# Patient Record
Sex: FEMALE | Race: WHITE | NOT HISPANIC OR LATINO | Employment: OTHER | URBAN - METROPOLITAN AREA
[De-identification: names, ages, dates, MRNs, and addresses within clinical notes are randomized per-mention and may not be internally consistent; named-entity substitution may affect disease eponyms.]

---

## 2017-03-07 ENCOUNTER — HISTORICAL (OUTPATIENT)
Dept: ADMINISTRATIVE | Facility: HOSPITAL | Age: 62
End: 2017-03-07

## 2017-03-13 LAB
CRP SERPL-MCNC: 0.55 MG/DL (ref 0–1.4)
URATE SERPL-MCNC: 5.5 MG/DL (ref 2.6–7.8)

## 2017-03-21 LAB — HLA B27 INTERPRETATION: NEGATIVE

## 2017-04-18 ENCOUNTER — HISTORICAL (OUTPATIENT)
Dept: ADMINISTRATIVE | Facility: HOSPITAL | Age: 62
End: 2017-04-18

## 2017-08-07 ENCOUNTER — OFFICE VISIT (OUTPATIENT)
Dept: RHEUMATOLOGY | Facility: CLINIC | Age: 62
End: 2017-08-07
Payer: MEDICAID

## 2017-08-07 VITALS
HEIGHT: 68 IN | BODY MASS INDEX: 44.41 KG/M2 | DIASTOLIC BLOOD PRESSURE: 82 MMHG | WEIGHT: 293 LBS | SYSTOLIC BLOOD PRESSURE: 141 MMHG

## 2017-08-07 DIAGNOSIS — M15.9 OSTEOARTHRITIS, GENERALIZED: Primary | ICD-10-CM

## 2017-08-07 PROCEDURE — 99213 OFFICE O/P EST LOW 20 MIN: CPT | Mod: ,,, | Performed by: INTERNAL MEDICINE

## 2017-08-07 PROCEDURE — 3008F BODY MASS INDEX DOCD: CPT | Mod: ,,, | Performed by: INTERNAL MEDICINE

## 2017-08-07 RX ORDER — CYANOCOBALAMIN 1000 UG/ML
INJECTION, SOLUTION INTRAMUSCULAR; SUBCUTANEOUS
COMMUNITY
End: 2019-02-19

## 2017-08-07 RX ORDER — EVENING PRIMROSE OIL 500 MG
CAPSULE ORAL
COMMUNITY
End: 2019-04-08

## 2017-08-07 RX ORDER — TRAMADOL HYDROCHLORIDE 50 MG/1
TABLET ORAL
COMMUNITY
End: 2018-06-07

## 2017-08-07 RX ORDER — INSULIN ASPART 100 [IU]/ML
INJECTION, SUSPENSION SUBCUTANEOUS
COMMUNITY
End: 2018-11-07

## 2017-08-07 RX ORDER — ERGOCALCIFEROL 1.25 MG/1
CAPSULE ORAL
COMMUNITY
End: 2019-02-19

## 2017-08-07 RX ORDER — GLUCOSAM/CHONDRO/HERB 149/HYAL 750-100 MG
2 TABLET ORAL NIGHTLY
COMMUNITY
End: 2019-05-20

## 2017-08-07 RX ORDER — ASPIRIN 325 MG
325 TABLET, DELAYED RELEASE (ENTERIC COATED) ORAL DAILY
Status: ON HOLD | COMMUNITY
End: 2021-08-08 | Stop reason: HOSPADM

## 2017-08-07 RX ORDER — ASCORBIC ACID 500 MG
500 TABLET ORAL DAILY
COMMUNITY
End: 2019-05-20

## 2017-08-07 RX ORDER — AMLODIPINE AND BENAZEPRIL HYDROCHLORIDE 10; 20 MG/1; MG/1
CAPSULE ORAL
COMMUNITY
End: 2017-08-23

## 2017-08-07 RX ORDER — ACETAMINOPHEN 160 MG/5ML
SUSPENSION, ORAL (FINAL DOSE FORM) ORAL
COMMUNITY
End: 2018-06-07

## 2017-08-07 RX ORDER — METOLAZONE 5 MG/1
5 TABLET ORAL DAILY
COMMUNITY
End: 2019-05-20

## 2017-08-07 RX ORDER — METFORMIN HYDROCHLORIDE 1000 MG/1
1000 TABLET ORAL 2 TIMES DAILY WITH MEALS
COMMUNITY
End: 2019-08-14 | Stop reason: SDUPTHER

## 2017-08-07 RX ORDER — DIFLUNISAL 500 MG/1
TABLET, FILM COATED ORAL
COMMUNITY
End: 2018-02-07 | Stop reason: SDUPTHER

## 2017-08-07 RX ORDER — FUROSEMIDE 40 MG/1
TABLET ORAL
COMMUNITY
End: 2017-08-23

## 2017-08-07 RX ORDER — POTASSIUM CHLORIDE 1500 MG/1
TABLET, EXTENDED RELEASE ORAL
COMMUNITY
End: 2019-02-19

## 2017-08-07 RX ORDER — METOPROLOL TARTRATE 50 MG/1
TABLET ORAL
COMMUNITY
End: 2018-02-07

## 2017-08-07 RX ORDER — BENAZEPRIL HYDROCHLORIDE 40 MG/1
TABLET ORAL
COMMUNITY
End: 2017-08-07 | Stop reason: SDUPTHER

## 2017-08-07 RX ORDER — HYDROCHLOROTHIAZIDE 50 MG/1
50 TABLET ORAL DAILY
COMMUNITY
End: 2019-05-20

## 2017-08-07 RX ORDER — DULOXETIN HYDROCHLORIDE 60 MG/1
CAPSULE, DELAYED RELEASE ORAL
COMMUNITY
End: 2017-12-14 | Stop reason: SDUPTHER

## 2017-08-07 RX ORDER — PRAVASTATIN SODIUM 40 MG/1
TABLET ORAL
COMMUNITY
End: 2017-08-23

## 2017-08-07 RX ORDER — BENAZEPRIL/HYDROCHLOROTHIAZIDE 20 MG-25MG
TABLET ORAL
COMMUNITY
End: 2017-08-23

## 2017-08-07 RX ORDER — ZOLPIDEM TARTRATE 12.5 MG/1
12.5 TABLET, FILM COATED, EXTENDED RELEASE ORAL NIGHTLY PRN
COMMUNITY
End: 2017-08-23

## 2017-08-07 NOTE — PROGRESS NOTES
[facility] RHEUMATOLOGY            PROGRESS NOTE      Subjective:       Patient ID:   NAME: Chanel Cervantes : 1955     61 y.o. female    Referring Doc: No ref. provider found  Other Physicians:    Chief Complaint:  Osteoarthritis (follow up, increased pain in both hands, increased in right with chronic pain)      History of Present Illness:     Patient returns today for a regularly scheduled follow-up visit for OA      The patient is experiencing pain in neck,PIPs,1CMC   No GI complaints  Has had 4 recent UTIs,gross hematuria  Has seen urology            ROS:   GEN:  No  fever, night sweats . weight is stable   + fatigue  SKIN: no rashes, no bruising, no ulcerations, no Raynaud's  HEENT: no HA's, No visual changes, no mucosal ulcers, no sicca symptoms except dry mouth  CV:   no CP, SOB, PND, WILSON, no orthopnea, no palpitations  PULM: normal with no SOB, cough, hemoptysis, sputum or pleuritic pain  GI:  no abdominal pain, nausea, vomiting, constipation, diarrhea, melanotic stools, BRBPR, hematemesis, no dysphagia  :   no dysuria  NEURO: no paresthesias, headaches, visual disturbances, muscle weakness  MUSCULOSKELETAL:+joint swelling 1 CMC,+ prolonged AM stiffness in hands, + back pain, + muscle pain  Allergies:  Review of patient's allergies indicates:   Allergen Reactions    Penicillins Edema     and burning sensation    Iodinated contrast- oral and iv dye Rash       Medications:    Current Outpatient Prescriptions:     amlodipine-benazepril 10-20mg (LOTREL) 10-20 mg per capsule, Take by mouth., Disp: , Rfl:     ascorbic acid, vitamin C, (VITAMIN C) 500 MG tablet, Take by mouth., Disp: , Rfl:     aspirin (ECOTRIN) 325 MG EC tablet, Take by mouth., Disp: , Rfl:     benazepril-hydrochlorthiazide (LOTENSIN HCT) 20-25 mg Tab, Take by mouth., Disp: , Rfl:     coenzyme Q10 200 mg capsule, Take by mouth., Disp: , Rfl:     cyanocobalamin (VITAMIN B-12) 1,000 mcg/mL injection, vitamin b-12 injection once a  "month Active, Disp: , Rfl:     diflunisal (DOLOBID) 500 mg Tab, Take by mouth., Disp: , Rfl:     duloxetine (CYMBALTA) 60 MG capsule, Take by mouth., Disp: , Rfl:     ergocalciferol (VITAMIN D2) 50,000 unit Cap, Take by mouth., Disp: , Rfl:     furosemide (LASIX) 40 MG tablet, Take by mouth., Disp: , Rfl:     hydrochlorothiazide (HYDRODIURIL) 25 MG tablet, Take by mouth., Disp: , Rfl:     insulin asp prt-insulin aspart, NOVOLOG 70/30, (NOVOLOG MIX 70-30) 100 unit/mL (70-30) Soln, Inject into the skin., Disp: , Rfl:     insulin lispro protamine-insulin lispro (HUMALOG MIX 75-25) 100 unit/mL (75-25) Susp, Inject into the skin., Disp: , Rfl:     insulin NPH (HUMULIN N) 100 unit/mL injection, Inject into the skin., Disp: , Rfl:     metformin (GLUCOPHAGE) 1000 MG tablet, Take by mouth., Disp: , Rfl:     metOLazone (ZAROXOLYN) 5 MG tablet, Take by mouth., Disp: , Rfl:     metoprolol tartrate (LOPRESSOR) 50 MG tablet, Take by mouth., Disp: , Rfl:     omega 3-dha-epa-fish oil (FISH OIL) 1,000 mg (120 mg-180 mg) Cap, Take by mouth., Disp: , Rfl:     potassium chloride (K-TAB) 20 mEq, Take by mouth., Disp: , Rfl:     pravastatin (PRAVACHOL) 40 MG tablet, Take by mouth., Disp: , Rfl:     tramadol (ULTRAM) 50 mg tablet, Take by mouth., Disp: , Rfl:     VITAMIN E, DL,TOCOPHERYL ACET, (VITAMIN E, DL, ACETATE,) 100 unit Cap, Take by mouth., Disp: , Rfl:     zolpidem (AMBIEN CR) 12.5 MG CR tablet, Take 12.5 mg by mouth nightly as needed for Insomnia., Disp: , Rfl:     PMHx/PSHx Updates:        Objective:     Vitals:  Blood pressure (!) 141/82, height 5' 8" (1.727 m), weight (!) 152 kg (335 lb).    Physical Examination:   GEN: no apparent distress, comfortable; AAOx3  SKIN: no rashes,no ulceration, no Raynaud's, no petechiae, no SQ nodules,  HEAD: normal  EYES: no pallor, no icterus,   NECK: no masses, thyroid normal, trachea midline, no LAD/LN's, supple  CV: RRR with no murmur; l S1 and S2 reg. ,no gallop no rubs, "   CHEST: Normal respiratory effort; CTAB; normal breath sounds; no wheeze or crackles  ABDOM: nontender and nondistended; soft; no masses; no rebound/guarding  MUSC/Skeletal: ROM normal; no crepitus; joints without synovitis,  no deformities  No joint swelling or tenderness of PIP, MCP, wrist, elbow, shoulder, or knee joints. Tenderness in both first carpometacarpal joints.  EXTREM: no clubbing, cyanosis, no edema,normal  pulses   NEURO: grossly intact; motor WNL; AAOx3; ,  PSYCH: normal mood, affect and behavior  LYMPH: normal cervical, supraclavicular          Labs:   No results found for: WBC, HGB, HCT, MCV, PLT CMP  @LASTLAB(NA,K,CL,CO2,GLU,BUN,Creatinine,Calcium,PROT,Albumin,Bilitot,Alkphos,AST,ALT,CRP,ESR,RF,CCP,LORIN,SSA,CPK,uric acid) )@  I have reviewed all available lab results and radiology reports.    Radiology/Diagnostic Studies:        Assessment/Plan:   (1) 61 y.o. female with diagnosis of Osteoarthritis. She has not been compliant with her Dolobid. Advised her to try Dolobid 500 mg 3 times a day after meals as needed. She has an appointment with orthopedic surgeon in a few days. If symptoms are not better she will most likely receive intra-articular injection. She brought copies of recent blood work, okay overall                Discussion:     I have explained all of the above in detail and the patient understands all of the current recommendation(s). I have answered all questions to the best of my ability and to their complete satisfaction.       The patient is to continue with the current management plan         RTC in   4 months      Electronically signed by Navdeep Orozco MD

## 2017-09-08 ENCOUNTER — TELEPHONE (OUTPATIENT)
Dept: SURGERY | Facility: CLINIC | Age: 62
End: 2017-09-08

## 2017-09-08 NOTE — TELEPHONE ENCOUNTER
----- Message from Felix TERRELL MD sent at 9/8/2017 11:46 AM CDT -----  Call pt u/s and marvin ok

## 2017-12-14 RX ORDER — DULOXETIN HYDROCHLORIDE 60 MG/1
60 CAPSULE, DELAYED RELEASE ORAL DAILY
Qty: 30 CAPSULE | Refills: 1 | Status: SHIPPED | OUTPATIENT
Start: 2017-12-14 | End: 2017-12-21 | Stop reason: SDUPTHER

## 2017-12-21 RX ORDER — DULOXETIN HYDROCHLORIDE 60 MG/1
60 CAPSULE, DELAYED RELEASE ORAL DAILY
Qty: 30 CAPSULE | Refills: 1 | Status: SHIPPED | OUTPATIENT
Start: 2017-12-21 | End: 2018-02-07 | Stop reason: SDUPTHER

## 2018-02-07 ENCOUNTER — OFFICE VISIT (OUTPATIENT)
Dept: RHEUMATOLOGY | Facility: CLINIC | Age: 63
End: 2018-02-07
Payer: MEDICAID

## 2018-02-07 VITALS
DIASTOLIC BLOOD PRESSURE: 78 MMHG | HEIGHT: 68 IN | SYSTOLIC BLOOD PRESSURE: 190 MMHG | WEIGHT: 293 LBS | BODY MASS INDEX: 44.41 KG/M2

## 2018-02-07 DIAGNOSIS — M19.91 PRIMARY OSTEOARTHRITIS, UNSPECIFIED SITE: Primary | ICD-10-CM

## 2018-02-07 PROCEDURE — 99213 OFFICE O/P EST LOW 20 MIN: CPT | Mod: ,,, | Performed by: INTERNAL MEDICINE

## 2018-02-07 PROCEDURE — 3008F BODY MASS INDEX DOCD: CPT | Mod: ,,, | Performed by: INTERNAL MEDICINE

## 2018-02-07 RX ORDER — CYCLOBENZAPRINE HCL 10 MG
TABLET ORAL
Qty: 30 TABLET | Refills: 1 | Status: SHIPPED | OUTPATIENT
Start: 2018-02-07 | End: 2019-05-20

## 2018-02-07 RX ORDER — DULOXETIN HYDROCHLORIDE 60 MG/1
60 CAPSULE, DELAYED RELEASE ORAL DAILY
Qty: 30 CAPSULE | Refills: 3 | Status: SHIPPED | OUTPATIENT
Start: 2018-02-07 | End: 2018-08-06 | Stop reason: SDUPTHER

## 2018-02-07 RX ORDER — DIFLUNISAL 500 MG/1
TABLET, FILM COATED ORAL
Qty: 90 TABLET | Refills: 3 | Status: SHIPPED | OUTPATIENT
Start: 2018-02-07 | End: 2019-02-11 | Stop reason: SDUPTHER

## 2018-02-07 NOTE — PROGRESS NOTES
[facility] RHEUMATOLOGY            PROGRESS NOTE      Subjective:       Patient ID:   NAME: Chanel Cervantes : 1955     62 y.o. female    Referring Doc: No ref. provider found  Other Physicians:    Chief Complaint:  No chief complaint on file.      History of Present Illness:     Patient returns today for a regularly scheduled follow-up visit for  Osteoarthritis     The patient is complaining of neck and low back pain.No paresthesias  No GI complaints  Taking meds for sinus congestion,Finished a few days of prednisone last night            ROS:   GEN:  No  fever, night sweats . weight is stable   + fatigue  SKIN: no rashes, no bruising, no ulcerations, no Raynaud's  HEENT: no HA's, No visual changes, no mucosal ulcers, no sicca symptoms,  CV:   no CP, SOB, PND, WILSON, no orthopnea, no palpitations  PULM: normal with no SOB, cough, hemoptysis, sputum or pleuritic pain  GI:  no abdominal pain, nausea, vomiting, constipation, diarrhea, melanotic stools, BRBPR, hematemesis, no dysphagia  :   no dysuria  NEURO: no paresthesias, headaches, visual disturbances, muscle weakness  MUSCULOSKELETAL:no joint swelling, prolonged AM stiffness, + back pain, + muscle pain  Allergies:  Review of patient's allergies indicates:   Allergen Reactions    Penicillins Edema     and burning sensation    Iodinated contrast- oral and iv dye Rash       Medications:    Current Outpatient Prescriptions:     ascorbic acid, vitamin C, (VITAMIN C) 500 MG tablet, Take by mouth., Disp: , Rfl:     aspirin (ECOTRIN) 325 MG EC tablet, Take by mouth., Disp: , Rfl:     benazepril (LOTENSIN) 40 MG tablet, Take 40 mg by mouth once daily., Disp: , Rfl:     coenzyme Q10 200 mg capsule, Take by mouth., Disp: , Rfl:     cyanocobalamin (VITAMIN B-12) 1,000 mcg/mL injection, vitamin b-12 injection once a month Active, Disp: , Rfl:     diflunisal (DOLOBID) 500 mg Tab, Take by mouth., Disp: , Rfl:     DULoxetine (CYMBALTA) 60 MG capsule, Take 1  "capsule (60 mg total) by mouth once daily., Disp: 30 capsule, Rfl: 1    ergocalciferol (VITAMIN D2) 50,000 unit Cap, Take by mouth., Disp: , Rfl:     estradiol (ESTRACE) 1 MG tablet, Take 1 mg by mouth 3 (three) times a week., Disp: , Rfl:     guanfacine (TENEX) 1 MG Tab, Take 1 mg by mouth every evening., Disp: , Rfl:     hydrochlorothiazide (HYDRODIURIL) 25 MG tablet, Take by mouth., Disp: , Rfl:     insulin asp prt-insulin aspart, NOVOLOG 70/30, (NOVOLOG MIX 70-30) 100 unit/mL (70-30) Soln, Inject into the skin., Disp: , Rfl:     insulin NPH (HUMULIN N) 100 unit/mL injection, Inject into the skin., Disp: , Rfl:     metformin (GLUCOPHAGE) 1000 MG tablet, Take by mouth., Disp: , Rfl:     metOLazone (ZAROXOLYN) 5 MG tablet, Take by mouth., Disp: , Rfl:     metoprolol succinate (TOPROL-XL) 50 MG 24 hr tablet, Take 100 mg by mouth once daily. , Disp: , Rfl:     omega 3-dha-epa-fish oil (FISH OIL) 1,000 mg (120 mg-180 mg) Cap, Take by mouth., Disp: , Rfl:     potassium chloride (K-TAB) 20 mEq, Take by mouth., Disp: , Rfl:     rosuvastatin (CRESTOR) 40 MG Tab, Take 40 mg by mouth every evening., Disp: , Rfl:     tramadol (ULTRAM) 50 mg tablet, Take by mouth., Disp: , Rfl:     VITAMIN E, DL,TOCOPHERYL ACET, (VITAMIN E, DL, ACETATE,) 100 unit Cap, Take by mouth., Disp: , Rfl:     zolpidem (AMBIEN) 5 MG Tab, Take 5 mg by mouth nightly as needed., Disp: , Rfl:     PMHx/PSHx Updates:    Objective:     Vitals:  Blood pressure (!) 190/78, height 5' 8" (1.727 m), weight (!) 156 kg (344 lb).    Physical Examination:   GEN: no apparent distress, comfortable; AAOx3  SKIN: no rashes,no ulceration, no Raynaud's, no petechiae, no SQ nodules,  HEAD: normal  EYES: no pallor, no icterus,  NECK: no masses, thyroid normal, trachea midline, no LAD/LN's, supple  CV: RRR with no murmur; l S1 and S2 reg. ,no gallop no rubs,   CHEST: Normal respiratory effort; CTAB; normal breath sounds; no wheeze or crackles  ABDOM: nontender " and nondistended; soft; no masses; no rebound/guarding  MUSC/Skeletal: ROM normal; no crepitus; joints without synovitis,  no deformities  No joint swelling or tenderness of PIP, MCP, wrist, elbow, shoulder, or knee joints,Spastic trapezius terry  EXTREM: no clubbing, cyanosis, no edema,normal  pulses   NEURO: grossly intact; motor WNL; AAOx3; ,   PSYCH: normal mood, affect and behavior  LYMPH: normal cervical, supraclavicular          Labs:   No results found for: WBC, HGB, HCT, MCV, PLT CMP  @LASTLAB(NA,K,CL,CO2,GLU,BUN,Creatinine,Calcium,PROT,Albumin,Bilitot,Alkphos,AST,ALT,CRP,ESR,RF,CCP,LORIN,SSA,CPK,uric acid) )@  I have reviewed all available lab results and radiology reports.    Radiology/Diagnostic Studies:        Assessment/Plan:   (1) 62 y.o. female with diagnosis of Osteoarthritis..stable  2) Cervical muscle spasm..Moist heat,Flexeril 5-10 mg tid prn  3) Elevated BP.will see PCP now.Stop any decongestants      Had labs for renal function  Add LFTs        Discussion:     I have explained all of the above in detail and the patient understands all of the current recommendation(s). I have answered all questions to the best of my ability and to their complete satisfaction.       The patient is to continue with the current management plan         RTC in   4 months      Electronically signed by Navdeep Orozco MD

## 2018-05-18 LAB
ALBUMIN SERPL-MCNC: 4.1 G/DL (ref 3.6–4.8)
ALP SERPL-CCNC: 62 IU/L (ref 39–117)
ALT SERPL-CCNC: 20 IU/L (ref 0–32)
AMBIG ABBREV HFP7 DEFAULT: NORMAL
AST SERPL-CCNC: 23 IU/L (ref 0–40)
BILIRUB DIRECT SERPL-MCNC: 0.1 MG/DL (ref 0–0.4)
BILIRUB SERPL-MCNC: 0.2 MG/DL (ref 0–1.2)
GGT SERPL-CCNC: 24 IU/L (ref 0–60)
PROT SERPL-MCNC: 6.5 G/DL (ref 6–8.5)

## 2018-06-07 ENCOUNTER — OFFICE VISIT (OUTPATIENT)
Dept: RHEUMATOLOGY | Facility: CLINIC | Age: 63
End: 2018-06-07
Payer: MEDICAID

## 2018-06-07 VITALS — BODY MASS INDEX: 52.08 KG/M2 | WEIGHT: 293 LBS | SYSTOLIC BLOOD PRESSURE: 148 MMHG | DIASTOLIC BLOOD PRESSURE: 72 MMHG

## 2018-06-07 DIAGNOSIS — M19.91 PRIMARY OSTEOARTHRITIS, UNSPECIFIED SITE: Primary | ICD-10-CM

## 2018-06-07 PROCEDURE — 99213 OFFICE O/P EST LOW 20 MIN: CPT | Mod: ,,, | Performed by: INTERNAL MEDICINE

## 2018-06-07 RX ORDER — CLONIDINE HYDROCHLORIDE 0.2 MG/1
0.3 TABLET ORAL 2 TIMES DAILY
COMMUNITY
End: 2019-08-01

## 2018-06-07 RX ORDER — AMLODIPINE BESYLATE 10 MG/1
10 TABLET ORAL DAILY
COMMUNITY
End: 2018-12-19

## 2018-06-07 NOTE — PROGRESS NOTES
[facility] RHEUMATOLOGY            PROGRESS NOTE      Subjective:       Patient ID:   NAME: Chanel Cervantes : 1955     62 y.o. female    Referring Doc: No ref. provider found  Other Physicians:    Chief Complaint:  Osteoarthritis      History of Present Illness:     Patient returns today for a regularly scheduled follow-up visit for  Osteoarthritis     The patient is complaining of pain in the right shoulder and neck pain. No paresthesias. No chest pains, cough shortness of breath. No gastrointestinal complaints. She tolerates Diflunisal well.            ROS:   GEN:  No  fever, night sweats . weight is stable   + fatigue  SKIN: no rashes, no bruising, no ulcerations, no Raynaud's  HEENT: no HA's, No visual changes, no mucosal ulcers, no sicca symptoms,  CV:   no CP, SOB, PND, WILSON, no orthopnea, no palpitations  PULM: normal with no SOB, cough, hemoptysis, sputum or pleuritic pain  GI:  no abdominal pain, nausea, vomiting, constipation, diarrhea, melanotic stools, BRBPR, hematemesis, no dysphagia  :   no dysuria  NEURO: no paresthesias, headaches, visual disturbances, muscle weakness  MUSCULOSKELETAL:no joint swelling, prolonged AM stiffness, Cervical back pain  Allergies:  Review of patient's allergies indicates:   Allergen Reactions    Penicillins Edema     and burning sensation    Iodinated contrast- oral and iv dye Rash       Medications:    Current Outpatient Prescriptions:     amLODIPine (NORVASC) 10 MG tablet, Take 10 mg by mouth once daily., Disp: , Rfl:     ascorbic acid, vitamin C, (VITAMIN C) 500 MG tablet, Take by mouth., Disp: , Rfl:     aspirin (ECOTRIN) 325 MG EC tablet, Take by mouth., Disp: , Rfl:     benazepril (LOTENSIN) 40 MG tablet, Take 40 mg by mouth once daily., Disp: , Rfl:     cloNIDine (CATAPRES) 0.3 MG tablet, Take 0.3 mg by mouth 2 (two) times daily., Disp: , Rfl:     cyanocobalamin (VITAMIN B-12) 1,000 mcg/mL injection, vitamin b-12 injection once a month Active, Disp:  , Rfl:     cyclobenzaprine (FLEXERIL) 10 MG tablet, 1/2- 1 po tid prn, Disp: 30 tablet, Rfl: 1    diflunisal (DOLOBID) 500 mg Tab, One po tid pc prn, Disp: 90 tablet, Rfl: 3    DULoxetine (CYMBALTA) 60 MG capsule, Take 1 capsule (60 mg total) by mouth once daily., Disp: 30 capsule, Rfl: 3    ergocalciferol (VITAMIN D2) 50,000 unit Cap, Take by mouth., Disp: , Rfl:     guanfacine (TENEX) 1 MG Tab, Take 1 mg by mouth every evening., Disp: , Rfl:     hydrochlorothiazide (HYDRODIURIL) 25 MG tablet, Take by mouth., Disp: , Rfl:     insulin asp prt-insulin aspart, NOVOLOG 70/30, (NOVOLOG MIX 70-30) 100 unit/mL (70-30) Soln, Inject into the skin., Disp: , Rfl:     insulin NPH (HUMULIN N) 100 unit/mL injection, Inject into the skin., Disp: , Rfl:     metformin (GLUCOPHAGE) 1000 MG tablet, Take by mouth., Disp: , Rfl:     metOLazone (ZAROXOLYN) 5 MG tablet, Take by mouth., Disp: , Rfl:     omega 3-dha-epa-fish oil (FISH OIL) 1,000 mg (120 mg-180 mg) Cap, Take by mouth., Disp: , Rfl:     potassium chloride (K-TAB) 20 mEq, Take by mouth., Disp: , Rfl:     rosuvastatin (CRESTOR) 40 MG Tab, Take 40 mg by mouth every evening., Disp: , Rfl:     VITAMIN E, DL,TOCOPHERYL ACET, (VITAMIN E, DL, ACETATE,) 100 unit Cap, Take by mouth., Disp: , Rfl:     PMHx/PSHx Updates:          Objective:     Vitals:  Blood pressure (!) 148/72, weight (!) 155.4 kg (342 lb 8 oz).    Physical Examination:   GEN: no apparent distress, comfortable; AAOx3  SKIN: no rashes,no ulceration, no Raynaud's, no petechiae, no SQ nodules,  HEAD: normal  EYES: no pallor, no icterus,  NECK: no masses, thyroid normal, trachea midline, no LAD/LN's, supple  CV: RRR with no murmur; l S1 and S2 reg. ,no gallop no rubs,   CHEST: Normal respiratory effort; CTAB; normal breath sounds; no wheeze or crackles  MUSC/Skeletal: ROM Decrease in right shoulder; no crepitus; joints without synovitis,  no deformities  No joint swelling or tenderness of PIP, MCP, wrist,  elbow, shoulder, or knee joints  EXTREM: no clubbing, cyanosis, no edema,normal  pulses   NEURO: grossly intact; motor WNL; AAOx3;  PSYCH: normal mood, affect and behavior  LYMPH: normal cervical, supraclavicular          Labs:   No results found for: WBC, HGB, HCT, MCV, PLT CMP  @LASTLAB(NA,K,CL,CO2,GLU,BUN,Creatinine,Calcium,PROT,Albumin,Bilitot,Alkphos,AST,ALT,CRP,ESR,RF,CCP,LORIN,SSA,CPK,uric acid) )@  I have reviewed all available lab results and radiology reports.    Radiology/Diagnostic Studies:    Reviewed blood work from last month: creatinine 1.04 liver tests within normal limits    Assessment/Plan:   (1) 62 y.o. female with diagnosis of Osteoarthritis. She is stable  2) right frozen shoulder. She will start physical  therapy soon.              Discussion:     I have explained all of the above in detail and the patient understands all of the current recommendation(s). I have answered all questions to the best of my ability and to their complete satisfaction.       The patient is to continue with the current management plan         RTC in   4 months or before if needed      Electronically signed by Navdeep Orozco MD

## 2018-08-06 RX ORDER — DULOXETIN HYDROCHLORIDE 60 MG/1
60 CAPSULE, DELAYED RELEASE ORAL DAILY
Qty: 30 CAPSULE | Refills: 3 | Status: SHIPPED | OUTPATIENT
Start: 2018-08-06 | End: 2018-10-29 | Stop reason: SDUPTHER

## 2018-08-13 ENCOUNTER — TELEPHONE (OUTPATIENT)
Dept: SURGERY | Facility: CLINIC | Age: 63
End: 2018-08-13

## 2018-08-13 DIAGNOSIS — R92.0 MICROCALCIFICATION OF BOTH BREASTS ON MAMMOGRAPHY: Primary | ICD-10-CM

## 2018-08-13 DIAGNOSIS — Z87.898 HISTORY OF ABNORMAL MAMMOGRAM: ICD-10-CM

## 2018-09-11 ENCOUNTER — TELEPHONE (OUTPATIENT)
Dept: SURGERY | Facility: CLINIC | Age: 63
End: 2018-09-11

## 2018-10-29 ENCOUNTER — OFFICE VISIT (OUTPATIENT)
Dept: RHEUMATOLOGY | Facility: CLINIC | Age: 63
End: 2018-10-29
Payer: MEDICAID

## 2018-10-29 VITALS
BODY MASS INDEX: 49.57 KG/M2 | HEART RATE: 59 BPM | DIASTOLIC BLOOD PRESSURE: 61 MMHG | WEIGHT: 293 LBS | SYSTOLIC BLOOD PRESSURE: 98 MMHG

## 2018-10-29 DIAGNOSIS — M15.9 PRIMARY OSTEOARTHRITIS INVOLVING MULTIPLE JOINTS: Primary | ICD-10-CM

## 2018-10-29 LAB
ALBUMIN SERPL-MCNC: 3.9 G/DL (ref 3.1–4.7)
ALP SERPL-CCNC: 56 IU/L (ref 40–104)
ALT (SGPT): 21 IU/L (ref 3–33)
AST SERPL-CCNC: 25 IU/L (ref 10–40)
BASOPHILS NFR BLD: 0.1 K/UL (ref 0–0.2)
BASOPHILS NFR BLD: 1 %
BILIRUB SERPL-MCNC: 0.6 MG/DL (ref 0.3–1)
BUN SERPL-MCNC: 15 MG/DL (ref 8–20)
CALCIUM SERPL-MCNC: 9.9 MG/DL (ref 7.7–10.4)
CHLORIDE: 99 MMOL/L (ref 98–110)
CO2 SERPL-SCNC: 30 MMOL/L (ref 22.8–31.6)
CREATININE: 1.03 MG/DL (ref 0.6–1.4)
CRP SERPL-MCNC: 1.14 MG/DL (ref 0–1.4)
EOSINOPHIL NFR BLD: 0.1 K/UL (ref 0–0.7)
EOSINOPHIL NFR BLD: 1.9 %
ERYTHROCYTE [DISTWIDTH] IN BLOOD BY AUTOMATED COUNT: 14.8 % (ref 11.7–14.9)
GLUCOSE: 139 MG/DL (ref 70–99)
GRAN #: 4.8 K/UL (ref 1.4–6.5)
GRAN%: 66.5 %
HCT VFR BLD AUTO: 45.2 % (ref 36–48)
HGB BLD-MCNC: 13.9 G/DL (ref 12–15)
IMMATURE GRANS (ABS): 0 K/UL (ref 0–1)
IMMATURE GRANULOCYTES: 0.3 %
LYMPH #: 1.5 K/UL (ref 1.2–3.4)
LYMPH%: 20.5 %
MCH RBC QN AUTO: 26.2 PG (ref 25–35)
MCHC RBC AUTO-ENTMCNC: 30.8 G/DL (ref 31–36)
MCV RBC AUTO: 85.1 FL (ref 79–98)
MONO #: 0.7 K/UL (ref 0.1–0.6)
MONO%: 9.8 %
NUCLEATED RBCS: 0 %
PLATELET # BLD AUTO: 326 K/UL (ref 140–440)
PMV BLD AUTO: 11.6 FL (ref 8.8–12.7)
POTASSIUM SERPL-SCNC: 3.7 MMOL/L (ref 3.5–5)
PROT SERPL-MCNC: 7.1 G/DL (ref 6–8.2)
RBC # BLD AUTO: 5.31 M/UL (ref 3.5–5.5)
SODIUM: 142 MMOL/L (ref 134–144)
WBC # BLD AUTO: 7.2 K/UL (ref 5–10)

## 2018-10-29 PROCEDURE — 99213 OFFICE O/P EST LOW 20 MIN: CPT | Mod: ,,, | Performed by: INTERNAL MEDICINE

## 2018-10-29 RX ORDER — DULOXETIN HYDROCHLORIDE 60 MG/1
60 CAPSULE, DELAYED RELEASE ORAL DAILY
Qty: 30 CAPSULE | Refills: 3 | Status: SHIPPED | OUTPATIENT
Start: 2018-10-29 | End: 2019-02-26 | Stop reason: SDUPTHER

## 2018-10-29 RX ORDER — METOPROLOL SUCCINATE 25 MG/1
25 TABLET, EXTENDED RELEASE ORAL DAILY
COMMUNITY
End: 2020-09-22 | Stop reason: SDUPTHER

## 2018-10-29 NOTE — PROGRESS NOTES
[facility] RHEUMATOLOGY            PROGRESS NOTE      Subjective:       Patient ID:   NAME: Chanel Cervantes : 1955     63 y.o. female    Referring Doc: No ref. provider found  Other Physicians:    Chief Complaint:  Osteoarthritis      History of Present Illness:     Patient returns today for a regularly scheduled follow-up visit for  Osteoarthritis.     The patient is doing well overall.  Planning on bariatric surgery soon.Pain in r post neck,no paresthesias.No CP,cough or SOB  No GI complaints  Finished PT for R frozen shoulder,as per pt            ROS:   GEN:  No  fever, night sweats . weight is stable  + somefatigue  SKIN: no rashes, no bruising, no ulcerations, no Raynaud's  HEENT: no HA's, No visual changes, no mucosal ulcers, no sicca symptoms,  CV:   no CP, SOB, PND, WILSON, no orthopnea, no palpitations  PULM: normal with no SOB, cough, hemoptysis, sputum or pleuritic pain  GI:  no abdominal pain, nausea, vomiting, constipation, diarrhea, melanotic stools, BRBPR, hematemesis, no dysphagia  :   no dysuria  NEURO: no paresthesias, headaches, visual disturbances, muscle weakness  MUSCULOSKELETAL:no joint swelling, prolonged AM stiffness, no back pain, + muscle pain  Allergies:  Review of patient's allergies indicates:   Allergen Reactions    Penicillins Edema     and burning sensation    Iodinated contrast- oral and iv dye Rash       Medications:    Current Outpatient Medications:     amLODIPine (NORVASC) 10 MG tablet, Take 10 mg by mouth once daily., Disp: , Rfl:     ascorbic acid, vitamin C, (VITAMIN C) 500 MG tablet, Take by mouth., Disp: , Rfl:     aspirin (ECOTRIN) 325 MG EC tablet, Take by mouth., Disp: , Rfl:     benazepril (LOTENSIN) 40 MG tablet, Take 40 mg by mouth 2 (two) times daily. , Disp: , Rfl:     cloNIDine (CATAPRES) 0.2 MG tablet, Take 0.3 mg by mouth 2 (two) times daily., Disp: , Rfl:     cyanocobalamin (VITAMIN B-12) 1,000 mcg/mL injection, vitamin b-12 injection once a  month Active, Disp: , Rfl:     cyclobenzaprine (FLEXERIL) 10 MG tablet, 1/2- 1 po tid prn, Disp: 30 tablet, Rfl: 1    diflunisal (DOLOBID) 500 mg Tab, One po tid pc prn, Disp: 90 tablet, Rfl: 3    DULoxetine (CYMBALTA) 60 MG capsule, Take 1 capsule (60 mg total) by mouth once daily., Disp: 30 capsule, Rfl: 3    ergocalciferol (VITAMIN D2) 50,000 unit Cap, Take by mouth., Disp: , Rfl:     guanfacine (TENEX) 1 MG Tab, Take 1 mg by mouth every evening., Disp: , Rfl:     hydrochlorothiazide (HYDRODIURIL) 25 MG tablet, Take by mouth., Disp: , Rfl:     insulin asp prt-insulin aspart, NOVOLOG 70/30, (NOVOLOG MIX 70-30) 100 unit/mL (70-30) Soln, Inject into the skin., Disp: , Rfl:     insulin NPH (HUMULIN N) 100 unit/mL injection, Inject into the skin., Disp: , Rfl:     metformin (GLUCOPHAGE) 1000 MG tablet, Take by mouth., Disp: , Rfl:     metOLazone (ZAROXOLYN) 5 MG tablet, Take by mouth., Disp: , Rfl:     metoprolol succinate (TOPROL-XL) 50 MG 24 hr tablet, Take 50 mg by mouth once daily., Disp: , Rfl:     omega 3-dha-epa-fish oil (FISH OIL) 1,000 mg (120 mg-180 mg) Cap, Take by mouth., Disp: , Rfl:     potassium chloride (K-TAB) 20 mEq, Take by mouth., Disp: , Rfl:     rosuvastatin (CRESTOR) 40 MG Tab, Take 40 mg by mouth every evening., Disp: , Rfl:     VITAMIN E, DL,TOCOPHERYL ACET, (VITAMIN E, DL, ACETATE,) 100 unit Cap, Take by mouth., Disp: , Rfl:     PMHx/PSHx Updates:          Objective:     Vitals:  Blood pressure 98/61, pulse (!) 59, weight (!) 147.9 kg (326 lb).    Physical Examination:   GEN: no apparent distress, comfortable; AAOx3  SKIN: no rashes,no ulceration, no Raynaud's, no petechiae, no SQ nodules,  HEAD: normal  EYES: no pallor, no icterus,   NECK: no masses, thyroid normal, trachea midline, no LAD/LN's, supple  CV: RRR with no murmur; l S1 and S2 reg. ,no gallop no rubs,   CHEST: Normal respiratory,clear  MUSC/Skeletal: ROM normal; no crepitus; joints without synovitis,  no  deformities  No joint swelling or tenderness of PIP, MCP, wrist, elbow, shoulder, or knee joints.Tenderness r trapezius.Shoulder FROM,no swelling  EXTREM: no clubbing, cyanosis, no edema,normal  pulses   NEURO: grossly intact; motor WNL; AAOx3;   PSYCH: normal mood, affect and behavior  LYMPH: normal cervical, supraclavicular          Labs:   No results found for: WBC, HGB, HCT, MCV, PLT CMP  @LASTLAB(NA,K,CL,CO2,GLU,BUN,Creatinine,Calcium,PROT,Albumin,Bilitot,Alkphos,AST,ALT,CRP,ESR,RF,CCP,LORIN,SSA,CPK,uric acid) )@  I have reviewed all available lab results and radiology reports.    Radiology/Diagnostic Studies:        Assessment/Plan:   (1) 63 y.o. female with diagnosis of osteoarthritis. She is stable          CBC CMP      Discussion:     I have explained all of the above in detail and the patient understands all of the current recommendation(s). I have answered all questions to the best of my ability and to their complete satisfaction.       The patient is to continue with the current management plan         RTC in 4 months      Electronically signed by Navdeep Orozco MD

## 2018-11-07 ENCOUNTER — OFFICE VISIT (OUTPATIENT)
Dept: FAMILY MEDICINE | Facility: CLINIC | Age: 63
End: 2018-11-07
Payer: MEDICARE

## 2018-11-07 VITALS
HEIGHT: 68 IN | RESPIRATION RATE: 20 BRPM | BODY MASS INDEX: 44.41 KG/M2 | SYSTOLIC BLOOD PRESSURE: 136 MMHG | DIASTOLIC BLOOD PRESSURE: 72 MMHG | OXYGEN SATURATION: 98 % | HEART RATE: 60 BPM | WEIGHT: 293 LBS

## 2018-11-07 DIAGNOSIS — I10 BENIGN ESSENTIAL HYPERTENSION: ICD-10-CM

## 2018-11-07 DIAGNOSIS — E11.9 TYPE 2 DIABETES MELLITUS TREATED WITH INSULIN: ICD-10-CM

## 2018-11-07 DIAGNOSIS — I87.2 VENOUS INSUFFICIENCY: ICD-10-CM

## 2018-11-07 DIAGNOSIS — Z79.4 TYPE 2 DIABETES MELLITUS TREATED WITH INSULIN: ICD-10-CM

## 2018-11-07 DIAGNOSIS — B37.2 CANDIDAL INTERTRIGO: ICD-10-CM

## 2018-11-07 DIAGNOSIS — E55.9 VITAMIN D DEFICIENCY: ICD-10-CM

## 2018-11-07 DIAGNOSIS — Z95.1 HX OF CABG: ICD-10-CM

## 2018-11-07 DIAGNOSIS — I25.2 H/O ACUTE MYOCARDIAL INFARCTION: ICD-10-CM

## 2018-11-07 DIAGNOSIS — E78.2 MIXED HYPERLIPIDEMIA: ICD-10-CM

## 2018-11-07 PROCEDURE — 99204 OFFICE O/P NEW MOD 45 MIN: CPT | Mod: ,,, | Performed by: INTERNAL MEDICINE

## 2018-11-07 RX ORDER — NYSTATIN 100000 [USP'U]/G
POWDER TOPICAL 2 TIMES DAILY
Qty: 60 G | Refills: 2 | Status: SHIPPED | OUTPATIENT
Start: 2018-11-07 | End: 2019-02-19

## 2018-11-07 RX ORDER — KETOCONAZOLE 20 MG/G
CREAM TOPICAL 2 TIMES DAILY
Qty: 60 G | Refills: 2 | Status: SHIPPED | OUTPATIENT
Start: 2018-11-07 | End: 2019-02-19

## 2018-11-07 RX ORDER — LANOLIN ALCOHOL/MO/W.PET/CERES
400 CREAM (GRAM) TOPICAL DAILY
COMMUNITY
End: 2019-05-20

## 2018-11-07 RX ORDER — INSULIN GLARGINE 300 [IU]/ML
30 INJECTION, SOLUTION SUBCUTANEOUS DAILY
Qty: 3 ML | Refills: 2 | Status: SHIPPED | OUTPATIENT
Start: 2018-11-07 | End: 2018-11-19 | Stop reason: SDUPTHER

## 2018-11-07 RX ORDER — FLUCONAZOLE 100 MG/1
100 TABLET ORAL WEEKLY
Qty: 4 TABLET | Refills: 0 | Status: SHIPPED | OUTPATIENT
Start: 2018-11-07 | End: 2018-12-07

## 2018-11-07 NOTE — PATIENT INSTRUCTIONS
Start toujeo 30 units daily.  Titrate up by 2 units every 3 days until fasting blood sugar is consistently under 130.  Please contact me via the portal every week to let me know how it's going.

## 2018-11-07 NOTE — PROGRESS NOTES
"                                                NEW ADULT PATIENT NOTE    Subjective:     Chief Complaint:  Establish Care      History of Present Illness:   Chanel Cervantes is a 63 y.o. female here to establish care with me. She is a former pt of Dr. Hernandez.    Diabetes- Pt was previously on  units QHS and novolog 70/30 80 units BID.  She reports her last HbA1c was <6%. More recently she has been on a strict diet in preparation for bariatric surgery and has not required such high doses. She also ran out of NPH and was unable to refill it as her insurance no longer prescribes it. She brought detailed logs of both glucose and insulin administration. If her blood sugar is <100 she does not take any insulin at all, if it is higher she gives herself insulin on an unspecified sliding scale.  Doses range from 30->100 units/day of 70/30.  Few lows noted in the 50's.   Rash- Pt c/o rash under her stomach. It is itchy and painful.  There are ashley where the skin has "split open." She states she is "always sweating" and can't keep the area dry.  She has tried medicated powder and bacitracin ointment w/o improvement.     Past Medical History:   Diagnosis Date    CAD (coronary artery disease)     Diabetes mellitus, type 2     MI (myocardial infarction)        History reviewed. No pertinent family history.    Social History     Socioeconomic History    Marital status:      Spouse name: Not on file    Number of children: Not on file    Years of education: Not on file    Highest education level: Not on file   Social Needs    Financial resource strain: Not on file    Food insecurity - worry: Not on file    Food insecurity - inability: Not on file    Transportation needs - medical: Not on file    Transportation needs - non-medical: Not on file   Occupational History    Not on file   Tobacco Use    Smoking status: Never Smoker    Smokeless tobacco: Never Used   Substance and Sexual Activity    Alcohol " use: No    Drug use: No    Sexual activity: Not on file   Other Topics Concern    Not on file   Social History Narrative    Not on file       ROS:  Review of Systems   Constitutional: Negative for fatigue, fever and unexpected weight change.   Respiratory: Negative for cough, shortness of breath and wheezing.    Cardiovascular: Positive for leg swelling (chronic L foot/leg).   Gastrointestinal: Negative for abdominal pain, blood in stool, constipation, diarrhea, nausea and vomiting.   Endocrine: Negative for polydipsia, polyphagia and polyuria.   Musculoskeletal: Positive for back pain, joint swelling, myalgias and neck pain.   Skin: Positive for rash.   Neurological: Negative for seizures, syncope, weakness and numbness.   Psychiatric/Behavioral: Negative for confusion.          Current Outpatient Medications:     amLODIPine (NORVASC) 10 MG tablet, Take 10 mg by mouth once daily., Disp: , Rfl:     ascorbic acid, vitamin C, (VITAMIN C) 500 MG tablet, Take by mouth., Disp: , Rfl:     aspirin (ECOTRIN) 325 MG EC tablet, Take by mouth., Disp: , Rfl:     benazepril (LOTENSIN) 40 MG tablet, Take 40 mg by mouth 2 (two) times daily. , Disp: , Rfl:     cloNIDine (CATAPRES) 0.2 MG tablet, Take 0.3 mg by mouth 2 (two) times daily., Disp: , Rfl:     cyanocobalamin (VITAMIN B-12) 1,000 mcg/mL injection, vitamin b-12 injection once a month Active, Disp: , Rfl:     cyclobenzaprine (FLEXERIL) 10 MG tablet, 1/2- 1 po tid prn, Disp: 30 tablet, Rfl: 1    diflunisal (DOLOBID) 500 mg Tab, One po tid pc prn, Disp: 90 tablet, Rfl: 3    DULoxetine (CYMBALTA) 60 MG capsule, Take 1 capsule (60 mg total) by mouth once daily., Disp: 30 capsule, Rfl: 3    ergocalciferol (VITAMIN D2) 50,000 unit Cap, Take by mouth., Disp: , Rfl:     guanfacine (TENEX) 1 MG Tab, Take 1 mg by mouth every evening., Disp: , Rfl:     hydroCHLOROthiazide (HYDRODIURIL) 50 MG tablet, Take 50 mg by mouth once daily. , Disp: , Rfl:     magnesium oxide  "(MAG-OX) 400 mg (241.3 mg magnesium) tablet, Take 400 mg by mouth once daily., Disp: , Rfl:     metformin (GLUCOPHAGE) 1000 MG tablet, Take by mouth., Disp: , Rfl:     metOLazone (ZAROXOLYN) 5 MG tablet, Take by mouth., Disp: , Rfl:     metoprolol succinate (TOPROL-XL) 50 MG 24 hr tablet, Take 50 mg by mouth once daily., Disp: , Rfl:     omega 3-dha-epa-fish oil (FISH OIL) 1,000 mg (120 mg-180 mg) Cap, Take by mouth., Disp: , Rfl:     potassium chloride (K-TAB) 20 mEq, Take by mouth., Disp: , Rfl:     rosuvastatin (CRESTOR) 40 MG Tab, Take 40 mg by mouth every evening., Disp: , Rfl:     VITAMIN E, DL,TOCOPHERYL ACET, (VITAMIN E, DL, ACETATE,) 100 unit Cap, Take by mouth., Disp: , Rfl:     DOCOSAHEXANOIC ACID ORAL, Take by mouth., Disp: , Rfl:     fluconazole (DIFLUCAN) 100 MG tablet, Take 1 tablet (100 mg total) by mouth once a week., Disp: 4 tablet, Rfl: 0    insulin glargine, TOUJEO, (TOUJEO SOLOSTAR U-300 INSULIN) 300 unit/mL (1.5 mL) InPn pen, Inject 30 Units into the skin once daily., Disp: 3 mL, Rfl: 2    ketoconazole (NIZORAL) 2 % cream, Apply topically 2 (two) times daily., Disp: 60 g, Rfl: 2    nystatin (MYCOSTATIN) powder, Apply topically 2 (two) times daily., Disp: 60 g, Rfl: 2    pen needle, diabetic (BD ULTRA-FINE ORIG PEN NEEDLE) 29 gauge x 1/2" Ndle, 1 each by Misc.(Non-Drug; Combo Route) route once daily., Disp: 100 each, Rfl: 5        Objective:       Physical Examination:     /72   Pulse 60   Resp 20   Ht 5' 8" (1.727 m)   Wt (!) 147.2 kg (324 lb 7 oz)   SpO2 98%   BMI 49.33 kg/m²     Physical Exam   Constitutional:   Pleasant, morbidly obese woman in NAD   Eyes: Conjunctivae are normal. Pupils are equal, round, and reactive to light.   Cardiovascular: Normal rate, regular rhythm, normal heart sounds and intact distal pulses.   Pulmonary/Chest: Effort normal and breath sounds normal. No respiratory distress.   Abdominal: Soft. Bowel sounds are normal. She exhibits no mass. "   Musculoskeletal: She exhibits edema (B feet, L>R with venous stasis skin changes and varicose veins).   Skin: Rash (confluent erythema with maceration, mild scaling under pannus and in groin) noted. She is not diaphoretic.              Assessment/Plan:   Chanel is a 63 y.o. female here to establish care.     Problem List Items Addressed This Visit        Cardiac/Vascular    Benign essential hypertension    Current Assessment & Plan     Per pt has been difficult to control but is normal today on HCTZ, guanfacine, clonidine, benazepril, amlodipine, metoprolol.          Relevant Orders    Comprehensive metabolic panel    Mixed hyperlipidemia    Current Assessment & Plan     Continue crestor. Check lipids.          Relevant Orders    Lipid panel    H/O acute myocardial infarction    Hx of CABG    Current Assessment & Plan     On aspirin, statin, BP control. F/u with cardiology.          Venous insufficiency    Current Assessment & Plan     Encouraged use of compression stockings.             ID    Candidal intertrigo    Current Assessment & Plan     Rx ketoconzole cream, diflucan x 4 weeks.  Nystatin powder to keep area dry.          Relevant Medications    fluconazole (DIFLUCAN) 100 MG tablet    ketoconazole (NIZORAL) 2 % cream    nystatin (MYCOSTATIN) powder       Endocrine    Type 2 diabetes mellitus treated with insulin    Current Assessment & Plan     Stop 70/30, start toujeo at 30 units daily, titrate up until fasting glucose <130. Continue metformin. HbA1c ordered.          Relevant Medications    insulin glargine, TOUJEO, (TOUJEO SOLOSTAR U-300 INSULIN) 300 unit/mL (1.5 mL) InPn pen    Other Relevant Orders    Hemoglobin A1c    Vitamin D deficiency          Health Maintenance   Topic Date Due    Hepatitis C Screening  1955    Foot Exam  08/20/1965    Eye Exam  08/20/1965    Colonoscopy  01/18/2020 (Originally 8/20/2005)    Hemoglobin A1c  02/23/2019    Lipid Panel  08/23/2019    Mammogram   09/10/2020    Pneumococcal PPSV23 (Medium Risk) (2) 05/09/2022    TETANUS VACCINE  09/21/2027    Zoster Vaccine  Completed    Influenza Vaccine  Completed       Discussion:     Follow-up in about 1 month (around 12/7/2018) for f/u diabetes.    Goals     None          Electronically signed by Mindy Funk

## 2018-11-08 RX ORDER — PEN NEEDLE, DIABETIC 29 G X1/2"
1 NEEDLE, DISPOSABLE MISCELLANEOUS DAILY
Qty: 100 EACH | Refills: 5 | Status: SHIPPED | OUTPATIENT
Start: 2018-11-08 | End: 2019-02-19

## 2018-11-08 NOTE — ASSESSMENT & PLAN NOTE
Stop 70/30, start toujeo at 30 units daily, titrate up until fasting glucose <130. Continue metformin. HbA1c ordered.

## 2018-11-08 NOTE — ASSESSMENT & PLAN NOTE
Per pt has been difficult to control but is normal today on HCTZ, guanfacine, clonidine, benazepril, amlodipine, metoprolol.

## 2018-11-12 ENCOUNTER — PATIENT MESSAGE (OUTPATIENT)
Dept: FAMILY MEDICINE | Facility: CLINIC | Age: 63
End: 2018-11-12

## 2018-11-14 ENCOUNTER — PATIENT MESSAGE (OUTPATIENT)
Dept: FAMILY MEDICINE | Facility: CLINIC | Age: 63
End: 2018-11-14

## 2018-11-16 ENCOUNTER — PATIENT MESSAGE (OUTPATIENT)
Dept: FAMILY MEDICINE | Facility: CLINIC | Age: 63
End: 2018-11-16

## 2018-11-19 ENCOUNTER — CLINICAL SUPPORT (OUTPATIENT)
Dept: FAMILY MEDICINE | Facility: CLINIC | Age: 63
End: 2018-11-19
Payer: MEDICARE

## 2018-11-19 ENCOUNTER — PATIENT MESSAGE (OUTPATIENT)
Dept: FAMILY MEDICINE | Facility: CLINIC | Age: 63
End: 2018-11-19

## 2018-11-19 DIAGNOSIS — E11.9 TYPE 2 DIABETES MELLITUS TREATED WITH INSULIN: ICD-10-CM

## 2018-11-19 DIAGNOSIS — Z79.4 TYPE 2 DIABETES MELLITUS TREATED WITH INSULIN: ICD-10-CM

## 2018-11-19 DIAGNOSIS — R30.0 DYSURIA: Primary | ICD-10-CM

## 2018-11-19 LAB
BILIRUB UR QL STRIP: NEGATIVE
GLUCOSE UR QL STRIP: POSITIVE
KETONES UR QL STRIP: NEGATIVE
LEUKOCYTE ESTERASE UR QL STRIP: NEGATIVE
PH, POC UA: 7.5 (ref 5–8.5)
POC BLOOD, URINE: NEGATIVE
POC NITRATES, URINE: NEGATIVE
PROT UR QL STRIP: NEGATIVE
SP GR UR STRIP: 1.01 (ref 1–1.03)
UROBILINOGEN UR STRIP-ACNC: NORMAL (ref 0.2–8)

## 2018-11-19 PROCEDURE — 81003 URINALYSIS AUTO W/O SCOPE: CPT | Mod: QW,,, | Performed by: INTERNAL MEDICINE

## 2018-11-19 RX ORDER — INSULIN GLARGINE 300 [IU]/ML
55 INJECTION, SOLUTION SUBCUTANEOUS DAILY
Qty: 3 ML | Refills: 2
Start: 2018-11-19 | End: 2018-11-26

## 2018-11-19 NOTE — PROGRESS NOTES
"Pt came in for Urine collection R/T dysuria. Urine Clean Catch collected. Positive Glucose. MD notified with orders for C&S. Pt educated on the use of her Toujeo Pen and the increase in dose to 55 units. Pt performed admin and noted some education to proper medication admin was needed. Educated pt on cleaning surface of site letting dry and pt stated, "the site I use is the ABD and I do alternate sites". PT educated to clean pen before placing on needle and priming needle with 2 units before dialing up dose. Pt educated to stick pen level at site and to hold for at least 8 seconds after the click to ensure delivery. Pt performed steps on mock pen kit. Pt verbally stated the steps properly. Educated on s/s of Ketoacidosis and verbally stated back s/s. Pt educated on chair exercises and water aerobics to help with diet and medication regime. Pt educated on what were carbs and what could change into glucose and to try to eat those more during the day in moderation. Pt stated, "I will increase my Toujeo to 55 units tonight. If I do not see a gradual change I will send a message on my Portal Wednesday. I understand may take up to two weeks to level out. I will meet with my dietician on Friday". Pt educated that her urine will be sent for Cx today if she will need an Abt we will inform her when the results come back. Pt stated verbal understanding.  "

## 2018-11-21 ENCOUNTER — PATIENT MESSAGE (OUTPATIENT)
Dept: FAMILY MEDICINE | Facility: CLINIC | Age: 63
End: 2018-11-21

## 2018-11-21 DIAGNOSIS — N30.90 CYSTITIS WITHOUT HEMATURIA: Primary | ICD-10-CM

## 2018-11-21 RX ORDER — NITROFURANTOIN 25; 75 MG/1; MG/1
100 CAPSULE ORAL 2 TIMES DAILY
Qty: 14 CAPSULE | Refills: 0 | Status: SHIPPED | OUTPATIENT
Start: 2018-11-21 | End: 2018-11-28

## 2018-11-26 ENCOUNTER — PATIENT MESSAGE (OUTPATIENT)
Dept: FAMILY MEDICINE | Facility: CLINIC | Age: 63
End: 2018-11-26

## 2018-11-26 DIAGNOSIS — Z79.4 TYPE 2 DIABETES MELLITUS TREATED WITH INSULIN: ICD-10-CM

## 2018-11-26 DIAGNOSIS — E11.9 TYPE 2 DIABETES MELLITUS TREATED WITH INSULIN: ICD-10-CM

## 2018-11-26 RX ORDER — INSULIN GLARGINE 300 [IU]/ML
65 INJECTION, SOLUTION SUBCUTANEOUS DAILY
Qty: 3 SYRINGE | Refills: 2 | Status: SHIPPED | OUTPATIENT
Start: 2018-11-26 | End: 2018-12-14 | Stop reason: SDUPTHER

## 2018-11-27 ENCOUNTER — PATIENT MESSAGE (OUTPATIENT)
Dept: FAMILY MEDICINE | Facility: CLINIC | Age: 63
End: 2018-11-27

## 2018-11-28 DIAGNOSIS — Z79.4 TYPE 2 DIABETES MELLITUS TREATED WITH INSULIN: Primary | ICD-10-CM

## 2018-11-28 DIAGNOSIS — E11.9 TYPE 2 DIABETES MELLITUS TREATED WITH INSULIN: Primary | ICD-10-CM

## 2018-11-28 RX ORDER — INSULIN PUMP SYRINGE, 3 ML
EACH MISCELLANEOUS
Qty: 1 EACH | Refills: 0 | Status: SHIPPED | OUTPATIENT
Start: 2018-11-28 | End: 2023-02-16

## 2018-11-30 ENCOUNTER — PATIENT MESSAGE (OUTPATIENT)
Dept: FAMILY MEDICINE | Facility: CLINIC | Age: 63
End: 2018-11-30

## 2018-12-05 ENCOUNTER — PATIENT MESSAGE (OUTPATIENT)
Dept: FAMILY MEDICINE | Facility: CLINIC | Age: 63
End: 2018-12-05

## 2018-12-07 ENCOUNTER — PATIENT MESSAGE (OUTPATIENT)
Dept: FAMILY MEDICINE | Facility: CLINIC | Age: 63
End: 2018-12-07

## 2018-12-07 DIAGNOSIS — E11.9 TYPE 2 DIABETES MELLITUS TREATED WITH INSULIN: Primary | ICD-10-CM

## 2018-12-07 DIAGNOSIS — Z79.4 TYPE 2 DIABETES MELLITUS TREATED WITH INSULIN: Primary | ICD-10-CM

## 2018-12-11 ENCOUNTER — PATIENT MESSAGE (OUTPATIENT)
Dept: FAMILY MEDICINE | Facility: CLINIC | Age: 63
End: 2018-12-11

## 2018-12-14 ENCOUNTER — PATIENT MESSAGE (OUTPATIENT)
Dept: FAMILY MEDICINE | Facility: CLINIC | Age: 63
End: 2018-12-14

## 2018-12-14 DIAGNOSIS — E11.9 TYPE 2 DIABETES MELLITUS TREATED WITH INSULIN: ICD-10-CM

## 2018-12-14 DIAGNOSIS — Z79.4 TYPE 2 DIABETES MELLITUS TREATED WITH INSULIN: ICD-10-CM

## 2018-12-14 RX ORDER — INSULIN GLARGINE 300 [IU]/ML
80 INJECTION, SOLUTION SUBCUTANEOUS DAILY
Qty: 3 SYRINGE | Refills: 2 | Status: SHIPPED | OUTPATIENT
Start: 2018-12-14 | End: 2019-02-19

## 2018-12-19 ENCOUNTER — OFFICE VISIT (OUTPATIENT)
Dept: FAMILY MEDICINE | Facility: CLINIC | Age: 63
End: 2018-12-19
Payer: MEDICARE

## 2018-12-19 VITALS
OXYGEN SATURATION: 97 % | HEART RATE: 51 BPM | RESPIRATION RATE: 18 BRPM | SYSTOLIC BLOOD PRESSURE: 118 MMHG | BODY MASS INDEX: 44.41 KG/M2 | WEIGHT: 293 LBS | HEIGHT: 68 IN | DIASTOLIC BLOOD PRESSURE: 60 MMHG

## 2018-12-19 DIAGNOSIS — J06.9 VIRAL URI WITH COUGH: ICD-10-CM

## 2018-12-19 DIAGNOSIS — Z79.4 TYPE 2 DIABETES MELLITUS TREATED WITH INSULIN: Primary | ICD-10-CM

## 2018-12-19 DIAGNOSIS — E11.9 TYPE 2 DIABETES MELLITUS TREATED WITH INSULIN: Primary | ICD-10-CM

## 2018-12-19 PROCEDURE — 99213 OFFICE O/P EST LOW 20 MIN: CPT | Mod: ,,, | Performed by: INTERNAL MEDICINE

## 2018-12-19 RX ORDER — FLUTICASONE PROPIONATE 50 MCG
1 SPRAY, SUSPENSION (ML) NASAL DAILY
Qty: 16 G | Refills: 0 | Status: SHIPPED | OUTPATIENT
Start: 2018-12-19 | End: 2021-10-11

## 2018-12-19 RX ORDER — AMLODIPINE BESYLATE 5 MG/1
5 TABLET ORAL DAILY
COMMUNITY
Start: 2018-12-04 | End: 2019-11-22

## 2018-12-19 RX ORDER — CODEINE PHOSPHATE AND GUAIFENESIN 10; 100 MG/5ML; MG/5ML
5 SOLUTION ORAL 3 TIMES DAILY PRN
Qty: 236 ML | Refills: 0 | Status: SHIPPED | OUTPATIENT
Start: 2018-12-19 | End: 2018-12-29

## 2018-12-19 RX ORDER — GUANFACINE 2 MG/1
2 TABLET ORAL NIGHTLY
COMMUNITY
Start: 2018-12-12 | End: 2019-05-20 | Stop reason: SDUPTHER

## 2018-12-19 NOTE — ASSESSMENT & PLAN NOTE
Advised symptomatic care with nasal saline spray nose often, ibuprofen or acetaminophen for fever or headache, honey or prescription cough syrup for cough.  Flonase x 2 weeks to clear up post nasal drip.  Explained expected course with patient, and explained no need for antibiotics at this time. Return to clinic if persistent fever or new symptoms such as ear pain, sinus pressure, shortness of breath or wheezing.

## 2018-12-19 NOTE — PROGRESS NOTES
ADULT FOLLOW-UP VISIT    Subjective:     Chief Complaint:  Follow-up; Diabetes; and Nasal Congestion      62 yo woman here for follow-up. She has been following up with me closely via the portal regarding her blood sugars after we changed her insulin regimen.  She has titrated up to toujeo 80 units daily, and we added tradjenta about 1 week ago. Since that time, all her fasting and post prandial blood sugars have been in range.  She is happy with current simplified regimen. She is still eating a restricted diet in preparation for bariatric surgery.  She c/o URI symptoms including sore throat, cough and runny nose for the past 3 days. No fever. Feels a little better today. OTC cold meds helping, but pt having trouble sleeping due to tickle in her throat/cough at night.           Ms. Cervantes  has a past medical history of CAD (coronary artery disease), Diabetes mellitus, type 2, and MI (myocardial infarction).    Family history and social history are reviewed and there are no significant changes.     ROS:  Review of Systems   HENT: Positive for postnasal drip, rhinorrhea, sneezing and sore throat. Negative for ear discharge, ear pain, facial swelling, sinus pressure and sinus pain.    Respiratory: Positive for cough. Negative for chest tightness, shortness of breath, wheezing and stridor.    Cardiovascular: Negative for chest pain and palpitations.   Endocrine: Negative for polydipsia, polyphagia and polyuria.   Neurological: Negative for dizziness, syncope, weakness and numbness.          Current Outpatient Medications:     amLODIPine (NORVASC) 5 MG tablet, , Disp: , Rfl:     ascorbic acid, vitamin C, (VITAMIN C) 500 MG tablet, Take by mouth., Disp: , Rfl:     aspirin (ECOTRIN) 325 MG EC tablet, Take by mouth., Disp: , Rfl:     benazepril (LOTENSIN) 40 MG tablet, Take 40 mg by mouth 2 (two) times daily. , Disp: , Rfl:     blood sugar diagnostic Strp, Use 4 times per day as  "directed to check blood sugar. (one touch delica), Disp: 200 each, Rfl: 5    blood-glucose meter kit, Use as instructed, Disp: 1 each, Rfl: 0    cloNIDine (CATAPRES) 0.2 MG tablet, Take 0.3 mg by mouth 2 (two) times daily., Disp: , Rfl:     cyanocobalamin (VITAMIN B-12) 1,000 mcg/mL injection, vitamin b-12 injection once a month Active, Disp: , Rfl:     cyclobenzaprine (FLEXERIL) 10 MG tablet, 1/2- 1 po tid prn, Disp: 30 tablet, Rfl: 1    diflunisal (DOLOBID) 500 mg Tab, One po tid pc prn, Disp: 90 tablet, Rfl: 3    DOCOSAHEXANOIC ACID ORAL, Take by mouth., Disp: , Rfl:     DULoxetine (CYMBALTA) 60 MG capsule, Take 1 capsule (60 mg total) by mouth once daily., Disp: 30 capsule, Rfl: 3    ergocalciferol (VITAMIN D2) 50,000 unit Cap, Take by mouth., Disp: , Rfl:     guanFACINE (TENEX) 2 MG tablet, , Disp: , Rfl:     hydroCHLOROthiazide (HYDRODIURIL) 50 MG tablet, Take 50 mg by mouth once daily. , Disp: , Rfl:     insulin glargine U-300 conc (TOUJEO MAX U-300 SOLOSTAR) 300 unit/mL (3 mL) InPn, Inject 80 Units into the skin once daily., Disp: 3 Syringe, Rfl: 2    ketoconazole (NIZORAL) 2 % cream, Apply topically 2 (two) times daily., Disp: 60 g, Rfl: 2    linagliptin (TRADJENTA) 5 mg Tab tablet, Take 1 tablet (5 mg total) by mouth once daily., Disp: 30 tablet, Rfl: 3    magnesium oxide (MAG-OX) 400 mg (241.3 mg magnesium) tablet, Take 400 mg by mouth once daily., Disp: , Rfl:     metformin (GLUCOPHAGE) 1000 MG tablet, Take by mouth., Disp: , Rfl:     metOLazone (ZAROXOLYN) 5 MG tablet, Take by mouth., Disp: , Rfl:     metoprolol succinate (TOPROL-XL) 50 MG 24 hr tablet, Take 50 mg by mouth once daily., Disp: , Rfl:     nystatin (MYCOSTATIN) powder, Apply topically 2 (two) times daily., Disp: 60 g, Rfl: 2    omega 3-dha-epa-fish oil (FISH OIL) 1,000 mg (120 mg-180 mg) Cap, Take by mouth., Disp: , Rfl:     pen needle, diabetic (BD ULTRA-FINE ORIG PEN NEEDLE) 29 gauge x 1/2" Ndle, 1 each by " "Misc.(Non-Drug; Combo Route) route once daily., Disp: 100 each, Rfl: 5    potassium chloride (K-TAB) 20 mEq, Take by mouth., Disp: , Rfl:     rosuvastatin (CRESTOR) 40 MG Tab, Take 40 mg by mouth every evening., Disp: , Rfl:     VITAMIN E, DL,TOCOPHERYL ACET, (VITAMIN E, DL, ACETATE,) 100 unit Cap, Take by mouth., Disp: , Rfl:     fluticasone (FLONASE) 50 mcg/actuation nasal spray, 1 spray (50 mcg total) by Each Nare route once daily., Disp: 16 g, Rfl: 0    guaifenesin-codeine 100-10 mg/5 ml (TUSSI-ORGANIDIN NR)  mg/5 mL syrup, Take 5 mLs by mouth 3 (three) times daily as needed for Cough., Disp: 236 mL, Rfl: 0    ONETOUCH DELICA LANCETS 33 gauge Misc, , Disp: , Rfl:       Objective:     Physical Examination:     /60   Pulse (!) 51   Resp 18   Ht 5' 8" (1.727 m)   Wt (!) 138.1 kg (304 lb 8 oz)   SpO2 97%   BMI 46.30 kg/m²     Physical Exam   Constitutional: She appears well-developed and well-nourished. No distress.   HENT:   Head: Normocephalic and atraumatic.   Right Ear: Tympanic membrane normal.   Left Ear: Tympanic membrane normal.   Nose: Mucosal edema and rhinorrhea present.   Mouth/Throat: Oropharynx is clear and moist and mucous membranes are normal. No oropharyngeal exudate, posterior oropharyngeal edema or posterior oropharyngeal erythema.   Eyes: Conjunctivae are normal. Pupils are equal, round, and reactive to light.   Cardiovascular: Normal rate, regular rhythm, normal heart sounds and intact distal pulses.   No murmur heard.  Pulmonary/Chest: Effort normal and breath sounds normal. She has no wheezes. She has no rales.   Musculoskeletal: She exhibits no edema.   Lymphadenopathy:     She has cervical adenopathy.   Skin: She is not diaphoretic.       Assessment/Plan:   Chanel is a 63 y.o. female here for follow-up.    Problem List Items Addressed This Visit        ENT    Viral URI with cough    Current Assessment & Plan     Advised symptomatic care with nasal saline spray nose " often, ibuprofen or acetaminophen for fever or headache, honey or prescription cough syrup for cough.  Flonase x 2 weeks to clear up post nasal drip.  Explained expected course with patient, and explained no need for antibiotics at this time. Return to clinic if persistent fever or new symptoms such as ear pain, sinus pressure, shortness of breath or wheezing.             Relevant Medications    fluticasone (FLONASE) 50 mcg/actuation nasal spray    guaifenesin-codeine 100-10 mg/5 ml (TUSSI-ORGANIDIN NR)  mg/5 mL syrup       Endocrine    Type 2 diabetes mellitus treated with insulin - Primary    Current Assessment & Plan     Continue metformin, toujeo 80 units QHS and tradjenta.  Labs including HbA1c ordered.                Health Maintenance   Topic Date Due    Hepatitis C Screening  1955    Foot Exam  08/20/1965    Eye Exam  08/20/1965    Colonoscopy  01/18/2020 (Originally 8/20/2005)    Hemoglobin A1c  02/23/2019    Lipid Panel  08/23/2019    Mammogram  09/10/2020    TETANUS VACCINE  09/21/2027    Zoster Vaccine  Completed    Pneumococcal Vaccine (Medium Risk)  Completed    Influenza Vaccine  Completed           Discussion:     Follow-up in about 2 months (around 2/19/2019) for diabetes, HTN.    Goals     None          Electronically signed by Mindy Funk

## 2019-01-14 LAB
ALBUMIN SERPL-MCNC: 3.8 G/DL (ref 3.1–4.7)
ALP SERPL-CCNC: 51 IU/L (ref 40–104)
ALT (SGPT): 23 IU/L (ref 3–33)
AST SERPL-CCNC: 26 IU/L (ref 10–40)
BILIRUB SERPL-MCNC: 0.9 MG/DL (ref 0.3–1)
BUN SERPL-MCNC: 18 MG/DL (ref 8–20)
CALCIUM SERPL-MCNC: 10 MG/DL (ref 7.7–10.4)
CHLORIDE: 96 MMOL/L (ref 98–110)
CO2 SERPL-SCNC: 32.3 MMOL/L (ref 22.8–31.6)
CREATININE: 0.99 MG/DL (ref 0.6–1.4)
GLUCOSE: 177 MG/DL (ref 70–99)
HBA1C MFR BLD: 6.9 % (ref 3.1–6.5)
POTASSIUM SERPL-SCNC: 3.7 MMOL/L (ref 3.5–5)
PROT SERPL-MCNC: 7.2 G/DL (ref 6–8.2)
SODIUM: 141 MMOL/L (ref 134–144)

## 2019-01-15 ENCOUNTER — PATIENT MESSAGE (OUTPATIENT)
Dept: FAMILY MEDICINE | Facility: CLINIC | Age: 64
End: 2019-01-15

## 2019-01-22 ENCOUNTER — PATIENT MESSAGE (OUTPATIENT)
Dept: FAMILY MEDICINE | Facility: CLINIC | Age: 64
End: 2019-01-22

## 2019-01-29 ENCOUNTER — PATIENT MESSAGE (OUTPATIENT)
Dept: FAMILY MEDICINE | Facility: CLINIC | Age: 64
End: 2019-01-29

## 2019-02-11 RX ORDER — DIFLUNISAL 500 MG/1
TABLET, FILM COATED ORAL
Qty: 90 TABLET | Refills: 3 | Status: SHIPPED | OUTPATIENT
Start: 2019-02-11 | End: 2019-02-26 | Stop reason: SDUPTHER

## 2019-02-14 RX ORDER — CLARITHROMYCIN 500 MG/1
TABLET, FILM COATED ORAL
COMMUNITY
Start: 2019-02-07 | End: 2019-02-19

## 2019-02-14 RX ORDER — PREDNISONE 20 MG/1
TABLET ORAL
COMMUNITY
Start: 2019-01-24 | End: 2019-02-19

## 2019-02-14 RX ORDER — OMEPRAZOLE 40 MG/1
CAPSULE, DELAYED RELEASE ORAL
COMMUNITY
Start: 2019-02-07 | End: 2019-02-19

## 2019-02-14 RX ORDER — POTASSIUM CHLORIDE 1500 MG/1
TABLET, EXTENDED RELEASE ORAL
COMMUNITY
Start: 2019-01-14 | End: 2019-03-14 | Stop reason: SDUPTHER

## 2019-02-14 RX ORDER — HUMAN INSULIN 100 [IU]/ML
INJECTION, SOLUTION SUBCUTANEOUS
COMMUNITY
Start: 2019-02-07 | End: 2019-02-26

## 2019-02-19 ENCOUNTER — OFFICE VISIT (OUTPATIENT)
Dept: FAMILY MEDICINE | Facility: CLINIC | Age: 64
End: 2019-02-19
Payer: MEDICARE

## 2019-02-19 VITALS
WEIGHT: 293 LBS | BODY MASS INDEX: 44.41 KG/M2 | HEART RATE: 75 BPM | TEMPERATURE: 98 F | HEIGHT: 68 IN | RESPIRATION RATE: 17 BRPM | SYSTOLIC BLOOD PRESSURE: 124 MMHG | OXYGEN SATURATION: 96 % | DIASTOLIC BLOOD PRESSURE: 58 MMHG

## 2019-02-19 DIAGNOSIS — G47.09 OTHER INSOMNIA: ICD-10-CM

## 2019-02-19 DIAGNOSIS — Z79.4 TYPE 2 DIABETES MELLITUS TREATED WITH INSULIN: ICD-10-CM

## 2019-02-19 DIAGNOSIS — I10 BENIGN ESSENTIAL HYPERTENSION: ICD-10-CM

## 2019-02-19 DIAGNOSIS — E11.9 TYPE 2 DIABETES MELLITUS TREATED WITH INSULIN: ICD-10-CM

## 2019-02-19 DIAGNOSIS — I25.2 H/O ACUTE MYOCARDIAL INFARCTION: Primary | ICD-10-CM

## 2019-02-19 DIAGNOSIS — B37.31 VAGINA, CANDIDIASIS: ICD-10-CM

## 2019-02-19 DIAGNOSIS — Z95.1 HX OF CABG: ICD-10-CM

## 2019-02-19 PROCEDURE — 99214 PR OFFICE/OUTPT VISIT, EST, LEVL IV, 30-39 MIN: ICD-10-PCS | Mod: ,,, | Performed by: INTERNAL MEDICINE

## 2019-02-19 PROCEDURE — 99214 OFFICE O/P EST MOD 30 MIN: CPT | Mod: ,,, | Performed by: INTERNAL MEDICINE

## 2019-02-19 RX ORDER — FLUCONAZOLE 150 MG/1
150 TABLET ORAL DAILY
Qty: 2 TABLET | Refills: 1 | Status: SHIPPED | OUTPATIENT
Start: 2019-02-19 | End: 2019-04-08

## 2019-02-19 RX ORDER — INSULIN GLARGINE 100 [IU]/ML
80 INJECTION, SOLUTION SUBCUTANEOUS DAILY
Qty: 24 ML | Refills: 2 | Status: SHIPPED | OUTPATIENT
Start: 2019-02-19 | End: 2019-06-21 | Stop reason: SDUPTHER

## 2019-02-19 RX ORDER — METRONIDAZOLE 500 MG/1
500 TABLET ORAL 3 TIMES DAILY
COMMUNITY
End: 2019-02-26

## 2019-02-19 NOTE — ASSESSMENT & PLAN NOTE
Recently out of control 2/2 need for steroids. Blood sugars improving. Continue metformin, toujeo 80 units QHS and tradjenta. When out of toujeo will change to basaglar for insurance. HbAic 6.9% last check.  Pt to call with updated glucose log.

## 2019-02-19 NOTE — PROGRESS NOTES
I attest that I have reviewed the student note and that the components of the history of present illness, the physical exam, and the assessment and plan documented were performed by me or were performed in my presence by the student. I have verified (and addended if appropriate) the documentation and performed (or re-performed) the exam and medical decision making.    Problem List Items Addressed This Visit        Cardiac/Vascular    Benign essential hypertension    Current Assessment & Plan     Well controlled on HCTZ, guanfacine, clonidine, benazepril, amlodipine, metoprolol.          H/O acute myocardial infarction - Primary    Hx of CABG    Current Assessment & Plan     On aspirin, statin, BP control. F/u with cardiology. S/p recent angiogram with Dr. Carlin.             Endocrine    Type 2 diabetes mellitus treated with insulin    Current Assessment & Plan     Recently out of control 2/2 need for steroids. Blood sugars improving. Continue metformin, toujeo 80 units QHS and tradjenta. When out of toujeo will change to basaglar for insurance. HbAic 6.9% last check.  Pt to call with updated glucose log.          Relevant Medications    NOVOLIN R REGULAR U-100 INSULN 100 unit/mL injection    insulin (BASAGLAR KWIKPEN U-100 INSULIN) glargine 100 units/mL (3mL) SubQ pen       Other    Other insomnia    Current Assessment & Plan     Trial of OTC meds. Once established on CPAP can try rx meds if needed.            Other Visit Diagnoses     Vagina, candidiasis        Relevant Medications    fluconazole (DIFLUCAN) 150 MG Tab

## 2019-02-19 NOTE — MEDICAL/APP STUDENT
ADULT FOLLOW-UP VISIT    Subjective:     Chief Complaint:  Follow-up and Diabetes      Mrs. Cervantes is a 63 yoF here for f/u of diabetes. She has completed her work-up for bariatric surgery and is waiting on a CPAP machine. During her angiogram as part of the work-up, she had an adverse reaction to the contrast and was on prednisone and medrol patch for 1 week. Her blood sugars were elevated 2/2 steroid-use and her cardiologist added Novolin which she is using at dinner time (8-10units) on top of Toujeo 80units/day and Tradjenta. Her AM glucose levels have been <120.      She is currently on triple therapy for H. Pylori infection. She reports a funny taste and yellowing of her tongue after she started the medications. She also reports trouble falling asleep. It takes her ~3 hours to fall asleep.          Ms. Cervantes  has a past medical history of CAD (coronary artery disease), Diabetes mellitus, type 2, and MI (myocardial infarction).    Family history and social history are reviewed and there are no significant changes.     ROS:  Review of Systems   Constitutional: Negative for activity change, fatigue and fever.   HENT: Negative for congestion and postnasal drip.         Dysgeusia   Respiratory: Negative for cough and shortness of breath.    Cardiovascular: Negative for chest pain and palpitations.   Gastrointestinal: Negative for abdominal pain, constipation and diarrhea.   Genitourinary: Negative for difficulty urinating.   Skin:        Skin is still peeling after adverse reaction to contrast   Neurological: Negative for light-headedness and headaches.   Psychiatric/Behavioral: Positive for sleep disturbance.          Current Outpatient Medications:     amLODIPine (NORVASC) 5 MG tablet, , Disp: , Rfl:     ascorbic acid, vitamin C, (VITAMIN C) 500 MG tablet, Take by mouth., Disp: , Rfl:     aspirin (ECOTRIN) 325 MG EC tablet, Take by mouth., Disp: , Rfl:     benazepril  (LOTENSIN) 40 MG tablet, Take 40 mg by mouth 2 (two) times daily. , Disp: , Rfl:     blood sugar diagnostic Strp, Use 4 times per day as directed to check blood sugar. (one touch delica), Disp: 200 each, Rfl: 5    blood-glucose meter kit, Use as instructed, Disp: 1 each, Rfl: 0    cloNIDine (CATAPRES) 0.2 MG tablet, Take 0.3 mg by mouth 2 (two) times daily., Disp: , Rfl:     cyclobenzaprine (FLEXERIL) 10 MG tablet, 1/2- 1 po tid prn, Disp: 30 tablet, Rfl: 1    diflunisal (DOLOBID) 500 mg Tab, TAKE ONE TABLET BY MOUTH THREE TIMES DAILY AFTER A MEAL AS NEEDED, Disp: 90 tablet, Rfl: 3    DULoxetine (CYMBALTA) 60 MG capsule, Take 1 capsule (60 mg total) by mouth once daily., Disp: 30 capsule, Rfl: 3    fluticasone (FLONASE) 50 mcg/actuation nasal spray, 1 spray (50 mcg total) by Each Nare route once daily., Disp: 16 g, Rfl: 0    guanFACINE (TENEX) 2 MG tablet, , Disp: , Rfl:     hydroCHLOROthiazide (HYDRODIURIL) 50 MG tablet, Take 50 mg by mouth once daily. , Disp: , Rfl:     KLOR-CON M20 20 mEq tablet, , Disp: , Rfl:     linagliptin (TRADJENTA) 5 mg Tab tablet, Take 1 tablet (5 mg total) by mouth once daily., Disp: 30 tablet, Rfl: 3    magnesium oxide (MAG-OX) 400 mg (241.3 mg magnesium) tablet, Take 400 mg by mouth once daily., Disp: , Rfl:     metformin (GLUCOPHAGE) 1000 MG tablet, Take by mouth., Disp: , Rfl:     metOLazone (ZAROXOLYN) 5 MG tablet, Take by mouth., Disp: , Rfl:     metoprolol succinate (TOPROL-XL) 50 MG 24 hr tablet, Take 50 mg by mouth once daily., Disp: , Rfl:     metroNIDAZOLE (FLAGYL) 500 MG tablet, Take 500 mg by mouth 3 (three) times daily., Disp: , Rfl:     NOVOLIN R REGULAR U-100 INSULN 100 unit/mL injection, , Disp: , Rfl:     omega 3-dha-epa-fish oil (FISH OIL) 1,000 mg (120 mg-180 mg) Cap, Take by mouth., Disp: , Rfl:     rosuvastatin (CRESTOR) 40 MG Tab, Take 40 mg by mouth every evening., Disp: , Rfl:     VITAMIN E, DL,TOCOPHERYL ACET, (VITAMIN E, DL, ACETATE,) 100  "unit Cap, Take by mouth., Disp: , Rfl:     fluconazole (DIFLUCAN) 150 MG Tab, Take 1 tablet (150 mg total) by mouth once daily. Repeat in 24 hours, Disp: 2 tablet, Rfl: 1    insulin (BASAGLAR KWIKPEN U-100 INSULIN) glargine 100 units/mL (3mL) SubQ pen, Inject 80 Units into the skin once daily., Disp: 24 mL, Rfl: 2      Objective:     Physical Examination:     BP (!) 124/58   Pulse 75   Temp 98.4 °F (36.9 °C) (Oral)   Resp 17   Ht 5' 8" (1.727 m)   Wt 133.1 kg (293 lb 7 oz)   SpO2 96%   BMI 44.62 kg/m²     Physical Exam   Constitutional: She appears well-developed and well-nourished.   Morbidly obese   HENT:   Head: Normocephalic and atraumatic.   Nose: Nose normal.   Mouth/Throat: No oropharyngeal exudate.   Yellow discoloration to back of tongue  No thrush   Eyes: Pupils are equal, round, and reactive to light.   Cardiovascular: Normal rate and regular rhythm.   Pulmonary/Chest: Effort normal and breath sounds normal. No respiratory distress.   Abdominal: Soft. Bowel sounds are normal.       Assessment/Plan:   Chanel is a 63 y.o. female here for follow-up.    Problem List Items Addressed This Visit        Cardiac/Vascular    H/O acute myocardial infarction - Primary       Endocrine    Type 2 diabetes mellitus treated with insulin    Current Assessment & Plan     Continue metformin, toujeo 80 units QHS and tradjenta.  Labs including HbA1c ordered.   Once out of toujeo, switch to Basaglar and update PCP through portal        Relevant Medications    NOVOLIN R REGULAR U-100 INSULN 100 unit/mL injection    insulin (BASAGLAR KWIKPEN U-100 INSULIN) glargine 100 units/mL (3mL) SubQ pen      Other Visit Diagnoses     Vagina, candidiasis        Relevant Medications    fluconazole (DIFLUCAN) 150 MG Tab          Health Maintenance   Topic Date Due    Hepatitis C Screening  1955    Foot Exam  08/20/1965    Eye Exam  08/20/1965    Colonoscopy  01/18/2020 (Originally 8/20/2005)    Hemoglobin A1c  07/14/2019 "    Lipid Panel  01/14/2020    High Dose Statin  02/19/2020    Mammogram  09/10/2020    TETANUS VACCINE  09/21/2027    Zoster Vaccine  Completed    Pneumococcal Vaccine (Medium Risk)  Completed    Influenza Vaccine  Completed           Discussion:     Follow-up in 3 months for diabetes    Goals     Try OTC sleep medication for trouble falling asleep          Electronically signed by Dee Meyers

## 2019-02-26 ENCOUNTER — OFFICE VISIT (OUTPATIENT)
Dept: RHEUMATOLOGY | Facility: CLINIC | Age: 64
End: 2019-02-26
Payer: MEDICARE

## 2019-02-26 VITALS
WEIGHT: 290.81 LBS | SYSTOLIC BLOOD PRESSURE: 111 MMHG | BODY MASS INDEX: 44.22 KG/M2 | DIASTOLIC BLOOD PRESSURE: 64 MMHG

## 2019-02-26 DIAGNOSIS — M19.91 PRIMARY OSTEOARTHRITIS, UNSPECIFIED SITE: Primary | ICD-10-CM

## 2019-02-26 PROCEDURE — 99213 OFFICE O/P EST LOW 20 MIN: CPT | Mod: ,,, | Performed by: INTERNAL MEDICINE

## 2019-02-26 PROCEDURE — 99213 PR OFFICE/OUTPT VISIT, EST, LEVL III, 20-29 MIN: ICD-10-PCS | Mod: ,,, | Performed by: INTERNAL MEDICINE

## 2019-02-26 RX ORDER — DIFLUNISAL 500 MG/1
TABLET, FILM COATED ORAL
Qty: 90 TABLET | Refills: 3 | Status: SHIPPED | OUTPATIENT
Start: 2019-02-26 | End: 2019-05-20

## 2019-02-26 RX ORDER — DULOXETIN HYDROCHLORIDE 60 MG/1
60 CAPSULE, DELAYED RELEASE ORAL DAILY
Qty: 30 CAPSULE | Refills: 3 | Status: SHIPPED | OUTPATIENT
Start: 2019-02-26 | End: 2019-07-16 | Stop reason: SDUPTHER

## 2019-02-26 NOTE — PROGRESS NOTES
[facility] RHEUMATOLOGY            PROGRESS NOTE      Subjective:       Patient ID:   NAME: Chanel Cervantes : 1955     63 y.o. female    Referring Doc: No ref. provider found  Other Physicians:    Chief Complaint:  Osteoarthritis (Back Pain)      History of Present Illness:     Patient returns today for a regularly scheduled follow-up visit for  osteoarthritis     The patient offers no new complaints. She has arthralgias in her finger joints neck and low back pain. No change from her usual. No paresthesias. No chest pains, cough or shortness of breath. No GI complaints.            ROS:   GEN:  No  fever, night sweats . weight is stable   + some  fatigue  SKIN: no rashes, no bruising, no ulcerations, no Raynaud's  HEENT: no HA's, No visual changes, no mucosal ulcers, no sicca symptoms,  CV:   no CP, SOB, PND, WILSON, no orthopnea, no palpitations  PULM: normal with no SOB, cough, hemoptysis, sputum or pleuritic pain  GI:  no abdominal pain, nausea, vomiting, constipation, diarrhea, melanotic stools, BRBPR, hematemesis, no dysphagia  :   no dysuria  NEURO: no paresthesias, headaches, visual disturbances, muscle weakness  MUSCULOSKELETAL:no joint swelling, prolonged AM stiffness, + back pain, no muscle pain  Allergies:  Review of patient's allergies indicates:   Allergen Reactions    Dye Hives    Penicillins Edema     and burning sensation    Iodinated contrast- oral and iv dye Rash       Medications:    Current Outpatient Medications:     amLODIPine (NORVASC) 5 MG tablet, , Disp: , Rfl:     ascorbic acid, vitamin C, (VITAMIN C) 500 MG tablet, Take by mouth., Disp: , Rfl:     aspirin (ECOTRIN) 325 MG EC tablet, Take by mouth., Disp: , Rfl:     benazepril (LOTENSIN) 40 MG tablet, Take 40 mg by mouth 2 (two) times daily. , Disp: , Rfl:     blood sugar diagnostic Strp, Use 4 times per day as directed to check blood sugar. (one touch delica), Disp: 200 each, Rfl: 5    blood-glucose meter kit, Use as  instructed, Disp: 1 each, Rfl: 0    cloNIDine (CATAPRES) 0.2 MG tablet, Take 0.3 mg by mouth 2 (two) times daily., Disp: , Rfl:     cyclobenzaprine (FLEXERIL) 10 MG tablet, 1/2- 1 po tid prn, Disp: 30 tablet, Rfl: 1    diflunisal (DOLOBID) 500 mg Tab, TAKE ONE TABLET BY MOUTH THREE TIMES DAILY AFTER A MEAL AS NEEDED, Disp: 90 tablet, Rfl: 3    DULoxetine (CYMBALTA) 60 MG capsule, Take 1 capsule (60 mg total) by mouth once daily., Disp: 30 capsule, Rfl: 3    fluconazole (DIFLUCAN) 150 MG Tab, Take 1 tablet (150 mg total) by mouth once daily. Repeat in 24 hours, Disp: 2 tablet, Rfl: 1    fluticasone (FLONASE) 50 mcg/actuation nasal spray, 1 spray (50 mcg total) by Each Nare route once daily., Disp: 16 g, Rfl: 0    hydroCHLOROthiazide (HYDRODIURIL) 50 MG tablet, Take 50 mg by mouth once daily. , Disp: , Rfl:     KLOR-CON M20 20 mEq tablet, , Disp: , Rfl:     linagliptin (TRADJENTA) 5 mg Tab tablet, Take 1 tablet (5 mg total) by mouth once daily., Disp: 30 tablet, Rfl: 3    magnesium oxide (MAG-OX) 400 mg (241.3 mg magnesium) tablet, Take 400 mg by mouth once daily., Disp: , Rfl:     metformin (GLUCOPHAGE) 1000 MG tablet, Take by mouth., Disp: , Rfl:     metOLazone (ZAROXOLYN) 5 MG tablet, Take by mouth., Disp: , Rfl:     metoprolol succinate (TOPROL-XL) 50 MG 24 hr tablet, Take 50 mg by mouth once daily., Disp: , Rfl:     omega 3-dha-epa-fish oil (FISH OIL) 1,000 mg (120 mg-180 mg) Cap, Take by mouth., Disp: , Rfl:     rosuvastatin (CRESTOR) 40 MG Tab, Take 40 mg by mouth every evening., Disp: , Rfl:     VITAMIN E, DL,TOCOPHERYL ACET, (VITAMIN E, DL, ACETATE,) 100 unit Cap, Take by mouth., Disp: , Rfl:     guanFACINE (TENEX) 2 MG tablet, , Disp: , Rfl:     insulin (BASAGLAR KWIKPEN U-100 INSULIN) glargine 100 units/mL (3mL) SubQ pen, Inject 80 Units into the skin once daily., Disp: 24 mL, Rfl: 2    PMHx/PSHx Updates:        Objective:     Vitals:  Blood pressure 111/64, weight 131.9 kg (290 lb 12.8  oz).    Physical Examination:   GEN: no apparent distress, comfortable; AAOx3  SKIN: no rashes,no ulceration, no Raynaud's, no petechiae, no SQ nodules,  HEAD: normal  EYES: no pallor, no icterus  NECK: no masses, thyroid normal, trachea midline, no LAD/LN's, supple  CV: RRR with no murmur; l S1 and S2 reg. ,no gallop no rubs,   CHEST: Normal respiratory effort; CTAB; normal breath sounds; no wheeze or crackles  MUSC/Skeletal: ROM normal; no crepitus; joints without synovitis,  no deformities  No joint swelling or tenderness of PIP, MCP, wrist, elbow, shoulder, or knee joints  EXTREM: no clubbing, cyanosis, no edema,normal  pulses   NEURO: grossly intact; motor WNL; AAOx3; ,  PSYCH: normal mood, affect and behavior  LYMPH: normal cervical, supraclavicular          Labs:   Lab Results   Component Value Date    WBC 7.2 10/29/2018    HGB 13.9 10/29/2018    HCT 45.2 10/29/2018    MCV 85.1 10/29/2018     10/29/2018    CMP  @LASTLAB(NA,K,CL,CO2,GLU,BUN,Creatinine,Calcium,PROT,Albumin,Bilitot,Alkphos,AST,ALT,CRP,ESR,RF,CCP,LORIN,SSA,CPK,uric acid) )@  I have reviewed all available lab results and radiology reports.    Radiology/Diagnostic Studies:        Assessment/Plan:   (1) 63 y.o. female with diagnosis of OA stable  2)DM  3)Sleep Apnea                Discussion:     I have explained all of the above in detail and the patient understands all of the current recommendation(s). I have answered all questions to the best of my ability and to their complete satisfaction.       The patient is to continue with the current management plan         RTC in   4 months or before prn      Electronically signed by Navdeep Orozco MD

## 2019-03-14 RX ORDER — POTASSIUM CHLORIDE 1500 MG/1
20 TABLET, EXTENDED RELEASE ORAL 2 TIMES DAILY
Qty: 60 TABLET | Refills: 5 | Status: SHIPPED | OUTPATIENT
Start: 2019-03-14 | End: 2019-05-20

## 2019-04-05 DIAGNOSIS — Z79.4 TYPE 2 DIABETES MELLITUS TREATED WITH INSULIN: ICD-10-CM

## 2019-04-05 DIAGNOSIS — E11.9 TYPE 2 DIABETES MELLITUS TREATED WITH INSULIN: ICD-10-CM

## 2019-04-08 ENCOUNTER — OFFICE VISIT (OUTPATIENT)
Dept: FAMILY MEDICINE | Facility: CLINIC | Age: 64
End: 2019-04-08
Payer: MEDICARE

## 2019-04-08 VITALS
SYSTOLIC BLOOD PRESSURE: 128 MMHG | WEIGHT: 292.19 LBS | DIASTOLIC BLOOD PRESSURE: 60 MMHG | HEART RATE: 60 BPM | TEMPERATURE: 98 F | RESPIRATION RATE: 16 BRPM | OXYGEN SATURATION: 96 % | BODY MASS INDEX: 44.28 KG/M2 | HEIGHT: 68 IN

## 2019-04-08 DIAGNOSIS — I10 BENIGN ESSENTIAL HYPERTENSION: ICD-10-CM

## 2019-04-08 DIAGNOSIS — G47.33 OSA ON CPAP: ICD-10-CM

## 2019-04-08 DIAGNOSIS — Z79.4 TYPE 2 DIABETES MELLITUS TREATED WITH INSULIN: Primary | ICD-10-CM

## 2019-04-08 DIAGNOSIS — E11.9 TYPE 2 DIABETES MELLITUS TREATED WITH INSULIN: Primary | ICD-10-CM

## 2019-04-08 DIAGNOSIS — Z11.59 ENCOUNTER FOR HEPATITIS C SCREENING TEST FOR LOW RISK PATIENT: ICD-10-CM

## 2019-04-08 PROCEDURE — 99213 PR OFFICE/OUTPT VISIT, EST, LEVL III, 20-29 MIN: ICD-10-PCS | Mod: ,,, | Performed by: INTERNAL MEDICINE

## 2019-04-08 PROCEDURE — 99213 OFFICE O/P EST LOW 20 MIN: CPT | Mod: ,,, | Performed by: INTERNAL MEDICINE

## 2019-04-08 RX ORDER — CYANOCOBALAMIN 1000 UG/ML
1 INJECTION, SOLUTION INTRAMUSCULAR; SUBCUTANEOUS
COMMUNITY
End: 2019-05-20

## 2019-04-08 NOTE — ASSESSMENT & PLAN NOTE
Continue metformin, basaglar 80 units QHS and tradjenta. HbAic 6.9% 3/2019 at [facility] per pt (unchanged).  Pt advised to hold oral hypoglycemics day of surgery. D/w anesthesia whether to take basaglar QHS as usual.

## 2019-04-08 NOTE — ASSESSMENT & PLAN NOTE
Well controlled on HCTZ, guanfacine, clonidine, benazepril, amlodipine, metoprolol. Continue all meds perioperative except for diuretics (hold AM of surgery).

## 2019-04-08 NOTE — PROGRESS NOTES
ADULT FOLLOW-UP VISIT    Subjective:     Chief Complaint:  Medication Management (Discuss Rx, insulin for Surgery) and Pre-op Exam (Discuss preop medication schedule before Bariatric surgery)      62 yo woman here for follow-up.  Pt scheduled for bariatric surgery 4/24.  Reviewed recent labs done by surgery (CBC, BMP) which were WNL.  Reviewed pt's home blood sugar logs- mostly AM fasting glucose is in range, occ ranging 150-170.  This has usually only occurred when pt forgot insulin or when she took less due to concern for hypoglycemia.  Pt checks PM blood sugar and was concerned about taking 80 units of basaglar if blood sugar was around 150 or less.  No hypoglyemia noted.    Diabetes   There are no hypoglycemic associated symptoms. Pertinent negatives for diabetes include no blurred vision, no chest pain, no fatigue, no foot paresthesias, no foot ulcerations, no polydipsia, no polyphagia, no polyuria, no visual change, no weakness and no weight loss.         Ms. Cervantes  has a past medical history of CAD (coronary artery disease), Diabetes mellitus, type 2, MI (myocardial infarction), Sleep apnea, and Sleep apnea (03/01/2019).    Family history and social history are reviewed and there are no significant changes.     ROS:  Review of Systems   Constitutional: Negative for fatigue and weight loss.   Eyes: Negative for blurred vision.   Cardiovascular: Negative for chest pain.   Endocrine: Negative for polydipsia, polyphagia and polyuria.   Neurological: Negative for weakness.          Current Outpatient Medications:     amLODIPine (NORVASC) 5 MG tablet, Take 5 mg by mouth once daily. , Disp: , Rfl:     ascorbic acid, vitamin C, (VITAMIN C) 500 MG tablet, Take 500 mg by mouth once daily. , Disp: , Rfl:     aspirin (ECOTRIN) 325 MG EC tablet, Take by mouth., Disp: , Rfl:     benazepril (LOTENSIN) 40 MG tablet, Take 40 mg by mouth 2 (two) times daily. , Disp: , Rfl:     blood  sugar diagnostic Strp, Use 4 times per day as directed to check blood sugar. (one touch delica), Disp: 200 each, Rfl: 5    blood-glucose meter kit, Use as instructed, Disp: 1 each, Rfl: 0    cloNIDine (CATAPRES) 0.2 MG tablet, Take 0.3 mg by mouth 2 (two) times daily., Disp: , Rfl:     cyanocobalamin 1,000 mcg/mL injection, Inject 1 mL into the skin every 30 days., Disp: , Rfl:     cyclobenzaprine (FLEXERIL) 10 MG tablet, 1/2- 1 po tid prn, Disp: 30 tablet, Rfl: 1    diflunisal (DOLOBID) 500 mg Tab, TAKE ONE TABLET BY MOUTH THREE TIMES DAILY AFTER A MEAL AS NEEDED, Disp: 90 tablet, Rfl: 3    DULoxetine (CYMBALTA) 60 MG capsule, Take 1 capsule (60 mg total) by mouth once daily., Disp: 30 capsule, Rfl: 3    fluticasone (FLONASE) 50 mcg/actuation nasal spray, 1 spray (50 mcg total) by Each Nare route once daily., Disp: 16 g, Rfl: 0    guanFACINE (TENEX) 2 MG tablet, Take 2 mg by mouth nightly. , Disp: , Rfl:     hydroCHLOROthiazide (HYDRODIURIL) 50 MG tablet, Take 50 mg by mouth once daily. , Disp: , Rfl:     insulin (BASAGLAR KWIKPEN U-100 INSULIN) glargine 100 units/mL (3mL) SubQ pen, Inject 80 Units into the skin once daily., Disp: 24 mL, Rfl: 2    KLOR-CON M20 20 mEq tablet, Take 1 tablet (20 mEq total) by mouth 2 (two) times daily., Disp: 60 tablet, Rfl: 5    linagliptin (TRADJENTA) 5 mg Tab tablet, Take 1 tablet (5 mg total) by mouth once daily., Disp: 30 tablet, Rfl: 3    magnesium oxide (MAG-OX) 400 mg (241.3 mg magnesium) tablet, Take 400 mg by mouth once daily., Disp: , Rfl:     metformin (GLUCOPHAGE) 1000 MG tablet, Take 1,000 mg by mouth 2 (two) times daily with meals. , Disp: , Rfl:     metOLazone (ZAROXOLYN) 5 MG tablet, Take 5 mg by mouth once daily. , Disp: , Rfl:     metoprolol succinate (TOPROL-XL) 50 MG 24 hr tablet, Take 50 mg by mouth once daily., Disp: , Rfl:     omega 3-dha-epa-fish oil (FISH OIL) 1,000 mg (120 mg-180 mg) Cap, Take 2 capsules by mouth every evening. , Disp: ,  "Rfl:     rosuvastatin (CRESTOR) 40 MG Tab, Take 40 mg by mouth every evening., Disp: , Rfl:       Objective:     Physical Examination:     /60 (BP Location: Left arm, Patient Position: Sitting, BP Method: Large (Manual))   Pulse 60   Temp 97.8 °F (36.6 °C)   Resp 16   Ht 5' 8" (1.727 m)   Wt 132.5 kg (292 lb 3.2 oz)   SpO2 96%   BMI 44.43 kg/m²     Physical Exam   Constitutional: She is oriented to person, place, and time. She appears well-developed and well-nourished. No distress.   HENT:   Right Ear: External ear normal.   Left Ear: External ear normal.   Nose: Nose normal.   Mouth/Throat: Oropharynx is clear and moist and mucous membranes are normal.   Eyes: Pupils are equal, round, and reactive to light. Conjunctivae are normal. Right eye exhibits no discharge. Left eye exhibits no discharge.   Cardiovascular: Normal rate, regular rhythm, normal heart sounds and intact distal pulses.   No murmur heard.  Pulmonary/Chest: Effort normal and breath sounds normal. No respiratory distress. She has no wheezes. She has no rales.   Musculoskeletal: She exhibits no edema.   Neurological: She is alert and oriented to person, place, and time.   Skin: She is not diaphoretic.       Assessment/Plan:   Chanel is a 63 y.o. female here for follow-up.    Problem List Items Addressed This Visit        Cardiac/Vascular    Benign essential hypertension    Current Assessment & Plan     Well controlled on HCTZ, guanfacine, clonidine, benazepril, amlodipine, metoprolol. Continue all meds perioperative except for diuretics (hold AM of surgery).             Endocrine    Type 2 diabetes mellitus treated with insulin - Primary    Current Assessment & Plan     Continue metformin, basaglar 80 units QHS and tradjenta. HbAic 6.9% 3/2019 at [facility] per pt (unchanged).  Pt advised to hold oral hypoglycemics day of surgery. D/w anesthesia whether to take basaglar QHS as usual.              Other    NIKOLAS on CPAP    Current Assessment & " Plan     Recently diagnosed and using CPAP for the past month.            Other Visit Diagnoses     Encounter for hepatitis C screening test for low risk patient        Relevant Orders    Hepatitis C antibody          Health Maintenance   Topic Date Due    Hepatitis C Screening  1955    Foot Exam  08/20/1965    Eye Exam  08/20/1965    Colonoscopy  01/18/2020 (Originally 8/20/2005)    Hemoglobin A1c  07/14/2019    Lipid Panel  01/14/2020    High Dose Statin  02/19/2020    Mammogram  09/10/2020    TETANUS VACCINE  09/21/2027    Zoster Vaccine  Completed    Pneumococcal Vaccine (Medium Risk)  Completed    Influenza Vaccine  Completed           Discussion:     No follow-ups on file.    Goals     None          Electronically signed by Mindy Funk

## 2019-04-30 ENCOUNTER — TELEPHONE (OUTPATIENT)
Dept: PEDIATRICS | Facility: CLINIC | Age: 64
End: 2019-04-30

## 2019-04-30 DIAGNOSIS — N30.00 ACUTE CYSTITIS WITHOUT HEMATURIA: Primary | ICD-10-CM

## 2019-04-30 RX ORDER — NITROFURANTOIN 25; 75 MG/1; MG/1
100 CAPSULE ORAL 2 TIMES DAILY
Qty: 14 CAPSULE | Refills: 0 | Status: SHIPPED | OUTPATIENT
Start: 2019-04-30 | End: 2019-05-07

## 2019-04-30 NOTE — TELEPHONE ENCOUNTER
Empiric treatment with nitrofurantion. If symptoms continue after treatment will need to come in to office for urine sample. If high fever, altered mental status, weakness, flank pain, n/v, will need to go to ER for evaluation.

## 2019-04-30 NOTE — TELEPHONE ENCOUNTER
Pt is at home recovering from recent bariatric surgery. She called complaining of urinary pressure and burning when she urinates for two days. She is requesting treatment to be called in for UTI. Please advise.

## 2019-05-18 NOTE — TELEPHONE ENCOUNTER
Pt notified...  Informed pt she needs yearly breast exams... She can RTO to see  or have GYN/PCP order yearly mammograms.    Mentioned she is changing to a new PCP in November (Mindy Funk)..     10

## 2019-05-20 ENCOUNTER — OFFICE VISIT (OUTPATIENT)
Dept: FAMILY MEDICINE | Facility: CLINIC | Age: 64
End: 2019-05-20
Payer: MEDICARE

## 2019-05-20 VITALS
BODY MASS INDEX: 40.55 KG/M2 | SYSTOLIC BLOOD PRESSURE: 138 MMHG | WEIGHT: 267.56 LBS | OXYGEN SATURATION: 98 % | DIASTOLIC BLOOD PRESSURE: 74 MMHG | HEART RATE: 74 BPM | RESPIRATION RATE: 18 BRPM | HEIGHT: 68 IN

## 2019-05-20 DIAGNOSIS — E11.9 TYPE 2 DIABETES MELLITUS TREATED WITH INSULIN: Primary | ICD-10-CM

## 2019-05-20 DIAGNOSIS — I10 BENIGN ESSENTIAL HYPERTENSION: ICD-10-CM

## 2019-05-20 DIAGNOSIS — E78.2 MIXED HYPERLIPIDEMIA: ICD-10-CM

## 2019-05-20 DIAGNOSIS — Z79.4 TYPE 2 DIABETES MELLITUS TREATED WITH INSULIN: Primary | ICD-10-CM

## 2019-05-20 DIAGNOSIS — Z98.84 S/P BARIATRIC SURGERY: ICD-10-CM

## 2019-05-20 PROCEDURE — 99213 OFFICE O/P EST LOW 20 MIN: CPT | Mod: ,,, | Performed by: INTERNAL MEDICINE

## 2019-05-20 PROCEDURE — 99213 PR OFFICE/OUTPT VISIT, EST, LEVL III, 20-29 MIN: ICD-10-PCS | Mod: ,,, | Performed by: INTERNAL MEDICINE

## 2019-05-20 RX ORDER — GUANFACINE 2 MG/1
2 TABLET ORAL NIGHTLY
Qty: 90 TABLET | Refills: 3 | Status: SHIPPED | OUTPATIENT
Start: 2019-05-20 | End: 2020-08-04 | Stop reason: SDUPTHER

## 2019-05-20 RX ORDER — ROSUVASTATIN CALCIUM 40 MG/1
40 TABLET, COATED ORAL NIGHTLY
Qty: 90 TABLET | Refills: 3 | Status: SHIPPED | OUTPATIENT
Start: 2019-05-20 | End: 2020-03-16 | Stop reason: SDUPTHER

## 2019-05-20 NOTE — ASSESSMENT & PLAN NOTE
Gastric sleeve 4/2019.  Lost 25 lbs so far, tolerating soft diet. F/u next week with surgeon at [facility]. Continue weaning off meds as possible.

## 2019-05-20 NOTE — PROGRESS NOTES
"                                    ADULT FOLLOW-UP VISIT    Subjective:     Chief Complaint:  Follow-up (bariatric surgery)      64 yo woman here for f/u after gastric sleeve done 3 weeks ago at [facility].  Pt reports she is doing very well - "Easiest surgery recover I've ever had."  Currently up to soft diet, no issues with n/v, abd pain.  At discharge from hospital after surgery, pt was taken off HCTZ, metolazone for BP, tradjenta for DM.  Pt has titrated down insulin to 40 units QHS with no low or high AM fasting glucose noted in the past week. 25lbs lost so far.         Ms. Cervantes  has a past medical history of CAD (coronary artery disease), Diabetes mellitus, type 2, MI (myocardial infarction), Sleep apnea, and Sleep apnea (03/01/2019).    Family history and social history are reviewed and there are no significant changes.     ROS:  Review of Systems   Respiratory: Negative for cough, shortness of breath and wheezing.    Cardiovascular: Negative for chest pain, palpitations and leg swelling.   Gastrointestinal: Negative for abdominal distention, abdominal pain, constipation, diarrhea, nausea and vomiting.   Skin: Positive for wound. Negative for rash.          Current Outpatient Medications:     amLODIPine (NORVASC) 5 MG tablet, Take 5 mg by mouth once daily. , Disp: , Rfl:     aspirin (ECOTRIN) 325 MG EC tablet, Take by mouth., Disp: , Rfl:     benazepril (LOTENSIN) 40 MG tablet, Take 40 mg by mouth 2 (two) times daily. , Disp: , Rfl:     blood sugar diagnostic Strp, Use 4 times per day as directed to check blood sugar. (one touch delica), Disp: 200 each, Rfl: 5    blood-glucose meter kit, Use as instructed, Disp: 1 each, Rfl: 0    cloNIDine (CATAPRES) 0.2 MG tablet, Take 0.3 mg by mouth 2 (two) times daily., Disp: , Rfl:     DULoxetine (CYMBALTA) 60 MG capsule, Take 1 capsule (60 mg total) by mouth once daily., Disp: 30 capsule, Rfl: 3    fluticasone (FLONASE) 50 mcg/actuation nasal spray, 1 spray (50 mcg " "total) by Each Nare route once daily., Disp: 16 g, Rfl: 0    guanFACINE (TENEX) 2 MG tablet, Take 1 tablet (2 mg total) by mouth nightly., Disp: 90 tablet, Rfl: 3    insulin (BASAGLAR KWIKPEN U-100 INSULIN) glargine 100 units/mL (3mL) SubQ pen, Inject 80 Units into the skin once daily., Disp: 24 mL, Rfl: 2    metformin (GLUCOPHAGE) 1000 MG tablet, Take 1,000 mg by mouth 2 (two) times daily with meals. , Disp: , Rfl:     metoprolol succinate (TOPROL-XL) 50 MG 24 hr tablet, Take 50 mg by mouth once daily., Disp: , Rfl:     rosuvastatin (CRESTOR) 40 MG Tab, Take 1 tablet (40 mg total) by mouth every evening., Disp: 90 tablet, Rfl: 3      Objective:     Physical Examination:     /74   Pulse 74   Resp 18   Ht 5' 8" (1.727 m)   Wt 121.4 kg (267 lb 9 oz)   SpO2 98%   BMI 40.68 kg/m²   Last 3 sets of Vitals    Vitals - 1 value per visit 2/26/2019 4/8/2019 5/20/2019   SYSTOLIC 111 128 138   DIASTOLIC 64 60 74   PULSE - 60 74   TEMPERATURE - 97.8 -   RESPIRATIONS - 16 18   SPO2 - 96 98   Weight (lb) 290.8 292.2 267.56   Weight (kg) 131.906 132.541 121.366   HEIGHT - 5' 8" 5' 8"   BODY MASS INDEX 44.22 44.43 40.68   VISIT REPORT - - -   Pain Score  6 - -     Physical Exam   Constitutional: She is oriented to person, place, and time. She appears well-developed and well-nourished. No distress.   HENT:   Right Ear: External ear normal.   Left Ear: External ear normal.   Nose: Nose normal.   Mouth/Throat: Oropharynx is clear and moist and mucous membranes are normal.   Eyes: Pupils are equal, round, and reactive to light. Conjunctivae are normal. Right eye exhibits no discharge. Left eye exhibits no discharge.   Cardiovascular: Normal rate, regular rhythm, normal heart sounds and intact distal pulses.   No murmur heard.  Pulmonary/Chest: Effort normal and breath sounds normal. No respiratory distress. She has no wheezes. She has no rales.   Abdominal: Soft. There is generalized tenderness. A hernia is present. " Hernia confirmed positive in the ventral area.   Musculoskeletal: She exhibits no edema.   Neurological: She is alert and oriented to person, place, and time.   Skin: She is not diaphoretic.   Multiple abdominal incisions, healing        Assessment/Plan:   Chanel is a 63 y.o. female here for follow-up.    Problem List Items Addressed This Visit        Cardiac/Vascular    Benign essential hypertension    Current Assessment & Plan     Well controlled on guanfacine, clonidine, benazepril, amlodipine, metoprolol. D/w pt that benazepril and metoprolol are for HTN as well as her h/o CAD, so will leave those on as long as BP not low.          Relevant Medications    guanFACINE (TENEX) 2 MG tablet    Mixed hyperlipidemia    Relevant Medications    rosuvastatin (CRESTOR) 40 MG Tab       Endocrine    Type 2 diabetes mellitus treated with insulin - Primary    Current Assessment & Plan     Continue metformin, basaglar 40 units QHS. HbA1c 6.9% 3/2019 at [facility] per pt (unchanged).  Recheck next month here or at [facility].          S/P bariatric surgery    Current Assessment & Plan     Gastric sleeve 4/2019.  Lost 25 lbs so far, tolerating soft diet. F/u next week with surgeon at [facility]. Continue weaning off meds as possible.                Health Maintenance   Topic Date Due    Hepatitis C Screening  1955    Foot Exam  08/20/1965    Eye Exam  08/20/1965    Colonoscopy  01/18/2020 (Originally 8/20/2005)    Hemoglobin A1c  07/14/2019    Influenza Vaccine  08/01/2019    Lipid Panel  01/14/2020    High Dose Statin  05/20/2020    Mammogram  09/10/2020    TETANUS VACCINE  09/21/2027    Pneumococcal Vaccine (Medium Risk)  Completed           Discussion:     Follow up in about 1 month (around 6/20/2019) for DM follow-up.    Goals     None          Electronically signed by Mindy Funk

## 2019-05-20 NOTE — ASSESSMENT & PLAN NOTE
Well controlled on guanfacine, clonidine, benazepril, amlodipine, metoprolol. D/w pt that benazepril and metoprolol are for HTN as well as her h/o CAD, so will leave those on as long as BP not low.

## 2019-05-20 NOTE — ASSESSMENT & PLAN NOTE
Continue metformin, basaglar 40 units QHS. HbA1c 6.9% 3/2019 at [facility] per pt (unchanged).  Recheck next month here or at [facility].

## 2019-06-04 ENCOUNTER — PATIENT MESSAGE (OUTPATIENT)
Dept: FAMILY MEDICINE | Facility: CLINIC | Age: 64
End: 2019-06-04

## 2019-06-06 DIAGNOSIS — I10 BENIGN ESSENTIAL HYPERTENSION: Primary | ICD-10-CM

## 2019-06-06 RX ORDER — METOLAZONE 5 MG/1
5 TABLET ORAL DAILY
Qty: 90 TABLET | Refills: 3 | Status: SHIPPED | OUTPATIENT
Start: 2019-06-06 | End: 2020-03-16 | Stop reason: SDUPTHER

## 2019-06-15 ENCOUNTER — PATIENT MESSAGE (OUTPATIENT)
Dept: FAMILY MEDICINE | Facility: CLINIC | Age: 64
End: 2019-06-15

## 2019-06-20 DIAGNOSIS — E11.9 TYPE 2 DIABETES MELLITUS TREATED WITH INSULIN: ICD-10-CM

## 2019-06-20 DIAGNOSIS — Z79.4 TYPE 2 DIABETES MELLITUS TREATED WITH INSULIN: ICD-10-CM

## 2019-06-21 DIAGNOSIS — Z79.4 TYPE 2 DIABETES MELLITUS TREATED WITH INSULIN: ICD-10-CM

## 2019-06-21 DIAGNOSIS — E11.9 TYPE 2 DIABETES MELLITUS TREATED WITH INSULIN: ICD-10-CM

## 2019-06-21 RX ORDER — INSULIN GLARGINE 100 [IU]/ML
80 INJECTION, SOLUTION SUBCUTANEOUS DAILY
Qty: 24 ML | Refills: 2 | Status: SHIPPED | OUTPATIENT
Start: 2019-06-21 | End: 2019-08-01

## 2019-06-21 RX ORDER — INSULIN GLARGINE 100 [IU]/ML
40 INJECTION, SOLUTION SUBCUTANEOUS DAILY
Qty: 24 ML | Refills: 2 | OUTPATIENT
Start: 2019-06-21 | End: 2020-06-20

## 2019-07-16 RX ORDER — DULOXETIN HYDROCHLORIDE 60 MG/1
60 CAPSULE, DELAYED RELEASE ORAL DAILY
Qty: 30 CAPSULE | Refills: 3 | Status: SHIPPED | OUTPATIENT
Start: 2019-07-16 | End: 2019-11-20 | Stop reason: SDUPTHER

## 2019-07-29 ENCOUNTER — TELEPHONE (OUTPATIENT)
Dept: FAMILY MEDICINE | Facility: CLINIC | Age: 64
End: 2019-07-29

## 2019-07-29 ENCOUNTER — PATIENT MESSAGE (OUTPATIENT)
Dept: FAMILY MEDICINE | Facility: CLINIC | Age: 64
End: 2019-07-29

## 2019-07-29 DIAGNOSIS — M54.30 ACUTE SCIATICA: Primary | ICD-10-CM

## 2019-07-29 NOTE — TELEPHONE ENCOUNTER
Pt called complaining of sciatic nerve pain muscle relaxers and ibuprofen not helping. She has an appointment scheduled on Thursday but is wondering if their is something you can suggest/ prescribe to help with pain before then.

## 2019-07-30 ENCOUNTER — PATIENT MESSAGE (OUTPATIENT)
Dept: FAMILY MEDICINE | Facility: CLINIC | Age: 64
End: 2019-07-30

## 2019-07-30 RX ORDER — GABAPENTIN 300 MG/1
300 CAPSULE ORAL 3 TIMES DAILY
Qty: 90 CAPSULE | Refills: 1 | Status: SHIPPED | OUTPATIENT
Start: 2019-07-30 | End: 2020-09-22

## 2019-08-01 ENCOUNTER — OFFICE VISIT (OUTPATIENT)
Dept: FAMILY MEDICINE | Facility: CLINIC | Age: 64
End: 2019-08-01
Payer: MEDICARE

## 2019-08-01 VITALS
SYSTOLIC BLOOD PRESSURE: 136 MMHG | HEIGHT: 68 IN | HEART RATE: 73 BPM | RESPIRATION RATE: 18 BRPM | WEIGHT: 235 LBS | DIASTOLIC BLOOD PRESSURE: 64 MMHG | BODY MASS INDEX: 35.61 KG/M2 | OXYGEN SATURATION: 98 %

## 2019-08-01 DIAGNOSIS — E11.9 TYPE 2 DIABETES MELLITUS TREATED WITH INSULIN: ICD-10-CM

## 2019-08-01 DIAGNOSIS — M54.41 ACUTE RIGHT-SIDED BACK PAIN WITH SCIATICA: Primary | ICD-10-CM

## 2019-08-01 DIAGNOSIS — I10 BENIGN ESSENTIAL HYPERTENSION: ICD-10-CM

## 2019-08-01 DIAGNOSIS — Z79.4 TYPE 2 DIABETES MELLITUS TREATED WITH INSULIN: ICD-10-CM

## 2019-08-01 LAB — HBA1C MFR BLD: 5.9 %

## 2019-08-01 PROCEDURE — 99999 PR PBB SHADOW E&M-EST. PATIENT-LVL V: CPT | Mod: PBBFAC,,, | Performed by: INTERNAL MEDICINE

## 2019-08-01 PROCEDURE — 83036 HEMOGLOBIN GLYCOSYLATED A1C: CPT | Mod: QW,S$GLB,, | Performed by: INTERNAL MEDICINE

## 2019-08-01 PROCEDURE — 96372 PR INJECTION,THERAP/PROPH/DIAG2ST, IM OR SUBCUT: ICD-10-PCS | Mod: S$GLB,,, | Performed by: INTERNAL MEDICINE

## 2019-08-01 PROCEDURE — 96372 THER/PROPH/DIAG INJ SC/IM: CPT | Mod: S$GLB,,, | Performed by: INTERNAL MEDICINE

## 2019-08-01 PROCEDURE — 99999 PR PBB SHADOW E&M-EST. PATIENT-LVL V: ICD-10-PCS | Mod: PBBFAC,,, | Performed by: INTERNAL MEDICINE

## 2019-08-01 PROCEDURE — 99213 OFFICE O/P EST LOW 20 MIN: CPT | Mod: 25,S$GLB,, | Performed by: INTERNAL MEDICINE

## 2019-08-01 PROCEDURE — 99213 PR OFFICE/OUTPT VISIT, EST, LEVL III, 20-29 MIN: ICD-10-PCS | Mod: 25,S$GLB,, | Performed by: INTERNAL MEDICINE

## 2019-08-01 PROCEDURE — 83036 POCT HEMOGLOBIN A1C: ICD-10-PCS | Mod: QW,S$GLB,, | Performed by: INTERNAL MEDICINE

## 2019-08-01 RX ORDER — HYDROCODONE BITARTRATE AND ACETAMINOPHEN 10; 325 MG/1; MG/1
TABLET ORAL
Qty: 24 TABLET | Refills: 0 | Status: SHIPPED | OUTPATIENT
Start: 2019-08-01 | End: 2019-10-28

## 2019-08-01 RX ORDER — INSULIN GLARGINE 100 [IU]/ML
40 INJECTION, SOLUTION SUBCUTANEOUS
COMMUNITY
Start: 2019-04-25 | End: 2019-10-21

## 2019-08-01 RX ORDER — KETOROLAC TROMETHAMINE 30 MG/ML
60 INJECTION, SOLUTION INTRAMUSCULAR; INTRAVENOUS
Status: COMPLETED | OUTPATIENT
Start: 2019-08-01 | End: 2019-08-01

## 2019-08-01 RX ADMIN — KETOROLAC TROMETHAMINE 60 MG: 30 INJECTION, SOLUTION INTRAMUSCULAR; INTRAVENOUS at 12:08

## 2019-08-01 NOTE — ASSESSMENT & PLAN NOTE
Weaning meds as needed as pt loses weight.  Stopped clonidine recently. Continue amlodipine, benazepril, metoprolol.

## 2019-08-01 NOTE — PROGRESS NOTES
Adult Urgent Visit    Chief Complaint   Patient presents with    Follow-up    Diabetes    Sciatica       Back Pain   This is a recurrent problem. The current episode started in the past 7 days. The problem occurs constantly. The problem has been waxing and waning since onset. The pain is present in the gluteal. The quality of the pain is described as burning and shooting. The pain radiates to the right thigh. The pain is severe. The pain is the same all the time. The symptoms are aggravated by bending, standing and sitting. Pertinent negatives include no abdominal pain, bladder incontinence, bowel incontinence, chest pain, dysuria, fever, headaches, leg pain, numbness, paresis, paresthesias, pelvic pain, perianal numbness, tingling, weakness or weight loss. Risk factors include obesity. She has tried analgesics, muscle relaxant, home exercises and heat (started gabapentin) for the symptoms. The treatment provided mild relief.       Review of Systems   Constitutional: Negative for fever and weight loss.   Cardiovascular: Negative for chest pain.   Gastrointestinal: Negative for abdominal pain and bowel incontinence.   Genitourinary: Negative for bladder incontinence, dysuria and pelvic pain.   Musculoskeletal: Positive for back pain.   Neurological: Negative for tingling, weakness, numbness, headaches and paresthesias.       Past medical, social and family history reviewed and there are no pertinent changes.       Current Outpatient Medications:     amLODIPine (NORVASC) 5 MG tablet, Take 5 mg by mouth once daily. , Disp: , Rfl:     aspirin (ECOTRIN) 325 MG EC tablet, Take by mouth., Disp: , Rfl:     benazepril (LOTENSIN) 40 MG tablet, Take 40 mg by mouth 2 (two) times daily. , Disp: , Rfl:     DULoxetine (CYMBALTA) 60 MG capsule, Take 1 capsule (60 mg total) by mouth once daily., Disp: 30 capsule, Rfl: 3    fluticasone (FLONASE) 50 mcg/actuation nasal spray, 1 spray (50 mcg total) by Each  "Nare route once daily., Disp: 16 g, Rfl: 0    gabapentin (NEURONTIN) 300 MG capsule, Take 1 capsule (300 mg total) by mouth 3 (three) times daily., Disp: 90 capsule, Rfl: 1    guanFACINE (TENEX) 2 MG tablet, Take 1 tablet (2 mg total) by mouth nightly., Disp: 90 tablet, Rfl: 3    insulin (LANTUS SOLOSTAR U-100 INSULIN) glargine 100 units/mL (3mL) SubQ pen, Inject 40 Units into the skin., Disp: , Rfl:     metformin (GLUCOPHAGE) 1000 MG tablet, Take 1,000 mg by mouth 2 (two) times daily with meals. , Disp: , Rfl:     metOLazone (ZAROXOLYN) 5 MG tablet, Take 1 tablet (5 mg total) by mouth once daily., Disp: 90 tablet, Rfl: 3    metoprolol succinate (TOPROL-XL) 25 MG 24 hr tablet, Take 50 mg by mouth once daily. , Disp: , Rfl:     rosuvastatin (CRESTOR) 40 MG Tab, Take 1 tablet (40 mg total) by mouth every evening., Disp: 90 tablet, Rfl: 3    blood sugar diagnostic Strp, Use 4 times per day as directed to check blood sugar. (one touch delica), Disp: 200 each, Rfl: 5    blood-glucose meter kit, Use as instructed, Disp: 1 each, Rfl: 0    HYDROcodone-acetaminophen (NORCO)  mg per tablet, 1/2 to 1 tab PO Q8 hours PRN pain, Disp: 24 tablet, Rfl: 0  No current facility-administered medications for this visit.     Vitals:    08/01/19 1139   BP: 136/64   Pulse: 73   Resp: 18   SpO2: 98%   Weight: 106.6 kg (235 lb)   Height: 5' 8" (1.727 m)       Physical Exam   Constitutional: She is oriented to person, place, and time. She appears well-developed and well-nourished. Distressed: mild distress 2/2 pain.   HENT:   Right Ear: External ear normal.   Left Ear: External ear normal.   Nose: Nose normal.   Mouth/Throat: Oropharynx is clear and moist and mucous membranes are normal.   Eyes: Pupils are equal, round, and reactive to light. Conjunctivae are normal. Right eye exhibits no discharge. Left eye exhibits no discharge.   Cardiovascular: Normal rate, regular rhythm, normal heart sounds and intact distal pulses.   No " murmur heard.  Pulmonary/Chest: Effort normal and breath sounds normal. No respiratory distress. She has no wheezes. She has no rales.   Musculoskeletal: She exhibits no edema.        Lumbar back: She exhibits decreased range of motion. She exhibits no deformity and no spasm.        Back:         Right upper leg: She exhibits tenderness. She exhibits no bony tenderness and no swelling.   Neurological: She is alert and oriented to person, place, and time.   Skin: She is not diaphoretic.       Asessment/Plan:  Chanel is a 63 y.o. female here with complaint of Follow-up; Diabetes; and Sciatica  .      Problem List Items Addressed This Visit        Cardiac/Vascular    Benign essential hypertension    Current Assessment & Plan     Weaning meds as needed as pt loses weight.  Stopped clonidine recently. Continue amlodipine, benazepril, metoprolol.             Endocrine    Type 2 diabetes mellitus treated with insulin    Current Assessment & Plan     Continue metformin, lantus 40 units QHS. HbA1c 5.9% today.          Relevant Medications    insulin (LANTUS SOLOSTAR U-100 INSULIN) glargine 100 units/mL (3mL) SubQ pen    Other Relevant Orders    Hemoglobin A1C, POCT       Orthopedic    Acute right-sided back pain with sciatica - Primary    Current Assessment & Plan     Toradol injection given in clinic. Increase gabapentin as tolerated. Hydrocodone PRN severe pain.          Relevant Medications    ketorolac injection 60 mg (Completed)    HYDROcodone-acetaminophen (NORCO)  mg per tablet

## 2019-08-01 NOTE — PATIENT INSTRUCTIONS
You may increase gabapentin to 300mg AM and lunch, 600mg before bed. Caution when combining sedating meds

## 2019-08-01 NOTE — ASSESSMENT & PLAN NOTE
Toradol injection given in clinic. Increase gabapentin as tolerated. Hydrocodone PRN severe pain.

## 2019-08-05 ENCOUNTER — PATIENT MESSAGE (OUTPATIENT)
Dept: FAMILY MEDICINE | Facility: CLINIC | Age: 64
End: 2019-08-05

## 2019-08-05 DIAGNOSIS — M54.41 ACUTE RIGHT-SIDED BACK PAIN WITH SCIATICA: Primary | ICD-10-CM

## 2019-08-07 ENCOUNTER — OFFICE VISIT (OUTPATIENT)
Dept: RHEUMATOLOGY | Facility: CLINIC | Age: 64
End: 2019-08-07
Payer: MEDICARE

## 2019-08-07 VITALS
BODY MASS INDEX: 35.69 KG/M2 | DIASTOLIC BLOOD PRESSURE: 73 MMHG | WEIGHT: 234.69 LBS | SYSTOLIC BLOOD PRESSURE: 124 MMHG

## 2019-08-07 DIAGNOSIS — M19.91 PRIMARY OSTEOARTHRITIS, UNSPECIFIED SITE: Primary | ICD-10-CM

## 2019-08-07 PROCEDURE — 99213 PR OFFICE/OUTPT VISIT, EST, LEVL III, 20-29 MIN: ICD-10-PCS | Mod: S$GLB,,, | Performed by: INTERNAL MEDICINE

## 2019-08-07 PROCEDURE — 99213 OFFICE O/P EST LOW 20 MIN: CPT | Mod: S$GLB,,, | Performed by: INTERNAL MEDICINE

## 2019-08-07 RX ORDER — DICLOFENAC SODIUM 10 MG/G
GEL TOPICAL
Qty: 1 TUBE | Refills: 2 | Status: SHIPPED | OUTPATIENT
Start: 2019-08-07 | End: 2019-10-28

## 2019-08-07 NOTE — PROGRESS NOTES
[facility]            PROGRESS NOTE      Subjective:       Patient ID:   NAME: Chanel Cervantes : 1955     63 y.o. female    Referring Doc: No ref. provider found  Other Physicians:    Chief Complaint:  Osteoarthritis      History of Present Illness:     Patient returns today for a regularly scheduled follow-up visit for  Osteoarthritis     The patient is experiencing r lower back pain radiating to ant thigh for last 3 wks.  No paresthesias.No hx trauma.No GI complaints            ROS:   GEN:  No  fever, night sweats . weight is stable   + fatigue  SKIN: no rashes, no bruising, no ulcerations, no Raynaud's  HEENT: no HA's, No visual changes, no mucosal ulcers, no sicca symptoms,  CV:   no CP, SOB, PND, WILSON, no orthopnea, no palpitations  PULM: normal with no SOB, cough, hemoptysis, sputum or pleuritic pain  GI:  no abdominal pain, nausea, vomiting, constipation, diarrhea, melanotic stools, BRBPR, hematemesis, no dysphagia  :   no dysuria  NEURO: no paresthesias, headaches, visual disturbances, muscle weakness  MUSCULOSKELETAL:no joint swelling, prolonged AM stiffness, + back pain, no muscle pain  Allergies:  Review of patient's allergies indicates:   Allergen Reactions    Adhesive tape-silicones Rash    Dye Hives    Penicillins Edema     and burning sensation    Iodinated contrast- oral and iv dye Rash       Medications:    Current Outpatient Medications:     amLODIPine (NORVASC) 5 MG tablet, Take 5 mg by mouth once daily. , Disp: , Rfl:     aspirin (ECOTRIN) 325 MG EC tablet, Take by mouth., Disp: , Rfl:     benazepril (LOTENSIN) 40 MG tablet, Take 40 mg by mouth 2 (two) times daily. , Disp: , Rfl:     blood sugar diagnostic Strp, Use 4 times per day as directed to check blood sugar. (one touch delica), Disp: 200 each, Rfl: 5    blood-glucose meter kit, Use as instructed, Disp: 1 each, Rfl: 0    DULoxetine (CYMBALTA) 60 MG capsule, Take 1 capsule (60 mg total) by mouth once daily.,  Disp: 30 capsule, Rfl: 3    fluticasone (FLONASE) 50 mcg/actuation nasal spray, 1 spray (50 mcg total) by Each Nare route once daily., Disp: 16 g, Rfl: 0    gabapentin (NEURONTIN) 300 MG capsule, Take 1 capsule (300 mg total) by mouth 3 (three) times daily., Disp: 90 capsule, Rfl: 1    guanFACINE (TENEX) 2 MG tablet, Take 1 tablet (2 mg total) by mouth nightly., Disp: 90 tablet, Rfl: 3    HYDROcodone-acetaminophen (NORCO)  mg per tablet, 1/2 to 1 tab PO Q8 hours PRN pain, Disp: 24 tablet, Rfl: 0    insulin (LANTUS SOLOSTAR U-100 INSULIN) glargine 100 units/mL (3mL) SubQ pen, Inject 40 Units into the skin., Disp: , Rfl:     metformin (GLUCOPHAGE) 1000 MG tablet, Take 1,000 mg by mouth 2 (two) times daily with meals. , Disp: , Rfl:     metOLazone (ZAROXOLYN) 5 MG tablet, Take 1 tablet (5 mg total) by mouth once daily., Disp: 90 tablet, Rfl: 3    metoprolol succinate (TOPROL-XL) 25 MG 24 hr tablet, Take 25 mg by mouth once daily. , Disp: , Rfl:     rosuvastatin (CRESTOR) 40 MG Tab, Take 1 tablet (40 mg total) by mouth every evening., Disp: 90 tablet, Rfl: 3    PMHx/PSHx Updates:        Objective:     Vitals:  Blood pressure 124/73, weight 106.5 kg (234 lb 11.2 oz).    Physical Examination:   GEN: no apparent distress, comfortable; AAOx3  SKIN: no rashes,no ulceration, no Raynaud's, no petechiae, no SQ nodules,  HEAD: normal  EYES: no pallor, no icterus,  NECK: no masses, thyroid normal, trachea midline, no LAD/LN's, supple  CV: RRR with no murmur; l S1 and S2 reg. ,no gallop no rubs,   CHEST: Normal respiratory effort; CTAB; normal breath sounds; no wheeze or crackles  MUSC/Skeletal: ROM normal; no crepitus; joints without synovitis,  No joint swelling or tenderness of PIP, MCP, wrist, elbow, shoulder, or knee joints.Sl tenderness R T Bursa and lat thigh  EXTREM: no clubbing, cyanosis, no edema,normal  pulses   NEURO: grossly intact; motor WNL; AAOx3; ,  PSYCH: normal mood, affect and behavior  LYMPH:  normal cervical, supraclavicular          Labs:   Lab Results   Component Value Date    WBC 7.2 10/29/2018    HGB 13.9 10/29/2018    HCT 45.2 10/29/2018    MCV 85.1 10/29/2018     10/29/2018    CMP  @[facility](NA,K,CL,CO2,GLU,BUN,Creatinine,Calcium,PROT,Albumin,Bilitot,Alkphos,AST,ALT,CRP,ESR,RF,CCP,LORIN,SSA,CPK,uric acid) )@  I have reviewed all available lab results and radiology reports.    Radiology/Diagnostic Studies:        Assessment/Plan:   (1) 63 y.o. female with diagnosis of Osteoarthritis.  Back Pain.Will see Dr Escalante tomorrow..defer to him [place] and tx  2) s/p bariatric surgery in April 2018.Lost 124 #                Discussion:     I have explained all of the above in detail and the patient understands all of the current recommendation(s). I have answered all questions to the best of my ability and to their complete satisfaction.       The patient is to continue with the current management plan         RTC in   4 months      Electronically signed by Navdeep Orozco MD

## 2019-08-08 ENCOUNTER — TELEPHONE (OUTPATIENT)
Dept: PAIN MEDICINE | Facility: CLINIC | Age: 64
End: 2019-08-08

## 2019-08-08 ENCOUNTER — INITIAL CONSULT (OUTPATIENT)
Dept: SPINE | Facility: CLINIC | Age: 64
End: 2019-08-08
Payer: MEDICARE

## 2019-08-08 VITALS
HEIGHT: 68 IN | SYSTOLIC BLOOD PRESSURE: 136 MMHG | BODY MASS INDEX: 35.59 KG/M2 | DIASTOLIC BLOOD PRESSURE: 73 MMHG | HEART RATE: 58 BPM | WEIGHT: 234.81 LBS

## 2019-08-08 DIAGNOSIS — M54.16 LUMBAR RADICULITIS: Primary | ICD-10-CM

## 2019-08-08 PROCEDURE — 99204 PR OFFICE/OUTPT VISIT, NEW, LEVL IV, 45-59 MIN: ICD-10-PCS | Mod: S$GLB,,, | Performed by: PHYSICAL MEDICINE & REHABILITATION

## 2019-08-08 PROCEDURE — 99204 OFFICE O/P NEW MOD 45 MIN: CPT | Mod: S$GLB,,, | Performed by: PHYSICAL MEDICINE & REHABILITATION

## 2019-08-08 RX ORDER — METHYLPREDNISOLONE 4 MG/1
TABLET ORAL
Qty: 1 PACKAGE | Refills: 0 | Status: SHIPPED | OUTPATIENT
Start: 2019-08-08 | End: 2019-08-29

## 2019-08-08 RX ORDER — CALCIUM CARBONATE 600 MG
600 TABLET ORAL ONCE
COMMUNITY
End: 2023-07-18

## 2019-08-08 NOTE — TELEPHONE ENCOUNTER
----- Message from Himanshu Escalante MD sent at 8/8/2019 10:01 AM CDT -----  Please schedule interlaminar epidural steroid injection at L2-3

## 2019-08-08 NOTE — PROGRESS NOTES
SUBJECTIVE:    Patient ID: Chanel Cervantes is a 63 y.o. female.    Chief Complaint: Back Pain    This is a 63-year-old woman who sees Dr. Funk for her primary care.  Has history of diabetes which is well-controlled.  History of hyperlipidemia hypertension.  Had bariatric surgery in April of this year and since then blood sugars much better.  Lost about 120 lb.  Long history of intermittent back pain radiating to the right lateral hip.  Usually resolves after a few days.  She presented to Dr. Funk on 08/01/2019 with a week long history of centralized low back pain at the lumbosacral junction radiating into the right lateral leg and right groin/anterior thigh.  Pain does not extend below the knee.  Mostly confined to the upper thigh region.  No associated weakness bowel or bladder dysfunction fever chills sweats or unexpected weight loss.  She received a shot of Toradol in the office which lasted for approximately 1 day.  She was also prescribed gabapentin which helped some and hydrocodone of which she is taking very little.  She has been trying to get by with Tylenol a 1000 mg per day which also helps some.  I reviewed an MRI from March of 2017 which is summarized below:    IMPRESSION:  1. Degenerative changes of the lumbar spine most pronounced at L2-L3 and L3-L4  as detailed above. Moderate neural foramen narrowing bilaterally at L3-L4. No  spinal canal compromise.  2. Minimal anterior wedging of T12 and L1 appears chronic.    X-rays of the hips from 2016 showed only mild degenerative changes.  Current pain level is 9/10        Past Medical History:   Diagnosis Date    CAD (coronary artery disease)     Diabetes mellitus, type 2     MI (myocardial infarction)     Sleep apnea     Sleep apnea 03/01/2019    Sleep Right      Social History     Socioeconomic History    Marital status:      Spouse name: Not on file    Number of children: Not on file    Years of education: Not on file    Highest  "education level: Not on file   Occupational History    Not on file   Social Needs    Financial resource strain: Not on file    Food insecurity:     Worry: Not on file     Inability: Not on file    Transportation needs:     Medical: Not on file     Non-medical: Not on file   Tobacco Use    Smoking status: Never Smoker    Smokeless tobacco: Never Used   Substance and Sexual Activity    Alcohol use: No    Drug use: No    Sexual activity: Not on file   Lifestyle    Physical activity:     Days per week: Not on file     Minutes per session: Not on file    Stress: Not on file   Relationships    Social connections:     Talks on phone: Not on file     Gets together: Not on file     Attends [employer] service: Not on file     Active member of club or organization: Not on file     Attends meetings of clubs or organizations: Not on file     Relationship status: Not on file   Other Topics Concern    Not on file   Social History Narrative    Not on file     Past Surgical History:   Procedure Laterality Date    ANGIOGRAM, CORONARY, WITH LEFT HEART CATHETERIZATION      APPENDECTOMY      BARIATRIC SURGERY  2019    Dr Nunez    CARPAL TUNNEL RELEASE Bilateral 2002     SECTION      CHOLECYSTECTOMY      COLONOSCOPY      CORONARY ANGIOPLASTY WITH STENT PLACEMENT      CORONARY ARTERY BYPASS GRAFT      ESOPHAGOGASTRODUODENOSCOPY      HERNIA REPAIR      HYSTERECTOMY       Family History   Problem Relation Age of Onset    Diabetes Mother     Vision loss Mother     Heart disease Father     Vision loss Father     Stroke Father      Vitals:    19 0919   BP: 136/73   Pulse: (!) 58   Weight: 106.5 kg (234 lb 12.6 oz)   Height: 5' 8" (1.727 m)       Review of Systems   Constitutional: Negative for chills, diaphoresis, fatigue, fever and unexpected weight change.   HENT: Negative for trouble swallowing.    Eyes: Negative for visual disturbance.   Respiratory: Negative for shortness of breath.  "   Cardiovascular: Negative for chest pain.   Gastrointestinal: Negative for abdominal pain, constipation, nausea and vomiting.   Genitourinary: Negative for difficulty urinating.   Musculoskeletal: Negative for arthralgias, back pain, gait problem, joint swelling, myalgias, neck pain and neck stiffness.   Neurological: Negative for dizziness, speech difficulty, weakness, light-headedness, numbness and headaches.          Objective:      Physical Exam   Constitutional: She is oriented to person, place, and time. She appears well-developed and well-nourished.   Neurological: She is alert and oriented to person, place, and time.   She is awake and in no acute distress but clearly uncomfortable  No point tenderness or palpable masses about the lumbar spine  Deep tendon reflexes +1 at both knees and trace at both ankles  Strength is normal in both lower extremities  Straight leg raising on the right reproduces right lateral leg discomfort.  Straight leg raising on the left is negative  HERB testing on the right causes right lateral leg discomfort low back pain and right groin pain.  Internal and external rotation of the right hip causes lateral thigh discomfort but no significant groin pain           Assessment:       1. Lumbar radiculitis           Plan:     she has a nonfocal examination from a neurological standpoint and no historical red flags.  I suspect that her pain is from lumbar radiculitis.  Probably upper lumbar region.  I doubt intrinsic hip pathology as the primary cause.  I am going to get her scheduled for an interlaminar epidural steroid injection at L2-3 but in the interim I am going to start on a Medrol pack.  Unfortunately she is going to have to struggle along with the pain medication and gabapentin until we can get those things done.  I will see her back after the procedure and if she is not significantly better she needs an updated MRI      Lumbar radiculitis    Other orders  -      methylPREDNISolone (MEDROL DOSEPACK) 4 mg tablet; use as directed  Dispense: 1 Package; Refill: 0

## 2019-08-12 ENCOUNTER — TELEPHONE (OUTPATIENT)
Dept: FAMILY MEDICINE | Facility: CLINIC | Age: 64
End: 2019-08-12

## 2019-08-12 DIAGNOSIS — M54.16 LUMBAR RADICULOPATHY: Primary | ICD-10-CM

## 2019-08-12 DIAGNOSIS — N30.91 CYSTITIS WITH HEMATURIA: Primary | ICD-10-CM

## 2019-08-12 RX ORDER — NITROFURANTOIN 25; 75 MG/1; MG/1
100 CAPSULE ORAL 2 TIMES DAILY
Qty: 14 CAPSULE | Refills: 0 | Status: SHIPPED | OUTPATIENT
Start: 2019-08-12 | End: 2019-08-19

## 2019-08-12 NOTE — TELEPHONE ENCOUNTER
Spoke with patient let her know to complete antibiotics if she is still symptomatic she will need to come in to the office to do a urine sample.

## 2019-08-12 NOTE — TELEPHONE ENCOUNTER
Pt called and left a msg complaining of burning urination and visible blood in urine. She can not make it in today due to transportation. Please advise.

## 2019-08-14 ENCOUNTER — PATIENT MESSAGE (OUTPATIENT)
Dept: FAMILY MEDICINE | Facility: CLINIC | Age: 64
End: 2019-08-14

## 2019-08-14 DIAGNOSIS — E11.9 TYPE 2 DIABETES MELLITUS TREATED WITH INSULIN: Primary | ICD-10-CM

## 2019-08-14 DIAGNOSIS — Z79.4 TYPE 2 DIABETES MELLITUS TREATED WITH INSULIN: Primary | ICD-10-CM

## 2019-08-14 RX ORDER — METFORMIN HYDROCHLORIDE 1000 MG/1
1000 TABLET ORAL 2 TIMES DAILY WITH MEALS
Qty: 60 TABLET | Refills: 5 | Status: SHIPPED | OUTPATIENT
Start: 2019-08-14 | End: 2020-02-26 | Stop reason: SDUPTHER

## 2019-08-18 ENCOUNTER — NURSE TRIAGE (OUTPATIENT)
Dept: ADMINISTRATIVE | Facility: CLINIC | Age: 64
End: 2019-08-18

## 2019-08-18 ENCOUNTER — HOSPITAL ENCOUNTER (EMERGENCY)
Facility: HOSPITAL | Age: 64
Discharge: HOME | End: 2019-08-18
Attending: EMERGENCY MEDICINE
Payer: MEDICARE

## 2019-08-18 VITALS
WEIGHT: 234 LBS | RESPIRATION RATE: 17 BRPM | TEMPERATURE: 98 F | DIASTOLIC BLOOD PRESSURE: 84 MMHG | OXYGEN SATURATION: 96 % | HEART RATE: 61 BPM | HEIGHT: 68 IN | BODY MASS INDEX: 35.46 KG/M2 | SYSTOLIC BLOOD PRESSURE: 130 MMHG

## 2019-08-18 DIAGNOSIS — M54.30 SCIATICA, UNSPECIFIED LATERALITY: Primary | ICD-10-CM

## 2019-08-18 PROCEDURE — 63600175 PHARM REV CODE 636 W HCPCS: Performed by: NURSE PRACTITIONER

## 2019-08-18 PROCEDURE — 99284 EMERGENCY DEPT VISIT MOD MDM: CPT | Mod: 25

## 2019-08-18 PROCEDURE — 96372 THER/PROPH/DIAG INJ SC/IM: CPT | Mod: 59

## 2019-08-18 RX ORDER — ONDANSETRON 2 MG/ML
4 INJECTION INTRAMUSCULAR; INTRAVENOUS ONCE
Status: COMPLETED | OUTPATIENT
Start: 2019-08-18 | End: 2019-08-18

## 2019-08-18 RX ORDER — HYDROMORPHONE HYDROCHLORIDE 1 MG/ML
1 INJECTION, SOLUTION INTRAMUSCULAR; INTRAVENOUS; SUBCUTANEOUS
Status: COMPLETED | OUTPATIENT
Start: 2019-08-18 | End: 2019-08-18

## 2019-08-18 RX ORDER — METHOCARBAMOL 500 MG/1
500 TABLET, FILM COATED ORAL 3 TIMES DAILY
Qty: 30 TABLET | Refills: 0 | Status: SHIPPED | OUTPATIENT
Start: 2019-08-18 | End: 2019-08-23

## 2019-08-18 RX ADMIN — ONDANSETRON 4 MG: 2 INJECTION INTRAMUSCULAR; INTRAVENOUS at 02:08

## 2019-08-18 RX ADMIN — HYDROMORPHONE HYDROCHLORIDE 1 MG: 1 INJECTION, SOLUTION INTRAMUSCULAR; INTRAVENOUS; SUBCUTANEOUS at 02:08

## 2019-08-18 NOTE — TELEPHONE ENCOUNTER
Saw Dr. Funk on 08/01 for sciatic nerve pain from the lower back to the knee and states that the pain was started to cross across her thigh. Was referred to Dr. Escalante. Patient was given a prednisone pack, hydrocodone, and a toradol shot for pain. Patient has an appointment for an epidural on 8/26/19. Patient states that she is in severe pain. Patient states that she has the pain in the right side of her back that wraps around and shoots down in the leg. Patient states that she did have her numbness in her leg last night. Patient advised to go to the urgent care or ER because she cannot see her PCP within four hours.      Reason for Disposition   Back pain radiating (shooting) into leg(s)   [1] SEVERE back pain (e.g., excruciating, unable to do any normal activities) AND [2] not improved 2 hours after pain medicine    Additional Information   Negative: Passed out (i.e., lost consciousness, collapsed and was not responding)   Negative: Shock suspected (e.g., cold/pale/clammy skin, too weak to stand, low BP, rapid pulse)   Negative: Sounds like a life-threatening emergency to the triager   Negative: [1] SEVERE back pain (e.g., excruciating) AND [2] sudden onset AND [3] age > 60   Negative: [1] Unable to urinate (or only a few drops) > 4 hours AND     [2] bladder feels very full (e.g., palpable bladder or strong urge to urinate)   Negative: [1] Urinary or bowel incontinence (i.e., loss of bladder or bowel control) AND [2] new onset   Negative: Numbness in groin or rectal area (i.e., loss of sensation)   Negative: [1] SEVERE abdominal pain AND [2] present > 1 hour   Negative: [1] Abdominal pain AND [2] age > 60   Negative: Weakness of a leg or foot (e.g., unable to bear weight, dragging foot)   Negative: Unable to walk   Negative: Patient sounds very sick or weak to the triager    Protocols used: LEG PAIN-A-AH, BACK PAIN-A-AH

## 2019-08-18 NOTE — ED PROVIDER NOTES
Encounter Date: 2019       History     Chief Complaint   Patient presents with    Back Pain     down right leg since  getting worse seen by pcp got toradol shot      Presents with complaint of low back pain with radiation down right leg.  Pt has been seen by her spinal specialist and dx with sciatica  She is to start epidural inj. Next week  Has been taking norco and just finished a medrol dose asad without relief.  Denies injury        Review of patient's allergies indicates:   Allergen Reactions    Adhesive tape-silicones Rash    Dye Hives    Penicillins Edema     and burning sensation    Iodinated contrast- oral and iv dye Rash     Past Medical History:   Diagnosis Date    CAD (coronary artery disease)     Diabetes mellitus, type 2     MI (myocardial infarction)     Sleep apnea     Sleep apnea 2019    Sleep Right      Past Surgical History:   Procedure Laterality Date    ANGIOGRAM, CORONARY, WITH LEFT HEART CATHETERIZATION      APPENDECTOMY      BARIATRIC SURGERY  2019    Dr Nunez    CARPAL TUNNEL RELEASE Bilateral 2002     SECTION      CHOLECYSTECTOMY      COLONOSCOPY      CORONARY ANGIOPLASTY WITH STENT PLACEMENT      CORONARY ARTERY BYPASS GRAFT      ESOPHAGOGASTRODUODENOSCOPY      HERNIA REPAIR      HYSTERECTOMY       Family History   Problem Relation Age of Onset    Diabetes Mother     Vision loss Mother     Heart disease Father     Vision loss Father     Stroke Father      Social History     Tobacco Use    Smoking status: Never Smoker    Smokeless tobacco: Never Used   Substance Use Topics    Alcohol use: No    Drug use: No     Review of Systems   Constitutional: Negative for fever.   Respiratory: Negative for cough, shortness of breath and wheezing.    Cardiovascular: Negative for chest pain, palpitations and leg swelling.   Gastrointestinal: Negative for abdominal pain, diarrhea, nausea and vomiting.   Genitourinary: Negative for dysuria.    Musculoskeletal: Positive for back pain.   Skin: Negative for rash.   Neurological: Negative for weakness.       Physical Exam     Initial Vitals [08/18/19 1234]   BP Pulse Resp Temp SpO2   (!) 161/80 70 20 97.8 °F (36.6 °C) 99 %      MAP       --         Physical Exam    Constitutional: She appears well-developed and well-nourished.   HENT:   Head: Normocephalic and atraumatic.   Eyes: Conjunctivae are normal.   Neck: Normal range of motion.   Cardiovascular: Normal rate and regular rhythm.   Pulmonary/Chest: Breath sounds normal. No respiratory distress.   Abdominal: Soft. Bowel sounds are normal.   Musculoskeletal: Normal range of motion.   Moderate pain with movement of right leg  Neg straight leg testing bilateral.  Moderate tenderness with palpation to right buttocks.  Negative bony tenderness   Neurological: She is alert and oriented to person, place, and time.   Skin: Skin is warm and dry.   Psychiatric: She has a normal mood and affect. Thought content normal.         ED Course   Procedures  Labs Reviewed - No data to display       Imaging Results    None          Medical Decision Making:   Initial Assessment:   Presents with chronic sciatic nerve pain  Back pain radiating down right leg  Denies injury  Pt reports decrease in pain after Dilaudid injection  Have discussed this pt with Dr. Jackson                      Clinical Impression:       ICD-10-CM ICD-9-CM   1. Sciatica, unspecified laterality M54.30 724.3                                Felicita Morton NP  08/18/19 3156

## 2019-08-19 ENCOUNTER — TELEPHONE (OUTPATIENT)
Dept: SPINE | Facility: CLINIC | Age: 64
End: 2019-08-19

## 2019-08-19 ENCOUNTER — PATIENT MESSAGE (OUTPATIENT)
Dept: FAMILY MEDICINE | Facility: CLINIC | Age: 64
End: 2019-08-19

## 2019-08-19 DIAGNOSIS — M54.16 LUMBAR RADICULITIS: Primary | ICD-10-CM

## 2019-08-19 DIAGNOSIS — M54.41 ACUTE RIGHT-SIDED BACK PAIN WITH SCIATICA: Primary | ICD-10-CM

## 2019-08-19 RX ORDER — OXYCODONE AND ACETAMINOPHEN 7.5; 325 MG/1; MG/1
1 TABLET ORAL EVERY 6 HOURS PRN
Qty: 24 TABLET | Refills: 0 | Status: SHIPPED | OUTPATIENT
Start: 2019-08-19 | End: 2019-08-23 | Stop reason: SDUPTHER

## 2019-08-19 NOTE — TELEPHONE ENCOUNTER
----- Message from Emily Sam sent at 8/19/2019  8:08 AM CDT -----  Contact: Patient  Type: Needs Medical Advice    Who Called:  Patient  Best Call Back Number: [phone]  Additional Information: Patient is stating that she went to the E.R. Yesterday at [facility] and she needs to speak with a nurse about getting in sooner for her epidural.Please call back and advise.

## 2019-08-20 ENCOUNTER — TELEPHONE (OUTPATIENT)
Dept: SPINE | Facility: CLINIC | Age: 64
End: 2019-08-20

## 2019-08-20 DIAGNOSIS — M54.16 LUMBAR RADICULITIS: Primary | ICD-10-CM

## 2019-08-20 RX ORDER — DIAZEPAM 5 MG/1
TABLET ORAL
Qty: 2 TABLET | Refills: 0 | Status: SHIPPED | OUTPATIENT
Start: 2019-08-20 | End: 2019-08-23 | Stop reason: SDUPTHER

## 2019-08-20 NOTE — TELEPHONE ENCOUNTER
----- Message from Bunny Best sent at 8/20/2019  2:15 PM CDT -----  Contact: Patient  Type: Needs Medical Advice    Who Called:  Patient  Best Call Back Number: [phone]  Additional Information: Patient is scheduled for MRI on 8/21/19 and will not be able to lie still because she is in severe pain. Please advise for sedation during MRI

## 2019-08-21 ENCOUNTER — HOSPITAL ENCOUNTER (OUTPATIENT)
Dept: RADIOLOGY | Facility: HOSPITAL | Age: 64
Discharge: HOME | End: 2019-08-21
Attending: PHYSICAL MEDICINE & REHABILITATION
Payer: MEDICARE

## 2019-08-21 ENCOUNTER — TELEPHONE (OUTPATIENT)
Dept: SPINE | Facility: CLINIC | Age: 64
End: 2019-08-21

## 2019-08-21 DIAGNOSIS — M54.16 LUMBAR RADICULITIS: ICD-10-CM

## 2019-08-21 PROCEDURE — 72148 MRI LUMBAR SPINE W/O DYE: CPT | Mod: TC

## 2019-08-21 NOTE — TELEPHONE ENCOUNTER
----- Message from Radha Fredy sent at 8/21/2019 11:41 AM CDT -----  Contact: Patient  Type: Needs Medical Advice    Who Called:  Patient  Best Call Back Number:   Additional Information: Calling to let the Nurse know that she wants a call back as soon as the MRI results are back in. Her insurance doesn't cover the Epidural that is scheduled Monday with Dr Hager.

## 2019-08-22 ENCOUNTER — TELEPHONE (OUTPATIENT)
Dept: PAIN MEDICINE | Facility: CLINIC | Age: 64
End: 2019-08-22

## 2019-08-22 NOTE — TELEPHONE ENCOUNTER
----- Message from Cari Koehler sent at 8/22/2019  8:52 AM CDT -----  Contact: self  Pt is calling in regards to cancelling her procedure on 08/26.    Pt can be reached at [phone].    Thank you

## 2019-08-23 ENCOUNTER — TELEPHONE (OUTPATIENT)
Dept: SPINE | Facility: CLINIC | Age: 64
End: 2019-08-23

## 2019-08-23 ENCOUNTER — OFFICE VISIT (OUTPATIENT)
Dept: SPINE | Facility: CLINIC | Age: 64
End: 2019-08-23
Payer: MEDICARE

## 2019-08-23 VITALS
DIASTOLIC BLOOD PRESSURE: 71 MMHG | WEIGHT: 233.94 LBS | HEART RATE: 55 BPM | HEIGHT: 68 IN | BODY MASS INDEX: 35.45 KG/M2 | SYSTOLIC BLOOD PRESSURE: 131 MMHG

## 2019-08-23 DIAGNOSIS — M54.41 ACUTE RIGHT-SIDED BACK PAIN WITH SCIATICA: ICD-10-CM

## 2019-08-23 DIAGNOSIS — R93.7 ABNORMAL MRI, LUMBAR SPINE: ICD-10-CM

## 2019-08-23 DIAGNOSIS — M54.16 LUMBAR RADICULITIS: Primary | ICD-10-CM

## 2019-08-23 PROCEDURE — 99213 PR OFFICE/OUTPT VISIT, EST, LEVL III, 20-29 MIN: ICD-10-PCS | Mod: S$GLB,,, | Performed by: PHYSICAL MEDICINE & REHABILITATION

## 2019-08-23 PROCEDURE — 99213 OFFICE O/P EST LOW 20 MIN: CPT | Mod: S$GLB,,, | Performed by: PHYSICAL MEDICINE & REHABILITATION

## 2019-08-23 RX ORDER — DIAZEPAM 5 MG/1
TABLET ORAL
Qty: 2 TABLET | Refills: 0 | Status: SHIPPED | OUTPATIENT
Start: 2019-08-23 | End: 2019-10-28

## 2019-08-23 RX ORDER — OXYCODONE AND ACETAMINOPHEN 7.5; 325 MG/1; MG/1
1 TABLET ORAL EVERY 6 HOURS PRN
Qty: 28 TABLET | Refills: 0 | Status: SHIPPED | OUTPATIENT
Start: 2019-08-23 | End: 2019-08-30

## 2019-08-23 NOTE — TELEPHONE ENCOUNTER
----- Message from Rivera Hill sent at 8/23/2019  2:08 PM CDT -----  Contact: same  Patient was to know if she has to wait for her MRI to get her epidural or not?    Patient call back number is [phone]

## 2019-08-23 NOTE — PROGRESS NOTES
SUBJECTIVE:    Patient ID: Chanel Cervantes is a 64 y.o. female.    Chief Complaint: Follow-up (MRi results)    She is here to review her lumbar MRI which was done to evaluate her complaint of right-sided low back pain associated with right anterior thigh pain.  The MRI was done on 08/21/2019 and is summarized below:    Impression      1.  Multilevel degenerative disc disease and facet arthropathy in the lumbar spine as outlined in detail above most pronounced at L2-L3, L3-L4 and L5-S1.    2.  Mild adjacent endplate edema at L3-L4, possibly degenerative in nature, consider correlation with symptoms, history and either short-term follow-up MRI or contrast enhanced exam as infection is not entirely excluded.    3.  Indeterminate exophytic interpolar left renal lesion, seen to represent a hyperdense/hemorrhagic cyst on the CT from 07/05/2017 ([facility]).    I note that she does not have any symptoms of infection and no risk factors for infection in the spine.  Also she continues to struggle with pain.  Her insurance company would not cover injections with Dr. Hager.  She intends to seek treatment with .        Past Medical History:   Diagnosis Date    CAD (coronary artery disease)     Diabetes mellitus, type 2     MI (myocardial infarction)     Sleep apnea     Sleep apnea 03/01/2019    Sleep Right      Social History     Socioeconomic History    Marital status:      Spouse name: Not on file    Number of children: Not on file    Years of education: Not on file    Highest education level: Not on file   Occupational History    Not on file   Social Needs    Financial resource strain: Not on file    Food insecurity:     Worry: Not on file     Inability: Not on file    Transportation needs:     Medical: Not on file     Non-medical: Not on file   Tobacco Use    Smoking status: Never Smoker    Smokeless tobacco: Never Used   Substance and Sexual Activity    Alcohol use: No    Drug use: No    Sexual  "activity: Not on file   Lifestyle    Physical activity:     Days per week: Not on file     Minutes per session: Not on file    Stress: Not on file   Relationships    Social connections:     Talks on phone: Not on file     Gets together: Not on file     Attends Sabianist service: Not on file     Active member of club or organization: Not on file     Attends meetings of clubs or organizations: Not on file     Relationship status: Not on file   Other Topics Concern    Not on file   Social History Narrative    Not on file     Past Surgical History:   Procedure Laterality Date    ANGIOGRAM, CORONARY, WITH LEFT HEART CATHETERIZATION      APPENDECTOMY      BARIATRIC SURGERY  2019    Dr Nunez    CARPAL TUNNEL RELEASE Bilateral 2002     SECTION      CHOLECYSTECTOMY      COLONOSCOPY      CORONARY ANGIOPLASTY WITH STENT PLACEMENT      CORONARY ARTERY BYPASS GRAFT      ESOPHAGOGASTRODUODENOSCOPY      HERNIA REPAIR      HYSTERECTOMY       Family History   Problem Relation Age of Onset    Diabetes Mother     Vision loss Mother     Heart disease Father     Vision loss Father     Stroke Father      Vitals:    19 1100   BP: 131/71   Pulse: (!) 55   Weight: 106.1 kg (233 lb 14.5 oz)   Height: 5' 8" (1.727 m)       Review of Systems   Constitutional: Negative for chills, diaphoresis, fatigue, fever and unexpected weight change.   HENT: Negative for trouble swallowing.    Eyes: Negative for visual disturbance.   Respiratory: Negative for shortness of breath.    Cardiovascular: Negative for chest pain.   Gastrointestinal: Negative for abdominal pain, constipation, nausea and vomiting.   Genitourinary: Negative for difficulty urinating.   Musculoskeletal: Negative for arthralgias, back pain, gait problem, joint swelling, myalgias, neck pain and neck stiffness.   Neurological: Negative for dizziness, speech difficulty, weakness, light-headedness, numbness and headaches.          Objective:    "   Physical Exam   Constitutional: She is oriented to person, place, and time. She appears well-developed and well-nourished.   Neurological: She is alert and oriented to person, place, and time.           Assessment:       1. Lumbar radiculitis    2. Abnormal MRI, lumbar spine    3. Acute right-sided back pain with sciatica           Plan:     per recommendations of Radiology will get a contrast enhanced MRI.  I refilled her Percocet.  Refilled Valium for the procedure.  Referred to .  Follow-up with me after the procedure      Lumbar radiculitis  -     Ambulatory referral to Pain Clinic    Abnormal MRI, lumbar spine  -     MRI Lumbar Spine W WO Cont; Future; Expected date: 08/23/2019  -     Creatinine, serum; Future; Expected date: 08/23/2019    Acute right-sided back pain with sciatica  -     oxyCODONE-acetaminophen (PERCOCET) 7.5-325 mg per tablet; Take 1 tablet by mouth every 6 (six) hours as needed for Pain.  Dispense: 28 tablet; Refill: 0    Other orders  -     diazePAM (VALIUM) 5 MG tablet; 1-2 tablets p.o. 30 -15 min prior to MRI  Dispense: 2 tablet; Refill: 0

## 2019-08-27 ENCOUNTER — HOSPITAL ENCOUNTER (OUTPATIENT)
Dept: RADIOLOGY | Facility: HOSPITAL | Age: 64
Discharge: HOME | End: 2019-08-27
Attending: PHYSICAL MEDICINE & REHABILITATION
Payer: MEDICARE

## 2019-08-27 DIAGNOSIS — R93.7 ABNORMAL MRI, LUMBAR SPINE: ICD-10-CM

## 2019-08-27 LAB
CREAT SERPL-MCNC: 0.7 MG/DL (ref 0.5–1.4)
SAMPLE: NORMAL

## 2019-08-27 PROCEDURE — A9585 GADOBUTROL INJECTION: HCPCS | Mod: PO | Performed by: PHYSICAL MEDICINE & REHABILITATION

## 2019-08-27 PROCEDURE — 25500020 PHARM REV CODE 255: Mod: PO | Performed by: PHYSICAL MEDICINE & REHABILITATION

## 2019-08-27 PROCEDURE — 72149 MRI LUMBAR SPINE W/DYE: CPT | Mod: TC,PO

## 2019-08-27 RX ORDER — GADOBUTROL 604.72 MG/ML
10 INJECTION INTRAVENOUS
Status: COMPLETED | OUTPATIENT
Start: 2019-08-27 | End: 2019-08-27

## 2019-08-27 RX ADMIN — GADOBUTROL 10 ML: 604.72 INJECTION INTRAVENOUS at 02:08

## 2019-08-28 ENCOUNTER — TELEPHONE (OUTPATIENT)
Dept: SPINE | Facility: CLINIC | Age: 64
End: 2019-08-28

## 2019-08-28 DIAGNOSIS — N28.1 RENAL CYST, LEFT: Primary | ICD-10-CM

## 2019-08-28 NOTE — TELEPHONE ENCOUNTER
----- Message from Himanshu Escalante MD sent at 8/28/2019  1:04 PM CDT -----  The did see a cyst on her left kidney.  I am going to ask her to have an ultrasound on the left kidney to make sure that it is indeed a cyst.  Cyst are generally benign

## 2019-08-28 NOTE — TELEPHONE ENCOUNTER
Informed patient of MRI results and recommendations per Dr. Escalante. Patient voiced understanding. Follow up appointment patient will call back to scheduled after procedure if needed.

## 2019-08-30 ENCOUNTER — TELEPHONE (OUTPATIENT)
Dept: SPINE | Facility: CLINIC | Age: 64
End: 2019-08-30

## 2019-08-30 RX ORDER — METHOCARBAMOL 500 MG/1
500 TABLET, FILM COATED ORAL 3 TIMES DAILY PRN
Qty: 60 TABLET | Refills: 1 | Status: SHIPPED | OUTPATIENT
Start: 2019-08-30 | End: 2019-09-09

## 2019-08-30 RX ORDER — METHOCARBAMOL 500 MG/1
500 TABLET, FILM COATED ORAL 3 TIMES DAILY
Refills: 0 | Status: CANCELLED | OUTPATIENT
Start: 2019-08-30

## 2019-08-30 NOTE — TELEPHONE ENCOUNTER
----- Message from Naomi Clark sent at 8/30/2019  9:06 AM CDT -----  Type: Needs Medication ordered    Who Called:  Patient  Best Call Back Number: [phone]  Additional Information: Requesting muscle relaxer medication: Robaxin be ordered/experiencing leg pain/please call back to advise.

## 2019-08-30 NOTE — TELEPHONE ENCOUNTER
Patient is requesting a refill on Robaxin 500mg 1 tablet 3 times daily previously prescribed by the NP in the ER.

## 2019-09-02 ENCOUNTER — HOSPITAL ENCOUNTER (OUTPATIENT)
Dept: RADIOLOGY | Facility: HOSPITAL | Age: 64
Discharge: HOME | End: 2019-09-02
Attending: PHYSICAL MEDICINE & REHABILITATION
Payer: MEDICARE

## 2019-09-02 DIAGNOSIS — N28.1 RENAL CYST, LEFT: ICD-10-CM

## 2019-09-02 PROCEDURE — 76770 US EXAM ABDO BACK WALL COMP: CPT | Mod: TC

## 2019-09-03 ENCOUNTER — TELEPHONE (OUTPATIENT)
Dept: SPINE | Facility: CLINIC | Age: 64
End: 2019-09-03

## 2019-09-03 NOTE — TELEPHONE ENCOUNTER
Informed patient of ultrasound results per Dr. Escalante's instructions. Informed patient that I will send a message to her PCP. Instructed patient to follow up with her PCP.

## 2019-09-05 ENCOUNTER — PATIENT MESSAGE (OUTPATIENT)
Dept: PEDIATRICS | Facility: CLINIC | Age: 64
End: 2019-09-05

## 2019-10-09 ENCOUNTER — PATIENT MESSAGE (OUTPATIENT)
Dept: FAMILY MEDICINE | Facility: CLINIC | Age: 64
End: 2019-10-09

## 2019-10-14 DIAGNOSIS — Z12.31 SCREENING MAMMOGRAM FOR HIGH-RISK PATIENT: Primary | ICD-10-CM

## 2019-10-17 ENCOUNTER — PATIENT MESSAGE (OUTPATIENT)
Dept: FAMILY MEDICINE | Facility: CLINIC | Age: 64
End: 2019-10-17

## 2019-10-17 DIAGNOSIS — Z79.4 TYPE 2 DIABETES MELLITUS TREATED WITH INSULIN: Primary | ICD-10-CM

## 2019-10-17 DIAGNOSIS — E11.9 TYPE 2 DIABETES MELLITUS TREATED WITH INSULIN: Primary | ICD-10-CM

## 2019-10-21 ENCOUNTER — TELEPHONE (OUTPATIENT)
Dept: PEDIATRICS | Facility: CLINIC | Age: 64
End: 2019-10-21

## 2019-10-21 DIAGNOSIS — E11.9 TYPE 2 DIABETES MELLITUS TREATED WITH INSULIN: Primary | ICD-10-CM

## 2019-10-21 DIAGNOSIS — I87.2 VENOUS INSUFFICIENCY: ICD-10-CM

## 2019-10-21 DIAGNOSIS — Z79.4 TYPE 2 DIABETES MELLITUS TREATED WITH INSULIN: Primary | ICD-10-CM

## 2019-10-21 RX ORDER — INSULIN GLARGINE 100 [IU]/ML
80 INJECTION, SOLUTION SUBCUTANEOUS DAILY
Qty: 24 ML | Refills: 3 | OUTPATIENT
Start: 2019-10-21

## 2019-10-21 RX ORDER — INSULIN GLARGINE 100 [IU]/ML
80 INJECTION, SOLUTION SUBCUTANEOUS DAILY
Qty: 9 SYRINGE | Refills: 2 | Status: SHIPPED | OUTPATIENT
Start: 2019-10-21 | End: 2019-11-22

## 2019-10-22 ENCOUNTER — PATIENT MESSAGE (OUTPATIENT)
Dept: FAMILY MEDICINE | Facility: CLINIC | Age: 64
End: 2019-10-22

## 2019-10-28 ENCOUNTER — OFFICE VISIT (OUTPATIENT)
Dept: FAMILY MEDICINE | Facility: CLINIC | Age: 64
End: 2019-10-28
Payer: MEDICARE

## 2019-10-28 VITALS
WEIGHT: 219.19 LBS | OXYGEN SATURATION: 97 % | TEMPERATURE: 99 F | DIASTOLIC BLOOD PRESSURE: 66 MMHG | BODY MASS INDEX: 33.22 KG/M2 | HEIGHT: 68 IN | SYSTOLIC BLOOD PRESSURE: 128 MMHG | HEART RATE: 57 BPM

## 2019-10-28 DIAGNOSIS — N39.0 URINARY TRACT INFECTION WITH HEMATURIA, SITE UNSPECIFIED: Primary | ICD-10-CM

## 2019-10-28 DIAGNOSIS — R31.9 URINARY TRACT INFECTION WITH HEMATURIA, SITE UNSPECIFIED: Primary | ICD-10-CM

## 2019-10-28 LAB
BILIRUB UR QL STRIP: NEGATIVE
GLUCOSE UR QL STRIP: NEGATIVE
KETONES UR QL STRIP: NEGATIVE
LEUKOCYTE ESTERASE UR QL STRIP: POSITIVE
PH, POC UA: 6.5
POC BLOOD, URINE: POSITIVE
POC NITRATES, URINE: NEGATIVE
PROT UR QL STRIP: POSITIVE
SP GR UR STRIP: 1.02 (ref 1–1.03)
UROBILINOGEN UR STRIP-ACNC: NORMAL (ref 0.1–1.1)

## 2019-10-28 PROCEDURE — 81003 POCT URINALYSIS, DIPSTICK, AUTOMATED, W/O SCOPE: ICD-10-PCS | Mod: QW,S$GLB,, | Performed by: NURSE PRACTITIONER

## 2019-10-28 PROCEDURE — 99213 OFFICE O/P EST LOW 20 MIN: CPT | Mod: 25,S$GLB,, | Performed by: NURSE PRACTITIONER

## 2019-10-28 PROCEDURE — 87086 URINE CULTURE/COLONY COUNT: CPT

## 2019-10-28 PROCEDURE — 99213 PR OFFICE/OUTPT VISIT, EST, LEVL III, 20-29 MIN: ICD-10-PCS | Mod: 25,S$GLB,, | Performed by: NURSE PRACTITIONER

## 2019-10-28 PROCEDURE — 81003 URINALYSIS AUTO W/O SCOPE: CPT | Mod: QW,S$GLB,, | Performed by: NURSE PRACTITIONER

## 2019-10-28 RX ORDER — METHOCARBAMOL 500 MG/1
500 TABLET, FILM COATED ORAL 3 TIMES DAILY
COMMUNITY
Start: 2019-10-04 | End: 2020-09-22

## 2019-10-28 RX ORDER — NITROFURANTOIN 25; 75 MG/1; MG/1
100 CAPSULE ORAL 2 TIMES DAILY
Qty: 10 CAPSULE | Refills: 0 | Status: SHIPPED | OUTPATIENT
Start: 2019-10-28 | End: 2019-11-02

## 2019-10-28 RX ORDER — PHENAZOPYRIDINE HYDROCHLORIDE 100 MG/1
100 TABLET, FILM COATED ORAL 3 TIMES DAILY PRN
Qty: 30 TABLET | Refills: 0 | Status: SHIPPED | OUTPATIENT
Start: 2019-10-28 | End: 2019-11-07

## 2019-10-28 RX ORDER — PEN NEEDLE, DIABETIC 30 GX3/16"
NEEDLE, DISPOSABLE MISCELLANEOUS
COMMUNITY
Start: 2019-08-29 | End: 2019-12-30

## 2019-10-28 RX ORDER — PREDNISOLONE ACETATE 10 MG/ML
1 SUSPENSION/ DROPS OPHTHALMIC
COMMUNITY
Start: 2019-09-06

## 2019-10-28 NOTE — PATIENT INSTRUCTIONS

## 2019-10-28 NOTE — PROGRESS NOTES
SUBJECTIVE:      Patient ID: Chanel Cervantes is a 64 y.o. female.    Chief Complaint: Urinary Tract Infection (Blood in urine started this morning)    Pt of Dr. Funk's presents for hematuria and urinary symptoms which started 7 days ago then resolved and started again today. She reports in the past having recurrent UTIs, even needing to be hospitalized for IV antibiotics for one incident. Reports she has been followed by urology for hematuria in the past. HPI as below. She does report flank pain but also reports not being able to tell between flank pain and her chronic back pain. She is scheduled to have an epidural injection on 11/4/2019. Denies fevers, CP, SOB, diarrhea, constipation, nausea, vomiting, dizziness, weakness, numbness, or any other concerns at this time.    Urinary Tract Infection    This is a new problem. The current episode started in the past 7 days. The problem has been gradually worsening. The quality of the pain is described as burning and aching. There has been no fever. She is not sexually active. There is a history of pyelonephritis (history of UTIs in the past, once requiring IV abx). Associated symptoms include flank pain, frequency, hematuria, sweats, urgency and weight loss (intentional weight loss). Pertinent negatives include no behavior changes, chills, discharge, hesitancy, nausea, possible pregnancy, vomiting, bubble bath use, constipation, rash or withholding. She has tried nothing for the symptoms. Her past medical history is significant for catheterization, diabetes mellitus, hypertension, recurrent UTIs and a urological procedure. There is no history of diabetes insipidus, genitourinary reflux, kidney stones, a single kidney, STD or urinary stasis.   Dysuria    This is a recurrent problem. The current episode started today. The problem occurs intermittently. The problem has been rapidly worsening. The quality of the pain is described as burning. The pain is at a severity  Patient : Karla Pelletier Age: 37 year old Sex: female   MRN: [id number] Encounter Date: 8/18/2017      History     Chief Complaint   Patient presents with   • Leg Pain     HPI  8/18/2017  8:49 PM Karla Pelletier is a 37 year old female who presents to the ED for evaluation of cast to LLE that began today.She states that she had a cased places by Dr. Adamson or a heel spur yesterday.  She reports that all of her toes are numb and it feels as if there is pressure on the back of her foot. Pt denies any pain or pressure above the level of her foot. She has no history of blood clotting disorders, has not had any recent surgeries, is not on birth control and denies any recent long distance travel. She denies weakness or any other s/s.There are no further complaints or modifying factors at this time.     PCP: Bulmaro Black MD      Allergies   Allergen Reactions   • Nitrofurantoin      Macrobid       Discharge Medication List as of 8/18/2017  9:47 PM      CONTINUE these medications which have NOT CHANGED    Details   terbinafine (LAMISIL) 250 MG tablet Take 1 tablet by mouth daily.Eprescribe, Disp-42 tablet, R-0      !! ibuprofen (MOTRIN) 600 MG tablet Take 1 tablet by mouth 3 times daily as needed for Pain.Normal, Disp-21 tablet, R-0      methylPREDNISolone (MEDROL DOSEPAK) 4 MG tablet follow package directionsEprescribe, Disp-21 tablet, R-0      nortriptyline (PAMELOR) 25 MG capsule Take 1 capsule by mouth nightly.Eprescribe, Disp-30 capsule, R-5      ondansetron (ZOFRAN ODT) 4 MG disintegrating tablet Take 1 tablet by mouth every 6 hours.Normal, Disp-10 tablet, R-0      traMADOL (ULTRAM) 50 MG tablet Take 1 tablet by mouth every 6 hours as needed for Pain.Normal, Disp-12 tablet, R-0      fluconazole (DIFLUCAN) 100 MG tablet Take 1 tablet by mouth daily.Normal, Disp-1 tablet, R-0      rifAMPIN (RIFADIN) 300 MG capsule Take 2 capsules by mouth every 12 hours.Eprescribe, Disp-14 capsule, R-0      mupirocin (BACTROBAN) 2 %  of 8/10. The pain is severe. There has been no fever. She is not sexually active. There is a history of pyelonephritis. Associated symptoms include flank pain, frequency, hematuria, sweats, urgency and weight loss (intentional weight loss). Pertinent negatives include no behavior changes, chills, discharge, hesitancy, nausea, possible pregnancy, vomiting, bubble bath use, constipation, rash or withholding. She has tried nothing for the symptoms. The treatment provided moderate relief. Her past medical history is significant for catheterization, diabetes mellitus, hypertension, recurrent UTIs and a urological procedure. There is no history of diabetes insipidus, genitourinary reflux, kidney stones, a single kidney, STD or urinary stasis.       Past Surgical History:   Procedure Laterality Date    ANGIOGRAM, CORONARY, WITH LEFT HEART CATHETERIZATION      APPENDECTOMY      BARIATRIC SURGERY  2019    Dr Nunez    CARPAL TUNNEL RELEASE Bilateral 2002     SECTION      CHOLECYSTECTOMY      COLONOSCOPY      CORONARY ANGIOPLASTY WITH STENT PLACEMENT      CORONARY ARTERY BYPASS GRAFT      ESOPHAGOGASTRODUODENOSCOPY      HERNIA REPAIR      HYSTERECTOMY       Family History   Problem Relation Age of Onset    Diabetes Mother     Vision loss Mother     Heart disease Father     Vision loss Father     Stroke Father       Social History     Socioeconomic History    Marital status:      Spouse name: Not on file    Number of children: Not on file    Years of education: Not on file    Highest education level: Not on file   Occupational History    Not on file   Social Needs    Financial resource strain: Somewhat hard    Food insecurity:     Worry: Sometimes true     Inability: Never true    Transportation needs:     Medical: No     Non-medical: No   Tobacco Use    Smoking status: Never Smoker    Smokeless tobacco: Never Used   Substance and Sexual Activity    Alcohol use: No      ointment Apply topically daily.Disp-22 g, R-0, Eprescribe      HYDROcodone-acetaminophen (NORCO) 5-325 MG per tablet Take 1 tablet by mouth every 6 hours as needed for Pain.Normal, Disp-15 tablet, R-0      guaiFENesin-codeine (CHERATUSSIN AC) 100-10 MG/5ML liquid Take 5 mLs by mouth 3 times daily as needed for Cough.Normal, Disp-120 mL, R-0      !! ibuprofen (MOTRIN) 800 MG tablet Take 1 tablet by mouth 3 times daily as needed for Pain.Eprescribe, Disp-30 tablet, R-1      acetaminophen (TYLENOL) 500 MG tablet Take 2 tablets by mouth 3 times daily as needed for Pain. Do not take more than 3000mg in 24 hoursEprescribe, Disp-45 tablet, R-5      fluticasone (FLONASE) 50 MCG/ACT nasal spray Spray 1 spray in each nostril daily.Eprescribe, Disp-16 g, R-2      loratadine (CLARITIN) 10 MG tablet Take 1 tablet by mouth daily.Eprescribe, Disp-30 tablet, R-2      !! sertraline (ZOLOFT) 50 MG tablet Take 1 tablet by mouth daily.Eprescribe, Disp-90 tablet, R-1New dose is 150mg      !! sertraline (ZOLOFT) 100 MG tablet Take 1 tablet by mouth daily.Eprescribe, Disp-90 tablet, R-3      polyethylene glycol (MIRALAX) packet Take 17 g by mouth daily.Eprescribe, Disp-90 packet, R-4      albuterol 108 (90 BASE) MCG/ACT inhaler Inhale 2 puffs into the lungs every 4 hours as needed for Shortness of Breath or Wheezing.Eprescribe, Disp-1 Inhaler, R-0       !! - Potential duplicate medications found. Please discuss with provider.            Past Medical History:   Diagnosis Date   • Anxiety    • Negative History of CA, HTN, DM, CAD, CVA, DVT, liver disease, migraine    • Obesity    • Vitamin D deficiency        Past Surgical History:   Procedure Laterality Date   • TUBAL LIGATION      Postpartum       Family History   Problem Relation Age of Onset   • Heart disease Mother 42     Heart disease   • Thyroid Mother    • OTHER Mother    • Hypertension Brother      x2 Brothers   • OTHER Daughter      appendectomy    • Diabetes Maternal Aunt    •  Frequency: Never     Binge frequency: Never    Drug use: No    Sexual activity: Not on file   Lifestyle    Physical activity:     Days per week: 0 days     Minutes per session: 0 min    Stress: Only a little   Relationships    Social connections:     Talks on phone: More than three times a week     Gets together: Three times a week     Attends Mosque service: Not on file     Active member of club or organization: No     Attends meetings of clubs or organizations: Never     Relationship status:    Other Topics Concern    Not on file   Social History Narrative    Not on file     Current Outpatient Medications   Medication Sig Dispense Refill    amLODIPine (NORVASC) 5 MG tablet Take 5 mg by mouth once daily.       aspirin (ECOTRIN) 325 MG EC tablet Take by mouth.      benazepril (LOTENSIN) 40 MG tablet Take 40 mg by mouth 2 (two) times daily.       blood sugar diagnostic Strp Use 4 times per day as directed to check blood sugar. (one touch delica) 200 each 5    blood-glucose meter kit Use as instructed 1 each 0    calcium carbonate (OS-RYAN) 600 mg calcium (1,500 mg) Tab Take 600 mg by mouth once.      DULoxetine (CYMBALTA) 60 MG capsule Take 1 capsule (60 mg total) by mouth once daily. 30 capsule 3    fluticasone (FLONASE) 50 mcg/actuation nasal spray 1 spray (50 mcg total) by Each Nare route once daily. 16 g 0    gabapentin (NEURONTIN) 300 MG capsule Take 1 capsule (300 mg total) by mouth 3 (three) times daily. 90 capsule 1    guanFACINE (TENEX) 2 MG tablet Take 1 tablet (2 mg total) by mouth nightly. 90 tablet 3    insulin (BASAGLAR KWIKPEN U-100 INSULIN) glargine 100 units/mL (3mL) SubQ pen Inject 80 Units into the skin once daily. 9 Syringe 2    metFORMIN (GLUCOPHAGE) 1000 MG tablet Take 1 tablet (1,000 mg total) by mouth 2 (two) times daily with meals. 60 tablet 5    methocarbamol (ROBAXIN) 500 MG Tab Take 500 mg by mouth.      metOLazone (ZAROXOLYN) 5 MG tablet Take 1 tablet (5 mg  Hypertension Maternal Aunt    • * Neg Hx      DVT/PE, CVA, Early MI, Cancer       Social History   Substance Use Topics   • Smoking status: Never Smoker   • Smokeless tobacco: Never Used   • Alcohol use No      Comment: socially       Review of Systems   Constitutional: Negative for chills and fever.   HENT: Negative for congestion, ear pain, rhinorrhea and sore throat.    Eyes: Negative for visual disturbance.   Respiratory: Negative for cough, choking, chest tightness, shortness of breath and wheezing.    Cardiovascular: Negative for chest pain and palpitations.   Gastrointestinal: Negative for abdominal pain, nausea and vomiting.   Genitourinary: Negative for difficulty urinating and dysuria.   Musculoskeletal: Negative for arthralgias, back pain and myalgias.        Positive for issue with cast to LLE     Skin: Negative for rash.   Neurological: Positive for numbness (left toes). Negative for dizziness, weakness, light-headedness and headaches.       Physical Exam     ED Triage Vitals [08/18/17 1955]   ED Triage Vitals Group      Temp 97.9 °F (36.6 °C)      Pulse 88      Resp 18      /85      SpO2 99 %      EtCO2 mmHg       Height 5' 2\" (1.575 m)      Weight 230 lb (104.3 kg)      Weight Scale Used ED Stated       Physical Exam   Constitutional: She appears well-developed.   HENT:   Head: Normocephalic.   Eyes: Pupils are equal, round, and reactive to light.   Neck: Normal range of motion. Neck supple.   Cardiovascular: Normal rate, regular rhythm and normal heart sounds.    Pulmonary/Chest: Effort normal and breath sounds normal. No respiratory distress.   Abdominal: Soft. There is no tenderness.   Musculoskeletal: Normal range of motion.   Short leg case LLE , normal cap refilll,  Toes are warm to touch with no cyanosis present ,cast seems loose    Neurological: She is alert. GCS eye subscore is 4. GCS verbal subscore is 5. GCS motor subscore is 6.   Skin: Skin is warm. No rash noted. No erythema. No  "total) by mouth once daily. 90 tablet 3    metoprolol succinate (TOPROL-XL) 25 MG 24 hr tablet Take 25 mg by mouth once daily.       multivitamin capsule Take 1 capsule by mouth once daily.      pen needle, diabetic (BD ULTRA-FINE SHORT PEN NEEDLE) 31 gauge x 5/16" Ndle USE 1 ONCE DAILY      prednisoLONE acetate (PRED FORTE) 1 % DrpS INSTILL 1 DROP INTO LEFT EYE TWICE DAILY FOR 7 DAYS      rosuvastatin (CRESTOR) 40 MG Tab Take 1 tablet (40 mg total) by mouth every evening. 90 tablet 3    nitrofurantoin, macrocrystal-monohydrate, (MACROBID) 100 MG capsule Take 1 capsule (100 mg total) by mouth 2 (two) times daily. for 5 days 10 capsule 0    phenazopyridine (PYRIDIUM) 100 MG tablet Take 1 tablet (100 mg total) by mouth 3 (three) times daily as needed for Pain. 30 tablet 0     No current facility-administered medications for this visit.      Review of patient's allergies indicates:   Allergen Reactions    Adhesive tape-silicones Rash    Dye Hives    Penicillins Edema     and burning sensation    Iodinated contrast media Rash      Past Medical History:   Diagnosis Date    CAD (coronary artery disease)     Diabetes mellitus, type 2     MI (myocardial infarction)     Sleep apnea     Sleep apnea 2019    Sleep Right      Past Surgical History:   Procedure Laterality Date    ANGIOGRAM, CORONARY, WITH LEFT HEART CATHETERIZATION      APPENDECTOMY      BARIATRIC SURGERY  2019    Dr Nunez    CARPAL TUNNEL RELEASE Bilateral 2002     SECTION      CHOLECYSTECTOMY      COLONOSCOPY      CORONARY ANGIOPLASTY WITH STENT PLACEMENT      CORONARY ARTERY BYPASS GRAFT      ESOPHAGOGASTRODUODENOSCOPY      HERNIA REPAIR      HYSTERECTOMY         Review of Systems   Constitutional: Positive for weight loss (intentional weight loss). Negative for activity change, appetite change, chills, fatigue, fever and unexpected weight change.   HENT: Negative for congestion, ear pain, postnasal drip, " pallor.   Psychiatric: She has a normal mood and affect.   Nursing note and vitals reviewed.      ED Course     Procedures  MDM  Number of Diagnoses or Management Options  Paresthesias/numbness- due to short leg cast/ distal CMS intact/ cast bivalved:     Vitals   Vitals:    08/18/17 1955   BP: 147/85   Pulse: 88   Resp: 18   Temp: 97.9 °F (36.6 °C)   TempSrc: Oral   SpO2: 99%   Weight: 104.3 kg   Height: 5' 2\" (1.575 m)       ED Course  8:55 PM Initial Impression: After initial examination, the pt presents with issue with case to LLE.We discussed the plan for cutting case.The pt understands and agrees to the plan. All questions have been addressed.    9:36 PM I bivalved the cast and applied ace wrap to LLE.   She is to follow up with Podiatry and return to ED for new or worsening sxs. She agrees with plan. All questions have been answered.       [facility]  Critical Care time spent on this patient outside of billable procedures:  None    Clinical Impression  ED Diagnosis        Final diagnosis    Paresthesias/numbness- due to short leg cast/ distal CMS intact/ cast bivalved           The patient was provided with a recommendation to follow up with a primary care provider and obtain reassessment of his/her blood pressure within three months.    Follow Up:  Marya Adamson DPM  [address] [facility] [address]  [facility] [address]  [facility] [address]  [phone]    In 2 days  As needed    Bulmaro Black MD  [address] [facility] [address]  [facility] [address]  [phone]      As needed    [facility]  [address] [facility] [address]  [phone]    If symptoms worsen       Discharge Medication List as of 8/18/2017  9:47 PM          Pt is discharged to home/self care in stable condition.       I have reviewed the information recorded by the scribe for accuracy and agree with its contents.    ____________________________________________________________________    Sparkle Vargas acting as  "rhinorrhea, sinus pain and sore throat.    Eyes: Negative for pain, discharge and visual disturbance.   Respiratory: Negative for cough, chest tightness, shortness of breath and wheezing.    Cardiovascular: Negative for chest pain, palpitations and leg swelling.   Gastrointestinal: Negative for abdominal distention, abdominal pain, blood in stool, constipation, diarrhea, nausea and vomiting.   Genitourinary: Positive for dysuria, flank pain, frequency, hematuria and urgency. Negative for difficulty urinating, hesitancy, pelvic pain and vaginal discharge.   Musculoskeletal: Positive for back pain. Negative for joint swelling and myalgias.   Skin: Negative for color change, rash and wound.   Neurological: Negative for dizziness, seizures, syncope, weakness, light-headedness and headaches.   Hematological: Negative for adenopathy.   Psychiatric/Behavioral: Negative for agitation, confusion, hallucinations and suicidal ideas. The patient is not nervous/anxious.       OBJECTIVE:      Vitals:    10/28/19 1611   BP: 128/66   BP Location: Right arm   Patient Position: Sitting   BP Method: Large (Manual)   Pulse: (!) 57   Temp: 98.5 °F (36.9 °C)   TempSrc: Oral   SpO2: 97%   Weight: 99.4 kg (219 lb 3.2 oz)   Height: 5' 8" (1.727 m)     Physical Exam   Constitutional: She is oriented to person, place, and time. Vital signs are normal. She appears well-developed and well-nourished. No distress.   HENT:   Head: Normocephalic and atraumatic.   Right Ear: Hearing and external ear normal.   Left Ear: Hearing and external ear normal.   Nose: Nose normal.   Mouth/Throat: Uvula is midline, oropharynx is clear and moist and mucous membranes are normal. No oropharyngeal exudate.   Eyes: Pupils are equal, round, and reactive to light. Conjunctivae, EOM and lids are normal. Right eye exhibits no discharge. Left eye exhibits no discharge. No scleral icterus.   Neck: Trachea normal, normal range of motion, full passive range of motion " a scribe for Henrry Dotson PA-C.  Henrry Dotson PA-C  Dictation # [id number]  Scribe: Sparkle Vargas    Attending Physician: Dr. Sonny Yip  Dictation # [id number]           Henrry Dotson PA-C  08/18/17 0868     without pain and phonation normal. Neck supple. Carotid bruit is not present. No tracheal deviation present. No thyromegaly present.   Cardiovascular: Normal rate, regular rhythm, normal heart sounds, intact distal pulses and normal pulses. Exam reveals no gallop and no friction rub.   No murmur heard.  Pulmonary/Chest: Effort normal and breath sounds normal. No stridor. No respiratory distress. She has no decreased breath sounds. She has no wheezes. She has no rhonchi. She has no rales.   Abdominal: Soft. Normal appearance and bowel sounds are normal. There is tenderness in the right lower quadrant and suprapubic area. There is no rigidity and no guarding. A hernia (RLQ) is present.   Musculoskeletal: Normal range of motion. She exhibits no edema.   Lymphadenopathy:     She has no cervical adenopathy.        Right: No supraclavicular adenopathy present.        Left: No supraclavicular adenopathy present.   Neurological: She is alert and oriented to person, place, and time.   Skin: Skin is warm, dry and intact. Capillary refill takes less than 2 seconds. No rash noted. She is not diaphoretic.   Psychiatric: She has a normal mood and affect. Her speech is normal and behavior is normal. Judgment and thought content normal. Cognition and memory are normal. She expresses no suicidal plans.   Vitals reviewed.     Assessment:       1. Urinary tract infection with hematuria, site unspecified        Plan:       Urinary tract infection with hematuria, site unspecified  Starting on pyridium and Macrobid with positive findings of leuks, blood, and protein in her POCT UA. Sending urine for culture for sensitivity. Will call with results. F/U if symptoms worsen or persist.   -     phenazopyridine (PYRIDIUM) 100 MG tablet; Take 1 tablet (100 mg total) by mouth 3 (three) times daily as needed for Pain.  Dispense: 30 tablet; Refill: 0  -     nitrofurantoin, macrocrystal-monohydrate, (MACROBID) 100 MG capsule; Take 1 capsule (100 mg  total) by mouth 2 (two) times daily. for 5 days  Dispense: 10 capsule; Refill: 0  -     Urine culture  -     POCT Urinalysis, Dipstick, Automated, W/O Scope        Follow up if symptoms worsen or fail to improve.      10/28/2019 MESERET Santos, GISELP

## 2019-10-29 ENCOUNTER — PATIENT MESSAGE (OUTPATIENT)
Dept: FAMILY MEDICINE | Facility: CLINIC | Age: 64
End: 2019-10-29

## 2019-10-30 LAB
BACTERIA UR CULT: NORMAL
BACTERIA UR CULT: NORMAL

## 2019-11-13 ENCOUNTER — HOSPITAL ENCOUNTER (OUTPATIENT)
Dept: RADIOLOGY | Facility: HOSPITAL | Age: 64
Discharge: HOME | End: 2019-11-13
Attending: INTERNAL MEDICINE
Payer: MEDICARE

## 2019-11-13 DIAGNOSIS — Z12.31 SCREENING MAMMOGRAM FOR HIGH-RISK PATIENT: ICD-10-CM

## 2019-11-13 PROCEDURE — 77063 BREAST TOMOSYNTHESIS BI: CPT | Mod: TC,PO

## 2019-11-14 ENCOUNTER — PATIENT MESSAGE (OUTPATIENT)
Dept: FAMILY MEDICINE | Facility: CLINIC | Age: 64
End: 2019-11-14

## 2019-11-14 DIAGNOSIS — R92.8 ABNORMAL MAMMOGRAM: Primary | ICD-10-CM

## 2019-11-15 ENCOUNTER — HOSPITAL ENCOUNTER (OUTPATIENT)
Dept: RADIOLOGY | Facility: HOSPITAL | Age: 64
Discharge: HOME | End: 2019-11-15
Attending: INTERNAL MEDICINE
Payer: MEDICARE

## 2019-11-15 DIAGNOSIS — R92.8 ABNORMAL MAMMOGRAM: ICD-10-CM

## 2019-11-15 PROCEDURE — 77065 DX MAMMO INCL CAD UNI: CPT | Mod: TC,PO

## 2019-11-15 PROCEDURE — 77061 BREAST TOMOSYNTHESIS UNI: CPT | Mod: TC,PO

## 2019-11-20 RX ORDER — DULOXETIN HYDROCHLORIDE 60 MG/1
60 CAPSULE, DELAYED RELEASE ORAL DAILY
Qty: 30 CAPSULE | Refills: 3 | Status: SHIPPED | OUTPATIENT
Start: 2019-11-20 | End: 2020-04-06 | Stop reason: SDUPTHER

## 2019-11-22 ENCOUNTER — OFFICE VISIT (OUTPATIENT)
Dept: FAMILY MEDICINE | Facility: CLINIC | Age: 64
End: 2019-11-22
Payer: MEDICARE

## 2019-11-22 VITALS
HEART RATE: 60 BPM | OXYGEN SATURATION: 97 % | SYSTOLIC BLOOD PRESSURE: 124 MMHG | BODY MASS INDEX: 33.29 KG/M2 | HEIGHT: 68 IN | WEIGHT: 219.63 LBS | DIASTOLIC BLOOD PRESSURE: 62 MMHG

## 2019-11-22 DIAGNOSIS — Z79.4 TYPE 2 DIABETES MELLITUS TREATED WITH INSULIN: ICD-10-CM

## 2019-11-22 DIAGNOSIS — K43.9 VENTRAL HERNIA WITHOUT OBSTRUCTION OR GANGRENE: ICD-10-CM

## 2019-11-22 DIAGNOSIS — E11.9 TYPE 2 DIABETES MELLITUS TREATED WITH INSULIN: ICD-10-CM

## 2019-11-22 DIAGNOSIS — I10 BENIGN ESSENTIAL HYPERTENSION: ICD-10-CM

## 2019-11-22 PROBLEM — G89.29 CHRONIC BILATERAL LOW BACK PAIN WITH BILATERAL SCIATICA: Status: ACTIVE | Noted: 2019-08-01

## 2019-11-22 PROBLEM — M54.42 CHRONIC BILATERAL LOW BACK PAIN WITH BILATERAL SCIATICA: Status: ACTIVE | Noted: 2019-08-01

## 2019-11-22 PROCEDURE — 99214 OFFICE O/P EST MOD 30 MIN: CPT | Mod: S$GLB,,, | Performed by: INTERNAL MEDICINE

## 2019-11-22 PROCEDURE — 99214 PR OFFICE/OUTPT VISIT, EST, LEVL IV, 30-39 MIN: ICD-10-PCS | Mod: S$GLB,,, | Performed by: INTERNAL MEDICINE

## 2019-11-22 RX ORDER — INSULIN GLARGINE 100 [IU]/ML
40 INJECTION, SOLUTION SUBCUTANEOUS DAILY
Qty: 9 SYRINGE | Refills: 2
Start: 2019-11-22 | End: 2020-03-16 | Stop reason: SDUPTHER

## 2019-11-22 NOTE — PROGRESS NOTES
ADULT FOLLOW-UP VISIT    Subjective:     Chief Complaint:  Diabetes (f/u)      64-year-old woman with a history of type 2 diabetes, CAD, hypertension, status post gastric bypass surgery here for follow-up of diabetes.  Hemoglobin A1c was last checked 10/20/2019 by her bariatric surgeon and was 6.1%.  Patient continues on basaglar 40 units daily and metformin.  She checks her sugar every morning and it is usually in range.  She has unfortunately been suffering from ongoing low back pain.  She has had 2 epidural injections and reports that the injections make her blood sugar increase.  Sometimes she will take extra insulin to account for this.  In terms of her bariatric surgery, since her last visit she has lost 16 more lb.  Her cardiologist took her off another 1 of her blood pressure medications.  She has an upcoming appointment with her surgeon and is planned for repair of her ventral hernia.    Diabetes   Pertinent negatives for diabetes include no blurred vision, no chest pain, no fatigue, no foot paresthesias, no foot ulcerations, no polydipsia, no polyphagia, no polyuria, no visual change, no weakness and no weight loss.         Ms. Ceravntes  has a past medical history of CAD (coronary artery disease), Diabetes mellitus, type 2, MI (myocardial infarction), Sleep apnea, and Sleep apnea (03/01/2019).    Family history and social history are reviewed and there are no significant changes.     ROS:  Review of Systems   Constitutional: Negative for fatigue and weight loss.   Eyes: Negative for blurred vision.   Cardiovascular: Negative for chest pain.   Endocrine: Negative for polydipsia, polyphagia and polyuria.   Neurological: Negative for weakness.          Current Outpatient Medications:     aspirin (ECOTRIN) 325 MG EC tablet, Take by mouth., Disp: , Rfl:     benazepril (LOTENSIN) 40 MG tablet, Take 40 mg by mouth 2 (two) times daily. , Disp: , Rfl:     blood sugar diagnostic  "Strp, Use 4 times per day as directed to check blood sugar. (one touch delica), Disp: 200 each, Rfl: 5    blood-glucose meter kit, Use as instructed, Disp: 1 each, Rfl: 0    calcium carbonate (OS-RYAN) 600 mg calcium (1,500 mg) Tab, Take 600 mg by mouth once., Disp: , Rfl:     DULoxetine (CYMBALTA) 60 MG capsule, Take 1 capsule (60 mg total) by mouth once daily., Disp: 30 capsule, Rfl: 3    fluticasone (FLONASE) 50 mcg/actuation nasal spray, 1 spray (50 mcg total) by Each Nare route once daily. (Patient taking differently: 1 spray by Each Nostril route daily as needed. ), Disp: 16 g, Rfl: 0    gabapentin (NEURONTIN) 300 MG capsule, Take 1 capsule (300 mg total) by mouth 3 (three) times daily., Disp: 90 capsule, Rfl: 1    guanFACINE (TENEX) 2 MG tablet, Take 1 tablet (2 mg total) by mouth nightly., Disp: 90 tablet, Rfl: 3    insulin (BASAGLAR KWIKPEN U-100 INSULIN) glargine 100 units/mL (3mL) SubQ pen, Inject 40 Units into the skin once daily., Disp: 9 Syringe, Rfl: 2    metFORMIN (GLUCOPHAGE) 1000 MG tablet, Take 1 tablet (1,000 mg total) by mouth 2 (two) times daily with meals., Disp: 60 tablet, Rfl: 5    methocarbamol (ROBAXIN) 500 MG Tab, Take 500 mg by mouth 3 (three) times daily. , Disp: , Rfl:     metOLazone (ZAROXOLYN) 5 MG tablet, Take 1 tablet (5 mg total) by mouth once daily., Disp: 90 tablet, Rfl: 3    metoprolol succinate (TOPROL-XL) 25 MG 24 hr tablet, Take 25 mg by mouth once daily. , Disp: , Rfl:     multivitamin capsule, Take 1 capsule by mouth once daily., Disp: , Rfl:     pen needle, diabetic (BD ULTRA-FINE SHORT PEN NEEDLE) 31 gauge x 5/16" Ndle, USE 1 ONCE DAILY, Disp: , Rfl:     prednisoLONE acetate (PRED FORTE) 1 % DrpS, Place 1 drop into both eyes as needed. , Disp: , Rfl:     rosuvastatin (CRESTOR) 40 MG Tab, Take 1 tablet (40 mg total) by mouth every evening., Disp: 90 tablet, Rfl: 3      Objective:     Physical Examination:     /62 (BP Location: Left arm, Patient " "Position: Sitting, BP Method: Large (Manual))   Pulse 60   Ht 5' 8" (1.727 m)   Wt 99.6 kg (219 lb 9.6 oz)   SpO2 97%   BMI 33.39 kg/m²     Physical Exam   Constitutional: She is oriented to person, place, and time. She appears well-developed and well-nourished. No distress.   HENT:   Right Ear: External ear normal.   Left Ear: External ear normal.   Nose: Nose normal.   Mouth/Throat: Oropharynx is clear and moist and mucous membranes are normal.   Eyes: Pupils are equal, round, and reactive to light. Conjunctivae are normal. Right eye exhibits no discharge. Left eye exhibits no discharge.   Cardiovascular: Normal rate, regular rhythm, normal heart sounds and intact distal pulses.   No murmur heard.  Pulses:       Dorsalis pedis pulses are 2+ on the right side, and 2+ on the left side.        Posterior tibial pulses are 2+ on the right side, and 2+ on the left side.   Pulmonary/Chest: Effort normal and breath sounds normal. No respiratory distress. She has no wheezes. She has no rales.   Abdominal: Soft. There is no tenderness. A hernia is present. Hernia confirmed positive in the ventral area.   Musculoskeletal: She exhibits no edema.        Right foot: There is normal range of motion and no deformity.        Left foot: There is normal range of motion and no deformity.   Feet:   Right Foot:   Protective Sensation: 10 sites tested. 10 sites sensed.   Skin Integrity: Positive for dry skin. Negative for ulcer, blister, skin breakdown or warmth.   Left Foot:   Protective Sensation: 10 sites tested. 10 sites sensed.   Skin Integrity: Positive for dry skin. Negative for ulcer, blister, skin breakdown or warmth.   Neurological: She is alert and oriented to person, place, and time.   Skin: She is not diaphoretic.       Assessment/Plan:   Chanel is a 64 y.o. female here for follow-up.    Problem List Items Addressed This Visit        Cardiac/Vascular    Benign essential hypertension    Current Assessment & Plan     " Weaning meds as needed as pt loses weight.  Continue tenex, benazepril, metoprolol.            Endocrine    Type 2 diabetes mellitus treated with insulin    Current Assessment & Plan     Continue metformin, lantus 40 units QHS. HbA1c 6.1% 10/2019. Eye exam 8/2019, results requested. Normal foot exam today.          Relevant Medications    insulin (BASAGLAR KWIKPEN U-100 INSULIN) glargine 100 units/mL (3mL) SubQ pen       GI    Ventral hernia without obstruction or gangrene    Current Assessment & Plan     Upcoming surgical repair.                Health Maintenance   Topic Date Due    Hepatitis C Screening  1955    Eye Exam  08/20/1965    Colonoscopy  01/18/2020 (Originally 8/20/2005)    Hemoglobin A1c  04/10/2020    Lipid Panel  10/10/2020    High Dose Statin  11/22/2020    Aspirin/Antiplatelet Therapy  11/22/2020    Foot Exam  11/22/2020    Mammogram  11/15/2021    TETANUS VACCINE  09/21/2027    Pneumococcal Vaccine (Medium Risk)  Completed           Discussion:     Follow up in about 3 months (around 2/22/2020) for DM, HTN.    Goals    None         Electronically signed by Mindy Funk

## 2019-11-22 NOTE — ASSESSMENT & PLAN NOTE
Continue metformin, lantus 40 units QHS. HbA1c 6.1% 10/2019. Eye exam 8/2019, results requested. Normal foot exam today.

## 2019-12-30 DIAGNOSIS — E11.9 TYPE 2 DIABETES MELLITUS TREATED WITH INSULIN: ICD-10-CM

## 2019-12-30 DIAGNOSIS — Z79.4 TYPE 2 DIABETES MELLITUS TREATED WITH INSULIN: ICD-10-CM

## 2019-12-30 RX ORDER — PEN NEEDLE, DIABETIC 31 GX5/16"
NEEDLE, DISPOSABLE MISCELLANEOUS
Qty: 100 EACH | Refills: 3 | Status: SHIPPED | OUTPATIENT
Start: 2019-12-30 | End: 2020-03-16 | Stop reason: SDUPTHER

## 2019-12-30 RX ORDER — LANCETS 33 GAUGE
EACH MISCELLANEOUS
Qty: 100 EACH | Refills: 3 | Status: SHIPPED | OUTPATIENT
Start: 2019-12-30

## 2020-02-26 DIAGNOSIS — Z79.4 TYPE 2 DIABETES MELLITUS TREATED WITH INSULIN: ICD-10-CM

## 2020-02-26 DIAGNOSIS — E11.9 TYPE 2 DIABETES MELLITUS TREATED WITH INSULIN: ICD-10-CM

## 2020-02-26 RX ORDER — METFORMIN HYDROCHLORIDE 1000 MG/1
1000 TABLET ORAL 2 TIMES DAILY WITH MEALS
Qty: 60 TABLET | Refills: 5 | Status: SHIPPED | OUTPATIENT
Start: 2020-02-26 | End: 2020-08-04 | Stop reason: SDUPTHER

## 2020-03-15 ENCOUNTER — PATIENT MESSAGE (OUTPATIENT)
Dept: FAMILY MEDICINE | Facility: CLINIC | Age: 65
End: 2020-03-15

## 2020-03-16 DIAGNOSIS — E78.2 MIXED HYPERLIPIDEMIA: ICD-10-CM

## 2020-03-16 DIAGNOSIS — E11.9 TYPE 2 DIABETES MELLITUS TREATED WITH INSULIN: ICD-10-CM

## 2020-03-16 DIAGNOSIS — Z79.4 TYPE 2 DIABETES MELLITUS TREATED WITH INSULIN: ICD-10-CM

## 2020-03-16 RX ORDER — METOLAZONE 5 MG/1
5 TABLET ORAL DAILY
Qty: 90 TABLET | Refills: 3 | Status: SHIPPED | OUTPATIENT
Start: 2020-03-16 | End: 2021-07-13

## 2020-03-16 RX ORDER — ROSUVASTATIN CALCIUM 40 MG/1
40 TABLET, COATED ORAL NIGHTLY
Qty: 90 TABLET | Refills: 3 | Status: SHIPPED | OUTPATIENT
Start: 2020-03-16 | End: 2020-08-04 | Stop reason: SDUPTHER

## 2020-03-16 RX ORDER — PEN NEEDLE, DIABETIC 30 GX3/16"
NEEDLE, DISPOSABLE MISCELLANEOUS
Qty: 100 EACH | Refills: 3 | Status: SHIPPED | OUTPATIENT
Start: 2020-03-16 | End: 2022-04-06

## 2020-03-16 RX ORDER — INSULIN GLARGINE 100 [IU]/ML
40 INJECTION, SOLUTION SUBCUTANEOUS DAILY
Qty: 9 SYRINGE | Refills: 2
Start: 2020-03-16 | End: 2020-06-02 | Stop reason: SDUPTHER

## 2020-03-23 ENCOUNTER — PATIENT MESSAGE (OUTPATIENT)
Dept: FAMILY MEDICINE | Facility: CLINIC | Age: 65
End: 2020-03-23

## 2020-03-23 DIAGNOSIS — N30.90 CYSTITIS: Primary | ICD-10-CM

## 2020-03-23 RX ORDER — NITROFURANTOIN 25; 75 MG/1; MG/1
100 CAPSULE ORAL 2 TIMES DAILY
Qty: 14 CAPSULE | Refills: 0 | Status: SHIPPED | OUTPATIENT
Start: 2020-03-23 | End: 2020-03-30

## 2020-04-06 RX ORDER — DULOXETIN HYDROCHLORIDE 60 MG/1
60 CAPSULE, DELAYED RELEASE ORAL DAILY
Qty: 30 CAPSULE | Refills: 3 | Status: SHIPPED | OUTPATIENT
Start: 2020-04-06 | End: 2020-10-05 | Stop reason: SDUPTHER

## 2020-06-02 DIAGNOSIS — E11.9 TYPE 2 DIABETES MELLITUS TREATED WITH INSULIN: ICD-10-CM

## 2020-06-02 DIAGNOSIS — Z79.4 TYPE 2 DIABETES MELLITUS TREATED WITH INSULIN: ICD-10-CM

## 2020-06-02 RX ORDER — INSULIN GLARGINE 100 [IU]/ML
40 INJECTION, SOLUTION SUBCUTANEOUS DAILY
Qty: 9 SYRINGE | Refills: 2 | Status: SHIPPED | OUTPATIENT
Start: 2020-06-02 | End: 2020-08-04 | Stop reason: SDUPTHER

## 2020-07-13 ENCOUNTER — OFFICE VISIT (OUTPATIENT)
Dept: FAMILY MEDICINE | Facility: CLINIC | Age: 65
End: 2020-07-13
Payer: MEDICARE

## 2020-07-13 DIAGNOSIS — E11.9 TYPE 2 DIABETES MELLITUS TREATED WITH INSULIN: Primary | ICD-10-CM

## 2020-07-13 DIAGNOSIS — K65.1 INTRA-ABDOMINAL ABSCESS: ICD-10-CM

## 2020-07-13 DIAGNOSIS — Z98.84 S/P BARIATRIC SURGERY: ICD-10-CM

## 2020-07-13 DIAGNOSIS — Z87.19 H/O VENTRAL HERNIA REPAIR: ICD-10-CM

## 2020-07-13 DIAGNOSIS — Z79.4 TYPE 2 DIABETES MELLITUS TREATED WITH INSULIN: Primary | ICD-10-CM

## 2020-07-13 DIAGNOSIS — I10 BENIGN ESSENTIAL HYPERTENSION: ICD-10-CM

## 2020-07-13 DIAGNOSIS — Z98.890 H/O VENTRAL HERNIA REPAIR: ICD-10-CM

## 2020-07-13 PROCEDURE — 99442 PR PHYSICIAN TELEPHONE EVALUATION 11-20 MIN: CPT | Mod: 95,,, | Performed by: INTERNAL MEDICINE

## 2020-07-13 PROCEDURE — 99442 PR PHYSICIAN TELEPHONE EVALUATION 11-20 MIN: ICD-10-PCS | Mod: 95,,, | Performed by: INTERNAL MEDICINE

## 2020-07-13 NOTE — PROGRESS NOTES
Established Patient - Audio Only Telehealth Visit     The patient location is: home  The chief complaint leading to consultation is: hospital follow-up  Visit type: Virtual visit with audio only (telephone)  Total time spent with patient: 20 minutes       The reason for the audio only service rather than synchronous audio and video virtual visit was related to technical difficulties or patient preference/necessity.     Each patient to whom I provide medical services by telemedicine is:  (1) informed of the relationship between the physician and patient and the respective role of any other health care provider with respect to management of the patient; and (2) notified that they may decline to receive medical services by telemedicine and may withdraw from such care at any time. Patient verbally consented to receive this service via voice-only telephone call.       HPI: abd wall hernia 12/4/19 surgery, complicated by abdominal wall abscesses and poor wound healing. Most recently readmitted 6/19 with recurrent abdominal wall abscess. Grew MSSA and proteus mirabilis. Treated with IV cefepime during hospitalization and levaquin PO at home x 7 days.  Has 2 new pigtail drains, doing daily betadine rinses at home.  Pt reports draining minimal amount currently, had recent f/u with surgery. A1c 5.9% during recent hospitalization.  Currently on metformin, basaglar 40 units daily for diabetes. BP well controlled on metoprolol and benazepril.  Has upcoming f/u with IR and ID at [facility].       Assessment and plan:  65 yo woman with a h/o type 2 DM, HTN, s/p bariatric surgery last year and abd wall hernia s/p repair last year c/b multiple abdominal abscesses. Currently with 2 drains in place, f/u scheduled with IR and ID in the upcoming weeks. No changes to pt's chronic meds for DM and HTN.  F/u in 3 months in office for routine follow-up.                         This service was not originating from a related E/M service provided  within the previous 7 days nor will  to an E/M service or procedure within the next 24 hours or my soonest available appointment.  Prevailing standard of care was able to be met in this audio-only visit.

## 2020-08-04 DIAGNOSIS — Z79.4 TYPE 2 DIABETES MELLITUS TREATED WITH INSULIN: ICD-10-CM

## 2020-08-04 DIAGNOSIS — E11.9 TYPE 2 DIABETES MELLITUS TREATED WITH INSULIN: ICD-10-CM

## 2020-08-04 DIAGNOSIS — E78.2 MIXED HYPERLIPIDEMIA: ICD-10-CM

## 2020-08-04 DIAGNOSIS — I10 BENIGN ESSENTIAL HYPERTENSION: ICD-10-CM

## 2020-08-04 RX ORDER — INSULIN GLARGINE 100 [IU]/ML
40 INJECTION, SOLUTION SUBCUTANEOUS DAILY
Qty: 9 SYRINGE | Refills: 2 | Status: SHIPPED | OUTPATIENT
Start: 2020-08-04 | End: 2020-09-22 | Stop reason: SDUPTHER

## 2020-08-04 RX ORDER — GUANFACINE 2 MG/1
2 TABLET ORAL NIGHTLY
Qty: 90 TABLET | Refills: 3 | Status: SHIPPED | OUTPATIENT
Start: 2020-08-04 | End: 2021-07-13 | Stop reason: SDUPTHER

## 2020-08-04 RX ORDER — METFORMIN HYDROCHLORIDE 1000 MG/1
1000 TABLET ORAL 2 TIMES DAILY WITH MEALS
Qty: 180 TABLET | Refills: 3 | Status: SHIPPED | OUTPATIENT
Start: 2020-08-04 | End: 2021-08-12 | Stop reason: SDUPTHER

## 2020-08-04 RX ORDER — ROSUVASTATIN CALCIUM 40 MG/1
40 TABLET, COATED ORAL NIGHTLY
Qty: 90 TABLET | Refills: 3 | Status: SHIPPED | OUTPATIENT
Start: 2020-08-04 | End: 2021-08-12 | Stop reason: SDUPTHER

## 2020-09-22 ENCOUNTER — OFFICE VISIT (OUTPATIENT)
Dept: FAMILY MEDICINE | Facility: CLINIC | Age: 65
End: 2020-09-22
Payer: MEDICARE

## 2020-09-22 VITALS
TEMPERATURE: 97 F | BODY MASS INDEX: 32.36 KG/M2 | RESPIRATION RATE: 18 BRPM | OXYGEN SATURATION: 99 % | DIASTOLIC BLOOD PRESSURE: 62 MMHG | WEIGHT: 213.5 LBS | SYSTOLIC BLOOD PRESSURE: 138 MMHG | HEIGHT: 68 IN | HEART RATE: 70 BPM

## 2020-09-22 DIAGNOSIS — K43.9 VENTRAL HERNIA WITHOUT OBSTRUCTION OR GANGRENE: ICD-10-CM

## 2020-09-22 DIAGNOSIS — Z12.31 SCREENING MAMMOGRAM, ENCOUNTER FOR: ICD-10-CM

## 2020-09-22 DIAGNOSIS — Z87.19 H/O VENTRAL HERNIA REPAIR: ICD-10-CM

## 2020-09-22 DIAGNOSIS — Z23 NEEDS FLU SHOT: ICD-10-CM

## 2020-09-22 DIAGNOSIS — E55.9 VITAMIN D DEFICIENCY: ICD-10-CM

## 2020-09-22 DIAGNOSIS — Z23 NEED FOR PROPHYLACTIC VACCINATION AGAINST STREPTOCOCCUS PNEUMONIAE (PNEUMOCOCCUS): ICD-10-CM

## 2020-09-22 DIAGNOSIS — Z98.890 H/O VENTRAL HERNIA REPAIR: ICD-10-CM

## 2020-09-22 DIAGNOSIS — E78.2 MIXED HYPERLIPIDEMIA: ICD-10-CM

## 2020-09-22 DIAGNOSIS — G47.33 OSA ON CPAP: ICD-10-CM

## 2020-09-22 DIAGNOSIS — Z79.4 TYPE 2 DIABETES MELLITUS TREATED WITH INSULIN: Primary | ICD-10-CM

## 2020-09-22 DIAGNOSIS — E11.9 TYPE 2 DIABETES MELLITUS TREATED WITH INSULIN: Primary | ICD-10-CM

## 2020-09-22 DIAGNOSIS — I10 BENIGN ESSENTIAL HYPERTENSION: ICD-10-CM

## 2020-09-22 DIAGNOSIS — R92.8 ABNORMALITY OF RIGHT BREAST ON SCREENING MAMMOGRAM: ICD-10-CM

## 2020-09-22 DIAGNOSIS — I25.2 H/O ACUTE MYOCARDIAL INFARCTION: ICD-10-CM

## 2020-09-22 PROBLEM — K65.1 INTRA-ABDOMINAL ABSCESS: Status: RESOLVED | Noted: 2020-07-13 | Resolved: 2020-09-22

## 2020-09-22 PROCEDURE — G0009 PNEUMOCOCCAL CONJUGATE VACCINE 13-VALENT LESS THAN 5YO & GREATER THAN: ICD-10-PCS | Mod: S$GLB,,, | Performed by: INTERNAL MEDICINE

## 2020-09-22 PROCEDURE — 90670 PNEUMOCOCCAL CONJUGATE VACCINE 13-VALENT LESS THAN 5YO & GREATER THAN: ICD-10-PCS | Mod: S$GLB,,, | Performed by: INTERNAL MEDICINE

## 2020-09-22 PROCEDURE — G0009 ADMIN PNEUMOCOCCAL VACCINE: HCPCS | Mod: S$GLB,,, | Performed by: INTERNAL MEDICINE

## 2020-09-22 PROCEDURE — 90662 IIV NO PRSV INCREASED AG IM: CPT | Mod: S$GLB,,, | Performed by: INTERNAL MEDICINE

## 2020-09-22 PROCEDURE — G0008 FLU VACCINE - QUADRIVALENT - HIGH DOSE (65+) PRESERVATIVE FREE IM: ICD-10-PCS | Mod: S$GLB,,, | Performed by: INTERNAL MEDICINE

## 2020-09-22 PROCEDURE — G0008 ADMIN INFLUENZA VIRUS VAC: HCPCS | Mod: S$GLB,,, | Performed by: INTERNAL MEDICINE

## 2020-09-22 PROCEDURE — 90662 FLU VACCINE - QUADRIVALENT - HIGH DOSE (65+) PRESERVATIVE FREE IM: ICD-10-PCS | Mod: S$GLB,,, | Performed by: INTERNAL MEDICINE

## 2020-09-22 PROCEDURE — 99214 OFFICE O/P EST MOD 30 MIN: CPT | Mod: 25,S$GLB,, | Performed by: INTERNAL MEDICINE

## 2020-09-22 PROCEDURE — 90670 PCV13 VACCINE IM: CPT | Mod: S$GLB,,, | Performed by: INTERNAL MEDICINE

## 2020-09-22 PROCEDURE — 99214 PR OFFICE/OUTPT VISIT, EST, LEVL IV, 30-39 MIN: ICD-10-PCS | Mod: 25,S$GLB,, | Performed by: INTERNAL MEDICINE

## 2020-09-22 RX ORDER — ERGOCALCIFEROL 1.25 MG/1
50000 CAPSULE ORAL
Qty: 12 CAPSULE | Refills: 3 | Status: CANCELLED | OUTPATIENT
Start: 2020-09-22

## 2020-09-22 RX ORDER — INSULIN GLARGINE 100 [IU]/ML
40 INJECTION, SOLUTION SUBCUTANEOUS DAILY
Qty: 9 SYRINGE | Refills: 2 | Status: SHIPPED | OUTPATIENT
Start: 2020-09-22 | End: 2020-12-22 | Stop reason: SDUPTHER

## 2020-09-22 RX ORDER — METOPROLOL SUCCINATE 25 MG/1
25 TABLET, EXTENDED RELEASE ORAL DAILY
Qty: 90 TABLET | Refills: 3 | Status: SHIPPED | OUTPATIENT
Start: 2020-09-22 | End: 2021-07-13 | Stop reason: SDUPTHER

## 2020-09-22 NOTE — ASSESSMENT & PLAN NOTE
Continue metformin,  basaglar 40 units QHS. HbA1c 5.9% 5/2020. Eye exam 8/2020 results requested.

## 2020-09-23 ENCOUNTER — PATIENT MESSAGE (OUTPATIENT)
Dept: FAMILY MEDICINE | Facility: CLINIC | Age: 65
End: 2020-09-23

## 2020-09-25 ENCOUNTER — TELEPHONE (OUTPATIENT)
Dept: PEDIATRICS | Facility: CLINIC | Age: 65
End: 2020-09-25

## 2020-09-25 DIAGNOSIS — Z12.31 SCREENING MAMMOGRAM FOR HIGH-RISK PATIENT: Primary | ICD-10-CM

## 2020-09-25 NOTE — TELEPHONE ENCOUNTER
----- Message from Ailyn Nur sent at 9/25/2020 12:45 PM CDT -----  Regarding: RE: Order clarification and order request  I just noticed the screening order states unilateral can you please resend for bilateral with code Z12.31 Chuyita Ariza  ----- Message -----  From: Mindy Funk MD  Sent: 9/25/2020  12:30 PM CDT  To: Ailyn Nur  Subject: RE: Order clarification and order request        She had an abnormal screening mammo on the R last year which is why I ordered dx on the R and screening on the L. But her dx mammo and u/s on R last year were read as BIRADS-2, so actually she should be okay for just screening mammo bilaterally.   Thanks!  ----- Message -----  From: Ailyn Nur  Sent: 9/24/2020   3:29 PM CDT  To: [facility]  Subject: Order clarification and order request            Good afternoon,     I would like to clarify which mammo order is needed for this patient, if a diag mammo is what we are looking to schedule then can you please send an order for a ultrasound of the breast limited left or right with the same dx code as the diag mammo order. Chuyita Ariza

## 2020-10-05 ENCOUNTER — CLINICAL SUPPORT (OUTPATIENT)
Dept: PEDIATRICS | Facility: CLINIC | Age: 65
End: 2020-10-05
Payer: MEDICARE

## 2020-10-05 ENCOUNTER — LAB VISIT (OUTPATIENT)
Dept: LAB | Facility: HOSPITAL | Age: 65
End: 2020-10-05
Attending: INTERNAL MEDICINE
Payer: MEDICARE

## 2020-10-05 ENCOUNTER — OFFICE VISIT (OUTPATIENT)
Dept: RHEUMATOLOGY | Facility: CLINIC | Age: 65
End: 2020-10-05
Payer: MEDICARE

## 2020-10-05 ENCOUNTER — PATIENT MESSAGE (OUTPATIENT)
Dept: FAMILY MEDICINE | Facility: CLINIC | Age: 65
End: 2020-10-05

## 2020-10-05 VITALS
DIASTOLIC BLOOD PRESSURE: 72 MMHG | SYSTOLIC BLOOD PRESSURE: 165 MMHG | WEIGHT: 216.63 LBS | TEMPERATURE: 98 F | BODY MASS INDEX: 32.93 KG/M2

## 2020-10-05 DIAGNOSIS — M85.89 OTHER SPECIFIED DISORDERS OF BONE DENSITY AND STRUCTURE, MULTIPLE SITES: ICD-10-CM

## 2020-10-05 DIAGNOSIS — M19.91 PRIMARY OSTEOARTHRITIS, UNSPECIFIED SITE: Primary | ICD-10-CM

## 2020-10-05 DIAGNOSIS — N30.90 CYSTITIS WITHOUT HEMATURIA: ICD-10-CM

## 2020-10-05 DIAGNOSIS — R30.0 DYSURIA: Primary | ICD-10-CM

## 2020-10-05 DIAGNOSIS — M19.91 PRIMARY OSTEOARTHRITIS, UNSPECIFIED SITE: ICD-10-CM

## 2020-10-05 LAB
ALBUMIN SERPL BCP-MCNC: 3.7 G/DL (ref 3.5–5.2)
ALP SERPL-CCNC: 63 U/L (ref 55–135)
ALT SERPL W/O P-5'-P-CCNC: 24 U/L (ref 10–44)
ANION GAP SERPL CALC-SCNC: 9 MMOL/L (ref 8–16)
AST SERPL-CCNC: 27 U/L (ref 10–40)
BASOPHILS # BLD AUTO: 0.05 K/UL (ref 0–0.2)
BASOPHILS NFR BLD: 0.7 % (ref 0–1.9)
BILIRUB SERPL-MCNC: 0.6 MG/DL (ref 0.1–1)
BILIRUB UR QL STRIP: NEGATIVE
BUN SERPL-MCNC: 25 MG/DL (ref 8–23)
CALCIUM SERPL-MCNC: 9.7 MG/DL (ref 8.7–10.5)
CHLORIDE SERPL-SCNC: 102 MMOL/L (ref 95–110)
CO2 SERPL-SCNC: 33 MMOL/L (ref 23–29)
CREAT SERPL-MCNC: 0.9 MG/DL (ref 0.5–1.4)
CRP SERPL-MCNC: 0.26 MG/DL
DIFFERENTIAL METHOD: ABNORMAL
EOSINOPHIL # BLD AUTO: 0.1 K/UL (ref 0–0.5)
EOSINOPHIL NFR BLD: 1.2 % (ref 0–8)
ERYTHROCYTE [DISTWIDTH] IN BLOOD BY AUTOMATED COUNT: 15.6 % (ref 11.5–14.5)
EST. GFR  (AFRICAN AMERICAN): >60 ML/MIN/1.73 M^2
EST. GFR  (NON AFRICAN AMERICAN): >60 ML/MIN/1.73 M^2
GLUCOSE SERPL-MCNC: 84 MG/DL (ref 70–110)
GLUCOSE UR QL STRIP: NEGATIVE
HCT VFR BLD AUTO: 33.3 % (ref 37–48.5)
HGB BLD-MCNC: 9.8 G/DL (ref 12–16)
IMM GRANULOCYTES # BLD AUTO: 0.02 K/UL (ref 0–0.04)
IMM GRANULOCYTES NFR BLD AUTO: 0.3 % (ref 0–0.5)
KETONES UR QL STRIP: NEGATIVE
LEUKOCYTE ESTERASE UR QL STRIP: POSITIVE
LYMPHOCYTES # BLD AUTO: 2.1 K/UL (ref 1–4.8)
LYMPHOCYTES NFR BLD: 28.2 % (ref 18–48)
MCH RBC QN AUTO: 22.6 PG (ref 27–31)
MCHC RBC AUTO-ENTMCNC: 29.4 G/DL (ref 32–36)
MCV RBC AUTO: 77 FL (ref 82–98)
MONOCYTES # BLD AUTO: 0.5 K/UL (ref 0.3–1)
MONOCYTES NFR BLD: 7.3 % (ref 4–15)
NEUTROPHILS # BLD AUTO: 4.6 K/UL (ref 1.8–7.7)
NEUTROPHILS NFR BLD: 62.3 % (ref 38–73)
NRBC BLD-RTO: 0 /100 WBC
PH, POC UA: 6.5
PLATELET # BLD AUTO: 385 K/UL (ref 150–350)
PMV BLD AUTO: 9.9 FL (ref 9.2–12.9)
POC BLOOD, URINE: NEGATIVE
POC NITRATES, URINE: POSITIVE
POTASSIUM SERPL-SCNC: 3.8 MMOL/L (ref 3.5–5.1)
PROT SERPL-MCNC: 7.2 G/DL (ref 6–8.4)
PROT UR QL STRIP: NEGATIVE
RBC # BLD AUTO: 4.34 M/UL (ref 4–5.4)
SODIUM SERPL-SCNC: 144 MMOL/L (ref 136–145)
SP GR UR STRIP: 1.02 (ref 1–1.03)
URATE SERPL-MCNC: 4.1 MG/DL (ref 2.4–5.7)
UROBILINOGEN UR STRIP-ACNC: 1 (ref 0.1–1.1)
WBC # BLD AUTO: 7.31 K/UL (ref 3.9–12.7)

## 2020-10-05 PROCEDURE — 81003 POCT URINALYSIS, DIPSTICK, AUTOMATED, W/O SCOPE: ICD-10-PCS | Mod: QW,S$GLB,, | Performed by: INTERNAL MEDICINE

## 2020-10-05 PROCEDURE — 86140 C-REACTIVE PROTEIN: CPT

## 2020-10-05 PROCEDURE — 36415 COLL VENOUS BLD VENIPUNCTURE: CPT

## 2020-10-05 PROCEDURE — 99213 OFFICE O/P EST LOW 20 MIN: CPT | Mod: S$GLB,,, | Performed by: INTERNAL MEDICINE

## 2020-10-05 PROCEDURE — 81003 URINALYSIS AUTO W/O SCOPE: CPT | Mod: QW,S$GLB,, | Performed by: INTERNAL MEDICINE

## 2020-10-05 PROCEDURE — 80053 COMPREHEN METABOLIC PANEL: CPT

## 2020-10-05 PROCEDURE — 85025 COMPLETE CBC W/AUTO DIFF WBC: CPT

## 2020-10-05 PROCEDURE — 87186 SC STD MICRODIL/AGAR DIL: CPT

## 2020-10-05 PROCEDURE — 87077 CULTURE AEROBIC IDENTIFY: CPT

## 2020-10-05 PROCEDURE — 99213 PR OFFICE/OUTPT VISIT, EST, LEVL III, 20-29 MIN: ICD-10-PCS | Mod: S$GLB,,, | Performed by: INTERNAL MEDICINE

## 2020-10-05 PROCEDURE — 84550 ASSAY OF BLOOD/URIC ACID: CPT

## 2020-10-05 PROCEDURE — 87086 URINE CULTURE/COLONY COUNT: CPT

## 2020-10-05 RX ORDER — NITROFURANTOIN 25; 75 MG/1; MG/1
100 CAPSULE ORAL 2 TIMES DAILY
Qty: 10 CAPSULE | Refills: 0 | Status: SHIPPED | OUTPATIENT
Start: 2020-10-05 | End: 2020-10-07 | Stop reason: ALTCHOICE

## 2020-10-05 RX ORDER — DULOXETIN HYDROCHLORIDE 60 MG/1
60 CAPSULE, DELAYED RELEASE ORAL DAILY
Qty: 30 CAPSULE | Refills: 3 | Status: SHIPPED | OUTPATIENT
Start: 2020-10-05 | End: 2020-12-23 | Stop reason: SDUPTHER

## 2020-10-05 NOTE — PROGRESS NOTES
[facility] RHEUMATOLOGY            PROGRESS NOTE      Subjective:       Patient ID:   NAME: Chanel Cervantes : 1955     65 y.o. female    Referring Doc: No ref. provider found  Other Physicians:    Chief Complaint:  Osteoarthritis (Needs refill on Cymbalta)      History of Present Illness:     Patient returns today for a regularly scheduled follow-up visit for OA      The patient was last seen 1 yr ago  Had bariatric surgery 2019, has had recurrent abscesses.  Arthralgias no joint swelling        ROS:   GEN:  No  fever, night sweats . weight is stable   + fatigue  SKIN: no rashes, no bruising, no ulcerations, no Raynaud's  HEENT: no HA's, No visual changes, no mucosal ulcers, no sicca symptoms,  CV:   no CP, SOB, PND, WILSON, no orthopnea, no palpitations  PULM: normal with no SOB, cough, hemoptysis, sputum or pleuritic pain  GI:  no abdominal pain, nausea, vomiting, constipation, diarrhea, melanotic stools, BRBPR, hematemesis, no dysphagia  :   no dysuria  NEURO: no paresthesias, headaches, visual disturbances, muscle weakness  MUSCULOSKELETAL:no joint swelling, prolonged AM stiffness, no back pain, no muscle pain  Allergies:  Review of patient's allergies indicates:   Allergen Reactions    Adhesive tape-silicones Rash    Dye Hives    Iodine     Penicillins Edema     and burning sensation    Pneumococcal vaccine     Iodinated contrast media Rash       Medications:    Current Outpatient Medications:     aspirin (ECOTRIN) 325 MG EC tablet, Take by mouth., Disp: , Rfl:     benazepril (LOTENSIN) 40 MG tablet, Take 40 mg by mouth 2 (two) times daily. , Disp: , Rfl:     blood sugar diagnostic Strp, One Touch Ultra Blue Test strips, Use l strip to check glucose 4 times daily, Disp: 100 each, Rfl: 11    blood-glucose meter kit, Use as instructed, Disp: 1 each, Rfl: 0    calcium carbonate (OS-RYAN) 600 mg calcium (1,500 mg) Tab, Take 600 mg by mouth once., Disp: , Rfl:     DULoxetine (CYMBALTA) 60 MG  "capsule, Take 1 capsule (60 mg total) by mouth once daily., Disp: 30 capsule, Rfl: 3    fluticasone (FLONASE) 50 mcg/actuation nasal spray, 1 spray (50 mcg total) by Each Nare route once daily. (Patient taking differently: 1 spray by Each Nostril route daily as needed. ), Disp: 16 g, Rfl: 0    guanFACINE (TENEX) 2 MG tablet, Take 1 tablet (2 mg total) by mouth nightly., Disp: 90 tablet, Rfl: 3    insulin (BASAGLAR KWIKPEN U-100 INSULIN) glargine 100 units/mL (3mL) SubQ pen, Inject 40 Units into the skin once daily., Disp: 9 Syringe, Rfl: 2    lancets (ONETOUCH DELICA LANCETS) 33 gauge Misc, USE 1  TO CHECK GLUCOSE 4 TIMES DAILY, Disp: 100 each, Rfl: 3    metFORMIN (GLUCOPHAGE) 1000 MG tablet, Take 1 tablet (1,000 mg total) by mouth 2 (two) times daily with meals., Disp: 180 tablet, Rfl: 3    metOLazone (ZAROXOLYN) 5 MG tablet, Take 1 tablet (5 mg total) by mouth once daily., Disp: 90 tablet, Rfl: 3    metoprolol succinate (TOPROL-XL) 25 MG 24 hr tablet, Take 1 tablet (25 mg total) by mouth once daily., Disp: 90 tablet, Rfl: 3    multivitamin capsule, Take 1 capsule by mouth once daily., Disp: , Rfl:     pen needle, diabetic (BD ULTRA-FINE SHORT PEN NEEDLE) 31 gauge x 5/16" Ndle, Use to check glucose 4x daily., Disp: 100 each, Rfl: 3    prednisoLONE acetate (PRED FORTE) 1 % DrpS, Place 1 drop into both eyes as needed. , Disp: , Rfl:     rosuvastatin (CRESTOR) 40 MG Tab, Take 1 tablet (40 mg total) by mouth every evening., Disp: 90 tablet, Rfl: 3    PMHx/PSHx Updates:        Objective:     Vitals:  Blood pressure (!) 165/72, temperature 98 °F (36.7 °C), weight 98.2 kg (216 lb 9.6 oz).    Physical Examination:   GEN: no apparent distress, comfortable; AAOx3  SKIN: no rashes,no ulceration, no Raynaud's, no petechiae, no SQ nodules,  HEAD: normal  EYES: no pallor, no icterus,  NECK: no masses, thyroid normal, trachea midline, no LAD/LN's, supple  CV: RRR with no murmur; l S1 and S2 reg. ,no gallop no rubs, "   CHEST: Normal respiratory effort; CTAB; normal breath sounds; no wheeze or crackles  MUSC/Skeletal: ROM normal; no crepitus; joints without synovitis,  no deformities  No joint swelling or tenderness of PIP, MCP, wrist, elbow, shoulder, or knee joints  EXTREM: no clubbing, cyanosis, no edema,normal  pulses   NEURO: grossly intact; motor WNL; AAOx3;   PSYCH: normal mood, affect and behavior  LYMPH: normal cervical, supraclavicular          Labs:   Lab Results   Component Value Date    WBC 7.2 10/29/2018    HGB 13.9 10/29/2018    HCT 45.2 10/29/2018    MCV 85.1 10/29/2018     10/29/2018    CMP  @LASTLAB(NA,K,CL,CO2,GLU,BUN,Creatinine,Calcium,PROT,Albumin,Bilitot,Alkphos,AST,ALT,CRP,ESR,RF,CCP,LORIN,SSA,CPK,uric acid) )@  I have reviewed all available lab results and radiology reports.    Radiology/Diagnostic Studies:        Assessment/Plan:   (1) 65 y.o. female with diagnosis of osteoarthritis .  Stable  S/p bariatric surgery    Labs    Continue Cymbalta      Discussion:     I have explained all of the above in detail and the patient understands all of the current recommendation(s). I have answered all questions to the best of my ability and to their complete satisfaction.       The patient is to continue with the current management plan         RTC in   A few months      Electronically signed by Navdeep Orozco MD

## 2020-10-06 ENCOUNTER — PATIENT MESSAGE (OUTPATIENT)
Dept: FAMILY MEDICINE | Facility: CLINIC | Age: 65
End: 2020-10-06

## 2020-10-07 ENCOUNTER — TELEPHONE (OUTPATIENT)
Dept: FAMILY MEDICINE | Facility: CLINIC | Age: 65
End: 2020-10-07

## 2020-10-07 ENCOUNTER — PATIENT MESSAGE (OUTPATIENT)
Dept: FAMILY MEDICINE | Facility: CLINIC | Age: 65
End: 2020-10-07

## 2020-10-07 DIAGNOSIS — N30.00 ACUTE CYSTITIS WITHOUT HEMATURIA: Primary | ICD-10-CM

## 2020-10-07 LAB — BACTERIA UR CULT: ABNORMAL

## 2020-10-07 RX ORDER — CIPROFLOXACIN 500 MG/1
500 TABLET ORAL DAILY
Qty: 3 TABLET | Refills: 0 | Status: SHIPPED | OUTPATIENT
Start: 2020-10-07 | End: 2020-10-10

## 2020-10-07 NOTE — PROGRESS NOTES
Please call patient. Urine culture shows bacteria not sensitive to the Macrobid called in by Dr. Funk. Will send in Cipro once daily x 3 days and she can stop the Macrobid.

## 2020-10-07 NOTE — TELEPHONE ENCOUNTER
----- Message from ARIC Jimenez sent at 10/7/2020  9:27 AM CDT -----  Please call patient. Urine culture shows bacteria not sensitive to the Macrobid called in by Dr. Funk. Will send in Cipro once daily x 3 days and she can stop the Macrobid.

## 2020-11-02 ENCOUNTER — TELEPHONE (OUTPATIENT)
Dept: FAMILY MEDICINE | Facility: CLINIC | Age: 65
End: 2020-11-02

## 2020-11-02 NOTE — TELEPHONE ENCOUNTER
.The following medication needs a prior authorization:     Medication Name: Guanfacine HCL ( Tenex)    Dosage: 2 mg     Frequency: Nightly    Directions for use: Take 1 tablet (2 mg total) by mouth nightly    Diagnosis: Benign essential hypertension [I10]    Is the request for a reauthorization? Yes    Is the patient currently stable on therapy? Yes    Please list all therapeutic alternatives previously used with start/end dates and outcome: Benazepril 40 mg ( not effective alone), Toprol-Xl 25 mg ( not effective alone) and  Amlodipine 10 mg d/c'd 11/22/2019.      The benefit of this therapy with this prescribed medication outweighs the potential risk for this patient.

## 2020-11-09 ENCOUNTER — PATIENT MESSAGE (OUTPATIENT)
Dept: FAMILY MEDICINE | Facility: CLINIC | Age: 65
End: 2020-11-09

## 2020-11-12 ENCOUNTER — PATIENT MESSAGE (OUTPATIENT)
Dept: FAMILY MEDICINE | Facility: CLINIC | Age: 65
End: 2020-11-12

## 2020-11-16 ENCOUNTER — HOSPITAL ENCOUNTER (OUTPATIENT)
Dept: RADIOLOGY | Facility: HOSPITAL | Age: 65
Discharge: HOME | End: 2020-11-16
Attending: INTERNAL MEDICINE
Payer: MEDICARE

## 2020-11-16 ENCOUNTER — PATIENT MESSAGE (OUTPATIENT)
Dept: FAMILY MEDICINE | Facility: CLINIC | Age: 65
End: 2020-11-16

## 2020-11-16 DIAGNOSIS — Z12.31 SCREENING MAMMOGRAM FOR HIGH-RISK PATIENT: ICD-10-CM

## 2020-11-16 DIAGNOSIS — M85.89 OTHER SPECIFIED DISORDERS OF BONE DENSITY AND STRUCTURE, MULTIPLE SITES: ICD-10-CM

## 2020-11-16 PROCEDURE — 77080 DXA BONE DENSITY AXIAL: CPT | Mod: TC,PO

## 2020-11-16 PROCEDURE — 77067 SCR MAMMO BI INCL CAD: CPT | Mod: TC,PO

## 2020-12-15 ENCOUNTER — PATIENT MESSAGE (OUTPATIENT)
Dept: FAMILY MEDICINE | Facility: CLINIC | Age: 65
End: 2020-12-15

## 2020-12-22 ENCOUNTER — OFFICE VISIT (OUTPATIENT)
Dept: FAMILY MEDICINE | Facility: CLINIC | Age: 65
End: 2020-12-22
Payer: MEDICARE

## 2020-12-22 ENCOUNTER — LAB VISIT (OUTPATIENT)
Dept: LAB | Facility: HOSPITAL | Age: 65
End: 2020-12-22
Attending: INTERNAL MEDICINE
Payer: MEDICARE

## 2020-12-22 VITALS
OXYGEN SATURATION: 99 % | HEIGHT: 68 IN | WEIGHT: 222 LBS | SYSTOLIC BLOOD PRESSURE: 126 MMHG | TEMPERATURE: 98 F | RESPIRATION RATE: 18 BRPM | BODY MASS INDEX: 33.65 KG/M2 | HEART RATE: 60 BPM | DIASTOLIC BLOOD PRESSURE: 62 MMHG

## 2020-12-22 DIAGNOSIS — Z98.84 S/P BARIATRIC SURGERY: ICD-10-CM

## 2020-12-22 DIAGNOSIS — M18.0 ARTHRITIS OF CARPOMETACARPAL (CMC) JOINT OF BOTH THUMBS: ICD-10-CM

## 2020-12-22 DIAGNOSIS — D50.9 MICROCYTIC ANEMIA: ICD-10-CM

## 2020-12-22 DIAGNOSIS — D53.9 NUTRITIONAL ANEMIA, UNSPECIFIED: ICD-10-CM

## 2020-12-22 DIAGNOSIS — E11.9 TYPE 2 DIABETES MELLITUS TREATED WITH INSULIN: Primary | ICD-10-CM

## 2020-12-22 DIAGNOSIS — E55.9 VITAMIN D DEFICIENCY: ICD-10-CM

## 2020-12-22 DIAGNOSIS — E78.2 MIXED HYPERLIPIDEMIA: ICD-10-CM

## 2020-12-22 DIAGNOSIS — E11.9 TYPE 2 DIABETES MELLITUS TREATED WITH INSULIN: ICD-10-CM

## 2020-12-22 DIAGNOSIS — Z79.4 TYPE 2 DIABETES MELLITUS TREATED WITH INSULIN: Primary | ICD-10-CM

## 2020-12-22 DIAGNOSIS — I10 BENIGN ESSENTIAL HYPERTENSION: ICD-10-CM

## 2020-12-22 DIAGNOSIS — Z79.4 TYPE 2 DIABETES MELLITUS TREATED WITH INSULIN: ICD-10-CM

## 2020-12-22 LAB
25(OH)D3+25(OH)D2 SERPL-MCNC: 38 NG/ML (ref 30–96)
BASOPHILS # BLD AUTO: 0.07 K/UL (ref 0–0.2)
BASOPHILS NFR BLD: 1 % (ref 0–1.9)
DIFFERENTIAL METHOD: ABNORMAL
EOSINOPHIL # BLD AUTO: 0.1 K/UL (ref 0–0.5)
EOSINOPHIL NFR BLD: 1.5 % (ref 0–8)
ERYTHROCYTE [DISTWIDTH] IN BLOOD BY AUTOMATED COUNT: 16.5 % (ref 11.5–14.5)
ESTIMATED AVG GLUCOSE: 148 MG/DL (ref 68–131)
FERRITIN SERPL-MCNC: 3 NG/ML (ref 20–300)
FOLATE SERPL-MCNC: >24.8 NG/ML (ref 4–24)
HBA1C MFR BLD HPLC: 6.8 % (ref 4.5–6.2)
HBA1C MFR BLD: 6.2 %
HCT VFR BLD AUTO: 34 % (ref 37–48.5)
HGB BLD-MCNC: 9.6 G/DL (ref 12–16)
IMM GRANULOCYTES # BLD AUTO: 0.02 K/UL (ref 0–0.04)
IMM GRANULOCYTES NFR BLD AUTO: 0.3 % (ref 0–0.5)
IRON SERPL-MCNC: 21 UG/DL (ref 30–160)
LYMPHOCYTES # BLD AUTO: 2 K/UL (ref 1–4.8)
LYMPHOCYTES NFR BLD: 29 % (ref 18–48)
MCH RBC QN AUTO: 21 PG (ref 27–31)
MCHC RBC AUTO-ENTMCNC: 28.2 G/DL (ref 32–36)
MCV RBC AUTO: 74 FL (ref 82–98)
MONOCYTES # BLD AUTO: 0.5 K/UL (ref 0.3–1)
MONOCYTES NFR BLD: 6.8 % (ref 4–15)
NEUTROPHILS # BLD AUTO: 4.2 K/UL (ref 1.8–7.7)
NEUTROPHILS NFR BLD: 61.4 % (ref 38–73)
NRBC BLD-RTO: 0 /100 WBC
PLATELET # BLD AUTO: 376 K/UL (ref 150–350)
PMV BLD AUTO: 10.5 FL (ref 9.2–12.9)
RBC # BLD AUTO: 4.58 M/UL (ref 4–5.4)
SATURATED IRON: 5 % (ref 20–50)
TOTAL IRON BINDING CAPACITY: 433 UG/DL (ref 250–450)
TRANSFERRIN SERPL-MCNC: 309 MG/DL (ref 200–375)
VIT B12 SERPL-MCNC: 222 PG/ML (ref 210–950)
WBC # BLD AUTO: 6.8 K/UL (ref 3.9–12.7)

## 2020-12-22 PROCEDURE — 82728 ASSAY OF FERRITIN: CPT

## 2020-12-22 PROCEDURE — 99214 PR OFFICE/OUTPT VISIT, EST, LEVL IV, 30-39 MIN: ICD-10-PCS | Mod: S$GLB,,, | Performed by: INTERNAL MEDICINE

## 2020-12-22 PROCEDURE — 82746 ASSAY OF FOLIC ACID SERUM: CPT

## 2020-12-22 PROCEDURE — 84630 ASSAY OF ZINC: CPT

## 2020-12-22 PROCEDURE — 83036 HEMOGLOBIN GLYCOSYLATED A1C: CPT

## 2020-12-22 PROCEDURE — 83036 HEMOGLOBIN GLYCOSYLATED A1C: CPT | Mod: QW,S$GLB,, | Performed by: INTERNAL MEDICINE

## 2020-12-22 PROCEDURE — 82525 ASSAY OF COPPER: CPT

## 2020-12-22 PROCEDURE — 99214 OFFICE O/P EST MOD 30 MIN: CPT | Mod: S$GLB,,, | Performed by: INTERNAL MEDICINE

## 2020-12-22 PROCEDURE — 83036 POCT HEMOGLOBIN A1C: ICD-10-PCS | Mod: QW,S$GLB,, | Performed by: INTERNAL MEDICINE

## 2020-12-22 PROCEDURE — 82306 VITAMIN D 25 HYDROXY: CPT

## 2020-12-22 PROCEDURE — 83540 ASSAY OF IRON: CPT

## 2020-12-22 PROCEDURE — 82607 VITAMIN B-12: CPT

## 2020-12-22 PROCEDURE — 36415 COLL VENOUS BLD VENIPUNCTURE: CPT

## 2020-12-22 PROCEDURE — 85025 COMPLETE CBC W/AUTO DIFF WBC: CPT

## 2020-12-22 RX ORDER — INSULIN GLARGINE 100 [IU]/ML
40 INJECTION, SOLUTION SUBCUTANEOUS DAILY
Qty: 9 SYRINGE | Refills: 2 | Status: SHIPPED | OUTPATIENT
Start: 2020-12-22 | End: 2021-03-02 | Stop reason: SDUPTHER

## 2020-12-22 RX ORDER — BENAZEPRIL HYDROCHLORIDE 40 MG/1
40 TABLET ORAL 2 TIMES DAILY
Qty: 180 TABLET | Refills: 3 | Status: SHIPPED | OUTPATIENT
Start: 2020-12-22 | End: 2021-12-31

## 2020-12-22 NOTE — PROGRESS NOTES
ADULT FOLLOW-UP VISIT    Subjective:     Chief Complaint:  Diabetes      64 yo woman with h/o DM, HTN, depression, obesity s/p gastric sleeve in April 2019 c/b incisional ventral hernia s/p repair, panniculectomy and full thickness skin graft on 12/4/19.  Patient reports minimal improvement in her chronic abdominal swelling and pain.  She has had a recurrence of her low back pain and is due to go back and see her pain doctor for possible injection.  Her diabetes continues to be well controlled on metformin and Basaglar.  Hemoglobin A1c today 6.2%.  Reviewed recent labs done by patient's rheumatologist.  CMP within normal limits, CBC shows mild, microcytic anemia.  Patient reports a previous history of iron deficiency.  She takes a multivitamin but does not think she has had her iron checked since her gastric sleeve last year.  Patient also complains of pain at the base of both of her thumbs which is worse with cooking and other activities where she has to         Diabetes  Associated symptoms include fatigue. Pertinent negatives for diabetes include no blurred vision, no chest pain, no foot paresthesias, no foot ulcerations, no polydipsia, no polyphagia, no polyuria, no visual change, no weakness and no weight loss.         Ms. Cervantes  has a past medical history of CAD (coronary artery disease), Diabetes mellitus, type 2, MI (myocardial infarction), Sleep apnea, and Sleep apnea (03/01/2019).    Family history and social history are reviewed and there are no significant changes.     ROS:  Review of Systems   Constitutional: Positive for fatigue. Negative for weight loss.   Eyes: Negative for blurred vision.   Cardiovascular: Negative for chest pain.   Endocrine: Negative for polydipsia, polyphagia and polyuria.   Musculoskeletal: Positive for arthralgias and back pain.   Neurological: Negative for weakness.          Current Outpatient  Medications:     aspirin (ECOTRIN) 325 MG EC tablet, Take by mouth., Disp: , Rfl:     benazepriL (LOTENSIN) 40 MG tablet, Take 1 tablet (40 mg total) by mouth 2 (two) times daily., Disp: 180 tablet, Rfl: 3    calcium carbonate (OS-RYAN) 600 mg calcium (1,500 mg) Tab, Take 600 mg by mouth once., Disp: , Rfl:     DULoxetine (CYMBALTA) 60 MG capsule, Take 1 capsule (60 mg total) by mouth once daily., Disp: 30 capsule, Rfl: 3    fluticasone (FLONASE) 50 mcg/actuation nasal spray, 1 spray (50 mcg total) by Each Nare route once daily. (Patient taking differently: 1 spray by Each Nostril route daily as needed. ), Disp: 16 g, Rfl: 0    guanFACINE (TENEX) 2 MG tablet, Take 1 tablet (2 mg total) by mouth nightly., Disp: 90 tablet, Rfl: 3    insulin (BASAGLAR KWIKPEN U-100 INSULIN) glargine 100 units/mL (3mL) SubQ pen, Inject 40 Units into the skin once daily., Disp: 9 Syringe, Rfl: 2    metFORMIN (GLUCOPHAGE) 1000 MG tablet, Take 1 tablet (1,000 mg total) by mouth 2 (two) times daily with meals., Disp: 180 tablet, Rfl: 3    metOLazone (ZAROXOLYN) 5 MG tablet, Take 1 tablet (5 mg total) by mouth once daily., Disp: 90 tablet, Rfl: 3    metoprolol succinate (TOPROL-XL) 25 MG 24 hr tablet, Take 1 tablet (25 mg total) by mouth once daily., Disp: 90 tablet, Rfl: 3    multivitamin capsule, Take 1 capsule by mouth once daily., Disp: , Rfl:     prednisoLONE acetate (PRED FORTE) 1 % DrpS, Place 1 drop into both eyes as needed. , Disp: , Rfl:     rosuvastatin (CRESTOR) 40 MG Tab, Take 1 tablet (40 mg total) by mouth every evening., Disp: 90 tablet, Rfl: 3    blood sugar diagnostic Strp, One Touch Ultra Blue Test strips, Use l strip to check glucose 4 times daily, Disp: 100 each, Rfl: 11    blood-glucose meter kit, Use as instructed, Disp: 1 each, Rfl: 0    lancets (ONETOUCH DELICA LANCETS) 33 gauge Misc, USE 1  TO CHECK GLUCOSE 4 TIMES DAILY, Disp: 100 each, Rfl: 3    pen needle, diabetic (BD ULTRA-FINE SHORT PEN NEEDLE)  "31 gauge x 5/16" Ndle, Use to check glucose 4x daily., Disp: 100 each, Rfl: 3      Objective:     Physical Examination:     /62   Pulse 60   Temp 98.4 °F (36.9 °C) (Temporal)   Resp 18   Ht 5' 8" (1.727 m)   Wt 100.7 kg (222 lb)   SpO2 99%   BMI 33.75 kg/m²     Physical Exam  Constitutional:       General: She is not in acute distress.     Appearance: She is well-developed. She is obese. She is not diaphoretic.   HENT:      Right Ear: External ear normal.      Left Ear: External ear normal.      Nose: Nose normal.   Eyes:      General:         Right eye: No discharge.         Left eye: No discharge.      Conjunctiva/sclera: Conjunctivae normal.      Pupils: Pupils are equal, round, and reactive to light.   Cardiovascular:      Rate and Rhythm: Normal rate and regular rhythm.      Heart sounds: Normal heart sounds. No murmur.   Pulmonary:      Effort: Pulmonary effort is normal. No respiratory distress.      Breath sounds: Normal breath sounds. No wheezing or rales.   Musculoskeletal:      Left lower leg: Edema (to ankle) present.      Comments: TTP at base of thumb B    Neurological:      Mental Status: She is alert and oriented to person, place, and time.         Assessment/Plan:   Chanel is a 65 y.o. female here for follow-up.    Problem List Items Addressed This Visit        Cardiac/Vascular    Benign essential hypertension    Relevant Medications    benazepriL (LOTENSIN) 40 MG tablet    Mixed hyperlipidemia       Oncology    Microcytic anemia    Current Assessment & Plan     Recheck CBC with ferritin, iron panel.  Most likely related to bariatric surgery.  Patient is no longer being followed by her bariatric surgeon, saw I will plan to check for vitamin and mineral deficiencies yearly starting now.  Patient denies vaginal bleeding, hematuria.  She is up-to-date on colonoscopy.         Relevant Orders    Iron and TIBC    Ferritin    CBC Auto Differential       Endocrine    Type 2 diabetes mellitus " treated with insulin - Primary    Current Assessment & Plan     Continue metformin,  basaglar 40 units QHS. HbA1c 6.2% today. Eye exam 8/2020 results requested.          Relevant Medications    insulin (BASAGLAR KWIKPEN U-100 INSULIN) glargine 100 units/mL (3mL) SubQ pen    Other Relevant Orders    Hemoglobin A1C, POCT (Completed)    Vitamin D deficiency    Relevant Orders    Vitamin D    S/P bariatric surgery    Relevant Orders    Copper, Serum    Zinc    Iron and TIBC    Ferritin    Vitamin B12    Folate    CBC Auto Differential       Other    Arthritis of carpometacarpal (CMC) joint of both thumbs    Current Assessment & Plan     Advised topical NSAID.            Other Visit Diagnoses     Nutritional anemia, unspecified         Relevant Orders    Vitamin B12    Folate          Health Maintenance   Topic Date Due    Hepatitis C Screening  1955    Eye Exam  09/05/2020    Hemoglobin A1c  10/09/2020    Foot Exam  11/22/2020    Lipid Panel  04/09/2021    High Dose Statin  09/22/2021    Aspirin/Antiplatelet Therapy  09/22/2021    Pneumococcal Vaccine (65+ Low/Medium Risk) (2 of 2 - PPSV23) 05/09/2022    Mammogram  11/16/2022    DEXA SCAN  11/16/2023    TETANUS VACCINE  09/21/2027           Discussion:     Follow up in about 3 months (around 3/22/2021).    Goals    None         Electronically signed by Mindy Funk

## 2020-12-22 NOTE — ASSESSMENT & PLAN NOTE
Recheck CBC with ferritin, iron panel.  Most likely related to bariatric surgery.  Patient is no longer being followed by her bariatric surgeon, saw I will plan to check for vitamin and mineral deficiencies yearly starting now.  Patient denies vaginal bleeding, hematuria.  She is up-to-date on colonoscopy.

## 2020-12-23 ENCOUNTER — OFFICE VISIT (OUTPATIENT)
Dept: RHEUMATOLOGY | Facility: CLINIC | Age: 65
End: 2020-12-23
Payer: MEDICARE

## 2020-12-23 VITALS
SYSTOLIC BLOOD PRESSURE: 155 MMHG | BODY MASS INDEX: 33.91 KG/M2 | WEIGHT: 223 LBS | TEMPERATURE: 97 F | DIASTOLIC BLOOD PRESSURE: 70 MMHG

## 2020-12-23 DIAGNOSIS — M19.90 ACUTE ARTHRITIS: ICD-10-CM

## 2020-12-23 DIAGNOSIS — M19.91 PRIMARY OSTEOARTHRITIS, UNSPECIFIED SITE: Primary | ICD-10-CM

## 2020-12-23 PROCEDURE — 20600 DRAIN/INJ JOINT/BURSA W/O US: CPT | Mod: LT,S$GLB,, | Performed by: INTERNAL MEDICINE

## 2020-12-23 PROCEDURE — 99213 OFFICE O/P EST LOW 20 MIN: CPT | Mod: 25,S$GLB,, | Performed by: INTERNAL MEDICINE

## 2020-12-23 PROCEDURE — 20600 SMALL JOINT ASPIRATION/INJECTION: ICD-10-PCS | Mod: LT,S$GLB,, | Performed by: INTERNAL MEDICINE

## 2020-12-23 PROCEDURE — 99213 PR OFFICE/OUTPT VISIT, EST, LEVL III, 20-29 MIN: ICD-10-PCS | Mod: 25,S$GLB,, | Performed by: INTERNAL MEDICINE

## 2020-12-23 RX ORDER — DULOXETIN HYDROCHLORIDE 60 MG/1
60 CAPSULE, DELAYED RELEASE ORAL DAILY
Qty: 30 CAPSULE | Refills: 3 | Status: SHIPPED | OUTPATIENT
Start: 2020-12-23 | End: 2020-12-23 | Stop reason: SDUPTHER

## 2020-12-23 RX ORDER — DEXAMETHASONE SODIUM PHOSPHATE 4 MG/ML
4 INJECTION, SOLUTION INTRA-ARTICULAR; INTRALESIONAL; INTRAMUSCULAR; INTRAVENOUS; SOFT TISSUE
Status: DISCONTINUED | OUTPATIENT
Start: 2020-12-23 | End: 2020-12-23 | Stop reason: HOSPADM

## 2020-12-23 RX ORDER — DULOXETIN HYDROCHLORIDE 60 MG/1
60 CAPSULE, DELAYED RELEASE ORAL DAILY
Qty: 30 CAPSULE | Refills: 3 | Status: SHIPPED | OUTPATIENT
Start: 2020-12-23 | End: 2021-05-04 | Stop reason: SDUPTHER

## 2020-12-23 RX ORDER — TRIAMCINOLONE ACETONIDE 40 MG/ML
40 INJECTION, SUSPENSION INTRA-ARTICULAR; INTRAMUSCULAR
Status: DISCONTINUED | OUTPATIENT
Start: 2020-12-23 | End: 2020-12-23 | Stop reason: HOSPADM

## 2020-12-23 RX ADMIN — TRIAMCINOLONE ACETONIDE 40 MG: 40 INJECTION, SUSPENSION INTRA-ARTICULAR; INTRAMUSCULAR at 03:12

## 2020-12-23 RX ADMIN — DEXAMETHASONE SODIUM PHOSPHATE 4 MG: 4 INJECTION, SOLUTION INTRA-ARTICULAR; INTRALESIONAL; INTRAMUSCULAR; INTRAVENOUS; SOFT TISSUE at 03:12

## 2020-12-23 NOTE — PROGRESS NOTES
[facility] RHEUMATOLOGY            PROGRESS NOTE      Subjective:       Patient ID:   NAME: Chanel Cervantes : 1955     65 y.o. female    Referring Doc: No ref. provider found  Other Physicians:    Chief Complaint:  Osteoarthritis      History of Present Illness:     Patient returns today for a regularly scheduled follow-up visit for  OA     The patient has been experiencing increased pain in both 1st carpometacarpal joints.  She has tried Voltaren gel without relief.  No chest pains cough or shortness of breath.  Following CDC guidelines to avoid COVID-19 infection.  No known exposure to COVID        ROS:   GEN:  No  fever, night sweats . weight is stable   + fatigue  SKIN: no rashes, no bruising, no ulcerations, no Raynaud's  HEENT: no HA's, No visual changes, no mucosal ulcers, no sicca symptoms,  CV:   no CP, SOB, PND, WILSON, no orthopnea, no palpitations  PULM: normal with no SOB, cough, hemoptysis, sputum or pleuritic pain  GI:  no abdominal pain, nausea, vomiting, constipation, diarrhea, melanotic stools, BRBPR, hematemesis, no dysphagia  :   no dysuria  NEURO: no paresthesias, headaches, visual disturbances, muscle weakness  MUSCULOSKELETAL:no joint swelling, prolonged AM stiffness, no back pain, no muscle pain  Allergies:  Review of patient's allergies indicates:   Allergen Reactions    Adhesive tape-silicones Rash    Dye Hives    Iodine     Penicillins Edema     and burning sensation    Pneumococcal vaccine     Iodinated contrast media Rash       Medications:    Current Outpatient Medications:     aspirin (ECOTRIN) 325 MG EC tablet, Take by mouth., Disp: , Rfl:     benazepriL (LOTENSIN) 40 MG tablet, Take 1 tablet (40 mg total) by mouth 2 (two) times daily., Disp: 180 tablet, Rfl: 3    blood sugar diagnostic Strp, One Touch Ultra Blue Test strips, Use l strip to check glucose 4 times daily, Disp: 100 each, Rfl: 11    blood-glucose meter kit, Use as instructed, Disp: 1 each, Rfl: 0     "calcium carbonate (OS-RYAN) 600 mg calcium (1,500 mg) Tab, Take 600 mg by mouth once., Disp: , Rfl:     DULoxetine (CYMBALTA) 60 MG capsule, Take 1 capsule (60 mg total) by mouth once daily., Disp: 30 capsule, Rfl: 3    fluticasone (FLONASE) 50 mcg/actuation nasal spray, 1 spray (50 mcg total) by Each Nare route once daily. (Patient taking differently: 1 spray by Each Nostril route daily as needed. ), Disp: 16 g, Rfl: 0    guanFACINE (TENEX) 2 MG tablet, Take 1 tablet (2 mg total) by mouth nightly., Disp: 90 tablet, Rfl: 3    insulin (BASAGLAR KWIKPEN U-100 INSULIN) glargine 100 units/mL (3mL) SubQ pen, Inject 40 Units into the skin once daily., Disp: 9 Syringe, Rfl: 2    lancets (ONETOUCH DELICA LANCETS) 33 gauge Misc, USE 1  TO CHECK GLUCOSE 4 TIMES DAILY, Disp: 100 each, Rfl: 3    metFORMIN (GLUCOPHAGE) 1000 MG tablet, Take 1 tablet (1,000 mg total) by mouth 2 (two) times daily with meals., Disp: 180 tablet, Rfl: 3    metOLazone (ZAROXOLYN) 5 MG tablet, Take 1 tablet (5 mg total) by mouth once daily., Disp: 90 tablet, Rfl: 3    metoprolol succinate (TOPROL-XL) 25 MG 24 hr tablet, Take 1 tablet (25 mg total) by mouth once daily., Disp: 90 tablet, Rfl: 3    multivitamin capsule, Take 1 capsule by mouth once daily., Disp: , Rfl:     pen needle, diabetic (BD ULTRA-FINE SHORT PEN NEEDLE) 31 gauge x 5/16" Ndle, Use to check glucose 4x daily., Disp: 100 each, Rfl: 3    prednisoLONE acetate (PRED FORTE) 1 % DrpS, Place 1 drop into both eyes as needed. , Disp: , Rfl:     rosuvastatin (CRESTOR) 40 MG Tab, Take 1 tablet (40 mg total) by mouth every evening., Disp: 90 tablet, Rfl: 3    PMHx/PSHx Updates:          Objective:     Vitals:  Blood pressure (!) 155/70, temperature 97.2 °F (36.2 °C), weight 101.2 kg (223 lb).    Physical Examination:   GEN: no apparent distress, comfortable; AAOx3  SKIN: no rashes,no ulceration, no Raynaud's, no petechiae, no SQ nodules,  HEAD: normal  EYES: no pallor, no icterus  NECK: " no masses, thyroid normal, trachea midline, no LAD/LN's, supple  CV: RRR with no murmur; l S1 and S2 reg. ,no gallop no rubs,   CHEST: Normal respiratory effort; CTAB; normal breath sounds; no wheeze or crackles  MUSC/Skeletal: ROM normal; no crepitus; joints without synovitis,  no deformities  No joint swelling or tenderness of PIP, MCP, wrist, elbow, shoulder, or knee joints.  Tenderness in both 1st carpometacarpal joints  EXTREM: no clubbing, cyanosis, no edema,normal  pulses   NEURO: grossly intact; motor WNL; AAOx3; ,   PSYCH: normal mood, affect and behavior  LYMPH: normal cervical, supraclavicular          Labs:   Lab Results   Component Value Date    WBC 6.80 12/22/2020    HGB 9.6 (L) 12/22/2020    HCT 34.0 (L) 12/22/2020    MCV 74 (L) 12/22/2020     (H) 12/22/2020    CMP  @LASTLAB(NA,K,CL,CO2,GLU,BUN,Creatinine,Calcium,PROT,Albumin,Bilitot,Alkphos,AST,ALT,CRP,ESR,RF,CCP,LORIN,SSA,CPK,uric acid) )@  I have reviewed all available lab results and radiology reports.    Radiology/Diagnostic Studies:        Assessment/Plan:   (1) 65 y.o. female with diagnosis of osteoarthritis.  Acute in  left 1st carpometacarpal joint.  This was injected as per procedure note        Follow-up in a few weeks or few months if doing well        Discussion:     I have explained all of the above in detail and the patient understands all of the current recommendation(s). I have answered all questions to the best of my ability and to their complete satisfaction.       The patient is to continue with the current management plan         RTC in         Electronically signed by Navdeep Orozco MD

## 2020-12-23 NOTE — PROCEDURES
Small Joint Aspiration/Injection    Date/Time: 12/23/2020 3:30 PM  Performed by: Navdeep Orozco MD  Authorized by: Navdeep Orozco MD     Consent Done?:  Not Needed  Indications:  Pain  Local anesthetic:  Lidocaine 2% without epinephrine  Medications:  4 mg dexamethasone 4 mg/mL; 40 mg triamcinolone acetonide 40 mg/mL  Patient tolerance:  Patient tolerated the procedure well with no immediate complications     Injected 0.125 cc Kenalog 0.125 cc dexamethasone left 1st carpometacarpal joint

## 2020-12-23 NOTE — PROGRESS NOTES
Zinc and copper results pending. Mild B12 deficiency, iron deficiency noted. Likely related to bariatric surgery. Will likely need B12 injections and iron infusions.

## 2020-12-28 ENCOUNTER — TELEPHONE (OUTPATIENT)
Dept: PEDIATRICS | Facility: CLINIC | Age: 65
End: 2020-12-28

## 2020-12-28 ENCOUNTER — PATIENT MESSAGE (OUTPATIENT)
Dept: PEDIATRICS | Facility: CLINIC | Age: 65
End: 2020-12-28

## 2020-12-28 DIAGNOSIS — E53.8 B12 DEFICIENCY: Primary | ICD-10-CM

## 2020-12-28 DIAGNOSIS — D50.8 OTHER IRON DEFICIENCY ANEMIA: ICD-10-CM

## 2020-12-28 LAB
COPPER SERPL-MCNC: 120 UG/DL (ref 72–166)
ZINC SERPL-MCNC: 88 UG/DL (ref 56–134)

## 2020-12-28 NOTE — TELEPHONE ENCOUNTER
Are you sending in b-12 injections and setting her up for infusions? Please advise so we can update her on the plan of care. Zinc and Copper still pending.

## 2020-12-30 ENCOUNTER — PATIENT MESSAGE (OUTPATIENT)
Dept: PEDIATRICS | Facility: CLINIC | Age: 65
End: 2020-12-30

## 2020-12-30 ENCOUNTER — TELEPHONE (OUTPATIENT)
Dept: FAMILY MEDICINE | Facility: CLINIC | Age: 65
End: 2020-12-30

## 2020-12-30 RX ORDER — CYANOCOBALAMIN 1000 UG/ML
1000 INJECTION, SOLUTION INTRAMUSCULAR; SUBCUTANEOUS DAILY
Qty: 10 ML | Refills: 0 | Status: SHIPPED | OUTPATIENT
Start: 2020-12-30 | End: 2021-01-06

## 2020-12-30 NOTE — TELEPHONE ENCOUNTER
B12 injections ordered and sent to patient's pharmacy.  Patient will need to start with daily injections for 1 week, then weekly for 1 month, then monthly for maintenance. Referring to hematology for iron infusions.

## 2020-12-30 NOTE — TELEPHONE ENCOUNTER
Spoke to patient and she was educated on when and where to get tested.  She verbally stated that she understood.

## 2020-12-30 NOTE — TELEPHONE ENCOUNTER
----- Message from Carmen Carr sent at 12/30/2020  3:06 PM CST -----  Regarding: Covid concern  Pt called stating she was exposed to daughter who tested positive for Covid today.  Concerned and needs advise on what to do.  Please call pt.

## 2021-01-14 ENCOUNTER — PATIENT MESSAGE (OUTPATIENT)
Dept: FAMILY MEDICINE | Facility: CLINIC | Age: 66
End: 2021-01-14

## 2021-01-24 PROBLEM — K95.89 IRON DEFICIENCY ANEMIA FOLLOWING BARIATRIC SURGERY: Status: ACTIVE | Noted: 2021-01-24

## 2021-01-24 PROBLEM — D64.89 ANEMIA DUE TO MULTIPLE MECHANISMS: Status: ACTIVE | Noted: 2021-01-24

## 2021-01-24 PROBLEM — D50.8 IRON DEFICIENCY ANEMIA FOLLOWING BARIATRIC SURGERY: Status: ACTIVE | Noted: 2021-01-24

## 2021-01-24 PROBLEM — D75.838 SECONDARY THROMBOCYTOSIS: Status: ACTIVE | Noted: 2021-01-24

## 2021-01-24 PROBLEM — D63.8 ANEMIA, CHRONIC DISEASE: Status: ACTIVE | Noted: 2021-01-24

## 2021-01-25 ENCOUNTER — OFFICE VISIT (OUTPATIENT)
Dept: HEMATOLOGY/ONCOLOGY | Facility: CLINIC | Age: 66
End: 2021-01-25
Payer: MEDICARE

## 2021-01-25 VITALS
HEART RATE: 60 BPM | HEIGHT: 67 IN | RESPIRATION RATE: 18 BRPM | BODY MASS INDEX: 35.16 KG/M2 | DIASTOLIC BLOOD PRESSURE: 80 MMHG | WEIGHT: 224 LBS | SYSTOLIC BLOOD PRESSURE: 162 MMHG

## 2021-01-25 DIAGNOSIS — D50.8 IRON DEFICIENCY ANEMIA FOLLOWING BARIATRIC SURGERY: ICD-10-CM

## 2021-01-25 DIAGNOSIS — Z98.84 S/P BARIATRIC SURGERY: ICD-10-CM

## 2021-01-25 DIAGNOSIS — D53.9 NUTRITIONAL ANEMIA, UNSPECIFIED: ICD-10-CM

## 2021-01-25 DIAGNOSIS — D63.8 ANEMIA, CHRONIC DISEASE: ICD-10-CM

## 2021-01-25 DIAGNOSIS — D50.9 MICROCYTIC ANEMIA: Primary | ICD-10-CM

## 2021-01-25 DIAGNOSIS — D50.8 OTHER IRON DEFICIENCY ANEMIA: ICD-10-CM

## 2021-01-25 DIAGNOSIS — D64.89 ANEMIA DUE TO MULTIPLE MECHANISMS: ICD-10-CM

## 2021-01-25 DIAGNOSIS — K95.89 IRON DEFICIENCY ANEMIA FOLLOWING BARIATRIC SURGERY: ICD-10-CM

## 2021-01-25 DIAGNOSIS — D75.838 SECONDARY THROMBOCYTOSIS: ICD-10-CM

## 2021-01-25 DIAGNOSIS — E55.9 VITAMIN D DEFICIENCY: ICD-10-CM

## 2021-01-25 PROCEDURE — 99203 OFFICE O/P NEW LOW 30 MIN: CPT | Mod: S$GLB,,, | Performed by: INTERNAL MEDICINE

## 2021-01-25 PROCEDURE — 99203 PR OFFICE/OUTPT VISIT, NEW, LEVL III, 30-44 MIN: ICD-10-PCS | Mod: S$GLB,,, | Performed by: INTERNAL MEDICINE

## 2021-01-25 RX ORDER — SODIUM CHLORIDE 9 MG/ML
INJECTION, SOLUTION INTRAVENOUS CONTINUOUS
Status: CANCELLED | OUTPATIENT
Start: 2021-01-26

## 2021-01-25 RX ORDER — METHYLPREDNISOLONE SOD SUCC 125 MG
125 VIAL (EA) INJECTION ONCE AS NEEDED
Status: CANCELLED | OUTPATIENT
Start: 2021-01-26

## 2021-01-25 RX ORDER — DIPHENHYDRAMINE HYDROCHLORIDE 50 MG/ML
25 INJECTION INTRAMUSCULAR; INTRAVENOUS
Status: CANCELLED
Start: 2021-01-26

## 2021-01-25 RX ORDER — SODIUM CHLORIDE 0.9 % (FLUSH) 0.9 %
10 SYRINGE (ML) INJECTION
Status: CANCELLED | OUTPATIENT
Start: 2021-01-26

## 2021-01-25 RX ORDER — EPINEPHRINE 0.3 MG/.3ML
0.3 INJECTION SUBCUTANEOUS ONCE AS NEEDED
Status: CANCELLED | OUTPATIENT
Start: 2021-01-26

## 2021-01-25 RX ORDER — HEPARIN 100 UNIT/ML
5 SYRINGE INTRAVENOUS
Status: CANCELLED | OUTPATIENT
Start: 2021-01-26

## 2021-01-25 RX ORDER — DIPHENHYDRAMINE HYDROCHLORIDE 50 MG/ML
50 INJECTION INTRAMUSCULAR; INTRAVENOUS ONCE AS NEEDED
Status: CANCELLED | OUTPATIENT
Start: 2021-01-26

## 2021-01-26 ENCOUNTER — LAB VISIT (OUTPATIENT)
Dept: LAB | Facility: HOSPITAL | Age: 66
End: 2021-01-26
Attending: INTERNAL MEDICINE
Payer: MEDICARE

## 2021-01-26 DIAGNOSIS — D53.9 NUTRITIONAL ANEMIA, UNSPECIFIED: ICD-10-CM

## 2021-01-26 DIAGNOSIS — D53.9 SIMPLE CHRONIC ANEMIA: Primary | ICD-10-CM

## 2021-01-26 DIAGNOSIS — D63.8 ANEMIA, CHRONIC DISEASE: ICD-10-CM

## 2021-01-26 DIAGNOSIS — D64.89 ANEMIA DUE TO MULTIPLE MECHANISMS: ICD-10-CM

## 2021-01-26 DIAGNOSIS — D50.9 IRON DEFICIENCY ANEMIA, UNSPECIFIED: ICD-10-CM

## 2021-01-26 DIAGNOSIS — D50.9 MICROCYTIC ANEMIA: ICD-10-CM

## 2021-01-26 LAB
ALBUMIN SERPL BCP-MCNC: 3.5 G/DL (ref 3.5–5.2)
ALP SERPL-CCNC: 59 U/L (ref 55–135)
ALT SERPL W/O P-5'-P-CCNC: 16 U/L (ref 10–44)
ANION GAP SERPL CALC-SCNC: 11 MMOL/L (ref 8–16)
AST SERPL-CCNC: 17 U/L (ref 10–40)
BASOPHILS # BLD AUTO: 0.06 K/UL (ref 0–0.2)
BASOPHILS NFR BLD: 1 % (ref 0–1.9)
BILIRUB SERPL-MCNC: 0.6 MG/DL (ref 0.1–1)
BUN SERPL-MCNC: 18 MG/DL (ref 8–23)
CALCIUM SERPL-MCNC: 9.5 MG/DL (ref 8.7–10.5)
CHLORIDE SERPL-SCNC: 98 MMOL/L (ref 95–110)
CO2 SERPL-SCNC: 30 MMOL/L (ref 23–29)
CREAT SERPL-MCNC: 0.8 MG/DL (ref 0.5–1.4)
DIFFERENTIAL METHOD: ABNORMAL
EOSINOPHIL # BLD AUTO: 0.1 K/UL (ref 0–0.5)
EOSINOPHIL NFR BLD: 1.4 % (ref 0–8)
ERYTHROCYTE [DISTWIDTH] IN BLOOD BY AUTOMATED COUNT: 16.5 % (ref 11.5–14.5)
EST. GFR  (AFRICAN AMERICAN): >60 ML/MIN/1.73 M^2
EST. GFR  (NON AFRICAN AMERICAN): >60 ML/MIN/1.73 M^2
FERRITIN SERPL-MCNC: 3 NG/ML (ref 20–300)
FOLATE SERPL-MCNC: >24.8 NG/ML (ref 4–24)
GLUCOSE SERPL-MCNC: 134 MG/DL (ref 70–110)
HCT VFR BLD AUTO: 33.1 % (ref 37–48.5)
HGB BLD-MCNC: 9.8 G/DL (ref 12–16)
IMM GRANULOCYTES # BLD AUTO: 0.02 K/UL (ref 0–0.04)
IMM GRANULOCYTES NFR BLD AUTO: 0.3 % (ref 0–0.5)
IRON SERPL-MCNC: 20 UG/DL (ref 30–160)
LYMPHOCYTES # BLD AUTO: 1.5 K/UL (ref 1–4.8)
LYMPHOCYTES NFR BLD: 25 % (ref 18–48)
MCH RBC QN AUTO: 21.5 PG (ref 27–31)
MCHC RBC AUTO-ENTMCNC: 29.6 G/DL (ref 32–36)
MCV RBC AUTO: 73 FL (ref 82–98)
MONOCYTES # BLD AUTO: 0.5 K/UL (ref 0.3–1)
MONOCYTES NFR BLD: 8.7 % (ref 4–15)
NEUTROPHILS # BLD AUTO: 3.8 K/UL (ref 1.8–7.7)
NEUTROPHILS NFR BLD: 63.6 % (ref 38–73)
NRBC BLD-RTO: 0 /100 WBC
PLATELET # BLD AUTO: 383 K/UL (ref 150–350)
PMV BLD AUTO: 10 FL (ref 9.2–12.9)
POTASSIUM SERPL-SCNC: 3.4 MMOL/L (ref 3.5–5.1)
PROT SERPL-MCNC: 7.1 G/DL (ref 6–8.4)
RBC # BLD AUTO: 4.56 M/UL (ref 4–5.4)
RETICS/RBC NFR AUTO: 1.8 % (ref 0.5–2.5)
SATURATED IRON: 4 % (ref 20–50)
SODIUM SERPL-SCNC: 139 MMOL/L (ref 136–145)
TOTAL IRON BINDING CAPACITY: 445 UG/DL (ref 250–450)
TRANSFERRIN SERPL-MCNC: 318 MG/DL (ref 200–375)
VIT B12 SERPL-MCNC: 179 PG/ML (ref 210–950)
WBC # BLD AUTO: 5.89 K/UL (ref 3.9–12.7)

## 2021-01-26 PROCEDURE — 36415 COLL VENOUS BLD VENIPUNCTURE: CPT

## 2021-01-26 PROCEDURE — 82728 ASSAY OF FERRITIN: CPT

## 2021-01-26 PROCEDURE — 80053 COMPREHEN METABOLIC PANEL: CPT

## 2021-01-26 PROCEDURE — 82607 VITAMIN B-12: CPT

## 2021-01-26 PROCEDURE — 83540 ASSAY OF IRON: CPT

## 2021-01-26 PROCEDURE — 85045 AUTOMATED RETICULOCYTE COUNT: CPT

## 2021-01-26 PROCEDURE — 85025 COMPLETE CBC W/AUTO DIFF WBC: CPT

## 2021-01-26 PROCEDURE — 82746 ASSAY OF FOLIC ACID SERUM: CPT

## 2021-01-26 PROCEDURE — 84165 PROTEIN E-PHORESIS SERUM: CPT

## 2021-01-26 PROCEDURE — 83021 HEMOGLOBIN CHROMOTOGRAPHY: CPT

## 2021-01-27 LAB
HEMOGLOBIN VARIANT 1: ABNORMAL
HGB A MFR BLD: 98.4 % (ref 96.4–98.8)
HGB A2 MFR BLD COLUMN CHROM: 1.6 % (ref 1.8–3.2)
HGB C MFR BLD: 0 %
HGB F MFR BLD: 0 % (ref 0–2)
HGB FRACT BLD-IMP: ABNORMAL
HGB S BLD QL SOLY: NEGATIVE
HGB S MFR BLD: 0 %

## 2021-01-28 LAB
ALBUMIN SERPL ELPH-MCNC: 3.3 G/DL (ref 2.9–4.4)
ALBUMIN/GLOB SERPL: 1.1 {RATIO} (ref 0.7–1.7)
ALPHA1 GLOB SERPL ELPH-MCNC: 0.2 G/DL (ref 0–0.4)
ALPHA2 GLOB SERPL ELPH-MCNC: 0.8 G/DL (ref 0.4–1)
B-GLOBULIN SERPL ELPH-MCNC: 1.3 G/DL (ref 0.7–1.3)
GAMMA GLOB SERPL ELPH-MCNC: 0.8 G/DL (ref 0.4–1.8)
GLOBULIN SER CALC-MCNC: 3.1 G/DL (ref 2.2–3.9)
LABORATORY COMMENT REPORT: NORMAL
M PROTEIN SERPL ELPH-MCNC: NORMAL G/DL
PROT SERPL-MCNC: 6.4 G/DL (ref 6–8.5)

## 2021-01-29 ENCOUNTER — INFUSION (OUTPATIENT)
Dept: INFUSION THERAPY | Facility: HOSPITAL | Age: 66
End: 2021-01-29
Attending: INTERNAL MEDICINE
Payer: MEDICARE

## 2021-01-29 VITALS
HEART RATE: 67 BPM | DIASTOLIC BLOOD PRESSURE: 68 MMHG | RESPIRATION RATE: 18 BRPM | WEIGHT: 225 LBS | HEIGHT: 68 IN | BODY MASS INDEX: 34.1 KG/M2 | TEMPERATURE: 98 F | OXYGEN SATURATION: 98 % | SYSTOLIC BLOOD PRESSURE: 138 MMHG

## 2021-01-29 DIAGNOSIS — K95.89 IRON DEFICIENCY ANEMIA FOLLOWING BARIATRIC SURGERY: Primary | ICD-10-CM

## 2021-01-29 DIAGNOSIS — D64.89 ANEMIA DUE TO MULTIPLE MECHANISMS: ICD-10-CM

## 2021-01-29 DIAGNOSIS — Z98.84 S/P BARIATRIC SURGERY: ICD-10-CM

## 2021-01-29 DIAGNOSIS — D50.8 IRON DEFICIENCY ANEMIA FOLLOWING BARIATRIC SURGERY: Primary | ICD-10-CM

## 2021-01-29 PROCEDURE — 96367 TX/PROPH/DG ADDL SEQ IV INF: CPT

## 2021-01-29 PROCEDURE — 25000003 PHARM REV CODE 250: Performed by: INTERNAL MEDICINE

## 2021-01-29 PROCEDURE — 63600175 PHARM REV CODE 636 W HCPCS: Mod: JG | Performed by: INTERNAL MEDICINE

## 2021-01-29 PROCEDURE — 96365 THER/PROPH/DIAG IV INF INIT: CPT

## 2021-01-29 RX ORDER — SODIUM CHLORIDE 0.9 % (FLUSH) 0.9 %
10 SYRINGE (ML) INJECTION
Status: CANCELLED | OUTPATIENT
Start: 2021-02-05

## 2021-01-29 RX ORDER — HEPARIN 100 UNIT/ML
5 SYRINGE INTRAVENOUS
Status: CANCELLED | OUTPATIENT
Start: 2021-02-05

## 2021-01-29 RX ORDER — DIPHENHYDRAMINE HYDROCHLORIDE 50 MG/ML
50 INJECTION INTRAMUSCULAR; INTRAVENOUS ONCE AS NEEDED
Status: CANCELLED | OUTPATIENT
Start: 2021-02-05

## 2021-01-29 RX ORDER — METHYLPREDNISOLONE SOD SUCC 125 MG
125 VIAL (EA) INJECTION ONCE AS NEEDED
Status: CANCELLED | OUTPATIENT
Start: 2021-02-05

## 2021-01-29 RX ORDER — SODIUM CHLORIDE 0.9 % (FLUSH) 0.9 %
10 SYRINGE (ML) INJECTION
Status: DISCONTINUED | OUTPATIENT
Start: 2021-01-29 | End: 2021-01-29 | Stop reason: HOSPADM

## 2021-01-29 RX ORDER — EPINEPHRINE 0.3 MG/.3ML
0.3 INJECTION SUBCUTANEOUS ONCE AS NEEDED
Status: CANCELLED | OUTPATIENT
Start: 2021-02-05

## 2021-01-29 RX ORDER — DIPHENHYDRAMINE HYDROCHLORIDE 50 MG/ML
25 INJECTION INTRAMUSCULAR; INTRAVENOUS
Status: CANCELLED
Start: 2021-02-05

## 2021-01-29 RX ORDER — SODIUM CHLORIDE 9 MG/ML
INJECTION, SOLUTION INTRAVENOUS CONTINUOUS
Status: CANCELLED | OUTPATIENT
Start: 2021-02-05

## 2021-01-29 RX ORDER — SODIUM CHLORIDE 9 MG/ML
INJECTION, SOLUTION INTRAVENOUS CONTINUOUS
Status: DISCONTINUED | OUTPATIENT
Start: 2021-01-29 | End: 2021-01-29 | Stop reason: HOSPADM

## 2021-01-29 RX ADMIN — FERRIC CARBOXYMALTOSE INJECTION 750 MG: 50 INJECTION, SOLUTION INTRAVENOUS at 10:01

## 2021-01-29 RX ADMIN — SODIUM CHLORIDE: 0.9 INJECTION, SOLUTION INTRAVENOUS at 10:01

## 2021-01-29 RX ADMIN — DIPHENHYDRAMINE HYDROCHLORIDE 25 MG: 50 INJECTION INTRAMUSCULAR; INTRAVENOUS at 10:01

## 2021-02-05 ENCOUNTER — INFUSION (OUTPATIENT)
Dept: INFUSION THERAPY | Facility: HOSPITAL | Age: 66
End: 2021-02-05
Attending: INTERNAL MEDICINE
Payer: MEDICARE

## 2021-02-05 VITALS
DIASTOLIC BLOOD PRESSURE: 73 MMHG | OXYGEN SATURATION: 96 % | SYSTOLIC BLOOD PRESSURE: 151 MMHG | BODY MASS INDEX: 35.56 KG/M2 | TEMPERATURE: 97 F | HEART RATE: 64 BPM | RESPIRATION RATE: 18 BRPM | WEIGHT: 233.88 LBS

## 2021-02-05 DIAGNOSIS — K95.89 IRON DEFICIENCY ANEMIA FOLLOWING BARIATRIC SURGERY: Primary | ICD-10-CM

## 2021-02-05 DIAGNOSIS — Z98.84 S/P BARIATRIC SURGERY: ICD-10-CM

## 2021-02-05 DIAGNOSIS — D64.89 ANEMIA DUE TO MULTIPLE MECHANISMS: ICD-10-CM

## 2021-02-05 DIAGNOSIS — D50.8 IRON DEFICIENCY ANEMIA FOLLOWING BARIATRIC SURGERY: Primary | ICD-10-CM

## 2021-02-05 PROCEDURE — 96365 THER/PROPH/DIAG IV INF INIT: CPT

## 2021-02-05 PROCEDURE — 25000003 PHARM REV CODE 250: Performed by: INTERNAL MEDICINE

## 2021-02-05 PROCEDURE — 96367 TX/PROPH/DG ADDL SEQ IV INF: CPT

## 2021-02-05 PROCEDURE — 63600175 PHARM REV CODE 636 W HCPCS: Performed by: INTERNAL MEDICINE

## 2021-02-05 RX ORDER — DIPHENHYDRAMINE HYDROCHLORIDE 50 MG/ML
25 INJECTION INTRAMUSCULAR; INTRAVENOUS
Status: CANCELLED
Start: 2021-02-12

## 2021-02-05 RX ORDER — DIPHENHYDRAMINE HYDROCHLORIDE 50 MG/ML
50 INJECTION INTRAMUSCULAR; INTRAVENOUS ONCE AS NEEDED
Status: CANCELLED | OUTPATIENT
Start: 2021-02-12

## 2021-02-05 RX ORDER — EPINEPHRINE 0.3 MG/.3ML
0.3 INJECTION SUBCUTANEOUS ONCE AS NEEDED
Status: CANCELLED | OUTPATIENT
Start: 2021-02-12

## 2021-02-05 RX ORDER — METHYLPREDNISOLONE SOD SUCC 125 MG
125 VIAL (EA) INJECTION ONCE AS NEEDED
Status: CANCELLED | OUTPATIENT
Start: 2021-02-12

## 2021-02-05 RX ORDER — SODIUM CHLORIDE 0.9 % (FLUSH) 0.9 %
10 SYRINGE (ML) INJECTION
Status: CANCELLED | OUTPATIENT
Start: 2021-02-12

## 2021-02-05 RX ORDER — HEPARIN 100 UNIT/ML
5 SYRINGE INTRAVENOUS
Status: CANCELLED | OUTPATIENT
Start: 2021-02-12

## 2021-02-05 RX ORDER — DIPHENHYDRAMINE HYDROCHLORIDE 50 MG/ML
25 INJECTION INTRAMUSCULAR; INTRAVENOUS
Status: DISCONTINUED | OUTPATIENT
Start: 2021-02-05 | End: 2021-02-05

## 2021-02-05 RX ORDER — SODIUM CHLORIDE 9 MG/ML
INJECTION, SOLUTION INTRAVENOUS CONTINUOUS
Status: DISCONTINUED | OUTPATIENT
Start: 2021-02-05 | End: 2021-02-05 | Stop reason: HOSPADM

## 2021-02-05 RX ORDER — SODIUM CHLORIDE 9 MG/ML
INJECTION, SOLUTION INTRAVENOUS CONTINUOUS
Status: CANCELLED | OUTPATIENT
Start: 2021-02-12

## 2021-02-05 RX ADMIN — DIPHENHYDRAMINE HYDROCHLORIDE 25 MG: 50 INJECTION INTRAMUSCULAR; INTRAVENOUS at 10:02

## 2021-02-05 RX ADMIN — FERRIC CARBOXYMALTOSE INJECTION 750 MG: 50 INJECTION, SOLUTION INTRAVENOUS at 10:02

## 2021-02-05 RX ADMIN — SODIUM CHLORIDE: 0.9 INJECTION, SOLUTION INTRAVENOUS at 10:02

## 2021-02-11 ENCOUNTER — PATIENT MESSAGE (OUTPATIENT)
Dept: FAMILY MEDICINE | Facility: CLINIC | Age: 66
End: 2021-02-11

## 2021-02-18 ENCOUNTER — HOSPITAL ENCOUNTER (EMERGENCY)
Facility: HOSPITAL | Age: 66
Discharge: HOME | End: 2021-02-18
Attending: EMERGENCY MEDICINE
Payer: MEDICARE

## 2021-02-18 VITALS
SYSTOLIC BLOOD PRESSURE: 188 MMHG | OXYGEN SATURATION: 98 % | WEIGHT: 222 LBS | DIASTOLIC BLOOD PRESSURE: 78 MMHG | BODY MASS INDEX: 33.65 KG/M2 | TEMPERATURE: 98 F | HEART RATE: 65 BPM | RESPIRATION RATE: 20 BRPM | HEIGHT: 68 IN
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
## 2021-02-18 DIAGNOSIS — T14.90XA TRAUMA: ICD-10-CM

## 2021-02-18 DIAGNOSIS — S42.211A CLOSED FRACTURE OF NECK OF RIGHT HUMERUS, INITIAL ENCOUNTER: Primary | ICD-10-CM
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
## 2021-02-18 DIAGNOSIS — S00.83XA CONTUSION OF FOREHEAD, INITIAL ENCOUNTER: ICD-10-CM

## 2021-02-18 DIAGNOSIS — S00.81XA ABRASION OF FOREHEAD, INITIAL ENCOUNTER: ICD-10-CM

## 2021-02-18 DIAGNOSIS — S09.90XA INJURY OF HEAD, INITIAL ENCOUNTER: ICD-10-CM

## 2021-02-18 DIAGNOSIS — E04.1 THYROID NODULE: ICD-10-CM

## 2021-02-18 LAB
ALBUMIN SERPL BCP-MCNC: 3.9 G/DL (ref 3.5–5.2)
ALP SERPL-CCNC: 66 U/L (ref 55–135)
ALT SERPL W/O P-5'-P-CCNC: 21 U/L (ref 10–44)
ANION GAP SERPL CALC-SCNC: 14 MMOL/L (ref 8–16)
AST SERPL-CCNC: 28 U/L (ref 10–40)
BASOPHILS # BLD AUTO: 0.05 K/UL (ref 0–0.2)
BASOPHILS NFR BLD: 0.5 % (ref 0–1.9)
BILIRUB SERPL-MCNC: 0.6 MG/DL (ref 0.1–1)
BUN SERPL-MCNC: 17 MG/DL (ref 8–23)
CALCIUM SERPL-MCNC: 9.6 MG/DL (ref 8.7–10.5)
CHLORIDE SERPL-SCNC: 97 MMOL/L (ref 95–110)
CO2 SERPL-SCNC: 29 MMOL/L (ref 23–29)
CREAT SERPL-MCNC: 0.9 MG/DL (ref 0.5–1.4)
DIFFERENTIAL METHOD: ABNORMAL
EOSINOPHIL # BLD AUTO: 0 K/UL (ref 0–0.5)
EOSINOPHIL NFR BLD: 0.2 % (ref 0–8)
ERYTHROCYTE [DISTWIDTH] IN BLOOD BY AUTOMATED COUNT: 21.9 % (ref 11.5–14.5)
EST. GFR  (AFRICAN AMERICAN): >60 ML/MIN/1.73 M^2
EST. GFR  (NON AFRICAN AMERICAN): >60 ML/MIN/1.73 M^2
GLUCOSE SERPL-MCNC: 207 MG/DL (ref 70–110)
HCT VFR BLD AUTO: 38.3 % (ref 37–48.5)
HGB BLD-MCNC: 11.6 G/DL (ref 12–16)
IMM GRANULOCYTES # BLD AUTO: 0.07 K/UL (ref 0–0.04)
IMM GRANULOCYTES NFR BLD AUTO: 0.8 % (ref 0–0.5)
LYMPHOCYTES # BLD AUTO: 0.8 K/UL (ref 1–4.8)
LYMPHOCYTES NFR BLD: 9.1 % (ref 18–48)
MCH RBC QN AUTO: 23.2 PG (ref 27–31)
MCHC RBC AUTO-ENTMCNC: 30.3 G/DL (ref 32–36)
MCV RBC AUTO: 77 FL (ref 82–98)
MONOCYTES # BLD AUTO: 0.3 K/UL (ref 0.3–1)
MONOCYTES NFR BLD: 3.6 % (ref 4–15)
NEUTROPHILS # BLD AUTO: 7.9 K/UL (ref 1.8–7.7)
NEUTROPHILS NFR BLD: 85.8 % (ref 38–73)
NRBC BLD-RTO: 0 /100 WBC
PLATELET # BLD AUTO: 318 K/UL (ref 150–350)
PMV BLD AUTO: 10.5 FL (ref 9.2–12.9)
POTASSIUM SERPL-SCNC: 3 MMOL/L (ref 3.5–5.1)
PROT SERPL-MCNC: 7.6 G/DL (ref 6–8.4)
RBC # BLD AUTO: 4.99 M/UL (ref 4–5.4)
SODIUM SERPL-SCNC: 140 MMOL/L (ref 136–145)
WBC # BLD AUTO: 9.15 K/UL (ref 3.9–12.7)

## 2021-02-18 PROCEDURE — 96375 TX/PRO/DX INJ NEW DRUG ADDON: CPT | Mod: 59

## 2021-02-18 PROCEDURE — 80053 COMPREHEN METABOLIC PANEL: CPT

## 2021-02-18 PROCEDURE — 25000003 PHARM REV CODE 250: Performed by: EMERGENCY MEDICINE

## 2021-02-18 PROCEDURE — 85025 COMPLETE CBC W/AUTO DIFF WBC: CPT

## 2021-02-18 PROCEDURE — 29105 APPLICATION LONG ARM SPLINT: CPT | Mod: RT

## 2021-02-18 PROCEDURE — 99284 EMERGENCY DEPT VISIT MOD MDM: CPT | Mod: 25

## 2021-02-18 PROCEDURE — 96374 THER/PROPH/DIAG INJ IV PUSH: CPT | Mod: 59

## 2021-02-18 PROCEDURE — 96376 TX/PRO/DX INJ SAME DRUG ADON: CPT

## 2021-02-18 PROCEDURE — 63600175 PHARM REV CODE 636 W HCPCS: Performed by: EMERGENCY MEDICINE

## 2021-02-18 PROCEDURE — 90715 TDAP VACCINE 7 YRS/> IM: CPT | Performed by: EMERGENCY MEDICINE

## 2021-02-18 PROCEDURE — 90471 IMMUNIZATION ADMIN: CPT | Performed by: EMERGENCY MEDICINE

## 2021-02-18 RX ORDER — MORPHINE SULFATE 2 MG/ML
2 INJECTION, SOLUTION INTRAMUSCULAR; INTRAVENOUS
Status: COMPLETED | OUTPATIENT
Start: 2021-02-18 | End: 2021-02-18

## 2021-02-18 RX ORDER — MORPHINE SULFATE 4 MG/ML
4 INJECTION, SOLUTION INTRAMUSCULAR; INTRAVENOUS
Status: COMPLETED | OUTPATIENT
Start: 2021-02-18 | End: 2021-02-18

## 2021-02-18 RX ORDER — METHOCARBAMOL 500 MG/1
1000 TABLET, FILM COATED ORAL 3 TIMES DAILY
Qty: 30 TABLET | Refills: 0 | Status: SHIPPED | OUTPATIENT
Start: 2021-02-18 | End: 2021-02-23

## 2021-02-18 RX ORDER — ONDANSETRON 2 MG/ML
4 INJECTION INTRAMUSCULAR; INTRAVENOUS
Status: COMPLETED | OUTPATIENT
Start: 2021-02-18 | End: 2021-02-18

## 2021-02-18 RX ORDER — HYDROCODONE BITARTRATE AND ACETAMINOPHEN 5; 325 MG/1; MG/1
1 TABLET ORAL EVERY 6 HOURS PRN
Qty: 12 TABLET | Refills: 0 | Status: SHIPPED | OUTPATIENT
Start: 2021-02-18 | End: 2021-03-02

## 2021-02-18 RX ADMIN — ONDANSETRON 4 MG: 2 INJECTION INTRAMUSCULAR; INTRAVENOUS at 03:02

## 2021-02-18 RX ADMIN — POTASSIUM BICARBONATE 50 MEQ: 977.5 TABLET, EFFERVESCENT ORAL at 05:02

## 2021-02-18 RX ADMIN — CLOSTRIDIUM TETANI TOXOID ANTIGEN (FORMALDEHYDE INACTIVATED), CORYNEBACTERIUM DIPHTHERIAE TOXOID ANTIGEN (FORMALDEHYDE INACTIVATED), BORDETELLA PERTUSSIS TOXOID ANTIGEN (GLUTARALDEHYDE INACTIVATED), BORDETELLA PERTUSSIS FILAMENTOUS HEMAGGLUTININ ANTIGEN (FORMALDEHYDE INACTIVATED), BORDETELLA PERTUSSIS PERTACTIN ANTIGEN, AND BORDETELLA PERTUSSIS FIMBRIAE 2/3 ANTIGEN 0.5 ML: 5; 2; 2.5; 5; 3; 5 INJECTION, SUSPENSION INTRAMUSCULAR at 03:02

## 2021-02-18 RX ADMIN — MORPHINE SULFATE 2 MG: 2 INJECTION, SOLUTION INTRAMUSCULAR; INTRAVENOUS at 03:02

## 2021-02-18 RX ADMIN — MORPHINE SULFATE 4 MG: 4 INJECTION INTRAVENOUS at 04:02

## 2021-02-18 RX ADMIN — MORPHINE SULFATE 4 MG: 4 INJECTION INTRAVENOUS at 05:02

## 2021-02-22 ENCOUNTER — TELEPHONE (OUTPATIENT)
Dept: FAMILY MEDICINE | Facility: CLINIC | Age: 66
End: 2021-02-22

## 2021-02-22 DIAGNOSIS — N39.0 COMPLICATED UTI (URINARY TRACT INFECTION): ICD-10-CM

## 2021-02-22 DIAGNOSIS — N39.0 COMPLICATED UTI (URINARY TRACT INFECTION): Primary | ICD-10-CM

## 2021-02-22 RX ORDER — CIPROFLOXACIN 500 MG/1
500 TABLET ORAL 2 TIMES DAILY
Qty: 10 TABLET | Refills: 0 | Status: SHIPPED | OUTPATIENT
Start: 2021-02-22 | End: 2021-02-22 | Stop reason: SDUPTHER

## 2021-02-23 RX ORDER — CIPROFLOXACIN 500 MG/1
500 TABLET ORAL 2 TIMES DAILY
Qty: 10 TABLET | Refills: 0 | Status: SHIPPED | OUTPATIENT
Start: 2021-02-23 | End: 2021-02-28

## 2021-02-24 DIAGNOSIS — M25.511 PAIN IN RIGHT SHOULDER: Primary | ICD-10-CM

## 2021-02-25 ENCOUNTER — PATIENT MESSAGE (OUTPATIENT)
Dept: FAMILY MEDICINE | Facility: CLINIC | Age: 66
End: 2021-02-25

## 2021-02-25 ENCOUNTER — TELEPHONE (OUTPATIENT)
Dept: FAMILY MEDICINE | Facility: CLINIC | Age: 66
End: 2021-02-25

## 2021-03-02 ENCOUNTER — HOSPITAL ENCOUNTER (OUTPATIENT)
Dept: RADIOLOGY | Facility: HOSPITAL | Age: 66
Discharge: HOME | End: 2021-03-02
Attending: ORTHOPAEDIC SURGERY
Payer: MEDICARE

## 2021-03-02 ENCOUNTER — OFFICE VISIT (OUTPATIENT)
Dept: FAMILY MEDICINE | Facility: CLINIC | Age: 66
End: 2021-03-02
Payer: MEDICARE

## 2021-03-02 VITALS
SYSTOLIC BLOOD PRESSURE: 146 MMHG | BODY MASS INDEX: 33.61 KG/M2 | OXYGEN SATURATION: 96 % | TEMPERATURE: 98 F | DIASTOLIC BLOOD PRESSURE: 60 MMHG | RESPIRATION RATE: 20 BRPM | HEART RATE: 72 BPM | WEIGHT: 221.06 LBS

## 2021-03-02 DIAGNOSIS — E78.2 MIXED HYPERLIPIDEMIA: ICD-10-CM

## 2021-03-02 DIAGNOSIS — Z79.4 TYPE 2 DIABETES MELLITUS TREATED WITH INSULIN: ICD-10-CM

## 2021-03-02 DIAGNOSIS — I10 BENIGN ESSENTIAL HYPERTENSION: Primary | ICD-10-CM

## 2021-03-02 DIAGNOSIS — K95.89 IRON DEFICIENCY ANEMIA FOLLOWING BARIATRIC SURGERY: ICD-10-CM

## 2021-03-02 DIAGNOSIS — S42.211G: ICD-10-CM

## 2021-03-02 DIAGNOSIS — M25.511 PAIN IN RIGHT SHOULDER: ICD-10-CM

## 2021-03-02 DIAGNOSIS — D50.8 IRON DEFICIENCY ANEMIA FOLLOWING BARIATRIC SURGERY: ICD-10-CM

## 2021-03-02 DIAGNOSIS — D50.9 MICROCYTIC ANEMIA: ICD-10-CM

## 2021-03-02 DIAGNOSIS — E11.9 TYPE 2 DIABETES MELLITUS TREATED WITH INSULIN: ICD-10-CM

## 2021-03-02 DIAGNOSIS — E04.1 RIGHT THYROID NODULE: ICD-10-CM

## 2021-03-02 PROCEDURE — 99214 OFFICE O/P EST MOD 30 MIN: CPT | Mod: S$GLB,,, | Performed by: INTERNAL MEDICINE

## 2021-03-02 PROCEDURE — 73200 CT UPPER EXTREMITY W/O DYE: CPT | Mod: TC,PO,RT

## 2021-03-02 PROCEDURE — 99214 PR OFFICE/OUTPT VISIT, EST, LEVL IV, 30-39 MIN: ICD-10-PCS | Mod: S$GLB,,, | Performed by: INTERNAL MEDICINE

## 2021-03-02 RX ORDER — OXYCODONE AND ACETAMINOPHEN 5; 325 MG/1; MG/1
1 TABLET ORAL EVERY 8 HOURS PRN
COMMUNITY
Start: 2021-02-23 | End: 2021-07-16

## 2021-03-02 RX ORDER — INSULIN GLARGINE 100 [IU]/ML
50 INJECTION, SOLUTION SUBCUTANEOUS DAILY
Qty: 9 SYRINGE | Refills: 2 | Status: SHIPPED | OUTPATIENT
Start: 2021-03-02 | End: 2021-08-12 | Stop reason: SDUPTHER

## 2021-03-08 ENCOUNTER — TELEPHONE (OUTPATIENT)
Dept: HEMATOLOGY/ONCOLOGY | Facility: CLINIC | Age: 66
End: 2021-03-08

## 2021-03-09 ENCOUNTER — TELEPHONE (OUTPATIENT)
Dept: HEMATOLOGY/ONCOLOGY | Facility: CLINIC | Age: 66
End: 2021-03-09

## 2021-03-30 ENCOUNTER — LAB VISIT (OUTPATIENT)
Dept: LAB | Facility: HOSPITAL | Age: 66
End: 2021-03-30
Attending: INTERNAL MEDICINE
Payer: MEDICARE

## 2021-03-30 DIAGNOSIS — D50.9 MICROCYTIC ANEMIA: ICD-10-CM

## 2021-03-30 DIAGNOSIS — I10 BENIGN ESSENTIAL HYPERTENSION: ICD-10-CM

## 2021-03-30 DIAGNOSIS — D64.89 ANEMIA DUE TO MULTIPLE MECHANISMS: ICD-10-CM

## 2021-03-30 DIAGNOSIS — E04.1 RIGHT THYROID NODULE: ICD-10-CM

## 2021-03-30 DIAGNOSIS — D63.8 ANEMIA, CHRONIC DISEASE: ICD-10-CM

## 2021-03-30 DIAGNOSIS — K95.89 IRON DEFICIENCY ANEMIA FOLLOWING BARIATRIC SURGERY: ICD-10-CM

## 2021-03-30 DIAGNOSIS — D50.8 IRON DEFICIENCY ANEMIA FOLLOWING BARIATRIC SURGERY: ICD-10-CM

## 2021-03-30 DIAGNOSIS — D53.9 NUTRITIONAL ANEMIA, UNSPECIFIED: ICD-10-CM

## 2021-03-30 LAB
ALBUMIN SERPL BCP-MCNC: 3.4 G/DL (ref 3.5–5.2)
ALP SERPL-CCNC: 112 U/L (ref 55–135)
ALT SERPL W/O P-5'-P-CCNC: 17 U/L (ref 10–44)
ANION GAP SERPL CALC-SCNC: 12 MMOL/L (ref 8–16)
ANION GAP SERPL CALC-SCNC: 12 MMOL/L (ref 8–16)
AST SERPL-CCNC: 19 U/L (ref 10–40)
BASOPHILS # BLD AUTO: 0.1 K/UL (ref 0–0.2)
BASOPHILS # BLD AUTO: 0.1 K/UL (ref 0–0.2)
BASOPHILS NFR BLD: 1.6 % (ref 0–1.9)
BASOPHILS NFR BLD: 1.6 % (ref 0–1.9)
BILIRUB SERPL-MCNC: 0.5 MG/DL (ref 0.1–1)
BUN SERPL-MCNC: 25 MG/DL (ref 8–23)
BUN SERPL-MCNC: 25 MG/DL (ref 8–23)
CALCIUM SERPL-MCNC: 10.1 MG/DL (ref 8.7–10.5)
CALCIUM SERPL-MCNC: 10.1 MG/DL (ref 8.7–10.5)
CHLORIDE SERPL-SCNC: 99 MMOL/L (ref 95–110)
CHLORIDE SERPL-SCNC: 99 MMOL/L (ref 95–110)
CO2 SERPL-SCNC: 33 MMOL/L (ref 23–29)
CO2 SERPL-SCNC: 33 MMOL/L (ref 23–29)
CREAT SERPL-MCNC: 0.8 MG/DL (ref 0.5–1.4)
CREAT SERPL-MCNC: 0.8 MG/DL (ref 0.5–1.4)
DIFFERENTIAL METHOD: ABNORMAL
DIFFERENTIAL METHOD: ABNORMAL
EOSINOPHIL # BLD AUTO: 0.2 K/UL (ref 0–0.5)
EOSINOPHIL # BLD AUTO: 0.2 K/UL (ref 0–0.5)
EOSINOPHIL NFR BLD: 3 % (ref 0–8)
EOSINOPHIL NFR BLD: 3 % (ref 0–8)
ERYTHROCYTE [DISTWIDTH] IN BLOOD BY AUTOMATED COUNT: 20.1 % (ref 11.5–14.5)
ERYTHROCYTE [DISTWIDTH] IN BLOOD BY AUTOMATED COUNT: 20.1 % (ref 11.5–14.5)
EST. GFR  (AFRICAN AMERICAN): >60 ML/MIN/1.73 M^2
EST. GFR  (AFRICAN AMERICAN): >60 ML/MIN/1.73 M^2
EST. GFR  (NON AFRICAN AMERICAN): >60 ML/MIN/1.73 M^2
EST. GFR  (NON AFRICAN AMERICAN): >60 ML/MIN/1.73 M^2
FERRITIN SERPL-MCNC: 97 NG/ML (ref 20–300)
FOLATE SERPL-MCNC: 17.5 NG/ML (ref 4–24)
GLUCOSE SERPL-MCNC: 167 MG/DL (ref 70–110)
GLUCOSE SERPL-MCNC: 167 MG/DL (ref 70–110)
HCT VFR BLD AUTO: 34.4 % (ref 37–48.5)
HCT VFR BLD AUTO: 34.4 % (ref 37–48.5)
HGB BLD-MCNC: 10.5 G/DL (ref 12–16)
HGB BLD-MCNC: 10.5 G/DL (ref 12–16)
IMM GRANULOCYTES # BLD AUTO: 0.05 K/UL (ref 0–0.04)
IMM GRANULOCYTES # BLD AUTO: 0.05 K/UL (ref 0–0.04)
IMM GRANULOCYTES NFR BLD AUTO: 0.8 % (ref 0–0.5)
IMM GRANULOCYTES NFR BLD AUTO: 0.8 % (ref 0–0.5)
IRON SERPL-MCNC: 41 UG/DL (ref 30–160)
LYMPHOCYTES # BLD AUTO: 1.4 K/UL (ref 1–4.8)
LYMPHOCYTES # BLD AUTO: 1.4 K/UL (ref 1–4.8)
LYMPHOCYTES NFR BLD: 21.6 % (ref 18–48)
LYMPHOCYTES NFR BLD: 21.6 % (ref 18–48)
MCH RBC QN AUTO: 25.5 PG (ref 27–31)
MCH RBC QN AUTO: 25.5 PG (ref 27–31)
MCHC RBC AUTO-ENTMCNC: 30.5 G/DL (ref 32–36)
MCHC RBC AUTO-ENTMCNC: 30.5 G/DL (ref 32–36)
MCV RBC AUTO: 84 FL (ref 82–98)
MCV RBC AUTO: 84 FL (ref 82–98)
MONOCYTES # BLD AUTO: 0.4 K/UL (ref 0.3–1)
MONOCYTES # BLD AUTO: 0.4 K/UL (ref 0.3–1)
MONOCYTES NFR BLD: 6.3 % (ref 4–15)
MONOCYTES NFR BLD: 6.3 % (ref 4–15)
NEUTROPHILS # BLD AUTO: 4.2 K/UL (ref 1.8–7.7)
NEUTROPHILS # BLD AUTO: 4.2 K/UL (ref 1.8–7.7)
NEUTROPHILS NFR BLD: 66.7 % (ref 38–73)
NEUTROPHILS NFR BLD: 66.7 % (ref 38–73)
NRBC BLD-RTO: 0 /100 WBC
NRBC BLD-RTO: 0 /100 WBC
PLATELET # BLD AUTO: 445 K/UL (ref 150–450)
PLATELET # BLD AUTO: 445 K/UL (ref 150–450)
PMV BLD AUTO: 10.6 FL (ref 9.2–12.9)
PMV BLD AUTO: 10.6 FL (ref 9.2–12.9)
POTASSIUM SERPL-SCNC: 3.9 MMOL/L (ref 3.5–5.1)
POTASSIUM SERPL-SCNC: 3.9 MMOL/L (ref 3.5–5.1)
PROT SERPL-MCNC: 6.9 G/DL (ref 6–8.4)
RBC # BLD AUTO: 4.11 M/UL (ref 4–5.4)
RBC # BLD AUTO: 4.11 M/UL (ref 4–5.4)
SATURATED IRON: 13 % (ref 20–50)
SODIUM SERPL-SCNC: 144 MMOL/L (ref 136–145)
SODIUM SERPL-SCNC: 144 MMOL/L (ref 136–145)
TOTAL IRON BINDING CAPACITY: 328 UG/DL (ref 250–450)
TRANSFERRIN SERPL-MCNC: 234 MG/DL (ref 200–375)
TSH SERPL DL<=0.005 MIU/L-ACNC: 1.18 UIU/ML (ref 0.34–5.6)
VIT B12 SERPL-MCNC: 450 PG/ML (ref 210–950)
WBC # BLD AUTO: 6.31 K/UL (ref 3.9–12.7)
WBC # BLD AUTO: 6.31 K/UL (ref 3.9–12.7)

## 2021-03-30 PROCEDURE — 84443 ASSAY THYROID STIM HORMONE: CPT | Performed by: INTERNAL MEDICINE

## 2021-03-30 PROCEDURE — 82607 VITAMIN B-12: CPT | Performed by: INTERNAL MEDICINE

## 2021-03-30 PROCEDURE — 36415 COLL VENOUS BLD VENIPUNCTURE: CPT | Performed by: INTERNAL MEDICINE

## 2021-03-30 PROCEDURE — 80053 COMPREHEN METABOLIC PANEL: CPT | Performed by: INTERNAL MEDICINE

## 2021-03-30 PROCEDURE — 85025 COMPLETE CBC W/AUTO DIFF WBC: CPT | Performed by: INTERNAL MEDICINE

## 2021-03-30 PROCEDURE — 82746 ASSAY OF FOLIC ACID SERUM: CPT | Performed by: INTERNAL MEDICINE

## 2021-03-30 PROCEDURE — 82728 ASSAY OF FERRITIN: CPT | Performed by: INTERNAL MEDICINE

## 2021-03-30 PROCEDURE — 83540 ASSAY OF IRON: CPT | Performed by: INTERNAL MEDICINE

## 2021-04-05 ENCOUNTER — OFFICE VISIT (OUTPATIENT)
Dept: HEMATOLOGY/ONCOLOGY | Facility: CLINIC | Age: 66
End: 2021-04-05
Payer: MEDICARE

## 2021-04-05 VITALS
HEART RATE: 76 BPM | DIASTOLIC BLOOD PRESSURE: 80 MMHG | RESPIRATION RATE: 18 BRPM | BODY MASS INDEX: 34.35 KG/M2 | SYSTOLIC BLOOD PRESSURE: 133 MMHG | WEIGHT: 225.88 LBS | TEMPERATURE: 98 F

## 2021-04-05 DIAGNOSIS — D50.9 MICROCYTIC ANEMIA: ICD-10-CM

## 2021-04-05 DIAGNOSIS — D75.838 SECONDARY THROMBOCYTOSIS: ICD-10-CM

## 2021-04-05 DIAGNOSIS — Z98.84 S/P BARIATRIC SURGERY: ICD-10-CM

## 2021-04-05 DIAGNOSIS — D50.8 IRON DEFICIENCY ANEMIA FOLLOWING BARIATRIC SURGERY: ICD-10-CM

## 2021-04-05 DIAGNOSIS — D63.8 ANEMIA, CHRONIC DISEASE: ICD-10-CM

## 2021-04-05 DIAGNOSIS — D64.89 ANEMIA DUE TO MULTIPLE MECHANISMS: Primary | ICD-10-CM

## 2021-04-05 DIAGNOSIS — K95.89 IRON DEFICIENCY ANEMIA FOLLOWING BARIATRIC SURGERY: ICD-10-CM

## 2021-04-05 PROCEDURE — 99215 OFFICE O/P EST HI 40 MIN: CPT | Mod: S$GLB,,, | Performed by: INTERNAL MEDICINE

## 2021-04-05 PROCEDURE — 99215 PR OFFICE/OUTPT VISIT, EST, LEVL V, 40-54 MIN: ICD-10-PCS | Mod: S$GLB,,, | Performed by: INTERNAL MEDICINE

## 2021-04-05 RX ORDER — IRON,CARBONYL/ASCORBIC ACID 100-250 MG
1 TABLET ORAL DAILY
Qty: 30 EACH | Refills: 6 | Status: SHIPPED | OUTPATIENT
Start: 2021-04-05 | End: 2021-11-17

## 2021-04-29 ENCOUNTER — PATIENT MESSAGE (OUTPATIENT)
Dept: RESEARCH | Facility: HOSPITAL | Age: 66
End: 2021-04-29

## 2021-05-04 ENCOUNTER — HOSPITAL ENCOUNTER (OUTPATIENT)
Dept: RADIOLOGY | Facility: HOSPITAL | Age: 66
Discharge: HOME | End: 2021-05-04
Attending: INTERNAL MEDICINE
Payer: MEDICARE

## 2021-05-04 ENCOUNTER — TELEPHONE (OUTPATIENT)
Dept: PEDIATRICS | Facility: CLINIC | Age: 66
End: 2021-05-04

## 2021-05-04 ENCOUNTER — OFFICE VISIT (OUTPATIENT)
Dept: RHEUMATOLOGY | Facility: CLINIC | Age: 66
End: 2021-05-04
Payer: MEDICARE

## 2021-05-04 ENCOUNTER — PATIENT MESSAGE (OUTPATIENT)
Dept: PEDIATRICS | Facility: CLINIC | Age: 66
End: 2021-05-04

## 2021-05-04 ENCOUNTER — TELEPHONE (OUTPATIENT)
Dept: FAMILY MEDICINE | Facility: CLINIC | Age: 66
End: 2021-05-04

## 2021-05-04 VITALS
SYSTOLIC BLOOD PRESSURE: 135 MMHG | DIASTOLIC BLOOD PRESSURE: 78 MMHG | BODY MASS INDEX: 33.74 KG/M2 | WEIGHT: 221.88 LBS

## 2021-05-04 DIAGNOSIS — E04.2 MULTINODULAR NON-TOXIC GOITER: Primary | ICD-10-CM

## 2021-05-04 DIAGNOSIS — M19.91 PRIMARY OSTEOARTHRITIS, UNSPECIFIED SITE: Primary | ICD-10-CM

## 2021-05-04 DIAGNOSIS — E04.1 RIGHT THYROID NODULE: ICD-10-CM

## 2021-05-04 PROCEDURE — 76536 US EXAM OF HEAD AND NECK: CPT | Mod: TC,PO

## 2021-05-04 PROCEDURE — 99213 OFFICE O/P EST LOW 20 MIN: CPT | Mod: S$GLB,,, | Performed by: INTERNAL MEDICINE

## 2021-05-04 PROCEDURE — 99213 PR OFFICE/OUTPT VISIT, EST, LEVL III, 20-29 MIN: ICD-10-PCS | Mod: S$GLB,,, | Performed by: INTERNAL MEDICINE

## 2021-05-04 RX ORDER — POTASSIUM CHLORIDE 750 MG/1
10 TABLET, EXTENDED RELEASE ORAL 2 TIMES DAILY
Qty: 10 TABLET | Refills: 0 | Status: SHIPPED | OUTPATIENT
Start: 2021-05-04 | End: 2021-05-27 | Stop reason: SDUPTHER

## 2021-05-04 RX ORDER — DULOXETIN HYDROCHLORIDE 60 MG/1
60 CAPSULE, DELAYED RELEASE ORAL DAILY
Qty: 30 CAPSULE | Refills: 3 | Status: SHIPPED | OUTPATIENT
Start: 2021-05-04 | End: 2021-09-06 | Stop reason: SDUPTHER

## 2021-05-04 RX ORDER — NYSTATIN 100000 [USP'U]/G
POWDER TOPICAL
COMMUNITY
Start: 2021-04-29 | End: 2023-09-06

## 2021-05-04 RX ORDER — HYDROCODONE BITARTRATE AND ACETAMINOPHEN 5; 325 MG/1; MG/1
TABLET ORAL
COMMUNITY
Start: 2021-04-27 | End: 2021-07-16

## 2021-05-06 ENCOUNTER — PATIENT MESSAGE (OUTPATIENT)
Dept: PEDIATRICS | Facility: CLINIC | Age: 66
End: 2021-05-06

## 2021-05-06 ENCOUNTER — TELEPHONE (OUTPATIENT)
Dept: PEDIATRICS | Facility: CLINIC | Age: 66
End: 2021-05-06

## 2021-05-06 DIAGNOSIS — E87.6 HYPOKALEMIA: Primary | ICD-10-CM

## 2021-05-10 ENCOUNTER — PATIENT MESSAGE (OUTPATIENT)
Dept: FAMILY MEDICINE | Facility: CLINIC | Age: 66
End: 2021-05-10

## 2021-05-10 DIAGNOSIS — E04.2 MULTINODULAR NON-TOXIC GOITER: Primary | ICD-10-CM

## 2021-05-18 DIAGNOSIS — Z96.611 PRESENCE OF RIGHT ARTIFICIAL SHOULDER JOINT: Primary | ICD-10-CM

## 2021-05-24 ENCOUNTER — LAB VISIT (OUTPATIENT)
Dept: LAB | Facility: HOSPITAL | Age: 66
End: 2021-05-24
Attending: INTERNAL MEDICINE
Payer: MEDICARE

## 2021-05-24 DIAGNOSIS — D53.9 NUTRITIONAL ANEMIA, UNSPECIFIED: ICD-10-CM

## 2021-05-24 DIAGNOSIS — D64.89 ANEMIA DUE TO MULTIPLE MECHANISMS: ICD-10-CM

## 2021-05-24 DIAGNOSIS — D50.9 MICROCYTIC ANEMIA: ICD-10-CM

## 2021-05-24 DIAGNOSIS — E87.6 HYPOKALEMIA: ICD-10-CM

## 2021-05-24 DIAGNOSIS — D63.8 ANEMIA, CHRONIC DISEASE: ICD-10-CM

## 2021-05-24 LAB
ALBUMIN SERPL BCP-MCNC: 3.8 G/DL (ref 3.5–5.2)
ALP SERPL-CCNC: 78 U/L (ref 55–135)
ALT SERPL W/O P-5'-P-CCNC: 24 U/L (ref 10–44)
ANION GAP SERPL CALC-SCNC: 9 MMOL/L (ref 8–16)
ANION GAP SERPL CALC-SCNC: 9 MMOL/L (ref 8–16)
AST SERPL-CCNC: 20 U/L (ref 10–40)
BASOPHILS # BLD AUTO: 0.05 K/UL (ref 0–0.2)
BASOPHILS NFR BLD: 0.8 % (ref 0–1.9)
BILIRUB SERPL-MCNC: 0.6 MG/DL (ref 0.1–1)
BUN SERPL-MCNC: 22 MG/DL (ref 8–23)
BUN SERPL-MCNC: 22 MG/DL (ref 8–23)
CALCIUM SERPL-MCNC: 10 MG/DL (ref 8.7–10.5)
CALCIUM SERPL-MCNC: 10 MG/DL (ref 8.7–10.5)
CHLORIDE SERPL-SCNC: 98 MMOL/L (ref 95–110)
CHLORIDE SERPL-SCNC: 98 MMOL/L (ref 95–110)
CO2 SERPL-SCNC: 32 MMOL/L (ref 23–29)
CO2 SERPL-SCNC: 32 MMOL/L (ref 23–29)
CREAT SERPL-MCNC: 0.7 MG/DL (ref 0.5–1.4)
CREAT SERPL-MCNC: 0.7 MG/DL (ref 0.5–1.4)
DIFFERENTIAL METHOD: ABNORMAL
EOSINOPHIL # BLD AUTO: 0.1 K/UL (ref 0–0.5)
EOSINOPHIL NFR BLD: 1.8 % (ref 0–8)
ERYTHROCYTE [DISTWIDTH] IN BLOOD BY AUTOMATED COUNT: 14.2 % (ref 11.5–14.5)
EST. GFR  (AFRICAN AMERICAN): >60 ML/MIN/1.73 M^2
EST. GFR  (AFRICAN AMERICAN): >60 ML/MIN/1.73 M^2
EST. GFR  (NON AFRICAN AMERICAN): >60 ML/MIN/1.73 M^2
EST. GFR  (NON AFRICAN AMERICAN): >60 ML/MIN/1.73 M^2
FERRITIN SERPL-MCNC: 58 NG/ML (ref 20–300)
FOLATE SERPL-MCNC: 19.1 NG/ML (ref 4–24)
GLUCOSE SERPL-MCNC: 147 MG/DL (ref 70–110)
GLUCOSE SERPL-MCNC: 147 MG/DL (ref 70–110)
HCT VFR BLD AUTO: 37.7 % (ref 37–48.5)
HGB BLD-MCNC: 11.9 G/DL (ref 12–16)
IMM GRANULOCYTES # BLD AUTO: 0.01 K/UL (ref 0–0.04)
IMM GRANULOCYTES NFR BLD AUTO: 0.2 % (ref 0–0.5)
IRON SERPL-MCNC: 57 UG/DL (ref 30–160)
LYMPHOCYTES # BLD AUTO: 1.8 K/UL (ref 1–4.8)
LYMPHOCYTES NFR BLD: 29.9 % (ref 18–48)
MCH RBC QN AUTO: 26.4 PG (ref 27–31)
MCHC RBC AUTO-ENTMCNC: 31.6 G/DL (ref 32–36)
MCV RBC AUTO: 84 FL (ref 82–98)
MONOCYTES # BLD AUTO: 0.5 K/UL (ref 0.3–1)
MONOCYTES NFR BLD: 7.6 % (ref 4–15)
NEUTROPHILS # BLD AUTO: 3.6 K/UL (ref 1.8–7.7)
NEUTROPHILS NFR BLD: 59.7 % (ref 38–73)
NRBC BLD-RTO: 0 /100 WBC
PLATELET # BLD AUTO: 253 K/UL (ref 150–450)
PMV BLD AUTO: 11 FL (ref 9.2–12.9)
POTASSIUM SERPL-SCNC: 3.2 MMOL/L (ref 3.5–5.1)
POTASSIUM SERPL-SCNC: 3.2 MMOL/L (ref 3.5–5.1)
PROT SERPL-MCNC: 6.9 G/DL (ref 6–8.4)
RBC # BLD AUTO: 4.51 M/UL (ref 4–5.4)
SATURATED IRON: 17 % (ref 20–50)
SODIUM SERPL-SCNC: 139 MMOL/L (ref 136–145)
SODIUM SERPL-SCNC: 139 MMOL/L (ref 136–145)
TOTAL IRON BINDING CAPACITY: 329 UG/DL (ref 250–450)
TRANSFERRIN SERPL-MCNC: 235 MG/DL (ref 200–375)
VIT B12 SERPL-MCNC: 241 PG/ML (ref 210–950)
WBC # BLD AUTO: 6.03 K/UL (ref 3.9–12.7)

## 2021-05-24 PROCEDURE — 82746 ASSAY OF FOLIC ACID SERUM: CPT | Performed by: INTERNAL MEDICINE

## 2021-05-24 PROCEDURE — 82607 VITAMIN B-12: CPT | Performed by: INTERNAL MEDICINE

## 2021-05-24 PROCEDURE — 82728 ASSAY OF FERRITIN: CPT | Performed by: INTERNAL MEDICINE

## 2021-05-24 PROCEDURE — 36415 COLL VENOUS BLD VENIPUNCTURE: CPT | Performed by: INTERNAL MEDICINE

## 2021-05-24 PROCEDURE — 80053 COMPREHEN METABOLIC PANEL: CPT | Performed by: INTERNAL MEDICINE

## 2021-05-24 PROCEDURE — 83540 ASSAY OF IRON: CPT | Performed by: INTERNAL MEDICINE

## 2021-05-24 PROCEDURE — 85025 COMPLETE CBC W/AUTO DIFF WBC: CPT | Performed by: INTERNAL MEDICINE

## 2021-05-27 ENCOUNTER — PATIENT MESSAGE (OUTPATIENT)
Dept: FAMILY MEDICINE | Facility: CLINIC | Age: 66
End: 2021-05-27

## 2021-05-27 DIAGNOSIS — E87.6 HYPOKALEMIA: Primary | ICD-10-CM

## 2021-05-27 RX ORDER — POTASSIUM CHLORIDE 20 MEQ/1
20 TABLET, EXTENDED RELEASE ORAL DAILY
Qty: 30 TABLET | Refills: 1 | Status: SHIPPED | OUTPATIENT
Start: 2021-05-27 | End: 2021-06-26

## 2021-05-28 ENCOUNTER — PATIENT MESSAGE (OUTPATIENT)
Dept: FAMILY MEDICINE | Facility: CLINIC | Age: 66
End: 2021-05-28

## 2021-06-01 ENCOUNTER — OFFICE VISIT (OUTPATIENT)
Dept: HEMATOLOGY/ONCOLOGY | Facility: CLINIC | Age: 66
End: 2021-06-01
Payer: MEDICARE

## 2021-06-01 VITALS
HEART RATE: 58 BPM | TEMPERATURE: 98 F | SYSTOLIC BLOOD PRESSURE: 146 MMHG | RESPIRATION RATE: 18 BRPM | BODY MASS INDEX: 33.1 KG/M2 | WEIGHT: 217.69 LBS | DIASTOLIC BLOOD PRESSURE: 70 MMHG

## 2021-06-01 DIAGNOSIS — E53.8 B12 DEFICIENCY: ICD-10-CM

## 2021-06-01 DIAGNOSIS — D63.8 ANEMIA, CHRONIC DISEASE: ICD-10-CM

## 2021-06-01 DIAGNOSIS — K95.89 IRON DEFICIENCY ANEMIA FOLLOWING BARIATRIC SURGERY: ICD-10-CM

## 2021-06-01 DIAGNOSIS — D75.838 SECONDARY THROMBOCYTOSIS: Primary | ICD-10-CM

## 2021-06-01 DIAGNOSIS — D50.9 MICROCYTIC ANEMIA: ICD-10-CM

## 2021-06-01 DIAGNOSIS — D50.8 IRON DEFICIENCY ANEMIA FOLLOWING BARIATRIC SURGERY: ICD-10-CM

## 2021-06-01 DIAGNOSIS — D64.89 ANEMIA DUE TO MULTIPLE MECHANISMS: ICD-10-CM

## 2021-06-01 DIAGNOSIS — Z98.84 S/P BARIATRIC SURGERY: ICD-10-CM

## 2021-06-01 PROCEDURE — 99213 OFFICE O/P EST LOW 20 MIN: CPT | Mod: 25,S$GLB,, | Performed by: INTERNAL MEDICINE

## 2021-06-01 PROCEDURE — 96372 PR INJECTION,THERAP/PROPH/DIAG2ST, IM OR SUBCUT: ICD-10-PCS | Mod: S$GLB,,, | Performed by: INTERNAL MEDICINE

## 2021-06-01 PROCEDURE — 96372 THER/PROPH/DIAG INJ SC/IM: CPT | Mod: S$GLB,,, | Performed by: INTERNAL MEDICINE

## 2021-06-01 PROCEDURE — 99213 PR OFFICE/OUTPT VISIT, EST, LEVL III, 20-29 MIN: ICD-10-PCS | Mod: 25,S$GLB,, | Performed by: INTERNAL MEDICINE

## 2021-06-01 RX ORDER — CYANOCOBALAMIN 1000 UG/ML
1000 INJECTION, SOLUTION INTRAMUSCULAR; SUBCUTANEOUS
Status: SHIPPED | OUTPATIENT
Start: 2021-06-01 | End: 2022-05-27

## 2021-06-01 RX ADMIN — CYANOCOBALAMIN 1000 MCG: 1000 INJECTION, SOLUTION INTRAMUSCULAR; SUBCUTANEOUS at 11:06

## 2021-06-08 DIAGNOSIS — M54.2 CERVICALGIA: Primary | ICD-10-CM

## 2021-06-14 ENCOUNTER — HOSPITAL ENCOUNTER (OUTPATIENT)
Dept: RADIOLOGY | Facility: HOSPITAL | Age: 66
Discharge: HOME | End: 2021-06-14
Attending: ORTHOPAEDIC SURGERY
Payer: MEDICARE

## 2021-06-14 DIAGNOSIS — M54.2 CERVICALGIA: ICD-10-CM

## 2021-06-14 PROCEDURE — 72141 MRI NECK SPINE W/O DYE: CPT | Mod: TC,PO

## 2021-07-06 ENCOUNTER — CLINICAL SUPPORT (OUTPATIENT)
Dept: HEMATOLOGY/ONCOLOGY | Facility: CLINIC | Age: 66
End: 2021-07-06
Payer: MEDICARE

## 2021-07-06 DIAGNOSIS — D63.8 ANEMIA, CHRONIC DISEASE: ICD-10-CM

## 2021-07-06 PROCEDURE — 96372 THER/PROPH/DIAG INJ SC/IM: CPT | Mod: S$GLB,,, | Performed by: INTERNAL MEDICINE

## 2021-07-06 PROCEDURE — 96372 PR INJECTION,THERAP/PROPH/DIAG2ST, IM OR SUBCUT: ICD-10-PCS | Mod: S$GLB,,, | Performed by: INTERNAL MEDICINE

## 2021-07-06 RX ADMIN — CYANOCOBALAMIN 1000 MCG: 1000 INJECTION, SOLUTION INTRAMUSCULAR; SUBCUTANEOUS at 11:07

## 2021-07-07 ENCOUNTER — TELEPHONE (OUTPATIENT)
Dept: FAMILY MEDICINE | Facility: CLINIC | Age: 66
End: 2021-07-07

## 2021-07-07 ENCOUNTER — LAB VISIT (OUTPATIENT)
Dept: LAB | Facility: HOSPITAL | Age: 66
End: 2021-07-07
Attending: INTERNAL MEDICINE
Payer: MEDICARE

## 2021-07-07 DIAGNOSIS — E11.9 TYPE 2 DIABETES MELLITUS TREATED WITH INSULIN: ICD-10-CM

## 2021-07-07 DIAGNOSIS — E87.6 HYPOKALEMIA: ICD-10-CM

## 2021-07-07 DIAGNOSIS — Z79.4 TYPE 2 DIABETES MELLITUS TREATED WITH INSULIN: Primary | ICD-10-CM

## 2021-07-07 DIAGNOSIS — Z79.4 TYPE 2 DIABETES MELLITUS TREATED WITH INSULIN: ICD-10-CM

## 2021-07-07 DIAGNOSIS — E11.9 TYPE 2 DIABETES MELLITUS TREATED WITH INSULIN: Primary | ICD-10-CM

## 2021-07-07 LAB
ANION GAP SERPL CALC-SCNC: 12 MMOL/L (ref 8–16)
BUN SERPL-MCNC: 21 MG/DL (ref 8–23)
CALCIUM SERPL-MCNC: 10.2 MG/DL (ref 8.7–10.5)
CHLORIDE SERPL-SCNC: 94 MMOL/L (ref 95–110)
CO2 SERPL-SCNC: 32 MMOL/L (ref 23–29)
CREAT SERPL-MCNC: 0.8 MG/DL (ref 0.5–1.4)
EST. GFR  (AFRICAN AMERICAN): >60 ML/MIN/1.73 M^2
EST. GFR  (NON AFRICAN AMERICAN): >60 ML/MIN/1.73 M^2
GLUCOSE SERPL-MCNC: 138 MG/DL (ref 70–110)
POTASSIUM SERPL-SCNC: 4 MMOL/L (ref 3.5–5.1)
SODIUM SERPL-SCNC: 138 MMOL/L (ref 136–145)

## 2021-07-07 PROCEDURE — 36415 COLL VENOUS BLD VENIPUNCTURE: CPT | Performed by: INTERNAL MEDICINE

## 2021-07-07 PROCEDURE — 80048 BASIC METABOLIC PNL TOTAL CA: CPT | Performed by: INTERNAL MEDICINE

## 2021-07-07 PROCEDURE — 83036 HEMOGLOBIN GLYCOSYLATED A1C: CPT | Performed by: INTERNAL MEDICINE

## 2021-07-08 LAB
ESTIMATED AVG GLUCOSE: 166 MG/DL (ref 68–131)
HBA1C MFR BLD: 7.4 % (ref 4.5–6.2)

## 2021-07-09 ENCOUNTER — OFFICE VISIT (OUTPATIENT)
Dept: FAMILY MEDICINE | Facility: CLINIC | Age: 66
End: 2021-07-09
Payer: MEDICARE

## 2021-07-09 VITALS
DIASTOLIC BLOOD PRESSURE: 68 MMHG | OXYGEN SATURATION: 97 % | SYSTOLIC BLOOD PRESSURE: 138 MMHG | BODY MASS INDEX: 33.45 KG/M2 | HEIGHT: 68 IN | TEMPERATURE: 99 F | HEART RATE: 58 BPM | RESPIRATION RATE: 16 BRPM | WEIGHT: 220.69 LBS

## 2021-07-09 DIAGNOSIS — D50.9 MICROCYTIC ANEMIA: ICD-10-CM

## 2021-07-09 DIAGNOSIS — Z79.4 TYPE 2 DIABETES MELLITUS TREATED WITH INSULIN: ICD-10-CM

## 2021-07-09 DIAGNOSIS — H60.331 ACUTE SWIMMER'S EAR OF RIGHT SIDE: ICD-10-CM

## 2021-07-09 DIAGNOSIS — I10 BENIGN ESSENTIAL HYPERTENSION: Primary | ICD-10-CM

## 2021-07-09 DIAGNOSIS — E04.1 THYROID NODULE: ICD-10-CM

## 2021-07-09 DIAGNOSIS — E11.9 TYPE 2 DIABETES MELLITUS TREATED WITH INSULIN: ICD-10-CM

## 2021-07-09 DIAGNOSIS — E87.6 HYPOKALEMIA: ICD-10-CM

## 2021-07-09 PROCEDURE — 99214 PR OFFICE/OUTPT VISIT, EST, LEVL IV, 30-39 MIN: ICD-10-PCS | Mod: S$GLB,,, | Performed by: INTERNAL MEDICINE

## 2021-07-09 PROCEDURE — 99214 OFFICE O/P EST MOD 30 MIN: CPT | Mod: S$GLB,,, | Performed by: INTERNAL MEDICINE

## 2021-07-09 RX ORDER — POTASSIUM CHLORIDE 750 MG/1
10 TABLET, EXTENDED RELEASE ORAL DAILY
Qty: 90 TABLET | Refills: 3 | Status: SHIPPED | OUTPATIENT
Start: 2021-07-09 | End: 2022-03-07 | Stop reason: SDUPTHER

## 2021-07-09 RX ORDER — POTASSIUM CHLORIDE 750 MG/1
10 TABLET, EXTENDED RELEASE ORAL DAILY
COMMUNITY
Start: 2021-05-04 | End: 2021-07-09 | Stop reason: SDUPTHER

## 2021-07-09 RX ORDER — NEOMYCIN SULFATE, POLYMYXIN B SULFATE AND HYDROCORTISONE 10; 3.5; 1 MG/ML; MG/ML; [USP'U]/ML
3 SUSPENSION/ DROPS AURICULAR (OTIC) 4 TIMES DAILY
Qty: 10 ML | Refills: 0 | Status: SHIPPED | OUTPATIENT
Start: 2021-07-09 | End: 2021-08-12

## 2021-07-16 ENCOUNTER — LAB VISIT (OUTPATIENT)
Dept: LAB | Facility: HOSPITAL | Age: 66
End: 2021-07-16
Attending: SURGERY
Payer: MEDICARE

## 2021-07-16 ENCOUNTER — HOSPITAL ENCOUNTER (OUTPATIENT)
Dept: PREADMISSION TESTING | Facility: HOSPITAL | Age: 66
Discharge: HOME | End: 2021-07-16
Attending: SURGERY
Payer: MEDICARE

## 2021-07-16 ENCOUNTER — HOSPITAL ENCOUNTER (OUTPATIENT)
Dept: RADIOLOGY | Facility: HOSPITAL | Age: 66
Discharge: HOME | End: 2021-07-16
Attending: SURGERY
Payer: MEDICARE

## 2021-07-16 VITALS
DIASTOLIC BLOOD PRESSURE: 82 MMHG | RESPIRATION RATE: 16 BRPM | BODY MASS INDEX: 34.29 KG/M2 | HEIGHT: 67 IN | SYSTOLIC BLOOD PRESSURE: 158 MMHG | WEIGHT: 218.5 LBS | OXYGEN SATURATION: 96 % | TEMPERATURE: 98 F

## 2021-07-16 DIAGNOSIS — E04.2 NONTOXIC MULTINODULAR GOITER: ICD-10-CM

## 2021-07-16 DIAGNOSIS — Z01.818 PRE-OP TESTING: ICD-10-CM

## 2021-07-16 DIAGNOSIS — E04.2 NONTOXIC MULTINODULAR GOITER: Primary | ICD-10-CM

## 2021-07-16 DIAGNOSIS — Z01.811 PRE-OP CHEST EXAM: Primary | ICD-10-CM

## 2021-07-16 LAB
ANION GAP SERPL CALC-SCNC: 10 MMOL/L (ref 8–16)
BILIRUB UR QL STRIP: NEGATIVE
BUN SERPL-MCNC: 24 MG/DL (ref 8–23)
CALCIUM SERPL-MCNC: 9.8 MG/DL (ref 8.7–10.5)
CHLORIDE SERPL-SCNC: 95 MMOL/L (ref 95–110)
CLARITY UR: CLEAR
CO2 SERPL-SCNC: 33 MMOL/L (ref 23–29)
COLOR UR: YELLOW
CREAT SERPL-MCNC: 0.7 MG/DL (ref 0.5–1.4)
ERYTHROCYTE [DISTWIDTH] IN BLOOD BY AUTOMATED COUNT: 14.1 % (ref 11.5–14.5)
EST. GFR  (AFRICAN AMERICAN): >60 ML/MIN/1.73 M^2
EST. GFR  (NON AFRICAN AMERICAN): >60 ML/MIN/1.73 M^2
GLUCOSE SERPL-MCNC: 182 MG/DL (ref 70–110)
GLUCOSE UR QL STRIP: NEGATIVE
HCT VFR BLD AUTO: 38.5 % (ref 37–48.5)
HGB BLD-MCNC: 13.1 G/DL (ref 12–16)
HGB UR QL STRIP: NEGATIVE
KETONES UR QL STRIP: NEGATIVE
LEUKOCYTE ESTERASE UR QL STRIP: NEGATIVE
MCH RBC QN AUTO: 28.2 PG (ref 27–31)
MCHC RBC AUTO-ENTMCNC: 34 G/DL (ref 32–36)
MCV RBC AUTO: 83 FL (ref 82–98)
NITRITE UR QL STRIP: NEGATIVE
PH UR STRIP: 7 [PH] (ref 5–8)
PLATELET # BLD AUTO: 304 K/UL (ref 150–450)
PMV BLD AUTO: 10.8 FL (ref 9.2–12.9)
POTASSIUM SERPL-SCNC: 4 MMOL/L (ref 3.5–5.1)
PROT UR QL STRIP: NEGATIVE
RBC # BLD AUTO: 4.64 M/UL (ref 4–5.4)
SODIUM SERPL-SCNC: 138 MMOL/L (ref 136–145)
SP GR UR STRIP: 1.02 (ref 1–1.03)
URN SPEC COLLECT METH UR: NORMAL
UROBILINOGEN UR STRIP-ACNC: NEGATIVE EU/DL
WBC # BLD AUTO: 6.6 K/UL (ref 3.9–12.7)

## 2021-07-16 PROCEDURE — 36415 COLL VENOUS BLD VENIPUNCTURE: CPT | Performed by: SURGERY

## 2021-07-16 PROCEDURE — 85027 COMPLETE CBC AUTOMATED: CPT | Performed by: SURGERY

## 2021-07-16 PROCEDURE — 71046 X-RAY EXAM CHEST 2 VIEWS: CPT | Mod: TC

## 2021-07-16 PROCEDURE — 80048 BASIC METABOLIC PNL TOTAL CA: CPT | Performed by: SURGERY

## 2021-07-16 PROCEDURE — 93010 ELECTROCARDIOGRAM REPORT: CPT | Mod: ,,, | Performed by: SPECIALIST

## 2021-07-16 PROCEDURE — 93005 ELECTROCARDIOGRAM TRACING: CPT | Performed by: SPECIALIST

## 2021-07-16 PROCEDURE — 93010 EKG 12-LEAD: ICD-10-PCS | Mod: ,,, | Performed by: SPECIALIST

## 2021-07-16 PROCEDURE — 81003 URINALYSIS AUTO W/O SCOPE: CPT | Performed by: SURGERY

## 2021-07-16 RX ORDER — LEVOFLOXACIN 5 MG/ML
500 INJECTION, SOLUTION INTRAVENOUS ONCE
Status: CANCELLED | OUTPATIENT
Start: 2021-07-20

## 2021-07-16 NOTE — DISCHARGE INSTRUCTIONS
A nurse will call the afternoon before the surgery between 4:00 PM and 6:00 PM with the final arrival time.       Please use [address] Park[address] and take the elevator to the first floor or come in through the [facility]. entrance.  (This will bring you directly to Registration)  Check in at Registration.     After checking in at Registration, proceed down the [address] past the gift shop and through glass door into the Outpatient Pav[facility].  Check in at the nurses station to the left.     Do not eat or drink anything after midnight the night before the procedure - THIS INCLUDES  WATER, GUM, MINTS AND CANDY.  Brush your teeth but DO NOT swallow any water.    You may take your Metoprolol and Cymbalta with just enough water to swallow them.    If you are diabetic, check your sugar the morning of the procedure.     DO NOT take any diabetic medicines or insulin the morning of the procedure.     PLEASE NOTE:    The surgery schedule has many variables which may affect the time of surgeries or endoscopy cases.  Family members should be available if your time changes and plan to spend 4-6 hours at the hospital.    DO NOT TAKE THESE MEDICATIONS 5-7 DAYS PRIOR to your procedure or per your surgeon's request:   ASPIRIN, ALEVE, ADVIL, IBUPROFEN,  MAYELIN SELTZER, BC , FISH OIL , VITAMIN E, HERBALS    You may take Tylenol    If you are prescribed any type of blood thinners:    Aspirin, Coumadin, Plavix, Pradaxa, Xarelto, Aggrenox, Effient, Eliquis, Savasya, Brilinta, or any other, ask your surgeon whether you should stop taking them and how long before surgery you should stop.  You may also need to verify with the prescribing physician if it is ok to stop your medication.                                                      IMPORTANT INSTRUCTIONS    · Do not smoke, vape or drink alcoholic beverages 24 hours prior to your procedure.  · Shower the night before AND the morning of your procedure with a Chlorhexidine wash such as Hibiclens or Dial  antibacterial soap from the neck down.   · No lotions, powder or oils on your skin after you shower.  · Do not get it on your face or in your eyes.  You may use your own shampoo and face wash. This helps your skin to be as bacteria free as possible.   · DO NOT remove hair from the surgery site.  Do not shave the incision site unless you are given specific instructions to do so.    · Sleep in a bed with clean sheets.    Do not sleep with a pet in the bed.   · If you wear contact lenses, dentures, hearing aids or glasses, bring a container to put them in during surgery and give to a family member for safe keeping.    · Please leave all jewelry, piercing's and valuables at home.   · Wear rubber soled shoes (no flip flops).  · ONLY if you have been diagnosed with sleep apnea please bring your C-PAP machine.  · ONLY if you wear home oxygen please bring your portable oxygen tank the day of your procedure.   · ONLY for patients requiring bowel prep, written instructions will be given by your doctor's office.  · ONLY if you have a neuro stimulator, please bring the controller with you the morning of surgery  · ONLY if a type and screen test is needed before surgery, please return:  · If your doctor has scheduled you for an overnight stay, bring a small overnight bag with any personal items you need.    · Make arrangements in advance for transportation home by a responsible adult (someone over the age of 18 who knows you).    · You must make arrangements for transportation, TAXI'S, UBER'S OR LYFTS ARE NOT ALLOWED.      If you have any questions about these instructions, call (Monday - Friday) Pre-Op Admit  Nursing  at [phone] or the Pre-Op Day Surgery Unit at [phone].

## 2021-07-20 ENCOUNTER — ANESTHESIA (OUTPATIENT)
Dept: SURGERY | Facility: HOSPITAL | Age: 66
End: 2021-07-20
Payer: MEDICARE

## 2021-07-20 ENCOUNTER — HOSPITAL ENCOUNTER (OUTPATIENT)
Facility: HOSPITAL | Age: 66
Discharge: HOME | End: 2021-07-20
Attending: SURGERY | Admitting: SURGERY
Payer: MEDICARE

## 2021-07-20 ENCOUNTER — ANESTHESIA EVENT (OUTPATIENT)
Dept: SURGERY | Facility: HOSPITAL | Age: 66
End: 2021-07-20
Payer: MEDICARE

## 2021-07-20 VITALS
BODY MASS INDEX: 34.29 KG/M2 | TEMPERATURE: 98 F | DIASTOLIC BLOOD PRESSURE: 83 MMHG | SYSTOLIC BLOOD PRESSURE: 144 MMHG | HEIGHT: 67 IN | HEART RATE: 83 BPM | RESPIRATION RATE: 16 BRPM | OXYGEN SATURATION: 97 % | WEIGHT: 218.5 LBS

## 2021-07-20 DIAGNOSIS — E04.2 MULTINODULAR GOITER: Primary | ICD-10-CM

## 2021-07-20 DIAGNOSIS — Z01.818 PRE-OP TESTING: ICD-10-CM

## 2021-07-20 LAB — GLUCOSE SERPL-MCNC: 70 MG/DL (ref 70–110)

## 2021-07-20 PROCEDURE — 63600175 PHARM REV CODE 636 W HCPCS: Performed by: STUDENT IN AN ORGANIZED HEALTH CARE EDUCATION/TRAINING PROGRAM

## 2021-07-20 PROCEDURE — 37000009 HC ANESTHESIA EA ADD 15 MINS: Performed by: SURGERY

## 2021-07-20 PROCEDURE — 88331 PATH CONSLTJ SURG 1 BLK 1SPC: CPT | Mod: TC

## 2021-07-20 PROCEDURE — 99900035 HC TECH TIME PER 15 MIN (STAT)

## 2021-07-20 PROCEDURE — C9290 INJ, BUPIVACAINE LIPOSOME: HCPCS | Performed by: SURGERY

## 2021-07-20 PROCEDURE — 27000653 HC CATH, IV CATHLN: Performed by: STUDENT IN AN ORGANIZED HEALTH CARE EDUCATION/TRAINING PROGRAM

## 2021-07-20 PROCEDURE — 71000039 HC RECOVERY, EACH ADD'L HOUR: Performed by: SURGERY

## 2021-07-20 PROCEDURE — 94799 UNLISTED PULMONARY SVC/PX: CPT

## 2021-07-20 PROCEDURE — 25000003 PHARM REV CODE 250

## 2021-07-20 PROCEDURE — 71000033 HC RECOVERY, INTIAL HOUR: Performed by: SURGERY

## 2021-07-20 PROCEDURE — 36000707: Performed by: SURGERY

## 2021-07-20 PROCEDURE — 71000015 HC POSTOP RECOV 1ST HR: Performed by: SURGERY

## 2021-07-20 PROCEDURE — 25000003 PHARM REV CODE 250: Performed by: STUDENT IN AN ORGANIZED HEALTH CARE EDUCATION/TRAINING PROGRAM

## 2021-07-20 PROCEDURE — 36000706: Performed by: SURGERY

## 2021-07-20 PROCEDURE — 27202105 HC BIS BILATERAL SENSOR: Performed by: STUDENT IN AN ORGANIZED HEALTH CARE EDUCATION/TRAINING PROGRAM

## 2021-07-20 PROCEDURE — 37000008 HC ANESTHESIA 1ST 15 MINUTES: Performed by: SURGERY

## 2021-07-20 PROCEDURE — 25000003 PHARM REV CODE 250: Performed by: NURSE ANESTHETIST, CERTIFIED REGISTERED

## 2021-07-20 PROCEDURE — 27201423 OPTIME MED/SURG SUP & DEVICES STERILE SUPPLY: Performed by: SURGERY

## 2021-07-20 PROCEDURE — 25000003 PHARM REV CODE 250: Performed by: SURGERY

## 2021-07-20 PROCEDURE — 63600175 PHARM REV CODE 636 W HCPCS: Performed by: NURSE ANESTHETIST, CERTIFIED REGISTERED

## 2021-07-20 PROCEDURE — 27000671 HC TUBING MICROBORE EXT: Performed by: STUDENT IN AN ORGANIZED HEALTH CARE EDUCATION/TRAINING PROGRAM

## 2021-07-20 PROCEDURE — 71000016 HC POSTOP RECOV ADDL HR: Performed by: SURGERY

## 2021-07-20 PROCEDURE — 27201107 HC STYLET, STANDARD: Performed by: STUDENT IN AN ORGANIZED HEALTH CARE EDUCATION/TRAINING PROGRAM

## 2021-07-20 PROCEDURE — 63600175 PHARM REV CODE 636 W HCPCS: Performed by: SURGERY

## 2021-07-20 PROCEDURE — 27000673 HC TUBING BLOOD Y: Performed by: STUDENT IN AN ORGANIZED HEALTH CARE EDUCATION/TRAINING PROGRAM

## 2021-07-20 RX ORDER — BUPIVACAINE HYDROCHLORIDE AND EPINEPHRINE 5; 5 MG/ML; UG/ML
INJECTION, SOLUTION EPIDURAL; INTRACAUDAL; PERINEURAL
Status: DISCONTINUED | OUTPATIENT
Start: 2021-07-20 | End: 2021-07-20 | Stop reason: HOSPADM

## 2021-07-20 RX ORDER — ACETAMINOPHEN 325 MG/1
650 TABLET ORAL EVERY 4 HOURS PRN
Status: CANCELLED | OUTPATIENT
Start: 2021-07-20

## 2021-07-20 RX ORDER — LIDOCAINE HYDROCHLORIDE 10 MG/ML
INJECTION, SOLUTION EPIDURAL; INFILTRATION; INTRACAUDAL; PERINEURAL
Status: DISCONTINUED | OUTPATIENT
Start: 2021-07-20 | End: 2021-07-20

## 2021-07-20 RX ORDER — DIPHENHYDRAMINE HYDROCHLORIDE 50 MG/ML
12.5 INJECTION INTRAMUSCULAR; INTRAVENOUS
Status: DISCONTINUED | OUTPATIENT
Start: 2021-07-20 | End: 2021-07-20 | Stop reason: HOSPADM

## 2021-07-20 RX ORDER — LIDOCAINE HYDROCHLORIDE 40 MG/ML
SOLUTION TOPICAL
Status: DISCONTINUED | OUTPATIENT
Start: 2021-07-20 | End: 2021-07-20

## 2021-07-20 RX ORDER — ACETAMINOPHEN 10 MG/ML
INJECTION, SOLUTION INTRAVENOUS
Status: DISCONTINUED | OUTPATIENT
Start: 2021-07-20 | End: 2021-07-20

## 2021-07-20 RX ORDER — LEVOFLOXACIN 5 MG/ML
500 INJECTION, SOLUTION INTRAVENOUS ONCE
Status: DISCONTINUED | OUTPATIENT
Start: 2021-07-20 | End: 2021-07-20 | Stop reason: HOSPADM

## 2021-07-20 RX ORDER — SODIUM CHLORIDE 0.9 % (FLUSH) 0.9 %
10 SYRINGE (ML) INJECTION
Status: DISCONTINUED | OUTPATIENT
Start: 2021-07-20 | End: 2021-07-20 | Stop reason: HOSPADM

## 2021-07-20 RX ORDER — SODIUM CHLORIDE 0.9 G/100ML
IRRIGANT IRRIGATION
Status: DISCONTINUED | OUTPATIENT
Start: 2021-07-20 | End: 2021-07-20 | Stop reason: HOSPADM

## 2021-07-20 RX ORDER — MIDAZOLAM HYDROCHLORIDE 1 MG/ML
INJECTION INTRAMUSCULAR; INTRAVENOUS
Status: DISCONTINUED | OUTPATIENT
Start: 2021-07-20 | End: 2021-07-20

## 2021-07-20 RX ORDER — ONDANSETRON 2 MG/ML
4 INJECTION INTRAMUSCULAR; INTRAVENOUS EVERY 12 HOURS PRN
Status: CANCELLED | OUTPATIENT
Start: 2021-07-20

## 2021-07-20 RX ORDER — OXYCODONE HYDROCHLORIDE 5 MG/1
5 TABLET ORAL
Status: DISCONTINUED | OUTPATIENT
Start: 2021-07-20 | End: 2021-07-20 | Stop reason: HOSPADM

## 2021-07-20 RX ORDER — SUCCINYLCHOLINE CHLORIDE 20 MG/ML
INJECTION INTRAMUSCULAR; INTRAVENOUS
Status: DISCONTINUED | OUTPATIENT
Start: 2021-07-20 | End: 2021-07-20

## 2021-07-20 RX ORDER — LABETALOL HYDROCHLORIDE 5 MG/ML
5 INJECTION, SOLUTION INTRAVENOUS ONCE
Status: COMPLETED | OUTPATIENT
Start: 2021-07-20 | End: 2021-07-20

## 2021-07-20 RX ORDER — ONDANSETRON 2 MG/ML
4 INJECTION INTRAMUSCULAR; INTRAVENOUS DAILY PRN
Status: DISCONTINUED | OUTPATIENT
Start: 2021-07-20 | End: 2021-07-20 | Stop reason: HOSPADM

## 2021-07-20 RX ORDER — HYDROCODONE BITARTRATE AND ACETAMINOPHEN 5; 325 MG/1; MG/1
1 TABLET ORAL EVERY 6 HOURS PRN
Status: CANCELLED | OUTPATIENT
Start: 2021-07-20

## 2021-07-20 RX ORDER — FENTANYL CITRATE 50 UG/ML
INJECTION, SOLUTION INTRAMUSCULAR; INTRAVENOUS
Status: DISCONTINUED | OUTPATIENT
Start: 2021-07-20 | End: 2021-07-20

## 2021-07-20 RX ORDER — SODIUM CHLORIDE, SODIUM LACTATE, POTASSIUM CHLORIDE, CALCIUM CHLORIDE 600; 310; 30; 20 MG/100ML; MG/100ML; MG/100ML; MG/100ML
INJECTION, SOLUTION INTRAVENOUS CONTINUOUS PRN
Status: DISCONTINUED | OUTPATIENT
Start: 2021-07-20 | End: 2021-07-20

## 2021-07-20 RX ORDER — LABETALOL HYDROCHLORIDE 5 MG/ML
INJECTION, SOLUTION INTRAVENOUS
Status: COMPLETED
Start: 2021-07-20 | End: 2021-07-20

## 2021-07-20 RX ORDER — ACETAMINOPHEN 10 MG/ML
1000 INJECTION, SOLUTION INTRAVENOUS ONCE
Status: CANCELLED | OUTPATIENT
Start: 2021-07-20 | End: 2021-07-20

## 2021-07-20 RX ORDER — FAMOTIDINE 10 MG/ML
INJECTION INTRAVENOUS
Status: DISCONTINUED | OUTPATIENT
Start: 2021-07-20 | End: 2021-07-20

## 2021-07-20 RX ORDER — METOPROLOL TARTRATE 1 MG/ML
INJECTION, SOLUTION INTRAVENOUS
Status: DISCONTINUED | OUTPATIENT
Start: 2021-07-20 | End: 2021-07-20

## 2021-07-20 RX ORDER — ROCURONIUM BROMIDE 10 MG/ML
INJECTION, SOLUTION INTRAVENOUS
Status: DISCONTINUED | OUTPATIENT
Start: 2021-07-20 | End: 2021-07-20

## 2021-07-20 RX ORDER — PROPOFOL 10 MG/ML
VIAL (ML) INTRAVENOUS
Status: DISCONTINUED | OUTPATIENT
Start: 2021-07-20 | End: 2021-07-20

## 2021-07-20 RX ORDER — HYDROMORPHONE HYDROCHLORIDE 1 MG/ML
0.2 INJECTION, SOLUTION INTRAMUSCULAR; INTRAVENOUS; SUBCUTANEOUS
Status: DISCONTINUED | OUTPATIENT
Start: 2021-07-20 | End: 2021-07-20 | Stop reason: HOSPADM

## 2021-07-20 RX ORDER — ONDANSETRON 2 MG/ML
INJECTION INTRAMUSCULAR; INTRAVENOUS
Status: DISCONTINUED | OUTPATIENT
Start: 2021-07-20 | End: 2021-07-20

## 2021-07-20 RX ORDER — MEPERIDINE HYDROCHLORIDE 50 MG/ML
12.5 INJECTION INTRAMUSCULAR; INTRAVENOUS; SUBCUTANEOUS EVERY 10 MIN PRN
Status: DISCONTINUED | OUTPATIENT
Start: 2021-07-20 | End: 2021-07-20 | Stop reason: HOSPADM

## 2021-07-20 RX ORDER — HYDROCODONE BITARTRATE AND ACETAMINOPHEN 5; 325 MG/1; MG/1
1 TABLET ORAL EVERY 8 HOURS PRN
Qty: 15 TABLET | Refills: 0 | Status: SHIPPED | OUTPATIENT
Start: 2021-07-20 | End: 2021-08-12

## 2021-07-20 RX ADMIN — HYDROMORPHONE HYDROCHLORIDE 0.2 MG: 1 INJECTION, SOLUTION INTRAMUSCULAR; INTRAVENOUS; SUBCUTANEOUS at 11:07

## 2021-07-20 RX ADMIN — FENTANYL CITRATE 50 MCG: 50 INJECTION INTRAMUSCULAR; INTRAVENOUS at 09:07

## 2021-07-20 RX ADMIN — ROCURONIUM BROMIDE 5 MG: 10 INJECTION, SOLUTION INTRAVENOUS at 08:07

## 2021-07-20 RX ADMIN — METOPROLOL TARTRATE 2.5 MG: 1 INJECTION, SOLUTION INTRAVENOUS at 09:07

## 2021-07-20 RX ADMIN — PROPOFOL 20 MG: 10 INJECTION, EMULSION INTRAVENOUS at 09:07

## 2021-07-20 RX ADMIN — LIDOCAINE HYDROCHLORIDE 4 MG: 40 SOLUTION TOPICAL at 08:07

## 2021-07-20 RX ADMIN — PROPOFOL 20 MG: 10 INJECTION, EMULSION INTRAVENOUS at 10:07

## 2021-07-20 RX ADMIN — LABETALOL HYDROCHLORIDE 5 MG: 5 INJECTION, SOLUTION INTRAVENOUS at 11:07

## 2021-07-20 RX ADMIN — FENTANYL CITRATE 50 MCG: 50 INJECTION INTRAMUSCULAR; INTRAVENOUS at 08:07

## 2021-07-20 RX ADMIN — ROCURONIUM BROMIDE 15 MG: 10 INJECTION, SOLUTION INTRAVENOUS at 08:07

## 2021-07-20 RX ADMIN — SUCCINYLCHOLINE CHLORIDE 140 MG: 20 INJECTION, SOLUTION INTRAMUSCULAR; INTRAVENOUS at 08:07

## 2021-07-20 RX ADMIN — SUGAMMADEX 200 MG: 100 INJECTION, SOLUTION INTRAVENOUS at 11:07

## 2021-07-20 RX ADMIN — FAMOTIDINE 20 MG: 10 INJECTION, SOLUTION INTRAVENOUS at 08:07

## 2021-07-20 RX ADMIN — SODIUM CHLORIDE, SODIUM LACTATE, POTASSIUM CHLORIDE, AND CALCIUM CHLORIDE: .6; .31; .03; .02 INJECTION, SOLUTION INTRAVENOUS at 09:07

## 2021-07-20 RX ADMIN — ONDANSETRON 4 MG: 2 INJECTION INTRAMUSCULAR; INTRAVENOUS at 08:07

## 2021-07-20 RX ADMIN — OXYCODONE HYDROCHLORIDE 5 MG: 5 TABLET ORAL at 11:07

## 2021-07-20 RX ADMIN — MIDAZOLAM HYDROCHLORIDE 2 MG: 1 INJECTION, SOLUTION INTRAMUSCULAR; INTRAVENOUS at 08:07

## 2021-07-20 RX ADMIN — PROPOFOL 100 MG: 10 INJECTION, EMULSION INTRAVENOUS at 08:07

## 2021-07-20 RX ADMIN — LIDOCAINE HYDROCHLORIDE 50 MG: 10 INJECTION, SOLUTION EPIDURAL; INFILTRATION; INTRACAUDAL; PERINEURAL at 08:07

## 2021-07-20 RX ADMIN — SODIUM CHLORIDE, SODIUM LACTATE, POTASSIUM CHLORIDE, AND CALCIUM CHLORIDE: .6; .31; .03; .02 INJECTION, SOLUTION INTRAVENOUS at 08:07

## 2021-07-20 RX ADMIN — ACETAMINOPHEN 1000 MG: 10 INJECTION, SOLUTION INTRAVENOUS at 08:07

## 2021-07-20 RX ADMIN — HYDROMORPHONE HYDROCHLORIDE 0.2 MG: 1 INJECTION, SOLUTION INTRAMUSCULAR; INTRAVENOUS; SUBCUTANEOUS at 12:07

## 2021-07-27 ENCOUNTER — TELEPHONE (OUTPATIENT)
Dept: FAMILY MEDICINE | Facility: CLINIC | Age: 66
End: 2021-07-27

## 2021-08-05 ENCOUNTER — CLINICAL SUPPORT (OUTPATIENT)
Dept: HEMATOLOGY/ONCOLOGY | Facility: CLINIC | Age: 66
End: 2021-08-05
Payer: MEDICARE

## 2021-08-05 ENCOUNTER — LAB VISIT (OUTPATIENT)
Dept: LAB | Facility: HOSPITAL | Age: 66
End: 2021-08-05
Attending: SURGERY
Payer: MEDICARE

## 2021-08-05 DIAGNOSIS — E04.2 NONTOXIC MULTINODULAR GOITER: Primary | ICD-10-CM

## 2021-08-05 DIAGNOSIS — E53.8 B12 DEFICIENCY: ICD-10-CM

## 2021-08-05 LAB
T4 FREE SERPL-MCNC: 0.56 NG/DL (ref 0.71–1.51)
TSH SERPL DL<=0.005 MIU/L-ACNC: 4.86 UIU/ML (ref 0.34–5.6)

## 2021-08-05 PROCEDURE — 84480 ASSAY TRIIODOTHYRONINE (T3): CPT | Performed by: SURGERY

## 2021-08-05 PROCEDURE — 84443 ASSAY THYROID STIM HORMONE: CPT | Performed by: SURGERY

## 2021-08-05 PROCEDURE — 36415 COLL VENOUS BLD VENIPUNCTURE: CPT | Performed by: SURGERY

## 2021-08-05 PROCEDURE — 84439 ASSAY OF FREE THYROXINE: CPT | Performed by: SURGERY

## 2021-08-05 PROCEDURE — 96372 PR INJECTION,THERAP/PROPH/DIAG2ST, IM OR SUBCUT: ICD-10-PCS | Mod: S$GLB,,, | Performed by: INTERNAL MEDICINE

## 2021-08-05 PROCEDURE — 96372 THER/PROPH/DIAG INJ SC/IM: CPT | Mod: S$GLB,,, | Performed by: INTERNAL MEDICINE

## 2021-08-05 RX ADMIN — CYANOCOBALAMIN 1000 MCG: 1000 INJECTION, SOLUTION INTRAMUSCULAR; SUBCUTANEOUS at 09:08

## 2021-08-06 LAB — T3 SERPL-MCNC: 122 NG/DL (ref 71–180)

## 2021-08-07 ENCOUNTER — HOSPITAL ENCOUNTER (OUTPATIENT)
Facility: HOSPITAL | Age: 66
Discharge: HOME | End: 2021-08-08
Attending: EMERGENCY MEDICINE | Admitting: INTERNAL MEDICINE
Payer: MEDICARE

## 2021-08-07 DIAGNOSIS — R00.2 PALPITATIONS: ICD-10-CM

## 2021-08-07 DIAGNOSIS — R07.9 CHEST PAIN, UNSPECIFIED TYPE: Primary | ICD-10-CM

## 2021-08-07 PROCEDURE — 99285 EMERGENCY DEPT VISIT HI MDM: CPT

## 2021-08-07 PROCEDURE — 93010 ELECTROCARDIOGRAM REPORT: CPT | Mod: ,,, | Performed by: INTERNAL MEDICINE

## 2021-08-07 PROCEDURE — 80053 COMPREHEN METABOLIC PANEL: CPT | Performed by: EMERGENCY MEDICINE

## 2021-08-07 PROCEDURE — 85025 COMPLETE CBC W/AUTO DIFF WBC: CPT | Performed by: EMERGENCY MEDICINE

## 2021-08-07 PROCEDURE — 83880 ASSAY OF NATRIURETIC PEPTIDE: CPT | Performed by: EMERGENCY MEDICINE

## 2021-08-07 PROCEDURE — 85610 PROTHROMBIN TIME: CPT | Performed by: EMERGENCY MEDICINE

## 2021-08-07 PROCEDURE — 84484 ASSAY OF TROPONIN QUANT: CPT | Performed by: EMERGENCY MEDICINE

## 2021-08-07 PROCEDURE — U0002 COVID-19 LAB TEST NON-CDC: HCPCS | Performed by: EMERGENCY MEDICINE

## 2021-08-07 PROCEDURE — 84443 ASSAY THYROID STIM HORMONE: CPT | Performed by: EMERGENCY MEDICINE

## 2021-08-07 PROCEDURE — 93005 ELECTROCARDIOGRAM TRACING: CPT | Performed by: INTERNAL MEDICINE

## 2021-08-07 PROCEDURE — 93010 EKG 12-LEAD: ICD-10-PCS | Mod: ,,, | Performed by: INTERNAL MEDICINE

## 2021-08-08 ENCOUNTER — HOSPITAL ENCOUNTER (OUTPATIENT)
Dept: RADIOLOGY | Facility: HOSPITAL | Age: 66
Discharge: HOME | End: 2021-08-08
Payer: MEDICARE

## 2021-08-08 ENCOUNTER — CLINICAL SUPPORT (OUTPATIENT)
Dept: CARDIOLOGY | Facility: HOSPITAL | Age: 66
End: 2021-08-08
Payer: MEDICARE

## 2021-08-08 VITALS
HEART RATE: 50 BPM | BODY MASS INDEX: 34.29 KG/M2 | SYSTOLIC BLOOD PRESSURE: 151 MMHG | OXYGEN SATURATION: 95 % | RESPIRATION RATE: 18 BRPM | TEMPERATURE: 98 F | HEIGHT: 67 IN | DIASTOLIC BLOOD PRESSURE: 65 MMHG | WEIGHT: 218.5 LBS

## 2021-08-08 PROBLEM — R07.9 CHEST PAIN: Status: ACTIVE | Noted: 2021-08-08

## 2021-08-08 LAB
ALBUMIN SERPL BCP-MCNC: 3.3 G/DL (ref 3.5–5.2)
ALBUMIN SERPL BCP-MCNC: 3.5 G/DL (ref 3.5–5.2)
ALP SERPL-CCNC: 64 U/L (ref 55–135)
ALP SERPL-CCNC: 76 U/L (ref 55–135)
ALT SERPL W/O P-5'-P-CCNC: 23 U/L (ref 10–44)
ALT SERPL W/O P-5'-P-CCNC: 27 U/L (ref 10–44)
ANION GAP SERPL CALC-SCNC: 10 MMOL/L (ref 8–16)
ANION GAP SERPL CALC-SCNC: 15 MMOL/L (ref 8–16)
AST SERPL-CCNC: 18 U/L (ref 10–40)
AST SERPL-CCNC: 24 U/L (ref 10–40)
BASOPHILS # BLD AUTO: 0.05 K/UL (ref 0–0.2)
BASOPHILS NFR BLD: 1.1 % (ref 0–1.9)
BILIRUB SERPL-MCNC: 0.5 MG/DL (ref 0.1–1)
BILIRUB SERPL-MCNC: 0.6 MG/DL (ref 0.1–1)
BILIRUB UR QL STRIP: NEGATIVE
BNP SERPL-MCNC: 85 PG/ML (ref 0–99)
BUN SERPL-MCNC: 16 MG/DL (ref 8–23)
BUN SERPL-MCNC: 17 MG/DL (ref 8–23)
CALCIUM SERPL-MCNC: 9.5 MG/DL (ref 8.7–10.5)
CALCIUM SERPL-MCNC: 9.6 MG/DL (ref 8.7–10.5)
CHLORIDE SERPL-SCNC: 100 MMOL/L (ref 95–110)
CHLORIDE SERPL-SCNC: 103 MMOL/L (ref 95–110)
CLARITY UR: CLEAR
CO2 SERPL-SCNC: 26 MMOL/L (ref 23–29)
CO2 SERPL-SCNC: 31 MMOL/L (ref 23–29)
COLOR UR: YELLOW
CREAT SERPL-MCNC: 0.9 MG/DL (ref 0.5–1.4)
CREAT SERPL-MCNC: 0.9 MG/DL (ref 0.5–1.4)
CV PHARM DOSE: 0.4 MG
CV STRESS BASE HR: 53 BPM
DIASTOLIC BLOOD PRESSURE: 67 MMHG
DIFFERENTIAL METHOD: ABNORMAL
EOSINOPHIL # BLD AUTO: 0.1 K/UL (ref 0–0.5)
EOSINOPHIL NFR BLD: 3 % (ref 0–8)
ERYTHROCYTE [DISTWIDTH] IN BLOOD BY AUTOMATED COUNT: 14.1 % (ref 11.5–14.5)
EST. GFR  (AFRICAN AMERICAN): >60 ML/MIN/1.73 M^2
EST. GFR  (AFRICAN AMERICAN): >60 ML/MIN/1.73 M^2
EST. GFR  (NON AFRICAN AMERICAN): >60 ML/MIN/1.73 M^2
EST. GFR  (NON AFRICAN AMERICAN): >60 ML/MIN/1.73 M^2
GLUCOSE SERPL-MCNC: 114 MG/DL (ref 70–110)
GLUCOSE SERPL-MCNC: 280 MG/DL (ref 70–110)
GLUCOSE SERPL-MCNC: 91 MG/DL (ref 70–110)
GLUCOSE UR QL STRIP: ABNORMAL
HCT VFR BLD AUTO: 39.1 % (ref 37–48.5)
HGB BLD-MCNC: 12.7 G/DL (ref 12–16)
HGB UR QL STRIP: NEGATIVE
IMM GRANULOCYTES # BLD AUTO: 0.02 K/UL (ref 0–0.04)
IMM GRANULOCYTES NFR BLD AUTO: 0.4 % (ref 0–0.5)
INR PPP: 1
KETONES UR QL STRIP: NEGATIVE
LEUKOCYTE ESTERASE UR QL STRIP: NEGATIVE
LYMPHOCYTES # BLD AUTO: 1.5 K/UL (ref 1–4.8)
LYMPHOCYTES NFR BLD: 31.6 % (ref 18–48)
MAGNESIUM SERPL-MCNC: 1.4 MG/DL (ref 1.6–2.6)
MCH RBC QN AUTO: 26.7 PG (ref 27–31)
MCHC RBC AUTO-ENTMCNC: 32.5 G/DL (ref 32–36)
MCV RBC AUTO: 82 FL (ref 82–98)
MONOCYTES # BLD AUTO: 0.4 K/UL (ref 0.3–1)
MONOCYTES NFR BLD: 7.6 % (ref 4–15)
NEUTROPHILS # BLD AUTO: 2.7 K/UL (ref 1.8–7.7)
NEUTROPHILS NFR BLD: 56.3 % (ref 38–73)
NITRITE UR QL STRIP: NEGATIVE
NRBC BLD-RTO: 0 /100 WBC
OHS CV CPX 85 PERCENT MAX PREDICTED HEART RATE MALE: 126
OHS CV CPX MAX PREDICTED HEART RATE: 149
OHS CV CPX PATIENT IS FEMALE: 1
OHS CV CPX PATIENT IS MALE: 0
OHS CV CPX PEAK DIASTOLIC BLOOD PRESSURE: 50 MMHG
OHS CV CPX PEAK HEAR RATE: 73 BPM
OHS CV CPX PEAK RATE PRESSURE PRODUCT: 9198
OHS CV CPX PEAK SYSTOLIC BLOOD PRESSURE: 126 MMHG
OHS CV CPX PERCENT MAX PREDICTED HEART RATE ACHIEVED: 49
OHS CV CPX RATE PRESSURE PRODUCT PRESENTING: 6519
PH UR STRIP: 6 [PH] (ref 5–8)
PLATELET # BLD AUTO: 283 K/UL (ref 150–450)
PMV BLD AUTO: 10.7 FL (ref 9.2–12.9)
POTASSIUM SERPL-SCNC: 3.2 MMOL/L (ref 3.5–5.1)
POTASSIUM SERPL-SCNC: 3.7 MMOL/L (ref 3.5–5.1)
PROT SERPL-MCNC: 6.3 G/DL (ref 6–8.4)
PROT SERPL-MCNC: 7 G/DL (ref 6–8.4)
PROT UR QL STRIP: ABNORMAL
PROTHROMBIN TIME: 12.4 SEC (ref 11.8–14.3)
RBC # BLD AUTO: 4.76 M/UL (ref 4–5.4)
SARS-COV-2 RDRP RESP QL NAA+PROBE: NEGATIVE
SODIUM SERPL-SCNC: 141 MMOL/L (ref 136–145)
SODIUM SERPL-SCNC: 144 MMOL/L (ref 136–145)
SP GR UR STRIP: 1.01 (ref 1–1.03)
SYSTOLIC BLOOD PRESSURE: 123 MMHG
TROPONIN I SERPL DL<=0.01 NG/ML-MCNC: 0.03 NG/ML
TROPONIN I SERPL DL<=0.01 NG/ML-MCNC: <0.03 NG/ML
TSH SERPL DL<=0.005 MIU/L-ACNC: 2.7 UIU/ML (ref 0.34–5.6)
URN SPEC COLLECT METH UR: ABNORMAL
UROBILINOGEN UR STRIP-ACNC: NEGATIVE EU/DL
WBC # BLD AUTO: 4.74 K/UL (ref 3.9–12.7)

## 2021-08-08 PROCEDURE — 25000003 PHARM REV CODE 250: Performed by: NURSE PRACTITIONER

## 2021-08-08 PROCEDURE — 83735 ASSAY OF MAGNESIUM: CPT | Performed by: NURSE PRACTITIONER

## 2021-08-08 PROCEDURE — 25000003 PHARM REV CODE 250: Performed by: HOSPITALIST

## 2021-08-08 PROCEDURE — 96374 THER/PROPH/DIAG INJ IV PUSH: CPT | Mod: 59

## 2021-08-08 PROCEDURE — 81003 URINALYSIS AUTO W/O SCOPE: CPT | Performed by: EMERGENCY MEDICINE

## 2021-08-08 PROCEDURE — 78452 HT MUSCLE IMAGE SPECT MULT: CPT | Mod: TC

## 2021-08-08 PROCEDURE — 99900035 HC TECH TIME PER 15 MIN (STAT)

## 2021-08-08 PROCEDURE — 93018 NUCLEAR STRESS TEST (CUPID ONLY): ICD-10-PCS | Mod: ,,, | Performed by: INTERNAL MEDICINE

## 2021-08-08 PROCEDURE — 93018 CV STRESS TEST I&R ONLY: CPT | Mod: ,,, | Performed by: INTERNAL MEDICINE

## 2021-08-08 PROCEDURE — 93017 CV STRESS TEST TRACING ONLY: CPT

## 2021-08-08 PROCEDURE — 80053 COMPREHEN METABOLIC PANEL: CPT | Performed by: NURSE PRACTITIONER

## 2021-08-08 PROCEDURE — 93016 CV STRESS TEST SUPVJ ONLY: CPT | Mod: ,,, | Performed by: INTERNAL MEDICINE

## 2021-08-08 PROCEDURE — G0378 HOSPITAL OBSERVATION PER HR: HCPCS

## 2021-08-08 PROCEDURE — 99219 PR INITIAL OBSERVATION CARE,LEVL II: CPT | Mod: 25,,, | Performed by: INTERNAL MEDICINE

## 2021-08-08 PROCEDURE — 25000003 PHARM REV CODE 250: Performed by: INTERNAL MEDICINE

## 2021-08-08 PROCEDURE — 63600175 PHARM REV CODE 636 W HCPCS: Performed by: NURSE PRACTITIONER

## 2021-08-08 PROCEDURE — 93016 NUCLEAR STRESS TEST (CUPID ONLY): ICD-10-PCS | Mod: ,,, | Performed by: INTERNAL MEDICINE

## 2021-08-08 PROCEDURE — 63600175 PHARM REV CODE 636 W HCPCS: Performed by: HOSPITALIST

## 2021-08-08 PROCEDURE — 84484 ASSAY OF TROPONIN QUANT: CPT | Mod: 91 | Performed by: NURSE PRACTITIONER

## 2021-08-08 PROCEDURE — 99219 PR INITIAL OBSERVATION CARE,LEVL II: ICD-10-PCS | Mod: 25,,, | Performed by: INTERNAL MEDICINE

## 2021-08-08 PROCEDURE — 36415 COLL VENOUS BLD VENIPUNCTURE: CPT | Performed by: NURSE PRACTITIONER

## 2021-08-08 PROCEDURE — 94760 N-INVAS EAR/PLS OXIMETRY 1: CPT

## 2021-08-08 PROCEDURE — 99900031 HC PATIENT EDUCATION (STAT)

## 2021-08-08 PROCEDURE — A9502 TC99M TETROFOSMIN: HCPCS

## 2021-08-08 PROCEDURE — 82962 GLUCOSE BLOOD TEST: CPT

## 2021-08-08 RX ORDER — NAPROXEN SODIUM 220 MG/1
324 TABLET, FILM COATED ORAL ONCE
Status: COMPLETED | OUTPATIENT
Start: 2021-08-08 | End: 2021-08-08

## 2021-08-08 RX ORDER — LANOLIN ALCOHOL/MO/W.PET/CERES
400 CREAM (GRAM) TOPICAL DAILY
Qty: 30 TABLET | Refills: 0 | Status: SHIPPED | OUTPATIENT
Start: 2021-08-08 | End: 2021-08-12 | Stop reason: SDUPTHER

## 2021-08-08 RX ORDER — ASPIRIN 81 MG/1
81 TABLET ORAL DAILY
Status: DISCONTINUED | OUTPATIENT
Start: 2021-08-09 | End: 2021-08-08 | Stop reason: HOSPADM

## 2021-08-08 RX ORDER — MAGNESIUM SULFATE HEPTAHYDRATE 40 MG/ML
2 INJECTION, SOLUTION INTRAVENOUS
Status: DISCONTINUED | OUTPATIENT
Start: 2021-08-08 | End: 2021-08-08 | Stop reason: HOSPADM

## 2021-08-08 RX ORDER — REGADENOSON 0.08 MG/ML
0.4 INJECTION, SOLUTION INTRAVENOUS ONCE
Status: COMPLETED | OUTPATIENT
Start: 2021-08-08 | End: 2021-08-08

## 2021-08-08 RX ORDER — MAGNESIUM SULFATE HEPTAHYDRATE 40 MG/ML
2 INJECTION, SOLUTION INTRAVENOUS ONCE
Status: COMPLETED | OUTPATIENT
Start: 2021-08-08 | End: 2021-08-08

## 2021-08-08 RX ORDER — POTASSIUM CHLORIDE 20 MEQ/1
20 TABLET, EXTENDED RELEASE ORAL
Status: DISCONTINUED | OUTPATIENT
Start: 2021-08-08 | End: 2021-08-08 | Stop reason: HOSPADM

## 2021-08-08 RX ORDER — NITROGLYCERIN 0.4 MG/1
0.4 TABLET SUBLINGUAL EVERY 5 MIN PRN
Status: DISCONTINUED | OUTPATIENT
Start: 2021-08-08 | End: 2021-08-08 | Stop reason: HOSPADM

## 2021-08-08 RX ORDER — ASPIRIN 325 MG
325 TABLET, DELAYED RELEASE (ENTERIC COATED) ORAL DAILY
Status: DISCONTINUED | OUTPATIENT
Start: 2021-08-08 | End: 2021-08-08

## 2021-08-08 RX ORDER — POTASSIUM CHLORIDE 750 MG/1
10 CAPSULE, EXTENDED RELEASE ORAL DAILY
Status: DISCONTINUED | OUTPATIENT
Start: 2021-08-08 | End: 2021-08-08 | Stop reason: HOSPADM

## 2021-08-08 RX ORDER — POTASSIUM CHLORIDE 7.45 MG/ML
20 INJECTION INTRAVENOUS
Status: DISCONTINUED | OUTPATIENT
Start: 2021-08-08 | End: 2021-08-08 | Stop reason: HOSPADM

## 2021-08-08 RX ORDER — POTASSIUM CHLORIDE 7.45 MG/ML
40 INJECTION INTRAVENOUS
Status: DISCONTINUED | OUTPATIENT
Start: 2021-08-08 | End: 2021-08-08 | Stop reason: HOSPADM

## 2021-08-08 RX ORDER — POTASSIUM CHLORIDE 20 MEQ/1
40 TABLET, EXTENDED RELEASE ORAL ONCE
Status: COMPLETED | OUTPATIENT
Start: 2021-08-08 | End: 2021-08-08

## 2021-08-08 RX ORDER — METOPROLOL SUCCINATE 25 MG/1
25 TABLET, EXTENDED RELEASE ORAL DAILY
Status: DISCONTINUED | OUTPATIENT
Start: 2021-08-08 | End: 2021-08-08 | Stop reason: HOSPADM

## 2021-08-08 RX ORDER — ASPIRIN 81 MG/1
81 TABLET ORAL DAILY
Qty: 30 TABLET | Refills: 0 | Status: SHIPPED | OUTPATIENT
Start: 2021-08-08 | End: 2024-01-10

## 2021-08-08 RX ORDER — GLUCAGON 1 MG
1 KIT INJECTION
Status: DISCONTINUED | OUTPATIENT
Start: 2021-08-08 | End: 2021-08-08 | Stop reason: HOSPADM

## 2021-08-08 RX ORDER — MAGNESIUM SULFATE 1 G/100ML
1 INJECTION INTRAVENOUS
Status: DISCONTINUED | OUTPATIENT
Start: 2021-08-08 | End: 2021-08-08 | Stop reason: HOSPADM

## 2021-08-08 RX ORDER — POTASSIUM CHLORIDE 20 MEQ/1
40 TABLET, EXTENDED RELEASE ORAL
Status: DISCONTINUED | OUTPATIENT
Start: 2021-08-08 | End: 2021-08-08 | Stop reason: HOSPADM

## 2021-08-08 RX ORDER — MAGNESIUM SULFATE HEPTAHYDRATE 40 MG/ML
4 INJECTION, SOLUTION INTRAVENOUS
Status: DISCONTINUED | OUTPATIENT
Start: 2021-08-08 | End: 2021-08-08 | Stop reason: HOSPADM

## 2021-08-08 RX ORDER — LANOLIN ALCOHOL/MO/W.PET/CERES
400 CREAM (GRAM) TOPICAL ONCE
Status: COMPLETED | OUTPATIENT
Start: 2021-08-08 | End: 2021-08-08

## 2021-08-08 RX ORDER — ATORVASTATIN CALCIUM 20 MG/1
40 TABLET, FILM COATED ORAL DAILY
Status: DISCONTINUED | OUTPATIENT
Start: 2021-08-08 | End: 2021-08-08

## 2021-08-08 RX ORDER — GUANFACINE 1 MG/1
2 TABLET ORAL NIGHTLY
Status: DISCONTINUED | OUTPATIENT
Start: 2021-08-08 | End: 2021-08-08 | Stop reason: HOSPADM

## 2021-08-08 RX ORDER — MORPHINE SULFATE 2 MG/ML
1 INJECTION, SOLUTION INTRAMUSCULAR; INTRAVENOUS EVERY 6 HOURS PRN
Status: DISCONTINUED | OUTPATIENT
Start: 2021-08-08 | End: 2021-08-08 | Stop reason: HOSPADM

## 2021-08-08 RX ORDER — INSULIN ASPART 100 [IU]/ML
0-5 INJECTION, SOLUTION INTRAVENOUS; SUBCUTANEOUS EVERY 6 HOURS PRN
Status: DISCONTINUED | OUTPATIENT
Start: 2021-08-08 | End: 2021-08-08 | Stop reason: HOSPADM

## 2021-08-08 RX ORDER — DULOXETIN HYDROCHLORIDE 30 MG/1
60 CAPSULE, DELAYED RELEASE ORAL DAILY
Status: DISCONTINUED | OUTPATIENT
Start: 2021-08-08 | End: 2021-08-08 | Stop reason: HOSPADM

## 2021-08-08 RX ORDER — CALCIUM CARBONATE 200(500)MG
500 TABLET,CHEWABLE ORAL DAILY
Status: DISCONTINUED | OUTPATIENT
Start: 2021-08-08 | End: 2021-08-08 | Stop reason: HOSPADM

## 2021-08-08 RX ORDER — ATORVASTATIN CALCIUM 40 MG/1
80 TABLET, FILM COATED ORAL DAILY
Status: DISCONTINUED | OUTPATIENT
Start: 2021-08-08 | End: 2021-08-08 | Stop reason: HOSPADM

## 2021-08-08 RX ORDER — LISINOPRIL 10 MG/1
10 TABLET ORAL DAILY
Status: DISCONTINUED | OUTPATIENT
Start: 2021-08-08 | End: 2021-08-08 | Stop reason: HOSPADM

## 2021-08-08 RX ORDER — ACETAMINOPHEN 325 MG/1
650 TABLET ORAL EVERY 6 HOURS PRN
Status: DISCONTINUED | OUTPATIENT
Start: 2021-08-08 | End: 2021-08-08 | Stop reason: HOSPADM

## 2021-08-08 RX ORDER — LANOLIN ALCOHOL/MO/W.PET/CERES
800 CREAM (GRAM) TOPICAL
Status: DISCONTINUED | OUTPATIENT
Start: 2021-08-08 | End: 2021-08-08 | Stop reason: HOSPADM

## 2021-08-08 RX ADMIN — CALCIUM CARBONATE (ANTACID) CHEW TAB 500 MG 500 MG: 500 CHEW TAB at 04:08

## 2021-08-08 RX ADMIN — MAGNESIUM OXIDE 400 MG: 400 TABLET ORAL at 04:08

## 2021-08-08 RX ADMIN — METOPROLOL SUCCINATE 25 MG: 25 TABLET, EXTENDED RELEASE ORAL at 04:08

## 2021-08-08 RX ADMIN — REGADENOSON 0.4 MG: 0.08 INJECTION, SOLUTION INTRAVENOUS at 11:08

## 2021-08-08 RX ADMIN — LISINOPRIL 10 MG: 10 TABLET ORAL at 04:08

## 2021-08-08 RX ADMIN — MAGNESIUM SULFATE 2 G: 2 INJECTION INTRAVENOUS at 10:08

## 2021-08-08 RX ADMIN — NITROGLYCERIN 1 INCH: 20 OINTMENT TOPICAL at 03:08

## 2021-08-08 RX ADMIN — POTASSIUM CHLORIDE 10 MEQ: 750 CAPSULE, EXTENDED RELEASE ORAL at 04:08

## 2021-08-08 RX ADMIN — POTASSIUM CHLORIDE 40 MEQ: 1500 TABLET, EXTENDED RELEASE ORAL at 10:08

## 2021-08-08 RX ADMIN — ATORVASTATIN CALCIUM 80 MG: 40 TABLET, FILM COATED ORAL at 04:08

## 2021-08-08 RX ADMIN — ASPIRIN 324 MG: 81 TABLET, CHEWABLE ORAL at 03:08

## 2021-08-08 RX ADMIN — DULOXETINE HYDROCHLORIDE 60 MG: 30 CAPSULE, DELAYED RELEASE ORAL at 04:08

## 2021-08-09 ENCOUNTER — TELEPHONE (OUTPATIENT)
Dept: CARDIOLOGY | Facility: CLINIC | Age: 66
End: 2021-08-09

## 2021-08-12 ENCOUNTER — OFFICE VISIT (OUTPATIENT)
Dept: FAMILY MEDICINE | Facility: CLINIC | Age: 66
End: 2021-08-12
Payer: MEDICARE

## 2021-08-12 ENCOUNTER — PATIENT MESSAGE (OUTPATIENT)
Dept: FAMILY MEDICINE | Facility: CLINIC | Age: 66
End: 2021-08-12

## 2021-08-12 VITALS
RESPIRATION RATE: 18 BRPM | OXYGEN SATURATION: 98 % | WEIGHT: 219.38 LBS | SYSTOLIC BLOOD PRESSURE: 148 MMHG | BODY MASS INDEX: 34.43 KG/M2 | HEART RATE: 96 BPM | HEIGHT: 67 IN | DIASTOLIC BLOOD PRESSURE: 62 MMHG

## 2021-08-12 DIAGNOSIS — E11.9 TYPE 2 DIABETES MELLITUS TREATED WITH INSULIN: ICD-10-CM

## 2021-08-12 DIAGNOSIS — Z90.09 HISTORY OF LOBECTOMY OF THYROID: ICD-10-CM

## 2021-08-12 DIAGNOSIS — R07.89 ATYPICAL CHEST PAIN: ICD-10-CM

## 2021-08-12 DIAGNOSIS — E87.6 HYPOKALEMIA: ICD-10-CM

## 2021-08-12 DIAGNOSIS — Z79.4 TYPE 2 DIABETES MELLITUS TREATED WITH INSULIN: ICD-10-CM

## 2021-08-12 DIAGNOSIS — E04.2 MULTINODULAR GOITER: Primary | ICD-10-CM

## 2021-08-12 DIAGNOSIS — E78.2 MIXED HYPERLIPIDEMIA: ICD-10-CM

## 2021-08-12 DIAGNOSIS — E83.42 HYPOMAGNESEMIA: ICD-10-CM

## 2021-08-12 DIAGNOSIS — I10 BENIGN ESSENTIAL HYPERTENSION: ICD-10-CM

## 2021-08-12 PROCEDURE — 99214 OFFICE O/P EST MOD 30 MIN: CPT | Mod: S$GLB,,, | Performed by: INTERNAL MEDICINE

## 2021-08-12 PROCEDURE — 99214 PR OFFICE/OUTPT VISIT, EST, LEVL IV, 30-39 MIN: ICD-10-PCS | Mod: S$GLB,,, | Performed by: INTERNAL MEDICINE

## 2021-08-12 RX ORDER — GUANFACINE 2 MG/1
2 TABLET ORAL NIGHTLY
Qty: 90 TABLET | Refills: 3 | Status: SHIPPED | OUTPATIENT
Start: 2021-08-12 | End: 2022-08-05 | Stop reason: SDUPTHER

## 2021-08-12 RX ORDER — INSULIN GLARGINE 100 [IU]/ML
60 INJECTION, SOLUTION SUBCUTANEOUS DAILY
Qty: 54 ML | Refills: 3 | Status: SHIPPED | OUTPATIENT
Start: 2021-08-12 | End: 2022-04-06 | Stop reason: ALTCHOICE

## 2021-08-12 RX ORDER — METFORMIN HYDROCHLORIDE 1000 MG/1
1000 TABLET ORAL 2 TIMES DAILY WITH MEALS
Qty: 180 TABLET | Refills: 3 | Status: SHIPPED | OUTPATIENT
Start: 2021-08-12 | End: 2022-08-05 | Stop reason: SDUPTHER

## 2021-08-12 RX ORDER — LEVOTHYROXINE SODIUM 25 UG/1
TABLET ORAL
COMMUNITY
Start: 2021-08-10 | End: 2021-10-12

## 2021-08-12 RX ORDER — LANOLIN ALCOHOL/MO/W.PET/CERES
400 CREAM (GRAM) TOPICAL DAILY
Qty: 30 TABLET | Refills: 5 | Status: SHIPPED | OUTPATIENT
Start: 2021-08-12 | End: 2022-03-07 | Stop reason: SDUPTHER

## 2021-08-12 RX ORDER — ROSUVASTATIN CALCIUM 40 MG/1
40 TABLET, COATED ORAL NIGHTLY
Qty: 90 TABLET | Refills: 3 | Status: SHIPPED | OUTPATIENT
Start: 2021-08-12 | End: 2021-08-16

## 2021-09-03 ENCOUNTER — PATIENT MESSAGE (OUTPATIENT)
Dept: RHEUMATOLOGY | Facility: CLINIC | Age: 66
End: 2021-09-03

## 2021-09-06 RX ORDER — DULOXETIN HYDROCHLORIDE 60 MG/1
60 CAPSULE, DELAYED RELEASE ORAL DAILY
Qty: 30 CAPSULE | Refills: 3 | Status: SHIPPED | OUTPATIENT
Start: 2021-09-06 | End: 2022-01-05 | Stop reason: SDUPTHER

## 2021-09-23 ENCOUNTER — LAB VISIT (OUTPATIENT)
Dept: LAB | Facility: HOSPITAL | Age: 66
End: 2021-09-23
Attending: SURGERY
Payer: MEDICARE

## 2021-09-23 DIAGNOSIS — E04.2 NONTOXIC MULTINODULAR GOITER: ICD-10-CM

## 2021-09-23 DIAGNOSIS — E04.2 NONTOXIC MULTINODULAR GOITER: Primary | ICD-10-CM

## 2021-09-23 LAB
T4 FREE SERPL-MCNC: 0.79 NG/DL (ref 0.71–1.51)
TSH SERPL DL<=0.005 MIU/L-ACNC: 2.61 UIU/ML (ref 0.34–5.6)

## 2021-09-23 PROCEDURE — 84443 ASSAY THYROID STIM HORMONE: CPT | Performed by: SURGERY

## 2021-09-23 PROCEDURE — 84480 ASSAY TRIIODOTHYRONINE (T3): CPT | Performed by: SURGERY

## 2021-09-23 PROCEDURE — 84439 ASSAY OF FREE THYROXINE: CPT | Performed by: SURGERY

## 2021-09-24 LAB — T3 SERPL-MCNC: 123 NG/DL (ref 71–180)

## 2021-10-02 ENCOUNTER — PATIENT MESSAGE (OUTPATIENT)
Dept: FAMILY MEDICINE | Facility: CLINIC | Age: 66
End: 2021-10-02

## 2021-10-02 ENCOUNTER — PATIENT MESSAGE (OUTPATIENT)
Dept: RHEUMATOLOGY | Facility: CLINIC | Age: 66
End: 2021-10-02

## 2021-10-02 DIAGNOSIS — I10 BENIGN ESSENTIAL HYPERTENSION: ICD-10-CM

## 2021-10-04 RX ORDER — METOPROLOL SUCCINATE 25 MG/1
25 TABLET, EXTENDED RELEASE ORAL DAILY
Qty: 90 TABLET | Refills: 3 | Status: SHIPPED | OUTPATIENT
Start: 2021-10-04 | End: 2022-09-30 | Stop reason: SDUPTHER

## 2021-10-07 ENCOUNTER — LAB VISIT (OUTPATIENT)
Dept: LAB | Facility: HOSPITAL | Age: 66
End: 2021-10-07
Attending: INTERNAL MEDICINE
Payer: MEDICARE

## 2021-10-07 DIAGNOSIS — E87.6 HYPOKALEMIA: ICD-10-CM

## 2021-10-07 DIAGNOSIS — D50.9 MICROCYTIC ANEMIA: ICD-10-CM

## 2021-10-07 DIAGNOSIS — D63.8 ANEMIA, CHRONIC DISEASE: ICD-10-CM

## 2021-10-07 DIAGNOSIS — E83.42 HYPOMAGNESEMIA: ICD-10-CM

## 2021-10-07 DIAGNOSIS — D64.89 ANEMIA DUE TO MULTIPLE MECHANISMS: ICD-10-CM

## 2021-10-07 DIAGNOSIS — E89.0 POSTSURGICAL HYPOTHYROIDISM: Primary | ICD-10-CM

## 2021-10-07 DIAGNOSIS — E89.0 POSTSURGICAL HYPOTHYROIDISM: ICD-10-CM

## 2021-10-07 DIAGNOSIS — D53.9 NUTRITIONAL ANEMIA, UNSPECIFIED: ICD-10-CM

## 2021-10-07 LAB
ALBUMIN SERPL BCP-MCNC: 3.8 G/DL (ref 3.5–5.2)
ALP SERPL-CCNC: 73 U/L (ref 55–135)
ALT SERPL W/O P-5'-P-CCNC: 24 U/L (ref 10–44)
ANION GAP SERPL CALC-SCNC: 8 MMOL/L (ref 8–16)
ANION GAP SERPL CALC-SCNC: 8 MMOL/L (ref 8–16)
AST SERPL-CCNC: 22 U/L (ref 10–40)
BASOPHILS # BLD AUTO: 0.04 K/UL (ref 0–0.2)
BASOPHILS NFR BLD: 0.7 % (ref 0–1.9)
BILIRUB SERPL-MCNC: 0.3 MG/DL (ref 0.1–1)
BUN SERPL-MCNC: 18 MG/DL (ref 8–23)
BUN SERPL-MCNC: 18 MG/DL (ref 8–23)
CALCIUM SERPL-MCNC: 9.7 MG/DL (ref 8.7–10.5)
CALCIUM SERPL-MCNC: 9.7 MG/DL (ref 8.7–10.5)
CHLORIDE SERPL-SCNC: 100 MMOL/L (ref 95–110)
CHLORIDE SERPL-SCNC: 100 MMOL/L (ref 95–110)
CO2 SERPL-SCNC: 35 MMOL/L (ref 23–29)
CO2 SERPL-SCNC: 35 MMOL/L (ref 23–29)
CREAT SERPL-MCNC: 0.8 MG/DL (ref 0.5–1.4)
CREAT SERPL-MCNC: 0.8 MG/DL (ref 0.5–1.4)
DIFFERENTIAL METHOD: ABNORMAL
EOSINOPHIL # BLD AUTO: 0.1 K/UL (ref 0–0.5)
EOSINOPHIL NFR BLD: 1.6 % (ref 0–8)
ERYTHROCYTE [DISTWIDTH] IN BLOOD BY AUTOMATED COUNT: 13.8 % (ref 11.5–14.5)
EST. GFR  (AFRICAN AMERICAN): >60 ML/MIN/1.73 M^2
EST. GFR  (AFRICAN AMERICAN): >60 ML/MIN/1.73 M^2
EST. GFR  (NON AFRICAN AMERICAN): >60 ML/MIN/1.73 M^2
EST. GFR  (NON AFRICAN AMERICAN): >60 ML/MIN/1.73 M^2
FERRITIN SERPL-MCNC: 68 NG/ML (ref 20–300)
GLUCOSE SERPL-MCNC: 115 MG/DL (ref 70–110)
GLUCOSE SERPL-MCNC: 115 MG/DL (ref 70–110)
HCT VFR BLD AUTO: 39.8 % (ref 37–48.5)
HGB BLD-MCNC: 12.5 G/DL (ref 12–16)
IMM GRANULOCYTES # BLD AUTO: 0.01 K/UL (ref 0–0.04)
IMM GRANULOCYTES NFR BLD AUTO: 0.2 % (ref 0–0.5)
IRON SERPL-MCNC: 67 UG/DL (ref 30–160)
LYMPHOCYTES # BLD AUTO: 1.9 K/UL (ref 1–4.8)
LYMPHOCYTES NFR BLD: 33.9 % (ref 18–48)
MAGNESIUM SERPL-MCNC: 1.6 MG/DL (ref 1.6–2.6)
MCH RBC QN AUTO: 27.1 PG (ref 27–31)
MCHC RBC AUTO-ENTMCNC: 31.4 G/DL (ref 32–36)
MCV RBC AUTO: 86 FL (ref 82–98)
MONOCYTES # BLD AUTO: 0.4 K/UL (ref 0.3–1)
MONOCYTES NFR BLD: 7.8 % (ref 4–15)
NEUTROPHILS # BLD AUTO: 3.1 K/UL (ref 1.8–7.7)
NEUTROPHILS NFR BLD: 55.8 % (ref 38–73)
NRBC BLD-RTO: 0 /100 WBC
PLATELET # BLD AUTO: 297 K/UL (ref 150–450)
PMV BLD AUTO: 10.8 FL (ref 9.2–12.9)
POTASSIUM SERPL-SCNC: 3.6 MMOL/L (ref 3.5–5.1)
POTASSIUM SERPL-SCNC: 3.6 MMOL/L (ref 3.5–5.1)
PROT SERPL-MCNC: 7 G/DL (ref 6–8.4)
RBC # BLD AUTO: 4.62 M/UL (ref 4–5.4)
SATURATED IRON: 19 % (ref 20–50)
SODIUM SERPL-SCNC: 143 MMOL/L (ref 136–145)
SODIUM SERPL-SCNC: 143 MMOL/L (ref 136–145)
T4 FREE SERPL-MCNC: 0.7 NG/DL (ref 0.71–1.51)
TOTAL IRON BINDING CAPACITY: 361 UG/DL (ref 250–450)
TRANSFERRIN SERPL-MCNC: 258 MG/DL (ref 200–375)
TSH SERPL DL<=0.005 MIU/L-ACNC: 2.72 UIU/ML (ref 0.34–5.6)
WBC # BLD AUTO: 5.51 K/UL (ref 3.9–12.7)

## 2021-10-07 PROCEDURE — 83735 ASSAY OF MAGNESIUM: CPT | Performed by: INTERNAL MEDICINE

## 2021-10-07 PROCEDURE — 84466 ASSAY OF TRANSFERRIN: CPT | Performed by: INTERNAL MEDICINE

## 2021-10-07 PROCEDURE — 80053 COMPREHEN METABOLIC PANEL: CPT | Performed by: INTERNAL MEDICINE

## 2021-10-07 PROCEDURE — 36415 COLL VENOUS BLD VENIPUNCTURE: CPT | Performed by: INTERNAL MEDICINE

## 2021-10-07 PROCEDURE — 84443 ASSAY THYROID STIM HORMONE: CPT | Performed by: INTERNAL MEDICINE

## 2021-10-07 PROCEDURE — 82728 ASSAY OF FERRITIN: CPT | Performed by: INTERNAL MEDICINE

## 2021-10-07 PROCEDURE — 84439 ASSAY OF FREE THYROXINE: CPT | Performed by: INTERNAL MEDICINE

## 2021-10-07 PROCEDURE — 85025 COMPLETE CBC W/AUTO DIFF WBC: CPT | Performed by: INTERNAL MEDICINE

## 2021-10-11 ENCOUNTER — OFFICE VISIT (OUTPATIENT)
Dept: HEMATOLOGY/ONCOLOGY | Facility: CLINIC | Age: 66
End: 2021-10-11
Payer: MEDICARE

## 2021-10-11 VITALS
DIASTOLIC BLOOD PRESSURE: 84 MMHG | BODY MASS INDEX: 35.47 KG/M2 | WEIGHT: 226 LBS | RESPIRATION RATE: 18 BRPM | HEIGHT: 67 IN | HEART RATE: 60 BPM | SYSTOLIC BLOOD PRESSURE: 141 MMHG

## 2021-10-11 DIAGNOSIS — D50.9 MICROCYTIC ANEMIA: ICD-10-CM

## 2021-10-11 DIAGNOSIS — D75.838 SECONDARY THROMBOCYTOSIS: ICD-10-CM

## 2021-10-11 DIAGNOSIS — D64.89 ANEMIA DUE TO MULTIPLE MECHANISMS: Primary | ICD-10-CM

## 2021-10-11 DIAGNOSIS — D63.8 ANEMIA, CHRONIC DISEASE: ICD-10-CM

## 2021-10-11 DIAGNOSIS — D50.8 IRON DEFICIENCY ANEMIA FOLLOWING BARIATRIC SURGERY: ICD-10-CM

## 2021-10-11 DIAGNOSIS — K95.89 IRON DEFICIENCY ANEMIA FOLLOWING BARIATRIC SURGERY: ICD-10-CM

## 2021-10-11 DIAGNOSIS — E53.8 B12 DEFICIENCY: ICD-10-CM

## 2021-10-11 DIAGNOSIS — Z98.84 S/P BARIATRIC SURGERY: ICD-10-CM

## 2021-10-11 PROCEDURE — 96372 THER/PROPH/DIAG INJ SC/IM: CPT | Mod: S$GLB,,, | Performed by: INTERNAL MEDICINE

## 2021-10-11 PROCEDURE — 99213 OFFICE O/P EST LOW 20 MIN: CPT | Mod: 25,S$GLB,, | Performed by: INTERNAL MEDICINE

## 2021-10-11 PROCEDURE — 96372 PR INJECTION,THERAP/PROPH/DIAG2ST, IM OR SUBCUT: ICD-10-PCS | Mod: S$GLB,,, | Performed by: INTERNAL MEDICINE

## 2021-10-11 PROCEDURE — 99213 PR OFFICE/OUTPT VISIT, EST, LEVL III, 20-29 MIN: ICD-10-PCS | Mod: 25,S$GLB,, | Performed by: INTERNAL MEDICINE

## 2021-10-11 RX ORDER — LEVOTHYROXINE SODIUM 50 UG/1
50 TABLET ORAL DAILY
COMMUNITY
Start: 2021-09-15 | End: 2022-01-05

## 2021-10-11 RX ADMIN — CYANOCOBALAMIN 1000 MCG: 1000 INJECTION, SOLUTION INTRAMUSCULAR; SUBCUTANEOUS at 11:10

## 2021-10-12 ENCOUNTER — OFFICE VISIT (OUTPATIENT)
Dept: FAMILY MEDICINE | Facility: CLINIC | Age: 66
End: 2021-10-12
Payer: MEDICARE

## 2021-10-12 VITALS
DIASTOLIC BLOOD PRESSURE: 70 MMHG | HEIGHT: 67 IN | RESPIRATION RATE: 18 BRPM | OXYGEN SATURATION: 99 % | WEIGHT: 225 LBS | SYSTOLIC BLOOD PRESSURE: 136 MMHG | BODY MASS INDEX: 35.31 KG/M2 | HEART RATE: 78 BPM

## 2021-10-12 DIAGNOSIS — Z23 NEED FOR INFLUENZA VACCINATION: ICD-10-CM

## 2021-10-12 DIAGNOSIS — Z90.09 HISTORY OF LOBECTOMY OF THYROID: ICD-10-CM

## 2021-10-12 DIAGNOSIS — E11.9 TYPE 2 DIABETES MELLITUS TREATED WITH INSULIN: Primary | ICD-10-CM

## 2021-10-12 DIAGNOSIS — E78.2 MIXED HYPERLIPIDEMIA: ICD-10-CM

## 2021-10-12 DIAGNOSIS — I10 BENIGN ESSENTIAL HYPERTENSION: ICD-10-CM

## 2021-10-12 DIAGNOSIS — Z79.4 TYPE 2 DIABETES MELLITUS TREATED WITH INSULIN: Primary | ICD-10-CM

## 2021-10-12 DIAGNOSIS — D64.89 ANEMIA DUE TO MULTIPLE MECHANISMS: ICD-10-CM

## 2021-10-12 LAB — HBA1C MFR BLD: 6.9 %

## 2021-10-12 PROCEDURE — G0008 FLU VACCINE - QUADRIVALENT - HIGH DOSE (65+) PRESERVATIVE FREE IM: ICD-10-PCS | Mod: S$GLB,,, | Performed by: INTERNAL MEDICINE

## 2021-10-12 PROCEDURE — 83036 HEMOGLOBIN GLYCOSYLATED A1C: CPT | Mod: QW,S$GLB,, | Performed by: INTERNAL MEDICINE

## 2021-10-12 PROCEDURE — 99214 PR OFFICE/OUTPT VISIT, EST, LEVL IV, 30-39 MIN: ICD-10-PCS | Mod: 25,S$GLB,, | Performed by: INTERNAL MEDICINE

## 2021-10-12 PROCEDURE — 90662 IIV NO PRSV INCREASED AG IM: CPT | Mod: S$GLB,,, | Performed by: INTERNAL MEDICINE

## 2021-10-12 PROCEDURE — 83036 POCT HEMOGLOBIN A1C: ICD-10-PCS | Mod: QW,S$GLB,, | Performed by: INTERNAL MEDICINE

## 2021-10-12 PROCEDURE — G0008 ADMIN INFLUENZA VIRUS VAC: HCPCS | Mod: S$GLB,,, | Performed by: INTERNAL MEDICINE

## 2021-10-12 PROCEDURE — 99214 OFFICE O/P EST MOD 30 MIN: CPT | Mod: 25,S$GLB,, | Performed by: INTERNAL MEDICINE

## 2021-10-12 PROCEDURE — 90662 FLU VACCINE - QUADRIVALENT - HIGH DOSE (65+) PRESERVATIVE FREE IM: ICD-10-PCS | Mod: S$GLB,,, | Performed by: INTERNAL MEDICINE

## 2021-10-14 DIAGNOSIS — E04.2 NONTOXIC MULTINODULAR GOITER: Primary | ICD-10-CM

## 2021-11-08 ENCOUNTER — CLINICAL SUPPORT (OUTPATIENT)
Dept: HEMATOLOGY/ONCOLOGY | Facility: CLINIC | Age: 66
End: 2021-11-08
Payer: MEDICARE

## 2021-11-08 DIAGNOSIS — E53.8 B12 DEFICIENCY: ICD-10-CM

## 2021-11-08 PROCEDURE — 96372 THER/PROPH/DIAG INJ SC/IM: CPT | Mod: S$GLB,,, | Performed by: INTERNAL MEDICINE

## 2021-11-08 PROCEDURE — 96372 PR INJECTION,THERAP/PROPH/DIAG2ST, IM OR SUBCUT: ICD-10-PCS | Mod: S$GLB,,, | Performed by: INTERNAL MEDICINE

## 2021-11-08 RX ADMIN — CYANOCOBALAMIN 1000 MCG: 1000 INJECTION, SOLUTION INTRAMUSCULAR; SUBCUTANEOUS at 10:11

## 2021-11-10 DIAGNOSIS — Z12.31 ENCOUNTER FOR SCREENING MAMMOGRAM FOR MALIGNANT NEOPLASM OF BREAST: Primary | ICD-10-CM

## 2021-12-09 ENCOUNTER — HOSPITAL ENCOUNTER (OUTPATIENT)
Dept: RADIOLOGY | Facility: HOSPITAL | Age: 66
Discharge: HOME | End: 2021-12-09
Attending: INTERNAL MEDICINE
Payer: MEDICARE

## 2021-12-09 DIAGNOSIS — Z12.31 ENCOUNTER FOR SCREENING MAMMOGRAM FOR MALIGNANT NEOPLASM OF BREAST: ICD-10-CM

## 2021-12-09 PROCEDURE — 77067 SCR MAMMO BI INCL CAD: CPT | Mod: TC,PO

## 2021-12-13 ENCOUNTER — CLINICAL SUPPORT (OUTPATIENT)
Dept: HEMATOLOGY/ONCOLOGY | Facility: CLINIC | Age: 66
End: 2021-12-13
Payer: MEDICARE

## 2021-12-13 DIAGNOSIS — E53.8 B12 DEFICIENCY: ICD-10-CM

## 2021-12-13 PROCEDURE — 96372 THER/PROPH/DIAG INJ SC/IM: CPT | Mod: S$GLB,,, | Performed by: INTERNAL MEDICINE

## 2021-12-13 PROCEDURE — 96372 PR INJECTION,THERAP/PROPH/DIAG2ST, IM OR SUBCUT: ICD-10-PCS | Mod: S$GLB,,, | Performed by: INTERNAL MEDICINE

## 2021-12-13 RX ADMIN — CYANOCOBALAMIN 1000 MCG: 1000 INJECTION, SOLUTION INTRAMUSCULAR; SUBCUTANEOUS at 11:12

## 2021-12-29 ENCOUNTER — HOSPITAL ENCOUNTER (OUTPATIENT)
Dept: RADIOLOGY | Facility: HOSPITAL | Age: 66
Discharge: HOME | End: 2021-12-29
Attending: INTERNAL MEDICINE
Payer: MEDICARE

## 2021-12-29 DIAGNOSIS — E04.2 NONTOXIC MULTINODULAR GOITER: ICD-10-CM

## 2021-12-29 PROCEDURE — 76536 US EXAM OF HEAD AND NECK: CPT | Mod: TC

## 2022-01-05 ENCOUNTER — OFFICE VISIT (OUTPATIENT)
Dept: FAMILY MEDICINE | Facility: CLINIC | Age: 67
End: 2022-01-05
Payer: MEDICARE

## 2022-01-05 VITALS
OXYGEN SATURATION: 96 % | BODY MASS INDEX: 36.05 KG/M2 | HEIGHT: 67 IN | RESPIRATION RATE: 18 BRPM | HEART RATE: 69 BPM | WEIGHT: 229.69 LBS | TEMPERATURE: 99 F | SYSTOLIC BLOOD PRESSURE: 142 MMHG | DIASTOLIC BLOOD PRESSURE: 88 MMHG

## 2022-01-05 DIAGNOSIS — M25.572 CHRONIC PAIN OF BOTH ANKLES: ICD-10-CM

## 2022-01-05 DIAGNOSIS — G89.29 CHRONIC PAIN OF BOTH ANKLES: ICD-10-CM

## 2022-01-05 DIAGNOSIS — M79.7 FIBROMYALGIA: ICD-10-CM

## 2022-01-05 DIAGNOSIS — Z11.59 ENCOUNTER FOR HEPATITIS C SCREENING TEST FOR LOW RISK PATIENT: ICD-10-CM

## 2022-01-05 DIAGNOSIS — H65.93 MIDDLE EAR EFFUSION, BILATERAL: ICD-10-CM

## 2022-01-05 DIAGNOSIS — M25.571 CHRONIC PAIN OF BOTH ANKLES: ICD-10-CM

## 2022-01-05 DIAGNOSIS — E11.9 TYPE 2 DIABETES MELLITUS TREATED WITH INSULIN: Primary | ICD-10-CM

## 2022-01-05 DIAGNOSIS — Z79.4 TYPE 2 DIABETES MELLITUS TREATED WITH INSULIN: Primary | ICD-10-CM

## 2022-01-05 DIAGNOSIS — E78.2 MIXED HYPERLIPIDEMIA: ICD-10-CM

## 2022-01-05 DIAGNOSIS — I10 BENIGN ESSENTIAL HYPERTENSION: ICD-10-CM

## 2022-01-05 PROBLEM — I21.9 MYOCARDIAL INFARCTION: Status: ACTIVE | Noted: 2022-01-05

## 2022-01-05 PROBLEM — Z87.898 PERSONAL HISTORY OF OTHER SPECIFIED CONDITIONS: Status: ACTIVE | Noted: 2022-01-05

## 2022-01-05 PROBLEM — M19.90 OSTEOARTHRITIS: Status: ACTIVE | Noted: 2022-01-05

## 2022-01-05 PROBLEM — Z87.898 PERSONAL HISTORY OF OTHER SPECIFIED CONDITIONS: Status: RESOLVED | Noted: 2022-01-05 | Resolved: 2022-01-05

## 2022-01-05 PROBLEM — I21.9 MYOCARDIAL INFARCTION: Status: RESOLVED | Noted: 2022-01-05 | Resolved: 2022-01-05

## 2022-01-05 PROBLEM — R07.89 ATYPICAL CHEST PAIN: Status: RESOLVED | Noted: 2021-08-08 | Resolved: 2022-01-05

## 2022-01-05 PROBLEM — I25.10 CAD (CORONARY ARTERY DISEASE): Status: ACTIVE | Noted: 2018-11-23

## 2022-01-05 LAB — HBA1C MFR BLD: 7.3 %

## 2022-01-05 PROCEDURE — 99214 PR OFFICE/OUTPT VISIT, EST, LEVL IV, 30-39 MIN: ICD-10-PCS | Mod: S$GLB,,, | Performed by: NURSE PRACTITIONER

## 2022-01-05 PROCEDURE — 83036 POCT HEMOGLOBIN A1C: ICD-10-PCS | Mod: QW,S$GLB,, | Performed by: NURSE PRACTITIONER

## 2022-01-05 PROCEDURE — 99214 OFFICE O/P EST MOD 30 MIN: CPT | Mod: S$GLB,,, | Performed by: NURSE PRACTITIONER

## 2022-01-05 PROCEDURE — 83036 HEMOGLOBIN GLYCOSYLATED A1C: CPT | Mod: QW,S$GLB,, | Performed by: NURSE PRACTITIONER

## 2022-01-05 RX ORDER — LEVOTHYROXINE SODIUM 75 UG/1
75 TABLET ORAL
COMMUNITY
End: 2023-07-18 | Stop reason: CLARIF

## 2022-01-05 RX ORDER — FLUTICASONE PROPIONATE 50 MCG
2 SPRAY, SUSPENSION (ML) NASAL DAILY
Qty: 18.2 ML | Refills: 6 | Status: SHIPPED | OUTPATIENT
Start: 2022-01-05 | End: 2022-04-06

## 2022-01-05 RX ORDER — DULOXETIN HYDROCHLORIDE 60 MG/1
60 CAPSULE, DELAYED RELEASE ORAL DAILY
Qty: 30 CAPSULE | Refills: 3 | Status: SHIPPED | OUTPATIENT
Start: 2022-01-05 | End: 2022-05-03 | Stop reason: SDUPTHER

## 2022-01-05 RX ORDER — AMLODIPINE BESYLATE 2.5 MG/1
2.5 TABLET ORAL DAILY
Qty: 30 TABLET | Refills: 3 | Status: SHIPPED | OUTPATIENT
Start: 2022-01-05 | End: 2022-05-03 | Stop reason: SDUPTHER

## 2022-01-05 NOTE — PROGRESS NOTES
SUBJECTIVE:      Patient ID: Chanel Cervantes is a 66 y.o. female.    Chief Complaint: Diabetes    Dr. Henley - endocrine  Dr. Jackson - hem/onc  Dr. Orozco - rheumatology  Dr. Nunez - bariatrics/gen surg  Dr. Chance - surg (thyroid)  Dr. Guan - eye doctor    Former pt of Dr. Funk presents for F/U HTN, DMII. This is a 67 yo F with h/o DM, HTN, anemia, CAD, hx of MI, depression, obesity s/p gastric sleeve in April 2019 c/b incisional ventral hernia s/p repair, panniculectomy and full thickness skin graft on 12/4/19, anemia, multinodular thyroid s/p R thyroid lobectomy 7/2021, and NIKOLAS. Hemoglobin A1c has worsened today to 7.3 from previous 6.9%. She continues on Basaglar and metformin. She reports her diet has not been good and she cannot exercise reportedly due to her fibromyalgia and chronic pain to her B ankles. Her BP is slightly uncontrolled today. Previously, she has been on Benazepril with control of her BP. Denies CP, SOB, wheezing, fevers, nausea, vomiting, diarrhea, constipation, numbness, weakness, dizziness, palpitations, or any other concerns at this time.     Diabetes  She presents for her follow-up diabetic visit. She has type 2 diabetes mellitus. Her disease course has been worsening. There are no hypoglycemic associated symptoms. Pertinent negatives for hypoglycemia include no confusion, dizziness, headaches, nervousness/anxiousness, seizures or sweats. There are no diabetic associated symptoms. Pertinent negatives for diabetes include no blurred vision, no chest pain, no fatigue, no polydipsia, no polyuria and no weakness. There are no hypoglycemic complications. Symptoms are stable. Diabetic complications include heart disease. Risk factors for coronary artery disease include diabetes mellitus, dyslipidemia, obesity, hypertension, sedentary lifestyle and post-menopausal. Current diabetic treatment includes insulin injections and oral agent (monotherapy). She is compliant with treatment most  of the time. Her weight is increasing steadily. She is following a generally healthy diet. When asked about meal planning, she reported none. She never participates in exercise. (<130) An ACE inhibitor/angiotensin II receptor blocker is being taken. She does not see a podiatrist.Eye exam is current.   Hypertension  This is a chronic problem. The current episode started more than 1 year ago. The problem has been gradually worsening since onset. The problem is uncontrolled. Pertinent negatives include no anxiety, blurred vision, chest pain, headaches, malaise/fatigue, neck pain, orthopnea, palpitations, peripheral edema, PND, shortness of breath or sweats. Agents associated with hypertension include thyroid hormones. Risk factors for coronary artery disease include sedentary lifestyle, obesity, post-menopausal state, diabetes mellitus and dyslipidemia. Past treatments include ACE inhibitors. The current treatment provides no improvement. Compliance problems include diet and exercise.  Hypertensive end-organ damage includes CAD/MI. Identifiable causes of hypertension include sleep apnea and a thyroid problem.       Past Surgical History:   Procedure Laterality Date    ANGIOGRAM, CORONARY, WITH LEFT HEART CATHETERIZATION      APPENDECTOMY      BARIATRIC SURGERY  2019    Dr Nunez    CARPAL TUNNEL RELEASE Bilateral 2002     SECTION      CHOLECYSTECTOMY      COLONOSCOPY      CORONARY ANGIOPLASTY WITH STENT PLACEMENT      CORONARY ARTERY BYPASS GRAFT      EPIDURAL STEROID INJECTION INTO LUMBAR SPINE      x2    ESOPHAGOGASTRODUODENOSCOPY      HERNIA REPAIR  2019    HYSTERECTOMY      THYROID LOBECTOMY Right 2021    Procedure: LOBECTOMY, THYROID;  Surgeon: Mari Chance MD;  Location: [facility];  Service: General;  Laterality: Right;     Family History   Problem Relation Age of Onset    Diabetes Mother     Vision loss Mother     Heart disease Father     Vision loss Father     Stroke  "Father       Social History     Socioeconomic History    Marital status:    Tobacco Use    Smoking status: Former Smoker     Packs/day: 1.00     Years: 15.00     Pack years: 15.00     Quit date:      Years since quittin.0    Smokeless tobacco: Never Used   Substance and Sexual Activity    Alcohol use: No     Comment: "maybe once a year"    Drug use: No    Sexual activity: Not Currently     Social Determinants of Health     Financial Resource Strain: Unknown    Difficulty of Paying Living Expenses: Patient refused   Food Insecurity: Unknown    Worried About Running Out of Food in the Last Year: Patient refused    Ran Out of Food in the Last Year: Patient refused   Transportation Needs: Unknown    Lack of Transportation (Medical): Patient refused    Lack of Transportation (Non-Medical): Patient refused   Physical Activity: Inactive    Days of Exercise per Week: 0 days    Minutes of Exercise per Session: 0 min   Stress: Stress Concern Present    Feeling of Stress : Rather much   Social Connections: Unknown    Frequency of Communication with Friends and Family: More than three times a week    Frequency of Social Gatherings with Friends and Family: More than three times a week    Active Member of Clubs or Organizations: No    Attends Club or Organization Meetings: Never    Marital Status:    Housing Stability: Low Risk     Unable to Pay for Housing in the Last Year: No    Number of Places Lived in the Last Year: 1    Unstable Housing in the Last Year: No     Current Outpatient Medications   Medication Sig Dispense Refill    aspirin (ECOTRIN) 81 MG EC tablet Take 1 tablet (81 mg total) by mouth once daily. 30 tablet 0    benazepriL (LOTENSIN) 40 MG tablet Take 1 tablet by mouth twice daily 180 tablet 0    blood sugar diagnostic Strp One Touch Ultra Blue Test strips, Use l strip to check glucose 4 times daily 100 each 11    blood-glucose meter kit Use as instructed 1 each " "0    calcium carbonate (OS-RYAN) 600 mg calcium (1,500 mg) Tab Take 600 mg by mouth once.      guanFACINE (TENEX) 2 MG tablet Take 1 tablet (2 mg total) by mouth nightly. 90 tablet 3    insulin (BASAGLAR KWIKPEN U-100 INSULIN) glargine 100 units/mL (3mL) SubQ pen Inject 60 Units into the skin once daily. 54 mL 3    iron-vitamin C 100-250 mg, ICAR-C, 100-250 mg Tab Take 1 tablet by mouth once daily 30 tablet 0    lancets (ONETOUCH DELICA LANCETS) 33 gauge Misc USE 1  TO CHECK GLUCOSE 4 TIMES DAILY 100 each 3    levothyroxine (SYNTHROID) 75 MCG tablet       magnesium oxide (MAG-OX) 400 mg (241.3 mg magnesium) tablet Take 1 tablet (400 mg total) by mouth once daily. 30 tablet 5    metFORMIN (GLUCOPHAGE) 1000 MG tablet Take 1 tablet (1,000 mg total) by mouth 2 (two) times daily with meals. 180 tablet 3    metOLazone (ZAROXOLYN) 5 MG tablet Take 1 tablet (5 mg total) by mouth once daily. 90 tablet 3    metoprolol succinate (TOPROL-XL) 25 MG 24 hr tablet Take 1 tablet (25 mg total) by mouth once daily. 90 tablet 3    multivitamin capsule Take 1 capsule by mouth once daily.      NYSTOP powder APPLY TO AFFECTED AREA AS NEEDED      pen needle, diabetic (BD ULTRA-FINE SHORT PEN NEEDLE) 31 gauge x 5/16" Ndle Use to check glucose 4x daily. 100 each 3    potassium chloride (KLOR-CON) 10 MEQ TbSR Take 1 tablet (10 mEq total) by mouth once daily. 90 tablet 3    prednisoLONE acetate (PRED FORTE) 1 % DrpS Place 1 drop into both eyes as needed.       rosuvastatin (CRESTOR) 40 MG Tab TAKE 1 TABLET BY MOUTH ONCE DAILY IN THE EVENING 90 tablet 3    syringe-needle,safety,disp unt 1.5 mL 22 gauge x 1 1/2" Syrg Use as directed 100 Syringe 0    amLODIPine (NORVASC) 2.5 MG tablet Take 1 tablet (2.5 mg total) by mouth once daily. 30 tablet 3    DULoxetine (CYMBALTA) 60 MG capsule Take 1 capsule (60 mg total) by mouth once daily. 30 capsule 3    fluticasone propionate (FLONASE) 50 mcg/actuation nasal spray 2 sprays (100 mcg " total) by Each Nostril route once daily. 18.2 mL 6     Current Facility-Administered Medications   Medication Dose Route Frequency Provider Last Rate Last Admin    cyanocobalamin injection 1,000 mcg  1,000 mcg Intramuscular Q30 Days Mauricio Jackson MD   1,000 mcg at 21 1127     Review of patient's allergies indicates:   Allergen Reactions    Adhesive tape-silicones Rash    Dye Hives    Cephalexin     Iodine     Penicillins Edema    Pneumococcal vaccine     Iodinated contrast media Rash      Past Medical History:   Diagnosis Date    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 2021    Anemia, chronic disease 2021    CAD (coronary artery disease)     Diabetes mellitus, type 2     Hypertension     Iron deficiency anemia following bariatric surgery 2021    MI (myocardial infarction)     Secondary thrombocytosis 2021    Sleep apnea     Sleep apnea 2019    Sleep Right      Past Surgical History:   Procedure Laterality Date    ANGIOGRAM, CORONARY, WITH LEFT HEART CATHETERIZATION      APPENDECTOMY      BARIATRIC SURGERY  2019    Dr Nunez    CARPAL TUNNEL RELEASE Bilateral 2002     SECTION      CHOLECYSTECTOMY      COLONOSCOPY      CORONARY ANGIOPLASTY WITH STENT PLACEMENT      CORONARY ARTERY BYPASS GRAFT      EPIDURAL STEROID INJECTION INTO LUMBAR SPINE      x2    ESOPHAGOGASTRODUODENOSCOPY      HERNIA REPAIR  2019    HYSTERECTOMY      THYROID LOBECTOMY Right 2021    Procedure: LOBECTOMY, THYROID;  Surgeon: Mari Chance MD;  Location: [facility];  Service: General;  Laterality: Right;       Review of Systems   Constitutional: Negative for activity change, appetite change, chills, fatigue, fever, malaise/fatigue and unexpected weight change.   HENT: Negative for congestion, ear pain, hearing loss, postnasal drip, rhinorrhea, sinus pressure, sinus pain, sneezing, sore throat and trouble swallowing.    Eyes: Negative for blurred vision, pain,  "discharge and visual disturbance.   Respiratory: Negative for cough, chest tightness, shortness of breath and wheezing.    Cardiovascular: Negative for chest pain, palpitations, orthopnea, leg swelling and PND.   Gastrointestinal: Negative for abdominal distention, abdominal pain, blood in stool, constipation, diarrhea, nausea and vomiting.   Endocrine: Negative for polydipsia and polyuria.   Genitourinary: Negative for difficulty urinating, dysuria, flank pain, frequency, hematuria, menstrual problem, pelvic pain, urgency and vaginal discharge.   Musculoskeletal: Positive for arthralgias and joint swelling. Negative for back pain, myalgias and neck pain.   Skin: Negative for color change, rash and wound.   Neurological: Negative for dizziness, seizures, syncope, weakness, light-headedness, numbness and headaches.   Hematological: Negative for adenopathy.   Psychiatric/Behavioral: Negative for agitation, confusion, dysphoric mood, hallucinations, sleep disturbance and suicidal ideas. The patient is not nervous/anxious.       OBJECTIVE:      Vitals:    01/05/22 1403 01/05/22 1443   BP: (!) 178/88 (!) 142/88   BP Location: Left arm Left arm   Patient Position: Sitting Sitting   BP Method: Large (Manual)    Pulse: 69    Resp: 18    Temp: 98.5 °F (36.9 °C)    TempSrc: Oral    SpO2: 96%    Weight: 104.2 kg (229 lb 11.2 oz)    Height: 5' 7" (1.702 m)      Physical Exam  Vitals reviewed.   Constitutional:       General: She is not in acute distress.     Appearance: Normal appearance. She is well-developed. She is obese. She is not diaphoretic.   HENT:      Head: Normocephalic and atraumatic.      Right Ear: Hearing, ear canal and external ear normal. A middle ear effusion (clear fluid) is present. Tympanic membrane is not perforated, erythematous, retracted or bulging.      Left Ear: Hearing, ear canal and external ear normal. A middle ear effusion (clear fluid) is present. Tympanic membrane is not perforated, " erythematous, retracted or bulging.      Nose: Nose normal. No mucosal edema, congestion or rhinorrhea.      Mouth/Throat:      Lips: Pink.      Mouth: Mucous membranes are moist.      Pharynx: Oropharynx is clear. Uvula midline. No pharyngeal swelling, oropharyngeal exudate or posterior oropharyngeal erythema.   Eyes:      General: Lids are normal. No scleral icterus.        Right eye: No discharge.         Left eye: No discharge.      Extraocular Movements: Extraocular movements intact.      Conjunctiva/sclera: Conjunctivae normal.      Pupils: Pupils are equal, round, and reactive to light.   Neck:      Thyroid: No thyroid mass or thyromegaly.      Vascular: No carotid bruit.      Trachea: Trachea and phonation normal. No tracheal deviation.   Cardiovascular:      Rate and Rhythm: Normal rate and regular rhythm.      Pulses: Normal pulses.      Heart sounds: Normal heart sounds. No murmur heard.  No friction rub. No gallop.    Pulmonary:      Effort: Pulmonary effort is normal. No respiratory distress.      Breath sounds: Normal breath sounds. No stridor. No decreased breath sounds, wheezing, rhonchi or rales.   Chest:   Breasts:      Right: No supraclavicular adenopathy.      Left: No supraclavicular adenopathy.       Abdominal:      General: Bowel sounds are normal.      Palpations: Abdomen is soft.      Tenderness: There is no abdominal tenderness.   Musculoskeletal:         General: Normal range of motion.      Cervical back: Full passive range of motion without pain, normal range of motion and neck supple.      Right lower leg: No edema.      Left lower leg: No edema.   Lymphadenopathy:      Cervical: No cervical adenopathy.      Upper Body:      Right upper body: No supraclavicular adenopathy.      Left upper body: No supraclavicular adenopathy.   Skin:     General: Skin is warm and dry.      Capillary Refill: Capillary refill takes less than 2 seconds.      Findings: No rash.   Neurological:      General:  No focal deficit present.      Mental Status: She is alert and oriented to person, place, and time.      Coordination: Coordination is intact.      Gait: Gait is intact.   Psychiatric:         Attention and Perception: Attention and perception normal.         Mood and Affect: Mood and affect normal.         Speech: Speech normal.         Behavior: Behavior normal. Behavior is cooperative.         Thought Content: Thought content normal. Thought content does not include suicidal plan.         Cognition and Memory: Cognition and memory normal.         Judgment: Judgment normal.        Assessment:       1. Type 2 diabetes mellitus treated with insulin    2. Benign essential hypertension    3. Mixed hyperlipidemia    4. Chronic pain of both ankles    5. Fibromyalgia    6. Middle ear effusion, bilateral    7. Encounter for hepatitis C screening test for low risk patient        Plan:       Type 2 diabetes mellitus treated with insulin  A1c worsened slightly. Encouraged dietary and lifestyle changes. Podiatry for foot exam and for bilateral chronic ankle pain. F/U in 3 months.  -     Hemoglobin A1C, POCT  -     Microalbumin/Creatinine Ratio, Urine; Future; Expected date: 01/05/2022  -     Ambulatory referral/consult to Podiatry; Future; Expected date: 01/12/2022    Benign essential hypertension  Adding Norvasc to Benazepril for more optimal control. Encouraged dietary and lifestyle measures. F/U in 3 months.  -     amLODIPine (NORVASC) 2.5 MG tablet; Take 1 tablet (2.5 mg total) by mouth once daily.  Dispense: 30 tablet; Refill: 3    Mixed hyperlipidemia  -     Lipid Panel; Future; Expected date: 01/05/2022    Chronic pain of both ankles  -     Ambulatory referral/consult to Podiatry; Future; Expected date: 01/12/2022    Fibromyalgia  -     DULoxetine (CYMBALTA) 60 MG capsule; Take 1 capsule (60 mg total) by mouth once daily.  Dispense: 30 capsule; Refill: 3    Middle ear effusion, bilateral  -     fluticasone propionate  (FLONASE) 50 mcg/actuation nasal spray; 2 sprays (100 mcg total) by Each Nostril route once daily.  Dispense: 18.2 mL; Refill: 6    Encounter for hepatitis C screening test for low risk patient  -     Hepatitis C Antibody; Future; Expected date: 01/05/2022        Follow up in about 3 months (around 4/5/2022) for F/U HTN, DM.      1/8/2022 MESERET Santos, FNP

## 2022-01-05 NOTE — PATIENT INSTRUCTIONS
"Patient Education       Diabetes and Diet   The Basics   Written by the doctors and editors at [facility]   Why is diet important in diabetes? -- Diet is important because it is part of diabetes treatment. Many people need to change what they eat and how much they eat to help treat their diabetes. It is important for people to treat their diabetes so that they:  · Keep their blood sugar at or near a normal level  · Prevent long-term problems, such as heart or kidney problems, that can happen in people with diabetes  Changing your diet can also help treat obesity, high blood pressure, and high cholesterol. These conditions can affect people with diabetes and can lead to future problems, such as heart attacks or strokes.  Who will work with me to change my diet? -- Your doctor or nurse will work with you to make a food plan to change your diet. They might also recommend that you work with a "dietitian." A dietitian is an expert on food and eating.  Do I need to eat at the same times every day? -- When and how often you should eat depends, in part, on the diabetes medicines you take. For example:  · People who take about the same amount of insulin at the same time each day (called a "fixed regimen") should eat meals at the same times. This is also true for people who take pills that increase insulin levels, such as sulfonylureas. Eating meals at the same time every day helps prevent low blood sugar.  · People who adjust the dose and timing of their insulin each day (called a "flexible regimen") do not always have to eat meals at the same time. That's because they can time their insulin dose for before they plan to eat, and also adjust the dose for how much they plan to eat.  · People who take medicines that don't usually cause low blood sugar, such as metformin, don't have to eat meals at the same time every day.  What do I need to think about when planning what to eat? -- Our bodies break down the food we eat into small " "pieces called carbohydrates, proteins, and fats.  When planning what to eat, people with diabetes need to think about:  · Carbohydrates (or "carbs") - Carbohydrates, which are sugars that our bodies use for energy, can raise a person's blood sugar level. Your doctor, nurse, or dietitian will tell you how many carbohydrates you should eat at each meal or snack. Foods that have carbohydrates include:  ? Bread, pasta, and rice  ? Vegetables and fruits  ? Dairy foods  ? Foods and drinks with added sugar  It is best to get your carbohydrates from fruits, vegetables, whole grains, and low-fat milk. It is best to avoid drinks with added sugar, like soda, juices, and sports drinks.   · Protein - Your doctor, nurse, or dietitian will tell you how much protein you should eat each day. It is best to eat lean meats, fish, eggs, beans, peas, soy products, nuts, and seeds.  · Fats - The type of fat you eat is more important than the amount of fat. "Saturated" and "trans" fats can increase your risk for heart problems, like a heart attack.  ? Foods that have saturated fats include meat, butter, cheese, and ice cream.  ? Foods that have trans fats include processed food with "partially hydrogenated oils" on the ingredient list. This may include fried foods, store bought cookies, muffins, pies, and cakes.  "Monounsaturated" and "polyunsaturated" fats are better for you. Foods with these types of fat include fish, avocado, olive oil, and nuts.  · Calories - People need to eat a certain amount of calories each day to keep their weight the same. People who are overweight and want to lose weight need to eat fewer calories each day.  · Fiber - Eating foods with a lot of fiber can help control a person's blood sugar level. Foods that have a lot of fiber include apples, green beans, peas, beans, lentils, nuts, oatmeal, and whole grains.  · Salt - People who have high blood pressure should not eat foods that contain a lot of salt (also " called sodium). People with high blood pressure should also eat healthy foods, such as fruits, vegetables, and low-fat dairy foods.  · Alcohol - Having more than 1 drink (for women) or 2 drinks (for men) a day can raise blood sugar levels. Also, drinks that have fruit juice or soda in them can raise blood sugar levels.  What can I do if I need to lose weight? -- If you need to lose weight, you can:  · Exercise - Try to get at least 30 minutes of physical activity a day, most days of the week. Even gentle exercise, like walking, is good for your health. Some people with diabetes need to change their medicine dose before they exercise. They might also need to check their blood sugar levels before and after exercising.  · Eat fewer calories - Your doctor, nurse, or dietitian can tell you how many calories you should eat each day in order to lose weight.  If you are worried about your weight, size, or shape, talk with your doctor, nurse, or dietitian. They can help you make changes to improve your health.  Can I eat the same foods as my family? -- Yes. You do not need to eat special foods if you have diabetes. You and your family can eat the same foods. Changing your diet is mostly about eating healthy foods and not eating too much.  What are the other parts of diabetes treatment? -- Besides changing your diet, the other parts of diabetes treatment are:  · Exercise  · Medicines  Some people with diabetes need to learn how to match their diet and exercise with their medicine dose. For example, people who use insulin might need to choose the dose of insulin they give themselves. To choose their dose, they need to think about:  · What they plan to eat at the next meal  · How much exercise they plan to do  · What their blood sugar level is  If the diet and exercise do not match the medicine dose, a person's blood sugar level can get too low or too high. Blood sugar levels that are too low or too high can cause  problems.  All topics are updated as new evidence becomes available and our peer review process is complete.  This topic retrieved from [employer] on: Sep 21, 2021.  Topic 96744 Version 7.0  Release: 29.4.2 - C29.263  © 2021 UpToDate, Inc. and/or its affiliates. All rights reserved.  Consumer Information Use and Disclaimer   This information is not specific medical advice and does not replace information you receive from your health care provider. This is only a brief summary of general information. It does NOT include all information about conditions, illnesses, injuries, tests, procedures, treatments, therapies, discharge instructions or life-style choices that may apply to you. You must talk with your health care provider for complete information about your health and treatment options. This information should not be used to decide whether or not to accept your health care provider's advice, instructions or recommendations. Only your health care provider has the knowledge and training to provide advice that is right for you. The use of this information is governed by the [employer] End User License Agreement, available at [email or web address]/caridad.The use of [employer] content is governed by the [employer] Terms of Use. ©2021 UpToDate, Inc. All rights reserved.  Copyright   © 2021 UpToDate, Inc. and/or its affiliates. All rights reserved.

## 2022-01-10 ENCOUNTER — CLINICAL SUPPORT (OUTPATIENT)
Dept: HEMATOLOGY/ONCOLOGY | Facility: CLINIC | Age: 67
End: 2022-01-10
Payer: MEDICARE

## 2022-01-10 DIAGNOSIS — E53.8 B12 DEFICIENCY: ICD-10-CM

## 2022-01-10 PROCEDURE — 96372 THER/PROPH/DIAG INJ SC/IM: CPT | Mod: S$GLB,,, | Performed by: INTERNAL MEDICINE

## 2022-01-10 PROCEDURE — 96372 PR INJECTION,THERAP/PROPH/DIAG2ST, IM OR SUBCUT: ICD-10-PCS | Mod: S$GLB,,, | Performed by: INTERNAL MEDICINE

## 2022-01-10 RX ADMIN — CYANOCOBALAMIN 1000 MCG: 1000 INJECTION, SOLUTION INTRAMUSCULAR; SUBCUTANEOUS at 11:01

## 2022-02-07 ENCOUNTER — TELEPHONE (OUTPATIENT)
Dept: HEMATOLOGY/ONCOLOGY | Facility: CLINIC | Age: 67
End: 2022-02-07
Payer: MEDICARE

## 2022-02-07 ENCOUNTER — TELEPHONE (OUTPATIENT)
Dept: FAMILY MEDICINE | Facility: CLINIC | Age: 67
End: 2022-02-07
Payer: MEDICARE

## 2022-02-07 NOTE — TELEPHONE ENCOUNTER
The following medication needs a prior authorization:     Medication Name: guanFACINE (TENEX)    Dosage: 2 mg    Frequency: nightly     Directions for use: take 1 tablet by mouth nightly     Diagnosis: Benign essential hypertension [I10]    Is the request for a reauthorization? yes    Is the patient currently stable on therapy? Yes     Please list all therapeutic alternatives previously used with start/end dates and outcome: n/a

## 2022-02-14 ENCOUNTER — PATIENT MESSAGE (OUTPATIENT)
Dept: FAMILY MEDICINE | Facility: CLINIC | Age: 67
End: 2022-02-14
Payer: MEDICARE

## 2022-02-23 ENCOUNTER — PATIENT MESSAGE (OUTPATIENT)
Dept: FAMILY MEDICINE | Facility: CLINIC | Age: 67
End: 2022-02-23
Payer: MEDICARE

## 2022-02-24 DIAGNOSIS — D50.8 IRON DEFICIENCY ANEMIA FOLLOWING BARIATRIC SURGERY: ICD-10-CM

## 2022-02-24 DIAGNOSIS — K95.89 IRON DEFICIENCY ANEMIA FOLLOWING BARIATRIC SURGERY: ICD-10-CM

## 2022-02-24 RX ORDER — IRON,CARBONYL/ASCORBIC ACID 100-250 MG
1 TABLET ORAL DAILY
Qty: 15 TABLET | Refills: 0 | Status: SHIPPED | OUTPATIENT
Start: 2022-02-24 | End: 2022-03-10 | Stop reason: SDUPTHER

## 2022-03-04 ENCOUNTER — PATIENT MESSAGE (OUTPATIENT)
Dept: FAMILY MEDICINE | Facility: CLINIC | Age: 67
End: 2022-03-04
Payer: MEDICARE

## 2022-03-04 DIAGNOSIS — E87.6 HYPOKALEMIA: ICD-10-CM

## 2022-03-04 DIAGNOSIS — E83.42 HYPOMAGNESEMIA: ICD-10-CM

## 2022-03-07 RX ORDER — LANOLIN ALCOHOL/MO/W.PET/CERES
400 CREAM (GRAM) TOPICAL DAILY
Qty: 90 TABLET | Refills: 1 | Status: SHIPPED | OUTPATIENT
Start: 2022-03-07 | End: 2022-08-05 | Stop reason: SDUPTHER

## 2022-03-07 RX ORDER — POTASSIUM CHLORIDE 750 MG/1
10 TABLET, EXTENDED RELEASE ORAL DAILY
Qty: 90 TABLET | Refills: 1 | Status: SHIPPED | OUTPATIENT
Start: 2022-03-07 | End: 2022-10-13

## 2022-03-09 NOTE — PROGRESS NOTES
[facility] Hematology/Oncology  PROGRESS NOTE -  Follow-up Visit      Subjective:       Patient ID:   NAME: Chanel Cervantes : 1955     66 y.o. female    Referring Doc: Mindy Funk MD  Other Physicians: LUIS Orozco; Kj Lucas Braxton; Himanshu Nunez (Bariatrics)           Chief Complaint: iron defic anem f/u      History of Present Illness:     Patient returns today for a regularly scheduled follow-up visit.  The patient is here today to go over the results of the recently ordered labs, tests and studies.     She had right shoulder surgery on 3/15/2021 with Dr Nathan Piedra and is on PT at home     She saw Dr Henley with endocrine again the other day and had good report; she sees her again in 3 months;    She is doing ok with the oral iron and needs refills; she has not been getting the B12 monthly     She is otherwise doing ok. No CP, SOB, HA's or N/V.    She last saw PCP - Freddie on 2022    Discussed covid19 precautions - she has had her vaccinations            ROS:   GEN: energy levels adequate; no fevers, weight loss  HEENT: normal with no HA's, sore throat, stiff neck, changes in vision  CV: normal with no CP, SOB, PND, WILSON or orthopnea  PULM: normal with no SOB, cough, hemoptysis, sputum or pleuritic pain  GI: normal with no abdominal pain, nausea, vomiting, constipation, diarrhea, melanotic stools, BRBPR, or hematemesis  : normal with no hematuria, dysuria  BREAST: normal with no mass, discharge, pain  SKIN: normal with no rash, erythema, bruising, or swelling    Pain Scale: 0     Allergies:  Review of patient's allergies indicates:   Allergen Reactions    Adhesive tape-silicones Rash    Dye Hives    Cephalexin     Iodine     Penicillins Edema    Pneumococcal vaccine     Iodinated contrast media Rash       Medications:    Current Outpatient Medications:     amLODIPine (NORVASC) 2.5 MG tablet, Take 1 tablet (2.5 mg total) by mouth once daily., Disp: 30 tablet, Rfl: 3    aspirin  (ECOTRIN) 81 MG EC tablet, Take 1 tablet (81 mg total) by mouth once daily., Disp: 30 tablet, Rfl: 0    benazepriL (LOTENSIN) 40 MG tablet, Take 1 tablet by mouth twice daily, Disp: 180 tablet, Rfl: 0    blood sugar diagnostic Strp, One Touch Ultra Blue Test strips, Use l strip to check glucose 4 times daily, Disp: 100 each, Rfl: 11    calcium carbonate (OS-RYAN) 600 mg calcium (1,500 mg) Tab, Take 600 mg by mouth once., Disp: , Rfl:     DULoxetine (CYMBALTA) 60 MG capsule, Take 1 capsule (60 mg total) by mouth once daily., Disp: 30 capsule, Rfl: 3    fluticasone propionate (FLONASE) 50 mcg/actuation nasal spray, 2 sprays (100 mcg total) by Each Nostril route once daily., Disp: 18.2 mL, Rfl: 6    guanFACINE (TENEX) 2 MG tablet, Take 1 tablet (2 mg total) by mouth nightly., Disp: 90 tablet, Rfl: 3    insulin (BASAGLAR KWIKPEN U-100 INSULIN) glargine 100 units/mL (3mL) SubQ pen, Inject 60 Units into the skin once daily., Disp: 54 mL, Rfl: 3    iron-vitamin C 100-250 mg, ICAR-C, 100-250 mg Tab, Take 1 tablet by mouth once daily. for 15 days, Disp: 15 tablet, Rfl: 0    lancets (ONETOUCH DELICA LANCETS) 33 gauge Misc, USE 1  TO CHECK GLUCOSE 4 TIMES DAILY, Disp: 100 each, Rfl: 3    levothyroxine (SYNTHROID) 75 MCG tablet, , Disp: , Rfl:     magnesium oxide (MAG-OX) 400 mg (241.3 mg magnesium) tablet, Take 1 tablet (400 mg total) by mouth once daily., Disp: 90 tablet, Rfl: 1    metFORMIN (GLUCOPHAGE) 1000 MG tablet, Take 1 tablet (1,000 mg total) by mouth 2 (two) times daily with meals., Disp: 180 tablet, Rfl: 3    metOLazone (ZAROXOLYN) 5 MG tablet, Take 1 tablet (5 mg total) by mouth once daily., Disp: 90 tablet, Rfl: 3    metoprolol succinate (TOPROL-XL) 25 MG 24 hr tablet, Take 1 tablet (25 mg total) by mouth once daily., Disp: 90 tablet, Rfl: 3    multivitamin capsule, Take 1 capsule by mouth once daily., Disp: , Rfl:     NYSTOP powder, APPLY TO AFFECTED AREA AS NEEDED, Disp: , Rfl:     pen needle,  "diabetic (BD ULTRA-FINE SHORT PEN NEEDLE) 31 gauge x 5/16" Ndle, Use to check glucose 4x daily., Disp: 100 each, Rfl: 3    potassium chloride (KLOR-CON) 10 MEQ TbSR, Take 1 tablet (10 mEq total) by mouth once daily., Disp: 90 tablet, Rfl: 1    prednisoLONE acetate (PRED FORTE) 1 % DrpS, Place 1 drop into both eyes as needed. , Disp: , Rfl:     rosuvastatin (CRESTOR) 40 MG Tab, TAKE 1 TABLET BY MOUTH ONCE DAILY IN THE EVENING, Disp: 90 tablet, Rfl: 3    syringe-needle,safety,disp unt 1.5 mL 22 gauge x 1 1/2" Syrg, Use as directed, Disp: 100 Syringe, Rfl: 0    blood-glucose meter kit, Use as instructed, Disp: 1 each, Rfl: 0    Current Facility-Administered Medications:     cyanocobalamin injection 1,000 mcg, 1,000 mcg, Intramuscular, Q30 Days, Mauricio Jackson MD, 1,000 mcg at 01/10/22 1104    PMHx/PSHx Updates:  See patient's last visit with me on 10/1/2021.  See H&P on 1/25/2021        Pathology:  Cancer Staging  No matching staging information was found for the patient.          Objective:     Vitals:  Blood pressure (!) 163/78, pulse 62, temperature 97.6 °F (36.4 °C), resp. rate 18, height 5' 7" (1.702 m), weight 108.4 kg (239 lb).    Physical Examination:   GEN: no apparent distress, comfortable; AAOx3; overweight  HEAD: atraumatic and normocephalic  EYES: no pallor, no icterus, PERRLA  ENT: OMM, no pharyngeal erythema, external ears WNL; no nasal discharge; no thrush  NECK: no masses, thyroid normal, trachea midline, no LAD/LN's, supple; s/p thyroidectomy    CV: RRR with no murmur; normal pulse; normal S1 and S2; no pedal edema  CHEST: Normal respiratory effort; CTAB; normal breath sounds; no wheeze or crackles  ABDOM: nontender and nondistended; soft; normal bowel sounds; no rebound/guarding  MUSC/Skeletal: ROM normal; no crepitus; joints normal; no deformities; s/p right shoulder replacement with better ROM   EXTREM: no clubbing, cyanosis, inflammation or swelling  SKIN: no rashes, lesions, ulcers, " petechiae or subcutaneous nodules  : no schaefer  NEURO: grossly intact; motor/sensory WNL; AAOx3; no tremors  PSYCH: normal mood, affect and behavior  LYMPH: normal cervical, supraclavicular, axillary and groin LN's            Labs:     Lab Results   Component Value Date    WBC 5.51 10/07/2021    HGB 12.5 10/07/2021    HCT 39.8 10/07/2021    MCV 86 10/07/2021     10/07/2021       Lab Results   Component Value Date    IRON 67 10/07/2021    TIBC 361 10/07/2021    FERRITIN 68 10/07/2021     Lab Results   Component Value Date    EWDNSYJP41 241 05/24/2021     Lab Results   Component Value Date    FOLATE 19.1 05/24/2021            CMP  Sodium   Date Value Ref Range Status   10/07/2021 143 136 - 145 mmol/L Final   10/07/2021 143 136 - 145 mmol/L Final   01/14/2019 141 134 - 144 mmol/L      Potassium   Date Value Ref Range Status   10/07/2021 3.6 3.5 - 5.1 mmol/L Final   10/07/2021 3.6 3.5 - 5.1 mmol/L Final     Chloride   Date Value Ref Range Status   10/07/2021 100 95 - 110 mmol/L Final   10/07/2021 100 95 - 110 mmol/L Final   01/14/2019 96 (L) 98 - 110 mmol/L      CO2   Date Value Ref Range Status   10/07/2021 35 (H) 23 - 29 mmol/L Final   10/07/2021 35 (H) 23 - 29 mmol/L Final     Glucose   Date Value Ref Range Status   10/07/2021 115 (H) 70 - 110 mg/dL Final   10/07/2021 115 (H) 70 - 110 mg/dL Final   01/14/2019 177 (H) 70 - 99 mg/dL      BUN   Date Value Ref Range Status   10/07/2021 18 8 - 23 mg/dL Final   10/07/2021 18 8 - 23 mg/dL Final     Creatinine   Date Value Ref Range Status   10/07/2021 0.8 0.5 - 1.4 mg/dL Final   10/07/2021 0.8 0.5 - 1.4 mg/dL Final   01/14/2019 0.99 0.60 - 1.40 mg/dL      Calcium   Date Value Ref Range Status   10/07/2021 9.7 8.7 - 10.5 mg/dL Final   10/07/2021 9.7 8.7 - 10.5 mg/dL Final     Total Protein   Date Value Ref Range Status   10/07/2021 7.0 6.0 - 8.4 g/dL Final     Albumin   Date Value Ref Range Status   10/07/2021 3.8 3.5 - 5.2 g/dL Final   01/14/2019 3.8 3.1 - 4.7 g/dL       Total Bilirubin   Date Value Ref Range Status   10/07/2021 0.3 0.1 - 1.0 mg/dL Final     Comment:     For infants and newborns, interpretation of results should be based  on gestational age, weight and in agreement with clinical  observations.    Premature Infant recommended reference ranges:  Up to 24 hours.............<8.0 mg/dL  Up to 48 hours............<12.0 mg/dL  3-5 days..................<15.0 mg/dL  6-29 days.................<15.0 mg/dL       Alkaline Phosphatase   Date Value Ref Range Status   10/07/2021 73 55 - 135 U/L Final     AST   Date Value Ref Range Status   10/07/2021 22 10 - 40 U/L Final     ALT   Date Value Ref Range Status   10/07/2021 24 10 - 44 U/L Final     Anion Gap   Date Value Ref Range Status   10/07/2021 8 8 - 16 mmol/L Final   10/07/2021 8 8 - 16 mmol/L Final     eGFR if    Date Value Ref Range Status   10/07/2021 >60.0 >60 mL/min/1.73 m^2 Final   10/07/2021 >60.0 >60 mL/min/1.73 m^2 Final     eGFR if non    Date Value Ref Range Status   10/07/2021 >60.0 >60 mL/min/1.73 m^2 Final     Comment:     Calculation used to obtain the estimated glomerular filtration  rate (eGFR) is the CKD-EPI equation.      10/07/2021 >60.0 >60 mL/min/1.73 m^2 Final     Comment:     Calculation used to obtain the estimated glomerular filtration  rate (eGFR) is the CKD-EPI equation.            Radiology/Diagnostic Studies:    CT Shoulder Without Contrast Right    Result Date: 3/2/2021  CMS MANDATED QUALITY DATA - CT RADIATION  436 All CT scans at this facility utilize dose modulation, iterative reconstruction, and/or weight based dosing when appropriate to reduce radiation dose to as low as reasonably achievable. 3-D reconstructed images were generated on a separate workstation from the axial data set and stored in the patient's permanent medical record. CT SHOULDER WITHOUT CONTRAST RIGHT CLINICAL HISTORY: 65 years Female M25.511 Pain in right Shoulder COMPARISON: Right  shoulder radiography February 18, 2020 FINDINGS: Acute comminuted proximal right humeral fracture demonstrates similar alignment to reference right shoulder radiographs. Medial displacement of largest distal humeral fracture fragment in relation to the largest proximal fragment by about one shaft width. Fracture extends superiorly to involve both the surgical and anatomic necks of the humerus, and extends through the greater tuberosity. No intra-articular extension through the humeral head articular surface is evident. Scapula is intact. AC joint is normally aligned. Mild AC joint degenerative change. No right rib fracture is evident within the field-of-view. Right lung is clear. IMPRESSION: Acute comminuted and displaced fracture of proximal right humerus, involving both the surgical and anatomic necks of the right humerus, as well as the greater tuberosity. Electronically Signed by Jah TORRES on 3/3/2021 7:44 AM      I have reviewed all available lab results and radiology reports.    Assessment/Plan:   (1) 66 y.o. female with diagnosis of iron deficiency anemia who has been referred by Mindy Funk MD for evaluation by medical hematology/oncology. She has history of bariatric surgery and most likely has a multifactorial anemia process with underlying anemia of chronic disorders and iron deficiency/nutrient deficiency due to bariatric induced malabsorption.    - microcytic indices  - total bili is WNL, so I do not suspect any hemolysis at this time  - iron low at 21 and ferritin low at 3     1/25/2021:  - check B12/folate level   - order stool OBS x3  - check SPEP and Hgb electrophoresis  - discussed and will set up IV iron    4/5/2021:  - she had shoulder surgery  - repeat hgb currently at 10.5 and stable; she had two IV irons since last visit and before surgery and had been as high as 11.6 afterwards with the hgb  - iron panel is adequate  - B12 and folate are adequate  - retic 1.8 and SPEP with no  M-protein    6/1/2021:  - latest labs with fairly adequate CBC  - mild anemia  - iron panel was good since on oral iron  - B12 was low - will start shots monthly today    10/11/2021:  - latest CBC and iron panel WNL  - getting B12 shot today    3/10/2022:  - last available labs are from oct 2021  - no anemia and iron panel was adequate  - B12 still was low - resume B12 monthly     (2) CAD s/p MI and CABG; venous insufficiency     (3) HTN and hypercholesterolemia     (4) IDDM Type II     (5) Vit D Deficiency     (6) NIKOLAS - on CPAP     (7) Chronic LBP     (8) Mild secondary thrombocytosis (resolved)        VISIT DIAGNOSES:      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 continue current management  2. continue ICAR-C - call in refill  3. F/u with PCP, bariatrics, card, Endocrine, etc  4. Check basic labs incl iron panel every 3 months  5. continue B12 monthly (encouraged compliance)      RTC in 6 months    Fax note to Mindy Funk MD; Robson Gurrola       Discussion:     COVID-19 Discussion:     I had long discussion with patient and any applicable family about the COVID-19 coronavirus epidemic and the recommended precautions with regard to cancer and/or hematology patients. I have re-iterated the CDC recommendations for adequate hand washing, use of hand -like products, and coughing into elbow, etc. In addition, especially for our patients who are on chemotherapy and/or our otherwise immunocompromised patients, I have recommended avoidance of crowds, including movie theaters, restaurants, churches, etc. I have recommended avoidance of any sick or symptomatic family members and/or friends. I have also recommended avoidance of any raw and unwashed food products, and general avoidance of food items that have not been prepared by  themselves. The patient has been asked to call us immediately with any symptom developments, issues, questions or other general concerns.         Iron Infusion Therapy Discussion:      I provided literature/learning materials on the particular IV iron regimen and discussed the potential side-effect profiles of the drug(s). I discussed the importance of compliance with obtaining and monitoring requested lab work, and went over the potential risk for the development of anaphylactic shock, bronchospasm, dysrhythmia, liver and/or kidney damage, and respiratory/cardiovascular arrest and/or failure. I discussed the potential risks for development of alopecia, fevers, itching, chills and/or rigors, cold sensory issues, ringing in ears, vertigo and neuropathy, all of which are usually acute but sometimes could end up being chronic and life-long. I discussed the risks of hand-foot syndrome and rashes, and development of other autoimmune mediated processes such as pneumonitis and colitis which could be life threatening.      The patient's consent has been obtained to proceed with the IV iron therapy.The patient will be referred to [facility] for training and education on IV iron therapy, use of antiemetics and/or anti-diarrheals, use of NSAID's, potential IV iron therapy side-effects, and any specific recommendations and precautions with the particular IV iron agents.       I answered all of the patient's (and family's, if applicable) questions to the best of my ability and to their complete satisfaction. The patient acknowledged full understanding of the risks, recommendations and plan(s).            I spent over 25 mins of time with the patient. Reviewed results of the recently ordered labs, tests and studies; made directives with regards to the results. Over half of this time was spent couseling and coordinating care.    I have explained all of the above in detail and the patient understands  all of the current recommendation(s). I have answered all of their questions to the best of my ability and to their complete satisfaction.   The patient is to continue with the current management plan.            Electronically signed by Mauricio Jackson MD                    Answers for HPI/ROS submitted by the patient on 3/4/2022  appetite change : Yes  unexpected weight change: No  mouth sores: No  visual disturbance: No  cough: Yes  shortness of breath: No  chest pain: No  abdominal pain: No  diarrhea: No  frequency: No  back pain: Yes  rash: No  headaches: No  adenopathy: No  nervous/ anxious: No

## 2022-03-10 ENCOUNTER — OFFICE VISIT (OUTPATIENT)
Dept: HEMATOLOGY/ONCOLOGY | Facility: CLINIC | Age: 67
End: 2022-03-10
Payer: MEDICARE

## 2022-03-10 VITALS
DIASTOLIC BLOOD PRESSURE: 78 MMHG | HEART RATE: 62 BPM | WEIGHT: 239 LBS | TEMPERATURE: 98 F | HEIGHT: 67 IN | SYSTOLIC BLOOD PRESSURE: 163 MMHG | BODY MASS INDEX: 37.51 KG/M2 | RESPIRATION RATE: 18 BRPM

## 2022-03-10 DIAGNOSIS — D50.9 MICROCYTIC ANEMIA: ICD-10-CM

## 2022-03-10 DIAGNOSIS — D51.8 OTHER VITAMIN B12 DEFICIENCY ANEMIA: ICD-10-CM

## 2022-03-10 DIAGNOSIS — D75.838 SECONDARY THROMBOCYTOSIS: ICD-10-CM

## 2022-03-10 DIAGNOSIS — D51.8 OTHER VITAMIN B12 DEFICIENCY ANEMIA: Primary | ICD-10-CM

## 2022-03-10 DIAGNOSIS — D64.89 ANEMIA DUE TO MULTIPLE MECHANISMS: Primary | ICD-10-CM

## 2022-03-10 DIAGNOSIS — D63.8 ANEMIA, CHRONIC DISEASE: ICD-10-CM

## 2022-03-10 DIAGNOSIS — Z98.84 S/P BARIATRIC SURGERY: ICD-10-CM

## 2022-03-10 DIAGNOSIS — D50.8 IRON DEFICIENCY ANEMIA FOLLOWING BARIATRIC SURGERY: ICD-10-CM

## 2022-03-10 DIAGNOSIS — K95.89 IRON DEFICIENCY ANEMIA FOLLOWING BARIATRIC SURGERY: ICD-10-CM

## 2022-03-10 PROCEDURE — 3288F FALL RISK ASSESSMENT DOCD: CPT | Mod: S$GLB,,, | Performed by: INTERNAL MEDICINE

## 2022-03-10 PROCEDURE — 3077F PR MOST RECENT SYSTOLIC BLOOD PRESSURE >= 140 MM HG: ICD-10-PCS | Mod: S$GLB,,, | Performed by: INTERNAL MEDICINE

## 2022-03-10 PROCEDURE — 96372 THER/PROPH/DIAG INJ SC/IM: CPT | Mod: S$GLB,,, | Performed by: INTERNAL MEDICINE

## 2022-03-10 PROCEDURE — 1101F PT FALLS ASSESS-DOCD LE1/YR: CPT | Mod: S$GLB,,, | Performed by: INTERNAL MEDICINE

## 2022-03-10 PROCEDURE — 1160F RVW MEDS BY RX/DR IN RCRD: CPT | Mod: S$GLB,,, | Performed by: INTERNAL MEDICINE

## 2022-03-10 PROCEDURE — 1125F PR PAIN SEVERITY QUANTIFIED, PAIN PRESENT: ICD-10-PCS | Mod: S$GLB,,, | Performed by: INTERNAL MEDICINE

## 2022-03-10 PROCEDURE — 3078F DIAST BP <80 MM HG: CPT | Mod: S$GLB,,, | Performed by: INTERNAL MEDICINE

## 2022-03-10 PROCEDURE — 1125F AMNT PAIN NOTED PAIN PRSNT: CPT | Mod: S$GLB,,, | Performed by: INTERNAL MEDICINE

## 2022-03-10 PROCEDURE — 1160F PR REVIEW ALL MEDS BY PRESCRIBER/CLIN PHARMACIST DOCUMENTED: ICD-10-PCS | Mod: S$GLB,,, | Performed by: INTERNAL MEDICINE

## 2022-03-10 PROCEDURE — 3051F PR MOST RECENT HEMOGLOBIN A1C LEVEL 7.0 - < 8.0%: ICD-10-PCS | Mod: S$GLB,,, | Performed by: INTERNAL MEDICINE

## 2022-03-10 PROCEDURE — 3051F HG A1C>EQUAL 7.0%<8.0%: CPT | Mod: S$GLB,,, | Performed by: INTERNAL MEDICINE

## 2022-03-10 PROCEDURE — 99213 OFFICE O/P EST LOW 20 MIN: CPT | Mod: 25,S$GLB,, | Performed by: INTERNAL MEDICINE

## 2022-03-10 PROCEDURE — 1159F PR MEDICATION LIST DOCUMENTED IN MEDICAL RECORD: ICD-10-PCS | Mod: S$GLB,,, | Performed by: INTERNAL MEDICINE

## 2022-03-10 PROCEDURE — 3078F PR MOST RECENT DIASTOLIC BLOOD PRESSURE < 80 MM HG: ICD-10-PCS | Mod: S$GLB,,, | Performed by: INTERNAL MEDICINE

## 2022-03-10 PROCEDURE — 99213 PR OFFICE/OUTPT VISIT, EST, LEVL III, 20-29 MIN: ICD-10-PCS | Mod: 25,S$GLB,, | Performed by: INTERNAL MEDICINE

## 2022-03-10 PROCEDURE — 96372 PR INJECTION,THERAP/PROPH/DIAG2ST, IM OR SUBCUT: ICD-10-PCS | Mod: S$GLB,,, | Performed by: INTERNAL MEDICINE

## 2022-03-10 PROCEDURE — 3077F SYST BP >= 140 MM HG: CPT | Mod: S$GLB,,, | Performed by: INTERNAL MEDICINE

## 2022-03-10 PROCEDURE — 3008F BODY MASS INDEX DOCD: CPT | Mod: S$GLB,,, | Performed by: INTERNAL MEDICINE

## 2022-03-10 PROCEDURE — 1159F MED LIST DOCD IN RCRD: CPT | Mod: S$GLB,,, | Performed by: INTERNAL MEDICINE

## 2022-03-10 PROCEDURE — 3288F PR FALLS RISK ASSESSMENT DOCUMENTED: ICD-10-PCS | Mod: S$GLB,,, | Performed by: INTERNAL MEDICINE

## 2022-03-10 PROCEDURE — 1101F PR PT FALLS ASSESS DOC 0-1 FALLS W/OUT INJ PAST YR: ICD-10-PCS | Mod: S$GLB,,, | Performed by: INTERNAL MEDICINE

## 2022-03-10 PROCEDURE — 3008F PR BODY MASS INDEX (BMI) DOCUMENTED: ICD-10-PCS | Mod: S$GLB,,, | Performed by: INTERNAL MEDICINE

## 2022-03-10 RX ORDER — CYANOCOBALAMIN 1000 UG/ML
1000 INJECTION, SOLUTION INTRAMUSCULAR; SUBCUTANEOUS
Qty: 3 ML | Refills: 4 | Status: CANCELLED | OUTPATIENT
Start: 2022-03-10

## 2022-03-10 RX ORDER — CYANOCOBALAMIN 1000 UG/ML
1000 INJECTION, SOLUTION INTRAMUSCULAR; SUBCUTANEOUS
Qty: 3 ML | Refills: 4 | Status: SHIPPED | OUTPATIENT
Start: 2022-03-10 | End: 2023-04-13 | Stop reason: SDUPTHER

## 2022-03-10 RX ORDER — IRON,CARBONYL/ASCORBIC ACID 100-250 MG
1 TABLET ORAL DAILY
Qty: 90 TABLET | Refills: 1 | Status: SHIPPED | OUTPATIENT
Start: 2022-03-10 | End: 2022-03-25

## 2022-03-10 RX ADMIN — CYANOCOBALAMIN 1000 MCG: 1000 INJECTION, SOLUTION INTRAMUSCULAR; SUBCUTANEOUS at 11:03

## 2022-03-10 NOTE — PROGRESS NOTES
B12 given, tolerated well. Orders placed for B12 to be given at home per Dr. Jackson's Ok and patient's request.

## 2022-03-27 ENCOUNTER — PATIENT MESSAGE (OUTPATIENT)
Dept: FAMILY MEDICINE | Facility: CLINIC | Age: 67
End: 2022-03-27

## 2022-03-27 DIAGNOSIS — I10 BENIGN ESSENTIAL HYPERTENSION: ICD-10-CM

## 2022-03-28 ENCOUNTER — TELEPHONE (OUTPATIENT)
Dept: FAMILY MEDICINE | Facility: CLINIC | Age: 67
End: 2022-03-28
Payer: MEDICARE

## 2022-03-28 RX ORDER — BENAZEPRIL HYDROCHLORIDE 40 MG/1
40 TABLET ORAL 2 TIMES DAILY
Qty: 180 TABLET | Refills: 0 | Status: SHIPPED | OUTPATIENT
Start: 2022-03-28 | End: 2022-04-06

## 2022-04-01 ENCOUNTER — LAB VISIT (OUTPATIENT)
Dept: LAB | Facility: HOSPITAL | Age: 67
End: 2022-04-01
Attending: NURSE PRACTITIONER
Payer: MEDICARE

## 2022-04-01 DIAGNOSIS — E11.9 TYPE 2 DIABETES MELLITUS TREATED WITH INSULIN: ICD-10-CM

## 2022-04-01 DIAGNOSIS — Z11.59 ENCOUNTER FOR HEPATITIS C SCREENING TEST FOR LOW RISK PATIENT: ICD-10-CM

## 2022-04-01 DIAGNOSIS — E78.2 MIXED HYPERLIPIDEMIA: ICD-10-CM

## 2022-04-01 DIAGNOSIS — Z79.4 TYPE 2 DIABETES MELLITUS TREATED WITH INSULIN: ICD-10-CM

## 2022-04-01 LAB
ALBUMIN/CREAT UR: 10.4 UG/MG (ref 0–30)
CHOLEST SERPL-MCNC: 93 MG/DL (ref 120–199)
CHOLEST/HDLC SERPL: 2.8 {RATIO} (ref 2–5)
CREAT UR-MCNC: 75 MG/DL (ref 15–325)
HDLC SERPL-MCNC: 33 MG/DL (ref 40–75)
HDLC SERPL: 35.5 % (ref 20–50)
LDLC SERPL CALC-MCNC: 31.6 MG/DL (ref 63–159)
MICROALBUMIN UR DL<=1MG/L-MCNC: 7.8 UG/ML
NONHDLC SERPL-MCNC: 60 MG/DL
TRIGL SERPL-MCNC: 142 MG/DL (ref 30–150)

## 2022-04-01 PROCEDURE — 86803 HEPATITIS C AB TEST: CPT | Performed by: NURSE PRACTITIONER

## 2022-04-01 PROCEDURE — 36415 COLL VENOUS BLD VENIPUNCTURE: CPT | Performed by: NURSE PRACTITIONER

## 2022-04-01 PROCEDURE — 80061 LIPID PANEL: CPT | Performed by: NURSE PRACTITIONER

## 2022-04-01 PROCEDURE — 82570 ASSAY OF URINE CREATININE: CPT | Performed by: NURSE PRACTITIONER

## 2022-04-01 PROCEDURE — 82043 UR ALBUMIN QUANTITATIVE: CPT | Performed by: NURSE PRACTITIONER

## 2022-04-03 LAB — HCV AB S/CO SERPL IA: <0.1 S/CO RATIO (ref 0–0.9)

## 2022-04-05 ENCOUNTER — PATIENT MESSAGE (OUTPATIENT)
Dept: FAMILY MEDICINE | Facility: CLINIC | Age: 67
End: 2022-04-05

## 2022-04-06 ENCOUNTER — OFFICE VISIT (OUTPATIENT)
Dept: FAMILY MEDICINE | Facility: CLINIC | Age: 67
End: 2022-04-06
Payer: MEDICARE

## 2022-04-06 ENCOUNTER — TELEPHONE (OUTPATIENT)
Dept: ORTHOPEDICS | Facility: CLINIC | Age: 67
End: 2022-04-06
Payer: MEDICARE

## 2022-04-06 VITALS
RESPIRATION RATE: 18 BRPM | OXYGEN SATURATION: 97 % | HEIGHT: 67 IN | TEMPERATURE: 98 F | HEART RATE: 63 BPM | SYSTOLIC BLOOD PRESSURE: 142 MMHG | WEIGHT: 242.19 LBS | DIASTOLIC BLOOD PRESSURE: 70 MMHG | BODY MASS INDEX: 38.01 KG/M2

## 2022-04-06 DIAGNOSIS — M79.642 LEFT HAND PAIN: ICD-10-CM

## 2022-04-06 DIAGNOSIS — K43.9 HERNIA OF ABDOMINAL WALL: ICD-10-CM

## 2022-04-06 DIAGNOSIS — R10.31 RIGHT LOWER QUADRANT ABDOMINAL PAIN: ICD-10-CM

## 2022-04-06 DIAGNOSIS — E11.9 TYPE 2 DIABETES MELLITUS TREATED WITH INSULIN: ICD-10-CM

## 2022-04-06 DIAGNOSIS — I10 BENIGN ESSENTIAL HYPERTENSION: Primary | ICD-10-CM

## 2022-04-06 DIAGNOSIS — Z79.4 TYPE 2 DIABETES MELLITUS TREATED WITH INSULIN: ICD-10-CM

## 2022-04-06 LAB — HBA1C MFR BLD: 7.2 %

## 2022-04-06 PROCEDURE — 3078F DIAST BP <80 MM HG: CPT | Mod: S$GLB,,, | Performed by: NURSE PRACTITIONER

## 2022-04-06 PROCEDURE — 1159F MED LIST DOCD IN RCRD: CPT | Mod: S$GLB,,, | Performed by: NURSE PRACTITIONER

## 2022-04-06 PROCEDURE — 3008F PR BODY MASS INDEX (BMI) DOCUMENTED: ICD-10-PCS | Mod: S$GLB,,, | Performed by: NURSE PRACTITIONER

## 2022-04-06 PROCEDURE — 1101F PT FALLS ASSESS-DOCD LE1/YR: CPT | Mod: S$GLB,,, | Performed by: NURSE PRACTITIONER

## 2022-04-06 PROCEDURE — 3061F PR NEG MICROALBUMINURIA RESULT DOCUMENTED/REVIEW: ICD-10-PCS | Mod: S$GLB,,, | Performed by: NURSE PRACTITIONER

## 2022-04-06 PROCEDURE — 3288F PR FALLS RISK ASSESSMENT DOCUMENTED: ICD-10-PCS | Mod: S$GLB,,, | Performed by: NURSE PRACTITIONER

## 2022-04-06 PROCEDURE — 3066F PR DOCUMENTATION OF TREATMENT FOR NEPHROPATHY: ICD-10-PCS | Mod: S$GLB,,, | Performed by: NURSE PRACTITIONER

## 2022-04-06 PROCEDURE — 1159F PR MEDICATION LIST DOCUMENTED IN MEDICAL RECORD: ICD-10-PCS | Mod: S$GLB,,, | Performed by: NURSE PRACTITIONER

## 2022-04-06 PROCEDURE — 4010F PR ACE/ARB THEARPY RXD/TAKEN: ICD-10-PCS | Mod: S$GLB,,, | Performed by: NURSE PRACTITIONER

## 2022-04-06 PROCEDURE — 99214 OFFICE O/P EST MOD 30 MIN: CPT | Mod: S$GLB,,, | Performed by: NURSE PRACTITIONER

## 2022-04-06 PROCEDURE — 83036 HEMOGLOBIN GLYCOSYLATED A1C: CPT | Mod: QW,S$GLB,, | Performed by: NURSE PRACTITIONER

## 2022-04-06 PROCEDURE — 3066F NEPHROPATHY DOC TX: CPT | Mod: S$GLB,,, | Performed by: NURSE PRACTITIONER

## 2022-04-06 PROCEDURE — 4010F ACE/ARB THERAPY RXD/TAKEN: CPT | Mod: S$GLB,,, | Performed by: NURSE PRACTITIONER

## 2022-04-06 PROCEDURE — 3051F HG A1C>EQUAL 7.0%<8.0%: CPT | Mod: S$GLB,,, | Performed by: NURSE PRACTITIONER

## 2022-04-06 PROCEDURE — 99214 PR OFFICE/OUTPT VISIT, EST, LEVL IV, 30-39 MIN: ICD-10-PCS | Mod: S$GLB,,, | Performed by: NURSE PRACTITIONER

## 2022-04-06 PROCEDURE — 3051F PR MOST RECENT HEMOGLOBIN A1C LEVEL 7.0 - < 8.0%: ICD-10-PCS | Mod: S$GLB,,, | Performed by: NURSE PRACTITIONER

## 2022-04-06 PROCEDURE — 3288F FALL RISK ASSESSMENT DOCD: CPT | Mod: S$GLB,,, | Performed by: NURSE PRACTITIONER

## 2022-04-06 PROCEDURE — 3008F BODY MASS INDEX DOCD: CPT | Mod: S$GLB,,, | Performed by: NURSE PRACTITIONER

## 2022-04-06 PROCEDURE — 3077F SYST BP >= 140 MM HG: CPT | Mod: S$GLB,,, | Performed by: NURSE PRACTITIONER

## 2022-04-06 PROCEDURE — 1160F RVW MEDS BY RX/DR IN RCRD: CPT | Mod: S$GLB,,, | Performed by: NURSE PRACTITIONER

## 2022-04-06 PROCEDURE — 3078F PR MOST RECENT DIASTOLIC BLOOD PRESSURE < 80 MM HG: ICD-10-PCS | Mod: S$GLB,,, | Performed by: NURSE PRACTITIONER

## 2022-04-06 PROCEDURE — 3077F PR MOST RECENT SYSTOLIC BLOOD PRESSURE >= 140 MM HG: ICD-10-PCS | Mod: S$GLB,,, | Performed by: NURSE PRACTITIONER

## 2022-04-06 PROCEDURE — 83036 POCT HEMOGLOBIN A1C: ICD-10-PCS | Mod: QW,S$GLB,, | Performed by: NURSE PRACTITIONER

## 2022-04-06 PROCEDURE — 1160F PR REVIEW ALL MEDS BY PRESCRIBER/CLIN PHARMACIST DOCUMENTED: ICD-10-PCS | Mod: S$GLB,,, | Performed by: NURSE PRACTITIONER

## 2022-04-06 PROCEDURE — 3061F NEG MICROALBUMINURIA REV: CPT | Mod: S$GLB,,, | Performed by: NURSE PRACTITIONER

## 2022-04-06 PROCEDURE — 1101F PR PT FALLS ASSESS DOC 0-1 FALLS W/OUT INJ PAST YR: ICD-10-PCS | Mod: S$GLB,,, | Performed by: NURSE PRACTITIONER

## 2022-04-06 RX ORDER — OLMESARTAN MEDOXOMIL 20 MG/1
20 TABLET ORAL DAILY
Qty: 90 TABLET | Refills: 1 | Status: SHIPPED | OUTPATIENT
Start: 2022-04-06 | End: 2022-05-20 | Stop reason: SDUPTHER

## 2022-04-06 RX ORDER — DAPAGLIFLOZIN 5 MG/1
5 TABLET, FILM COATED ORAL DAILY
Qty: 90 TABLET | Refills: 1 | Status: SHIPPED | OUTPATIENT
Start: 2022-04-06 | End: 2022-09-30 | Stop reason: SDUPTHER

## 2022-04-06 RX ORDER — INSULIN DEGLUDEC 200 U/ML
65 INJECTION, SOLUTION SUBCUTANEOUS DAILY
Qty: 10 PEN | Refills: 1 | Status: SHIPPED | OUTPATIENT
Start: 2022-04-06 | End: 2022-04-25 | Stop reason: SDUPTHER

## 2022-04-06 RX ORDER — METOLAZONE 5 MG/1
5 TABLET ORAL DAILY
Qty: 90 TABLET | Refills: 3 | Status: SHIPPED | OUTPATIENT
Start: 2022-04-06 | End: 2023-07-03 | Stop reason: SDUPTHER

## 2022-04-06 NOTE — PROGRESS NOTES
SUBJECTIVE:      Patient ID: Chanel Cervantes is a 66 y.o. female.    Chief Complaint: Diabetes and Hypertension    Dr. Henley - endocrine  Dr. Jackson - hem/onc  Dr. Orozco - rheumatology  Dr. Nunez - bariatrics/gen surg  Dr. Chance - surg (thyroid)  Dr. Guan - eye doctor     Pt presents for F/U HTN, DMII, and HLD. This is a 65 yo F with h/o DM, HTN, HLD, anemia, CAD, hx of MI, depression, obesity s/p gastric sleeve in April 2019 c/b incisional ventral hernia s/p repair, panniculectomy and full thickness skin graft on 12/4/19, anemia, multinodular thyroid s/p R thyroid lobectomy 7/2021, and NIKOLAS. Hemoglobin A1c has improved today to 7.2 from previous 7.3%. She continues on Basaglar and metformin. She reports her diet has improved but she still cannot exercise reportedly due to her fibromyalgia and chronic pain to her B ankles. She was referred to podiatry for bilateral foot pain which she deferred stating Dr. Henley completed her foot exam at her previous visit - will request records. Her BP is uncontrolled today. Last visit, she was started on amlodipine in addition to her Benazepril, Tenex, and metolazone. Her BP remains uncontrolled today. She is also concerned for what she believes she was told was a hernia reporting it is getting bigger. She has an area to her RLQ that is larger than the other side and seems to be distended today. Bowel sounds can be heard audibly without stethoscope during the visit today. She reports the area is tender when she wears pants which may be a little too tight or is seated. She also reports L hand pain to the base of her thumb which has been more intense recently. She has attempted NSAIDs, topical medications, and Tylenol without relief. Denies locking or popping sensation to the joint. She denies noticing any overuse to that hand and is R handed. She reports she previously received an injection by Dr. Orozco which helped her more than anything else. Denies CP, SOB,  wheezing, fevers, nausea, vomiting, diarrhea, constipation, numbness, weakness, dizziness, palpitations, or any other concerns at this time.    Diabetes  She presents for her follow-up diabetic visit. She has type 2 diabetes mellitus. Her disease course has been stable. Hypoglycemia symptoms include headaches. Pertinent negatives for hypoglycemia include no confusion, dizziness, hunger, mood changes, nervousness/anxiousness, pallor, seizures, sleepiness, speech difficulty, sweats or tremors. There are no diabetic associated symptoms. Pertinent negatives for diabetes include no blurred vision, no chest pain, no fatigue and no weakness. There are no hypoglycemic complications. Symptoms are stable. Diabetic complications include heart disease. Pertinent negatives for diabetic complications include no autonomic neuropathy, CVA, impotence, nephropathy, peripheral neuropathy, PVD or retinopathy. Risk factors for coronary artery disease include diabetes mellitus, hypertension, obesity, sedentary lifestyle, post-menopausal and family history. Current diabetic treatment includes oral agent (monotherapy) and insulin injections. She is compliant with treatment most of the time. Her weight is increasing steadily. She is following a generally healthy diet. Meal planning includes avoidance of concentrated sweets. She never participates in exercise. Her breakfast blood glucose range is generally  mg/dl. Her dinner blood glucose range is generally >200 mg/dl. An ACE inhibitor/angiotensin II receptor blocker is being taken. She does not see a podiatrist.Eye exam is current.   Hypertension  This is a chronic problem. The current episode started more than 1 year ago. The problem is unchanged. The problem is uncontrolled. Associated symptoms include headaches. Pertinent negatives include no anxiety, blurred vision, chest pain, malaise/fatigue, neck pain, orthopnea, palpitations, peripheral edema, PND, shortness of breath or  "sweats. Agents associated with hypertension include NSAIDs and thyroid hormones. Risk factors for coronary artery disease include diabetes mellitus, family history, obesity, sedentary lifestyle and stress. Past treatments include diuretics, central alpha agonists, calcium channel blockers, ACE inhibitors and beta blockers. The current treatment provides no improvement. Compliance problems include exercise.  Hypertensive end-organ damage includes CAD/MI. There is no history of angina, kidney disease, CVA, heart failure, left ventricular hypertrophy, PVD or retinopathy.       Past Surgical History:   Procedure Laterality Date    ANGIOGRAM, CORONARY, WITH LEFT HEART CATHETERIZATION      APPENDECTOMY      BARIATRIC SURGERY  2019    Dr Nunez    CARPAL TUNNEL RELEASE Bilateral 2002     SECTION      CHOLECYSTECTOMY      COLONOSCOPY      CORONARY ANGIOPLASTY WITH STENT PLACEMENT      CORONARY ARTERY BYPASS GRAFT      EPIDURAL STEROID INJECTION INTO LUMBAR SPINE      x2    ESOPHAGOGASTRODUODENOSCOPY      HERNIA REPAIR  2019    HYSTERECTOMY      THYROID LOBECTOMY Right 2021    Procedure: LOBECTOMY, THYROID;  Surgeon: Mari Chance MD;  Location: [facility];  Service: General;  Laterality: Right;     Family History   Problem Relation Age of Onset    Diabetes Mother     Vision loss Mother     Heart disease Father     Vision loss Father     Stroke Father       Social History     Socioeconomic History    Marital status:    Tobacco Use    Smoking status: Former Smoker     Packs/day: 1.00     Years: 15.00     Pack years: 15.00     Quit date:      Years since quittin.2    Smokeless tobacco: Never Used   Substance and Sexual Activity    Alcohol use: No     Comment: "maybe once a year"    Drug use: No    Sexual activity: Not Currently     Social Determinants of Health     Financial Resource Strain: Unknown    Difficulty of Paying Living Expenses: Patient refused   Food " Insecurity: Unknown    Worried About Running Out of Food in the Last Year: Patient refused    Ran Out of Food in the Last Year: Patient refused   Transportation Needs: Unknown    Lack of Transportation (Medical): Patient refused    Lack of Transportation (Non-Medical): Patient refused   Physical Activity: Inactive    Days of Exercise per Week: 0 days    Minutes of Exercise per Session: 0 min   Stress: Stress Concern Present    Feeling of Stress : To some extent   Social Connections: Unknown    Frequency of Communication with Friends and Family: More than three times a week    Frequency of Social Gatherings with Friends and Family: More than three times a week    Active Member of Clubs or Organizations: No    Attends Club or Organization Meetings: Never    Marital Status:    Housing Stability: Low Risk     Unable to Pay for Housing in the Last Year: No    Number of Places Lived in the Last Year: 1    Unstable Housing in the Last Year: No     Current Outpatient Medications   Medication Sig Dispense Refill    amLODIPine (NORVASC) 2.5 MG tablet Take 1 tablet (2.5 mg total) by mouth once daily. 30 tablet 3    aspirin (ECOTRIN) 81 MG EC tablet Take 1 tablet (81 mg total) by mouth once daily. 30 tablet 0    blood-glucose meter kit Use as instructed 1 each 0    calcium carbonate (OS-RYAN) 600 mg calcium (1,500 mg) Tab Take 600 mg by mouth once.      cyanocobalamin 1,000 mcg/mL injection Inject 1 mL (1,000 mcg total) into the skin every 30 days. 3 mL 4    DULoxetine (CYMBALTA) 60 MG capsule Take 1 capsule (60 mg total) by mouth once daily. 30 capsule 3    guanFACINE (TENEX) 2 MG tablet Take 1 tablet (2 mg total) by mouth nightly. 90 tablet 3    lancets (ONETOUCH DELICA LANCETS) 33 gauge Misc USE 1  TO CHECK GLUCOSE 4 TIMES DAILY 100 each 3    levothyroxine (SYNTHROID) 75 MCG tablet       magnesium oxide (MAG-OX) 400 mg (241.3 mg magnesium) tablet Take 1 tablet (400 mg total) by mouth once  daily. 90 tablet 1    metFORMIN (GLUCOPHAGE) 1000 MG tablet Take 1 tablet (1,000 mg total) by mouth 2 (two) times daily with meals. 180 tablet 3    metoprolol succinate (TOPROL-XL) 25 MG 24 hr tablet Take 1 tablet (25 mg total) by mouth once daily. 90 tablet 3    multivitamin capsule Take 1 capsule by mouth once daily.      NYSTOP powder APPLY TO AFFECTED AREA AS NEEDED      potassium chloride (KLOR-CON) 10 MEQ TbSR Take 1 tablet (10 mEq total) by mouth once daily. 90 tablet 1    rosuvastatin (CRESTOR) 40 MG Tab TAKE 1 TABLET BY MOUTH ONCE DAILY IN THE EVENING 90 tablet 3    blood sugar diagnostic Strp One Touch Ultra Blue Test strips, Use l strip to check glucose 4 times daily 100 each 11    dapagliflozin (FARXIGA) 5 mg Tab tablet Take 1 tablet (5 mg total) by mouth once daily. 90 tablet 1    insulin degludec (TRESIBA FLEXTOUCH U-200) 200 unit/mL (3 mL) insulin pen Inject 66 Units into the skin once daily. 10 pen 1    metOLazone (ZAROXOLYN) 5 MG tablet Take 1 tablet (5 mg total) by mouth once daily. 90 tablet 3    olmesartan (BENICAR) 20 MG tablet Take 1 tablet (20 mg total) by mouth once daily. 90 tablet 1    prednisoLONE acetate (PRED FORTE) 1 % DrpS Place 1 drop into both eyes as needed.        Current Facility-Administered Medications   Medication Dose Route Frequency Provider Last Rate Last Admin    cyanocobalamin injection 1,000 mcg  1,000 mcg Intramuscular Q30 Days Mauricio Jackson MD   1,000 mcg at 03/10/22 1144     Review of patient's allergies indicates:   Allergen Reactions    Adhesive tape-silicones Rash    Dye Hives    Cephalexin     Iodine     Penicillins Edema    Pneumococcal vaccine     Iodinated contrast media Rash      Past Medical History:   Diagnosis Date    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 1/24/2021    Anemia, chronic disease 1/24/2021    CAD (coronary artery disease)     Diabetes mellitus, type 2     Hypertension     Iron deficiency anemia following bariatric  surgery 2021    MI (myocardial infarction)     Secondary thrombocytosis 2021    Sleep apnea     Sleep apnea 2019    Sleep Right      Past Surgical History:   Procedure Laterality Date    ANGIOGRAM, CORONARY, WITH LEFT HEART CATHETERIZATION      APPENDECTOMY      BARIATRIC SURGERY  2019    Dr Nunez    CARPAL TUNNEL RELEASE Bilateral 2002     SECTION      CHOLECYSTECTOMY      COLONOSCOPY      CORONARY ANGIOPLASTY WITH STENT PLACEMENT      CORONARY ARTERY BYPASS GRAFT      EPIDURAL STEROID INJECTION INTO LUMBAR SPINE      x2    ESOPHAGOGASTRODUODENOSCOPY      HERNIA REPAIR  2019    HYSTERECTOMY      THYROID LOBECTOMY Right 2021    Procedure: LOBECTOMY, THYROID;  Surgeon: Mari Chance MD;  Location: [facility];  Service: General;  Laterality: Right;       Review of Systems   Constitutional: Negative for activity change, appetite change, chills, fatigue, fever, malaise/fatigue and unexpected weight change.   HENT: Negative for congestion, ear pain, postnasal drip, rhinorrhea, sinus pressure, sinus pain, sneezing and sore throat.    Eyes: Negative for blurred vision, pain, discharge and visual disturbance.   Respiratory: Negative for cough, chest tightness, shortness of breath and wheezing.    Cardiovascular: Negative for chest pain, palpitations, orthopnea, leg swelling and PND.   Gastrointestinal: Positive for abdominal distention and abdominal pain. Negative for blood in stool, constipation, diarrhea, nausea and vomiting.   Genitourinary: Negative for difficulty urinating, flank pain, frequency, hematuria, impotence, pelvic pain, urgency and vaginal discharge.   Musculoskeletal: Positive for arthralgias. Negative for back pain, joint swelling, myalgias and neck pain.   Skin: Negative for color change, pallor, rash and wound.   Neurological: Positive for headaches. Negative for dizziness, tremors, seizures, syncope, speech difficulty, weakness, light-headedness  "and numbness.   Hematological: Negative for adenopathy.   Psychiatric/Behavioral: Negative for agitation, confusion, dysphoric mood, hallucinations, sleep disturbance and suicidal ideas. The patient is not nervous/anxious.       OBJECTIVE:      Vitals:    04/06/22 1301 04/06/22 1353   BP: (!) 156/92 (!) 142/70   BP Location: Right arm Left arm   Patient Position: Sitting Sitting   BP Method: Large (Manual)    Pulse: 63    Resp: 18    Temp: 98.4 °F (36.9 °C)    TempSrc: Oral    SpO2: 97%    Weight: 109.9 kg (242 lb 3.2 oz)    Height: 5' 7" (1.702 m)      Physical Exam  Vitals reviewed.   Constitutional:       General: She is not in acute distress.     Appearance: Normal appearance. She is well-developed. She is obese. She is not diaphoretic.   HENT:      Head: Normocephalic and atraumatic.      Right Ear: Hearing and external ear normal.      Left Ear: Hearing and external ear normal.      Nose: Nose normal.      Mouth/Throat:      Lips: Pink.      Mouth: Mucous membranes are moist.   Eyes:      General: Lids are normal. No scleral icterus.        Right eye: No discharge.         Left eye: No discharge.      Extraocular Movements: Extraocular movements intact.      Conjunctiva/sclera: Conjunctivae normal.      Pupils: Pupils are equal, round, and reactive to light.   Neck:      Thyroid: No thyroid mass or thyromegaly.      Vascular: No carotid bruit.      Trachea: Trachea and phonation normal. No tracheal deviation.   Cardiovascular:      Rate and Rhythm: Normal rate and regular rhythm.      Pulses: Normal pulses.      Heart sounds: Normal heart sounds. No murmur heard.    No friction rub. No gallop.   Pulmonary:      Effort: Pulmonary effort is normal. No respiratory distress.      Breath sounds: Normal breath sounds. No stridor. No decreased breath sounds, wheezing, rhonchi or rales.   Chest:   Breasts:      Right: No supraclavicular adenopathy.      Left: No supraclavicular adenopathy.       Abdominal:      " General: Bowel sounds are increased. There is distension. There is no abdominal bruit.      Palpations: Abdomen is soft. There is no hepatomegaly, splenomegaly or mass.      Tenderness: There is abdominal tenderness in the right lower quadrant. There is no guarding or rebound. Negative signs include Mei's sign.      Hernia: No hernia is present.       Musculoskeletal:      Left hand: Tenderness and bony tenderness present. No swelling, deformity or lacerations. Decreased range of motion. Normal strength. Normal sensation. Normal capillary refill. Normal pulse.      Cervical back: Full passive range of motion without pain, normal range of motion and neck supple.      Right lower leg: No edema.      Left lower leg: No edema.   Lymphadenopathy:      Cervical: No cervical adenopathy.      Upper Body:      Right upper body: No supraclavicular adenopathy.      Left upper body: No supraclavicular adenopathy.   Skin:     General: Skin is warm and dry.      Capillary Refill: Capillary refill takes less than 2 seconds.      Findings: No rash.   Neurological:      General: No focal deficit present.      Mental Status: She is alert and oriented to person, place, and time.      Coordination: Coordination is intact.      Gait: Gait is intact.   Psychiatric:         Attention and Perception: Attention and perception normal.         Mood and Affect: Mood and affect normal.         Speech: Speech normal.         Behavior: Behavior normal. Behavior is cooperative.         Thought Content: Thought content normal. Thought content does not include suicidal plan.         Cognition and Memory: Cognition and memory normal.         Judgment: Judgment normal.        Assessment:       1. Benign essential hypertension    2. Type 2 diabetes mellitus treated with insulin    3. Right lower quadrant abdominal pain    4. Hernia of abdominal wall    5. Left hand pain        Plan:       Benign essential hypertension  Switching benazepril to  olmesartan for more optimal control of BP. Continue metolazone, metoprolol, Tenex, and amlodipine as prescribed. Daily BP checks and notify clinic after a week about readings. F/U in 3 months.  -     metOLazone (ZAROXOLYN) 5 MG tablet; Take 1 tablet (5 mg total) by mouth once daily.  Dispense: 90 tablet; Refill: 3  -     olmesartan (BENICAR) 20 MG tablet; Take 1 tablet (20 mg total) by mouth once daily.  Dispense: 90 tablet; Refill: 1    Type 2 diabetes mellitus treated with insulin  A1c stable. Switching to Tresiba for more optimal control of blood sugar. Post prandials are high. Adding Farxiga in addition to Metformin for additional CVD and renal benefits as well as more optimal A1c control. F/U in 3 months.  -     blood sugar diagnostic Strp; One Touch Ultra Blue Test strips, Use l strip to check glucose 4 times daily  Dispense: 100 each; Refill: 11  -     Hemoglobin A1C, POCT  -     insulin degludec (TRESIBA FLEXTOUCH U-200) 200 unit/mL (3 mL) insulin pen; Inject 66 Units into the skin once daily.  Dispense: 10 pen; Refill: 1  -     dapagliflozin (FARXIGA) 5 mg Tab tablet; Take 1 tablet (5 mg total) by mouth once daily.  Dispense: 90 tablet; Refill: 1    Right lower quadrant abdominal pain  -     CT Abdomen Pelvis W Wo Contrast; Future; Expected date: 04/06/2022  -     Creatinine, serum; Future; Expected date: 04/06/2022    Hernia of abdominal wall  -     CT Abdomen Pelvis W Wo Contrast; Future; Expected date: 04/06/2022    Left hand pain  -     X-Ray Hand 3 View Left; Future; Expected date: 04/06/2022  -     Ambulatory referral/consult to Orthopedics; Future; Expected date: 04/13/2022        Follow up in about 3 months (around 7/6/2022) for F/U HTN, DMII.      4/6/2022 Wen Frazier, MESERET, FNP

## 2022-04-11 ENCOUNTER — PATIENT MESSAGE (OUTPATIENT)
Dept: FAMILY MEDICINE | Facility: CLINIC | Age: 67
End: 2022-04-11

## 2022-04-13 ENCOUNTER — PATIENT MESSAGE (OUTPATIENT)
Dept: FAMILY MEDICINE | Facility: CLINIC | Age: 67
End: 2022-04-13

## 2022-04-13 ENCOUNTER — TELEPHONE (OUTPATIENT)
Dept: FAMILY MEDICINE | Facility: CLINIC | Age: 67
End: 2022-04-13

## 2022-04-24 ENCOUNTER — PATIENT MESSAGE (OUTPATIENT)
Dept: FAMILY MEDICINE | Facility: CLINIC | Age: 67
End: 2022-04-24

## 2022-04-24 DIAGNOSIS — E11.9 TYPE 2 DIABETES MELLITUS TREATED WITH INSULIN: ICD-10-CM

## 2022-04-24 DIAGNOSIS — Z79.4 TYPE 2 DIABETES MELLITUS TREATED WITH INSULIN: ICD-10-CM

## 2022-04-25 ENCOUNTER — PATIENT MESSAGE (OUTPATIENT)
Dept: FAMILY MEDICINE | Facility: CLINIC | Age: 67
End: 2022-04-25

## 2022-04-25 RX ORDER — INSULIN DEGLUDEC 200 U/ML
75 INJECTION, SOLUTION SUBCUTANEOUS DAILY
Qty: 12 PEN | Refills: 3 | Status: SHIPPED | OUTPATIENT
Start: 2022-04-25 | End: 2023-02-16 | Stop reason: SDUPTHER

## 2022-05-03 ENCOUNTER — PATIENT MESSAGE (OUTPATIENT)
Dept: FAMILY MEDICINE | Facility: CLINIC | Age: 67
End: 2022-05-03

## 2022-05-03 DIAGNOSIS — M79.7 FIBROMYALGIA: ICD-10-CM

## 2022-05-03 DIAGNOSIS — I10 BENIGN ESSENTIAL HYPERTENSION: ICD-10-CM

## 2022-05-04 RX ORDER — AMLODIPINE BESYLATE 2.5 MG/1
2.5 TABLET ORAL DAILY
Qty: 90 TABLET | Refills: 1 | Status: SHIPPED | OUTPATIENT
Start: 2022-05-04 | End: 2022-10-26 | Stop reason: SDUPTHER

## 2022-05-04 RX ORDER — DULOXETIN HYDROCHLORIDE 60 MG/1
60 CAPSULE, DELAYED RELEASE ORAL DAILY
Qty: 90 CAPSULE | Refills: 1 | Status: SHIPPED | OUTPATIENT
Start: 2022-05-04 | End: 2022-10-26 | Stop reason: SDUPTHER

## 2022-05-19 ENCOUNTER — PATIENT MESSAGE (OUTPATIENT)
Dept: FAMILY MEDICINE | Facility: CLINIC | Age: 67
End: 2022-05-19

## 2022-05-19 DIAGNOSIS — I10 BENIGN ESSENTIAL HYPERTENSION: ICD-10-CM

## 2022-05-20 RX ORDER — OLMESARTAN MEDOXOMIL 40 MG/1
40 TABLET ORAL DAILY
Qty: 90 TABLET | Refills: 1 | Status: SHIPPED | OUTPATIENT
Start: 2022-05-20 | End: 2022-11-18 | Stop reason: SDUPTHER

## 2022-06-07 ENCOUNTER — PATIENT MESSAGE (OUTPATIENT)
Dept: FAMILY MEDICINE | Facility: CLINIC | Age: 67
End: 2022-06-07

## 2022-06-07 DIAGNOSIS — M54.42 CHRONIC BILATERAL LOW BACK PAIN WITH BILATERAL SCIATICA: Primary | ICD-10-CM

## 2022-06-07 DIAGNOSIS — M54.41 CHRONIC BILATERAL LOW BACK PAIN WITH BILATERAL SCIATICA: Primary | ICD-10-CM

## 2022-06-07 DIAGNOSIS — G89.29 CHRONIC BILATERAL LOW BACK PAIN WITH BILATERAL SCIATICA: Primary | ICD-10-CM

## 2022-06-07 RX ORDER — METHOCARBAMOL 500 MG/1
500 TABLET, FILM COATED ORAL 3 TIMES DAILY PRN
Qty: 40 TABLET | Refills: 0 | Status: SHIPPED | OUTPATIENT
Start: 2022-06-07 | End: 2022-06-17

## 2022-07-11 ENCOUNTER — HOSPITAL ENCOUNTER (OUTPATIENT)
Dept: RADIOLOGY | Facility: HOSPITAL | Age: 67
Discharge: HOME | End: 2022-07-11
Attending: NURSE PRACTITIONER
Payer: MEDICARE

## 2022-07-11 DIAGNOSIS — M79.642 LEFT HAND PAIN: ICD-10-CM

## 2022-07-11 PROCEDURE — 73130 X-RAY EXAM OF HAND: CPT | Mod: TC,PO,LT

## 2022-07-20 ENCOUNTER — LAB VISIT (OUTPATIENT)
Dept: LAB | Facility: HOSPITAL | Age: 67
End: 2022-07-20
Attending: NURSE PRACTITIONER
Payer: MEDICARE

## 2022-07-20 DIAGNOSIS — R10.31 RIGHT LOWER QUADRANT ABDOMINAL PAIN: ICD-10-CM

## 2022-07-20 LAB
CREAT SERPL-MCNC: 0.9 MG/DL (ref 0.5–1.4)
EST. GFR  (AFRICAN AMERICAN): >60 ML/MIN/1.73 M^2
EST. GFR  (NON AFRICAN AMERICAN): >60 ML/MIN/1.73 M^2

## 2022-07-20 PROCEDURE — 82565 ASSAY OF CREATININE: CPT | Performed by: NURSE PRACTITIONER

## 2022-08-05 ENCOUNTER — OFFICE VISIT (OUTPATIENT)
Dept: FAMILY MEDICINE | Facility: CLINIC | Age: 67
End: 2022-08-05
Payer: MEDICARE

## 2022-08-05 ENCOUNTER — TELEPHONE (OUTPATIENT)
Dept: FAMILY MEDICINE | Facility: CLINIC | Age: 67
End: 2022-08-05
Payer: MEDICARE

## 2022-08-05 VITALS
BODY MASS INDEX: 37.98 KG/M2 | HEART RATE: 86 BPM | DIASTOLIC BLOOD PRESSURE: 74 MMHG | SYSTOLIC BLOOD PRESSURE: 136 MMHG | TEMPERATURE: 98 F | WEIGHT: 242 LBS | HEIGHT: 67 IN | OXYGEN SATURATION: 98 % | RESPIRATION RATE: 18 BRPM

## 2022-08-05 DIAGNOSIS — I10 BENIGN ESSENTIAL HYPERTENSION: ICD-10-CM

## 2022-08-05 DIAGNOSIS — M19.042 PRIMARY OSTEOARTHRITIS OF BOTH HANDS: ICD-10-CM

## 2022-08-05 DIAGNOSIS — E78.2 MIXED HYPERLIPIDEMIA: ICD-10-CM

## 2022-08-05 DIAGNOSIS — G89.29 CHRONIC BILATERAL LOW BACK PAIN WITH BILATERAL SCIATICA: ICD-10-CM

## 2022-08-05 DIAGNOSIS — M54.42 CHRONIC BILATERAL LOW BACK PAIN WITH BILATERAL SCIATICA: ICD-10-CM

## 2022-08-05 DIAGNOSIS — E83.42 HYPOMAGNESEMIA: ICD-10-CM

## 2022-08-05 DIAGNOSIS — M54.41 CHRONIC BILATERAL LOW BACK PAIN WITH BILATERAL SCIATICA: ICD-10-CM

## 2022-08-05 DIAGNOSIS — Z79.4 TYPE 2 DIABETES MELLITUS TREATED WITH INSULIN: Primary | ICD-10-CM

## 2022-08-05 DIAGNOSIS — M19.041 PRIMARY OSTEOARTHRITIS OF BOTH HANDS: ICD-10-CM

## 2022-08-05 DIAGNOSIS — E11.9 TYPE 2 DIABETES MELLITUS TREATED WITH INSULIN: Primary | ICD-10-CM

## 2022-08-05 LAB — HBA1C MFR BLD: 7.4 %

## 2022-08-05 PROCEDURE — 1101F PT FALLS ASSESS-DOCD LE1/YR: CPT | Mod: CPTII,S$GLB,, | Performed by: NURSE PRACTITIONER

## 2022-08-05 PROCEDURE — 3075F PR MOST RECENT SYSTOLIC BLOOD PRESS GE 130-139MM HG: ICD-10-PCS | Mod: CPTII,S$GLB,, | Performed by: NURSE PRACTITIONER

## 2022-08-05 PROCEDURE — 4010F PR ACE/ARB THEARPY RXD/TAKEN: ICD-10-PCS | Mod: CPTII,S$GLB,, | Performed by: NURSE PRACTITIONER

## 2022-08-05 PROCEDURE — 4010F ACE/ARB THERAPY RXD/TAKEN: CPT | Mod: CPTII,S$GLB,, | Performed by: NURSE PRACTITIONER

## 2022-08-05 PROCEDURE — 99213 PR OFFICE/OUTPT VISIT, EST, LEVL III, 20-29 MIN: ICD-10-PCS | Mod: S$GLB,,, | Performed by: NURSE PRACTITIONER

## 2022-08-05 PROCEDURE — 3066F PR DOCUMENTATION OF TREATMENT FOR NEPHROPATHY: ICD-10-PCS | Mod: CPTII,S$GLB,, | Performed by: NURSE PRACTITIONER

## 2022-08-05 PROCEDURE — 1160F PR REVIEW ALL MEDS BY PRESCRIBER/CLIN PHARMACIST DOCUMENTED: ICD-10-PCS | Mod: CPTII,S$GLB,, | Performed by: NURSE PRACTITIONER

## 2022-08-05 PROCEDURE — 3288F FALL RISK ASSESSMENT DOCD: CPT | Mod: CPTII,S$GLB,, | Performed by: NURSE PRACTITIONER

## 2022-08-05 PROCEDURE — 1159F MED LIST DOCD IN RCRD: CPT | Mod: CPTII,S$GLB,, | Performed by: NURSE PRACTITIONER

## 2022-08-05 PROCEDURE — 1160F RVW MEDS BY RX/DR IN RCRD: CPT | Mod: CPTII,S$GLB,, | Performed by: NURSE PRACTITIONER

## 2022-08-05 PROCEDURE — 3051F HG A1C>EQUAL 7.0%<8.0%: CPT | Mod: CPTII,S$GLB,, | Performed by: NURSE PRACTITIONER

## 2022-08-05 PROCEDURE — 3061F PR NEG MICROALBUMINURIA RESULT DOCUMENTED/REVIEW: ICD-10-PCS | Mod: CPTII,S$GLB,, | Performed by: NURSE PRACTITIONER

## 2022-08-05 PROCEDURE — 3066F NEPHROPATHY DOC TX: CPT | Mod: CPTII,S$GLB,, | Performed by: NURSE PRACTITIONER

## 2022-08-05 PROCEDURE — 3061F NEG MICROALBUMINURIA REV: CPT | Mod: CPTII,S$GLB,, | Performed by: NURSE PRACTITIONER

## 2022-08-05 PROCEDURE — 99213 OFFICE O/P EST LOW 20 MIN: CPT | Mod: S$GLB,,, | Performed by: NURSE PRACTITIONER

## 2022-08-05 PROCEDURE — 3051F PR MOST RECENT HEMOGLOBIN A1C LEVEL 7.0 - < 8.0%: ICD-10-PCS | Mod: CPTII,S$GLB,, | Performed by: NURSE PRACTITIONER

## 2022-08-05 PROCEDURE — 3078F PR MOST RECENT DIASTOLIC BLOOD PRESSURE < 80 MM HG: ICD-10-PCS | Mod: CPTII,S$GLB,, | Performed by: NURSE PRACTITIONER

## 2022-08-05 PROCEDURE — 1159F PR MEDICATION LIST DOCUMENTED IN MEDICAL RECORD: ICD-10-PCS | Mod: CPTII,S$GLB,, | Performed by: NURSE PRACTITIONER

## 2022-08-05 PROCEDURE — 3008F BODY MASS INDEX DOCD: CPT | Mod: CPTII,S$GLB,, | Performed by: NURSE PRACTITIONER

## 2022-08-05 PROCEDURE — 3078F DIAST BP <80 MM HG: CPT | Mod: CPTII,S$GLB,, | Performed by: NURSE PRACTITIONER

## 2022-08-05 PROCEDURE — 1101F PR PT FALLS ASSESS DOC 0-1 FALLS W/OUT INJ PAST YR: ICD-10-PCS | Mod: CPTII,S$GLB,, | Performed by: NURSE PRACTITIONER

## 2022-08-05 PROCEDURE — 83036 POCT HEMOGLOBIN A1C: ICD-10-PCS | Mod: QW,S$GLB,, | Performed by: NURSE PRACTITIONER

## 2022-08-05 PROCEDURE — 3288F PR FALLS RISK ASSESSMENT DOCUMENTED: ICD-10-PCS | Mod: CPTII,S$GLB,, | Performed by: NURSE PRACTITIONER

## 2022-08-05 PROCEDURE — 3075F SYST BP GE 130 - 139MM HG: CPT | Mod: CPTII,S$GLB,, | Performed by: NURSE PRACTITIONER

## 2022-08-05 PROCEDURE — 3008F PR BODY MASS INDEX (BMI) DOCUMENTED: ICD-10-PCS | Mod: CPTII,S$GLB,, | Performed by: NURSE PRACTITIONER

## 2022-08-05 PROCEDURE — 83036 HEMOGLOBIN GLYCOSYLATED A1C: CPT | Mod: QW,S$GLB,, | Performed by: NURSE PRACTITIONER

## 2022-08-05 RX ORDER — DICLOFENAC SODIUM 10 MG/G
2 GEL TOPICAL 4 TIMES DAILY
Qty: 450 G | Refills: 1 | Status: SHIPPED | OUTPATIENT
Start: 2022-08-05 | End: 2023-04-17

## 2022-08-05 RX ORDER — ROSUVASTATIN CALCIUM 40 MG/1
40 TABLET, COATED ORAL NIGHTLY
Qty: 90 TABLET | Refills: 3 | Status: SHIPPED | OUTPATIENT
Start: 2022-08-05 | End: 2023-02-16 | Stop reason: SDUPTHER

## 2022-08-05 RX ORDER — LANOLIN ALCOHOL/MO/W.PET/CERES
400 CREAM (GRAM) TOPICAL DAILY
Qty: 90 TABLET | Refills: 1 | Status: SHIPPED | OUTPATIENT
Start: 2022-08-05 | End: 2023-02-16 | Stop reason: SDUPTHER

## 2022-08-05 RX ORDER — GUANFACINE 2 MG/1
2 TABLET ORAL NIGHTLY
Qty: 90 TABLET | Refills: 3 | Status: SHIPPED | OUTPATIENT
Start: 2022-08-05 | End: 2023-09-06 | Stop reason: SDUPTHER

## 2022-08-05 RX ORDER — IRON,CARBONYL/ASCORBIC ACID 100-250 MG
1 TABLET ORAL DAILY
COMMUNITY
Start: 2022-05-29 | End: 2022-09-12 | Stop reason: SDUPTHER

## 2022-08-05 RX ORDER — CYCLOBENZAPRINE HCL 5 MG
5 TABLET ORAL NIGHTLY PRN
Qty: 60 TABLET | Refills: 1 | Status: SHIPPED | OUTPATIENT
Start: 2022-08-05 | End: 2022-08-15

## 2022-08-05 RX ORDER — METFORMIN HYDROCHLORIDE 1000 MG/1
1000 TABLET ORAL 2 TIMES DAILY WITH MEALS
Qty: 180 TABLET | Refills: 3 | Status: SHIPPED | OUTPATIENT
Start: 2022-08-05 | End: 2023-02-16

## 2022-08-05 RX ORDER — METHOCARBAMOL 500 MG/1
500 TABLET, FILM COATED ORAL 2 TIMES DAILY PRN
Qty: 60 TABLET | Refills: 0 | Status: SHIPPED | OUTPATIENT
Start: 2022-08-05 | End: 2022-08-15

## 2022-08-05 NOTE — PROGRESS NOTES
SUBJECTIVE:      Patient ID: Chanel Cervantes is a 66 y.o. female.    Chief Complaint: Diabetes and Hypertension    Dr. Henley - endocrine  Dr. Jackson - hem/onc  Dr. Orzoco - rheumatology  Dr. Nunez - bariatrics/gen surg  Dr. Chance - surg (thyroid)  Dr. Guan - eye doctor     Pt presents for F/U HTN and DMII. This is a 65 yo F with h/o DM, HTN, HLD, anemia, CAD, hx of MI, depression, obesity s/p gastric sleeve in April 2019 c/b incisional ventral hernia s/p repair, panniculectomy and full thickness skin graft on 12/4/19, anemia, multinodular thyroid s/p R thyroid lobectomy 7/2021, and NIKOLAS. Hemoglobin A1c has worsened today to 7.4 from previous 7.2%. She continues on Tresiba, Farxiga, and metformin. Her Tresiba was decreased slightly from previous through the portal due to lower blood sugars in the morning. She reports her diet has not been good and she hasn't been able to exercise reportedly due to her fibromyalgia and chronic pain to her B ankles. Her eye exam is due and her foot exam Her BP is controlled today. She continues on amlodipine, olmesartan, Tenex, metoprolol, and metolazone. She continues with bilateral hand pain due to OA. Denies locking or popping sensations. She continues with intermittent back pain and has been using OTC NSAIDs and Robaxin as prescribed. She is agreeable to seeing pain management to discuss different options for pain relief. Denies CP, SOB, wheezing, fevers, nausea, vomiting, diarrhea, constipation, numbness, weakness, dizziness, palpitations, or any other concerns at this time.    Hypertension  This is a recurrent problem. The current episode started more than 1 year ago. The problem has been gradually improving since onset. The problem is resistant. Associated symptoms include malaise/fatigue and peripheral edema. Pertinent negatives include no chest pain, headaches, palpitations, PND, shortness of breath or sweats. Agents associated with hypertension include NSAIDs and  "thyroid hormones. Risk factors for coronary artery disease include diabetes mellitus, obesity, sedentary lifestyle and stress. Past treatments include beta blockers, diuretics and lifestyle changes. The current treatment provides moderate improvement. There are no compliance problems.        Past Surgical History:   Procedure Laterality Date    ANGIOGRAM, CORONARY, WITH LEFT HEART CATHETERIZATION      APPENDECTOMY      BARIATRIC SURGERY  2019    Dr Nunez    CARPAL TUNNEL RELEASE Bilateral 2002     SECTION      CHOLECYSTECTOMY      COLONOSCOPY      CORONARY ANGIOPLASTY WITH STENT PLACEMENT      CORONARY ARTERY BYPASS GRAFT      EPIDURAL STEROID INJECTION INTO LUMBAR SPINE      x2    ESOPHAGOGASTRODUODENOSCOPY      HERNIA REPAIR  2019    HYSTERECTOMY      THYROID LOBECTOMY Right 2021    Procedure: LOBECTOMY, THYROID;  Surgeon: Mari Chance MD;  Location: [facility];  Service: General;  Laterality: Right;     Family History   Problem Relation Age of Onset    Diabetes Mother     Vision loss Mother     Heart disease Father     Vision loss Father     Stroke Father       Social History     Socioeconomic History    Marital status:    Tobacco Use    Smoking status: Former Smoker     Packs/day: 1.00     Years: 15.00     Pack years: 15.00     Quit date:      Years since quittin.6    Smokeless tobacco: Never Used   Substance and Sexual Activity    Alcohol use: No     Comment: "maybe once a year"    Drug use: No    Sexual activity: Not Currently     Social Determinants of Health     Financial Resource Strain: Unknown    Difficulty of Paying Living Expenses: Patient refused   Food Insecurity: Unknown    Worried About Running Out of Food in the Last Year: Patient refused    Ran Out of Food in the Last Year: Patient refused   Transportation Needs: Unknown    Lack of Transportation (Medical): Patient refused    Lack of Transportation (Non-Medical): Patient " refused   Physical Activity: Inactive    Days of Exercise per Week: 0 days    Minutes of Exercise per Session: 0 min   Stress: Stress Concern Present    Feeling of Stress : To some extent   Social Connections: Unknown    Frequency of Communication with Friends and Family: More than three times a week    Frequency of Social Gatherings with Friends and Family: More than three times a week    Active Member of Clubs or Organizations: No    Attends Club or Organization Meetings: Never    Marital Status:    Housing Stability: Low Risk     Unable to Pay for Housing in the Last Year: No    Number of Places Lived in the Last Year: 1    Unstable Housing in the Last Year: No     Current Outpatient Medications   Medication Sig Dispense Refill    amLODIPine (NORVASC) 2.5 MG tablet Take 1 tablet (2.5 mg total) by mouth once daily. 90 tablet 1    aspirin (ECOTRIN) 81 MG EC tablet Take 1 tablet (81 mg total) by mouth once daily. 30 tablet 0    blood sugar diagnostic Strp One Touch Ultra Blue Test strips, Use l strip to check glucose 4 times daily 100 each 11    blood-glucose meter kit Use as instructed 1 each 0    calcium carbonate (OS-RYNA) 600 mg calcium (1,500 mg) Tab Take 600 mg by mouth once.      cyanocobalamin 1,000 mcg/mL injection Inject 1 mL (1,000 mcg total) into the skin every 30 days. 3 mL 4    dapagliflozin (FARXIGA) 5 mg Tab tablet Take 1 tablet (5 mg total) by mouth once daily. 90 tablet 1    DULoxetine (CYMBALTA) 60 MG capsule Take 1 capsule (60 mg total) by mouth once daily. 90 capsule 1    insulin degludec (TRESIBA FLEXTOUCH U-200) 200 unit/mL (3 mL) insulin pen Inject 76 Units into the skin once daily. 12 pen 3    iron-vitamin C 100-250 mg, ICAR-C, 100-250 mg Tab Take 1 tablet by mouth once daily.      lancets (ONETOUCH DELICA LANCETS) 33 gauge Misc USE 1  TO CHECK GLUCOSE 4 TIMES DAILY 100 each 3    levothyroxine (SYNTHROID) 75 MCG tablet       metOLazone (ZAROXOLYN) 5 MG tablet  Take 1 tablet (5 mg total) by mouth once daily. 90 tablet 3    metoprolol succinate (TOPROL-XL) 25 MG 24 hr tablet Take 1 tablet (25 mg total) by mouth once daily. 90 tablet 3    multivitamin capsule Take 1 capsule by mouth once daily.      NYSTOP powder APPLY TO AFFECTED AREA AS NEEDED      olmesartan (BENICAR) 40 MG tablet Take 1 tablet (40 mg total) by mouth once daily. 90 tablet 1    potassium chloride (KLOR-CON) 10 MEQ TbSR Take 1 tablet (10 mEq total) by mouth once daily. 90 tablet 1    prednisoLONE acetate (PRED FORTE) 1 % DrpS Place 1 drop into both eyes as needed.       cyclobenzaprine (FLEXERIL) 5 MG tablet Take 1 tablet (5 mg total) by mouth nightly as needed for Muscle spasms. 60 tablet 1    diclofenac sodium (VOLTAREN) 1 % Gel Apply 2 g topically 4 (four) times daily. 450 g 1    guanFACINE (TENEX) 2 MG tablet Take 1 tablet (2 mg total) by mouth nightly. 90 tablet 3    magnesium oxide (MAG-OX) 400 mg (241.3 mg magnesium) tablet Take 1 tablet (400 mg total) by mouth once daily. 90 tablet 1    metFORMIN (GLUCOPHAGE) 1000 MG tablet Take 1 tablet (1,000 mg total) by mouth 2 (two) times daily with meals. 180 tablet 3    methocarbamoL (ROBAXIN) 500 MG Tab Take 1 tablet (500 mg total) by mouth 2 (two) times daily as needed (back pain). 60 tablet 0    rosuvastatin (CRESTOR) 40 MG Tab Take 1 tablet (40 mg total) by mouth every evening. 90 tablet 3     No current facility-administered medications for this visit.     Review of patient's allergies indicates:   Allergen Reactions    Adhesive tape-silicones Rash    Dye Hives    Cephalexin     Iodine     Penicillins Edema    Pneumococcal vaccine     Iodinated contrast media Rash      Past Medical History:   Diagnosis Date    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 1/24/2021    Anemia, chronic disease 1/24/2021    CAD (coronary artery disease)     Diabetes mellitus, type 2     Hypertension     Iron deficiency anemia following bariatric surgery  2021    MI (myocardial infarction)     Secondary thrombocytosis 2021    Sleep apnea     Sleep apnea 2019    Sleep Right      Past Surgical History:   Procedure Laterality Date    ANGIOGRAM, CORONARY, WITH LEFT HEART CATHETERIZATION      APPENDECTOMY      BARIATRIC SURGERY  2019    Dr Nunez    CARPAL TUNNEL RELEASE Bilateral 2002     SECTION      CHOLECYSTECTOMY      COLONOSCOPY      CORONARY ANGIOPLASTY WITH STENT PLACEMENT      CORONARY ARTERY BYPASS GRAFT      EPIDURAL STEROID INJECTION INTO LUMBAR SPINE      x2    ESOPHAGOGASTRODUODENOSCOPY      HERNIA REPAIR  2019    HYSTERECTOMY      THYROID LOBECTOMY Right 2021    Procedure: LOBECTOMY, THYROID;  Surgeon: Mari Chance MD;  Location: [facility];  Service: General;  Laterality: Right;       Review of Systems   Constitutional: Positive for malaise/fatigue. Negative for activity change, appetite change, chills, fatigue, fever and unexpected weight change.   HENT: Negative for congestion, ear pain, postnasal drip, rhinorrhea, sinus pressure, sinus pain, sneezing and sore throat.    Eyes: Negative for pain, discharge and visual disturbance.   Respiratory: Negative for cough, chest tightness, shortness of breath and wheezing.    Cardiovascular: Negative for chest pain, palpitations, leg swelling and PND.   Gastrointestinal: Negative for abdominal distention, abdominal pain, blood in stool, constipation, diarrhea, nausea and vomiting.   Genitourinary: Negative for difficulty urinating, flank pain, frequency, hematuria, pelvic pain, urgency and vaginal discharge.   Musculoskeletal: Positive for arthralgias and back pain. Negative for joint swelling and myalgias.   Skin: Negative for color change, rash and wound.   Neurological: Negative for dizziness, seizures, syncope, weakness, light-headedness, numbness and headaches.   Hematological: Negative for adenopathy.   Psychiatric/Behavioral: Negative for agitation,  "confusion, dysphoric mood, hallucinations, sleep disturbance and suicidal ideas. The patient is not nervous/anxious.       OBJECTIVE:      Vitals:    08/05/22 0852   BP: 136/74   BP Location: Left arm   Patient Position: Standing   BP Method: Large (Manual)   Pulse: 86   Resp: 18   Temp: 98.2 °F (36.8 °C)   TempSrc: Oral   SpO2: 98%   Weight: 109.8 kg (242 lb)   Height: 5' 7" (1.702 m)     Physical Exam  Vitals reviewed.   Constitutional:       General: She is not in acute distress.     Appearance: Normal appearance. She is well-developed. She is obese. She is not diaphoretic.   HENT:      Head: Normocephalic and atraumatic.      Right Ear: Hearing, tympanic membrane, ear canal and external ear normal.      Left Ear: Hearing, tympanic membrane, ear canal and external ear normal.      Nose: Nose normal. No mucosal edema, congestion or rhinorrhea.      Mouth/Throat:      Lips: Pink.      Mouth: Mucous membranes are moist.      Pharynx: Oropharynx is clear. Uvula midline. No pharyngeal swelling, oropharyngeal exudate or posterior oropharyngeal erythema.   Eyes:      General: Lids are normal. No scleral icterus.        Right eye: No discharge.         Left eye: No discharge.      Extraocular Movements: Extraocular movements intact.      Conjunctiva/sclera: Conjunctivae normal.      Pupils: Pupils are equal, round, and reactive to light.   Neck:      Thyroid: No thyroid mass or thyromegaly.      Vascular: No carotid bruit.      Trachea: Trachea and phonation normal. No tracheal deviation.   Cardiovascular:      Rate and Rhythm: Normal rate and regular rhythm.      Pulses: Normal pulses.      Heart sounds: Normal heart sounds. No murmur heard.    No friction rub. No gallop.   Pulmonary:      Effort: Pulmonary effort is normal. No respiratory distress.      Breath sounds: Normal breath sounds. No stridor. No decreased breath sounds, wheezing, rhonchi or rales.   Chest:   Breasts:      Right: No supraclavicular adenopathy. "      Left: No supraclavicular adenopathy.       Abdominal:      General: Bowel sounds are normal.      Palpations: Abdomen is soft.      Tenderness: There is no abdominal tenderness.   Musculoskeletal:         General: Normal range of motion.      Cervical back: Full passive range of motion without pain, normal range of motion and neck supple.      Right lower leg: No edema.      Left lower leg: No edema.   Lymphadenopathy:      Cervical: No cervical adenopathy.      Upper Body:      Right upper body: No supraclavicular adenopathy.      Left upper body: No supraclavicular adenopathy.   Skin:     General: Skin is warm and dry.      Capillary Refill: Capillary refill takes less than 2 seconds.      Findings: No rash.   Neurological:      General: No focal deficit present.      Mental Status: She is alert and oriented to person, place, and time.      Coordination: Coordination is intact.      Gait: Gait is intact.   Psychiatric:         Attention and Perception: Attention and perception normal.         Mood and Affect: Mood and affect normal.         Speech: Speech normal.         Behavior: Behavior normal. Behavior is cooperative.         Thought Content: Thought content normal. Thought content does not include suicidal plan.         Cognition and Memory: Cognition and memory normal.         Judgment: Judgment normal.        Assessment:       1. Type 2 diabetes mellitus treated with insulin    2. Benign essential hypertension    3. Mixed hyperlipidemia    4. Chronic bilateral low back pain with bilateral sciatica    5. Primary osteoarthritis of both hands    6. Hypomagnesemia        Plan:       Type 2 diabetes mellitus treated with insulin  -     Hemoglobin A1C, POCT  -     Ambulatory referral/consult to Ophthalmology; Future; Expected date: 08/12/2022  -     metFORMIN (GLUCOPHAGE) 1000 MG tablet; Take 1 tablet (1,000 mg total) by mouth 2 (two) times daily with meals.  Dispense: 180 tablet; Refill: 3    Benign  essential hypertension  -     guanFACINE (TENEX) 2 MG tablet; Take 1 tablet (2 mg total) by mouth nightly.  Dispense: 90 tablet; Refill: 3    Mixed hyperlipidemia  -     rosuvastatin (CRESTOR) 40 MG Tab; Take 1 tablet (40 mg total) by mouth every evening.  Dispense: 90 tablet; Refill: 3    Chronic bilateral low back pain with bilateral sciatica  -     Ambulatory referral/consult to Pain Clinic; Future; Expected date: 08/12/2022  -     cyclobenzaprine (FLEXERIL) 5 MG tablet; Take 1 tablet (5 mg total) by mouth nightly as needed for Muscle spasms.  Dispense: 60 tablet; Refill: 1  -     methocarbamoL (ROBAXIN) 500 MG Tab; Take 1 tablet (500 mg total) by mouth 2 (two) times daily as needed (back pain).  Dispense: 60 tablet; Refill: 0    Primary osteoarthritis of both hands  -     diclofenac sodium (VOLTAREN) 1 % Gel; Apply 2 g topically 4 (four) times daily.  Dispense: 450 g; Refill: 1    Hypomagnesemia  -     magnesium oxide (MAG-OX) 400 mg (241.3 mg magnesium) tablet; Take 1 tablet (400 mg total) by mouth once daily.  Dispense: 90 tablet; Refill: 1        Follow up in about 3 months (around 11/5/2022) for F/U DM, HTN.      8/8/2022 MESERET Santos, FNP

## 2022-08-31 ENCOUNTER — LAB VISIT (OUTPATIENT)
Dept: LAB | Facility: HOSPITAL | Age: 67
End: 2022-08-31
Attending: NURSE PRACTITIONER
Payer: MEDICARE

## 2022-08-31 DIAGNOSIS — K95.89 IRON DEFICIENCY ANEMIA FOLLOWING BARIATRIC SURGERY: ICD-10-CM

## 2022-08-31 DIAGNOSIS — D50.9 MICROCYTIC ANEMIA: ICD-10-CM

## 2022-08-31 DIAGNOSIS — D75.838 SECONDARY THROMBOCYTOSIS: ICD-10-CM

## 2022-08-31 DIAGNOSIS — D63.8 ANEMIA, CHRONIC DISEASE: ICD-10-CM

## 2022-08-31 DIAGNOSIS — D51.8 OTHER VITAMIN B12 DEFICIENCY ANEMIA: ICD-10-CM

## 2022-08-31 DIAGNOSIS — D50.8 IRON DEFICIENCY ANEMIA FOLLOWING BARIATRIC SURGERY: ICD-10-CM

## 2022-08-31 DIAGNOSIS — D64.89 ANEMIA DUE TO MULTIPLE MECHANISMS: ICD-10-CM

## 2022-08-31 LAB
ALBUMIN SERPL BCP-MCNC: 3.9 G/DL (ref 3.5–5.2)
ALP SERPL-CCNC: 77 U/L (ref 55–135)
ALT SERPL W/O P-5'-P-CCNC: 25 U/L (ref 10–44)
ANION GAP SERPL CALC-SCNC: 11 MMOL/L (ref 8–16)
AST SERPL-CCNC: 24 U/L (ref 10–40)
BASOPHILS # BLD AUTO: 0.05 K/UL (ref 0–0.2)
BASOPHILS NFR BLD: 0.8 % (ref 0–1.9)
BILIRUB SERPL-MCNC: 0.5 MG/DL (ref 0.1–1)
BUN SERPL-MCNC: 18 MG/DL (ref 8–23)
CALCIUM SERPL-MCNC: 9.5 MG/DL (ref 8.7–10.5)
CHLORIDE SERPL-SCNC: 98 MMOL/L (ref 95–110)
CO2 SERPL-SCNC: 32 MMOL/L (ref 23–29)
CREAT SERPL-MCNC: 0.9 MG/DL (ref 0.5–1.4)
DIFFERENTIAL METHOD: ABNORMAL
EOSINOPHIL # BLD AUTO: 0.1 K/UL (ref 0–0.5)
EOSINOPHIL NFR BLD: 1.8 % (ref 0–8)
ERYTHROCYTE [DISTWIDTH] IN BLOOD BY AUTOMATED COUNT: 14.6 % (ref 11.5–14.5)
EST. GFR  (NO RACE VARIABLE): >60 ML/MIN/1.73 M^2
FERRITIN SERPL-MCNC: 30 NG/ML (ref 20–300)
FOLATE SERPL-MCNC: >24.8 NG/ML (ref 4–24)
GLUCOSE SERPL-MCNC: 198 MG/DL (ref 70–110)
HCT VFR BLD AUTO: 44.8 % (ref 37–48.5)
HGB BLD-MCNC: 14 G/DL (ref 12–16)
IMM GRANULOCYTES # BLD AUTO: 0.02 K/UL (ref 0–0.04)
IMM GRANULOCYTES NFR BLD AUTO: 0.3 % (ref 0–0.5)
IRON SERPL-MCNC: 55 UG/DL (ref 30–160)
LYMPHOCYTES # BLD AUTO: 1.7 K/UL (ref 1–4.8)
LYMPHOCYTES NFR BLD: 28 % (ref 18–48)
MCH RBC QN AUTO: 26.2 PG (ref 27–31)
MCHC RBC AUTO-ENTMCNC: 31.3 G/DL (ref 32–36)
MCV RBC AUTO: 84 FL (ref 82–98)
MONOCYTES # BLD AUTO: 0.5 K/UL (ref 0.3–1)
MONOCYTES NFR BLD: 8.6 % (ref 4–15)
NEUTROPHILS # BLD AUTO: 3.7 K/UL (ref 1.8–7.7)
NEUTROPHILS NFR BLD: 60.5 % (ref 38–73)
NRBC BLD-RTO: 0 /100 WBC
PLATELET # BLD AUTO: 334 K/UL (ref 150–450)
PMV BLD AUTO: 10.3 FL (ref 9.2–12.9)
POTASSIUM SERPL-SCNC: 3.5 MMOL/L (ref 3.5–5.1)
PROT SERPL-MCNC: 7.2 G/DL (ref 6–8.4)
RBC # BLD AUTO: 5.34 M/UL (ref 4–5.4)
SATURATED IRON: 14 % (ref 20–50)
SODIUM SERPL-SCNC: 141 MMOL/L (ref 136–145)
TOTAL IRON BINDING CAPACITY: 391 UG/DL (ref 250–450)
TRANSFERRIN SERPL-MCNC: 279 MG/DL (ref 200–375)
VIT B12 SERPL-MCNC: 519 PG/ML (ref 210–950)
WBC # BLD AUTO: 6.17 K/UL (ref 3.9–12.7)

## 2022-08-31 PROCEDURE — 80053 COMPREHEN METABOLIC PANEL: CPT | Performed by: INTERNAL MEDICINE

## 2022-08-31 PROCEDURE — 82746 ASSAY OF FOLIC ACID SERUM: CPT | Performed by: INTERNAL MEDICINE

## 2022-08-31 PROCEDURE — 36415 COLL VENOUS BLD VENIPUNCTURE: CPT | Performed by: INTERNAL MEDICINE

## 2022-08-31 PROCEDURE — 82728 ASSAY OF FERRITIN: CPT | Performed by: INTERNAL MEDICINE

## 2022-08-31 PROCEDURE — 84466 ASSAY OF TRANSFERRIN: CPT | Performed by: INTERNAL MEDICINE

## 2022-08-31 PROCEDURE — 82607 VITAMIN B-12: CPT | Performed by: INTERNAL MEDICINE

## 2022-08-31 PROCEDURE — 85025 COMPLETE CBC W/AUTO DIFF WBC: CPT | Performed by: INTERNAL MEDICINE

## 2022-09-09 NOTE — PROGRESS NOTES
[facility] Hematology/Oncology  PROGRESS NOTE -  Follow-up Visit      Subjective:       Patient ID:   NAME: Chanel Cervantes : 1955     67 y.o. female    Referring Doc: Freddie  Other Physicians: LUIS Orozco; Kj Lucas Braxton; Himanshu Nunez (Bariatrics)           Chief Complaint: iron defic anem f/u      History of Present Illness:     Patient returns today for a regularly scheduled follow-up visit.  The patient is here today to go over the results of the recently ordered labs, tests and studies.     She has been under a lot of stress at home with her daughter who recently had surgery. She has been having chronic back pain and sciatica issues and is seeing pain management next month.     Low energy levels, general aches and pains; some urinary symptoms     She sees Dr Henley with endocrine again in Oct 2022    She is doing ok with the oral iron and needs refills; she has been doing the B12 monthly     She is otherwise doing ok. No CP, SOB, HA's or N/V.     She saw her PCP Freddie on 2022 and is on new BP meds    Discussed covid19 precautions - she has had her vaccinations            ROS:   GEN: energy levels low again; a lot of stress at home; no fevers, weight loss  HEENT: normal with no HA's, sore throat, stiff neck, changes in vision  CV: normal with no CP, SOB, PND, WILSON or orthopnea  PULM: normal with no SOB, cough, hemoptysis, sputum or pleuritic pain  GI: normal with no abdominal pain, nausea, vomiting, constipation, diarrhea, melanotic stools, BRBPR, or hematemesis  : some dysuria  BREAST: normal with no mass, discharge, pain  SKIN: normal with no rash, erythema, bruising, or swelling    Pain Scale: 0     Allergies:  Review of patient's allergies indicates:   Allergen Reactions    Adhesive tape-silicones Rash    Dye Hives    Cephalexin     Iodine     Penicillins Edema    Pneumococcal vaccine     Iodinated contrast media Rash       Medications:    Current Outpatient Medications:     amLODIPine  (NORVASC) 2.5 MG tablet, Take 1 tablet (2.5 mg total) by mouth once daily., Disp: 90 tablet, Rfl: 1    blood sugar diagnostic Strp, One Touch Ultra Blue Test strips, Use l strip to check glucose 4 times daily, Disp: 100 each, Rfl: 11    calcium carbonate (OS-RYAN) 600 mg calcium (1,500 mg) Tab, Take 600 mg by mouth once., Disp: , Rfl:     cyanocobalamin 1,000 mcg/mL injection, Inject 1 mL (1,000 mcg total) into the skin every 30 days., Disp: 3 mL, Rfl: 4    dapagliflozin (FARXIGA) 5 mg Tab tablet, Take 1 tablet (5 mg total) by mouth once daily., Disp: 90 tablet, Rfl: 1    diclofenac sodium (VOLTAREN) 1 % Gel, Apply 2 g topically 4 (four) times daily., Disp: 450 g, Rfl: 1    DULoxetine (CYMBALTA) 60 MG capsule, Take 1 capsule (60 mg total) by mouth once daily., Disp: 90 capsule, Rfl: 1    guanFACINE (TENEX) 2 MG tablet, Take 1 tablet (2 mg total) by mouth nightly., Disp: 90 tablet, Rfl: 3    insulin degludec (TRESIBA FLEXTOUCH U-200) 200 unit/mL (3 mL) insulin pen, Inject 76 Units into the skin once daily., Disp: 12 pen, Rfl: 3    iron-vitamin C 100-250 mg, ICAR-C, 100-250 mg Tab, Take 1 tablet by mouth once daily., Disp: , Rfl:     lancets (ONETOUCH DELICA LANCETS) 33 gauge Misc, USE 1  TO CHECK GLUCOSE 4 TIMES DAILY, Disp: 100 each, Rfl: 3    levothyroxine (SYNTHROID) 75 MCG tablet, , Disp: , Rfl:     magnesium oxide (MAG-OX) 400 mg (241.3 mg magnesium) tablet, Take 1 tablet (400 mg total) by mouth once daily., Disp: 90 tablet, Rfl: 1    metFORMIN (GLUCOPHAGE) 1000 MG tablet, Take 1 tablet (1,000 mg total) by mouth 2 (two) times daily with meals., Disp: 180 tablet, Rfl: 3    metOLazone (ZAROXOLYN) 5 MG tablet, Take 1 tablet (5 mg total) by mouth once daily., Disp: 90 tablet, Rfl: 3    metoprolol succinate (TOPROL-XL) 25 MG 24 hr tablet, Take 1 tablet (25 mg total) by mouth once daily., Disp: 90 tablet, Rfl: 3    multivitamin capsule, Take 1 capsule by mouth once daily., Disp: , Rfl:     NYSTOP powder, APPLY TO  "AFFECTED AREA AS NEEDED, Disp: , Rfl:     olmesartan (BENICAR) 40 MG tablet, Take 1 tablet (40 mg total) by mouth once daily., Disp: 90 tablet, Rfl: 1    potassium chloride (KLOR-CON) 10 MEQ TbSR, Take 1 tablet (10 mEq total) by mouth once daily., Disp: 90 tablet, Rfl: 1    prednisoLONE acetate (PRED FORTE) 1 % DrpS, Place 1 drop into both eyes as needed. , Disp: , Rfl:     rosuvastatin (CRESTOR) 40 MG Tab, Take 1 tablet (40 mg total) by mouth every evening., Disp: 90 tablet, Rfl: 3    aspirin (ECOTRIN) 81 MG EC tablet, Take 1 tablet (81 mg total) by mouth once daily., Disp: 30 tablet, Rfl: 0    blood-glucose meter kit, Use as instructed, Disp: 1 each, Rfl: 0    PMHx/PSHx Updates:  See patient's last visit with me on 3/10/2022  See H&P on 1/25/2021        Pathology:   Cancer Staging   No matching staging information was found for the patient.          Objective:     Vitals:  Blood pressure 138/79, pulse (!) 57, temperature 96.4 °F (35.8 °C), resp. rate 18, height 5' 7" (1.702 m), weight 111.8 kg (246 lb 6.4 oz).    Physical Examination:   GEN: no apparent distress, comfortable; AAOx3; overweight  HEAD: atraumatic and normocephalic  EYES: no pallor, no icterus, PERRLA  ENT: OMM, no pharyngeal erythema, external ears WNL; no nasal discharge; no thrush  NECK: no masses, thyroid normal, trachea midline, no LAD/LN's, supple; s/p thyroidectomy    CV: RRR with no murmur; normal pulse; normal S1 and S2; no pedal edema  CHEST: Normal respiratory effort; CTAB; normal breath sounds; no wheeze or crackles  ABDOM: nontender and nondistended; soft; normal bowel sounds; no rebound/guarding  MUSC/Skeletal: ROM normal; no crepitus; joints normal; no deformities; s/p right shoulder replacement with better ROM   EXTREM: no clubbing, cyanosis, inflammation or swelling  SKIN: no rashes, lesions, ulcers, petechiae or subcutaneous nodules  : no schaefer  NEURO: grossly intact; motor/sensory WNL; AAOx3; no tremors  PSYCH: normal mood, " affect and behavior  LYMPH: normal cervical, supraclavicular, axillary and groin LN's            Labs:     Lab Results   Component Value Date    WBC 6.17 08/31/2022    HGB 14.0 08/31/2022    HCT 44.8 08/31/2022    MCV 84 08/31/2022     08/31/2022       Lab Results   Component Value Date    IRON 55 08/31/2022    TIBC 391 08/31/2022    FERRITIN 30 08/31/2022     Lab Results   Component Value Date    QQLSAFOI87 519 08/31/2022     Lab Results   Component Value Date    FOLATE >24.8 (H) 08/31/2022           CMP  Sodium   Date Value Ref Range Status   08/31/2022 141 136 - 145 mmol/L Final   01/14/2019 141 134 - 144 mmol/L      Potassium   Date Value Ref Range Status   08/31/2022 3.5 3.5 - 5.1 mmol/L Final     Chloride   Date Value Ref Range Status   08/31/2022 98 95 - 110 mmol/L Final   01/14/2019 96 (L) 98 - 110 mmol/L      CO2   Date Value Ref Range Status   08/31/2022 32 (H) 23 - 29 mmol/L Final     Glucose   Date Value Ref Range Status   08/31/2022 198 (H) 70 - 110 mg/dL Final   01/14/2019 177 (H) 70 - 99 mg/dL      BUN   Date Value Ref Range Status   08/31/2022 18 8 - 23 mg/dL Final     Creatinine   Date Value Ref Range Status   08/31/2022 0.9 0.5 - 1.4 mg/dL Final   01/14/2019 0.99 0.60 - 1.40 mg/dL      Calcium   Date Value Ref Range Status   08/31/2022 9.5 8.7 - 10.5 mg/dL Final     Total Protein   Date Value Ref Range Status   08/31/2022 7.2 6.0 - 8.4 g/dL Final     Albumin   Date Value Ref Range Status   08/31/2022 3.9 3.5 - 5.2 g/dL Final   01/14/2019 3.8 3.1 - 4.7 g/dL      Total Bilirubin   Date Value Ref Range Status   08/31/2022 0.5 0.1 - 1.0 mg/dL Final     Comment:     For infants and newborns, interpretation of results should be based  on gestational age, weight and in agreement with clinical  observations.    Premature Infant recommended reference ranges:  Up to 24 hours.............<8.0 mg/dL  Up to 48 hours............<12.0 mg/dL  3-5 days..................<15.0 mg/dL  6-29  days.................<15.0 mg/dL       Alkaline Phosphatase   Date Value Ref Range Status   08/31/2022 77 55 - 135 U/L Final     AST   Date Value Ref Range Status   08/31/2022 24 10 - 40 U/L Final     ALT   Date Value Ref Range Status   08/31/2022 25 10 - 44 U/L Final     Anion Gap   Date Value Ref Range Status   08/31/2022 11 8 - 16 mmol/L Final     eGFR if    Date Value Ref Range Status   07/20/2022 >60.0 >60 mL/min/1.73 m^2 Final     eGFR if non    Date Value Ref Range Status   07/20/2022 >60.0 >60 mL/min/1.73 m^2 Final     Comment:     Calculation used to obtain the estimated glomerular filtration  rate (eGFR) is the CKD-EPI equation.            Radiology/Diagnostic Studies:    CT Shoulder Without Contrast Right    Result Date: 3/2/2021  CMS MANDATED QUALITY DATA - CT RADIATION  436 All CT scans at this facility utilize dose modulation, iterative reconstruction, and/or weight based dosing when appropriate to reduce radiation dose to as low as reasonably achievable. 3-D reconstructed images were generated on a separate workstation from the axial data set and stored in the patient's permanent medical record. CT SHOULDER WITHOUT CONTRAST RIGHT CLINICAL HISTORY: 65 years Female M25.511 Pain in right Shoulder COMPARISON: Right shoulder radiography February 18, 2020 FINDINGS: Acute comminuted proximal right humeral fracture demonstrates similar alignment to reference right shoulder radiographs. Medial displacement of largest distal humeral fracture fragment in relation to the largest proximal fragment by about one shaft width. Fracture extends superiorly to involve both the surgical and anatomic necks of the humerus, and extends through the greater tuberosity. No intra-articular extension through the humeral head articular surface is evident. Scapula is intact. AC joint is normally aligned. Mild AC joint degenerative change. No right rib fracture is evident within the field-of-view.  Right lung is clear. IMPRESSION: Acute comminuted and displaced fracture of proximal right humerus, involving both the surgical and anatomic necks of the right humerus, as well as the greater tuberosity. Electronically Signed by Jah TORRES on 3/3/2021 7:44 AM      I have reviewed all available lab results and radiology reports.    Assessment/Plan:   (1) 67 y.o. female with diagnosis of iron deficiency anemia who has been referred by Mindy Fukn MD for evaluation by medical hematology/oncology. She has history of bariatric surgery and most likely has a multifactorial anemia process with underlying anemia of chronic disorders and iron deficiency/nutrient deficiency due to bariatric induced malabsorption.    - microcytic indices  - total bili is WNL, so I do not suspect any hemolysis at this time  - iron low at 21 and ferritin low at 3     1/25/2021:  - check B12/folate level   - order stool OBS x3  - check SPEP and Hgb electrophoresis  - discussed and will set up IV iron    4/5/2021:  - she had shoulder surgery  - repeat hgb currently at 10.5 and stable; she had two IV irons since last visit and before surgery and had been as high as 11.6 afterwards with the hgb  - iron panel is adequate  - B12 and folate are adequate  - retic 1.8 and SPEP with no M-protein    6/1/2021:  - latest labs with fairly adequate CBC  - mild anemia  - iron panel was good since on oral iron  - B12 was low - will start shots monthly today    10/11/2021:  - latest CBC and iron panel WNL  - getting B12 shot today    3/10/2022:  - last available labs are from oct 2021  - no anemia and iron panel was adequate  - B12 still was low - resume B12 monthly    9/12/2022:  - latest Hgb was WNL; no current anemia  - iron panel, B12/folate are all adequate  - continued on oral iron and B12 injections at home     (2) CAD s/p MI and CABG; venous insufficiency     (3) HTN and hypercholesterolemia     (4) IDDM Type II     (5) Vit D Deficiency      (6) NIKOLAS - on CPAP     (7) Chronic LBP              VISIT DIAGNOSES:      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 continue current management  2. continue ICAR-C - call in refill  3. F/u with PCP, bariatrics, card, Endocrine, etc  4. Check basic labs incl iron panel every 3 months  5. continue B12 monthly (encouraged compliance)      RTC in 6 months    Fax note to Robson Skinner       Discussion:     COVID-19 Discussion:     I had long discussion with patient and any applicable family about the COVID-19 coronavirus epidemic and the recommended precautions with regard to cancer and/or hematology patients. I have re-iterated the CDC recommendations for adequate hand washing, use of hand -like products, and coughing into elbow, etc. In addition, especially for our patients who are on chemotherapy and/or our otherwise immunocompromised patients, I have recommended avoidance of crowds, including movie theaters, restaurants, churches, etc. I have recommended avoidance of any sick or symptomatic family members and/or friends. I have also recommended avoidance of any raw and unwashed food products, and general avoidance of food items that have not been prepared by themselves. The patient has been asked to call us immediately with any symptom developments, issues, questions or other general concerns.         Iron Infusion Therapy Discussion:      I provided literature/learning materials on the particular IV iron regimen and discussed the potential side-effect profiles of the drug(s). I discussed the importance of compliance with obtaining and monitoring requested lab work, and went over the potential risk for the development of anaphylactic shock, bronchospasm, dysrhythmia, liver and/or kidney damage, and respiratory/cardiovascular  arrest and/or failure. I discussed the potential risks for development of alopecia, fevers, itching, chills and/or rigors, cold sensory issues, ringing in ears, vertigo and neuropathy, all of which are usually acute but sometimes could end up being chronic and life-long. I discussed the risks of hand-foot syndrome and rashes, and development of other autoimmune mediated processes such as pneumonitis and colitis which could be life threatening.      The patient's consent has been obtained to proceed with the IV iron therapy.The patient will be referred to [facility] for training and education on IV iron therapy, use of antiemetics and/or anti-diarrheals, use of NSAID's, potential IV iron therapy side-effects, and any specific recommendations and precautions with the particular IV iron agents.       I answered all of the patient's (and family's, if applicable) questions to the best of my ability and to their complete satisfaction. The patient acknowledged full understanding of the risks, recommendations and plan(s).            I spent over 25 mins of time with the patient. Reviewed results of the recently ordered labs, tests and studies; made directives with regards to the results. Over half of this time was spent couseling and coordinating care.    I have explained all of the above in detail and the patient understands all of the current recommendation(s). I have answered all of their questions to the best of my ability and to their complete satisfaction.   The patient is to continue with the current management plan.            Electronically signed by Mauricio Jackson MD

## 2022-09-12 ENCOUNTER — OFFICE VISIT (OUTPATIENT)
Dept: HEMATOLOGY/ONCOLOGY | Facility: CLINIC | Age: 67
End: 2022-09-12
Payer: MEDICARE

## 2022-09-12 ENCOUNTER — LAB VISIT (OUTPATIENT)
Dept: LAB | Facility: HOSPITAL | Age: 67
End: 2022-09-12
Attending: INTERNAL MEDICINE
Payer: MEDICARE

## 2022-09-12 VITALS
BODY MASS INDEX: 38.67 KG/M2 | WEIGHT: 246.38 LBS | RESPIRATION RATE: 18 BRPM | DIASTOLIC BLOOD PRESSURE: 79 MMHG | HEART RATE: 57 BPM | SYSTOLIC BLOOD PRESSURE: 138 MMHG | TEMPERATURE: 96 F | HEIGHT: 67 IN

## 2022-09-12 DIAGNOSIS — N39.0 URINARY TRACT INFECTION WITHOUT HEMATURIA, SITE UNSPECIFIED: Primary | ICD-10-CM

## 2022-09-12 DIAGNOSIS — D75.838 SECONDARY THROMBOCYTOSIS: ICD-10-CM

## 2022-09-12 DIAGNOSIS — D50.9 MICROCYTIC ANEMIA: ICD-10-CM

## 2022-09-12 DIAGNOSIS — D64.89 ANEMIA DUE TO MULTIPLE MECHANISMS: Primary | ICD-10-CM

## 2022-09-12 DIAGNOSIS — D63.8 ANEMIA, CHRONIC DISEASE: ICD-10-CM

## 2022-09-12 DIAGNOSIS — Z98.84 S/P BARIATRIC SURGERY: ICD-10-CM

## 2022-09-12 DIAGNOSIS — K95.89 IRON DEFICIENCY ANEMIA FOLLOWING BARIATRIC SURGERY: ICD-10-CM

## 2022-09-12 DIAGNOSIS — D50.8 IRON DEFICIENCY ANEMIA FOLLOWING BARIATRIC SURGERY: ICD-10-CM

## 2022-09-12 DIAGNOSIS — D53.9 NUTRITIONAL ANEMIA, UNSPECIFIED: ICD-10-CM

## 2022-09-12 DIAGNOSIS — N39.0 URINARY TRACT INFECTION WITHOUT HEMATURIA, SITE UNSPECIFIED: ICD-10-CM

## 2022-09-12 LAB
BACTERIA #/AREA URNS HPF: NEGATIVE /HPF
BILIRUB UR QL STRIP: NEGATIVE
CLARITY UR: CLEAR
COLOR UR: YELLOW
GLUCOSE UR QL STRIP: ABNORMAL
HGB UR QL STRIP: NEGATIVE
HYALINE CASTS #/AREA URNS LPF: 5 /LPF
KETONES UR QL STRIP: NEGATIVE
LEUKOCYTE ESTERASE UR QL STRIP: NEGATIVE
MICROSCOPIC COMMENT: ABNORMAL
NITRITE UR QL STRIP: NEGATIVE
PH UR STRIP: 7 [PH] (ref 5–8)
PROT UR QL STRIP: NEGATIVE
RBC #/AREA URNS HPF: 2 /HPF (ref 0–4)
SP GR UR STRIP: 1.02 (ref 1–1.03)
SQUAMOUS #/AREA URNS HPF: 1 /HPF
URN SPEC COLLECT METH UR: ABNORMAL
UROBILINOGEN UR STRIP-ACNC: NEGATIVE EU/DL
WBC #/AREA URNS HPF: 2 /HPF (ref 0–5)
YEAST URNS QL MICRO: ABNORMAL

## 2022-09-12 PROCEDURE — 3051F PR MOST RECENT HEMOGLOBIN A1C LEVEL 7.0 - < 8.0%: ICD-10-PCS | Mod: CPTII,S$GLB,, | Performed by: INTERNAL MEDICINE

## 2022-09-12 PROCEDURE — 3066F PR DOCUMENTATION OF TREATMENT FOR NEPHROPATHY: ICD-10-PCS | Mod: CPTII,S$GLB,, | Performed by: INTERNAL MEDICINE

## 2022-09-12 PROCEDURE — 4010F ACE/ARB THERAPY RXD/TAKEN: CPT | Mod: CPTII,S$GLB,, | Performed by: INTERNAL MEDICINE

## 2022-09-12 PROCEDURE — 1160F RVW MEDS BY RX/DR IN RCRD: CPT | Mod: CPTII,S$GLB,, | Performed by: INTERNAL MEDICINE

## 2022-09-12 PROCEDURE — 3061F PR NEG MICROALBUMINURIA RESULT DOCUMENTED/REVIEW: ICD-10-PCS | Mod: CPTII,S$GLB,, | Performed by: INTERNAL MEDICINE

## 2022-09-12 PROCEDURE — 3288F FALL RISK ASSESSMENT DOCD: CPT | Mod: CPTII,S$GLB,, | Performed by: INTERNAL MEDICINE

## 2022-09-12 PROCEDURE — 99213 OFFICE O/P EST LOW 20 MIN: CPT | Mod: S$GLB,,, | Performed by: INTERNAL MEDICINE

## 2022-09-12 PROCEDURE — 3075F SYST BP GE 130 - 139MM HG: CPT | Mod: CPTII,S$GLB,, | Performed by: INTERNAL MEDICINE

## 2022-09-12 PROCEDURE — 3078F PR MOST RECENT DIASTOLIC BLOOD PRESSURE < 80 MM HG: ICD-10-PCS | Mod: CPTII,S$GLB,, | Performed by: INTERNAL MEDICINE

## 2022-09-12 PROCEDURE — 3061F NEG MICROALBUMINURIA REV: CPT | Mod: CPTII,S$GLB,, | Performed by: INTERNAL MEDICINE

## 2022-09-12 PROCEDURE — 3075F PR MOST RECENT SYSTOLIC BLOOD PRESS GE 130-139MM HG: ICD-10-PCS | Mod: CPTII,S$GLB,, | Performed by: INTERNAL MEDICINE

## 2022-09-12 PROCEDURE — 1159F PR MEDICATION LIST DOCUMENTED IN MEDICAL RECORD: ICD-10-PCS | Mod: CPTII,S$GLB,, | Performed by: INTERNAL MEDICINE

## 2022-09-12 PROCEDURE — 3008F PR BODY MASS INDEX (BMI) DOCUMENTED: ICD-10-PCS | Mod: CPTII,S$GLB,, | Performed by: INTERNAL MEDICINE

## 2022-09-12 PROCEDURE — 1125F PR PAIN SEVERITY QUANTIFIED, PAIN PRESENT: ICD-10-PCS | Mod: CPTII,S$GLB,, | Performed by: INTERNAL MEDICINE

## 2022-09-12 PROCEDURE — 4010F PR ACE/ARB THEARPY RXD/TAKEN: ICD-10-PCS | Mod: CPTII,S$GLB,, | Performed by: INTERNAL MEDICINE

## 2022-09-12 PROCEDURE — 3078F DIAST BP <80 MM HG: CPT | Mod: CPTII,S$GLB,, | Performed by: INTERNAL MEDICINE

## 2022-09-12 PROCEDURE — 1101F PT FALLS ASSESS-DOCD LE1/YR: CPT | Mod: CPTII,S$GLB,, | Performed by: INTERNAL MEDICINE

## 2022-09-12 PROCEDURE — 3288F PR FALLS RISK ASSESSMENT DOCUMENTED: ICD-10-PCS | Mod: CPTII,S$GLB,, | Performed by: INTERNAL MEDICINE

## 2022-09-12 PROCEDURE — 1160F PR REVIEW ALL MEDS BY PRESCRIBER/CLIN PHARMACIST DOCUMENTED: ICD-10-PCS | Mod: CPTII,S$GLB,, | Performed by: INTERNAL MEDICINE

## 2022-09-12 PROCEDURE — 3051F HG A1C>EQUAL 7.0%<8.0%: CPT | Mod: CPTII,S$GLB,, | Performed by: INTERNAL MEDICINE

## 2022-09-12 PROCEDURE — 99213 PR OFFICE/OUTPT VISIT, EST, LEVL III, 20-29 MIN: ICD-10-PCS | Mod: S$GLB,,, | Performed by: INTERNAL MEDICINE

## 2022-09-12 PROCEDURE — 1159F MED LIST DOCD IN RCRD: CPT | Mod: CPTII,S$GLB,, | Performed by: INTERNAL MEDICINE

## 2022-09-12 PROCEDURE — 3066F NEPHROPATHY DOC TX: CPT | Mod: CPTII,S$GLB,, | Performed by: INTERNAL MEDICINE

## 2022-09-12 PROCEDURE — 3008F BODY MASS INDEX DOCD: CPT | Mod: CPTII,S$GLB,, | Performed by: INTERNAL MEDICINE

## 2022-09-12 PROCEDURE — 81001 URINALYSIS AUTO W/SCOPE: CPT | Performed by: INTERNAL MEDICINE

## 2022-09-12 PROCEDURE — 1101F PR PT FALLS ASSESS DOC 0-1 FALLS W/OUT INJ PAST YR: ICD-10-PCS | Mod: CPTII,S$GLB,, | Performed by: INTERNAL MEDICINE

## 2022-09-12 PROCEDURE — 1125F AMNT PAIN NOTED PAIN PRSNT: CPT | Mod: CPTII,S$GLB,, | Performed by: INTERNAL MEDICINE

## 2022-09-12 RX ORDER — IRON,CARBONYL/ASCORBIC ACID 100-250 MG
1 TABLET ORAL DAILY
Qty: 30 EACH | Refills: 8 | Status: SHIPPED | OUTPATIENT
Start: 2022-09-12 | End: 2022-12-26 | Stop reason: SDUPTHER

## 2022-09-12 RX ORDER — NITROFURANTOIN 25; 75 MG/1; MG/1
100 CAPSULE ORAL 2 TIMES DAILY
Qty: 14 CAPSULE | Refills: 0 | Status: SHIPPED | OUTPATIENT
Start: 2022-09-12 | End: 2022-09-19

## 2022-09-12 NOTE — TELEPHONE ENCOUNTER
Order placed for macrobid 100 mg 1 po BID x 7 days per Dr. Jackson. Pt has UTI. Cultures ordered at this time. Pt notified.

## 2022-09-28 DIAGNOSIS — E04.2 NONTOXIC MULTINODULAR GOITER: ICD-10-CM

## 2022-09-28 DIAGNOSIS — E89.0 POSTSURGICAL HYPOTHYROIDISM: Primary | ICD-10-CM

## 2022-09-29 ENCOUNTER — PATIENT MESSAGE (OUTPATIENT)
Dept: FAMILY MEDICINE | Facility: CLINIC | Age: 67
End: 2022-09-29

## 2022-09-29 DIAGNOSIS — E11.9 TYPE 2 DIABETES MELLITUS TREATED WITH INSULIN: ICD-10-CM

## 2022-09-29 DIAGNOSIS — Z79.4 TYPE 2 DIABETES MELLITUS TREATED WITH INSULIN: ICD-10-CM

## 2022-09-29 DIAGNOSIS — I10 BENIGN ESSENTIAL HYPERTENSION: ICD-10-CM

## 2022-10-03 ENCOUNTER — HOSPITAL ENCOUNTER (OUTPATIENT)
Dept: RADIOLOGY | Facility: HOSPITAL | Age: 67
Discharge: HOME | End: 2022-10-03
Attending: INTERNAL MEDICINE
Payer: MEDICARE

## 2022-10-03 DIAGNOSIS — E04.2 NONTOXIC MULTINODULAR GOITER: ICD-10-CM

## 2022-10-03 DIAGNOSIS — E89.0 POSTSURGICAL HYPOTHYROIDISM: ICD-10-CM

## 2022-10-03 PROCEDURE — 76536 US EXAM OF HEAD AND NECK: CPT | Mod: TC,PO

## 2022-10-03 RX ORDER — METOPROLOL SUCCINATE 25 MG/1
25 TABLET, EXTENDED RELEASE ORAL DAILY
Qty: 90 TABLET | Refills: 3 | Status: SHIPPED | OUTPATIENT
Start: 2022-10-03 | End: 2023-06-02 | Stop reason: SDUPTHER

## 2022-10-03 RX ORDER — DAPAGLIFLOZIN 5 MG/1
5 TABLET, FILM COATED ORAL DAILY
Qty: 90 TABLET | Refills: 1 | Status: SHIPPED | OUTPATIENT
Start: 2022-10-03 | End: 2022-12-26 | Stop reason: SDUPTHER

## 2022-11-03 DIAGNOSIS — M54.12 CERVICAL RADICULITIS: Primary | ICD-10-CM

## 2022-11-03 DIAGNOSIS — M54.16 LUMBAR RADICULOPATHY: ICD-10-CM

## 2022-11-17 ENCOUNTER — PATIENT MESSAGE (OUTPATIENT)
Dept: FAMILY MEDICINE | Facility: CLINIC | Age: 67
End: 2022-11-17

## 2022-11-17 DIAGNOSIS — I10 BENIGN ESSENTIAL HYPERTENSION: ICD-10-CM

## 2022-11-18 RX ORDER — OLMESARTAN MEDOXOMIL 40 MG/1
40 TABLET ORAL DAILY
Qty: 90 TABLET | Refills: 1 | Status: SHIPPED | OUTPATIENT
Start: 2022-11-18 | End: 2022-11-30 | Stop reason: SDUPTHER

## 2022-11-21 ENCOUNTER — HOSPITAL ENCOUNTER (OUTPATIENT)
Dept: RADIOLOGY | Facility: HOSPITAL | Age: 67
Discharge: HOME | End: 2022-11-21
Attending: ORTHOPAEDIC SURGERY
Payer: MEDICARE

## 2022-11-21 DIAGNOSIS — M54.12 CERVICAL RADICULITIS: ICD-10-CM

## 2022-11-21 DIAGNOSIS — M54.16 LUMBAR RADICULOPATHY: ICD-10-CM

## 2022-11-21 PROCEDURE — 72141 MRI NECK SPINE W/O DYE: CPT | Mod: TC,PO

## 2022-11-21 PROCEDURE — 72050 X-RAY EXAM NECK SPINE 4/5VWS: CPT | Mod: TC,PO

## 2022-11-21 PROCEDURE — 72148 MRI LUMBAR SPINE W/O DYE: CPT | Mod: TC,PO

## 2022-11-21 PROCEDURE — 72110 X-RAY EXAM L-2 SPINE 4/>VWS: CPT | Mod: TC,PO

## 2022-11-30 ENCOUNTER — OFFICE VISIT (OUTPATIENT)
Dept: FAMILY MEDICINE | Facility: CLINIC | Age: 67
End: 2022-11-30
Payer: MEDICARE

## 2022-11-30 VITALS
RESPIRATION RATE: 18 BRPM | WEIGHT: 240 LBS | OXYGEN SATURATION: 97 % | SYSTOLIC BLOOD PRESSURE: 140 MMHG | TEMPERATURE: 99 F | BODY MASS INDEX: 37.67 KG/M2 | DIASTOLIC BLOOD PRESSURE: 72 MMHG | HEIGHT: 67 IN | HEART RATE: 70 BPM

## 2022-11-30 DIAGNOSIS — Z12.31 BREAST CANCER SCREENING BY MAMMOGRAM: ICD-10-CM

## 2022-11-30 DIAGNOSIS — E11.9 TYPE 2 DIABETES MELLITUS TREATED WITH INSULIN: Primary | ICD-10-CM

## 2022-11-30 DIAGNOSIS — R41.3 MEMORY CHANGES: ICD-10-CM

## 2022-11-30 DIAGNOSIS — Z79.4 TYPE 2 DIABETES MELLITUS TREATED WITH INSULIN: Primary | ICD-10-CM

## 2022-11-30 DIAGNOSIS — Z82.0 FH: ALZHEIMER'S DISEASE: ICD-10-CM

## 2022-11-30 DIAGNOSIS — I10 BENIGN ESSENTIAL HYPERTENSION: ICD-10-CM

## 2022-11-30 DIAGNOSIS — Z12.11 COLON CANCER SCREENING: ICD-10-CM

## 2022-11-30 LAB — HBA1C MFR BLD: 7.2 %

## 2022-11-30 PROCEDURE — 3061F NEG MICROALBUMINURIA REV: CPT | Mod: CPTII,S$GLB,, | Performed by: NURSE PRACTITIONER

## 2022-11-30 PROCEDURE — 3008F BODY MASS INDEX DOCD: CPT | Mod: CPTII,S$GLB,, | Performed by: NURSE PRACTITIONER

## 2022-11-30 PROCEDURE — 3077F PR MOST RECENT SYSTOLIC BLOOD PRESSURE >= 140 MM HG: ICD-10-PCS | Mod: CPTII,S$GLB,, | Performed by: NURSE PRACTITIONER

## 2022-11-30 PROCEDURE — 83036 POCT HEMOGLOBIN A1C: ICD-10-PCS | Mod: QW,S$GLB,, | Performed by: NURSE PRACTITIONER

## 2022-11-30 PROCEDURE — 3078F PR MOST RECENT DIASTOLIC BLOOD PRESSURE < 80 MM HG: ICD-10-PCS | Mod: CPTII,S$GLB,, | Performed by: NURSE PRACTITIONER

## 2022-11-30 PROCEDURE — 1101F PR PT FALLS ASSESS DOC 0-1 FALLS W/OUT INJ PAST YR: ICD-10-PCS | Mod: CPTII,S$GLB,, | Performed by: NURSE PRACTITIONER

## 2022-11-30 PROCEDURE — 3078F DIAST BP <80 MM HG: CPT | Mod: CPTII,S$GLB,, | Performed by: NURSE PRACTITIONER

## 2022-11-30 PROCEDURE — 4010F PR ACE/ARB THEARPY RXD/TAKEN: ICD-10-PCS | Mod: CPTII,S$GLB,, | Performed by: NURSE PRACTITIONER

## 2022-11-30 PROCEDURE — 4010F ACE/ARB THERAPY RXD/TAKEN: CPT | Mod: CPTII,S$GLB,, | Performed by: NURSE PRACTITIONER

## 2022-11-30 PROCEDURE — 3066F PR DOCUMENTATION OF TREATMENT FOR NEPHROPATHY: ICD-10-PCS | Mod: CPTII,S$GLB,, | Performed by: NURSE PRACTITIONER

## 2022-11-30 PROCEDURE — 99214 OFFICE O/P EST MOD 30 MIN: CPT | Mod: S$GLB,,, | Performed by: NURSE PRACTITIONER

## 2022-11-30 PROCEDURE — 3061F PR NEG MICROALBUMINURIA RESULT DOCUMENTED/REVIEW: ICD-10-PCS | Mod: CPTII,S$GLB,, | Performed by: NURSE PRACTITIONER

## 2022-11-30 PROCEDURE — 1159F PR MEDICATION LIST DOCUMENTED IN MEDICAL RECORD: ICD-10-PCS | Mod: CPTII,S$GLB,, | Performed by: NURSE PRACTITIONER

## 2022-11-30 PROCEDURE — 3288F FALL RISK ASSESSMENT DOCD: CPT | Mod: CPTII,S$GLB,, | Performed by: NURSE PRACTITIONER

## 2022-11-30 PROCEDURE — 83036 HEMOGLOBIN GLYCOSYLATED A1C: CPT | Mod: QW,S$GLB,, | Performed by: NURSE PRACTITIONER

## 2022-11-30 PROCEDURE — 3051F HG A1C>EQUAL 7.0%<8.0%: CPT | Mod: CPTII,S$GLB,, | Performed by: NURSE PRACTITIONER

## 2022-11-30 PROCEDURE — 3288F PR FALLS RISK ASSESSMENT DOCUMENTED: ICD-10-PCS | Mod: CPTII,S$GLB,, | Performed by: NURSE PRACTITIONER

## 2022-11-30 PROCEDURE — 3008F PR BODY MASS INDEX (BMI) DOCUMENTED: ICD-10-PCS | Mod: CPTII,S$GLB,, | Performed by: NURSE PRACTITIONER

## 2022-11-30 PROCEDURE — 99214 PR OFFICE/OUTPT VISIT, EST, LEVL IV, 30-39 MIN: ICD-10-PCS | Mod: S$GLB,,, | Performed by: NURSE PRACTITIONER

## 2022-11-30 PROCEDURE — 3066F NEPHROPATHY DOC TX: CPT | Mod: CPTII,S$GLB,, | Performed by: NURSE PRACTITIONER

## 2022-11-30 PROCEDURE — 1159F MED LIST DOCD IN RCRD: CPT | Mod: CPTII,S$GLB,, | Performed by: NURSE PRACTITIONER

## 2022-11-30 PROCEDURE — 1160F PR REVIEW ALL MEDS BY PRESCRIBER/CLIN PHARMACIST DOCUMENTED: ICD-10-PCS | Mod: CPTII,S$GLB,, | Performed by: NURSE PRACTITIONER

## 2022-11-30 PROCEDURE — 1101F PT FALLS ASSESS-DOCD LE1/YR: CPT | Mod: CPTII,S$GLB,, | Performed by: NURSE PRACTITIONER

## 2022-11-30 PROCEDURE — 1160F RVW MEDS BY RX/DR IN RCRD: CPT | Mod: CPTII,S$GLB,, | Performed by: NURSE PRACTITIONER

## 2022-11-30 PROCEDURE — 3077F SYST BP >= 140 MM HG: CPT | Mod: CPTII,S$GLB,, | Performed by: NURSE PRACTITIONER

## 2022-11-30 PROCEDURE — 3051F PR MOST RECENT HEMOGLOBIN A1C LEVEL 7.0 - < 8.0%: ICD-10-PCS | Mod: CPTII,S$GLB,, | Performed by: NURSE PRACTITIONER

## 2022-11-30 RX ORDER — OLMESARTAN MEDOXOMIL 40 MG/1
40 TABLET ORAL DAILY
Qty: 90 TABLET | Refills: 0 | Status: SHIPPED | OUTPATIENT
Start: 2022-11-30 | End: 2023-02-16 | Stop reason: SDUPTHER

## 2022-11-30 RX ORDER — FERROUS SULFATE TAB 325 MG (65 MG ELEMENTAL FE) 325 (65 FE) MG
TAB ORAL
COMMUNITY
Start: 2022-11-06 | End: 2023-07-18

## 2022-11-30 NOTE — PROGRESS NOTES
SUBJECTIVE:      Patient ID: Chanel Cervantes is a 67 y.o. female.    Chief Complaint: Diabetes and Hypertension    Dr. Henley - endocrine  Dr. Jackson - hem/onc  Dr. Orozco - rheumatology  Dr. Nunez - bariatrics/gen surg  Dr. Chance - surg (thyroid)  Dr. Guan - eye doctor     Pt presents for F/U HTN and DMII. This is a 65 yo F with h/o DM, HTN, HLD, anemia, CAD, hx of MI, depression, obesity s/p gastric sleeve in April 2019 c/b incisional ventral hernia s/p repair, panniculectomy and full thickness skin graft on 12/4/19, anemia, multinodular thyroid s/p R thyroid lobectomy 7/2021, and NIKOLAS. Hemoglobin A1c has improved today to 7.2%. She continues on Tresiba, Farxiga, and metformin. Her low blood sugars have improved after decreasing the Tresiba. She reports her diet has not been good because of the holidays and she hasn't been able to exercise reportedly due to her fibromyalgia and chronic pain. Her eye exam is reportedly UTD (will request records) and her foot exam is overdue. Her BP is uncontrolled today. She continues on amlodipine, olmesartan, Tenex, metoprolol, and metolazone. Unfortunately, she threw a whole 90 days of olmesartan in the trash accidentally at home. She reports she went to the pharmacy and they told her it would be hundreds of dollars to refill without insurance. She has been without it for almost 2 weeks. She continues to see pain management for her chronic pain and they are determining different possibilities for longer term treatment. She is concerned for some memory changes she has been having because she has a family hx of Alzheimers in her grandmother. Denies CP, SOB, wheezing, fevers, nausea, vomiting, diarrhea, constipation, numbness, weakness, dizziness, palpitations, or any other concerns at this time.    Diabetes  She presents for her follow-up diabetic visit. She has type 2 diabetes mellitus. No MedicAlert identification noted. The initial diagnosis of diabetes was made 35  Years ago. Her disease course has been improving. Hypoglycemia symptoms include confusion. Pertinent negatives for hypoglycemia include no dizziness, headaches, hunger, mood changes, nervousness/anxiousness, pallor, seizures, sleepiness, speech difficulty, sweats or tremors. Associated symptoms include fatigue and polyuria. Pertinent negatives for diabetes include no blurred vision, no chest pain, no foot paresthesias, no foot ulcerations, no polydipsia, no polyphagia, no visual change, no weakness and no weight loss. There are no hypoglycemic complications. Pertinent negatives for hypoglycemia complications include no blackouts, no hospitalization, no nocturnal hypoglycemia, no required assistance and no required glucagon injection. Symptoms are stable. Diabetic complications include heart disease. Pertinent negatives for diabetic complications include no autonomic neuropathy, CVA, nephropathy, peripheral neuropathy, PVD or retinopathy. Risk factors for coronary artery disease include family history, hypertension, obesity, stress, diabetes mellitus, sedentary lifestyle, post-menopausal and dyslipidemia. Current diabetic treatment includes insulin injections and oral agent (dual therapy). She is compliant with treatment most of the time. She is currently taking insulin at bedtime. Insulin injections are given by patient. Rotation sites for injection include the abdominal wall. Her weight is stable. She is following a generally healthy diet. Meal planning includes avoidance of concentrated sweets. She has not had a previous visit with a dietitian. She never participates in exercise. She monitors blood glucose at home 1-2 x per day. Blood glucose monitoring compliance is good. There is no change in her home blood glucose trend. Her breakfast blood glucose range is generally 110-130 mg/dl. An ACE inhibitor/angiotensin II receptor blocker is being taken. She does not see a podiatrist.Eye exam is current.     Past  "Surgical History:   Procedure Laterality Date    ANGIOGRAM, CORONARY, WITH LEFT HEART CATHETERIZATION      APPENDECTOMY      BARIATRIC SURGERY  2019    Dr Nunez    CARPAL TUNNEL RELEASE Bilateral 2002     SECTION      CHOLECYSTECTOMY      COLONOSCOPY      CORONARY ANGIOPLASTY WITH STENT PLACEMENT      CORONARY ARTERY BYPASS GRAFT      EPIDURAL STEROID INJECTION INTO LUMBAR SPINE      x2    ESOPHAGOGASTRODUODENOSCOPY      HERNIA REPAIR  2019    HYSTERECTOMY      THYROID LOBECTOMY Right 2021    Procedure: LOBECTOMY, THYROID;  Surgeon: Mari Chance MD;  Location: [facility];  Service: General;  Laterality: Right;     Family History   Problem Relation Age of Onset    Diabetes Mother     Vision loss Mother     Heart disease Father     Vision loss Father     Stroke Father       Social History     Socioeconomic History    Marital status:    Tobacco Use    Smoking status: Former     Packs/day: 1.00     Years: 15.00     Pack years: 15.00     Types: Cigarettes     Quit date:      Years since quittin.9    Smokeless tobacco: Never   Substance and Sexual Activity    Alcohol use: No     Comment: "maybe once a year"    Drug use: No    Sexual activity: Not Currently     Social Determinants of Health     Financial Resource Strain: Unknown    Difficulty of Paying Living Expenses: Patient refused   Food Insecurity: Unknown    Worried About Running Out of Food in the Last Year: Patient refused    Ran Out of Food in the Last Year: Patient refused   Transportation Needs: Unknown    Lack of Transportation (Medical): Patient refused    Lack of Transportation (Non-Medical): Patient refused   Physical Activity: Inactive    Days of Exercise per Week: 0 days    Minutes of Exercise per Session: 0 min   Stress: Stress Concern Present    Feeling of Stress : To some extent   Social Connections: Unknown    Frequency of Communication with Friends and Family: More than three times a week    Frequency of " Social Gatherings with Friends and Family: More than three times a week    Active Member of Clubs or Organizations: No    Attends Club or Organization Meetings: Never    Marital Status:    Housing Stability: Low Risk     Unable to Pay for Housing in the Last Year: No    Number of Places Lived in the Last Year: 1    Unstable Housing in the Last Year: No     Current Outpatient Medications   Medication Sig Dispense Refill    amLODIPine (NORVASC) 2.5 MG tablet Take 1 tablet (2.5 mg total) by mouth once daily. 90 tablet 1    aspirin (ECOTRIN) 81 MG EC tablet Take 1 tablet (81 mg total) by mouth once daily. 30 tablet 0    blood sugar diagnostic Strp One Touch Ultra Blue Test strips, Use l strip to check glucose 4 times daily 100 each 11    blood-glucose meter kit Use as instructed 1 each 0    calcium carbonate (OS-RYAN) 600 mg calcium (1,500 mg) Tab Take 600 mg by mouth once.      cyanocobalamin 1,000 mcg/mL injection Inject 1 mL (1,000 mcg total) into the skin every 30 days. 3 mL 4    dapagliflozin (FARXIGA) 5 mg Tab tablet Take 1 tablet (5 mg total) by mouth once daily. 90 tablet 1    diclofenac sodium (VOLTAREN) 1 % Gel Apply 2 g topically 4 (four) times daily. 450 g 1    DULoxetine (CYMBALTA) 60 MG capsule Take 1 capsule (60 mg total) by mouth once daily. 90 capsule 1    FEROSUL 325 mg (65 mg iron) Tab tablet Take by mouth.      guanFACINE (TENEX) 2 MG tablet Take 1 tablet (2 mg total) by mouth nightly. 90 tablet 3    insulin degludec (TRESIBA FLEXTOUCH U-200) 200 unit/mL (3 mL) insulin pen Inject 76 Units into the skin once daily. 12 pen 3    iron-vitamin C 100-250 mg, ICAR-C, 100-250 mg Tab Take 1 tablet by mouth once daily. 30 each 8    lancets (ONETOUCH DELICA LANCETS) 33 gauge Misc USE 1  TO CHECK GLUCOSE 4 TIMES DAILY 100 each 3    levothyroxine (SYNTHROID) 75 MCG tablet       magnesium oxide (MAG-OX) 400 mg (241.3 mg magnesium) tablet Take 1 tablet (400 mg total) by mouth once daily. 90 tablet 1     metFORMIN (GLUCOPHAGE) 1000 MG tablet Take 1 tablet (1,000 mg total) by mouth 2 (two) times daily with meals. 180 tablet 3    metOLazone (ZAROXOLYN) 5 MG tablet Take 1 tablet (5 mg total) by mouth once daily. 90 tablet 3    metoprolol succinate (TOPROL-XL) 25 MG 24 hr tablet Take 1 tablet (25 mg total) by mouth once daily. 90 tablet 3    multivitamin capsule Take 1 capsule by mouth once daily.      NYSTOP powder APPLY TO AFFECTED AREA AS NEEDED      potassium chloride (KLOR-CON) 10 MEQ TbSR Take 1 tablet by mouth once daily 90 tablet 0    prednisoLONE acetate (PRED FORTE) 1 % DrpS Place 1 drop into both eyes as needed.       rosuvastatin (CRESTOR) 40 MG Tab Take 1 tablet (40 mg total) by mouth every evening. 90 tablet 3    olmesartan (BENICAR) 40 MG tablet Take 1 tablet (40 mg total) by mouth once daily. 90 tablet 0     No current facility-administered medications for this visit.     Review of patient's allergies indicates:   Allergen Reactions    Adhesive tape-silicones Rash    Dye Hives    Cephalexin     Iodine     Penicillins Edema    Pneumococcal vaccine     Iodinated contrast media Rash      Past Medical History:   Diagnosis Date    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 2021    Anemia, chronic disease 2021    CAD (coronary artery disease)     Diabetes mellitus, type 2     Hypertension     Iron deficiency anemia following bariatric surgery 2021    MI (myocardial infarction)     Secondary thrombocytosis 2021    Sleep apnea     Sleep apnea 2019    Sleep Right      Past Surgical History:   Procedure Laterality Date    ANGIOGRAM, CORONARY, WITH LEFT HEART CATHETERIZATION      APPENDECTOMY      BARIATRIC SURGERY  2019    Dr Nunez    CARPAL TUNNEL RELEASE Bilateral 2002     SECTION      CHOLECYSTECTOMY      COLONOSCOPY      CORONARY ANGIOPLASTY WITH STENT PLACEMENT      CORONARY ARTERY BYPASS GRAFT      EPIDURAL STEROID INJECTION INTO LUMBAR SPINE      x2     "ESOPHAGOGASTRODUODENOSCOPY      HERNIA REPAIR  12/04/2019    HYSTERECTOMY      THYROID LOBECTOMY Right 7/20/2021    Procedure: LOBECTOMY, THYROID;  Surgeon: Mari Chance MD;  Location: [facility];  Service: General;  Laterality: Right;       Review of Systems   Constitutional:  Positive for fatigue. Negative for activity change, appetite change, chills, fever, unexpected weight change and weight loss.   HENT:  Negative for congestion, ear pain, postnasal drip, rhinorrhea, sinus pressure, sinus pain, sneezing and sore throat.    Eyes:  Negative for blurred vision, pain, discharge and visual disturbance.   Respiratory:  Negative for cough, chest tightness, shortness of breath and wheezing.    Cardiovascular:  Negative for chest pain, palpitations and leg swelling.   Gastrointestinal:  Negative for abdominal distention, abdominal pain, blood in stool, constipation, diarrhea, nausea and vomiting.   Endocrine: Positive for polyuria. Negative for polydipsia and polyphagia.   Genitourinary:  Negative for difficulty urinating, flank pain, frequency, hematuria, pelvic pain, urgency and vaginal discharge.   Musculoskeletal:  Negative for back pain, joint swelling and myalgias.   Skin:  Negative for color change, pallor, rash and wound.   Neurological:  Negative for dizziness, tremors, seizures, syncope, speech difficulty, weakness, light-headedness, numbness and headaches.   Hematological:  Negative for adenopathy.   Psychiatric/Behavioral:  Positive for confusion. Negative for agitation, dysphoric mood, hallucinations, sleep disturbance and suicidal ideas. The patient is not nervous/anxious.     OBJECTIVE:      Vitals:    11/30/22 0955 11/30/22 1034   BP: (!) 144/80 (!) 140/72   BP Location: Right arm Left arm   Patient Position: Sitting Sitting   BP Method: Large (Manual) Large (Manual)   Pulse: 70    Resp: 18    Temp: 98.7 °F (37.1 °C)    TempSrc: Oral    SpO2: 97%    Weight: 108.9 kg (240 lb)    Height: 5' 7" (1.702 m)  "     Physical Exam  Vitals reviewed.   Constitutional:       General: She is not in acute distress.     Appearance: Normal appearance. She is well-developed. She is obese. She is not diaphoretic.   HENT:      Head: Normocephalic and atraumatic.      Right Ear: Hearing and external ear normal.      Left Ear: Hearing and external ear normal.      Nose: Nose normal.      Mouth/Throat:      Lips: Pink.      Mouth: Mucous membranes are moist.   Eyes:      General: Lids are normal. No scleral icterus.        Right eye: No discharge.         Left eye: No discharge.      Extraocular Movements: Extraocular movements intact.      Conjunctiva/sclera: Conjunctivae normal.      Pupils: Pupils are equal, round, and reactive to light.   Neck:      Thyroid: No thyroid mass or thyromegaly.      Vascular: No carotid bruit.      Trachea: Trachea and phonation normal. No tracheal deviation.   Cardiovascular:      Rate and Rhythm: Normal rate and regular rhythm.      Pulses: Normal pulses.      Heart sounds: Normal heart sounds. No murmur heard.    No friction rub. No gallop.   Pulmonary:      Effort: Pulmonary effort is normal. No respiratory distress.      Breath sounds: Normal breath sounds. No stridor. No decreased breath sounds, wheezing, rhonchi or rales.   Abdominal:      General: Bowel sounds are normal.      Palpations: Abdomen is soft.      Tenderness: There is no abdominal tenderness.   Musculoskeletal:         General: Normal range of motion.      Cervical back: Full passive range of motion without pain, normal range of motion and neck supple.      Right lower leg: No edema.      Left lower leg: No edema.   Lymphadenopathy:      Cervical: No cervical adenopathy.      Upper Body:      Right upper body: No supraclavicular adenopathy.      Left upper body: No supraclavicular adenopathy.   Skin:     General: Skin is warm and dry.      Capillary Refill: Capillary refill takes less than 2 seconds.      Findings: No rash.    Neurological:      General: No focal deficit present.      Mental Status: She is alert and oriented to person, place, and time.      GCS: GCS eye subscore is 4. GCS verbal subscore is 5. GCS motor subscore is 6.      Coordination: Coordination is intact.      Gait: Gait is intact.   Psychiatric:         Attention and Perception: Attention and perception normal.         Mood and Affect: Mood and affect normal.         Speech: Speech normal.         Behavior: Behavior normal. Behavior is cooperative.         Thought Content: Thought content normal. Thought content does not include suicidal plan.         Cognition and Memory: Cognition and memory normal.         Judgment: Judgment normal.      Assessment:       1. Type 2 diabetes mellitus treated with insulin    2. Benign essential hypertension    3. Memory changes    4. FH: Alzheimer's disease    5. Breast cancer screening by mammogram    6. Colon cancer screening        Plan:       Type 2 diabetes mellitus treated with insulin  -     Hemoglobin A1C, POCT    Benign essential hypertension  -     olmesartan (BENICAR) 40 MG tablet; Take 1 tablet (40 mg total) by mouth once daily.  Dispense: 90 tablet; Refill: 0    Memory changes  -     Ambulatory referral/consult to Neurology; Future; Expected date: 12/07/2022    FH: Alzheimer's disease  -     Ambulatory referral/consult to Neurology; Future; Expected date: 12/07/2022    Breast cancer screening by mammogram  -     Mammo Digital Screening Bilat w/ Ollie; Future; Expected date: 12/12/2022    Colon cancer screening  -     Cologuard Screening (Multitarget Stool DNA); Future; Expected date: 11/30/2022      Follow up in about 3 months (around 2/28/2023) for F/U DM, HTN.      11/30/2022 MESERET Santos, GISELP

## 2022-12-27 ENCOUNTER — HOSPITAL ENCOUNTER (OUTPATIENT)
Dept: RADIOLOGY | Facility: HOSPITAL | Age: 67
Discharge: HOME | End: 2022-12-27
Attending: NURSE PRACTITIONER
Payer: MEDICARE

## 2022-12-27 VITALS — HEIGHT: 67 IN | BODY MASS INDEX: 37.68 KG/M2 | WEIGHT: 240.06 LBS

## 2022-12-27 DIAGNOSIS — Z12.31 BREAST CANCER SCREENING BY MAMMOGRAM: ICD-10-CM

## 2022-12-27 PROCEDURE — 77067 SCR MAMMO BI INCL CAD: CPT | Mod: TC,PO

## 2022-12-27 PROCEDURE — 77063 BREAST TOMOSYNTHESIS BI: CPT | Mod: TC,PO

## 2023-01-10 ENCOUNTER — PATIENT MESSAGE (OUTPATIENT)
Dept: FAMILY MEDICINE | Facility: CLINIC | Age: 68
End: 2023-01-10

## 2023-01-10 DIAGNOSIS — E87.6 HYPOKALEMIA: ICD-10-CM

## 2023-01-11 RX ORDER — POTASSIUM CHLORIDE 750 MG/1
10 TABLET, EXTENDED RELEASE ORAL DAILY
Qty: 90 TABLET | Refills: 0 | Status: SHIPPED | OUTPATIENT
Start: 2023-01-11 | End: 2023-04-17 | Stop reason: SDUPTHER

## 2023-01-26 ENCOUNTER — PATIENT MESSAGE (OUTPATIENT)
Dept: FAMILY MEDICINE | Facility: CLINIC | Age: 68
End: 2023-01-26

## 2023-01-27 ENCOUNTER — PATIENT MESSAGE (OUTPATIENT)
Dept: FAMILY MEDICINE | Facility: CLINIC | Age: 68
End: 2023-01-27

## 2023-01-27 NOTE — TELEPHONE ENCOUNTER
Pt requesting refill LOV 11/30/22.       Spoke to pharmacy and they state pt picked up most recent refill on 01/22/23 and should have a full rx for 90 days.

## 2023-02-16 ENCOUNTER — OFFICE VISIT (OUTPATIENT)
Dept: FAMILY MEDICINE | Facility: CLINIC | Age: 68
End: 2023-02-16
Payer: MEDICARE

## 2023-02-16 VITALS
OXYGEN SATURATION: 98 % | HEART RATE: 83 BPM | WEIGHT: 239.31 LBS | SYSTOLIC BLOOD PRESSURE: 134 MMHG | DIASTOLIC BLOOD PRESSURE: 66 MMHG | HEIGHT: 67 IN | BODY MASS INDEX: 37.56 KG/M2

## 2023-02-16 DIAGNOSIS — G89.29 CHRONIC BILATERAL LOW BACK PAIN WITH BILATERAL SCIATICA: ICD-10-CM

## 2023-02-16 DIAGNOSIS — I10 BENIGN ESSENTIAL HYPERTENSION: ICD-10-CM

## 2023-02-16 DIAGNOSIS — Z79.4 TYPE 2 DIABETES MELLITUS TREATED WITH INSULIN: Primary | ICD-10-CM

## 2023-02-16 DIAGNOSIS — M54.41 CHRONIC BILATERAL LOW BACK PAIN WITH BILATERAL SCIATICA: ICD-10-CM

## 2023-02-16 DIAGNOSIS — M54.42 CHRONIC BILATERAL LOW BACK PAIN WITH BILATERAL SCIATICA: ICD-10-CM

## 2023-02-16 DIAGNOSIS — E83.42 HYPOMAGNESEMIA: ICD-10-CM

## 2023-02-16 DIAGNOSIS — E78.2 MIXED HYPERLIPIDEMIA: ICD-10-CM

## 2023-02-16 DIAGNOSIS — E11.9 TYPE 2 DIABETES MELLITUS TREATED WITH INSULIN: Primary | ICD-10-CM

## 2023-02-16 LAB — HBA1C MFR BLD: 8.2 %

## 2023-02-16 PROCEDURE — 1101F PR PT FALLS ASSESS DOC 0-1 FALLS W/OUT INJ PAST YR: ICD-10-PCS | Mod: CPTII,S$GLB,, | Performed by: NURSE PRACTITIONER

## 2023-02-16 PROCEDURE — 1125F AMNT PAIN NOTED PAIN PRSNT: CPT | Mod: CPTII,S$GLB,, | Performed by: NURSE PRACTITIONER

## 2023-02-16 PROCEDURE — 1125F PR PAIN SEVERITY QUANTIFIED, PAIN PRESENT: ICD-10-PCS | Mod: CPTII,S$GLB,, | Performed by: NURSE PRACTITIONER

## 2023-02-16 PROCEDURE — 3008F PR BODY MASS INDEX (BMI) DOCUMENTED: ICD-10-PCS | Mod: CPTII,S$GLB,, | Performed by: NURSE PRACTITIONER

## 2023-02-16 PROCEDURE — 1160F PR REVIEW ALL MEDS BY PRESCRIBER/CLIN PHARMACIST DOCUMENTED: ICD-10-PCS | Mod: CPTII,S$GLB,, | Performed by: NURSE PRACTITIONER

## 2023-02-16 PROCEDURE — 3075F PR MOST RECENT SYSTOLIC BLOOD PRESS GE 130-139MM HG: ICD-10-PCS | Mod: CPTII,S$GLB,, | Performed by: NURSE PRACTITIONER

## 2023-02-16 PROCEDURE — 3075F SYST BP GE 130 - 139MM HG: CPT | Mod: CPTII,S$GLB,, | Performed by: NURSE PRACTITIONER

## 2023-02-16 PROCEDURE — 1160F RVW MEDS BY RX/DR IN RCRD: CPT | Mod: CPTII,S$GLB,, | Performed by: NURSE PRACTITIONER

## 2023-02-16 PROCEDURE — 3078F DIAST BP <80 MM HG: CPT | Mod: CPTII,S$GLB,, | Performed by: NURSE PRACTITIONER

## 2023-02-16 PROCEDURE — 83036 HEMOGLOBIN GLYCOSYLATED A1C: CPT | Mod: QW,S$GLB,, | Performed by: NURSE PRACTITIONER

## 2023-02-16 PROCEDURE — 1159F MED LIST DOCD IN RCRD: CPT | Mod: CPTII,S$GLB,, | Performed by: NURSE PRACTITIONER

## 2023-02-16 PROCEDURE — 1159F PR MEDICATION LIST DOCUMENTED IN MEDICAL RECORD: ICD-10-PCS | Mod: CPTII,S$GLB,, | Performed by: NURSE PRACTITIONER

## 2023-02-16 PROCEDURE — 99214 PR OFFICE/OUTPT VISIT, EST, LEVL IV, 30-39 MIN: ICD-10-PCS | Mod: S$GLB,,, | Performed by: NURSE PRACTITIONER

## 2023-02-16 PROCEDURE — 3288F FALL RISK ASSESSMENT DOCD: CPT | Mod: CPTII,S$GLB,, | Performed by: NURSE PRACTITIONER

## 2023-02-16 PROCEDURE — 83036 POCT HEMOGLOBIN A1C: ICD-10-PCS | Mod: QW,S$GLB,, | Performed by: NURSE PRACTITIONER

## 2023-02-16 PROCEDURE — 3052F HG A1C>EQUAL 8.0%<EQUAL 9.0%: CPT | Mod: CPTII,S$GLB,, | Performed by: NURSE PRACTITIONER

## 2023-02-16 PROCEDURE — 1101F PT FALLS ASSESS-DOCD LE1/YR: CPT | Mod: CPTII,S$GLB,, | Performed by: NURSE PRACTITIONER

## 2023-02-16 PROCEDURE — 3008F BODY MASS INDEX DOCD: CPT | Mod: CPTII,S$GLB,, | Performed by: NURSE PRACTITIONER

## 2023-02-16 PROCEDURE — 3052F PR MOST RECENT HEMOGLOBIN A1C LEVEL 8.0 - < 9.0%: ICD-10-PCS | Mod: CPTII,S$GLB,, | Performed by: NURSE PRACTITIONER

## 2023-02-16 PROCEDURE — 4010F PR ACE/ARB THEARPY RXD/TAKEN: ICD-10-PCS | Mod: CPTII,S$GLB,, | Performed by: NURSE PRACTITIONER

## 2023-02-16 PROCEDURE — 3288F PR FALLS RISK ASSESSMENT DOCUMENTED: ICD-10-PCS | Mod: CPTII,S$GLB,, | Performed by: NURSE PRACTITIONER

## 2023-02-16 PROCEDURE — 4010F ACE/ARB THERAPY RXD/TAKEN: CPT | Mod: CPTII,S$GLB,, | Performed by: NURSE PRACTITIONER

## 2023-02-16 PROCEDURE — 3078F PR MOST RECENT DIASTOLIC BLOOD PRESSURE < 80 MM HG: ICD-10-PCS | Mod: CPTII,S$GLB,, | Performed by: NURSE PRACTITIONER

## 2023-02-16 PROCEDURE — 99214 OFFICE O/P EST MOD 30 MIN: CPT | Mod: S$GLB,,, | Performed by: NURSE PRACTITIONER

## 2023-02-16 RX ORDER — LANOLIN ALCOHOL/MO/W.PET/CERES
400 CREAM (GRAM) TOPICAL DAILY
Qty: 90 TABLET | Refills: 1 | Status: SHIPPED | OUTPATIENT
Start: 2023-02-16 | End: 2023-09-07 | Stop reason: SDUPTHER

## 2023-02-16 RX ORDER — CALCIUM CARBONATE/VITAMIN D3 600MG-62.5
CAPSULE ORAL
COMMUNITY

## 2023-02-16 RX ORDER — INSULIN GLARGINE 100 [IU]/ML
INJECTION, SOLUTION SUBCUTANEOUS
COMMUNITY
End: 2023-02-16

## 2023-02-16 RX ORDER — OLMESARTAN MEDOXOMIL 40 MG/1
40 TABLET ORAL DAILY
Qty: 90 TABLET | Refills: 1 | Status: ON HOLD | OUTPATIENT
Start: 2023-02-16 | End: 2023-08-03 | Stop reason: HOSPADM

## 2023-02-16 RX ORDER — INSULIN DEGLUDEC 200 U/ML
75 INJECTION, SOLUTION SUBCUTANEOUS DAILY
Qty: 12 PEN | Refills: 3 | Status: SHIPPED | OUTPATIENT
Start: 2023-02-16 | End: 2024-02-16

## 2023-02-16 RX ORDER — IRON,CARB/VIT C/VIT B12/FOLIC 100-250-1
TABLET ORAL
COMMUNITY
End: 2023-02-16

## 2023-02-16 RX ORDER — CALCIUM CARB/VITAMIN D3/VIT K1 500-500-40
TABLET,CHEWABLE ORAL
COMMUNITY
End: 2023-02-16

## 2023-02-16 RX ORDER — CYCLOBENZAPRINE HCL 10 MG
10 TABLET ORAL 3 TIMES DAILY PRN
Qty: 60 TABLET | Refills: 0 | Status: SHIPPED | OUTPATIENT
Start: 2023-02-16 | End: 2023-02-26

## 2023-02-16 RX ORDER — EMPAGLIFLOZIN, LINAGLIPTIN, METFORMIN HYDROCHLORIDE 25; 5; 1000 MG/1; MG/1; MG/1
1 TABLET, EXTENDED RELEASE ORAL DAILY
Qty: 90 TABLET | Refills: 1 | Status: SHIPPED | OUTPATIENT
Start: 2023-02-16 | End: 2023-08-04 | Stop reason: SDUPTHER

## 2023-02-16 RX ORDER — METFORMIN HYDROCHLORIDE 1000 MG/1
1000 TABLET ORAL
Qty: 90 TABLET | Refills: 1 | Status: SHIPPED | OUTPATIENT
Start: 2023-02-16 | End: 2023-07-18 | Stop reason: DRUGHIGH

## 2023-02-16 RX ORDER — ROSUVASTATIN CALCIUM 40 MG/1
40 TABLET, COATED ORAL NIGHTLY
Qty: 90 TABLET | Refills: 3 | Status: SHIPPED | OUTPATIENT
Start: 2023-02-16 | End: 2024-04-02 | Stop reason: SDUPTHER

## 2023-02-16 NOTE — PROGRESS NOTES
SUBJECTIVE:      Patient ID: Chanel Cervantes is a 67 y.o. female.    Chief Complaint: Diabetes and Hypertension    Dr. Henley - endocrine  Dr. Jackson - hem/onc  Dr. Orozco - rheumatology  Dr. Nunez - bariatrics/gen surg  Dr. Chance - surg (thyroid)  Dr. Guan - eye doctor     Pt presents for F/U HTN and DMII. This is a 65 yo F with h/o DM, HTN, HLD, anemia, CAD, hx of MI, depression, obesity s/p gastric sleeve in April 2019 c/b incisional ventral hernia s/p repair, panniculectomy and full thickness skin graft on 12/4/19, anemia, multinodular thyroid s/p R thyroid lobectomy 7/2021, and NIKOLAS. Hemoglobin A1c has worsened today to 8.2%. She continues on Tresiba, Farxiga, and metformin. Last visit, she reported low blood sugars so her Tresiba was decreased. She reports her diet has not been good because of Ady Gras and she hasn't been able to exercise reportedly due to her fibromyalgia and chronic pain. She has been eating desmond cake, as well. Her eye exam is reportedly UTD (will request records) and her foot exam is overdue but she wishes to go to podiatry because she is having foot pain and a nail issue. Her BP is controlled today. She continues on amlodipine, olmesartan, Tenex, metoprolol, and metolazone. She continues to see pain management for her chronic pain and they are determining different possibilities for longer term treatment. She was referred to neurology for concern over some memory changes she was having because she has a family hx of Alzheimers in her grandmother. She has not followed up there as of yet but has plans to. Denies CP, SOB, wheezing, fevers, nausea, vomiting, diarrhea, constipation, numbness, weakness, dizziness, palpitations, or any other concerns at this time.    Diabetes  She presents for her follow-up diabetic visit. She has type 2 diabetes mellitus. Her disease course has been worsening. There are no hypoglycemic associated symptoms. Pertinent negatives for hypoglycemia include  no confusion, dizziness, headaches, nervousness/anxiousness or seizures. There are no diabetic associated symptoms. Pertinent negatives for diabetes include no chest pain, no fatigue, no polydipsia, no polyuria and no weakness. There are no hypoglycemic complications. Symptoms are stable. Diabetic complications include heart disease. Pertinent negatives for diabetic complications include no autonomic neuropathy, CVA, nephropathy, peripheral neuropathy, PVD or retinopathy. Risk factors for coronary artery disease include post-menopausal, sedentary lifestyle, obesity, hypertension, diabetes mellitus and dyslipidemia. Current diabetic treatment includes insulin injections and oral agent (dual therapy). She is compliant with treatment most of the time. She is following a generally healthy diet. When asked about meal planning, she reported none. She rarely participates in exercise. An ACE inhibitor/angiotensin II receptor blocker is being taken. She does not see a podiatrist.Eye exam is current.   Past Surgical History:   Procedure Laterality Date    ANGIOGRAM, CORONARY, WITH LEFT HEART CATHETERIZATION      APPENDECTOMY      BARIATRIC SURGERY  2019    Dr Nunez    CARPAL TUNNEL RELEASE Bilateral 2002     SECTION      CHOLECYSTECTOMY      COLONOSCOPY      CORONARY ANGIOPLASTY WITH STENT PLACEMENT      CORONARY ARTERY BYPASS GRAFT      EPIDURAL STEROID INJECTION INTO LUMBAR SPINE      x2    ESOPHAGOGASTRODUODENOSCOPY      HERNIA REPAIR  2019    HYSTERECTOMY      THYROID LOBECTOMY Right 2021    Procedure: LOBECTOMY, THYROID;  Surgeon: Mari Chance MD;  Location: [facility];  Service: General;  Laterality: Right;     Family History   Problem Relation Age of Onset    Diabetes Mother     Vision loss Mother     Heart disease Father     Vision loss Father     Stroke Father       Social History     Socioeconomic History    Marital status:    Tobacco Use    Smoking status: Former     Packs/day: 1.00  "    Years: 15.00     Pack years: 15.00     Types: Cigarettes     Quit date:      Years since quittin.1    Smokeless tobacco: Never   Substance and Sexual Activity    Alcohol use: No     Comment: "maybe once a year"    Drug use: No    Sexual activity: Not Currently     Social Determinants of Health     Financial Resource Strain: Unknown    Difficulty of Paying Living Expenses: Patient refused   Food Insecurity: Unknown    Worried About Running Out of Food in the Last Year: Patient refused    Ran Out of Food in the Last Year: Patient refused   Transportation Needs: Unknown    Lack of Transportation (Medical): Patient refused    Lack of Transportation (Non-Medical): Patient refused   Physical Activity: Inactive    Days of Exercise per Week: 0 days    Minutes of Exercise per Session: 0 min   Stress: Stress Concern Present    Feeling of Stress : To some extent   Social Connections: Unknown    Frequency of Communication with Friends and Family: More than three times a week    Frequency of Social Gatherings with Friends and Family: More than three times a week    Active Member of Clubs or Organizations: Patient refused    Attends Club or Organization Meetings: Patient refused    Marital Status:    Housing Stability: Low Risk     Unable to Pay for Housing in the Last Year: No    Number of Places Lived in the Last Year: 1    Unstable Housing in the Last Year: No     Current Outpatient Medications   Medication Sig Dispense Refill    amLODIPine (NORVASC) 2.5 MG tablet Take 1 tablet by mouth once daily 90 tablet 0    aspirin (ECOTRIN) 81 MG EC tablet Take 1 tablet (81 mg total) by mouth once daily. 30 tablet 0    blood sugar diagnostic Strp One Touch Ultra Blue Test strips, Use l strip to check glucose 4 times daily 100 each 11    blood-glucose meter kit Use as instructed 1 each 0    calcium carbonate (OS-RYAN) 600 mg calcium (1,500 mg) Tab Take 600 mg by mouth once.      calcium carbonate-vitamin D3 600 mg-62.5 " mcg (2,500 unit) Cap calcium carbonate 600 mg-vitamin D3 62.5 mcg (2,500 unit) capsule   Take by oral route.      cyanocobalamin 1,000 mcg/mL injection Inject 1 mL (1,000 mcg total) into the skin every 30 days. 3 mL 4    diclofenac sodium (VOLTAREN) 1 % Gel Apply 2 g topically 4 (four) times daily. 450 g 1    DULoxetine (CYMBALTA) 60 MG capsule Take 1 capsule (60 mg total) by mouth once daily. 90 capsule 1    FEROSUL 325 mg (65 mg iron) Tab tablet Take by mouth.      guanFACINE (TENEX) 2 MG tablet Take 1 tablet (2 mg total) by mouth nightly. 90 tablet 3    iron-vitamin C 100-250 mg, ICAR-C, 100-250 mg Tab Take 1 tablet by mouth once daily. 30 each 8    lancets (ONETOUCH DELICA LANCETS) 33 gauge Misc USE 1  TO CHECK GLUCOSE 4 TIMES DAILY 100 each 3    levothyroxine (SYNTHROID) 75 MCG tablet       metOLazone (ZAROXOLYN) 5 MG tablet Take 1 tablet (5 mg total) by mouth once daily. 90 tablet 3    metoprolol succinate (TOPROL-XL) 25 MG 24 hr tablet Take 1 tablet (25 mg total) by mouth once daily. 90 tablet 3    multivitamin capsule Take 1 capsule by mouth once daily.      NYSTOP powder APPLY TO AFFECTED AREA AS NEEDED      potassium chloride (KLOR-CON) 10 MEQ TbSR Take 1 tablet (10 mEq total) by mouth once daily. 90 tablet 0    prednisoLONE acetate (PRED FORTE) 1 % DrpS Place 1 drop into both eyes as needed.       cyclobenzaprine (FLEXERIL) 10 MG tablet Take 1 tablet (10 mg total) by mouth 3 (three) times daily as needed for Muscle spasms. 60 tablet 0    empaglifloz-linaglip-metformin (TRIJARDY XR) 25-5-1,000 mg TBph Take 1 tablet by mouth once daily. 90 tablet 1    insulin degludec (TRESIBA FLEXTOUCH U-200) 200 unit/mL (3 mL) insulin pen Inject 76 Units into the skin once daily. 12 pen 3    magnesium oxide (MAG-OX) 400 mg (241.3 mg magnesium) tablet Take 1 tablet (400 mg total) by mouth once daily. 90 tablet 1    metFORMIN (GLUCOPHAGE) 1000 MG tablet Take 1 tablet (1,000 mg total) by mouth daily with dinner or evening  meal. 90 tablet 1    olmesartan (BENICAR) 40 MG tablet Take 1 tablet (40 mg total) by mouth once daily. 90 tablet 1    rosuvastatin (CRESTOR) 40 MG Tab Take 1 tablet (40 mg total) by mouth every evening. 90 tablet 3     No current facility-administered medications for this visit.     Review of patient's allergies indicates:   Allergen Reactions    Adhesive tape-silicones Rash    Dye Hives    Cephalexin     Iodine     Penicillins Edema    Pneumococcal vaccine     Iodinated contrast media Rash      Past Medical History:   Diagnosis Date    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 2021    Anemia, chronic disease 2021    CAD (coronary artery disease)     Diabetes mellitus, type 2     Hypertension     Iron deficiency anemia following bariatric surgery 2021    MI (myocardial infarction)     Secondary thrombocytosis 2021    Sleep apnea     Sleep apnea 2019    Sleep Right      Past Surgical History:   Procedure Laterality Date    ANGIOGRAM, CORONARY, WITH LEFT HEART CATHETERIZATION      APPENDECTOMY      BARIATRIC SURGERY  2019    Dr Nunez    CARPAL TUNNEL RELEASE Bilateral 2002     SECTION      CHOLECYSTECTOMY      COLONOSCOPY      CORONARY ANGIOPLASTY WITH STENT PLACEMENT      CORONARY ARTERY BYPASS GRAFT      EPIDURAL STEROID INJECTION INTO LUMBAR SPINE      x2    ESOPHAGOGASTRODUODENOSCOPY      HERNIA REPAIR  2019    HYSTERECTOMY      THYROID LOBECTOMY Right 2021    Procedure: LOBECTOMY, THYROID;  Surgeon: Mari Chance MD;  Location: [facility];  Service: General;  Laterality: Right;       Review of Systems   Constitutional:  Negative for activity change, appetite change, chills, fatigue, fever and unexpected weight change.   HENT:  Negative for congestion, ear pain, hearing loss, postnasal drip, rhinorrhea, sinus pressure, sinus pain, sneezing, sore throat and trouble swallowing.    Eyes:  Negative for pain, discharge and visual disturbance.   Respiratory:  Negative for  "cough, chest tightness, shortness of breath and wheezing.    Cardiovascular:  Negative for chest pain, palpitations and leg swelling.   Gastrointestinal:  Negative for abdominal distention, abdominal pain, blood in stool, constipation, diarrhea, nausea and vomiting.   Endocrine: Negative for polydipsia and polyuria.   Genitourinary:  Negative for difficulty urinating, dysuria, flank pain, frequency, hematuria, menstrual problem, pelvic pain, urgency and vaginal discharge.   Musculoskeletal:  Positive for arthralgias, back pain and joint swelling. Negative for myalgias and neck pain.   Skin:  Negative for color change, rash and wound.   Neurological:  Negative for dizziness, seizures, syncope, weakness, light-headedness, numbness and headaches.   Hematological:  Negative for adenopathy.   Psychiatric/Behavioral:  Negative for agitation, confusion, dysphoric mood, hallucinations, sleep disturbance and suicidal ideas. The patient is not nervous/anxious.     OBJECTIVE:      Vitals:    02/16/23 0921   BP: 134/66   BP Location: Left arm   Patient Position: Sitting   BP Method: Large (Automatic)   Pulse: 83   SpO2: 98%   Weight: 108.5 kg (239 lb 4.8 oz)   Height: 5' 7" (1.702 m)     Physical Exam  Vitals reviewed.   Constitutional:       General: She is not in acute distress.     Appearance: Normal appearance. She is well-developed. She is obese. She is not diaphoretic.   HENT:      Head: Normocephalic and atraumatic.      Right Ear: Hearing and external ear normal.      Left Ear: Hearing and external ear normal.      Nose: Nose normal.      Mouth/Throat:      Lips: Pink.      Mouth: Mucous membranes are moist.   Eyes:      General: Lids are normal. No scleral icterus.        Right eye: No discharge.         Left eye: No discharge.      Extraocular Movements: Extraocular movements intact.      Conjunctiva/sclera: Conjunctivae normal.      Pupils: Pupils are equal, round, and reactive to light.   Neck:      Thyroid: No " thyroid mass or thyromegaly.      Vascular: No carotid bruit.      Trachea: Trachea and phonation normal. No tracheal deviation.   Cardiovascular:      Rate and Rhythm: Normal rate and regular rhythm.      Pulses: Normal pulses.      Heart sounds: Normal heart sounds. No murmur heard.    No friction rub. No gallop.   Pulmonary:      Effort: Pulmonary effort is normal. No respiratory distress.      Breath sounds: Normal breath sounds. No stridor. No decreased breath sounds, wheezing, rhonchi or rales.   Abdominal:      General: Bowel sounds are normal.      Palpations: Abdomen is soft.      Tenderness: There is no abdominal tenderness.   Musculoskeletal:         General: Normal range of motion.      Cervical back: Full passive range of motion without pain, normal range of motion and neck supple.      Right lower leg: No edema.      Left lower leg: No edema.   Lymphadenopathy:      Cervical: No cervical adenopathy.      Upper Body:      Right upper body: No supraclavicular adenopathy.      Left upper body: No supraclavicular adenopathy.   Skin:     General: Skin is warm and dry.      Capillary Refill: Capillary refill takes less than 2 seconds.      Findings: No rash.   Neurological:      General: No focal deficit present.      Mental Status: She is alert and oriented to person, place, and time.      GCS: GCS eye subscore is 4. GCS verbal subscore is 5. GCS motor subscore is 6.      Coordination: Coordination is intact.      Gait: Gait is intact.   Psychiatric:         Attention and Perception: Attention and perception normal.         Mood and Affect: Mood and affect normal.         Speech: Speech normal.         Behavior: Behavior normal. Behavior is cooperative.         Thought Content: Thought content normal. Thought content does not include suicidal plan.         Cognition and Memory: Cognition and memory normal.         Judgment: Judgment normal.      Assessment:       1. Type 2 diabetes mellitus treated with  insulin    2. Benign essential hypertension    3. Mixed hyperlipidemia    4. Hypomagnesemia    5. Chronic bilateral low back pain with bilateral sciatica        Plan:       Type 2 diabetes mellitus treated with insulin  -     Hemoglobin A1C, POCT  -     Cancel: Foot Exam Performed  -     CBC Auto Differential; Future; Expected date: 02/16/2023  -     Comprehensive Metabolic Panel; Future; Expected date: 02/16/2023  -     Urinalysis; Future; Expected date: 02/16/2023  -     Microalbumin/Creatinine Ratio, Urine; Future; Expected date: 02/16/2023  -     empaglifloz-linaglip-metformin (TRIJARDY XR) 25-5-1,000 mg TBph; Take 1 tablet by mouth once daily.  Dispense: 90 tablet; Refill: 1  -     metFORMIN (GLUCOPHAGE) 1000 MG tablet; Take 1 tablet (1,000 mg total) by mouth daily with dinner or evening meal.  Dispense: 90 tablet; Refill: 1  -     insulin degludec (TRESIBA FLEXTOUCH U-200) 200 unit/mL (3 mL) insulin pen; Inject 76 Units into the skin once daily.  Dispense: 12 pen; Refill: 3  -     Ambulatory referral/consult to Podiatry; Future; Expected date: 02/23/2023    Benign essential hypertension  -     TSH; Future; Expected date: 02/16/2023  -     olmesartan (BENICAR) 40 MG tablet; Take 1 tablet (40 mg total) by mouth once daily.  Dispense: 90 tablet; Refill: 1    Mixed hyperlipidemia  -     Lipid Panel; Future; Expected date: 02/16/2023  -     rosuvastatin (CRESTOR) 40 MG Tab; Take 1 tablet (40 mg total) by mouth every evening.  Dispense: 90 tablet; Refill: 3    Hypomagnesemia  -     magnesium oxide (MAG-OX) 400 mg (241.3 mg magnesium) tablet; Take 1 tablet (400 mg total) by mouth once daily.  Dispense: 90 tablet; Refill: 1    Chronic bilateral low back pain with bilateral sciatica  -     cyclobenzaprine (FLEXERIL) 10 MG tablet; Take 1 tablet (10 mg total) by mouth 3 (three) times daily as needed for Muscle spasms.  Dispense: 60 tablet; Refill: 0        Follow up in about 3 months (around 5/16/2023) for F/U DM, HTN.       2/16/2023 Wen Frazier, MESERET, FNP

## 2023-02-17 ENCOUNTER — TELEPHONE (OUTPATIENT)
Dept: FAMILY MEDICINE | Facility: CLINIC | Age: 68
End: 2023-02-17
Payer: MEDICARE

## 2023-02-20 ENCOUNTER — TELEPHONE (OUTPATIENT)
Dept: FAMILY MEDICINE | Facility: CLINIC | Age: 68
End: 2023-02-20
Payer: MEDICARE

## 2023-02-21 ENCOUNTER — PATIENT MESSAGE (OUTPATIENT)
Dept: FAMILY MEDICINE | Facility: CLINIC | Age: 68
End: 2023-02-21

## 2023-02-22 ENCOUNTER — TELEPHONE (OUTPATIENT)
Dept: FAMILY MEDICINE | Facility: CLINIC | Age: 68
End: 2023-02-22

## 2023-02-22 NOTE — TELEPHONE ENCOUNTER
The following medication needs a prior authorization:     Medication Name: empaglifloz-linaglip-metformin (TRIJARDY XR)    Dosage: 25-5-1,000 mg TBph    Frequency: once daily     Directions for use: Take 1 tablet by mouth once daily. - Oral    Diagnosis: Type 2 diabetes mellitus treated with insulin [E11.9, Z79.4]    Is the request for a reauthorization? no    Is the patient currently stable on therapy? N/a    Please list all therapeutic alternatives previously used with start/end dates and outcome:           metFORMIN (GLUCOPHAGE) 1000 MG tablet - current use       insulin degludec (TRESIBA FLEXTOUCH U-200) 200 unit/mL (3 mL) insulin pen - current use

## 2023-04-03 ENCOUNTER — TELEPHONE (OUTPATIENT)
Dept: HEMATOLOGY/ONCOLOGY | Facility: CLINIC | Age: 68
End: 2023-04-03

## 2023-04-10 ENCOUNTER — LAB VISIT (OUTPATIENT)
Dept: LAB | Facility: HOSPITAL | Age: 68
End: 2023-04-10
Attending: INTERNAL MEDICINE
Payer: MEDICARE

## 2023-04-10 DIAGNOSIS — K95.89 IRON DEFICIENCY ANEMIA FOLLOWING BARIATRIC SURGERY: ICD-10-CM

## 2023-04-10 DIAGNOSIS — D50.9 MICROCYTIC ANEMIA: ICD-10-CM

## 2023-04-10 DIAGNOSIS — D53.9 NUTRITIONAL ANEMIA, UNSPECIFIED: ICD-10-CM

## 2023-04-10 DIAGNOSIS — D63.8 ANEMIA, CHRONIC DISEASE: ICD-10-CM

## 2023-04-10 DIAGNOSIS — D50.8 IRON DEFICIENCY ANEMIA FOLLOWING BARIATRIC SURGERY: ICD-10-CM

## 2023-04-10 DIAGNOSIS — D64.89 ANEMIA DUE TO MULTIPLE MECHANISMS: ICD-10-CM

## 2023-04-10 DIAGNOSIS — D75.838 SECONDARY THROMBOCYTOSIS: ICD-10-CM

## 2023-04-10 LAB
BASOPHILS # BLD AUTO: 0.06 K/UL (ref 0–0.2)
BASOPHILS NFR BLD: 0.9 % (ref 0–1.9)
DIFFERENTIAL METHOD: ABNORMAL
EOSINOPHIL # BLD AUTO: 0.1 K/UL (ref 0–0.5)
EOSINOPHIL NFR BLD: 1.5 % (ref 0–8)
ERYTHROCYTE [DISTWIDTH] IN BLOOD BY AUTOMATED COUNT: 14.6 % (ref 11.5–14.5)
HCT VFR BLD AUTO: 45.2 % (ref 37–48.5)
HGB BLD-MCNC: 13 G/DL (ref 12–16)
IMM GRANULOCYTES # BLD AUTO: 0.01 K/UL (ref 0–0.04)
IMM GRANULOCYTES NFR BLD AUTO: 0.2 % (ref 0–0.5)
LYMPHOCYTES # BLD AUTO: 1.6 K/UL (ref 1–4.8)
LYMPHOCYTES NFR BLD: 24.9 % (ref 18–48)
MCH RBC QN AUTO: 25 PG (ref 27–31)
MCHC RBC AUTO-ENTMCNC: 28.8 G/DL (ref 32–36)
MCV RBC AUTO: 87 FL (ref 82–98)
MONOCYTES # BLD AUTO: 0.5 K/UL (ref 0.3–1)
MONOCYTES NFR BLD: 8.4 % (ref 4–15)
NEUTROPHILS # BLD AUTO: 4.1 K/UL (ref 1.8–7.7)
NEUTROPHILS NFR BLD: 64.1 % (ref 38–73)
NRBC BLD-RTO: 0 /100 WBC
PLATELET # BLD AUTO: 364 K/UL (ref 150–450)
PMV BLD AUTO: 11.1 FL (ref 9.2–12.9)
RBC # BLD AUTO: 5.2 M/UL (ref 4–5.4)
WBC # BLD AUTO: 6.46 K/UL (ref 3.9–12.7)

## 2023-04-10 PROCEDURE — 82607 VITAMIN B-12: CPT | Performed by: INTERNAL MEDICINE

## 2023-04-10 PROCEDURE — 84466 ASSAY OF TRANSFERRIN: CPT | Performed by: INTERNAL MEDICINE

## 2023-04-10 PROCEDURE — 80053 COMPREHEN METABOLIC PANEL: CPT | Performed by: INTERNAL MEDICINE

## 2023-04-10 PROCEDURE — 82728 ASSAY OF FERRITIN: CPT | Performed by: INTERNAL MEDICINE

## 2023-04-10 PROCEDURE — 36415 COLL VENOUS BLD VENIPUNCTURE: CPT | Mod: PO | Performed by: INTERNAL MEDICINE

## 2023-04-10 PROCEDURE — 82746 ASSAY OF FOLIC ACID SERUM: CPT | Performed by: INTERNAL MEDICINE

## 2023-04-10 PROCEDURE — 85025 COMPLETE CBC W/AUTO DIFF WBC: CPT | Performed by: INTERNAL MEDICINE

## 2023-04-11 LAB
ALBUMIN SERPL BCP-MCNC: 3.4 G/DL (ref 3.5–5.2)
ALP SERPL-CCNC: 79 U/L (ref 55–135)
ALT SERPL W/O P-5'-P-CCNC: 15 U/L (ref 10–44)
ANION GAP SERPL CALC-SCNC: 14 MMOL/L (ref 8–16)
AST SERPL-CCNC: 21 U/L (ref 10–40)
BILIRUB SERPL-MCNC: 0.3 MG/DL (ref 0.1–1)
BUN SERPL-MCNC: 13 MG/DL (ref 8–23)
CALCIUM SERPL-MCNC: 9.8 MG/DL (ref 8.7–10.5)
CHLORIDE SERPL-SCNC: 98 MMOL/L (ref 95–110)
CO2 SERPL-SCNC: 30 MMOL/L (ref 23–29)
CREAT SERPL-MCNC: 1 MG/DL (ref 0.5–1.4)
EST. GFR  (NO RACE VARIABLE): >60 ML/MIN/1.73 M^2
FERRITIN SERPL-MCNC: 42 NG/ML (ref 20–300)
FOLATE SERPL-MCNC: 8.1 NG/ML (ref 4–24)
GLUCOSE SERPL-MCNC: 114 MG/DL (ref 70–110)
IRON SERPL-MCNC: 46 UG/DL (ref 30–160)
POTASSIUM SERPL-SCNC: 3.6 MMOL/L (ref 3.5–5.1)
PROT SERPL-MCNC: 6.8 G/DL (ref 6–8.4)
SATURATED IRON: 13 % (ref 20–50)
SODIUM SERPL-SCNC: 142 MMOL/L (ref 136–145)
TOTAL IRON BINDING CAPACITY: 366 UG/DL (ref 250–450)
TRANSFERRIN SERPL-MCNC: 247 MG/DL (ref 200–375)
VIT B12 SERPL-MCNC: 337 PG/ML (ref 210–950)

## 2023-04-12 NOTE — PROGRESS NOTES
[facility], formerly [facility] Hematology/Oncology  PROGRESS NOTE -  Follow-up Visit      Subjective:       Patient ID:   NAME: Chanel Cervantes : 1955     67 y.o. female    Referring Doc: Melvina (New PCP)  Other Physicians: LUIS Orozco; Kj Lucas Braxton; Himanshu Nunez (Bariatrics)           Chief Complaint: iron defic anem f/u      History of Present Illness:     Patient returns today for a regularly scheduled follow-up visit.  The patient is here today to go over the results of the recently ordered labs, tests and studies.     She has been having chronic back pain and sciatica issues and is seeing pain management; she returns to them on  for possible nerve ablation    Low energy levels, general aches and pains      She sees Dr Henley with endocrine again in 2023    She is doing ok with the oral iron and she has been doing the B12 monthly     She is otherwise doing ok. No CP, SOB, HA's or N/V.         Discussed covid19 precautions - she has had her vaccinations            ROS:   GEN: energy levels low; chronic aches and pains; no fevers, weight loss  HEENT: normal with no HA's, sore throat, stiff neck, changes in vision  CV: normal with no CP, SOB, PND, WILSON or orthopnea  PULM: normal with no SOB, cough, hemoptysis, sputum or pleuritic pain  GI: normal with no abdominal pain, nausea, vomiting, constipation, diarrhea, melanotic stools, BRBPR, or hematemesis  : some dysuria  BREAST: normal with no mass, discharge, pain  SKIN: normal with no rash, erythema, bruising, or swelling    Pain Scale: 0     Allergies:  Review of patient's allergies indicates:   Allergen Reactions    Adhesive tape-silicones Rash    Dye Hives    Cephalexin     Iodine     Penicillins Edema    Pneumococcal vaccine     Iodinated contrast media Rash       Medications:    Current Outpatient Medications:     amLODIPine (NORVASC) 2.5 MG tablet, Take 1 tablet by mouth once daily, Disp: 90 tablet, Rfl: 0    blood sugar diagnostic Strp, One Touch Ultra Blue  Test strips, Use l strip to check glucose 4 times daily, Disp: 100 each, Rfl: 11    calcium carbonate (OS-RYAN) 600 mg calcium (1,500 mg) Tab, Take 600 mg by mouth once., Disp: , Rfl:     calcium carbonate-vitamin D3 600 mg-62.5 mcg (2,500 unit) Cap, calcium carbonate 600 mg-vitamin D3 62.5 mcg (2,500 unit) capsule  Take by oral route., Disp: , Rfl:     cyanocobalamin 1,000 mcg/mL injection, Inject 1 mL (1,000 mcg total) into the skin every 30 days., Disp: 3 mL, Rfl: 4    DULoxetine (CYMBALTA) 60 MG capsule, Take 1 capsule (60 mg total) by mouth once daily., Disp: 90 capsule, Rfl: 1    empaglifloz-linaglip-metformin (TRIJARDY XR) 25-5-1,000 mg TBph, Take 1 tablet by mouth once daily., Disp: 90 tablet, Rfl: 1    FEROSUL 325 mg (65 mg iron) Tab tablet, Take by mouth., Disp: , Rfl:     guanFACINE (TENEX) 2 MG tablet, Take 1 tablet (2 mg total) by mouth nightly., Disp: 90 tablet, Rfl: 3    insulin degludec (TRESIBA FLEXTOUCH U-200) 200 unit/mL (3 mL) insulin pen, Inject 76 Units into the skin once daily., Disp: 12 pen, Rfl: 3    iron-vitamin C 100-250 mg, ICAR-C, 100-250 mg Tab, Take 1 tablet by mouth once daily., Disp: 30 each, Rfl: 8    lancets (ONETOUCH DELICA LANCETS) 33 gauge Misc, USE 1  TO CHECK GLUCOSE 4 TIMES DAILY, Disp: 100 each, Rfl: 3    levothyroxine (SYNTHROID) 75 MCG tablet, , Disp: , Rfl:     magnesium oxide (MAG-OX) 400 mg (241.3 mg magnesium) tablet, Take 1 tablet (400 mg total) by mouth once daily., Disp: 90 tablet, Rfl: 1    metFORMIN (GLUCOPHAGE) 1000 MG tablet, Take 1 tablet (1,000 mg total) by mouth daily with dinner or evening meal., Disp: 90 tablet, Rfl: 1    metoprolol succinate (TOPROL-XL) 25 MG 24 hr tablet, Take 1 tablet (25 mg total) by mouth once daily., Disp: 90 tablet, Rfl: 3    multivitamin capsule, Take 1 capsule by mouth once daily., Disp: , Rfl:     NYSTOP powder, APPLY TO AFFECTED AREA AS NEEDED, Disp: , Rfl:     olmesartan (BENICAR) 40 MG tablet, Take 1 tablet (40 mg total) by  "mouth once daily., Disp: 90 tablet, Rfl: 1    potassium chloride (KLOR-CON) 10 MEQ TbSR, Take 1 tablet (10 mEq total) by mouth once daily., Disp: 90 tablet, Rfl: 0    prednisoLONE acetate (PRED FORTE) 1 % DrpS, Place 1 drop into both eyes as needed. , Disp: , Rfl:     rosuvastatin (CRESTOR) 40 MG Tab, Take 1 tablet (40 mg total) by mouth every evening., Disp: 90 tablet, Rfl: 3    aspirin (ECOTRIN) 81 MG EC tablet, Take 1 tablet (81 mg total) by mouth once daily., Disp: 30 tablet, Rfl: 0    blood-glucose meter kit, Use as instructed, Disp: 1 each, Rfl: 0    diclofenac sodium (VOLTAREN) 1 % Gel, Apply 2 g topically 4 (four) times daily., Disp: 450 g, Rfl: 1    metOLazone (ZAROXOLYN) 5 MG tablet, Take 1 tablet (5 mg total) by mouth once daily., Disp: 90 tablet, Rfl: 3    PMHx/PSHx Updates:  See patient's last visit with me on 9/12/2022  See H&P on 1/25/2021        Pathology:   Cancer Staging   No matching staging information was found for the patient.          Objective:     Vitals:  Blood pressure (!) 146/70, pulse 71, temperature 97.4 °F (36.3 °C), resp. rate 18, height 5' 7" (1.702 m), weight 108.8 kg (239 lb 14.4 oz).    Physical Examination:   GEN: no apparent distress, comfortable; AAOx3; overweight  HEAD: atraumatic and normocephalic  EYES: no pallor, no icterus, PERRLA  ENT: OMM, no pharyngeal erythema, external ears WNL; no nasal discharge; no thrush  NECK: no masses, thyroid normal, trachea midline, no LAD/LN's, supple; s/p thyroidectomy    CV: RRR with no murmur; normal pulse; normal S1 and S2; no pedal edema  CHEST: Normal respiratory effort; CTAB; normal breath sounds; no wheeze or crackles  ABDOM: nontender and nondistended; soft; normal bowel sounds; no rebound/guarding  MUSC/Skeletal: ROM normal; no crepitus; joints normal; no deformities; s/p right shoulder replacement with better ROM   EXTREM: no clubbing, cyanosis, inflammation or swelling  SKIN: no rashes, lesions, ulcers, petechiae or subcutaneous " nodules; chronic age related skin changes  : no schaefer  NEURO: grossly intact; motor/sensory WNL; AAOx3; no tremors  PSYCH: normal mood, affect and behavior  LYMPH: normal cervical, supraclavicular, axillary and groin LN's            Labs:     Lab Results   Component Value Date    WBC 6.46 04/10/2023    HGB 13.0 04/10/2023    HCT 45.2 04/10/2023    MCV 87 04/10/2023     04/10/2023       Lab Results   Component Value Date    IRON 46 04/10/2023    TIBC 366 04/10/2023    FERRITIN 42 04/10/2023     Lab Results   Component Value Date    VIIXTUWE50 337 04/10/2023     Lab Results   Component Value Date    FOLATE 8.1 04/10/2023           CMP  Sodium   Date Value Ref Range Status   04/10/2023 142 136 - 145 mmol/L Final   01/14/2019 141 134 - 144 mmol/L      Potassium   Date Value Ref Range Status   04/10/2023 3.6 3.5 - 5.1 mmol/L Final     Chloride   Date Value Ref Range Status   04/10/2023 98 95 - 110 mmol/L Final   01/14/2019 96 (L) 98 - 110 mmol/L      CO2   Date Value Ref Range Status   04/10/2023 30 (H) 23 - 29 mmol/L Final     Glucose   Date Value Ref Range Status   04/10/2023 114 (H) 70 - 110 mg/dL Final   01/14/2019 177 (H) 70 - 99 mg/dL      BUN   Date Value Ref Range Status   04/10/2023 13 8 - 23 mg/dL Final     Creatinine   Date Value Ref Range Status   04/10/2023 1.0 0.5 - 1.4 mg/dL Final   01/14/2019 0.99 0.60 - 1.40 mg/dL      Calcium   Date Value Ref Range Status   04/10/2023 9.8 8.7 - 10.5 mg/dL Final     Total Protein   Date Value Ref Range Status   04/10/2023 6.8 6.0 - 8.4 g/dL Final     Albumin   Date Value Ref Range Status   04/10/2023 3.4 (L) 3.5 - 5.2 g/dL Final   01/14/2019 3.8 3.1 - 4.7 g/dL      Total Bilirubin   Date Value Ref Range Status   04/10/2023 0.3 0.1 - 1.0 mg/dL Final     Comment:     For infants and newborns, interpretation of results should be based  on gestational age, weight and in agreement with clinical  observations.    Premature Infant recommended reference ranges:  Up to  24 hours.............<8.0 mg/dL  Up to 48 hours............<12.0 mg/dL  3-5 days..................<15.0 mg/dL  6-29 days.................<15.0 mg/dL       Alkaline Phosphatase   Date Value Ref Range Status   04/10/2023 79 55 - 135 U/L Final     AST   Date Value Ref Range Status   04/10/2023 21 10 - 40 U/L Final     ALT   Date Value Ref Range Status   04/10/2023 15 10 - 44 U/L Final     Anion Gap   Date Value Ref Range Status   04/10/2023 14 8 - 16 mmol/L Final     eGFR if    Date Value Ref Range Status   07/20/2022 >60.0 >60 mL/min/1.73 m^2 Final     eGFR if non    Date Value Ref Range Status   07/20/2022 >60.0 >60 mL/min/1.73 m^2 Final     Comment:     Calculation used to obtain the estimated glomerular filtration  rate (eGFR) is the CKD-EPI equation.            Radiology/Diagnostic Studies:    CT Shoulder Without Contrast Right    Result Date: 3/2/2021  CMS MANDATED QUALITY DATA - CT RADIATION  436 All CT scans at this facility utilize dose modulation, iterative reconstruction, and/or weight based dosing when appropriate to reduce radiation dose to as low as reasonably achievable. 3-D reconstructed images were generated on a separate workstation from the axial data set and stored in the patient's permanent medical record. CT SHOULDER WITHOUT CONTRAST RIGHT CLINICAL HISTORY: 65 years Female M25.511 Pain in right Shoulder COMPARISON: Right shoulder radiography February 18, 2020 FINDINGS: Acute comminuted proximal right humeral fracture demonstrates similar alignment to reference right shoulder radiographs. Medial displacement of largest distal humeral fracture fragment in relation to the largest proximal fragment by about one shaft width. Fracture extends superiorly to involve both the surgical and anatomic necks of the humerus, and extends through the greater tuberosity. No intra-articular extension through the humeral head articular surface is evident. Scapula is intact. AC joint is  normally aligned. Mild AC joint degenerative change. No right rib fracture is evident within the field-of-view. Right lung is clear. IMPRESSION: Acute comminuted and displaced fracture of proximal right humerus, involving both the surgical and anatomic necks of the right humerus, as well as the greater tuberosity. Electronically Signed by Jah TRORES on 3/3/2021 7:44 AM      I have reviewed all available lab results and radiology reports.    Assessment/Plan:   (1) 67 y.o. female with diagnosis of iron deficiency anemia who has been referred by Mindy Funk MD for evaluation by medical hematology/oncology. She has history of bariatric surgery and most likely has a multifactorial anemia process with underlying anemia of chronic disorders and iron deficiency/nutrient deficiency due to bariatric induced malabsorption.    - microcytic indices  - total bili is WNL, so I do not suspect any hemolysis at this time  - iron low at 21 and ferritin low at 3     1/25/2021:  - check B12/folate level   - order stool OBS x3  - check SPEP and Hgb electrophoresis  - discussed and will set up IV iron    4/5/2021:  - she had shoulder surgery  - repeat hgb currently at 10.5 and stable; she had two IV irons since last visit and before surgery and had been as high as 11.6 afterwards with the hgb  - iron panel is adequate  - B12 and folate are adequate  - retic 1.8 and SPEP with no M-protein    6/1/2021:  - latest labs with fairly adequate CBC  - mild anemia  - iron panel was good since on oral iron  - B12 was low - will start shots monthly today    10/11/2021:  - latest CBC and iron panel WNL  - getting B12 shot today    3/10/2022:  - last available labs are from oct 2021  - no anemia and iron panel was adequate  - B12 still was low - resume B12 monthly    9/12/2022:  - latest Hgb was WNL; no current anemia  - iron panel, B12/folate are all adequate  - continued on oral iron and B12 injections at home    4/13/2023:  - latest  CBC adequate with normal hgb  - iron panel adequate  - continued on iron po and B12 monthly     (2) CAD s/p MI and CABG; venous insufficiency     (3) HTN and hypercholesterolemia     (4) IDDM Type II     (5) Vit D Deficiency     (6) NIKOLAS - on CPAP     (7) Chronic LBP              VISIT DIAGNOSES:      Iron deficiency anemia following bariatric surgery    Anemia, chronic disease    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 continue current management  2. continue ICAR-C    3. F/u with PCP, bariatrics, card, Endocrine, etc  4. Check basic labs incl iron panel every 6 months  5. continue B12 monthly (encouraged compliance)      RTC in 6 months    Fax note to Robson Mcintyre       Discussion:     COVID-19 Discussion:     I had long discussion with patient and any applicable family about the COVID-19 coronavirus epidemic and the recommended precautions with regard to cancer and/or hematology patients. I have re-iterated the CDC recommendations for adequate hand washing, use of hand -like products, and coughing into elbow, etc. In addition, especially for our patients who are on chemotherapy and/or our otherwise immunocompromised patients, I have recommended avoidance of crowds, including movie theaters, restaurants, churches, etc. I have recommended avoidance of any sick or symptomatic family members and/or friends. I have also recommended avoidance of any raw and unwashed food products, and general avoidance of food items that have not been prepared by themselves. The patient has been asked to call us immediately with any symptom developments, issues, questions or other general concerns.         Iron Infusion Therapy Discussion:      I provided literature/learning materials on the particular IV iron regimen and discussed the potential side-effect profiles of the drug(s). I discussed the importance of compliance  with obtaining and monitoring requested lab work, and went over the potential risk for the development of anaphylactic shock, bronchospasm, dysrhythmia, liver and/or kidney damage, and respiratory/cardiovascular arrest and/or failure. I discussed the potential risks for development of alopecia, fevers, itching, chills and/or rigors, cold sensory issues, ringing in ears, vertigo and neuropathy, all of which are usually acute but sometimes could end up being chronic and life-long. I discussed the risks of hand-foot syndrome and rashes, and development of other autoimmune mediated processes such as pneumonitis and colitis which could be life threatening.      The patient's consent has been obtained to proceed with the IV iron therapy.The patient will be referred to [facility] for training and education on IV iron therapy, use of antiemetics and/or anti-diarrheals, use of NSAID's, potential IV iron therapy side-effects, and any specific recommendations and precautions with the particular IV iron agents.       I answered all of the patient's (and family's, if applicable) questions to the best of my ability and to their complete satisfaction. The patient acknowledged full understanding of the risks, recommendations and plan(s).            I spent over 25 mins of time with the patient. Reviewed results of the recently ordered labs, tests and studies; made directives with regards to the results. Over half of this time was spent couseling and coordinating care.    I have explained all of the above in detail and the patient understands all of the current recommendation(s). I have answered all of their questions to the best of my ability and to their complete satisfaction.   The patient is to continue with the current management plan.            Electronically signed by Mauricio Jackson MD                         Answers submitted by the patient for this visit:  Review of Systems Questionnaire  (Submitted on 4/10/2023)  appetite change : No  unexpected weight change: No  mouth sores: No  visual disturbance: No  cough: No  shortness of breath: No  chest pain: No  abdominal pain: No  diarrhea: No  back pain: Yes  rash: No  headaches: Yes  adenopathy: No  nervous/ anxious: No

## 2023-04-13 ENCOUNTER — OFFICE VISIT (OUTPATIENT)
Dept: HEMATOLOGY/ONCOLOGY | Facility: CLINIC | Age: 68
End: 2023-04-13
Payer: MEDICARE

## 2023-04-13 VITALS
SYSTOLIC BLOOD PRESSURE: 146 MMHG | DIASTOLIC BLOOD PRESSURE: 70 MMHG | HEIGHT: 67 IN | HEART RATE: 71 BPM | RESPIRATION RATE: 18 BRPM | BODY MASS INDEX: 37.65 KG/M2 | WEIGHT: 239.88 LBS | TEMPERATURE: 97 F

## 2023-04-13 DIAGNOSIS — D63.8 ANEMIA, CHRONIC DISEASE: ICD-10-CM

## 2023-04-13 DIAGNOSIS — D64.89 ANEMIA DUE TO MULTIPLE MECHANISMS: ICD-10-CM

## 2023-04-13 DIAGNOSIS — E53.8 B12 DEFICIENCY: ICD-10-CM

## 2023-04-13 DIAGNOSIS — Z98.84 S/P BARIATRIC SURGERY: ICD-10-CM

## 2023-04-13 DIAGNOSIS — D50.9 MICROCYTIC ANEMIA: ICD-10-CM

## 2023-04-13 DIAGNOSIS — K95.89 IRON DEFICIENCY ANEMIA FOLLOWING BARIATRIC SURGERY: Primary | ICD-10-CM

## 2023-04-13 DIAGNOSIS — D50.8 IRON DEFICIENCY ANEMIA FOLLOWING BARIATRIC SURGERY: Primary | ICD-10-CM

## 2023-04-13 DIAGNOSIS — D75.838 SECONDARY THROMBOCYTOSIS: ICD-10-CM

## 2023-04-13 DIAGNOSIS — D51.8 OTHER VITAMIN B12 DEFICIENCY ANEMIA: ICD-10-CM

## 2023-04-13 PROCEDURE — 1125F PR PAIN SEVERITY QUANTIFIED, PAIN PRESENT: ICD-10-PCS | Mod: CPTII,S$GLB,, | Performed by: INTERNAL MEDICINE

## 2023-04-13 PROCEDURE — 3288F FALL RISK ASSESSMENT DOCD: CPT | Mod: CPTII,S$GLB,, | Performed by: INTERNAL MEDICINE

## 2023-04-13 PROCEDURE — 99213 OFFICE O/P EST LOW 20 MIN: CPT | Mod: S$GLB,,, | Performed by: INTERNAL MEDICINE

## 2023-04-13 PROCEDURE — 3078F DIAST BP <80 MM HG: CPT | Mod: CPTII,S$GLB,, | Performed by: INTERNAL MEDICINE

## 2023-04-13 PROCEDURE — 3288F PR FALLS RISK ASSESSMENT DOCUMENTED: ICD-10-PCS | Mod: CPTII,S$GLB,, | Performed by: INTERNAL MEDICINE

## 2023-04-13 PROCEDURE — 1101F PT FALLS ASSESS-DOCD LE1/YR: CPT | Mod: CPTII,S$GLB,, | Performed by: INTERNAL MEDICINE

## 2023-04-13 PROCEDURE — 4010F ACE/ARB THERAPY RXD/TAKEN: CPT | Mod: CPTII,S$GLB,, | Performed by: INTERNAL MEDICINE

## 2023-04-13 PROCEDURE — 1101F PR PT FALLS ASSESS DOC 0-1 FALLS W/OUT INJ PAST YR: ICD-10-PCS | Mod: CPTII,S$GLB,, | Performed by: INTERNAL MEDICINE

## 2023-04-13 PROCEDURE — 3077F SYST BP >= 140 MM HG: CPT | Mod: CPTII,S$GLB,, | Performed by: INTERNAL MEDICINE

## 2023-04-13 PROCEDURE — 3008F PR BODY MASS INDEX (BMI) DOCUMENTED: ICD-10-PCS | Mod: CPTII,S$GLB,, | Performed by: INTERNAL MEDICINE

## 2023-04-13 PROCEDURE — 3077F PR MOST RECENT SYSTOLIC BLOOD PRESSURE >= 140 MM HG: ICD-10-PCS | Mod: CPTII,S$GLB,, | Performed by: INTERNAL MEDICINE

## 2023-04-13 PROCEDURE — 3052F HG A1C>EQUAL 8.0%<EQUAL 9.0%: CPT | Mod: CPTII,S$GLB,, | Performed by: INTERNAL MEDICINE

## 2023-04-13 PROCEDURE — 1125F AMNT PAIN NOTED PAIN PRSNT: CPT | Mod: CPTII,S$GLB,, | Performed by: INTERNAL MEDICINE

## 2023-04-13 PROCEDURE — 3052F PR MOST RECENT HEMOGLOBIN A1C LEVEL 8.0 - < 9.0%: ICD-10-PCS | Mod: CPTII,S$GLB,, | Performed by: INTERNAL MEDICINE

## 2023-04-13 PROCEDURE — 4010F PR ACE/ARB THEARPY RXD/TAKEN: ICD-10-PCS | Mod: CPTII,S$GLB,, | Performed by: INTERNAL MEDICINE

## 2023-04-13 PROCEDURE — 99213 PR OFFICE/OUTPT VISIT, EST, LEVL III, 20-29 MIN: ICD-10-PCS | Mod: S$GLB,,, | Performed by: INTERNAL MEDICINE

## 2023-04-13 PROCEDURE — 3078F PR MOST RECENT DIASTOLIC BLOOD PRESSURE < 80 MM HG: ICD-10-PCS | Mod: CPTII,S$GLB,, | Performed by: INTERNAL MEDICINE

## 2023-04-13 PROCEDURE — 1160F RVW MEDS BY RX/DR IN RCRD: CPT | Mod: CPTII,S$GLB,, | Performed by: INTERNAL MEDICINE

## 2023-04-13 PROCEDURE — 1160F PR REVIEW ALL MEDS BY PRESCRIBER/CLIN PHARMACIST DOCUMENTED: ICD-10-PCS | Mod: CPTII,S$GLB,, | Performed by: INTERNAL MEDICINE

## 2023-04-13 PROCEDURE — 1159F MED LIST DOCD IN RCRD: CPT | Mod: CPTII,S$GLB,, | Performed by: INTERNAL MEDICINE

## 2023-04-13 PROCEDURE — 3008F BODY MASS INDEX DOCD: CPT | Mod: CPTII,S$GLB,, | Performed by: INTERNAL MEDICINE

## 2023-04-13 PROCEDURE — 1159F PR MEDICATION LIST DOCUMENTED IN MEDICAL RECORD: ICD-10-PCS | Mod: CPTII,S$GLB,, | Performed by: INTERNAL MEDICINE

## 2023-04-13 RX ORDER — CYANOCOBALAMIN 1000 UG/ML
1000 INJECTION, SOLUTION INTRAMUSCULAR; SUBCUTANEOUS
Qty: 3 ML | Refills: 4 | Status: SHIPPED | OUTPATIENT
Start: 2023-04-13 | End: 2023-09-06

## 2023-04-13 RX ORDER — IRON,CARBONYL/ASCORBIC ACID 100-250 MG
1 TABLET ORAL DAILY
Qty: 30 EACH | Refills: 8 | Status: SHIPPED | OUTPATIENT
Start: 2023-04-13 | End: 2023-10-26 | Stop reason: SDUPTHER

## 2023-04-14 ENCOUNTER — PATIENT MESSAGE (OUTPATIENT)
Dept: FAMILY MEDICINE | Facility: CLINIC | Age: 68
End: 2023-04-14

## 2023-04-14 DIAGNOSIS — M79.7 FIBROMYALGIA: ICD-10-CM

## 2023-04-14 DIAGNOSIS — E87.6 HYPOKALEMIA: ICD-10-CM

## 2023-04-14 DIAGNOSIS — I10 BENIGN ESSENTIAL HYPERTENSION: ICD-10-CM

## 2023-04-18 RX ORDER — POTASSIUM CHLORIDE 750 MG/1
10 TABLET, EXTENDED RELEASE ORAL DAILY
Qty: 30 TABLET | Refills: 0 | Status: SHIPPED | OUTPATIENT
Start: 2023-04-18 | End: 2023-06-02 | Stop reason: SDUPTHER

## 2023-04-18 RX ORDER — AMLODIPINE BESYLATE 2.5 MG/1
2.5 TABLET ORAL DAILY
Qty: 30 TABLET | Refills: 0 | Status: SHIPPED | OUTPATIENT
Start: 2023-04-18 | End: 2023-06-02 | Stop reason: SDUPTHER

## 2023-04-18 RX ORDER — DULOXETIN HYDROCHLORIDE 60 MG/1
60 CAPSULE, DELAYED RELEASE ORAL DAILY
Qty: 30 CAPSULE | Refills: 1 | Status: SHIPPED | OUTPATIENT
Start: 2023-04-18 | End: 2023-06-02 | Stop reason: SDUPTHER

## 2023-05-17 ENCOUNTER — PATIENT MESSAGE (OUTPATIENT)
Dept: FAMILY MEDICINE | Facility: CLINIC | Age: 68
End: 2023-05-17

## 2023-05-18 ENCOUNTER — PATIENT MESSAGE (OUTPATIENT)
Dept: FAMILY MEDICINE | Facility: CLINIC | Age: 68
End: 2023-05-18

## 2023-05-29 ENCOUNTER — LAB VISIT (OUTPATIENT)
Dept: LAB | Facility: HOSPITAL | Age: 68
End: 2023-05-29
Attending: NURSE PRACTITIONER
Payer: MEDICARE

## 2023-05-29 DIAGNOSIS — E11.9 TYPE 2 DIABETES MELLITUS TREATED WITH INSULIN: Primary | ICD-10-CM

## 2023-05-29 DIAGNOSIS — E78.2 MIXED HYPERLIPIDEMIA: Primary | ICD-10-CM

## 2023-05-29 DIAGNOSIS — Z79.4 TYPE 2 DIABETES MELLITUS TREATED WITH INSULIN: Primary | ICD-10-CM

## 2023-05-29 DIAGNOSIS — E11.9 DIABETES MELLITUS WITHOUT COMPLICATION: ICD-10-CM

## 2023-05-29 DIAGNOSIS — E78.2 MIXED HYPERLIPIDEMIA: ICD-10-CM

## 2023-05-29 DIAGNOSIS — Z79.4 ENCOUNTER FOR LONG-TERM (CURRENT) USE OF INSULIN: ICD-10-CM

## 2023-05-29 DIAGNOSIS — E11.9 DIABETES MELLITUS WITHOUT COMPLICATION: Primary | ICD-10-CM

## 2023-05-29 DIAGNOSIS — I10 BENIGN ESSENTIAL HYPERTENSION: Primary | ICD-10-CM

## 2023-05-29 DIAGNOSIS — Z79.4 TYPE 2 DIABETES MELLITUS TREATED WITH INSULIN: ICD-10-CM

## 2023-05-29 DIAGNOSIS — I10 BENIGN ESSENTIAL HYPERTENSION: ICD-10-CM

## 2023-05-29 DIAGNOSIS — E11.9 TYPE 2 DIABETES MELLITUS TREATED WITH INSULIN: ICD-10-CM

## 2023-05-29 LAB
ALBUMIN SERPL BCP-MCNC: 3.6 G/DL (ref 3.5–5.2)
ALBUMIN/CREAT UR: NORMAL UG/MG (ref 0–30)
ALP SERPL-CCNC: 60 U/L (ref 55–135)
ALT SERPL W/O P-5'-P-CCNC: 17 U/L (ref 10–44)
ANION GAP SERPL CALC-SCNC: 9 MMOL/L (ref 8–16)
AST SERPL-CCNC: 19 U/L (ref 10–40)
BACTERIA #/AREA URNS HPF: NEGATIVE /HPF
BASOPHILS # BLD AUTO: 0.08 K/UL (ref 0–0.2)
BASOPHILS NFR BLD: 1.2 % (ref 0–1.9)
BILIRUB SERPL-MCNC: 0.8 MG/DL (ref 0.1–1)
BILIRUB UR QL STRIP: NEGATIVE
BUN SERPL-MCNC: 16 MG/DL (ref 8–23)
CALCIUM SERPL-MCNC: 9.6 MG/DL (ref 8.7–10.5)
CHLORIDE SERPL-SCNC: 100 MMOL/L (ref 95–110)
CHOLEST SERPL-MCNC: 103 MG/DL (ref 120–199)
CHOLEST/HDLC SERPL: 2.5 {RATIO} (ref 2–5)
CLARITY UR: CLEAR
CO2 SERPL-SCNC: 31 MMOL/L (ref 23–29)
COLOR UR: YELLOW
CREAT SERPL-MCNC: 0.9 MG/DL (ref 0.5–1.4)
CREAT UR-MCNC: 68 MG/DL (ref 15–325)
DIFFERENTIAL METHOD: ABNORMAL
EOSINOPHIL # BLD AUTO: 0.1 K/UL (ref 0–0.5)
EOSINOPHIL NFR BLD: 1.9 % (ref 0–8)
ERYTHROCYTE [DISTWIDTH] IN BLOOD BY AUTOMATED COUNT: 15 % (ref 11.5–14.5)
EST. GFR  (NO RACE VARIABLE): >60 ML/MIN/1.73 M^2
GLUCOSE SERPL-MCNC: 78 MG/DL (ref 70–110)
GLUCOSE UR QL STRIP: ABNORMAL
HCT VFR BLD AUTO: 44.6 % (ref 37–48.5)
HDLC SERPL-MCNC: 42 MG/DL (ref 40–75)
HDLC SERPL: 40.8 % (ref 20–50)
HGB BLD-MCNC: 13.7 G/DL (ref 12–16)
HGB UR QL STRIP: NEGATIVE
HYALINE CASTS #/AREA URNS LPF: 4 /LPF
IMM GRANULOCYTES # BLD AUTO: 0.03 K/UL (ref 0–0.04)
IMM GRANULOCYTES NFR BLD AUTO: 0.4 % (ref 0–0.5)
KETONES UR QL STRIP: NEGATIVE
LDLC SERPL CALC-MCNC: 35.2 MG/DL (ref 63–159)
LEUKOCYTE ESTERASE UR QL STRIP: ABNORMAL
LYMPHOCYTES # BLD AUTO: 2 K/UL (ref 1–4.8)
LYMPHOCYTES NFR BLD: 29.4 % (ref 18–48)
MCH RBC QN AUTO: 26.3 PG (ref 27–31)
MCHC RBC AUTO-ENTMCNC: 30.7 G/DL (ref 32–36)
MCV RBC AUTO: 86 FL (ref 82–98)
MICROALBUMIN UR DL<=1MG/L-MCNC: <2 UG/ML
MICROSCOPIC COMMENT: ABNORMAL
MONOCYTES # BLD AUTO: 0.6 K/UL (ref 0.3–1)
MONOCYTES NFR BLD: 8.8 % (ref 4–15)
NEUTROPHILS # BLD AUTO: 4 K/UL (ref 1.8–7.7)
NEUTROPHILS NFR BLD: 58.3 % (ref 38–73)
NITRITE UR QL STRIP: NEGATIVE
NONHDLC SERPL-MCNC: 61 MG/DL
NRBC BLD-RTO: 0 /100 WBC
PH UR STRIP: 6 [PH] (ref 5–8)
PLATELET # BLD AUTO: 284 K/UL (ref 150–450)
PMV BLD AUTO: 10.7 FL (ref 9.2–12.9)
POTASSIUM SERPL-SCNC: 3.4 MMOL/L (ref 3.5–5.1)
PROT SERPL-MCNC: 6.8 G/DL (ref 6–8.4)
PROT UR QL STRIP: NEGATIVE
RBC # BLD AUTO: 5.2 M/UL (ref 4–5.4)
RBC #/AREA URNS HPF: 1 /HPF (ref 0–4)
SODIUM SERPL-SCNC: 140 MMOL/L (ref 136–145)
SP GR UR STRIP: 1.01 (ref 1–1.03)
SQUAMOUS #/AREA URNS HPF: 1 /HPF
TRIGL SERPL-MCNC: 129 MG/DL (ref 30–150)
TSH SERPL DL<=0.005 MIU/L-ACNC: 1.93 UIU/ML (ref 0.34–5.6)
URN SPEC COLLECT METH UR: ABNORMAL
UROBILINOGEN UR STRIP-ACNC: NEGATIVE EU/DL
WBC # BLD AUTO: 6.9 K/UL (ref 3.9–12.7)
WBC #/AREA URNS HPF: 7 /HPF (ref 0–5)
YEAST URNS QL MICRO: ABNORMAL

## 2023-05-29 PROCEDURE — 82570 ASSAY OF URINE CREATININE: CPT | Performed by: NURSE PRACTITIONER

## 2023-05-29 PROCEDURE — 80061 LIPID PANEL: CPT | Performed by: NURSE PRACTITIONER

## 2023-05-29 PROCEDURE — 36415 COLL VENOUS BLD VENIPUNCTURE: CPT | Performed by: NURSE PRACTITIONER

## 2023-05-29 PROCEDURE — 85025 COMPLETE CBC W/AUTO DIFF WBC: CPT | Performed by: NURSE PRACTITIONER

## 2023-05-29 PROCEDURE — 80053 COMPREHEN METABOLIC PANEL: CPT | Performed by: NURSE PRACTITIONER

## 2023-05-29 PROCEDURE — 81001 URINALYSIS AUTO W/SCOPE: CPT | Performed by: NURSE PRACTITIONER

## 2023-05-29 PROCEDURE — 84443 ASSAY THYROID STIM HORMONE: CPT | Performed by: NURSE PRACTITIONER

## 2023-06-02 ENCOUNTER — OFFICE VISIT (OUTPATIENT)
Dept: FAMILY MEDICINE | Facility: CLINIC | Age: 68
End: 2023-06-02
Payer: MEDICARE

## 2023-06-02 VITALS
HEART RATE: 68 BPM | DIASTOLIC BLOOD PRESSURE: 59 MMHG | SYSTOLIC BLOOD PRESSURE: 120 MMHG | RESPIRATION RATE: 20 BRPM | TEMPERATURE: 98 F | WEIGHT: 240.5 LBS | OXYGEN SATURATION: 97 % | BODY MASS INDEX: 37.75 KG/M2 | HEIGHT: 67 IN

## 2023-06-02 DIAGNOSIS — E78.2 MIXED HYPERLIPIDEMIA: ICD-10-CM

## 2023-06-02 DIAGNOSIS — Z79.4 TYPE 2 DIABETES MELLITUS TREATED WITH INSULIN: ICD-10-CM

## 2023-06-02 DIAGNOSIS — I10 BENIGN ESSENTIAL HYPERTENSION: Primary | ICD-10-CM

## 2023-06-02 DIAGNOSIS — E11.9 TYPE 2 DIABETES MELLITUS TREATED WITH INSULIN: ICD-10-CM

## 2023-06-02 DIAGNOSIS — M79.7 FIBROMYALGIA: ICD-10-CM

## 2023-06-02 DIAGNOSIS — E87.6 HYPOKALEMIA: ICD-10-CM

## 2023-06-02 DIAGNOSIS — Z91.09 ENVIRONMENTAL ALLERGIES: ICD-10-CM

## 2023-06-02 LAB — HBA1C MFR BLD: 6.9 %

## 2023-06-02 PROCEDURE — 4010F ACE/ARB THERAPY RXD/TAKEN: CPT | Mod: CPTII,S$GLB,, | Performed by: NURSE PRACTITIONER

## 2023-06-02 PROCEDURE — 3066F PR DOCUMENTATION OF TREATMENT FOR NEPHROPATHY: ICD-10-PCS | Mod: CPTII,S$GLB,, | Performed by: NURSE PRACTITIONER

## 2023-06-02 PROCEDURE — 3078F PR MOST RECENT DIASTOLIC BLOOD PRESSURE < 80 MM HG: ICD-10-PCS | Mod: CPTII,S$GLB,, | Performed by: NURSE PRACTITIONER

## 2023-06-02 PROCEDURE — 99214 OFFICE O/P EST MOD 30 MIN: CPT | Mod: S$GLB,,, | Performed by: NURSE PRACTITIONER

## 2023-06-02 PROCEDURE — 1159F MED LIST DOCD IN RCRD: CPT | Mod: CPTII,S$GLB,, | Performed by: NURSE PRACTITIONER

## 2023-06-02 PROCEDURE — 1159F PR MEDICATION LIST DOCUMENTED IN MEDICAL RECORD: ICD-10-PCS | Mod: CPTII,S$GLB,, | Performed by: NURSE PRACTITIONER

## 2023-06-02 PROCEDURE — 3044F HG A1C LEVEL LT 7.0%: CPT | Mod: CPTII,S$GLB,, | Performed by: NURSE PRACTITIONER

## 2023-06-02 PROCEDURE — 1160F RVW MEDS BY RX/DR IN RCRD: CPT | Mod: CPTII,S$GLB,, | Performed by: NURSE PRACTITIONER

## 2023-06-02 PROCEDURE — 1101F PR PT FALLS ASSESS DOC 0-1 FALLS W/OUT INJ PAST YR: ICD-10-PCS | Mod: CPTII,S$GLB,, | Performed by: NURSE PRACTITIONER

## 2023-06-02 PROCEDURE — 3074F SYST BP LT 130 MM HG: CPT | Mod: CPTII,S$GLB,, | Performed by: NURSE PRACTITIONER

## 2023-06-02 PROCEDURE — 1101F PT FALLS ASSESS-DOCD LE1/YR: CPT | Mod: CPTII,S$GLB,, | Performed by: NURSE PRACTITIONER

## 2023-06-02 PROCEDURE — 3008F PR BODY MASS INDEX (BMI) DOCUMENTED: ICD-10-PCS | Mod: CPTII,S$GLB,, | Performed by: NURSE PRACTITIONER

## 2023-06-02 PROCEDURE — 3066F NEPHROPATHY DOC TX: CPT | Mod: CPTII,S$GLB,, | Performed by: NURSE PRACTITIONER

## 2023-06-02 PROCEDURE — 1160F PR REVIEW ALL MEDS BY PRESCRIBER/CLIN PHARMACIST DOCUMENTED: ICD-10-PCS | Mod: CPTII,S$GLB,, | Performed by: NURSE PRACTITIONER

## 2023-06-02 PROCEDURE — 3078F DIAST BP <80 MM HG: CPT | Mod: CPTII,S$GLB,, | Performed by: NURSE PRACTITIONER

## 2023-06-02 PROCEDURE — 1125F AMNT PAIN NOTED PAIN PRSNT: CPT | Mod: CPTII,S$GLB,, | Performed by: NURSE PRACTITIONER

## 2023-06-02 PROCEDURE — 3044F PR MOST RECENT HEMOGLOBIN A1C LEVEL <7.0%: ICD-10-PCS | Mod: CPTII,S$GLB,, | Performed by: NURSE PRACTITIONER

## 2023-06-02 PROCEDURE — 99214 PR OFFICE/OUTPT VISIT, EST, LEVL IV, 30-39 MIN: ICD-10-PCS | Mod: S$GLB,,, | Performed by: NURSE PRACTITIONER

## 2023-06-02 PROCEDURE — 83036 HEMOGLOBIN GLYCOSYLATED A1C: CPT | Mod: QW,S$GLB,, | Performed by: NURSE PRACTITIONER

## 2023-06-02 PROCEDURE — 3288F FALL RISK ASSESSMENT DOCD: CPT | Mod: CPTII,S$GLB,, | Performed by: NURSE PRACTITIONER

## 2023-06-02 PROCEDURE — 4010F PR ACE/ARB THEARPY RXD/TAKEN: ICD-10-PCS | Mod: CPTII,S$GLB,, | Performed by: NURSE PRACTITIONER

## 2023-06-02 PROCEDURE — 3008F BODY MASS INDEX DOCD: CPT | Mod: CPTII,S$GLB,, | Performed by: NURSE PRACTITIONER

## 2023-06-02 PROCEDURE — 3288F PR FALLS RISK ASSESSMENT DOCUMENTED: ICD-10-PCS | Mod: CPTII,S$GLB,, | Performed by: NURSE PRACTITIONER

## 2023-06-02 PROCEDURE — 3074F PR MOST RECENT SYSTOLIC BLOOD PRESSURE < 130 MM HG: ICD-10-PCS | Mod: CPTII,S$GLB,, | Performed by: NURSE PRACTITIONER

## 2023-06-02 PROCEDURE — 3061F PR NEG MICROALBUMINURIA RESULT DOCUMENTED/REVIEW: ICD-10-PCS | Mod: CPTII,S$GLB,, | Performed by: NURSE PRACTITIONER

## 2023-06-02 PROCEDURE — 83036 POCT HEMOGLOBIN A1C: ICD-10-PCS | Mod: QW,S$GLB,, | Performed by: NURSE PRACTITIONER

## 2023-06-02 PROCEDURE — 1125F PR PAIN SEVERITY QUANTIFIED, PAIN PRESENT: ICD-10-PCS | Mod: CPTII,S$GLB,, | Performed by: NURSE PRACTITIONER

## 2023-06-02 PROCEDURE — 3061F NEG MICROALBUMINURIA REV: CPT | Mod: CPTII,S$GLB,, | Performed by: NURSE PRACTITIONER

## 2023-06-02 RX ORDER — AZELASTINE 1 MG/ML
1 SPRAY, METERED NASAL 2 TIMES DAILY
Qty: 30 ML | Refills: 5 | Status: SHIPPED | OUTPATIENT
Start: 2023-06-02 | End: 2023-09-06

## 2023-06-02 RX ORDER — POTASSIUM CHLORIDE 750 MG/1
10 TABLET, EXTENDED RELEASE ORAL DAILY
Qty: 90 TABLET | Refills: 1 | Status: SHIPPED | OUTPATIENT
Start: 2023-06-02 | End: 2023-11-20 | Stop reason: SDUPTHER

## 2023-06-02 RX ORDER — DULOXETIN HYDROCHLORIDE 60 MG/1
60 CAPSULE, DELAYED RELEASE ORAL DAILY
Qty: 90 CAPSULE | Refills: 1 | Status: SHIPPED | OUTPATIENT
Start: 2023-06-02 | End: 2023-11-27

## 2023-06-02 RX ORDER — AMLODIPINE BESYLATE 2.5 MG/1
2.5 TABLET ORAL DAILY
Qty: 90 TABLET | Refills: 1 | Status: ON HOLD | OUTPATIENT
Start: 2023-06-02 | End: 2023-08-03 | Stop reason: SDUPTHER

## 2023-06-02 RX ORDER — METOPROLOL SUCCINATE 25 MG/1
25 TABLET, EXTENDED RELEASE ORAL DAILY
Qty: 90 TABLET | Refills: 1 | Status: ON HOLD | OUTPATIENT
Start: 2023-06-02 | End: 2023-08-03 | Stop reason: SDUPTHER

## 2023-06-02 NOTE — PROGRESS NOTES
SUBJECTIVE:      Patient ID: Chanel Cervantes is a 67 y.o. female.    Chief Complaint: Hypertension and Diabetes    Chanel is a previous patient of EMERSON Frazier NP here for establish care visit.  PMH of DM, HTN, HLD, anemia, CAD, hx of MI, depression, obesity s/p gastric sleeve in April 2019 c/b incisional ventral hernia s/p repair, panniculectomy and full thickness skin graft on 12/4/19, anemia, multinodular thyroid s/p R thyroid lobectomy 7/2021, and NIKOLAS. She is here to follow-up on diabetes, hypertension, and hyperlipidemia. She continues on Tresiba, Trijardy and metformin. Eye exam UTD- 10/22. Her BP is controlled today. She continues on amlodipine, olmesartan, Tenex, metoprolol, and metolazone. She continues to see pain management for her chronic pain- does not exercise due to her complaints. Tries to diet but has not lost any weight. A1C dropped to 6.9!! Recent labs reviewed.     C/o increased allergy sx recently including clear runny nose and over-the-counter medications and Flonase or low longer working for her.  Denies fever, chills, productive sputum, sinus pain or pressure.    Dr. Henley - endocrine (thyroid)   Dr. Jackson - hem/onc  Dr. Orozco - rheumatology  Dr. Nunez - bariatrics/gen surg  Dr. Chance - surg (thyroid)  Dr. Guan - eye doctor      Hypertension  This is a chronic problem. The current episode started more than 1 year ago. The problem is controlled. Associated symptoms include neck pain. Pertinent negatives include no anxiety, blurred vision, chest pain, headaches, malaise/fatigue, palpitations, peripheral edema or shortness of breath. Agents associated with hypertension include thyroid hormones. Risk factors for coronary artery disease include obesity, post-menopausal state, sedentary lifestyle, dyslipidemia, diabetes mellitus and family history. Past treatments include beta blockers, angiotensin blockers, calcium channel blockers and lifestyle changes. The current treatment provides  significant improvement. Compliance problems include diet and exercise.  There is no history of CVA, PVD or retinopathy. Identifiable causes of hypertension include sleep apnea. There is no history of chronic renal disease or a thyroid problem.   Diabetes  She presents for her follow-up diabetic visit. She has type 2 diabetes mellitus. Her disease course has been improving. There are no hypoglycemic associated symptoms. Pertinent negatives for hypoglycemia include no confusion, dizziness, headaches, nervousness/anxiousness or seizures. There are no diabetic associated symptoms. Pertinent negatives for diabetes include no blurred vision, no chest pain, no fatigue, no foot paresthesias, no foot ulcerations, no polydipsia, no polyphagia, no polyuria, no visual change, no weakness and no weight loss. There are no hypoglycemic complications. Symptoms are improving. Diabetic complications include heart disease. Pertinent negatives for diabetic complications include no autonomic neuropathy, CVA, nephropathy, peripheral neuropathy, PVD or retinopathy. Risk factors for coronary artery disease include post-menopausal, sedentary lifestyle, obesity, hypertension, diabetes mellitus and dyslipidemia. Current diabetic treatment includes insulin injections and oral agent (triple therapy). She is compliant with treatment most of the time. Her weight is stable. She is following a generally healthy diet. When asked about meal planning, she reported none. She rarely participates in exercise. () An ACE inhibitor/angiotensin II receptor blocker is being taken. She sees a podiatrist.Eye exam is current.   Hyperlipidemia  This is a chronic problem. The current episode started more than 1 year ago. The problem is controlled. Recent lipid tests were reviewed and are normal. Exacerbating diseases include diabetes, hypothyroidism and obesity. She has no history of chronic renal disease or liver disease. Factors aggravating her  "hyperlipidemia include beta blockers. Pertinent negatives include no chest pain, leg pain, myalgias or shortness of breath. Current antihyperlipidemic treatment includes statins. The current treatment provides significant improvement of lipids. Compliance problems include adherence to exercise and adherence to diet.  Risk factors for coronary artery disease include post-menopausal, a sedentary lifestyle, obesity, hypertension, diabetes mellitus, dyslipidemia and family history.     Family History   Problem Relation Age of Onset    Diabetes Mother     Vision loss Mother     Heart disease Father     Vision loss Father     Stroke Father       Social History     Socioeconomic History    Marital status:    Tobacco Use    Smoking status: Former     Packs/day: 1.00     Years: 15.00     Pack years: 15.00     Types: Cigarettes     Quit date:      Years since quittin.4    Smokeless tobacco: Never   Substance and Sexual Activity    Alcohol use: No     Comment: "maybe once a year"    Drug use: No    Sexual activity: Not Currently     Social Determinants of Health     Financial Resource Strain: Unknown    Difficulty of Paying Living Expenses: Patient refused   Food Insecurity: Unknown    Worried About Running Out of Food in the Last Year: Patient refused    Ran Out of Food in the Last Year: Patient refused   Transportation Needs: No Transportation Needs    Lack of Transportation (Medical): No    Lack of Transportation (Non-Medical): No   Physical Activity: Inactive    Days of Exercise per Week: 0 days    Minutes of Exercise per Session: 0 min   Stress: Stress Concern Present    Feeling of Stress : To some extent   Social Connections: Unknown    Frequency of Communication with Friends and Family: More than three times a week    Frequency of Social Gatherings with Friends and Family: More than three times a week    Active Member of Clubs or Organizations: No    Attends Club or Organization Meetings: Never    " Marital Status:    Housing Stability: Low Risk     Unable to Pay for Housing in the Last Year: No    Number of Places Lived in the Last Year: 1    Unstable Housing in the Last Year: No     Current Outpatient Medications   Medication Sig Dispense Refill    calcium carbonate (OS-RYAN) 600 mg calcium (1,500 mg) Tab Take 600 mg by mouth once.      calcium carbonate-vitamin D3 600 mg-62.5 mcg (2,500 unit) Cap calcium carbonate 600 mg-vitamin D3 62.5 mcg (2,500 unit) capsule   Take by oral route.      cyanocobalamin 1,000 mcg/mL injection Inject 1 mL (1,000 mcg total) into the skin every 30 days. 3 mL 4    empaglifloz-linaglip-metformin (TRIJARDY XR) 25-5-1,000 mg TBph Take 1 tablet by mouth once daily. 90 tablet 1    FEROSUL 325 mg (65 mg iron) Tab tablet Take by mouth.      guanFACINE (TENEX) 2 MG tablet Take 1 tablet (2 mg total) by mouth nightly. 90 tablet 3    insulin degludec (TRESIBA FLEXTOUCH U-200) 200 unit/mL (3 mL) insulin pen Inject 76 Units into the skin once daily. 12 pen 3    iron-vitamin C 100-250 mg, ICAR-C, 100-250 mg Tab Take 1 tablet by mouth once daily. 30 each 8    levothyroxine (SYNTHROID) 75 MCG tablet       magnesium oxide (MAG-OX) 400 mg (241.3 mg magnesium) tablet Take 1 tablet (400 mg total) by mouth once daily. 90 tablet 1    metFORMIN (GLUCOPHAGE) 1000 MG tablet Take 1 tablet (1,000 mg total) by mouth daily with dinner or evening meal. 90 tablet 1    metOLazone (ZAROXOLYN) 5 MG tablet Take 1 tablet (5 mg total) by mouth once daily. 90 tablet 3    metoprolol succinate (TOPROL-XL) 25 MG 24 hr tablet Take 1 tablet (25 mg total) by mouth once daily. 90 tablet 1    multivitamin capsule Take 1 capsule by mouth once daily.      NYSTOP powder APPLY TO AFFECTED AREA AS NEEDED      olmesartan (BENICAR) 40 MG tablet Take 1 tablet (40 mg total) by mouth once daily. 90 tablet 1    potassium chloride (KLOR-CON) 10 MEQ TbSR Take 1 tablet (10 mEq total) by mouth once daily. 90 tablet 1    prednisoLONE  acetate (PRED FORTE) 1 % DrpS Place 1 drop into both eyes as needed.       rosuvastatin (CRESTOR) 40 MG Tab Take 1 tablet (40 mg total) by mouth every evening. 90 tablet 3    amLODIPine (NORVASC) 2.5 MG tablet Take 1 tablet (2.5 mg total) by mouth once daily. 90 tablet 1    aspirin (ECOTRIN) 81 MG EC tablet Take 1 tablet (81 mg total) by mouth once daily. 30 tablet 0    azelastine (ASTELIN) 137 mcg (0.1 %) nasal spray 1 spray (137 mcg total) by Nasal route 2 (two) times daily. 30 mL 5    blood sugar diagnostic Strp One Touch Ultra Blue Test strips, Use l strip to check glucose 4 times daily 100 each 11    blood-glucose meter kit Use as instructed 1 each 0    DULoxetine (CYMBALTA) 60 MG capsule Take 1 capsule (60 mg total) by mouth once daily. 90 capsule 1    lancets (ONETOUCH DELICA LANCETS) 33 gauge Misc USE 1  TO CHECK GLUCOSE 4 TIMES DAILY 100 each 3     No current facility-administered medications for this visit.     Review of patient's allergies indicates:   Allergen Reactions    Adhesive tape-silicones Rash    Dye Hives    Cephalexin     Iodine     Penicillins Edema    Pneumococcal vaccine     Iodinated contrast media Rash      Past Medical History:   Diagnosis Date    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 2021    Anemia, chronic disease 2021    CAD (coronary artery disease)     Diabetes mellitus, type 2     Hypertension     Iron deficiency anemia following bariatric surgery 2021    MI (myocardial infarction)     Secondary thrombocytosis 2021    Sleep apnea     Sleep apnea 2019    Sleep Right      Past Surgical History:   Procedure Laterality Date    ANGIOGRAM, CORONARY, WITH LEFT HEART CATHETERIZATION      APPENDECTOMY      BARIATRIC SURGERY  2019    Dr Nunez    CARPAL TUNNEL RELEASE Bilateral 2002     SECTION      CHOLECYSTECTOMY      COLONOSCOPY      CORONARY ANGIOPLASTY WITH STENT PLACEMENT      CORONARY ARTERY BYPASS GRAFT      EPIDURAL STEROID INJECTION INTO LUMBAR  SPINE      x2    ESOPHAGOGASTRODUODENOSCOPY      HERNIA REPAIR  12/04/2019    HYSTERECTOMY      THYROID LOBECTOMY Right 7/20/2021    Procedure: LOBECTOMY, THYROID;  Surgeon: Mari Chance MD;  Location: [facility];  Service: General;  Laterality: Right;       Review of Systems   Constitutional:  Negative for activity change, appetite change, chills, fatigue, fever, malaise/fatigue, unexpected weight change and weight loss.   HENT:  Positive for postnasal drip and rhinorrhea. Negative for congestion, ear discharge, ear pain, hearing loss, sinus pressure, sinus pain, sneezing, sore throat and trouble swallowing.    Eyes:  Negative for blurred vision, pain, discharge and visual disturbance.   Respiratory:  Negative for cough, chest tightness, shortness of breath and wheezing.    Cardiovascular:  Negative for chest pain, palpitations and leg swelling.   Gastrointestinal:  Negative for abdominal distention, abdominal pain, blood in stool, constipation, diarrhea, nausea and vomiting.   Endocrine: Negative for polydipsia, polyphagia and polyuria.   Genitourinary:  Negative for difficulty urinating, dysuria, flank pain, frequency, hematuria, menstrual problem, pelvic pain, urgency and vaginal discharge.   Musculoskeletal:  Positive for arthralgias, back pain, joint swelling and neck pain. Negative for myalgias.   Skin:  Negative for color change, rash and wound.   Allergic/Immunologic: Positive for environmental allergies.   Neurological:  Negative for dizziness, seizures, syncope, weakness, light-headedness, numbness and headaches.   Hematological:  Negative for adenopathy.   Psychiatric/Behavioral:  Negative for agitation, confusion, dysphoric mood, hallucinations, sleep disturbance and suicidal ideas. The patient is not nervous/anxious.     OBJECTIVE:      Vitals:    06/02/23 0758   BP: (!) 120/59   BP Location: Left arm   Patient Position: Sitting   BP Method: Large (Automatic)   Pulse: 68   Resp: 20   Temp: 98 °F (36.7  "°C)   TempSrc: Oral   SpO2: 97%   Weight: 109.1 kg (240 lb 8 oz)   Height: 5' 7" (1.702 m)     Physical Exam  Vitals and nursing note reviewed.   Constitutional:       General: She is not in acute distress.     Appearance: Normal appearance. She is well-developed. She is obese. She is not diaphoretic.   HENT:      Head: Normocephalic and atraumatic.      Right Ear: Hearing, tympanic membrane, ear canal and external ear normal.      Left Ear: Hearing, tympanic membrane, ear canal and external ear normal.      Nose: Nose normal. No congestion.      Mouth/Throat:      Lips: Pink.      Mouth: Mucous membranes are moist.      Pharynx: Oropharynx is clear.   Eyes:      General: Lids are normal. No scleral icterus.        Right eye: No discharge.         Left eye: No discharge.      Extraocular Movements: Extraocular movements intact.      Conjunctiva/sclera: Conjunctivae normal.      Pupils: Pupils are equal, round, and reactive to light.   Neck:      Thyroid: No thyroid mass or thyromegaly.      Trachea: Trachea and phonation normal. No tracheal deviation.   Cardiovascular:      Rate and Rhythm: Normal rate and regular rhythm.      Pulses: Normal pulses.      Heart sounds: Normal heart sounds. No murmur heard.  Pulmonary:      Effort: Pulmonary effort is normal. No respiratory distress.      Breath sounds: Normal breath sounds. No stridor. No decreased breath sounds, wheezing, rhonchi or rales.   Abdominal:      General: Bowel sounds are normal.      Palpations: Abdomen is soft.      Tenderness: There is no abdominal tenderness.   Musculoskeletal:         General: Normal range of motion.      Cervical back: Full passive range of motion without pain, normal range of motion and neck supple.      Right lower leg: No edema.      Left lower leg: No edema.   Lymphadenopathy:      Cervical: No cervical adenopathy.      Upper Body:      Right upper body: No supraclavicular adenopathy.      Left upper body: No supraclavicular " adenopathy.   Skin:     General: Skin is warm and dry.      Coloration: Skin is not jaundiced or pale.   Neurological:      Mental Status: She is alert and oriented to person, place, and time.      GCS: GCS eye subscore is 4. GCS verbal subscore is 5. GCS motor subscore is 6.      Coordination: Coordination is intact.      Gait: Gait is intact.   Psychiatric:         Attention and Perception: Attention and perception normal.         Mood and Affect: Mood and affect normal.         Speech: Speech normal.         Behavior: Behavior normal. Behavior is cooperative.         Thought Content: Thought content normal. Thought content does not include suicidal plan.         Cognition and Memory: Cognition and memory normal.         Judgment: Judgment normal.      Assessment:       1. Benign essential hypertension    2. Type 2 diabetes mellitus treated with insulin    3. Fibromyalgia    4. Hypokalemia    5. Mixed hyperlipidemia    6. Environmental allergies        Plan:       Benign essential hypertension  -     amLODIPine (NORVASC) 2.5 MG tablet; Take 1 tablet (2.5 mg total) by mouth once daily.  Dispense: 90 tablet; Refill: 1  -     metoprolol succinate (TOPROL-XL) 25 MG 24 hr tablet; Take 1 tablet (25 mg total) by mouth once daily.  Dispense: 90 tablet; Refill: 1  -stable on medications; cont as planned     Type 2 diabetes mellitus treated with insulin  -     Hemoglobin A1C, POCT  -     Comprehensive Metabolic Panel; Future; Expected date: 12/01/2023  -     Hemoglobin A1C; Future; Expected date: 12/01/2023  -improved to 6.9! Continue medications and monitor BS closely; may need insulin decrease; recheck in 6 mo      Fibromyalgia  -     DULoxetine (CYMBALTA) 60 MG capsule; Take 1 capsule (60 mg total) by mouth once daily.  Dispense: 90 capsule; Refill: 1   -stable    Hypokalemia  -     potassium chloride (KLOR-CON) 10 MEQ TbSR; Take 1 tablet (10 mEq total) by mouth once daily.  Dispense: 90 tablet; Refill: 1   -low on labs;  cont supplement daily     Mixed hyperlipidemia   -improving/stable; cont statin daily    Environmental allergies  -     azelastine (ASTELIN) 137 mcg (0.1 %) nasal spray; 1 spray (137 mcg total) by Nasal route 2 (two) times daily.  Dispense: 30 mL; Refill: 5   -trial of Astelin BID     I spent a total of 27  minutes on the day of the visit.This includes face to face time and non-face to face time preparing to see the patient (eg, review of tests), obtaining and/or reviewing separately obtained history, documenting clinical information in the electronic or other health record, independently interpreting results and communicating results to the patient/family/caregiver, or care coordinator.       Follow up in about 6 months (around 12/2/2023) for diabetes, HTN.      6/2/2023 MESERET Almaguer, GISELP    This note was created using [employer] voice recognition software that occasionally misinterprets phrases or words.

## 2023-06-08 DIAGNOSIS — M54.50 LOW BACK PAIN: Primary | ICD-10-CM

## 2023-06-21 ENCOUNTER — HOSPITAL ENCOUNTER (OUTPATIENT)
Dept: RADIOLOGY | Facility: HOSPITAL | Age: 68
Discharge: HOME | End: 2023-06-21
Attending: PAIN MEDICINE
Payer: MEDICARE

## 2023-06-21 DIAGNOSIS — M54.50 LOW BACK PAIN: ICD-10-CM

## 2023-06-21 PROCEDURE — 72148 MRI LUMBAR SPINE W/O DYE: CPT | Mod: TC,PO

## 2023-06-22 DIAGNOSIS — M54.50 LOW BACK PAIN, UNSPECIFIED: Primary | ICD-10-CM

## 2023-06-26 ENCOUNTER — HOSPITAL ENCOUNTER (OUTPATIENT)
Dept: RADIOLOGY | Facility: HOSPITAL | Age: 68
Discharge: HOME | End: 2023-06-26
Attending: PAIN MEDICINE
Payer: MEDICARE

## 2023-06-26 DIAGNOSIS — M54.50 LOW BACK PAIN, UNSPECIFIED: ICD-10-CM

## 2023-06-26 PROCEDURE — 72120 X-RAY BEND ONLY L-S SPINE: CPT | Mod: TC,PO

## 2023-06-30 ENCOUNTER — PATIENT MESSAGE (OUTPATIENT)
Dept: FAMILY MEDICINE | Facility: CLINIC | Age: 68
End: 2023-06-30

## 2023-06-30 DIAGNOSIS — I10 BENIGN ESSENTIAL HYPERTENSION: ICD-10-CM

## 2023-07-03 RX ORDER — METOLAZONE 5 MG/1
5 TABLET ORAL DAILY
Qty: 90 TABLET | Refills: 0 | Status: ON HOLD | OUTPATIENT
Start: 2023-07-03 | End: 2023-07-11 | Stop reason: SDUPTHER

## 2023-07-11 DIAGNOSIS — I10 BENIGN ESSENTIAL HYPERTENSION: ICD-10-CM

## 2023-07-11 NOTE — TELEPHONE ENCOUNTER
Pharmacy trying to get advanced refill authorization. Patient is on last refill.  Would be for October fill.  Last Office Visit 6-2-23  Next Office Visit 12-1-23

## 2023-07-13 ENCOUNTER — TELEPHONE (OUTPATIENT)
Dept: CARDIOLOGY | Facility: CLINIC | Age: 68
End: 2023-07-13

## 2023-07-13 NOTE — TELEPHONE ENCOUNTER
----- Message from Ankita Stiles sent at 7/13/2023 11:53 AM CDT -----  Type: Need Medical Advice   Who Called:Patient  Best callback number: [phone]  Additional Information: Patient need medical clearance, information from Dr. Krystal White office was faxed over to [facility] office. Please call patient   Please call to further assist Thanks

## 2023-07-18 ENCOUNTER — HOSPITAL ENCOUNTER (INPATIENT)
Facility: HOSPITAL | Age: 68
LOS: 15 days | Discharge: NURSING, REHAB OR LONG-TERM CARE | DRG: 870 | End: 2023-08-03
Attending: EMERGENCY MEDICINE | Admitting: FAMILY MEDICINE
Payer: MEDICARE

## 2023-07-18 DIAGNOSIS — R78.81 BACTEREMIA DUE TO ENTEROCOCCUS: ICD-10-CM

## 2023-07-18 DIAGNOSIS — E87.6 HYPOKALEMIA: ICD-10-CM

## 2023-07-18 DIAGNOSIS — J96.01 ACUTE HYPOXEMIC RESPIRATORY FAILURE: ICD-10-CM

## 2023-07-18 DIAGNOSIS — K85.80 OTHER ACUTE PANCREATITIS, UNSPECIFIED COMPLICATION STATUS: ICD-10-CM

## 2023-07-18 DIAGNOSIS — I50.9 CHF (CONGESTIVE HEART FAILURE): ICD-10-CM

## 2023-07-18 DIAGNOSIS — D72.819 LEUKOPENIA, UNSPECIFIED TYPE: ICD-10-CM

## 2023-07-18 DIAGNOSIS — K85.10 GALLSTONE PANCREATITIS: ICD-10-CM

## 2023-07-18 DIAGNOSIS — K72.00 SHOCK LIVER: ICD-10-CM

## 2023-07-18 DIAGNOSIS — R79.89 ABNORMAL LFTS: Primary | ICD-10-CM

## 2023-07-18 DIAGNOSIS — R65.21 SEPTIC SHOCK: ICD-10-CM

## 2023-07-18 DIAGNOSIS — R91.1 PULMONARY NODULE: ICD-10-CM

## 2023-07-18 DIAGNOSIS — I10 BENIGN ESSENTIAL HYPERTENSION: ICD-10-CM

## 2023-07-18 DIAGNOSIS — I49.8 VENTRICULAR BIGEMINY: ICD-10-CM

## 2023-07-18 DIAGNOSIS — A41.9 SEPTIC SHOCK: ICD-10-CM

## 2023-07-18 DIAGNOSIS — N17.9 AKI (ACUTE KIDNEY INJURY): ICD-10-CM

## 2023-07-18 DIAGNOSIS — B95.2 BACTEREMIA DUE TO ENTEROCOCCUS: ICD-10-CM
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
## 2023-07-18 DIAGNOSIS — I33.0 SBE (SUBACUTE BACTERIAL ENDOCARDITIS): ICD-10-CM

## 2023-07-18 DIAGNOSIS — I24.89 DEMAND ISCHEMIA: ICD-10-CM

## 2023-07-18 DIAGNOSIS — G47.33 OSA ON CPAP: ICD-10-CM

## 2023-07-18 DIAGNOSIS — I95.9 HYPOTENSIVE EPISODE: ICD-10-CM

## 2023-07-18 DIAGNOSIS — R07.9 CHEST PAIN: ICD-10-CM

## 2023-07-18 DIAGNOSIS — G93.41 ENCEPHALOPATHY, METABOLIC: ICD-10-CM

## 2023-07-18 DIAGNOSIS — E83.42 HYPOMAGNESEMIA: ICD-10-CM

## 2023-07-18 DIAGNOSIS — R78.81 BACTEREMIA: ICD-10-CM

## 2023-07-18 DIAGNOSIS — K80.51 CALCULUS OF BILE DUCT WITHOUT CHOLECYSTITIS WITH OBSTRUCTION: ICD-10-CM

## 2023-07-18 DIAGNOSIS — K83.09 ACUTE OBSTRUCTIVE CHOLANGITIS: ICD-10-CM

## 2023-07-18 PROBLEM — K85.90 PANCREATITIS: Status: ACTIVE | Noted: 2023-07-18

## 2023-07-18 LAB
ALBUMIN SERPL BCP-MCNC: 3.8 G/DL (ref 3.5–5.2)
ALLENS TEST: ABNORMAL
ALP SERPL-CCNC: 217 U/L (ref 55–135)
ALT SERPL W/O P-5'-P-CCNC: 1475 U/L (ref 10–44)
ANION GAP SERPL CALC-SCNC: 13 MMOL/L (ref 8–16)
ANISOCYTOSIS BLD QL SMEAR: SLIGHT
AST SERPL-CCNC: 4943 U/L (ref 10–40)
BACTERIA #/AREA URNS HPF: NEGATIVE /HPF
BASOPHILS NFR BLD: 0 % (ref 0–1.9)
BILIRUB SERPL-MCNC: 1.8 MG/DL (ref 0.1–1)
BILIRUB UR QL STRIP: NEGATIVE
BNP SERPL-MCNC: 20 PG/ML (ref 0–99)
BUN SERPL-MCNC: 22 MG/DL (ref 8–23)
CALCIUM SERPL-MCNC: 10 MG/DL (ref 8.7–10.5)
CHLORIDE SERPL-SCNC: 99 MMOL/L (ref 95–110)
CK SERPL-CCNC: 92 U/L (ref 20–180)
CLARITY UR: CLEAR
CO2 SERPL-SCNC: 29 MMOL/L (ref 23–29)
COLOR UR: YELLOW
CREAT SERPL-MCNC: 1.1 MG/DL (ref 0.5–1.4)
DELSYS: ABNORMAL
DIFFERENTIAL METHOD: ABNORMAL
EOSINOPHIL NFR BLD: 0 % (ref 0–8)
ERYTHROCYTE [DISTWIDTH] IN BLOOD BY AUTOMATED COUNT: 15.2 % (ref 11.5–14.5)
EST. GFR  (NO RACE VARIABLE): 55.1 ML/MIN/1.73 M^2
FIO2: 100
FLOW: 15
GLUCOSE SERPL-MCNC: 115 MG/DL (ref 70–110)
GLUCOSE SERPL-MCNC: 161 MG/DL (ref 70–110)
GLUCOSE UR QL STRIP: ABNORMAL
HCO3 UR-SCNC: 28.2 MMOL/L (ref 24–28)
HCT VFR BLD AUTO: 44.2 % (ref 37–48.5)
HCT VFR BLD CALC: 36 %PCV (ref 36–54)
HGB BLD-MCNC: 14 G/DL (ref 12–16)
HGB UR QL STRIP: ABNORMAL
HYALINE CASTS #/AREA URNS LPF: 5 /LPF
IMM GRANULOCYTES # BLD AUTO: ABNORMAL K/UL (ref 0–0.04)
IMM GRANULOCYTES NFR BLD AUTO: ABNORMAL % (ref 0–0.5)
KETONES UR QL STRIP: ABNORMAL
LEUKOCYTE ESTERASE UR QL STRIP: ABNORMAL
LIPASE SERPL-CCNC: 1363 U/L (ref 4–60)
LYMPHOCYTES NFR BLD: 4 % (ref 18–48)
MAGNESIUM SERPL-MCNC: 1.5 MG/DL (ref 1.6–2.6)
MCH RBC QN AUTO: 26.5 PG (ref 27–31)
MCHC RBC AUTO-ENTMCNC: 31.7 G/DL (ref 32–36)
MCV RBC AUTO: 84 FL (ref 82–98)
MICROSCOPIC COMMENT: ABNORMAL
MODE: ABNORMAL
MONOCYTES NFR BLD: 1 % (ref 4–15)
NEUTROPHILS NFR BLD: 87 % (ref 38–73)
NEUTS BAND NFR BLD MANUAL: 8 %
NITRITE UR QL STRIP: NEGATIVE
NRBC BLD-RTO: 0 /100 WBC
PCO2 BLDA: 47.5 MMHG (ref 35–45)
PH SMN: 7.38 [PH] (ref 7.35–7.45)
PH UR STRIP: 8 [PH] (ref 5–8)
PLATELET # BLD AUTO: 213 K/UL (ref 150–450)
PLATELET BLD QL SMEAR: ABNORMAL
PMV BLD AUTO: 10.6 FL (ref 9.2–12.9)
PO2 BLDA: 192 MMHG (ref 80–100)
POC BE: 3 MMOL/L
POC IONIZED CALCIUM: 1.25 MMOL/L (ref 1.06–1.42)
POC SATURATED O2: 100 % (ref 95–100)
POC TCO2: 30 MMOL/L (ref 23–27)
POTASSIUM BLD-SCNC: 2.8 MMOL/L (ref 3.5–5.1)
POTASSIUM SERPL-SCNC: 3.1 MMOL/L (ref 3.5–5.1)
PROT SERPL-MCNC: 7.1 G/DL (ref 6–8.4)
PROT UR QL STRIP: ABNORMAL
RBC # BLD AUTO: 5.28 M/UL (ref 4–5.4)
RBC #/AREA URNS HPF: 6 /HPF (ref 0–4)
SAMPLE: ABNORMAL
SITE: ABNORMAL
SODIUM BLD-SCNC: 141 MMOL/L (ref 136–145)
SODIUM SERPL-SCNC: 141 MMOL/L (ref 136–145)
SP GR UR STRIP: 1.03 (ref 1–1.03)
SP02: 100
SQUAMOUS #/AREA URNS HPF: 1 /HPF
TROPONIN I SERPL HS-MCNC: 14.8 PG/ML (ref 0–14.9)
TROPONIN I SERPL HS-MCNC: 39.2 PG/ML (ref 0–14.9)
URN SPEC COLLECT METH UR: ABNORMAL
UROBILINOGEN UR STRIP-ACNC: ABNORMAL EU/DL
WBC # BLD AUTO: 1.17 K/UL (ref 3.9–12.7)
WBC #/AREA URNS HPF: 6 /HPF (ref 0–5)
YEAST URNS QL MICRO: ABNORMAL

## 2023-07-18 PROCEDURE — 96366 THER/PROPH/DIAG IV INF ADDON: CPT

## 2023-07-18 PROCEDURE — 25000003 PHARM REV CODE 250: Performed by: EMERGENCY MEDICINE

## 2023-07-18 PROCEDURE — 93010 ELECTROCARDIOGRAM REPORT: CPT | Mod: ,,, | Performed by: SPECIALIST

## 2023-07-18 PROCEDURE — 83880 ASSAY OF NATRIURETIC PEPTIDE: CPT | Performed by: EMERGENCY MEDICINE

## 2023-07-18 PROCEDURE — 81001 URINALYSIS AUTO W/SCOPE: CPT | Performed by: EMERGENCY MEDICINE

## 2023-07-18 PROCEDURE — 84484 ASSAY OF TROPONIN QUANT: CPT | Mod: 91 | Performed by: FAMILY MEDICINE

## 2023-07-18 PROCEDURE — 87077 CULTURE AEROBIC IDENTIFY: CPT | Performed by: FAMILY MEDICINE

## 2023-07-18 PROCEDURE — 84484 ASSAY OF TROPONIN QUANT: CPT | Performed by: EMERGENCY MEDICINE

## 2023-07-18 PROCEDURE — 25000003 PHARM REV CODE 250: Performed by: FAMILY MEDICINE

## 2023-07-18 PROCEDURE — G0378 HOSPITAL OBSERVATION PER HR: HCPCS

## 2023-07-18 PROCEDURE — 80074 ACUTE HEPATITIS PANEL: CPT | Performed by: FAMILY MEDICINE

## 2023-07-18 PROCEDURE — 96365 THER/PROPH/DIAG IV INF INIT: CPT

## 2023-07-18 PROCEDURE — 82330 ASSAY OF CALCIUM: CPT

## 2023-07-18 PROCEDURE — 83690 ASSAY OF LIPASE: CPT | Performed by: EMERGENCY MEDICINE

## 2023-07-18 PROCEDURE — 96367 TX/PROPH/DG ADDL SEQ IV INF: CPT

## 2023-07-18 PROCEDURE — 96372 THER/PROPH/DIAG INJ SC/IM: CPT | Performed by: FAMILY MEDICINE

## 2023-07-18 PROCEDURE — 82550 ASSAY OF CK (CPK): CPT | Performed by: EMERGENCY MEDICINE

## 2023-07-18 PROCEDURE — 83735 ASSAY OF MAGNESIUM: CPT | Performed by: EMERGENCY MEDICINE

## 2023-07-18 PROCEDURE — 84295 ASSAY OF SERUM SODIUM: CPT

## 2023-07-18 PROCEDURE — 93005 ELECTROCARDIOGRAM TRACING: CPT | Performed by: SPECIALIST

## 2023-07-18 PROCEDURE — 85027 COMPLETE CBC AUTOMATED: CPT | Performed by: EMERGENCY MEDICINE

## 2023-07-18 PROCEDURE — 36415 COLL VENOUS BLD VENIPUNCTURE: CPT | Performed by: FAMILY MEDICINE

## 2023-07-18 PROCEDURE — 84132 ASSAY OF SERUM POTASSIUM: CPT

## 2023-07-18 PROCEDURE — 82803 BLOOD GASES ANY COMBINATION: CPT

## 2023-07-18 PROCEDURE — 80053 COMPREHEN METABOLIC PANEL: CPT | Performed by: EMERGENCY MEDICINE

## 2023-07-18 PROCEDURE — 83036 HEMOGLOBIN GLYCOSYLATED A1C: CPT | Performed by: FAMILY MEDICINE

## 2023-07-18 PROCEDURE — 87040 BLOOD CULTURE FOR BACTERIA: CPT | Mod: 59 | Performed by: FAMILY MEDICINE

## 2023-07-18 PROCEDURE — 87154 CUL TYP ID BLD PTHGN 6+ TRGT: CPT | Performed by: FAMILY MEDICINE

## 2023-07-18 PROCEDURE — 25500020 PHARM REV CODE 255: Performed by: EMERGENCY MEDICINE

## 2023-07-18 PROCEDURE — 99291 CRITICAL CARE FIRST HOUR: CPT

## 2023-07-18 PROCEDURE — 85014 HEMATOCRIT: CPT

## 2023-07-18 PROCEDURE — 96375 TX/PRO/DX INJ NEW DRUG ADDON: CPT

## 2023-07-18 PROCEDURE — 85007 BL SMEAR W/DIFF WBC COUNT: CPT | Performed by: EMERGENCY MEDICINE

## 2023-07-18 PROCEDURE — 99900035 HC TECH TIME PER 15 MIN (STAT)

## 2023-07-18 PROCEDURE — 63600175 PHARM REV CODE 636 W HCPCS: Performed by: FAMILY MEDICINE

## 2023-07-18 PROCEDURE — 87186 SC STD MICRODIL/AGAR DIL: CPT | Performed by: FAMILY MEDICINE

## 2023-07-18 PROCEDURE — 93010 EKG 12-LEAD: ICD-10-PCS | Mod: ,,, | Performed by: SPECIALIST

## 2023-07-18 PROCEDURE — 36600 WITHDRAWAL OF ARTERIAL BLOOD: CPT

## 2023-07-18 PROCEDURE — 36415 COLL VENOUS BLD VENIPUNCTURE: CPT | Performed by: EMERGENCY MEDICINE

## 2023-07-18 PROCEDURE — 63600175 PHARM REV CODE 636 W HCPCS: Performed by: EMERGENCY MEDICINE

## 2023-07-18 RX ORDER — PREGABALIN 50 MG/1
50 CAPSULE ORAL NIGHTLY
COMMUNITY
Start: 2023-07-09

## 2023-07-18 RX ORDER — ASPIRIN 81 MG/1
81 TABLET ORAL DAILY
Status: DISCONTINUED | OUTPATIENT
Start: 2023-07-19 | End: 2023-07-19

## 2023-07-18 RX ORDER — INSULIN ASPART 100 [IU]/ML
1-10 INJECTION, SOLUTION INTRAVENOUS; SUBCUTANEOUS
Status: DISCONTINUED | OUTPATIENT
Start: 2023-07-18 | End: 2023-07-19

## 2023-07-18 RX ORDER — POTASSIUM CHLORIDE 7.45 MG/ML
10 INJECTION INTRAVENOUS
Status: COMPLETED | OUTPATIENT
Start: 2023-07-19 | End: 2023-07-19

## 2023-07-18 RX ORDER — DEXAMETHASONE SODIUM PHOSPHATE 4 MG/ML
8 INJECTION, SOLUTION INTRA-ARTICULAR; INTRALESIONAL; INTRAMUSCULAR; INTRAVENOUS; SOFT TISSUE
Status: COMPLETED | OUTPATIENT
Start: 2023-07-18 | End: 2023-07-18

## 2023-07-18 RX ORDER — OXYCODONE HYDROCHLORIDE 5 MG/1
5 TABLET ORAL EVERY 4 HOURS PRN
Status: DISCONTINUED | OUTPATIENT
Start: 2023-07-18 | End: 2023-07-19

## 2023-07-18 RX ORDER — LANOLIN ALCOHOL/MO/W.PET/CERES
400 CREAM (GRAM) TOPICAL ONCE
Status: COMPLETED | OUTPATIENT
Start: 2023-07-18 | End: 2023-07-18

## 2023-07-18 RX ORDER — NOREPINEPHRINE BITARTRATE/D5W 4MG/250ML
PLASTIC BAG, INJECTION (ML) INTRAVENOUS
Status: DISPENSED
Start: 2023-07-18 | End: 2023-07-19

## 2023-07-18 RX ORDER — ONDANSETRON 2 MG/ML
4 INJECTION INTRAMUSCULAR; INTRAVENOUS
Status: COMPLETED | OUTPATIENT
Start: 2023-07-18 | End: 2023-07-18

## 2023-07-18 RX ORDER — MEROPENEM AND SODIUM CHLORIDE 1 G/50ML
1 INJECTION, SOLUTION INTRAVENOUS
Status: DISCONTINUED | OUTPATIENT
Start: 2023-07-18 | End: 2023-07-18

## 2023-07-18 RX ORDER — ONDANSETRON 2 MG/ML
4 INJECTION INTRAMUSCULAR; INTRAVENOUS EVERY 6 HOURS PRN
Status: DISCONTINUED | OUTPATIENT
Start: 2023-07-18 | End: 2023-08-03 | Stop reason: HOSPADM

## 2023-07-18 RX ORDER — METFORMIN HYDROCHLORIDE 1000 MG/1
1000 TABLET ORAL 2 TIMES DAILY WITH MEALS
Status: ON HOLD | COMMUNITY
End: 2023-08-03 | Stop reason: HOSPADM

## 2023-07-18 RX ORDER — POLYETHYLENE GLYCOL 3350 17 G/17G
17 POWDER, FOR SOLUTION ORAL 2 TIMES DAILY PRN
Status: DISCONTINUED | OUTPATIENT
Start: 2023-07-18 | End: 2023-08-03 | Stop reason: HOSPADM

## 2023-07-18 RX ORDER — MORPHINE SULFATE 4 MG/ML
4 INJECTION, SOLUTION INTRAMUSCULAR; INTRAVENOUS EVERY 4 HOURS PRN
Status: DISCONTINUED | OUTPATIENT
Start: 2023-07-18 | End: 2023-07-19

## 2023-07-18 RX ORDER — IBUPROFEN 200 MG
24 TABLET ORAL
Status: DISCONTINUED | OUTPATIENT
Start: 2023-07-18 | End: 2023-08-03 | Stop reason: HOSPADM

## 2023-07-18 RX ORDER — NALOXONE HCL 0.4 MG/ML
0.02 VIAL (ML) INJECTION
Status: DISCONTINUED | OUTPATIENT
Start: 2023-07-18 | End: 2023-08-03 | Stop reason: HOSPADM

## 2023-07-18 RX ORDER — LEVOTHYROXINE SODIUM 75 UG/1
75 TABLET ORAL
COMMUNITY

## 2023-07-18 RX ORDER — ACETAMINOPHEN 325 MG/1
650 TABLET ORAL EVERY 8 HOURS PRN
Status: DISCONTINUED | OUTPATIENT
Start: 2023-07-18 | End: 2023-07-18

## 2023-07-18 RX ORDER — DIPHENHYDRAMINE HYDROCHLORIDE 50 MG/ML
25 INJECTION INTRAMUSCULAR; INTRAVENOUS
Status: COMPLETED | OUTPATIENT
Start: 2023-07-18 | End: 2023-07-18

## 2023-07-18 RX ORDER — SODIUM CHLORIDE 0.9 % (FLUSH) 0.9 %
10 SYRINGE (ML) INJECTION
Status: DISCONTINUED | OUTPATIENT
Start: 2023-07-18 | End: 2023-08-03 | Stop reason: HOSPADM

## 2023-07-18 RX ORDER — POTASSIUM CHLORIDE 20 MEQ/1
40 TABLET, EXTENDED RELEASE ORAL ONCE
Status: COMPLETED | OUTPATIENT
Start: 2023-07-18 | End: 2023-07-18

## 2023-07-18 RX ORDER — DULOXETIN HYDROCHLORIDE 30 MG/1
60 CAPSULE, DELAYED RELEASE ORAL DAILY
Status: DISCONTINUED | OUTPATIENT
Start: 2023-07-19 | End: 2023-07-19

## 2023-07-18 RX ORDER — MAGNESIUM SULFATE HEPTAHYDRATE 40 MG/ML
2 INJECTION, SOLUTION INTRAVENOUS ONCE
Status: COMPLETED | OUTPATIENT
Start: 2023-07-18 | End: 2023-07-18

## 2023-07-18 RX ORDER — LEVOTHYROXINE SODIUM 25 UG/1
75 TABLET ORAL
Status: DISCONTINUED | OUTPATIENT
Start: 2023-07-19 | End: 2023-08-03 | Stop reason: HOSPADM

## 2023-07-18 RX ORDER — GUANFACINE 1 MG/1
2 TABLET ORAL NIGHTLY
Status: DISCONTINUED | OUTPATIENT
Start: 2023-07-18 | End: 2023-07-19

## 2023-07-18 RX ORDER — LOSARTAN POTASSIUM 50 MG/1
100 TABLET ORAL DAILY
Status: DISCONTINUED | OUTPATIENT
Start: 2023-07-19 | End: 2023-07-18

## 2023-07-18 RX ORDER — SODIUM CHLORIDE 9 MG/ML
INJECTION, SOLUTION INTRAVENOUS CONTINUOUS
Status: DISCONTINUED | OUTPATIENT
Start: 2023-07-18 | End: 2023-07-19

## 2023-07-18 RX ORDER — METOPROLOL SUCCINATE 25 MG/1
25 TABLET, EXTENDED RELEASE ORAL DAILY
Status: DISCONTINUED | OUTPATIENT
Start: 2023-07-19 | End: 2023-07-18

## 2023-07-18 RX ORDER — AMOXICILLIN 250 MG
1 CAPSULE ORAL 2 TIMES DAILY PRN
Status: DISCONTINUED | OUTPATIENT
Start: 2023-07-18 | End: 2023-08-03 | Stop reason: HOSPADM

## 2023-07-18 RX ORDER — GLUCAGON 1 MG
1 KIT INJECTION
Status: DISCONTINUED | OUTPATIENT
Start: 2023-07-18 | End: 2023-07-19

## 2023-07-18 RX ORDER — HYDROCODONE BITARTRATE AND ACETAMINOPHEN 5; 325 MG/1; MG/1
1 TABLET ORAL EVERY 4 HOURS PRN
Status: DISCONTINUED | OUTPATIENT
Start: 2023-07-18 | End: 2023-07-18

## 2023-07-18 RX ORDER — AZELASTINE 1 MG/ML
1 SPRAY, METERED NASAL 2 TIMES DAILY
Status: DISCONTINUED | OUTPATIENT
Start: 2023-07-18 | End: 2023-07-19

## 2023-07-18 RX ORDER — ENOXAPARIN SODIUM 100 MG/ML
40 INJECTION SUBCUTANEOUS EVERY 24 HOURS
Status: DISCONTINUED | OUTPATIENT
Start: 2023-07-18 | End: 2023-07-22

## 2023-07-18 RX ORDER — IPRATROPIUM BROMIDE AND ALBUTEROL SULFATE 2.5; .5 MG/3ML; MG/3ML
3 SOLUTION RESPIRATORY (INHALATION) EVERY 4 HOURS PRN
Status: DISCONTINUED | OUTPATIENT
Start: 2023-07-18 | End: 2023-07-25

## 2023-07-18 RX ORDER — IBUPROFEN 200 MG
16 TABLET ORAL
Status: DISCONTINUED | OUTPATIENT
Start: 2023-07-18 | End: 2023-08-03 | Stop reason: HOSPADM

## 2023-07-18 RX ORDER — HYDRALAZINE HYDROCHLORIDE 20 MG/ML
10 INJECTION INTRAMUSCULAR; INTRAVENOUS EVERY 4 HOURS PRN
Status: DISCONTINUED | OUTPATIENT
Start: 2023-07-18 | End: 2023-07-20

## 2023-07-18 RX ORDER — PREGABALIN 25 MG/1
50 CAPSULE ORAL NIGHTLY
Status: DISCONTINUED | OUTPATIENT
Start: 2023-07-18 | End: 2023-07-18

## 2023-07-18 RX ORDER — TALC
6 POWDER (GRAM) TOPICAL NIGHTLY PRN
Status: DISCONTINUED | OUTPATIENT
Start: 2023-07-18 | End: 2023-07-19

## 2023-07-18 RX ORDER — PREGABALIN 25 MG/1
50 CAPSULE ORAL NIGHTLY
Status: DISCONTINUED | OUTPATIENT
Start: 2023-07-19 | End: 2023-07-18

## 2023-07-18 RX ORDER — SIMETHICONE 80 MG
1 TABLET,CHEWABLE ORAL 4 TIMES DAILY PRN
Status: DISCONTINUED | OUTPATIENT
Start: 2023-07-18 | End: 2023-08-03 | Stop reason: HOSPADM

## 2023-07-18 RX ORDER — LORAZEPAM 2 MG/ML
1 INJECTION INTRAMUSCULAR
Status: COMPLETED | OUTPATIENT
Start: 2023-07-18 | End: 2023-07-18

## 2023-07-18 RX ORDER — HYDRALAZINE HYDROCHLORIDE 20 MG/ML
5 INJECTION INTRAMUSCULAR; INTRAVENOUS EVERY 4 HOURS PRN
Status: DISCONTINUED | OUTPATIENT
Start: 2023-07-18 | End: 2023-07-20

## 2023-07-18 RX ADMIN — DIPHENHYDRAMINE HYDROCHLORIDE 25 MG: 50 INJECTION, SOLUTION INTRAMUSCULAR; INTRAVENOUS at 12:07

## 2023-07-18 RX ADMIN — LORAZEPAM 1 MG: 2 INJECTION INTRAMUSCULAR; INTRAVENOUS at 01:07

## 2023-07-18 RX ADMIN — ONDANSETRON 4 MG: 2 INJECTION INTRAMUSCULAR; INTRAVENOUS at 12:07

## 2023-07-18 RX ADMIN — MAGNESIUM SULFATE HEPTAHYDRATE 2 G: 40 INJECTION, SOLUTION INTRAVENOUS at 08:07

## 2023-07-18 RX ADMIN — MEROPENEM 1 G: 1 INJECTION, POWDER, FOR SOLUTION INTRAVENOUS at 09:07

## 2023-07-18 RX ADMIN — Medication 400 MG: at 03:07

## 2023-07-18 RX ADMIN — DEXAMETHASONE SODIUM PHOSPHATE 4 MG: 4 INJECTION, SOLUTION INTRA-ARTICULAR; INTRALESIONAL; INTRAMUSCULAR; INTRAVENOUS; SOFT TISSUE at 12:07

## 2023-07-18 RX ADMIN — OXYCODONE HYDROCHLORIDE 5 MG: 5 TABLET ORAL at 09:07

## 2023-07-18 RX ADMIN — IOHEXOL 100 ML: 350 INJECTION, SOLUTION INTRAVENOUS at 02:07

## 2023-07-18 RX ADMIN — POTASSIUM CHLORIDE 40 MEQ: 1500 TABLET, EXTENDED RELEASE ORAL at 03:07

## 2023-07-18 RX ADMIN — SODIUM CHLORIDE: 0.9 INJECTION, SOLUTION INTRAVENOUS at 08:07

## 2023-07-18 RX ADMIN — ENOXAPARIN SODIUM 40 MG: 100 INJECTION SUBCUTANEOUS at 08:07

## 2023-07-18 NOTE — H&P
[facility]   History & Physical     Patient Name: Chanel Cervantes  MRN: [id number]  Admission Date: 7/18/2023 11:08 AM  Attending Physician: Sandra Godinez MD  Face-to-Face encounter date: 07/18/2023 4:28 PM    Patient information was obtained from patient, past medical records, ER physician, and ER records.     HISTORY OF PRESENT ILLNESS:     Chanel Cervantes is a 67 y.o. White female   With PMH of DM2, HTN, bariatric surgery,  Remote cholecystectomy,  Frequent UTIs with kidney stones,  who presents with back pain.  Admitted for pancreatitis with elevated LFTs    Pt is currently confused after receiving ativan  (ER gave it to calm her for CT scan)  History taken from family at bedside    Onset of back pain overnight  Located b/l lumbar region  Radiating to b/l upper quadrants of abdomen  Occurring intermittently  Progressively worsening  Severe, causes SOB when occurring    Initially pt thought pain was due to a fall yesterday  (Mechanical, tripped over a rug, no head trauma)  +nausea, no vomiting  +intermittent chills  They are not sure about objective fever    Has had similar abdominal pain previously  Per family, this has been attributed to her ongoing ventral wall hernia  They don't bring her to ER for this usually  However pain today was much more severe than usual    REVIEW OF SYSTEMS:     All systems reviewed and are negative except as noted per above.    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    Sleep apnea     Sleep apnea 03/01/2019    Sleep Right        PAST SURGICAL HISTORY:     Past Surgical History:   Procedure Laterality Date    ANGIOGRAM, CORONARY,  "WITH LEFT HEART CATHETERIZATION      APPENDECTOMY      BARIATRIC SURGERY  2019    Dr Nunez    CARPAL TUNNEL RELEASE Bilateral 2002     SECTION      CHOLECYSTECTOMY      COLONOSCOPY      CORONARY ANGIOPLASTY WITH STENT PLACEMENT      CORONARY ARTERY BYPASS GRAFT      EPIDURAL STEROID INJECTION INTO LUMBAR SPINE      x2    ESOPHAGOGASTRODUODENOSCOPY      HERNIA REPAIR  2019    HYSTERECTOMY      THYROID LOBECTOMY Right 2021    Procedure: LOBECTOMY, THYROID;  Surgeon: Mari Chance MD;  Location: [facility];  Service: General;  Laterality: Right;       ALLERGIES:   Adhesive tape-silicones, Dye, Cephalexin, Iodine, Penicillins, Pneumococcal vaccine, and Iodinated contrast media    FAMILY HISTORY:     Family History   Problem Relation Age of Onset    Diabetes Mother     Vision loss Mother     Heart disease Father     Vision loss Father     Stroke Father        SOCIAL HISTORY:     Social History     Tobacco Use    Smoking status: Former     Packs/day: 1.00     Years: 15.00     Pack years: 15.00     Types: Cigarettes     Quit date:      Years since quittin.    Smokeless tobacco: Never   Substance Use Topics    Alcohol use: No     Comment: "maybe once a year"        Social History     Substance and Sexual Activity   Sexual Activity Not Currently        HOME MEDICATIONS:     Prior to Admission medications    Medication Sig Start Date End Date Taking? Authorizing Provider   amLODIPine (NORVASC) 2.5 MG tablet Take 1 tablet (2.5 mg total) by mouth once daily. 23  Yes ARIC Marte   azelastine (ASTELIN) 137 mcg (0.1 %) nasal spray 1 spray (137 mcg total) by Nasal route 2 (two) times daily. 23  Yes ARIC Marte   calcium carbonate-vitamin D3 600 mg-62.5 mcg (2,500 unit) Cap calcium carbonate 600 mg-vitamin D3 62.5 mcg (2,500 unit) capsule   Take by oral route.   Yes Historical Provider   DULoxetine (CYMBALTA) 60 MG capsule Take 1 capsule (60 mg total) by mouth once " daily. 6/2/23  Yes ARIC Marte   empaglifloz-linaglip-metformin (TRIJARDY XR) 25-5-1,000 mg TBph Take 1 tablet by mouth once daily. 2/16/23  Yes ARIC Jimenez   guanFACINE (TENEX) 2 MG tablet Take 1 tablet (2 mg total) by mouth nightly. 8/5/22  Yes ARIC Jimenez   insulin degludec (TRESIBA FLEXTOUCH U-200) 200 unit/mL (3 mL) insulin pen Inject 76 Units into the skin once daily. 2/16/23 2/16/24 Yes ARIC Jimenez   iron-vitamin C 100-250 mg, ICAR-C, 100-250 mg Tab Take 1 tablet by mouth once daily. 4/13/23  Yes Mauricio Jackson MD   levothyroxine (SYNTHROID) 75 MCG tablet Take 75 mcg by mouth before breakfast.   Yes Historical Provider   metFORMIN (GLUCOPHAGE) 1000 MG tablet Take 1,000 mg by mouth 2 (two) times daily with meals.   Yes Historical Provider   metOLazone (ZAROXOLYN) 5 MG tablet Take 1 tablet (5 mg total) by mouth once daily. 7/3/23  Yes Bunny Hallman NP   metoprolol succinate (TOPROL-XL) 25 MG 24 hr tablet Take 1 tablet (25 mg total) by mouth once daily. 6/2/23  Yes ARIC Marte   multivitamin capsule Take 1 capsule by mouth once daily.   Yes Historical Provider   olmesartan (BENICAR) 40 MG tablet Take 1 tablet (40 mg total) by mouth once daily. 2/16/23 2/16/24 Yes ARIC Jimenez   potassium chloride (KLOR-CON) 10 MEQ TbSR Take 1 tablet (10 mEq total) by mouth once daily. 6/2/23  Yes ARIC Marte   pregabalin (LYRICA) 50 MG capsule Take 50 mg by mouth every evening. 7/9/23  Yes Historical Provider   rosuvastatin (CRESTOR) 40 MG Tab Take 1 tablet (40 mg total) by mouth every evening. 2/16/23  Yes Wen J. Nastasi, FNP   aspirin (ECOTRIN) 81 MG EC tablet Take 1 tablet (81 mg total) by mouth once daily. 8/8/21 2/16/23  Keny Alejandra, DO   blood sugar diagnostic Strp One Touch Ultra Blue Test strips, Use l strip to check glucose 4 times daily 4/6/22   Wen Frazier, GISELP   blood-glucose meter kit Use as instructed 11/28/18  "2/16/23  Mindy Funk MD   cyanocobalamin 1,000 mcg/mL injection Inject 1 mL (1,000 mcg total) into the skin every 30 days. 4/13/23   Mauricio Jackson MD   lancets (ONETOUCH DELICA LANCETS) 33 gauge Misc USE 1  TO CHECK GLUCOSE 4 TIMES DAILY 12/30/19   Mindy Funk MD   magnesium oxide (MAG-OX) 400 mg (241.3 mg magnesium) tablet Take 1 tablet (400 mg total) by mouth once daily. 2/16/23   ARIC Jimenez   NYSTOP powder APPLY TO AFFECTED AREA AS NEEDED 4/29/21   Historical Provider   prednisoLONE acetate (PRED FORTE) 1 % DrpS Place 1 drop into both eyes as needed.  9/6/19   Historical Provider   calcium carbonate (OS-RYAN) 600 mg calcium (1,500 mg) Tab Take 600 mg by mouth once.  7/18/23  Historical Provider   FEROSUL 325 mg (65 mg iron) Tab tablet Take by mouth. 11/6/22 7/18/23  Historical Provider   levothyroxine (SYNTHROID) 75 MCG tablet Take 75 mcg by mouth before breakfast.  7/18/23  Historical Provider   metFORMIN (GLUCOPHAGE) 1000 MG tablet Take 1 tablet (1,000 mg total) by mouth daily with dinner or evening meal. 2/16/23 7/18/23  ARIC Jimenez       PHYSICAL EXAM:     BP (!) 152/99   Pulse 83   Temp 98.9 °F (37.2 °C) (Oral)   Resp (!) 24   Ht 5' 7" (1.702 m)   Wt 108.9 kg (240 lb)   SpO2 97%   BMI 37.59 kg/m²     Gen: somnolent but easily arousable (recently received ativan)  HEENT:  Eyes - no pallor  External ears with no lesions  Nares patent  Mouth/Throat:  trachea midline  CV: RRR  Lungs: CTA B/L  Abd: +BS, soft, NT currently on my exam however pt is very sedated, ND  Ext: no atrophy or edema  Skin: warm, dry  Neuro: somnolent but easily arousable  Psych: calm    LABS AND IMAGING:     Labs Reviewed   CBC W/ AUTO DIFFERENTIAL - Abnormal; Notable for the following components:       Result Value    WBC 1.17 (*)     MCH 26.5 (*)     MCHC 31.7 (*)     RDW 15.2 (*)     Gran % 87.0 (*)     Lymph % 4.0 (*)     Mono % 1.0 (*)     All other components within normal limits    Narrative:  "    wbc critical result(s) repeated. Called and verbal readback obtained   from Himanshu Mendoza RN by LS1 07/18/2023 12:49   COMPREHENSIVE METABOLIC PANEL - Abnormal; Notable for the following components:    Potassium 3.1 (*)     Glucose 161 (*)     Total Bilirubin 1.8 (*)     Alkaline Phosphatase 217 (*)     AST 4,943 (*)     ALT 1,475 (*)     eGFR 55.1 (*)     All other components within normal limits   MAGNESIUM - Abnormal; Notable for the following components:    Magnesium 1.5 (*)     All other components within normal limits   URINALYSIS, REFLEX TO URINE CULTURE - Abnormal; Notable for the following components:    Protein, UA 1+ (*)     Glucose, UA 4+ (*)     Ketones, UA Trace (*)     Occult Blood UA 1+ (*)     Urobilinogen, UA 2.0-3.0 (*)     Leukocytes, UA Trace (*)     All other components within normal limits    Narrative:     Specimen Source->Urine   LIPASE - Abnormal; Notable for the following components:    Lipase 1363 (*)     All other components within normal limits   URINALYSIS MICROSCOPIC - Abnormal; Notable for the following components:    RBC, UA 6 (*)     WBC, UA 6 (*)     Hyaline Casts, UA 5 (*)     All other components within normal limits    Narrative:     Specimen Source->Urine   CULTURE, BLOOD   CULTURE, BLOOD   B-TYPE NATRIURETIC PEPTIDE   TROPONIN I HIGH SENSITIVITY   CK   CK   HEPATITIS PANEL, ACUTE   HEMOGLOBIN A1C   POCT GLUCOSE, HAND-HELD DEVICE   POCT CREATININE       Imaging Results              US Abdomen Limited (In process)                      CT Abdomen Pelvis With Contrast (Final result)  Result time 07/18/23 14:45:54      Final result by Benjy Woodson MD (07/18/23 14:45:54)                   Narrative:    CMS MANDATED QUALITY DATA - CT RADIATION - 436    All CT scans at this facility utilize dose modulation, iterative reconstruction, and/or weight based dosing when appropriate to reduce radiation dose to as low as reasonably achievable.        Reason: Nausea/vomiting;  Abdominal pain, acute, nonlocalized    TECHNIQUE: CT abdomen and pelvis with 100 mL Omnipaque 350.    COMPARISON: CT abdomen pelvis July 5, 2017.    FINDINGS:    There is subsegmental atelectasis of the lung bases. Heart size is normal.    The liver is normal size. No hepatic lesions identified. Gallbladder is been removed and there is postsurgical dilatation of the common bile duct and intrahepatic bile ducts. Pancreas, spleen and adrenal glands are unremarkable.    The kidneys are normal size. There is no hydronephrosis or nephrolithiasis. Exophytic hyperattenuating structure of the mid left kidney measures 1.7 cm. The ureters are normal caliber without obstructions. Bladder is fluid-filled with no focal wall abnormalities. The uterus and adnexal structures are identified. As noted.    There is diverticulosis of the colon. The appendix is been removed. Large and small bowel are normal caliber. There is no bowel wall thickening or inflammatory changes.    There is a broad fat filled ventral abdominal wall hernia measuring approximately 15 x 13 cm. Herniated loops of large and small bowel are noted with no evidence of obstruction or strangulation.    There is atherosclerosis of the abdominal aorta. There is no intra-abdominal lymphadenopathy. No mesenteric fat stranding or free fluid. There are degenerative changes of the spine. No acute osseous abnormality.    IMPRESSION:    1.  Status post cholecystectomy with postsurgical dilatation of the common bile duct and intrahepatic ducts. Should be correlated with bilirubin levels.  2.  Broad-based ventral abdominal wall hernia measures approximately 15 x 13 cm with herniated loops of large and small bowel. No evidence of obstruction.  3.  Exophytic hypoattenuating structure in the mid left kidney is felt to reflect a hemorrhagic cyst.    Electronically signed by:  Benjy Woodson DO  7/18/2023 2:45 PM CDT Workstation: [id number]                                     CTA  Chest Non-Coronary (PE Studies) (Final result)  Result time 07/18/23 14:56:14      Final result by Roger Cardona MD (07/18/23 14:56:14)                   Narrative:    CMS MANDATED QUALITY DATA-CT RADIATION DOSE-436  All CT scans at this facility use dose modulation, iterative reconstruction, and or weight-based dosing when appropriate to reduce radiation dose to as low as reasonably achievable.    HISTORY: Pulmonary embolism (PE) suspected, high prob  dyspnea, back pain.    FINDINGS: Thin axial imaging through the chest was performed with 100 mL Omnipaque 350 IV contrast, with sagittal and coronal reformatted images and MIP reconstructions performed, and images stored in the patient's permanent electronic medical record.    Comparison to multiple prior exams. There are no pulmonary arterial filling defects to suggest pulmonary thromboembolism. The central pulmonary arteries are normal in caliber.    Diffuse calcified plaque involves normal caliber thoracic aorta, with no aortic dissection or evidence of aortic intramural hematoma. The heart is enlarged, with extensive coronary arterial calcifications, and no pericardial effusion. No enlarged mediastinal or hilar lymph nodes. There is a moderate-sized sliding-type hiatal hernia.    The lungs are normally expanded, with scattered reticulonodular and groundglass densities in both lower lobes, left greater than right, nonspecific. There is a 7 mm left upper lobe noncalcified pulmonary nodule image 112 series 4, nonspecific. No pleural effusion, evidence of pulmonary edema, or pneumothorax.    There are no acute thoracic spine fractures or other acute fractures, with intervertebral disc space narrowing in the spine with osteophytes.    IMPRESSION:  1. Negative for pulmonary thromboembolism.  2. Scattered lower lung interstitial opacities left greater than right, potentially subsegmental atelectasis and or small airways inflammation.  3. A 7 mm left upper lobe  noncalcified pulmonary nodule, nonspecific. Pulmonary neoplasm is not excluded. A follow-up noncontrast chest CT in 6 months is recommended, per Fleischner Society Recommendations. Reference:  Radiology. 2017 Jul; 284(1):228-243.  4. Cardiomegaly, with aortic and coronary arterial calcifications.  5. Hiatal hernia.    Electronically signed by:  Roger Cardona MD  7/18/2023 2:56 PM CDT Workstation: [id number]                                    Labs and images reviewed personally by me.  See my personal assessment/interpretation of results below:    ASSESSMENT & PLAN:   Chanel Cervantes is a 67 y.o. female admitted for    Active Hospital Problems    Diagnosis  POA    *Pancreatitis [K85.90]  Yes    Abnormal LFTs [R79.89]  Yes    S/P bariatric surgery [Z98.84]  Not Applicable    NIKOLAS on CPAP [G47.33, Z99.89]  Not Applicable    CAD (coronary artery disease) [I25.10]  Yes    Benign essential hypertension [I10]  Yes    Type 2 diabetes mellitus treated with insulin [E11.9, Z79.4]  Not Applicable      Resolved Hospital Problems   No resolved problems to display.        Plan    Acute pancreatitis with elevated LFTs  Dilated CBD on CT  Hypokalemia  - cautious IVFs due to CHF  - empiric merrem (PCN allergy)   - follow cultures  - RUQ US  - potassium replacement  - trending CBC, CMP, lipase  - acute hepatitis panel in progress  - GI consulted, thank you    NIKOLAS  - no longer on CPAP since bariatric surgery / weight loss  - however is de-satting while asleep here  - supplemental oxygen, wean as tolerated  - NIKOLAS follow-up outpt    Chronic conditions as noted above/below; home medications reviewed personally by me and restarted as appropriate  Electrolyte derangement:  Trending BMP; Mg; replacement prn  DVT ppx: lovenox   FULL CODE      - The above conditions include an acute and/or chronic illness that poses a threat to life (or bodily function).  - Previous notes/encounters/external records reviewed personally by me.  - Pt's case  personally discussed with another physician:  ER physician    Sandra Godinez MD  [facility] Hospitalist  07/18/2023

## 2023-07-18 NOTE — ED PROVIDER NOTES
"Encounter Date: 7/18/2023       History     Chief Complaint   Patient presents with    Fall     Denies hitting head or LOC, hit right shoulder    Shortness of Breath    Back Pain     67-year-old female with past medical history diabetes, hypertension, MI, sleep apnea, morbid obesity, chronic anemia, presents emergency department with a fall, left shoulder pain, shortness of breath, back pain.  Patient says that she had a trip and fall over rug in her house, got "twisted up" and moved her body in ways it normally does not move and has had pain mostly in her left shoulder and low back ever since.  Said that she got up at 3 in the morning to go to the restroom and had severe pain in her lower back.  Said that she also felt short of breath.  She said that the pain radiated up from her lower back all the way up into her shoulders.  She said that it made her nauseous.  She did feel some associated shortness of breath.  She did not have any associated chest pain.  No urinary complaints such as frequency urgency burning or any hematuria.  She says that she took 800 mg of ibuprofen but it did not help so she decided to come here.    Review of patient's allergies indicates:   Allergen Reactions    Adhesive tape-silicones Rash    Dye Hives    Cephalexin     Iodine     Penicillins Edema    Pneumococcal vaccine     Iodinated contrast media Rash     Past Medical History:   Diagnosis Date    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 1/24/2021    Anemia, chronic disease 1/24/2021    CAD (coronary artery disease)     Diabetes mellitus, type 2     Hypertension     Iron deficiency anemia following bariatric surgery 1/24/2021    MI (myocardial infarction)     Secondary thrombocytosis 1/24/2021    Sleep apnea     Sleep apnea 03/01/2019    Sleep Right      Past Surgical History:   Procedure Laterality Date    ANGIOGRAM, CORONARY, WITH LEFT HEART CATHETERIZATION      APPENDECTOMY      BARIATRIC SURGERY  04/24/2019    Dr Nunez    CARPAL TUNNEL " "RELEASE Bilateral 2002     SECTION      CHOLECYSTECTOMY      COLONOSCOPY      CORONARY ANGIOPLASTY WITH STENT PLACEMENT      CORONARY ARTERY BYPASS GRAFT      EPIDURAL STEROID INJECTION INTO LUMBAR SPINE      x2    ESOPHAGOGASTRODUODENOSCOPY      HERNIA REPAIR  2019    HYSTERECTOMY      THYROID LOBECTOMY Right 2021    Procedure: LOBECTOMY, THYROID;  Surgeon: Mari Chance MD;  Location: [facility];  Service: General;  Laterality: Right;     Family History   Problem Relation Age of Onset    Diabetes Mother     Vision loss Mother     Heart disease Father     Vision loss Father     Stroke Father      Social History     Tobacco Use    Smoking status: Former     Packs/day: 1.00     Years: 15.00     Pack years: 15.00     Types: Cigarettes     Quit date:      Years since quittin.5    Smokeless tobacco: Never   Substance Use Topics    Alcohol use: No     Comment: "maybe once a year"    Drug use: No     Review of Systems   Constitutional:  Negative for chills and fever.   HENT:  Negative for sore throat.    Respiratory:  Positive for shortness of breath. Negative for cough and chest tightness.    Cardiovascular:  Negative for chest pain and palpitations.   Gastrointestinal:  Positive for abdominal pain, nausea and vomiting.   Genitourinary:  Negative for dysuria, flank pain and urgency.   Musculoskeletal:  Positive for arthralgias, back pain and myalgias. Negative for neck pain.   Skin:  Negative for rash.   Neurological:  Negative for seizures, facial asymmetry, weakness and headaches.   Hematological:  Does not bruise/bleed easily.   All other systems reviewed and are negative.    Physical Exam     Initial Vitals   BP Pulse Resp Temp SpO2   23 1112 23 1111 23 1111 23 1111 23 1112   (!) 152/99 83 18 98 °F (36.7 °C) 97 %      MAP       --                Physical Exam    Nursing note and vitals reviewed.  Constitutional: She appears well-developed and well-nourished. " She is Obese . No distress.   HENT:   Head: Normocephalic and atraumatic.   Mouth/Throat: No oropharyngeal exudate.   Eyes: Conjunctivae and EOM are normal. Pupils are equal, round, and reactive to light.   Neck: Neck supple. No tracheal deviation present.   Normal range of motion.  Cardiovascular:  Normal rate, regular rhythm, normal heart sounds and intact distal pulses.           No murmur heard.  Pulmonary/Chest: Breath sounds normal. No stridor. No respiratory distress. She has no wheezes. She has no rhonchi. She has no rales.   Abdominal: Abdomen is soft. She exhibits no distension. There is no abdominal tenderness.   Ventral abdominal wall hernia, easily reducible. There is no rebound.   Musculoskeletal:         General: No tenderness or edema. Normal range of motion.      Cervical back: Normal range of motion and neck supple.      Comments: Lumbar spine:  There is no midline tenderness to palpation, paraspinal tenderness in the upper lumbar spinal region left and right side bilaterally.    Thoracic spine:  No midline tenderness to palpation     Neurological: She is alert and oriented to person, place, and time. She has normal strength. No cranial nerve deficit or sensory deficit.   Skin: Skin is warm and dry. Capillary refill takes less than 2 seconds. No rash noted. No erythema. There is pallor.   Psychiatric: She has a normal mood and affect. Thought content normal.       ED Course   Procedures  Labs Reviewed   CBC W/ AUTO DIFFERENTIAL - Abnormal; Notable for the following components:       Result Value    WBC 1.17 (*)     MCH 26.5 (*)     MCHC 31.7 (*)     RDW 15.2 (*)     Gran % 87.0 (*)     Lymph % 4.0 (*)     Mono % 1.0 (*)     All other components within normal limits    Narrative:     wbc critical result(s) repeated. Called and verbal readback obtained   from Himanshu Mendoza RN by LS1 07/18/2023 12:49   COMPREHENSIVE METABOLIC PANEL - Abnormal; Notable for the following components:    Potassium 3.1  (*)     Glucose 161 (*)     Total Bilirubin 1.8 (*)     Alkaline Phosphatase 217 (*)     AST 4,943 (*)     ALT 1,475 (*)     eGFR 55.1 (*)     All other components within normal limits   MAGNESIUM - Abnormal; Notable for the following components:    Magnesium 1.5 (*)     All other components within normal limits   URINALYSIS, REFLEX TO URINE CULTURE - Abnormal; Notable for the following components:    Protein, UA 1+ (*)     Glucose, UA 4+ (*)     Ketones, UA Trace (*)     Occult Blood UA 1+ (*)     Urobilinogen, UA 2.0-3.0 (*)     Leukocytes, UA Trace (*)     All other components within normal limits    Narrative:     Specimen Source->Urine   LIPASE - Abnormal; Notable for the following components:    Lipase 1363 (*)     All other components within normal limits   URINALYSIS MICROSCOPIC - Abnormal; Notable for the following components:    RBC, UA 6 (*)     WBC, UA 6 (*)     Hyaline Casts, UA 5 (*)     All other components within normal limits    Narrative:     Specimen Source->Urine   B-TYPE NATRIURETIC PEPTIDE   TROPONIN I HIGH SENSITIVITY   CK   CK   HEPATITIS PANEL, ACUTE   POCT CREATININE        ECG Results              EKG 12-lead (In process)  Result time 07/18/23 11:34:32      In process by Interface, Lab In [facility] (07/18/23 11:34:32)                   Narrative:    Test Reason : W19.XXXA,    Vent. Rate : 073 BPM     Atrial Rate : 073 BPM     P-R Int : 154 ms          QRS Dur : 162 ms      QT Int : 426 ms       P-R-T Axes : 069 -17 -11 degrees     QTc Int : 469 ms    Sinus rhythm with Premature ventricular complexes or Fusion complexes  Right bundle branch block  Minimal voltage criteria for LVH, may be normal variant ( R in aVL )  Abnormal ECG  When compared with ECG of 07-AUG-2021 22:34,  Fusion complexes are now Present  Premature ventricular complexes are now Present    Referred By: [email or web address]   SELF           Confirmed By:                                   Imaging Results              CT Abdomen Pelvis  With Contrast (Final result)  Result time 07/18/23 14:45:54      Final result by Benjy Woodson MD (07/18/23 14:45:54)                   Narrative:    CMS MANDATED QUALITY DATA - CT RADIATION - 436    All CT scans at this facility utilize dose modulation, iterative reconstruction, and/or weight based dosing when appropriate to reduce radiation dose to as low as reasonably achievable.        Reason: Nausea/vomiting; Abdominal pain, acute, nonlocalized    TECHNIQUE: CT abdomen and pelvis with 100 mL Omnipaque 350.    COMPARISON: CT abdomen pelvis July 5, 2017.    FINDINGS:    There is subsegmental atelectasis of the lung bases. Heart size is normal.    The liver is normal size. No hepatic lesions identified. Gallbladder is been removed and there is postsurgical dilatation of the common bile duct and intrahepatic bile ducts. Pancreas, spleen and adrenal glands are unremarkable.    The kidneys are normal size. There is no hydronephrosis or nephrolithiasis. Exophytic hyperattenuating structure of the mid left kidney measures 1.7 cm. The ureters are normal caliber without obstructions. Bladder is fluid-filled with no focal wall abnormalities. The uterus and adnexal structures are identified. As noted.    There is diverticulosis of the colon. The appendix is been removed. Large and small bowel are normal caliber. There is no bowel wall thickening or inflammatory changes.    There is a broad fat filled ventral abdominal wall hernia measuring approximately 15 x 13 cm. Herniated loops of large and small bowel are noted with no evidence of obstruction or strangulation.    There is atherosclerosis of the abdominal aorta. There is no intra-abdominal lymphadenopathy. No mesenteric fat stranding or free fluid. There are degenerative changes of the spine. No acute osseous abnormality.    IMPRESSION:    1.  Status post cholecystectomy with postsurgical dilatation of the common bile duct and intrahepatic ducts. Should be correlated  with bilirubin levels.  2.  Broad-based ventral abdominal wall hernia measures approximately 15 x 13 cm with herniated loops of large and small bowel. No evidence of obstruction.  3.  Exophytic hypoattenuating structure in the mid left kidney is felt to reflect a hemorrhagic cyst.    Electronically signed by:  Benjy Woodson DO  7/18/2023 2:45 PM CDT Workstation: [id number]                                     CTA Chest Non-Coronary (PE Studies) (Final result)  Result time 07/18/23 14:56:14      Final result by Roger Cardona MD (07/18/23 14:56:14)                   Narrative:    CMS MANDATED QUALITY DATA-CT RADIATION DOSE-436  All CT scans at this facility use dose modulation, iterative reconstruction, and or weight-based dosing when appropriate to reduce radiation dose to as low as reasonably achievable.    HISTORY: Pulmonary embolism (PE) suspected, high prob  dyspnea, back pain.    FINDINGS: Thin axial imaging through the chest was performed with 100 mL Omnipaque 350 IV contrast, with sagittal and coronal reformatted images and MIP reconstructions performed, and images stored in the patient's permanent electronic medical record.    Comparison to multiple prior exams. There are no pulmonary arterial filling defects to suggest pulmonary thromboembolism. The central pulmonary arteries are normal in caliber.    Diffuse calcified plaque involves normal caliber thoracic aorta, with no aortic dissection or evidence of aortic intramural hematoma. The heart is enlarged, with extensive coronary arterial calcifications, and no pericardial effusion. No enlarged mediastinal or hilar lymph nodes. There is a moderate-sized sliding-type hiatal hernia.    The lungs are normally expanded, with scattered reticulonodular and groundglass densities in both lower lobes, left greater than right, nonspecific. There is a 7 mm left upper lobe noncalcified pulmonary nodule image 112 series 4, nonspecific. No pleural effusion, evidence of  pulmonary edema, or pneumothorax.    There are no acute thoracic spine fractures or other acute fractures, with intervertebral disc space narrowing in the spine with osteophytes.    IMPRESSION:  1. Negative for pulmonary thromboembolism.  2. Scattered lower lung interstitial opacities left greater than right, potentially subsegmental atelectasis and or small airways inflammation.  3. A 7 mm left upper lobe noncalcified pulmonary nodule, nonspecific. Pulmonary neoplasm is not excluded. A follow-up noncontrast chest CT in 6 months is recommended, per Fleischner Society Recommendations. Reference:  Radiology. 2017 Jul; 284(1):228-243.  4. Cardiomegaly, with aortic and coronary arterial calcifications.  5. Hiatal hernia.    Electronically signed by:  Roger Cardona MD  7/18/2023 2:56 PM CDT Workstation: [id number]                                     Medications   piperacillin-tazobactam 4.5 g in dextrose 5 % 100 mL IVPB (ready to mix) (has no administration in time range)   LORazepam injection 1 mg (1 mg Intravenous Given 7/18/23 1301)   ondansetron injection 4 mg (4 mg Intravenous Given 7/18/23 1258)   diphenhydrAMINE injection 25 mg (25 mg Intravenous Given 7/18/23 1259)   dexAMETHasone injection 8 mg (4 mg Intravenous Given 7/18/23 1259)   magnesium oxide tablet 400 mg (400 mg Oral Given 7/18/23 1527)   potassium chloride SA CR tablet 40 mEq (40 mEq Oral Given 7/18/23 1527)   iohexoL (OMNIPAQUE 350) injection 100 mL (100 mLs Intravenous Given 7/18/23 1428)     Medical Decision Making:   Differential Diagnosis:   67-year-old female presents emergency department with back pain, fall, nausea, not feeling well.  Patient in the emergency department with markedly abnormal LFTs.  Patient has an elevated bilirubin as well.  Concern for possible early cholangitis versus possible early choledocholithiasis.  Patient does not drink alcohol, doubt any alcoholic pancreatitis.  Patient could have underlying malignancy as well with  her leukopenia.  I did send hepatitis panel to rule out any hepatitis as a cause of this.  I also questioned the patient regarding Tylenol use and she says she is not been using Tylenol.  She is not eaten any wild mushrooms either.  Patient will need to be admitted to the hospitalist for further care and evaluation of symptoms.  I have consulted GI but she is in a procedure and has yet to call back.  Patient will be admitted to Hospital Medicine.  I did give patient IV Zosyn in anticipation of any early cholangitis    A dictation software program was used for this note.  Please expect some simple typographical  errors in this note.   Clinical Tests:   Lab Tests: Reviewed and Ordered  Radiological Study: Ordered and Reviewed  Medical Tests: Ordered and Reviewed          Attending Attestation:             Attending ED Notes:   Attending Critical Care:   Critical Care Times:   Direct Patient Care (initial evaluation, reassessments, and time considering the case)................................................................10 minutes.   Additional History from reviewing old medical records or taking additional history from the family, EMS, PCP, etc.......................10 minutes.   Ordering, Reviewing, and Interpreting Diagnostic Studies...............................................................................................................10 minutes.   Documentation..................................................................................................................................................................................5 minutes.   ==============================================================  · Total Critical Care Time - exclusive of procedural time: 35 minutes.  ==============================================================  Critical care was necessary to treat or prevent imminent or life-threatening deterioration of the following conditions:  Cholangitis, abnormal LFTs.   Critical  care was time spent personally by me on the following activities: obtaining history from patient or relative, examination of patient, review of x-rays / CT sent with the patient, ordering lab, x-rays, and/or EKG, development of treatment plan with patient or relative, ordering and performing treatments and interventions, interpretation of cardiac measurements and re-evaluation of patient's conition.   Critical Care Condition: potentially life-threatening       ED Course as of 07/18/23 1556   e Jul 18, 2023   1230 EKG 11:24 a.m. normal sinus rhythm rate of 73, right bundle branch block, left ventricular hypertrophy.  No ST elevation or depression.  No STEMI.  EKG interpreted independently by me. [JR]      ED Course User Index  [JR] Kana Chandra DO                 Clinical Impression:   Final diagnoses:  [W19.XXXA] Fall  [R79.89] Abnormal LFTs (Primary)  [K85.80] Other acute pancreatitis, unspecified complication status  [K80.51] Calculus of bile duct without cholecystitis with obstruction  [R91.1] Pulmonary nodule  [D72.819] Leukopenia, unspecified type  [E83.42] Hypomagnesemia  [E87.6] Hypokalemia        ED Disposition Condition    Admit Stable                Kana Chandra DO  07/18/23 1555

## 2023-07-18 NOTE — PHARMACY MED REC
"    Admission Medication History     The home medication history was taken by Maura Lawson.    You may go to "Admission" then "Reconcile Home Medications" tabs to review and/or act upon these items.     The home medication list has been updated by the Pharmacy department.   Please read ALL comments highlighted in yellow.   Please address this information as you see fit.    Feel free to contact us if you have any questions or require assistance.      The medications listed below were removed from the home medication list. Please reorder if appropriate:  Patient reports no longer taking the following medication(s):  Oscal          Medications listed below were obtained from: Patient/family and Analytic software- [employer]  No current facility-administered medications on file prior to encounter.     Current Outpatient Medications on File Prior to Encounter   Medication Sig Dispense Refill    amLODIPine (NORVASC) 2.5 MG tablet Take 1 tablet (2.5 mg total) by mouth once daily. 90 tablet 1    azelastine (ASTELIN) 137 mcg (0.1 %) nasal spray 1 spray (137 mcg total) by Nasal route 2 (two) times daily. 30 mL 5    calcium carbonate-vitamin D3 600 mg-62.5 mcg (2,500 unit) Cap calcium carbonate 600 mg-vitamin D3 62.5 mcg (2,500 unit) capsule   Take by oral route.      DULoxetine (CYMBALTA) 60 MG capsule Take 1 capsule (60 mg total) by mouth once daily. 90 capsule 1    empaglifloz-linaglip-metformin (TRIJARDY XR) 25-5-1,000 mg TBph Take 1 tablet by mouth once daily. 90 tablet 1    guanFACINE (TENEX) 2 MG tablet Take 1 tablet (2 mg total) by mouth nightly. 90 tablet 3    insulin degludec (TRESIBA FLEXTOUCH U-200) 200 unit/mL (3 mL) insulin pen Inject 76 Units into the skin once daily. 12 pen 3    iron-vitamin C 100-250 mg, ICAR-C, 100-250 mg Tab Take 1 tablet by mouth once daily. 30 each 8    levothyroxine (SYNTHROID) 75 MCG tablet Take 75 mcg by mouth before breakfast.      metFORMIN (GLUCOPHAGE) 1000 MG tablet Take 1,000 mg by " mouth 2 (two) times daily with meals.      metOLazone (ZAROXOLYN) 5 MG tablet Take 1 tablet (5 mg total) by mouth once daily. 90 tablet 0    metoprolol succinate (TOPROL-XL) 25 MG 24 hr tablet Take 1 tablet (25 mg total) by mouth once daily. 90 tablet 1    multivitamin capsule Take 1 capsule by mouth once daily.      olmesartan (BENICAR) 40 MG tablet Take 1 tablet (40 mg total) by mouth once daily. 90 tablet 1    potassium chloride (KLOR-CON) 10 MEQ TbSR Take 1 tablet (10 mEq total) by mouth once daily. 90 tablet 1    pregabalin (LYRICA) 50 MG capsule Take 50 mg by mouth every evening.      rosuvastatin (CRESTOR) 40 MG Tab Take 1 tablet (40 mg total) by mouth every evening. 90 tablet 3    aspirin (ECOTRIN) 81 MG EC tablet Take 1 tablet (81 mg total) by mouth once daily. 30 tablet 0    blood sugar diagnostic Strp One Touch Ultra Blue Test strips, Use l strip to check glucose 4 times daily 100 each 11    blood-glucose meter kit Use as instructed 1 each 0    cyanocobalamin 1,000 mcg/mL injection Inject 1 mL (1,000 mcg total) into the skin every 30 days. 3 mL 4    lancets (ONETOUCH DELICA LANCETS) 33 gauge Misc USE 1  TO CHECK GLUCOSE 4 TIMES DAILY 100 each 3    magnesium oxide (MAG-OX) 400 mg (241.3 mg magnesium) tablet Take 1 tablet (400 mg total) by mouth once daily. 90 tablet 1    NYSTOP powder APPLY TO AFFECTED AREA AS NEEDED      prednisoLONE acetate (PRED FORTE) 1 % DrpS Place 1 drop into both eyes as needed.       [DISCONTINUED] calcium carbonate (OS-RYAN) 600 mg calcium (1,500 mg) Tab Take 600 mg by mouth once.      [DISCONTINUED] FEROSUL 325 mg (65 mg iron) Tab tablet Take by mouth.      [DISCONTINUED] levothyroxine (SYNTHROID) 75 MCG tablet Take 75 mcg by mouth before breakfast.      [DISCONTINUED] metFORMIN (GLUCOPHAGE) 1000 MG tablet Take 1 tablet (1,000 mg total) by mouth daily with dinner or evening meal. 90 tablet 1         Maura Lawson  [id number]      .        .

## 2023-07-19 ENCOUNTER — ANESTHESIA (OUTPATIENT)
Dept: INTENSIVE CARE | Facility: HOSPITAL | Age: 68
End: 2023-07-19

## 2023-07-19 ENCOUNTER — ANESTHESIA EVENT (OUTPATIENT)
Dept: INTENSIVE CARE | Facility: HOSPITAL | Age: 68
DRG: 870 | End: 2023-07-19
Payer: MEDICARE

## 2023-07-19 ENCOUNTER — ANESTHESIA EVENT (OUTPATIENT)
Dept: SURGERY | Facility: HOSPITAL | Age: 68
DRG: 870 | End: 2023-07-19
Payer: MEDICARE

## 2023-07-19 ENCOUNTER — ANESTHESIA EVENT (OUTPATIENT)
Dept: INTENSIVE CARE | Facility: HOSPITAL | Age: 68
End: 2023-07-19

## 2023-07-19 ENCOUNTER — ANESTHESIA (OUTPATIENT)
Dept: SURGERY | Facility: HOSPITAL | Age: 68
DRG: 870 | End: 2023-07-19
Payer: MEDICARE

## 2023-07-19 ENCOUNTER — ANESTHESIA (OUTPATIENT)
Dept: INTENSIVE CARE | Facility: HOSPITAL | Age: 68
DRG: 870 | End: 2023-07-19
Payer: MEDICARE

## 2023-07-19 ENCOUNTER — CLINICAL SUPPORT (OUTPATIENT)
Dept: CARDIOLOGY | Facility: HOSPITAL | Age: 68
DRG: 870 | End: 2023-07-19
Attending: FAMILY MEDICINE
Payer: MEDICARE

## 2023-07-19 VITALS — HEIGHT: 67 IN | WEIGHT: 240.06 LBS | BODY MASS INDEX: 37.68 KG/M2

## 2023-07-19 PROBLEM — S42.211G: Status: RESOLVED | Noted: 2021-03-02 | Resolved: 2023-07-19

## 2023-07-19 PROBLEM — D63.8 ANEMIA, CHRONIC DISEASE: Status: RESOLVED | Noted: 2021-01-24 | Resolved: 2023-07-19

## 2023-07-19 PROBLEM — D64.89 ANEMIA DUE TO MULTIPLE MECHANISMS: Status: RESOLVED | Noted: 2021-01-24 | Resolved: 2023-07-19

## 2023-07-19 PROBLEM — E66.9 OBESITY (BMI 30-39.9): Status: ACTIVE | Noted: 2023-07-19

## 2023-07-19 PROBLEM — N17.9 AKI (ACUTE KIDNEY INJURY): Status: ACTIVE | Noted: 2023-07-19

## 2023-07-19 PROBLEM — E03.9 HYPOTHYROIDISM: Status: ACTIVE | Noted: 2023-07-19

## 2023-07-19 PROBLEM — R65.21 SEPTIC SHOCK: Status: ACTIVE | Noted: 2023-07-19

## 2023-07-19 PROBLEM — A41.9 SEPTIC SHOCK: Status: ACTIVE | Noted: 2023-07-19

## 2023-07-19 PROBLEM — K72.00 SHOCK LIVER: Status: ACTIVE | Noted: 2023-07-19

## 2023-07-19 PROBLEM — R78.81 E COLI BACTEREMIA: Status: ACTIVE | Noted: 2023-07-19

## 2023-07-19 PROBLEM — K83.09 ACUTE OBSTRUCTIVE CHOLANGITIS: Status: ACTIVE | Noted: 2023-07-18

## 2023-07-19 PROBLEM — D75.838 SECONDARY THROMBOCYTOSIS: Status: RESOLVED | Noted: 2021-01-24 | Resolved: 2023-07-19

## 2023-07-19 PROBLEM — B96.20 E COLI BACTEREMIA: Status: ACTIVE | Noted: 2023-07-19

## 2023-07-19 PROBLEM — D64.9 ANEMIA: Status: ACTIVE | Noted: 2023-07-19

## 2023-07-19 PROBLEM — D50.9 MICROCYTIC ANEMIA: Status: RESOLVED | Noted: 2020-12-22 | Resolved: 2023-07-19

## 2023-07-19 PROBLEM — I24.89 DEMAND ISCHEMIA: Status: ACTIVE | Noted: 2023-07-19

## 2023-07-19 PROBLEM — J96.01 ACUTE HYPOXEMIC RESPIRATORY FAILURE: Status: ACTIVE | Noted: 2023-07-19

## 2023-07-19 PROBLEM — G93.40 ACUTE ENCEPHALOPATHY: Status: ACTIVE | Noted: 2023-07-19

## 2023-07-19 LAB
ACINETOBACTER CALCOACETICUS/BAUMANNII COMPLEX: NOT DETECTED
ALBUMIN SERPL BCP-MCNC: 2.7 G/DL (ref 3.5–5.2)
ALBUMIN SERPL BCP-MCNC: 2.8 G/DL (ref 3.5–5.2)
ALLENS TEST: ABNORMAL
ALP SERPL-CCNC: 232 U/L (ref 55–135)
ALP SERPL-CCNC: 239 U/L (ref 55–135)
ALT SERPL W/O P-5'-P-CCNC: 1237 U/L (ref 10–44)
ALT SERPL W/O P-5'-P-CCNC: 1389 U/L (ref 10–44)
ANION GAP SERPL CALC-SCNC: 12 MMOL/L (ref 8–16)
ANION GAP SERPL CALC-SCNC: 13 MMOL/L (ref 8–16)
ANION GAP SERPL CALC-SCNC: 9 MMOL/L (ref 8–16)
AORTIC ROOT ANNULUS: 3 CM
AORTIC VALVE CUSP SEPERATION: 2 CM
AST SERPL-CCNC: 2526 U/L (ref 10–40)
AST SERPL-CCNC: 3585 U/L (ref 10–40)
AV INDEX (PROSTH): 0.65
AV MEAN GRADIENT: 5 MMHG
AV PEAK GRADIENT: 8 MMHG
AV VALVE AREA: 2.25 CM2
AV VELOCITY RATIO: 0.6
BACTERIA #/AREA URNS HPF: NEGATIVE /HPF
BACTEROIDES FRAGILIS: NOT DETECTED
BASOPHILS # BLD AUTO: 0.02 K/UL (ref 0–0.2)
BASOPHILS NFR BLD: 0.1 % (ref 0–1.9)
BILIRUB SERPL-MCNC: 2.4 MG/DL (ref 0.1–1)
BILIRUB SERPL-MCNC: 2.7 MG/DL (ref 0.1–1)
BILIRUB UR QL STRIP: ABNORMAL
BSA FOR ECHO PROCEDURE: 2.27 M2
BUN SERPL-MCNC: 31 MG/DL (ref 8–23)
BUN SERPL-MCNC: 34 MG/DL (ref 8–23)
BUN SERPL-MCNC: 42 MG/DL (ref 8–23)
CALCIUM SERPL-MCNC: 8.1 MG/DL (ref 8.7–10.5)
CALCIUM SERPL-MCNC: 8.2 MG/DL (ref 8.7–10.5)
CALCIUM SERPL-MCNC: 9.2 MG/DL (ref 8.7–10.5)
CANDIDA ALBICANS: NOT DETECTED
CANDIDA AURIS: NOT DETECTED
CANDIDA GLABRATA: NOT DETECTED
CANDIDA KRUSEI: NOT DETECTED
CANDIDA PARAPSILOSIS: NOT DETECTED
CANDIDA TROPICALIS: NOT DETECTED
CHLORIDE SERPL-SCNC: 102 MMOL/L (ref 95–110)
CHLORIDE SERPL-SCNC: 103 MMOL/L (ref 95–110)
CHLORIDE SERPL-SCNC: 105 MMOL/L (ref 95–110)
CLARITY UR: CLEAR
CO2 SERPL-SCNC: 21 MMOL/L (ref 23–29)
CO2 SERPL-SCNC: 24 MMOL/L (ref 23–29)
CO2 SERPL-SCNC: 27 MMOL/L (ref 23–29)
COLOR UR: YELLOW
CREAT SERPL-MCNC: 1.6 MG/DL (ref 0.5–1.4)
CREAT SERPL-MCNC: 1.8 MG/DL (ref 0.5–1.4)
CREAT SERPL-MCNC: 2.4 MG/DL (ref 0.5–1.4)
CRP SERPL-MCNC: 24.03 MG/DL
CRYPTOCOCCUS NEOFORMANS/GATTII: NOT DETECTED
CTX-M GENE: NOT DETECTED
CV ECHO LV RWT: 0.61 CM
DELSYS: ABNORMAL
DIFFERENTIAL METHOD: ABNORMAL
DOP CALC AO PEAK VEL: 1.45 M/S
DOP CALC AO VTI: 21.4 CM
DOP CALC LVOT AREA: 3.5 CM2
DOP CALC LVOT DIAMETER: 2.1 CM
DOP CALC LVOT PEAK VEL: 0.87 M/S
DOP CALC LVOT STROKE VOLUME: 48.12 CM3
DOP CALC MV VTI: 19.5 CM
DOP CALCLVOT PEAK VEL VTI: 13.9 CM
E WAVE DECELERATION TIME: 159 MSEC
E/A RATIO: 0.84
E/E' RATIO: 8.27 M/S
ECHO LV POSTERIOR WALL: 1.32 CM (ref 0.6–1.1)
EJECTION FRACTION: 45 %
ENTEROBACTER CLOACAE COMPLEX: NOT DETECTED
ENTEROBACTERALES: ABNORMAL
ENTEROCOCCUS FAECALIS: NOT DETECTED
ENTEROCOCCUS FAECIUM: DETECTED
EOSINOPHIL # BLD AUTO: 0 K/UL (ref 0–0.5)
EOSINOPHIL NFR BLD: 0.1 % (ref 0–8)
ERYTHROCYTE [DISTWIDTH] IN BLOOD BY AUTOMATED COUNT: 15.7 % (ref 11.5–14.5)
ERYTHROCYTE [DISTWIDTH] IN BLOOD BY AUTOMATED COUNT: 15.8 % (ref 11.5–14.5)
ERYTHROCYTE [SEDIMENTATION RATE] IN BLOOD BY WESTERGREN METHOD: 26 MM/H
ESCHERICHIA COLI: DETECTED
EST. GFR  (NO RACE VARIABLE): 21.6 ML/MIN/1.73 M^2
EST. GFR  (NO RACE VARIABLE): 30.5 ML/MIN/1.73 M^2
EST. GFR  (NO RACE VARIABLE): 35.1 ML/MIN/1.73 M^2
ESTIMATED AVG GLUCOSE: 151 MG/DL (ref 68–131)
FLOW: 5
FRACTIONAL SHORTENING: 19 % (ref 28–44)
GLUCOSE SERPL-MCNC: 107 MG/DL (ref 70–110)
GLUCOSE SERPL-MCNC: 160 MG/DL (ref 70–110)
GLUCOSE SERPL-MCNC: 163 MG/DL (ref 70–110)
GLUCOSE SERPL-MCNC: 165 MG/DL (ref 70–110)
GLUCOSE SERPL-MCNC: 169 MG/DL (ref 70–110)
GLUCOSE SERPL-MCNC: 170 MG/DL (ref 70–110)
GLUCOSE UR QL STRIP: ABNORMAL
HAEMOPHILUS INFLUENZAE: NOT DETECTED
HBA1C MFR BLD: 6.9 % (ref 4.5–6.2)
HCO3 UR-SCNC: 24.9 MMOL/L (ref 24–28)
HCT VFR BLD AUTO: 36.6 % (ref 37–48.5)
HCT VFR BLD AUTO: 39.9 % (ref 37–48.5)
HCT VFR BLD CALC: 38 %PCV (ref 36–54)
HGB BLD-MCNC: 11.4 G/DL (ref 12–16)
HGB BLD-MCNC: 12.3 G/DL (ref 12–16)
HGB UR QL STRIP: ABNORMAL
HYALINE CASTS #/AREA URNS LPF: 1 /LPF
IMM GRANULOCYTES # BLD AUTO: 0.12 K/UL (ref 0–0.04)
IMM GRANULOCYTES NFR BLD AUTO: 0.8 % (ref 0–0.5)
IMP GENE: NOT DETECTED
INTERVENTRICULAR SEPTUM: 1.31 CM (ref 0.6–1.1)
IVC DIAMETER: 1.69 CM
IVRT: 67 MSEC
KETONES UR QL STRIP: NEGATIVE
KLEBSIELLA AEROGENES: NOT DETECTED
KLEBSIELLA OXYTOCA: NOT DETECTED
KLEBSIELLA PNEUMONIAE GROUP: NOT DETECTED
KPC: NOT DETECTED
LACTATE SERPL-SCNC: 2.9 MMOL/L (ref 0.5–1.9)
LACTATE SERPL-SCNC: 3.6 MMOL/L (ref 0.5–1.9)
LACTATE SERPL-SCNC: 4.1 MMOL/L (ref 0.5–1.9)
LACTATE SERPL-SCNC: 4.6 MMOL/L (ref 0.5–1.9)
LACTATE SERPL-SCNC: 5.9 MMOL/L (ref 0.5–1.9)
LEFT ATRIUM SIZE: 4.1 CM
LEFT ATRIUM VOLUME INDEX MOD: 24.8 ML/M2
LEFT ATRIUM VOLUME MOD: 54.4 CM3
LEFT INTERNAL DIMENSION IN SYSTOLE: 3.47 CM (ref 2.1–4)
LEFT VENTRICLE DIASTOLIC VOLUME INDEX: 37.95 ML/M2
LEFT VENTRICLE DIASTOLIC VOLUME: 83.1 ML
LEFT VENTRICLE MASS INDEX: 97 G/M2
LEFT VENTRICLE SYSTOLIC VOLUME INDEX: 22.7 ML/M2
LEFT VENTRICLE SYSTOLIC VOLUME: 49.8 ML
LEFT VENTRICULAR INTERNAL DIMENSION IN DIASTOLE: 4.3 CM (ref 3.5–6)
LEFT VENTRICULAR MASS: 211.35 G
LEUKOCYTE ESTERASE UR QL STRIP: NEGATIVE
LIPASE SERPL-CCNC: 102 U/L (ref 4–60)
LISTERIA MONOCYTOGENES: NOT DETECTED
LV LATERAL E/E' RATIO: 6.89 M/S
LV SEPTAL E/E' RATIO: 10.33 M/S
LVOT MG: 2 MMHG
LVOT MV: 0.6 CM/S
LYMPHOCYTES # BLD AUTO: 0.2 K/UL (ref 1–4.8)
LYMPHOCYTES NFR BLD: 1.1 % (ref 18–48)
MAGNESIUM SERPL-MCNC: 2 MG/DL (ref 1.6–2.6)
MAGNESIUM SERPL-MCNC: 2.2 MG/DL (ref 1.6–2.6)
MCH RBC QN AUTO: 26.2 PG (ref 27–31)
MCH RBC QN AUTO: 26.6 PG (ref 27–31)
MCHC RBC AUTO-ENTMCNC: 30.8 G/DL (ref 32–36)
MCHC RBC AUTO-ENTMCNC: 31.1 G/DL (ref 32–36)
MCR-1: NOT DETECTED
MCV RBC AUTO: 84 FL (ref 82–98)
MCV RBC AUTO: 86 FL (ref 82–98)
MEC A/C AND MREJ (MRSA): ABNORMAL
MEC A/C: ABNORMAL
MICROSCOPIC COMMENT: ABNORMAL
MODE: ABNORMAL
MONOCYTES # BLD AUTO: 0.4 K/UL (ref 0.3–1)
MONOCYTES NFR BLD: 2.5 % (ref 4–15)
MV MEAN GRADIENT: 2 MMHG
MV PEAK A VEL: 0.74 M/S
MV PEAK E VEL: 0.62 M/S
MV PEAK GRADIENT: 4 MMHG
MV VALVE AREA BY CONTINUITY EQUATION: 2.47 CM2
NDM GENE: NOT DETECTED
NEISSERIA MENINGITIDIS: NOT DETECTED
NEUTROPHILS # BLD AUTO: 13.6 K/UL (ref 1.8–7.7)
NEUTROPHILS NFR BLD: 95.4 % (ref 38–73)
NITRITE UR QL STRIP: NEGATIVE
NRBC BLD-RTO: 0 /100 WBC
OXA-48-LIKE: NOT DETECTED
PCO2 BLDA: 44.4 MMHG (ref 35–45)
PH SMN: 7.36 [PH] (ref 7.35–7.45)
PH UR STRIP: 6 [PH] (ref 5–8)
PISA TR MAX VEL: 3.05 M/S
PLATELET # BLD AUTO: 186 K/UL (ref 150–450)
PLATELET # BLD AUTO: 199 K/UL (ref 150–450)
PMV BLD AUTO: 11.2 FL (ref 9.2–12.9)
PMV BLD AUTO: 11.6 FL (ref 9.2–12.9)
PO2 BLDA: 89 MMHG (ref 80–100)
POC BE: -1 MMOL/L
POC IONIZED CALCIUM: 1.21 MMOL/L (ref 1.06–1.42)
POC SATURATED O2: 96 % (ref 95–100)
POC TCO2: 26 MMOL/L (ref 23–27)
POTASSIUM BLD-SCNC: 3.4 MMOL/L (ref 3.5–5.1)
POTASSIUM SERPL-SCNC: 2.8 MMOL/L (ref 3.5–5.1)
POTASSIUM SERPL-SCNC: 3.3 MMOL/L (ref 3.5–5.1)
POTASSIUM SERPL-SCNC: 5.1 MMOL/L (ref 3.5–5.1)
PROCALCITONIN SERPL IA-MCNC: 54.06 NG/ML (ref 0–0.5)
PROT SERPL-MCNC: 5.3 G/DL (ref 6–8.4)
PROT SERPL-MCNC: 5.5 G/DL (ref 6–8.4)
PROT UR QL STRIP: ABNORMAL
PROTEUS SPECIES: NOT DETECTED
PSEUDOMONAS AERUGINOSA: NOT DETECTED
PV MV: 0.82 M/S
PV PEAK VELOCITY: 1.19 CM/S
RA PRESSURE: 3 MMHG
RBC # BLD AUTO: 4.35 M/UL (ref 4–5.4)
RBC # BLD AUTO: 4.62 M/UL (ref 4–5.4)
RBC #/AREA URNS HPF: 1 /HPF (ref 0–4)
RIGHT VENTRICULAR END-DIASTOLIC DIMENSION: 2.61 CM
RV TISSUE DOPPLER FREE WALL SYSTOLIC VELOCITY 1 (APICAL 4 CHAMBER VIEW): 13.1 CM/S
SALMONELLA SP: NOT DETECTED
SAMPLE: ABNORMAL
SERRATIA MARCESCENS: NOT DETECTED
SITE: ABNORMAL
SODIUM BLD-SCNC: 140 MMOL/L (ref 136–145)
SODIUM SERPL-SCNC: 136 MMOL/L (ref 136–145)
SODIUM SERPL-SCNC: 138 MMOL/L (ref 136–145)
SODIUM SERPL-SCNC: 142 MMOL/L (ref 136–145)
SP GR UR STRIP: >1.03 (ref 1–1.03)
SP02: 97
SQUAMOUS #/AREA URNS HPF: 2 /HPF
STAPHYLOCOCCUS AUREUS: NOT DETECTED
STAPHYLOCOCCUS EPIDERMIDIS: NOT DETECTED
STAPHYLOCOCCUS LUGDUNESIS: NOT DETECTED
STAPHYLOCOCCUS SPECIES: NOT DETECTED
STENOTROPHOMONAS MALTOPHILIA: NOT DETECTED
STREPTOCOCCUS AGALACTIAE: NOT DETECTED
STREPTOCOCCUS PNEUMONIAE: NOT DETECTED
STREPTOCOCCUS PYOGENES: NOT DETECTED
STREPTOCOCCUS SPECIES: NOT DETECTED
TDI LATERAL: 0.09 M/S
TDI SEPTAL: 0.06 M/S
TDI: 0.08 M/S
TR MAX PG: 37 MMHG
TRICUSPID ANNULAR PLANE SYSTOLIC EXCURSION: 2.19 CM
TROPONIN I SERPL HS-MCNC: 106.5 PG/ML (ref 0–14.9)
TROPONIN I SERPL HS-MCNC: 1148.1 PG/ML (ref 0–14.9)
TROPONIN I SERPL HS-MCNC: 252.9 PG/ML (ref 0–14.9)
TROPONIN I SERPL HS-MCNC: 39.7 PG/ML (ref 0–14.9)
TROPONIN I SERPL HS-MCNC: 648.1 PG/ML (ref 0–14.9)
TSH SERPL DL<=0.005 MIU/L-ACNC: 0.47 UIU/ML (ref 0.34–5.6)
TV REST PULMONARY ARTERY PRESSURE: 40 MMHG
URN SPEC COLLECT METH UR: ABNORMAL
UROBILINOGEN UR STRIP-ACNC: ABNORMAL EU/DL
VAN A/B: NOT DETECTED
VIM GENE: NOT DETECTED
WBC # BLD AUTO: 14.26 K/UL (ref 3.9–12.7)
WBC # BLD AUTO: 6.9 K/UL (ref 3.9–12.7)
WBC #/AREA URNS HPF: 3 /HPF (ref 0–5)
YEAST URNS QL MICRO: ABNORMAL

## 2023-07-19 PROCEDURE — 80053 COMPREHEN METABOLIC PANEL: CPT | Performed by: FAMILY MEDICINE

## 2023-07-19 PROCEDURE — 27202125 HC BALLOON, EXTRACTION (ANY): Performed by: INTERNAL MEDICINE

## 2023-07-19 PROCEDURE — 43264 ERCP REMOVE DUCT CALCULI: CPT | Performed by: INTERNAL MEDICINE

## 2023-07-19 PROCEDURE — 25000003 PHARM REV CODE 250: Performed by: STUDENT IN AN ORGANIZED HEALTH CARE EDUCATION/TRAINING PROGRAM

## 2023-07-19 PROCEDURE — 99900031 HC PATIENT EDUCATION (STAT)

## 2023-07-19 PROCEDURE — 80048 BASIC METABOLIC PNL TOTAL CA: CPT | Performed by: STUDENT IN AN ORGANIZED HEALTH CARE EDUCATION/TRAINING PROGRAM

## 2023-07-19 PROCEDURE — 63600175 PHARM REV CODE 636 W HCPCS: Performed by: STUDENT IN AN ORGANIZED HEALTH CARE EDUCATION/TRAINING PROGRAM

## 2023-07-19 PROCEDURE — 80053 COMPREHEN METABOLIC PANEL: CPT | Mod: 91 | Performed by: FAMILY MEDICINE

## 2023-07-19 PROCEDURE — 85025 COMPLETE CBC W/AUTO DIFF WBC: CPT | Performed by: FAMILY MEDICINE

## 2023-07-19 PROCEDURE — D9220A PRA ANESTHESIA: Mod: ANES,,, | Performed by: ANESTHESIOLOGY

## 2023-07-19 PROCEDURE — 84484 ASSAY OF TROPONIN QUANT: CPT | Mod: 91 | Performed by: STUDENT IN AN ORGANIZED HEALTH CARE EDUCATION/TRAINING PROGRAM

## 2023-07-19 PROCEDURE — 37000008 HC ANESTHESIA 1ST 15 MINUTES: Performed by: INTERNAL MEDICINE

## 2023-07-19 PROCEDURE — 99291 PR CRITICAL CARE, E/M 30-74 MINUTES: ICD-10-PCS | Mod: ,,, | Performed by: INTERNAL MEDICINE

## 2023-07-19 PROCEDURE — 85027 COMPLETE CBC AUTOMATED: CPT | Performed by: FAMILY MEDICINE

## 2023-07-19 PROCEDURE — 63600175 PHARM REV CODE 636 W HCPCS: Performed by: NURSE ANESTHETIST, CERTIFIED REGISTERED

## 2023-07-19 PROCEDURE — 93306 TTE W/DOPPLER COMPLETE: CPT

## 2023-07-19 PROCEDURE — 83690 ASSAY OF LIPASE: CPT | Performed by: INTERNAL MEDICINE

## 2023-07-19 PROCEDURE — 25000003 PHARM REV CODE 250

## 2023-07-19 PROCEDURE — 94761 N-INVAS EAR/PLS OXIMETRY MLT: CPT

## 2023-07-19 PROCEDURE — 27000221 HC OXYGEN, UP TO 24 HOURS

## 2023-07-19 PROCEDURE — 86140 C-REACTIVE PROTEIN: CPT | Performed by: STUDENT IN AN ORGANIZED HEALTH CARE EDUCATION/TRAINING PROGRAM

## 2023-07-19 PROCEDURE — 99291 CRITICAL CARE FIRST HOUR: CPT | Mod: ,,, | Performed by: INTERNAL MEDICINE

## 2023-07-19 PROCEDURE — 96376 TX/PRO/DX INJ SAME DRUG ADON: CPT | Mod: 59

## 2023-07-19 PROCEDURE — D9220A PRA ANESTHESIA: ICD-10-PCS | Mod: ANES,,, | Performed by: ANESTHESIOLOGY

## 2023-07-19 PROCEDURE — C1769 GUIDE WIRE: HCPCS | Performed by: INTERNAL MEDICINE

## 2023-07-19 PROCEDURE — 37000009 HC ANESTHESIA EA ADD 15 MINS: Performed by: INTERNAL MEDICINE

## 2023-07-19 PROCEDURE — 96366 THER/PROPH/DIAG IV INF ADDON: CPT | Mod: 59

## 2023-07-19 PROCEDURE — 20000000 HC ICU ROOM

## 2023-07-19 PROCEDURE — 63600175 PHARM REV CODE 636 W HCPCS: Performed by: FAMILY MEDICINE

## 2023-07-19 PROCEDURE — 84484 ASSAY OF TROPONIN QUANT: CPT | Mod: 91 | Performed by: FAMILY MEDICINE

## 2023-07-19 PROCEDURE — 83605 ASSAY OF LACTIC ACID: CPT | Performed by: INTERNAL MEDICINE

## 2023-07-19 PROCEDURE — 87040 BLOOD CULTURE FOR BACTERIA: CPT | Mod: 59 | Performed by: STUDENT IN AN ORGANIZED HEALTH CARE EDUCATION/TRAINING PROGRAM

## 2023-07-19 PROCEDURE — 83735 ASSAY OF MAGNESIUM: CPT | Performed by: FAMILY MEDICINE

## 2023-07-19 PROCEDURE — 84484 ASSAY OF TROPONIN QUANT: CPT | Performed by: INTERNAL MEDICINE

## 2023-07-19 PROCEDURE — 81001 URINALYSIS AUTO W/SCOPE: CPT | Performed by: INTERNAL MEDICINE

## 2023-07-19 PROCEDURE — 99291 CRITICAL CARE FIRST HOUR: CPT | Mod: ,,, | Performed by: STUDENT IN AN ORGANIZED HEALTH CARE EDUCATION/TRAINING PROGRAM

## 2023-07-19 PROCEDURE — 25000003 PHARM REV CODE 250: Performed by: NURSE ANESTHETIST, CERTIFIED REGISTERED

## 2023-07-19 PROCEDURE — 96361 HYDRATE IV INFUSION ADD-ON: CPT | Mod: 59

## 2023-07-19 PROCEDURE — 36556 CENTRAL LINE: ICD-10-PCS | Mod: 59,,, | Performed by: ANESTHESIOLOGY

## 2023-07-19 PROCEDURE — D9220A PRA ANESTHESIA: Mod: CRNA,,, | Performed by: NURSE ANESTHETIST, CERTIFIED REGISTERED

## 2023-07-19 PROCEDURE — 83735 ASSAY OF MAGNESIUM: CPT | Mod: 91 | Performed by: FAMILY MEDICINE

## 2023-07-19 PROCEDURE — 99900035 HC TECH TIME PER 15 MIN (STAT)

## 2023-07-19 PROCEDURE — 96367 TX/PROPH/DG ADDL SEQ IV INF: CPT | Mod: 59

## 2023-07-19 PROCEDURE — 82330 ASSAY OF CALCIUM: CPT

## 2023-07-19 PROCEDURE — 36415 COLL VENOUS BLD VENIPUNCTURE: CPT | Performed by: INTERNAL MEDICINE

## 2023-07-19 PROCEDURE — 84132 ASSAY OF SERUM POTASSIUM: CPT

## 2023-07-19 PROCEDURE — 25000003 PHARM REV CODE 250: Performed by: INTERNAL MEDICINE

## 2023-07-19 PROCEDURE — 84443 ASSAY THYROID STIM HORMONE: CPT | Performed by: FAMILY MEDICINE

## 2023-07-19 PROCEDURE — 36620 INSERTION CATHETER ARTERY: CPT | Mod: 59,,, | Performed by: ANESTHESIOLOGY

## 2023-07-19 PROCEDURE — 84295 ASSAY OF SERUM SODIUM: CPT

## 2023-07-19 PROCEDURE — 36415 COLL VENOUS BLD VENIPUNCTURE: CPT | Performed by: STUDENT IN AN ORGANIZED HEALTH CARE EDUCATION/TRAINING PROGRAM

## 2023-07-19 PROCEDURE — 63600175 PHARM REV CODE 636 W HCPCS: Performed by: INTERNAL MEDICINE

## 2023-07-19 PROCEDURE — 25000003 PHARM REV CODE 250: Performed by: FAMILY MEDICINE

## 2023-07-19 PROCEDURE — 87040 BLOOD CULTURE FOR BACTERIA: CPT | Performed by: FAMILY MEDICINE

## 2023-07-19 PROCEDURE — 94760 N-INVAS EAR/PLS OXIMETRY 1: CPT

## 2023-07-19 PROCEDURE — 93306 TTE W/DOPPLER COMPLETE: CPT | Mod: 26,,, | Performed by: INTERNAL MEDICINE

## 2023-07-19 PROCEDURE — 36556 INSERT NON-TUNNEL CV CATH: CPT | Mod: 59,,, | Performed by: ANESTHESIOLOGY

## 2023-07-19 PROCEDURE — D9220A PRA ANESTHESIA: ICD-10-PCS | Mod: CRNA,,, | Performed by: NURSE ANESTHETIST, CERTIFIED REGISTERED

## 2023-07-19 PROCEDURE — 36415 COLL VENOUS BLD VENIPUNCTURE: CPT | Performed by: FAMILY MEDICINE

## 2023-07-19 PROCEDURE — 84145 PROCALCITONIN (PCT): CPT | Performed by: STUDENT IN AN ORGANIZED HEALTH CARE EDUCATION/TRAINING PROGRAM

## 2023-07-19 PROCEDURE — 43262 ENDO CHOLANGIOPANCREATOGRAPH: CPT | Performed by: INTERNAL MEDICINE

## 2023-07-19 PROCEDURE — 36620 ARTERIAL: ICD-10-PCS | Mod: 59,,, | Performed by: ANESTHESIOLOGY

## 2023-07-19 PROCEDURE — 99291 PR CRITICAL CARE, E/M 30-74 MINUTES: ICD-10-PCS | Mod: ,,, | Performed by: STUDENT IN AN ORGANIZED HEALTH CARE EDUCATION/TRAINING PROGRAM

## 2023-07-19 PROCEDURE — 83605 ASSAY OF LACTIC ACID: CPT | Mod: 91 | Performed by: INTERNAL MEDICINE

## 2023-07-19 PROCEDURE — 82803 BLOOD GASES ANY COMBINATION: CPT

## 2023-07-19 PROCEDURE — 96375 TX/PRO/DX INJ NEW DRUG ADDON: CPT | Mod: 59

## 2023-07-19 PROCEDURE — 87086 URINE CULTURE/COLONY COUNT: CPT | Performed by: STUDENT IN AN ORGANIZED HEALTH CARE EDUCATION/TRAINING PROGRAM

## 2023-07-19 PROCEDURE — 85014 HEMATOCRIT: CPT

## 2023-07-19 PROCEDURE — 37799 UNLISTED PX VASCULAR SURGERY: CPT

## 2023-07-19 PROCEDURE — 93306 ECHO (CUPID ONLY): ICD-10-PCS | Mod: 26,,, | Performed by: INTERNAL MEDICINE

## 2023-07-19 RX ORDER — GLUCAGON 1 MG
1 KIT INJECTION
Status: DISCONTINUED | OUTPATIENT
Start: 2023-07-19 | End: 2023-08-03 | Stop reason: HOSPADM

## 2023-07-19 RX ORDER — ACETAMINOPHEN 325 MG/1
650 TABLET ORAL EVERY 6 HOURS PRN
Status: DISCONTINUED | OUTPATIENT
Start: 2023-07-19 | End: 2023-08-03 | Stop reason: HOSPADM

## 2023-07-19 RX ORDER — CALCIUM GLUCONATE 20 MG/ML
2 INJECTION, SOLUTION INTRAVENOUS
Status: DISCONTINUED | OUTPATIENT
Start: 2023-07-19 | End: 2023-08-03 | Stop reason: HOSPADM

## 2023-07-19 RX ORDER — PROPOFOL 10 MG/ML
VIAL (ML) INTRAVENOUS
Status: DISCONTINUED | OUTPATIENT
Start: 2023-07-19 | End: 2023-07-19

## 2023-07-19 RX ORDER — INSULIN ASPART 100 [IU]/ML
1-10 INJECTION, SOLUTION INTRAVENOUS; SUBCUTANEOUS EVERY 6 HOURS PRN
Status: DISCONTINUED | OUTPATIENT
Start: 2023-07-19 | End: 2023-08-03 | Stop reason: HOSPADM

## 2023-07-19 RX ORDER — MUPIROCIN 20 MG/G
OINTMENT TOPICAL 2 TIMES DAILY
Status: COMPLETED | OUTPATIENT
Start: 2023-07-19 | End: 2023-07-23

## 2023-07-19 RX ORDER — CALCIUM GLUCONATE 20 MG/ML
1 INJECTION, SOLUTION INTRAVENOUS
Status: DISCONTINUED | OUTPATIENT
Start: 2023-07-19 | End: 2023-08-03 | Stop reason: HOSPADM

## 2023-07-19 RX ORDER — LIDOCAINE HYDROCHLORIDE 10 MG/ML
INJECTION, SOLUTION INTRAVENOUS
Status: DISCONTINUED | OUTPATIENT
Start: 2023-07-19 | End: 2023-07-19

## 2023-07-19 RX ORDER — LORAZEPAM 2 MG/ML
2 INJECTION INTRAMUSCULAR
Status: DISCONTINUED | OUTPATIENT
Start: 2023-07-19 | End: 2023-07-29

## 2023-07-19 RX ORDER — SODIUM CHLORIDE, SODIUM LACTATE, POTASSIUM CHLORIDE, CALCIUM CHLORIDE 600; 310; 30; 20 MG/100ML; MG/100ML; MG/100ML; MG/100ML
INJECTION, SOLUTION INTRAVENOUS CONTINUOUS
Status: DISCONTINUED | OUTPATIENT
Start: 2023-07-19 | End: 2023-07-21

## 2023-07-19 RX ORDER — HYDROMORPHONE HYDROCHLORIDE 1 MG/ML
2 INJECTION, SOLUTION INTRAMUSCULAR; INTRAVENOUS; SUBCUTANEOUS EVERY 6 HOURS PRN
Status: DISCONTINUED | OUTPATIENT
Start: 2023-07-19 | End: 2023-07-25

## 2023-07-19 RX ORDER — CALCIUM GLUCONATE 20 MG/ML
3 INJECTION, SOLUTION INTRAVENOUS
Status: DISCONTINUED | OUTPATIENT
Start: 2023-07-19 | End: 2023-08-03 | Stop reason: HOSPADM

## 2023-07-19 RX ORDER — MUPIROCIN 20 MG/G
OINTMENT TOPICAL 2 TIMES DAILY
Status: CANCELLED | OUTPATIENT
Start: 2023-07-19 | End: 2023-07-24

## 2023-07-19 RX ORDER — LIDOCAINE HYDROCHLORIDE 20 MG/ML
INJECTION INTRAVENOUS
Status: COMPLETED
Start: 2023-07-19 | End: 2023-07-19

## 2023-07-19 RX ORDER — SUCCINYLCHOLINE CHLORIDE 20 MG/ML
INJECTION INTRAMUSCULAR; INTRAVENOUS
Status: DISCONTINUED | OUTPATIENT
Start: 2023-07-19 | End: 2023-07-19

## 2023-07-19 RX ORDER — ONDANSETRON 2 MG/ML
INJECTION INTRAMUSCULAR; INTRAVENOUS
Status: DISCONTINUED | OUTPATIENT
Start: 2023-07-19 | End: 2023-07-19

## 2023-07-19 RX ORDER — POTASSIUM CHLORIDE 29.8 MG/ML
80 INJECTION INTRAVENOUS
Status: DISCONTINUED | OUTPATIENT
Start: 2023-07-19 | End: 2023-08-03 | Stop reason: HOSPADM

## 2023-07-19 RX ORDER — MAGNESIUM SULFATE HEPTAHYDRATE 40 MG/ML
2 INJECTION, SOLUTION INTRAVENOUS
Status: DISCONTINUED | OUTPATIENT
Start: 2023-07-19 | End: 2023-08-03 | Stop reason: HOSPADM

## 2023-07-19 RX ORDER — FAMOTIDINE 10 MG/ML
INJECTION INTRAVENOUS
Status: DISCONTINUED | OUTPATIENT
Start: 2023-07-19 | End: 2023-07-19

## 2023-07-19 RX ORDER — POTASSIUM CHLORIDE 14.9 MG/ML
60 INJECTION INTRAVENOUS
Status: DISCONTINUED | OUTPATIENT
Start: 2023-07-19 | End: 2023-08-03 | Stop reason: HOSPADM

## 2023-07-19 RX ORDER — POTASSIUM CHLORIDE 29.8 MG/ML
40 INJECTION INTRAVENOUS
Status: DISCONTINUED | OUTPATIENT
Start: 2023-07-19 | End: 2023-08-03 | Stop reason: HOSPADM

## 2023-07-19 RX ORDER — PHENYLEPHRINE HYDROCHLORIDE 10 MG/ML
INJECTION INTRAVENOUS
Status: DISCONTINUED | OUTPATIENT
Start: 2023-07-19 | End: 2023-07-19

## 2023-07-19 RX ORDER — MAGNESIUM SULFATE HEPTAHYDRATE 40 MG/ML
4 INJECTION, SOLUTION INTRAVENOUS
Status: DISCONTINUED | OUTPATIENT
Start: 2023-07-19 | End: 2023-08-03 | Stop reason: HOSPADM

## 2023-07-19 RX ORDER — NOREPINEPHRINE BITARTRATE/D5W 4MG/250ML
0-3 PLASTIC BAG, INJECTION (ML) INTRAVENOUS CONTINUOUS
Status: DISCONTINUED | OUTPATIENT
Start: 2023-07-19 | End: 2023-07-19

## 2023-07-19 RX ADMIN — VANCOMYCIN HYDROCHLORIDE 2000 MG: 500 INJECTION, POWDER, LYOPHILIZED, FOR SOLUTION INTRAVENOUS at 05:07

## 2023-07-19 RX ADMIN — SODIUM CHLORIDE, SODIUM LACTATE, POTASSIUM CHLORIDE, AND CALCIUM CHLORIDE: .6; .31; .03; .02 INJECTION, SOLUTION INTRAVENOUS at 01:07

## 2023-07-19 RX ADMIN — LORAZEPAM 2 MG: 2 INJECTION INTRAMUSCULAR; INTRAVENOUS at 11:07

## 2023-07-19 RX ADMIN — SODIUM CHLORIDE, SODIUM LACTATE, POTASSIUM CHLORIDE, AND CALCIUM CHLORIDE 1000 ML: .6; .31; .03; .02 INJECTION, SOLUTION INTRAVENOUS at 12:07

## 2023-07-19 RX ADMIN — HYDROMORPHONE HYDROCHLORIDE 2 MG: 1 INJECTION, SOLUTION INTRAMUSCULAR; INTRAVENOUS; SUBCUTANEOUS at 04:07

## 2023-07-19 RX ADMIN — NOREPINEPHRINE BITARTRATE 0.05 MCG/KG/MIN: 4 INJECTION, SOLUTION INTRAVENOUS at 12:07

## 2023-07-19 RX ADMIN — POTASSIUM CHLORIDE 10 MEQ: 10 INJECTION, SOLUTION INTRAVENOUS at 01:07

## 2023-07-19 RX ADMIN — MEROPENEM 1 G: 1 INJECTION, POWDER, FOR SOLUTION INTRAVENOUS at 01:07

## 2023-07-19 RX ADMIN — Medication 100 MG: at 07:07

## 2023-07-19 RX ADMIN — FAMOTIDINE 20 MG: 10 INJECTION, SOLUTION INTRAVENOUS at 07:07

## 2023-07-19 RX ADMIN — SODIUM CHLORIDE, SODIUM LACTATE, POTASSIUM CHLORIDE, AND CALCIUM CHLORIDE 1000 ML: .6; .31; .03; .02 INJECTION, SOLUTION INTRAVENOUS at 04:07

## 2023-07-19 RX ADMIN — POTASSIUM CHLORIDE 10 MEQ: 10 INJECTION, SOLUTION INTRAVENOUS at 02:07

## 2023-07-19 RX ADMIN — NOREPINEPHRINE BITARTRATE 0.3 MCG/KG/MIN: 1 INJECTION, SOLUTION, CONCENTRATE INTRAVENOUS at 05:07

## 2023-07-19 RX ADMIN — POTASSIUM CHLORIDE 40 MEQ: 29.8 INJECTION, SOLUTION INTRAVENOUS at 10:07

## 2023-07-19 RX ADMIN — SODIUM CHLORIDE, SODIUM LACTATE, POTASSIUM CHLORIDE, AND CALCIUM CHLORIDE 1000 ML: .6; .31; .03; .02 INJECTION, SOLUTION INTRAVENOUS at 05:07

## 2023-07-19 RX ADMIN — NOREPINEPHRINE BITARTRATE 0.3 MCG/KG/MIN: 4 INJECTION, SOLUTION INTRAVENOUS at 02:07

## 2023-07-19 RX ADMIN — ENOXAPARIN SODIUM 40 MG: 100 INJECTION SUBCUTANEOUS at 04:07

## 2023-07-19 RX ADMIN — POTASSIUM CHLORIDE 10 MEQ: 10 INJECTION, SOLUTION INTRAVENOUS at 12:07

## 2023-07-19 RX ADMIN — LORAZEPAM 2 MG: 2 INJECTION INTRAMUSCULAR; INTRAVENOUS at 09:07

## 2023-07-19 RX ADMIN — SODIUM CHLORIDE: 0.9 INJECTION, SOLUTION INTRAVENOUS at 07:07

## 2023-07-19 RX ADMIN — LIDOCAINE HYDROCHLORIDE: 20 INJECTION, SOLUTION INTRAVENOUS at 03:07

## 2023-07-19 RX ADMIN — PROPOFOL 50 MG: 10 INJECTION, EMULSION INTRAVENOUS at 07:07

## 2023-07-19 RX ADMIN — POTASSIUM CHLORIDE 10 MEQ: 10 INJECTION, SOLUTION INTRAVENOUS at 03:07

## 2023-07-19 RX ADMIN — MUPIROCIN 1 G: 20 OINTMENT TOPICAL at 10:07

## 2023-07-19 RX ADMIN — ONDANSETRON 4 MG: 2 INJECTION INTRAMUSCULAR; INTRAVENOUS at 07:07

## 2023-07-19 RX ADMIN — NOREPINEPHRINE BITARTRATE 0.4 MCG/KG/MIN: 1 INJECTION, SOLUTION, CONCENTRATE INTRAVENOUS at 05:07

## 2023-07-19 RX ADMIN — NOREPINEPHRINE BITARTRATE 0.3 MCG/KG/MIN: 4 INJECTION, SOLUTION INTRAVENOUS at 04:07

## 2023-07-19 RX ADMIN — PHENYLEPHRINE HYDROCHLORIDE 200 MCG: 10 INJECTION INTRAVENOUS at 07:07

## 2023-07-19 RX ADMIN — MUPIROCIN 1 G: 20 OINTMENT TOPICAL at 12:07

## 2023-07-19 RX ADMIN — DAPTOMYCIN 805 MG: 500 INJECTION, POWDER, LYOPHILIZED, FOR SOLUTION INTRAVENOUS at 02:07

## 2023-07-19 RX ADMIN — MEROPENEM 1 G: 1 INJECTION, POWDER, FOR SOLUTION INTRAVENOUS at 12:07

## 2023-07-19 RX ADMIN — LIDOCAINE HYDROCHLORIDE 100 MG: 10 INJECTION, SOLUTION INTRAVENOUS at 07:07

## 2023-07-19 NOTE — ANESTHESIA PROCEDURE NOTES
Intubation    Date/Time: 7/19/2023 7:13 AM  Performed by: Richa Acuna CRNA  Authorized by: Kyle Dobbs MD     Intubation:     Induction:  Intravenous    Intubated:  Postinduction    Mask Ventilation:  Easy mask    Attempts:  1    Attempted By:  CRNA    Method of Intubation:  Direct    Blade:  Hendricks 3    Laryngeal View Grade: Grade I - full view of cords      Difficult Airway Encountered?: No      Complications:  None    Airway Device:  Oral endotracheal tube    Airway Device Size:  7.5    Style/Cuff Inflation:  Cuffed (inflated to minimal occlusive pressure)    Tube secured:  21    Secured at:  The lips    Placement Verified By:  Capnometry    Complicating Factors:  None    Findings Post-Intubation:  BS equal bilateral and atraumatic/condition of teeth unchanged

## 2023-07-19 NOTE — ASSESSMENT & PLAN NOTE
Metabolic in setting of septic shock.  -Avoid restraints if possible  -Delirium precautions  -Treat septic shock

## 2023-07-19 NOTE — ASSESSMENT & PLAN NOTE
Body mass index is 37.59 kg/m². Morbid obesity complicates all aspects of disease management from diagnostic modalities to treatment.

## 2023-07-19 NOTE — HPI
Chanel Cervantes is a 67 y.o. White female   With PMH of DM2, HTN, bariatric surgery,  Remote cholecystectomy,  Frequent UTIs with kidney stones,  who presents with back pain.  Admitted for pancreatitis with elevated LFTs     Pt is currently confused after receiving ativan  (ER gave it to calm her for CT scan)  History taken from family at bedside     Onset of back pain overnight  Located b/l lumbar region  Radiating to b/l upper quadrants of abdomen  Occurring intermittently  Progressively worsening  Severe, causes SOB when occurring     Initially pt thought pain was due to a fall yesterday  (Mechanical, tripped over a rug, no head trauma)  +nausea, no vomiting  +intermittent chills  They are not sure about objective fever     Has had similar abdominal pain previously  Per family, this has been attributed to her ongoing ventral wall hernia  They don't bring her to ER for this usually  However pain today was much more severe than usual

## 2023-07-19 NOTE — CONSULTS
GASTROENTEROLOGY INPATIENT CONSULT NOTE  Patient Name: Chanel Cervantes  Patient MRN: [id number]  Patient : 1955    Admit Date: 2023  Service date: 2023    Reason for Consult: shock    PCP: ARIC Almaguer    Chief Complaint   Patient presents with    Fall     Denies hitting head or LOC, hit right shoulder    Shortness of Breath    Back Pain       HPI: Patient is a 67 y.o. female with PMHx  coronary disease hypertension iron-deficiency anemia from multiple mechanisms and sleep apnea.  Review of our records shows a GI consult from Dr. Vance  with an inpatient admission for pancreatitis related to gallstones.      MRCP from 2012 shows moderate intrahepatic bile duct dilation and dilation of common duct with normal tapering at the ampulla additional mild dilatation of the pancreatic duct in the pancreatic head and gallstones noted at the time also small intestinal diverticulosis.      Patient was admitted earlier this evening with acute onset of back pain overnight radiating into bilateral upper quadrants of the abdomen with pain progressively worsening and increasing shortness of breath.  Family had attributed to this pain to questionable ongoing ventral wall hernia discomfort.  Rapid response was called at 11 20 this evening patient was noted to be severely hypotensive with leukopenia and lipase at 1:00 p.m. with elevated transaminases as well.  GI is consulted for elevated liver function tests.    2020 patient had Interventional Radiology drain abdominal wall seroma  Past Medical History:  Past Medical History:   Diagnosis Date    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 2021    Anemia, chronic disease 2021    CAD (coronary artery disease)     Diabetes mellitus, type 2     Hypertension     Iron deficiency anemia following bariatric surgery 2021    MI (myocardial infarction)     Secondary thrombocytosis 2021    Sleep apnea     Sleep apnea 2019    Sleep Right          Past Surgical History:  Past Surgical History:   Procedure Laterality Date    ANGIOGRAM, CORONARY, WITH LEFT HEART CATHETERIZATION      APPENDECTOMY      BARIATRIC SURGERY  2019    Dr Nunez    CARPAL TUNNEL RELEASE Bilateral 2002     SECTION      CHOLECYSTECTOMY      COLONOSCOPY      CORONARY ANGIOPLASTY WITH STENT PLACEMENT      CORONARY ARTERY BYPASS GRAFT      EPIDURAL STEROID INJECTION INTO LUMBAR SPINE      x2    ESOPHAGOGASTRODUODENOSCOPY      HERNIA REPAIR  2019    HYSTERECTOMY      THYROID LOBECTOMY Right 2021    Procedure: LOBECTOMY, THYROID;  Surgeon: Mari Chance MD;  Location: [facility];  Service: General;  Laterality: Right;        Home Medications:  Medications Prior to Admission   Medication Sig Dispense Refill Last Dose    amLODIPine (NORVASC) 2.5 MG tablet Take 1 tablet (2.5 mg total) by mouth once daily. 90 tablet 1 2023    azelastine (ASTELIN) 137 mcg (0.1 %) nasal spray 1 spray (137 mcg total) by Nasal route 2 (two) times daily. 30 mL 5 2023    calcium carbonate-vitamin D3 600 mg-62.5 mcg (2,500 unit) Cap calcium carbonate 600 mg-vitamin D3 62.5 mcg (2,500 unit) capsule   Take by oral route.   2023    DULoxetine (CYMBALTA) 60 MG capsule Take 1 capsule (60 mg total) by mouth once daily. 90 capsule 1 2023    empaglifloz-linaglip-metformin (TRIJARDY XR) 25-5-1,000 mg TBph Take 1 tablet by mouth once daily. 90 tablet 1 2023    guanFACINE (TENEX) 2 MG tablet Take 1 tablet (2 mg total) by mouth nightly. 90 tablet 3 2023    insulin degludec (TRESIBA FLEXTOUCH U-200) 200 unit/mL (3 mL) insulin pen Inject 76 Units into the skin once daily. 12 pen 3 2023    iron-vitamin C 100-250 mg, ICAR-C, 100-250 mg Tab Take 1 tablet by mouth once daily. 30 each 8     levothyroxine (SYNTHROID) 75 MCG tablet Take 75 mcg by mouth before breakfast.   2023    metFORMIN (GLUCOPHAGE) 1000 MG tablet Take 1,000 mg by mouth 2 (two) times daily with meals.    7/18/2023 at 09:00    metOLazone (ZAROXOLYN) 5 MG tablet Take 1 tablet (5 mg total) by mouth once daily. 90 tablet 0 7/18/2023    metoprolol succinate (TOPROL-XL) 25 MG 24 hr tablet Take 1 tablet (25 mg total) by mouth once daily. 90 tablet 1 7/18/2023    multivitamin capsule Take 1 capsule by mouth once daily.   7/18/2023    olmesartan (BENICAR) 40 MG tablet Take 1 tablet (40 mg total) by mouth once daily. 90 tablet 1 7/18/2023    potassium chloride (KLOR-CON) 10 MEQ TbSR Take 1 tablet (10 mEq total) by mouth once daily. 90 tablet 1 7/18/2023    pregabalin (LYRICA) 50 MG capsule Take 50 mg by mouth every evening.   7/17/2023    rosuvastatin (CRESTOR) 40 MG Tab Take 1 tablet (40 mg total) by mouth every evening. 90 tablet 3 7/17/2023    aspirin (ECOTRIN) 81 MG EC tablet Take 1 tablet (81 mg total) by mouth once daily. 30 tablet 0     blood sugar diagnostic Strp One Touch Ultra Blue Test strips, Use l strip to check glucose 4 times daily 100 each 11     blood-glucose meter kit Use as instructed 1 each 0     cyanocobalamin 1,000 mcg/mL injection Inject 1 mL (1,000 mcg total) into the skin every 30 days. 3 mL 4 More than a month    lancets (ONETOUCH DELICA LANCETS) 33 gauge Misc USE 1  TO CHECK GLUCOSE 4 TIMES DAILY 100 each 3     magnesium oxide (MAG-OX) 400 mg (241.3 mg magnesium) tablet Take 1 tablet (400 mg total) by mouth once daily. 90 tablet 1 Unknown    NYSTOP powder APPLY TO AFFECTED AREA AS NEEDED       prednisoLONE acetate (PRED FORTE) 1 % DrpS Place 1 drop into both eyes as needed.    Unknown       Inpatient Medications:   aspirin  81 mg Oral Daily    azelastine  1 spray Nasal BID    DULoxetine  60 mg Oral Daily    enoxparin  40 mg Subcutaneous Daily    guanFACINE  2 mg Oral Nightly    insulin detemir U-100 (Levemir)  10 Units Subcutaneous Daily    lactated ringers  1,000 mL Intravenous Once    levothyroxine  75 mcg Oral Before breakfast    meropenem (MERREM) IVPB  1 g Intravenous Q8H    potassium chloride  " 10 mEq Intravenous Q1H     albuterol-ipratropium, dextrose 50%, dextrose 50%, glucagon (human recombinant), glucose, glucose, hydrALAZINE, hydrALAZINE, insulin aspart U-100, melatonin, naloxone, ondansetron, polyethylene glycol, senna-docusate 8.6-50 mg, simethicone, sodium chloride 0.9%    Review of patient's allergies indicates:   Allergen Reactions    Adhesive tape-silicones Rash    Dye Hives    Cephalexin     Iodine     Penicillins Edema    Pneumococcal vaccine     Iodinated contrast media Rash       Social History:   Social History     Occupational History    Not on file   Tobacco Use    Smoking status: Former     Packs/day: 1.00     Years: 15.00     Pack years: 15.00     Types: Cigarettes     Quit date:      Years since quittin.    Smokeless tobacco: Never   Substance and Sexual Activity    Alcohol use: No     Comment: "maybe once a year"    Drug use: No    Sexual activity: Not Currently       Family History:   Family History   Problem Relation Age of Onset    Diabetes Mother     Vision loss Mother     Heart disease Father     Vision loss Father     Stroke Father        Review of Systems:  A 10 point review of systems was performed and was normal, except as mentioned in the HPI, including constitutional, HEENT, heme, lymph, cardiovascular, respiratory, gastrointestinal, genitourinary, neurologic, endocrine, psychiatric and musculoskeletal.      OBJECTIVE:    Physical Exam:  24 Hour Vital Sign Ranges: Temp:  [97 °F (36.1 °C)-101.3 °F (38.5 °C)] 101.3 °F (38.5 °C)  Pulse:  [2-100] 78  Resp:  [14-24] 16  SpO2:  [91 %-98 %] 92 %  BP: ()/(40-99) 84/53  Most recent vitals: BP (!) 84/53   Pulse 78   Temp (!) 101.3 °F (38.5 °C) (Axillary) Comment: nurse notified - rosa rn  Resp 16   Ht 5' 7" (1.702 m)   Wt 108.9 kg (240 lb)   SpO2 (!) 92%   BMI 37.59 kg/m²    GEN: well-developed, well-nourished, awake and alert, non-toxic appearing adult  HEENT: PERRL, sclera anicteric, oral mucosa pink and " moist without lesion  NECK: trachea midline; Good ROM  CV: regular rate and rhythm, no murmurs or gallops  RESP: clear to auscultation bilaterally, no wheezes, rhonci or rales  ABD: soft, non-tender, non-distended, normal bowel sounds  EXT: no swelling or edema, 2+ pulses distally  SKIN: no rashes or jaundice  PSYCH: normal affect    Labs:   Recent Labs     07/18/23  1220   WBC 1.17*   MCV 84        Recent Labs     07/18/23  1220      K 3.1*   CL 99   CO2 29   BUN 22   *     No results for input(s): ALB in the last 72 hours.    Invalid input(s): ALKP, SGOT, SGPT, TBIL, DBIL, TPRO  No results for input(s): PT, INR, PTT in the last 72 hours.      Radiology Review:  US Abdomen Limited   Final Result      CT Abdomen Pelvis With Contrast   Final Result      CTA Chest Non-Coronary (PE Studies)   Final Result      X-Ray Chest 1 View    (Results Pending)   MRI MRCP Without Contrast    (Results Pending)     Us  Impression: The gallbladder surgically absent. The common duct is within normal limits.     Hepatic steatosis and hepatomegaly     Ct abd  1.  Status post cholecystectomy with postsurgical dilatation of the common bile duct and intrahepatic ducts. Should be correlated with bilirubin levels.   2.  Broad-based ventral abdominal wall hernia measures approximately 15 x 13 cm with herniated loops of large and small bowel. No evidence of obstruction.   3.  Exophytic hypoattenuating structure in the mid left kidney is felt to reflect a hemorrhagic cyst.     CT chest:   IMPRESSION:   1. Negative for pulmonary thromboembolism.   2. Scattered lower lung interstitial opacities left greater than right, potentially subsegmental atelectasis and or small airways inflammation.   3. A 7 mm left upper lobe noncalcified pulmonary nodule, nonspecific. Pulmonary neoplasm is not excluded. A follow-up noncontrast chest CT in 6 months is recommended, per Fleischner Society Recommendations. Reference:  Radiology. 2017 Jul;  284(1):228-243.   4. Cardiomegaly, with aortic and coronary arterial calcifications.   5. Hiatal hernia.     MRCP 2012  MRCP from July 11, 2012 shows moderate intrahepatic bile duct dilation and dilation of common duct with normal tapering at the ampulla additional mild dilatation of the pancreatic duct in the pancreatic head . Ampullary neoplasm or stricture cannot be entirely excluded.and gallstones noted at the time also small intestinal diverticulosis    IMPRESSION / RECOMMENDATIONS:  67-year-old female patient with a history of diabetes hypertension sleep apnea and multifactorial anemia with severe pancreatitis and shock.  Current liver injury appears more predominantly hepatocellular and less cholestatic in the setting of her hypotension and fever.  Agree with current MRCP order that is pending.  If cholestatic picture evolves may consider endoscopic ultrasound if MRCP is unable to be performed or unrevealing we will follow with you      Thank you for this consult.    Jeanette Soriano  7/19/2023  12:29 AM

## 2023-07-19 NOTE — ANESTHESIA PROCEDURE NOTES
Central Line    Diagnosis: Inadequate IV access  Patient location during procedure: ICU  Timeout: 7/19/2023 3:18 AM  Procedure end time: 7/19/2023 3:56 AM    Staffing  Authorizing Provider: Harjit Novoa MD  Performing Provider: Harjit Novoa MD    Staffing  Performed: anesthesiologist   Anesthesiologist: Harjit Novoa MD  Anesthesiologist was present at the time of the procedure.  Preanesthetic Checklist  Completed: patient identified, risks and benefits discussed, monitors and equipment checked, pre-op evaluation, timeout performed and anesthesia consent given  Indication   Indication: vascular access, med administration     Anesthesia   local infiltration    Central Line   Skin Prep: skin prepped with ChloraPrep, skin prep agent completely dried prior to procedure  Sterile Barriers Followed: Yes    All five maximal barriers used- gloves, gown, cap, mask, and large sterile sheet    hand hygiene performed prior to central venous catheter insertion  Location: right internal jugular, left internal jugular.   Catheter type: triple lumen  Catheter Size: 7 Fr  Inserted Catheter Length: 19 cm  Ultrasound: vascular probe with ultrasound   Vessel Caliber: small, patent  Vascular Doppler:  not done, compressibility normal  Needle advanced into vessel with real time Ultrasound guidance.  Guidewire confirmed in vessel.  sterile gel and probe cover used in ultrasound-guided central venous catheter insertion  Manometry: none  Insertion Attempts: 2   Securement:line sutured, chlorhexidine patch, sterile dressing applied and blood return through all ports    Post-Procedure   X-Ray: no pneumothorax on x-ray, placement verified by x-ray, tip termination and successful placement  Tip termination comments: SVC   Adverse Events:none      Guidewire Guidewire removed intact. Guidewire removed intact, verified with nurse.  Additional Notes  Initially attempted placement of central line on the right IJ site.  On ultrasound  survey the right internal jugular vein was noted to be collapsed and displayed minimal filling despite Trendelenburg position and asking the patient to Valsalva.  Using ultrasound guidance the right internal jugular vein was attempted to be cannulated but was unable to obtain good blood return sufficient for J-wire and line placement.    The right side was aborted and a sterile prep and drape of the left side and a new kit were used.  Under ultrasound guidance the left IJ was also noted to be significantly collapsed however by this time the patient had been given a bolus of fluid and under ultrasound guidance I was able to successfully place the left internal jugular triple-lumen catheter.    Postprocedure chest x-ray shows the left internal jugular triple-lumen catheter entering on the left crossing to the right side and turning caudad with the tip in the positioned in the superior vena cava.    There was no evidence of pneumothorax bilaterally, and no other complications noted on chest x-ray.    Okay to use left internal jugular triple-lumen catheter.    Please re-consult if needed.

## 2023-07-19 NOTE — CONSULTS
Pulmonary/Critical Care Consult      PATIENT NAME: Chanel Cervantes  MRN: [id number]  TODAY'S DATE: 2023  8:19 AM  ADMIT DATE: 2023  AGE: 67 y.o. : 1955    CONSULT REQUESTED BY: Pradip Gary MD    REASON FOR CONSULT:   Critical care management    HPI:  The patient is a 67-year-old female who was brought to the hospital for back pain.  She was having bilateral back pain and upper abdominal pain that was not controlled with ibuprofen and came it.  She was found to have dilated common bile duct and elevated LFTs.  She is in shock requiring Levophed.  She is bacteremic with E coli and E faecium.  Her ERCP released sludge from the common bile duct. She is very encephalopathic.     REVIEW OF SYSTEMS  Unobtainable.    ALLERGIES  Review of patient's allergies indicates:   Allergen Reactions    Adhesive tape-silicones Rash    Dye Hives    Cephalexin     Iodine     Penicillins Edema    Pneumococcal vaccine     Iodinated contrast media Rash       INPATIENT SCHEDULED MEDICATIONS   aspirin  81 mg Oral Daily    azelastine  1 spray Nasal BID    DULoxetine  60 mg Oral Daily    enoxparin  40 mg Subcutaneous Daily    levothyroxine  75 mcg Oral Before breakfast    meropenem (MERREM) IVPB  1 g Intravenous Q12H    [START ON 2023] vancomycin (VANCOCIN) IV (PEDS and ADULTS)  1,750 mg Intravenous Q24H      lactated ringers 200 mL/hr at 23 0452    NORepinephrine bitartrate-D5W 0.3 mcg/kg/min (23 0737)       MEDICAL AND SURGICAL HISTORY  Past Medical History:   Diagnosis Date    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 2021    Anemia, chronic disease 2021    CAD (coronary artery disease)     Diabetes mellitus, type 2     Hypertension     Iron deficiency anemia following bariatric surgery 2021    MI (myocardial infarction)     Secondary thrombocytosis 2021    Sleep apnea     Sleep apnea 2019    Sleep Right      Past Surgical History:   Procedure Laterality Date    ANGIOGRAM, CORONARY, WITH  "LEFT HEART CATHETERIZATION      APPENDECTOMY      BARIATRIC SURGERY  2019    Dr Nunez    CARPAL TUNNEL RELEASE Bilateral 2002     SECTION      CHOLECYSTECTOMY      COLONOSCOPY      CORONARY ANGIOPLASTY WITH STENT PLACEMENT      CORONARY ARTERY BYPASS GRAFT      EPIDURAL STEROID INJECTION INTO LUMBAR SPINE      x2    ESOPHAGOGASTRODUODENOSCOPY      HERNIA REPAIR  2019    HYSTERECTOMY      THYROID LOBECTOMY Right 2021    Procedure: LOBECTOMY, THYROID;  Surgeon: Mari Chance MD;  Location: [facility];  Service: General;  Laterality: Right;       ALCOHOL, TOBACCO AND DRUG USE  Social History     Tobacco Use   Smoking Status Former    Packs/day: 1.00    Years: 15.00    Pack years: 15.00    Types: Cigarettes    Quit date:     Years since quittin.5   Smokeless Tobacco Never     Social History     Substance and Sexual Activity   Alcohol Use No    Comment: "maybe once a year"     Social History     Substance and Sexual Activity   Drug Use No       FAMILY HISTORY  Family History   Problem Relation Age of Onset    Diabetes Mother     Vision loss Mother     Heart disease Father     Vision loss Father     Stroke Father        VITAL SIGNS (MOST RECENT)  Temp: (!) 101.3 °F (38.5 °C) (23 0630)  Pulse: 104 (23 0809)  Resp: (!) 33 (23 0809)  BP: (!) 106/57 (23 0645)  SpO2: (!) 93 % (23 0809)    INTAKE AND OUTPUT (LAST 24 HOURS):  Intake/Output Summary (Last 24 hours) at 2023 0819  Last data filed at 2023 0750  Gross per 24 hour   Intake 7716.67 ml   Output 950 ml   Net 6766.67 ml       WEIGHT  Wt Readings from Last 1 Encounters:   23 108.9 kg (240 lb)       PHYSICAL EXAM  GENERAL: Older patient very encephalopathic.  HEENT: Pupils equal and reactive. Extraocular movements intact. Nose intact. Pharynx dry.  NECK: Supple.   HEART: Regular rate and rhythm. No murmur or gallop auscultated.  LUNGS: Clear to auscultation and percussion. Lung excursion " symmetrical. No change in fremitus. No adventitial noises.  ABDOMEN: Bowel sounds present. Very large ventral hernia.  Very obese.Non-tender, no masses palpated.  : Normal anatomy.Waller with a little urine.  EXTREMITIES: Normal muscle tone and joint movement, no cyanosis or clubbing. Scar at L shoulder.  LYMPHATICS: No adenopathy palpated, no edema.  SKIN: Dry, intact, no lesions.   NEURO: Encephalopathic  PSYCH: Encephalopathic      CBC LAST (LAST 24 HOURS)  Recent Labs   Lab 07/19/23  0535 07/19/23  0544   WBC 14.26*  --    RBC 4.35  --    HGB 11.4*  --    HCT 36.6* 38   MCV 84  --    MCH 26.2*  --    MCHC 31.1*  --    RDW 15.8*  --      --    MPV 11.6  --    GRAN 95.4*  13.6*  --    LYMPH 1.1*  0.2*  --    MONO 2.5*  0.4  --    BASO 0.02  --    NRBC 0  --        CHEMISTRY LAST (LAST 24 HOURS)  Recent Labs   Lab 07/19/23  0535 07/19/23  0544     --    K 3.3*  --      --    CO2 24  --    ANIONGAP 9  --    BUN 34*  --    CREATININE 1.6*  --    *  --    CALCIUM 8.2*  --    PH  --  7.356   MG 2.0  --    ALBUMIN 2.7*  --    PROT 5.3*  --    ALKPHOS 239*  --    ALT 1,237*  --    AST 2,526*  --    BILITOT 2.7*  --          CARDIAC PROFILE (LAST 24 HOURS)  Recent Labs   Lab 07/18/23  1220 07/18/23  2204 07/19/23  0535   BNP 20  --   --    CPK 92  --   --    TROPONINIHS 14.8   < > 106.5*    < > = values in this interval not displayed.       LAST 7 DAYS MICROBIOLOGY   Microbiology Results (last 7 days)       Procedure Component Value Units Date/Time    Blood culture [[id number]] Collected: 07/19/23 0030    Order Status: Completed Specimen: Blood from Peripheral, Left Hand Updated: 07/19/23 0717     Blood Culture, Routine No Growth to date    Narrative:      Collection has been rescheduled by SRIDEVI at 07/19/2023 00:20 Reason:   Nurse Draw  Collection has been rescheduled by SRIDEVI at 07/19/2023 00:20 Reason:   Nurse Draw    Blood culture [[id number]] Collected: 07/19/23 0002    Order Status: Completed  Specimen: Blood Updated: 07/19/23 0717     Blood Culture, Routine No Growth to date    Rapid Organism ID by PCR (from Blood culture) [[id number]]  (Abnormal) Collected: 07/18/23 1648    Order Status: Completed Updated: 07/19/23 0526     Enterococcus faecalis Not Detected     Enterococcus faecium Detected     Listeria monocytogenes Not Detected     Staphylococcus spp. Not Detected     Staphylococcus aureus Not Detected     Staphylococcus epidermidis Not Detected     Staphylococcus lugdunensis Not Detected     Streptococcus species Not Detected     Streptococcus agalactiae Not Detected     Streptococcus pneumoniae Not Detected     Streptococcus pyogenes Not Detected     Acinetobacter calcoaceticus/baumannii complex Not Detected     Bacteroides fragilis Not Detected     Enterobacterales See species for ID     Enterobacter cloacae complex Not Detected     Escherichia coli Detected     Klebsiella aerogenes Not Detected     Klebsiella oxytoca Not Detected     Klebsiella pneumoniae group Not Detected     Proteus Not Detected     Salmonella sp Not Detected     Serratia marcescens Not Detected     Haemophilus influenzae Not Detected     Neisseria meningtidis Not Detected     Pseudomonas aeruginosa Not Detected     Stenotrophomonas maltophilia Not Detected     Candida albicans Not Detected     Candida auris Not Detected     Candida glabrata Not Detected     Candida krusei Not Detected     Candida parapsilosis Not Detected     Candida tropicalis Not Detected     Cryptococcus neoformans/gattii Not Detected     CTX-M (ESBL ) Not Detected     IMP (Carbapenem resistant) Not Detected     KPC resistance gene (Carbapenem resistant) Not Detected     mcr-1  Not Detected     mec A/C  Test not applicable     mec A/C and MREJ (MRSA) gene Test not applicable     NDM (Carbapenem resistant) Not Detected     OXA-48-like (Carbapenem resistant) Not Detected     van A/B (VRE gene) Not Detected     VIM (Carbapenem resistant) Not Detected     Blood culture [[id number]] Collected: 07/18/23 1738    Order Status: Completed Specimen: Blood Updated: 07/19/23 0523     Blood Culture, Routine Gram stain aer bottle: Gram negative rods      Results called to and read back by:Lesvia Mcdowell RN MICU3.      07/19/2023  05:23 TGC    Blood culture [[id number]] Collected: 07/18/23 1648    Order Status: Completed Specimen: Blood Updated: 07/19/23 0507     Blood Culture, Routine Gram stain aer bottle: Gram negative rods      Results called to and read back by: Lesvia Mcdowell RN MICU3.      07/19/2023  05:06 TGC            MOST RECENT IMAGING  X-Ray Chest AP Portable  Reason: Line placement    FINDINGS:    Portable chest with comparison radiograph July 18, 2023 show unchanged cardiomediastinal silhouette. Enlarged central hilar vascular structures and bilateral perihilar interstitial thickening again noted. Unchanged blunting of the costophrenic angles. No pneumothorax.  Interval placement of left internal jugular central venous catheter with tip near anatomic region of the SVC. Pulmonary vasculature is normal. No acute osseous abnormality.    IMPRESSION:    1.  Unchanged CHF lung pattern.  2.  Left internal jugular central venous catheter tip is in the anatomic region of the SVC. No pneumothorax.    Electronically signed by:  Benjy Woodson DO  7/19/2023 6:59 AM CDT Workstation: [id number]  X-Ray Chest 1 View  Reason: Low oxygen saturation.    FINDINGS:    Portable chest with comparison chest x-ray August 7, 2021 show enlarged cardiomediastinal silhouette. Median sternotomy wires noted.  There is enlargement of the central hilar vascular structures with mild perihilar interstitial thickening. There is blunting of the left costophrenic angle with left basilar hazy opacities. Blunting of the right costophrenic angle. Pulmonary vasculature is normal. No acute osseous abnormality.    IMPRESSION:    1.  Enlarged cardiomediastinal silhouette and central hilar vascular  structures. Mild perihilar interstitial thickening. Findings are felt to reflect CHF with mild pulmonary edema.  2.  Small bilateral pleural effusions.    Electronically signed by:  Benjy Woodson DO  7/19/2023 6:57 AM CDT Workstation: [id number]      CURRENT VISIT EKG  Results for orders placed or performed during the hospital encounter of 07/18/23   EKG 12-lead    Narrative    Test Reason : W19.XXXA,    Vent. Rate : 073 BPM     Atrial Rate : 073 BPM     P-R Int : 154 ms          QRS Dur : 162 ms      QT Int : 426 ms       P-R-T Axes : 069 -17 -11 degrees     QTc Int : 469 ms    Sinus rhythm with Premature ventricular complexes or Fusion complexes  Right bundle branch block  Minimal voltage criteria for LVH, may be normal variant ( R in aVL )  Abnormal ECG  When compared with ECG of 07-AUG-2021 22:34,  Fusion complexes are now Present  Premature ventricular complexes are now Present    Referred By: [email or web address]   SELF           Confirmed By:        ECHOCARDIOGRAM RESULTS  No results found for this or any previous visit.        Oxygen INFORMATION   6 liters           LAST ARTERIAL BLOOD GAS  ABG  Recent Labs   Lab 07/19/23  0544   PH 7.356   PO2 89   PCO2 44.4   HCO3 24.9   BE -1       IMPRESSION AND PLAN  Severe sepsis with shock  - bacteremia with E coli and E faecium  - febrile  - leukocytosis has replaced leukopenia, a good sign  - sludge released from ERCP  - Daptomycin added to Merrem  - wean levophed as tolerated ,on 0.3  Severe transaminitis improving  Elevated lipase improving  Morbid obesity/moderate hypoalbuminemia  Hypokalemia  - replacing  Anemia  Lactic acidosis  - trend  Pulmonary edema  - persisting  - will have to tolerate as patient needs volume    Discussed with nursing and Respiratory and GI and Infectious Disease    Critical care time spent reviewing the chart, examining the patient, reviewing the labs, reviewing the radiological findings, discussing care with nursing, physicians, and respiratory  and creating the note and  has been greater than 35 minutes.      Mariajose Serrano MD  Date of Service: 07/19/2023  8:19 AM

## 2023-07-19 NOTE — ASSESSMENT & PLAN NOTE
Sludge ball removed via ERCP. Likely source of E coli bacteremia.  -Continue meropenem, renally dosed; follow sensitivities  -GI following  -IV fluids  -Levophed infusion  -Follow up repeat blood cultures  -Trend LFTs

## 2023-07-19 NOTE — PROGRESS NOTES
[facility]  Progress Note    Patient Name: Chanel Cervantes  MRN: [id number]  Patient Class: IP- Inpatient   Admission Date: 7/18/2023  Length of Stay: 0 days  Attending Physician: Pradip Gary MD  Primary Care Provider: ARIC Almaguer        Subjective:     Principal Problem:Septic shock        HPI:  Chanel Cervantes is a 67 y.o. White female   With PMH of DM2, HTN, bariatric surgery,  Remote cholecystectomy,  Frequent UTIs with kidney stones,  who presents with back pain.  Admitted for pancreatitis with elevated LFTs     Pt is currently confused after receiving ativan  (ER gave it to calm her for CT scan)  History taken from family at bedside     Onset of back pain overnight  Located b/l lumbar region  Radiating to b/l upper quadrants of abdomen  Occurring intermittently  Progressively worsening  Severe, causes SOB when occurring     Initially pt thought pain was due to a fall yesterday  (Mechanical, tripped over a rug, no head trauma)  +nausea, no vomiting  +intermittent chills  They are not sure about objective fever     Has had similar abdominal pain previously  Per family, this has been attributed to her ongoing ventral wall hernia  They don't bring her to ER for this usually  However pain today was much more severe than usual      Overview/Hospital Course:  Chanel Cervantes is a 67 year old female with a past medical history of obesity, ventral hernia, DM, HTN, anemia, CAD, NIKOLAS, and hypothyroidism who presented with septic shock secondary to a possible cholangitis with E coli bacteremia. Vancomycin has been discontinued and she remains on meropenem. BP is also maintained on a Levophed infusion. GI was consulted and performed ERCP which showed biliary sludge with a sludge ball dilating the main duct which was removed; a biliary sphincterectomy was also performed. Repeat blood cultures are negative. She is also no IV fluids with LR at this time and is NPO as she is  "encephalopathic. She also has an ANTHONY as well. She also has acute hypoxic respiratory failure for which she is on mask O2. She also has demand ischemia; TTE is pending and troponin is being trended. There is also likely shock liver superimposed on the hepatocellular injury brought on by the acute cholangitis. Pulmonary/Critical Care has also been consulted given the patient's medical acuity.      Interval History: see "Hospital Course"    Review of Systems   Unable to perform ROS: Mental status change   Objective:     Vital Signs (Most Recent):  Temp: (!) 101 °F (38.3 °C) (07/19/23 0745)  Pulse: 100 (07/19/23 0945)  Resp: (!) 31 (07/19/23 0945)  BP: (!) 100/57 (07/19/23 0809)  SpO2: (!) 92 % (07/19/23 0945) Vital Signs (24h Range):  Temp:  [97 °F (36.1 °C)-101.3 °F (38.5 °C)] 101 °F (38.3 °C)  Pulse:  [2-109] 100  Resp:  [14-44] 31  SpO2:  [88 %-99 %] 92 %  BP: ()/(32-99) 100/57  Arterial Line BP: (107-172)/(17-48) 117/46     Weight: 108.9 kg (239 lb 15.9 oz)  Body mass index is 37.59 kg/m².    Intake/Output Summary (Last 24 hours) at 7/19/2023 1045  Last data filed at 7/19/2023 0750  Gross per 24 hour   Intake 7716.67 ml   Output 950 ml   Net 6766.67 ml         Physical Exam  Vitals and nursing note reviewed.   Constitutional:       Appearance: She is obese. She is ill-appearing.   HENT:      Head: Normocephalic and atraumatic.      Right Ear: External ear normal.      Left Ear: External ear normal.      Nose: Nose normal.      Mouth/Throat:      Mouth: Mucous membranes are moist.      Pharynx: Oropharynx is clear.   Eyes:      Extraocular Movements: Extraocular movements intact.      Conjunctiva/sclera: Conjunctivae normal.   Neck:      Comments: IJ CVC.  Cardiovascular:      Rate and Rhythm: Regular rhythm. Tachycardia present.      Pulses: Normal pulses.      Heart sounds: Normal heart sounds.   Pulmonary:      Effort: Tachypnea present.      Comments: Mask O2.  Abdominal:      General: There is no " distension.      Tenderness: There is abdominal tenderness.      Comments: Abdominal hernia.   Genitourinary:     Comments: Waller.  Musculoskeletal:         General: Normal range of motion.      Cervical back: Normal range of motion and neck supple.      Right lower leg: No edema.      Left lower leg: No edema.   Skin:     General: Skin is warm and dry.   Neurological:      Mental Status: She is disoriented.           Significant Labs: All pertinent labs within the past 24 hours have been reviewed.    Significant Imaging: I have reviewed all pertinent imaging results/findings within the past 24 hours.      Assessment/Plan:      * Septic shock  Secondary to acute cholangitis with E coli bacteremia.  -Meropenem, renally dosed  -Follow up E coli sensitivities  -Follow up repeat blood cultures  -S/p ERCP; GI following  -Trend lactic acid  -Continue LR IV fluids and Levophed to keep MAP > 65      Acute encephalopathy  Metabolic in setting of septic shock.  -Avoid restraints if possible  -Delirium precautions  -Treat septic shock      E coli bacteremia  -Follow up repeat blood cultures  -Continue meropenem, follow up sensitivities      Shock liver  -Treat cholangitis  -Trend LFTs  -Continue IV fluids and Levophed      ANTHONY (acute kidney injury)  In setting of septic shock.  -Continue Levophed and IV fluids  -Renally dose medications  -Avoid nephrotoxic agents  -Monitor UOP and electrolytes  -Trend creatinine    Demand ischemia  History of CAD s/p CABG. No acute ST changes on EKG.  -TTE  -Trend troponin  -Treat septic shock  -Telemetry    Acute hypoxemic respiratory failure  -Mask O2  -Caution with IV fluids  -ABG and CXR PRN  -Treat septic shock  -Pulmonary/Critical Care consulted    Acute obstructive cholangitis  Sludge ball removed via ERCP. Likely source of E coli bacteremia.  -Continue meropenem, renally dosed; follow sensitivities  -GI following  -IV fluids  -Levophed infusion  -Follow up repeat blood cultures  -Trend  LFTs      Anemia  Stable.  -Trend Hgb with CBC      Obesity (BMI 30-39.9)  Body mass index is 37.59 kg/m². Morbid obesity complicates all aspects of disease management from diagnostic modalities to treatment.       Hypothyroidism  TSH unremarkable.  -Continue Synthroid      CAD (coronary artery disease)  -Hold home medications  -Telemetry      Hypokalemia  -Trend K  -Replete K PRN  -Telemetry      S/P bariatric surgery  Noted.      NIKOLAS on CPAP  -CPAP QHS if able to tolerated    Type 2 diabetes mellitus treated with insulin  Patient's FSGs are controlled on current medication regimen.  Last A1c reviewed-   Lab Results   Component Value Date    HGBA1C 6.9 (H) 07/18/2023     Most recent fingerstick glucose reviewed- No results for input(s): POCTGLUCOSE in the last 24 hours.  Current correctional scale  Medium  Maintain anti-hyperglycemic dose as follows-   Antihyperglycemics (From admission, onward)    Start     Stop Route Frequency Ordered    07/19/23 1018  insulin aspart U-100 pen 1-10 Units         -- SubQ Every 6 hours PRN 07/19/23 0920        Hold Oral hypoglycemics while patient is in the hospital.    Benign essential hypertension  -Hold home medications in setting of shock        VTE Risk Mitigation (From admission, onward)         Ordered     enoxaparin injection 40 mg  Daily         07/18/23 1642     IP VTE HIGH RISK PATIENT  Once         07/18/23 1642     Place sequential compression device  Until discontinued         07/18/23 1642                Discharge Planning   LUIS ALFREDO:      Code Status: Full Code   Is the patient medically ready for discharge?:     Reason for patient still in hospital (select all that apply): Patient trending condition, Laboratory test, Treatment and Consult recommendations                     Pradip Gary MD  Department of Hospital Medicine   [facility]

## 2023-07-19 NOTE — PROVATION PATIENT INSTRUCTIONS
Discharge Summary/Instructions after an Endoscopic Procedure  Patient Name: Chanel Cervantes  Patient MRN: [id number]  Patient YOB: 1955 Wednesday, July 19, 2023  Lavon Hernandez III, MD  RESTRICTIONS:  During your procedure today, you received medications for sedation.  These   medications may affect your judgment, balance and coordination.  Therefore,   for 24 hours, you have the following restrictions:   - DO NOT drive a car, operate machinery, make legal/financial decisions,   sign important papers or drink alcohol.    ACTIVITY:  Today: no heavy lifting, straining or running due to procedural   sedation/anesthesia.  The following day: return to full activity including work.  DIET:  Eat and drink normally unless instructed otherwise.     TREATMENT FOR COMMON SIDE EFFECTS:  - Mild abdominal pain, nausea, belching, bloating or excessive gas:  rest,   eat lightly and use a heating pad.  - Sore Throat: treat with throat lozenges and/or gargle with warm salt   water.  - Because air was used during the procedure, expelling large amounts of air   from your rectum or belching is normal.  - If a bowel prep was taken, you may not have a bowel movement for 1-3 days.    This is normal.  SYMPTOMS TO WATCH FOR AND REPORT TO YOUR PHYSICIAN:  1. Abdominal pain or bloating, other than gas cramps.  2. Chest pain.  3. Back pain.  4. Signs of infection such as: chills or fever occurring within 24 hours   after the procedure.  5. Rectal bleeding, which would show as bright red, maroon, or black stools.   (A tablespoon of blood from the rectum is not serious, especially if   hemorrhoids are present.)  6. Vomiting.  7. Weakness or dizziness.  GO DIRECTLY TO THE NEAREST EMERGENCY ROOM IF YOU HAVE ANY OF THE FOLLOWING:      Difficulty breathing              Chills and/or fever over 101 F   Persistent vomiting and/or vomiting blood   Severe abdominal pain   Severe chest pain   Black, tarry stools   Bleeding- more than one  tablespoon   Any other symptom or condition that you feel may need urgent attention  Your doctor recommends these additional instructions:  If any biopsies were taken, your doctors clinic will contact you in 1 to 2   weeks with any results.  - Continue present medications especially antibiotics.   - Patient has a contact number available for emergencies.  The signs and   symptoms of potential delayed complications were discussed with the   patient.  Return to normal activities tomorrow.  Written discharge   instructions were provided to the patient.   - If any signs of further clinical deterioration, consider repeat CT to   re-evaluate hernia to ensure no concerns. Patient also now w/ + urine   cultures as well.   - Return patient to ICU for ongoing care.  For questions, problems or results please call your physician - Lavon Hernandez III, MD at Work:  [phone].  [facility], EMERGENCY ROOM PHONE NUMBER: [phone]  IF A COMPLICATION OR EMERGENCY SITUATION ARISES AND YOU ARE UNABLE TO REACH   YOUR PHYSICIAN - GO DIRECTLY TO THE EMERGENCY ROOM.  Lavon Hernandez III, MD  7/19/2023 7:48:50 AM  This report has been verified and signed electronically.  Dear patient,  As a result of recent federal legislation (The Federal Cures Act), you may   receive lab or pathology results from your procedure in your [facility]   account before your physician is able to contact you. Your physician or   their representative will relay the results to you with their   recommendations at their soonest availability.  Thank you,  PROVATION

## 2023-07-19 NOTE — ANESTHESIA PROCEDURE NOTES
Arterial    Diagnosis: hypotension    Patient location during procedure: ICU  Procedure start time: 7/19/2023 3:10 AM  Timeout: 7/19/2023 3:00 AM  Procedure end time: 7/19/2023 3:10 AM    Staffing  Authorizing Provider: Harjit Novoa MD  Performing Provider: Harjit Novoa MD    Anesthesiologist was present at the time of the procedure.    Preanesthetic Checklist  Completed: patient identified, risks and benefits discussed, monitors and equipment checked, timeout performed and anesthesia consent givenArterial  Skin Prep: chlorhexidine gluconate  Local Infiltration: lidocaine  Orientation: right  Location: radial    Catheter Size: 20 G  Catheter placement by Ultrasound guidance. Heme positive aspiration all ports.   Vessel Caliber: medium, patent, compressibility normal  Vascular Doppler:  not done  Needle advanced into vessel with real time Ultrasound guidance.  Sterile sheath used.Insertion Attempts: 1  Assessment  Dressing: sutured in place and taped and tegaderm  Patient: Tolerated well

## 2023-07-19 NOTE — ANESTHESIA POSTPROCEDURE EVALUATION
Anesthesia Post Evaluation    Patient: Chanel Cervantes    Procedure(s) Performed: Procedure(s) (LRB):  ERCP (ENDOSCOPIC RETROGRADE CHOLANGIOPANCREATOGRAPHY) (N/A)    Final Anesthesia Type: general      Patient location during evaluation: ICU  Patient participation: Yes- Able to Participate  Level of consciousness: awake and alert  Post-procedure vital signs: reviewed and stable  Pain management: adequate  Airway patency: patent    PONV status at discharge: No PONV  Anesthetic complications: no      Cardiovascular status: blood pressure returned to baseline and stable (On levephed before the case and currently)  Respiratory status: unassisted and face mask  Hydration status: euvolemic  Follow-up not needed.          Vitals Value Taken Time   /57 07/19/23 0809   Temp 101 07/19/23 0922   Pulse 100 07/19/23 0920   Resp 24 07/19/23 0920   SpO2 91 % 07/19/23 0920   Vitals shown include unvalidated device data.      No case tracking events are documented in the log.      Pain/Kellee Score: Pain Rating Prior to Med Admin: 7 (7/18/2023  9:08 PM)  Pain Rating Post Med Admin: 0 (7/18/2023 10:08 PM)

## 2023-07-19 NOTE — TRANSFER OF CARE
"Anesthesia Transfer of Care Note    Patient: Chanel Cervantes    Procedure(s) Performed: Procedure(s) (LRB):  ERCP (ENDOSCOPIC RETROGRADE CHOLANGIOPANCREATOGRAPHY) (N/A)    Patient location: PACU    Anesthesia Type: general    Transport from OR: Transported from OR on 6-10 L/min O2 by face mask with adequate spontaneous ventilation. Continuous ECG monitoring in transport. Continuous SpO2 monitoring in transport. Continuos invasive BP monitoring in transport    Post pain: adequate analgesia    Post assessment: no apparent anesthetic complications    Post vital signs: stable    Level of consciousness: awake and confused    Nausea/Vomiting: no nausea/vomiting    Complications: none    Transfer of care protocol was followed      Last vitals:   Visit Vitals  BP (!) 106/57   Pulse 107   Temp (!) 38.5 °C (101.3 °F) (Axillary)   Resp (!) 29   Ht 5' 7" (1.702 m)   Wt 108.9 kg (240 lb)   SpO2 95%   BMI 37.59 kg/m²     "

## 2023-07-19 NOTE — PROGRESS NOTES
Chanel Cervantes [id number] is a 67 y.o. female who has been consulted for vancomycin dosing.    Pharmacy consult for vancomycin dosing in no longer required.  Vancomycin was discontinued.    Thank you for allowing us to participate in this patient's care.     Teresa Mallory, Pharm.D

## 2023-07-19 NOTE — SUBJECTIVE & OBJECTIVE
"Interval History: see "Hospital Course"    Review of Systems   Unable to perform ROS: Mental status change   Objective:     Vital Signs (Most Recent):  Temp: (!) 101 °F (38.3 °C) (07/19/23 0745)  Pulse: 100 (07/19/23 0945)  Resp: (!) 31 (07/19/23 0945)  BP: (!) 100/57 (07/19/23 0809)  SpO2: (!) 92 % (07/19/23 0945) Vital Signs (24h Range):  Temp:  [97 °F (36.1 °C)-101.3 °F (38.5 °C)] 101 °F (38.3 °C)  Pulse:  [2-109] 100  Resp:  [14-44] 31  SpO2:  [88 %-99 %] 92 %  BP: ()/(32-99) 100/57  Arterial Line BP: (107-172)/(17-48) 117/46     Weight: 108.9 kg (239 lb 15.9 oz)  Body mass index is 37.59 kg/m².    Intake/Output Summary (Last 24 hours) at 7/19/2023 1045  Last data filed at 7/19/2023 0750  Gross per 24 hour   Intake 7716.67 ml   Output 950 ml   Net 6766.67 ml         Physical Exam  Vitals and nursing note reviewed.   Constitutional:       Appearance: She is obese. She is ill-appearing.   HENT:      Head: Normocephalic and atraumatic.      Right Ear: External ear normal.      Left Ear: External ear normal.      Nose: Nose normal.      Mouth/Throat:      Mouth: Mucous membranes are moist.      Pharynx: Oropharynx is clear.   Eyes:      Extraocular Movements: Extraocular movements intact.      Conjunctiva/sclera: Conjunctivae normal.   Neck:      Comments: IJ CVC.  Cardiovascular:      Rate and Rhythm: Regular rhythm. Tachycardia present.      Pulses: Normal pulses.      Heart sounds: Normal heart sounds.   Pulmonary:      Effort: Tachypnea present.      Comments: Mask O2.  Abdominal:      General: There is no distension.      Tenderness: There is abdominal tenderness.      Comments: Abdominal hernia.   Genitourinary:     Comments: Waller.  Musculoskeletal:         General: Normal range of motion.      Cervical back: Normal range of motion and neck supple.      Right lower leg: No edema.      Left lower leg: No edema.   Skin:     General: Skin is warm and dry.   Neurological:      Mental Status: She is " disoriented.           Significant Labs: All pertinent labs within the past 24 hours have been reviewed.    Significant Imaging: I have reviewed all pertinent imaging results/findings within the past 24 hours.

## 2023-07-19 NOTE — ANESTHESIA PREPROCEDURE EVALUATION
2023  Chanel Cervantes is a 67 y.o., female.      Patient Active Problem List   Diagnosis    Benign essential hypertension    Mixed hyperlipidemia    H/O acute myocardial infarction    Type 2 diabetes mellitus treated with insulin    Candidal intertrigo    Hx of CABG    Vitamin D deficiency    Venous insufficiency    Other insomnia    NIKOLAS on CPAP    S/P bariatric surgery    Chronic bilateral low back pain with bilateral sciatica    H/O ventral hernia repair    Arthritis of carpometacarpal (CMC) joint of both thumbs    Microcytic anemia    Iron deficiency anemia following bariatric surgery    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    Anemia, chronic disease    Secondary thrombocytosis    Closed fracture of neck of right humerus with delayed healing    Thyroid nodule    Hypokalemia    Multinodular goiter    History of lobectomy of thyroid    CAD (coronary artery disease)    Osteoarthritis    Abnormal LFTs    Pancreatitis       Past Surgical History:   Procedure Laterality Date    ANGIOGRAM, CORONARY, WITH LEFT HEART CATHETERIZATION      APPENDECTOMY      BARIATRIC SURGERY  2019    Dr Nunez    CARPAL TUNNEL RELEASE Bilateral 2002     SECTION      CHOLECYSTECTOMY      COLONOSCOPY      CORONARY ANGIOPLASTY WITH STENT PLACEMENT      CORONARY ARTERY BYPASS GRAFT      EPIDURAL STEROID INJECTION INTO LUMBAR SPINE      x2    ESOPHAGOGASTRODUODENOSCOPY      HERNIA REPAIR  2019    HYSTERECTOMY      THYROID LOBECTOMY Right 2021    Procedure: LOBECTOMY, THYROID;  Surgeon: Mari Chance MD;  Location[facility];  Service: General;  Laterality: Right;        Tobacco Use:  The patient  reports that she quit smoking about 25 years ago. She has a 15.00 pack-year smoking history. She has never used smokeless tobacco.     Results for orders placed or performed  during the hospital encounter of 07/18/23   EKG 12-lead    Collection Time: 07/18/23 11:24 AM    Narrative    Test Reason : W19.XXXA,    Vent. Rate : 073 BPM     Atrial Rate : 073 BPM     P-R Int : 154 ms          QRS Dur : 162 ms      QT Int : 426 ms       P-R-T Axes : 069 -17 -11 degrees     QTc Int : 469 ms    Sinus rhythm with Premature ventricular complexes or Fusion complexes  Right bundle branch block  Minimal voltage criteria for LVH, may be normal variant ( R in aVL )  Abnormal ECG  When compared with ECG of 07-AUG-2021 22:34,  Fusion complexes are now Present  Premature ventricular complexes are now Present    Referred By: [email or web address]   SELF           Confirmed By:              Lab Results   Component Value Date    WBC 14.26 (H) 07/19/2023    HGB 11.4 (L) 07/19/2023    HCT 38 07/19/2023    MCV 84 07/19/2023     07/19/2023     BMP  Lab Results   Component Value Date     07/19/2023    K 3.3 (L) 07/19/2023     07/19/2023    CO2 24 07/19/2023    BUN 34 (H) 07/19/2023    CREATININE 1.6 (H) 07/19/2023    CALCIUM 8.2 (L) 07/19/2023    ANIONGAP 9 07/19/2023     (H) 07/19/2023     07/19/2023     (H) 07/18/2023       No results found for this or any previous visit.            Pre-op Assessment    I have reviewed the Patient Summary Reports.    I have reviewed the Nursing Notes. I have reviewed the NPO Status.   I have reviewed the Medications.     Review of Systems  Anesthesia Hx:  Denies Family Hx of Anesthesia complications.   Denies Personal Hx of Anesthesia complications.   Social:  Former Smoker    Hematology/Oncology:         -- Anemia:   Cardiovascular:   Exercise tolerance: good Hypertension Past MI CAD (elevated troponins, no chest pain) asymptomatic CABG/stent (1998)  ECG has been reviewed.    Pulmonary:   Sleep Apnea (resolved post bariatric surgery) Does not consistently use CPAP per daughter   Education provided regarding risk of obstructive sleep apnea      Hepatic/GI:   Severe abdominal pain, history of biliary obstruction. Elevated LFTs.   Musculoskeletal:   Arthritis   Spine Disorders: cervical and lumbar    Neurological:   Neuromuscular Disease,   Chronic Pain Syndrome   Endocrine:   Diabetes, type 2  Obesity / BMI > 30  Sepsis    Physical Exam  General:  Well nourished and Obesity      Airway/Jaw/Neck:  Airway Findings: Mouth Opening: Normal   Tongue: Normal   General Airway Assessment: Adult Mallampati: IV  Improves to III with phonation.  TM Distance: 4 - 6 cm   Jaw/Neck Findings:  Neck ROM: Normal ROM      Eyes/Ears/Nose:  Eyes/Ears/Nose Findings:    Dental:  Dental Findings: In tact     Chest/Lungs:  Chest/Lungs Findings: Clear to auscultation, Normal Respiratory Rate      Heart/Vascular:  Heart Findings: Rate: Normal  Rhythm: Regular Rhythm  Sounds: Normal     Abdomen:  Abdomen Findings:     Musculoskeletal:  Musculoskeletal Findings:    Skin:  Skin Findings:     Mental Status:  Mental Status Findings:  Alert and Oriented         Anesthesia Plan  Type of Anesthesia, risks & benefits discussed:  Anesthesia Type:  general    Patient's Preference:   Plan Factors:          Intra-op Monitoring Plan: standard ASA monitors  Intra-op Monitoring Plan Comments:   Post Op Pain Control Plan: multimodal analgesia  Post Op Pain Control Plan Comments:     Induction:   IV  Beta Blocker:  Patient is on a Beta-Blocker and has received one dose within the past 24 hours (No further documentation required).       Informed Consent: Informed consent signed with the Patient and all parties understand the risks and agree with anesthesia plan.  All questions answered.  Anesthesia consent signed with patient.  ASA Score: 3     Day of Surgery Review of History & Physical:        Anesthesia Plan Notes: KEVEN Mora Ofirmev  Sleep apnea precautions awake extubation, and sitting up positioning.  PONV prophylaxis with Pepcid 20 mg IV, and Zofran 4 mg IV.          Ready For Surgery From  Anesthesia Perspective.           Physical Exam  General: Well nourished and Obesity    Airway:  Mallampati: IV / III  Mouth Opening: Normal  TM Distance: 4 - 6 cm  Tongue: Normal  Neck ROM: Normal ROM    Dental:  In tact    Chest/Lungs:  Clear to auscultation, Normal Respiratory Rate    Heart:  Rate: Normal  Rhythm: Regular Rhythm  Sounds: Normal          Anesthesia Plan  Type of Anesthesia, risks & benefits discussed:    Anesthesia Type: general  Intra-op Monitoring Plan: standard ASA monitors  Post Op Pain Control Plan: multimodal analgesia  Induction:  IV  Airway Plan: Video  Informed Consent: Informed consent signed with the Patient and all parties understand the risks and agree with anesthesia plan.  All questions answered.   ASA Score: 3  Anesthesia Plan Notes: GETA  No Ofirmev  Sleep apnea precautions awake extubation, and sitting up positioning.  PONV prophylaxis with Pepcid 20 mg IV, and Zofran 4 mg IV.      Ready For Surgery From Anesthesia Perspective.       .

## 2023-07-19 NOTE — CARE UPDATE
07/19/23 0809   Patient Assessment/Suction   Level of Consciousness (AVPU) responds to voice   Respiratory Effort Unlabored   Expansion/Accessory Muscles/Retractions no use of accessory muscles   All Lung Fields Breath Sounds clear;diminished   Rhythm/Pattern, Respiratory unlabored   PRE-TX-O2   Device (Oxygen Therapy) Oxymask   $ Is the patient on Low Flow Oxygen? Yes   Flow (L/min) 6   SpO2 (!) 93 %   Pulse Oximetry Type Continuous   $ Pulse Oximetry - Single Charge Pulse Oximetry - Single   Pulse 104   Resp (!) 33   Aerosol Therapy   $ Aerosol Therapy Charges PRN treatment not required   Education   $ Education Other (see comment);15 min  (sats)        23-Oct-2018 01:37

## 2023-07-19 NOTE — PLAN OF CARE
GPCs now growing from second blood culture set. Daptomycin added, ID following, and TTE pending as well as repeat blood cultures..

## 2023-07-19 NOTE — ASSESSMENT & PLAN NOTE
Secondary to acute cholangitis with E coli bacteremia.  -Meropenem, renally dosed  -Follow up E coli sensitivities  -Follow up repeat blood cultures  -S/p ERCP; GI following  -Trend lactic acid  -Continue LR IV fluids and Levophed to keep MAP > 65

## 2023-07-19 NOTE — ASSESSMENT & PLAN NOTE
Patient's FSGs are controlled on current medication regimen.  Last A1c reviewed-   Lab Results   Component Value Date    HGBA1C 6.9 (H) 07/18/2023     Most recent fingerstick glucose reviewed- No results for input(s): POCTGLUCOSE in the last 24 hours.  Current correctional scale  Medium  Maintain anti-hyperglycemic dose as follows-   Antihyperglycemics (From admission, onward)    Start     Stop Route Frequency Ordered    07/19/23 1018  insulin aspart U-100 pen 1-10 Units         -- SubQ Every 6 hours PRN 07/19/23 0920        Hold Oral hypoglycemics while patient is in the hospital.

## 2023-07-19 NOTE — ASSESSMENT & PLAN NOTE
History of CAD s/p CABG. No acute ST changes on EKG.  -TTE  -Trend troponin  -Treat septic shock  -Telemetry

## 2023-07-19 NOTE — PROGRESS NOTES
VANCOMYCIN PHARMACOKINETIC NOTE:  Vancomycin Day # 1    Objective/Assessment:    Diagnosis/Indication for Vancomycin:Bacteremia      67 y.o., female; Actual Body Weight = 108.9 kg (240 lb).    The patient has the following labs:  7/19/2023 Estimated Creatinine Clearance: 43.4 mL/min (A) (based on SCr of 1.6 mg/dL (H)). Lab Results   Component Value Date    BUN 34 (H) 07/19/2023     Lab Results   Component Value Date    WBC 14.26 (H) 07/19/2023          Plan:  Adjust vancomycin dose and/or frequency based on the patient's actual weight and renal function:  Initiate Vancomycin 1750 mg IV every 24 hours following 2000 mg loading dose.  Orders have been entered into patient's chart.        Vancomycin trough level has been ordered for 7/22 at 05:00.    Pharmacy will manage vancomycin therapy, monitor serum vancomycin levels, monitor renal function and adjust regimen as necessary.    Thank you for allowing us to participate in this patient's care.     Franny Tristan 7/19/2023   Department of Pharmacy  Ext [phone]

## 2023-07-19 NOTE — ASSESSMENT & PLAN NOTE
-Mask O2  -Caution with IV fluids  -ABG and CXR PRN  -Treat septic shock  -Pulmonary/Critical Care consulted

## 2023-07-19 NOTE — ASSESSMENT & PLAN NOTE
In setting of septic shock.  -Continue Levophed and IV fluids  -Renally dose medications  -Avoid nephrotoxic agents  -Monitor UOP and electrolytes  -Trend creatinine

## 2023-07-19 NOTE — PROGRESS NOTES
Pharmacist Renal Dose Adjustment Note    Chanel Cervantes is a 67 y.o. female being treated with the medication Meropenem    Patient Data:    Vital Signs (Most Recent):  Temp: (!) 101.3 °F (38.5 °C) (nurse notified - rosa rn) (07/18/23 2252)  Pulse: 109 (07/19/23 0544)  Resp: (!) 27 (07/19/23 0544)  BP: (!) 118/58 (07/19/23 0544)  SpO2: 95 % (07/19/23 0544) Vital Signs (72h Range):  Temp:  [97 °F (36.1 °C)-101.3 °F (38.5 °C)]   Pulse:  [2-109]   Resp:  [14-27]   BP: ()/(40-99)   SpO2:  [91 %-98 %]      Recent Labs   Lab 07/18/23  1220 07/19/23  0030 07/19/23  0535   CREATININE 1.1 1.8* 1.6*     Serum creatinine: 1.6 mg/dL (H) 07/19/23 0535  Estimated creatinine clearance: 43.4 mL/min (A)    Medication:Meropenem 1 g every 8 hours will be changed to 1 g every 12 hours per Automatic Renal Dose Adjustment by Pharmacy Policy     Pharmacist's Name: Teresa Mallory  Pharmacist's Extension: [phone]

## 2023-07-19 NOTE — SIGNIFICANT EVENT
Responded to rapid response called overhead on 3000 at around 11:15 p.m..  Rapid response called for hypotension with systolics in the 60s and ongoing altered mental status.  Collateral from daughter present at bedside.  States patient had trip and fall earlier this morning, she has chronic back pain and abdominal ventral hernia, abdominal pain worsening which prompted ED presentation.  Reports history of gastric sleeve and cholecystectomy.  [facility] patient is extremely sensitive to medications including morphine and dilaudid.  On presentation labs with leukopenia, acute kidney injury, T bilirubin 1.8 with transaminitis with AST 4943, ALT 1475, , lipase 1393.  Imaging studies with post cholecystectomy changes and reported CBD at 4 mm.  Patient has listed allergy to contrast and she received dexamethasone 8 mg, Benadryl 25 mg, Ativan 1 mg at around 1:00 p.m. for CTA.  Daughter states since then she has been somnolent, decreased responsiveness, slept through 4 peripheral IV attempts.  Additionally she received oxycodone 5 mg around 9:00 p.m..    Patient currently in reverse Trendelenburg.  Somnolent and lethargic, does open eyes and states first name with insistence, is following simple commands extremities.  Abdomen with tenderness to palpation.  Now febrile to 101.3.  Systolic blood pressure in the 80s.  Transfer to ICU given concern for septic shock with possible ascending cholangitis. Stat consult Gastroenterology placed for consideration ERCP, discussed with on-call Gastroenterology, Dr. Soriano, reviewed case, I expressed concerns for developing septic shock and cholangitis, Dr. Soriano impressions LFTs more hepatocellular injury rather than cholestatic picture, more concerned for evolving pancreatitis, recommends MRCP (will obtain if patient stable), [facility] will consider EUS tomorrow morning and patient will be seen in consultation.     Transfer to ICU.  IV fluid bolus, may need addition of Levophed depending  on blood pressure trend.  Stat labs placed including lactic acid and ABG.  Blood cultures submitted.  Continue empiric meropenem.  Consult anesthesiology for central line and arterial line.  Discontinue home metoprolol and losartan.  Avoid sedative/pain medication if possible.  Continue close monitoring in ICU.  Discussed with daughter at bedside.  Further plan as per clinical course.    Critical care time spent including chart review review, review of laboratory and radiological findings, patient encounter, discussion with nursing/consultants/family,  and note preparation 48 minutes.     Update  Patient now on Levophed.  Lactic acid 5.9 and will continue to trend.  Potassium 2.8 and replaced.  Central line and arterial line has been placed.  Low diastolic blood pressure.  Further fluid bolus given.  Blood culture positive with Enterococcus faecalis and E coli.  Added vancomycin to meropenem.  Updated GI, Dr. Hernandez, planning ERCP at 7 am .  Critical Care consulted for septic shock.    Stormy Beckett MD  Covering night physician   no

## 2023-07-19 NOTE — CARE UPDATE
07/19/23 0544   Patient Assessment/Suction   Level of Consciousness (AVPU) responds to voice   Respiratory Effort Normal;Unlabored   Expansion/Accessory Muscles/Retractions no use of accessory muscles   PRE-TX-O2   Device (Oxygen Therapy) Oxymask   $ Is the patient on Low Flow Oxygen? Yes   Flow (L/min) 5   SpO2 95 %   Pulse Oximetry Type Continuous   $ Pulse Oximetry - Multiple Charge Pulse Oximetry - Multiple   Pulse 109   Resp (!) 27   BP (!) 118/58   Labs   $ Was an ABG obtained? Arterial Blood Draw from Existing Line;ISTAT - Blood gas;ISTAT - Calcium;ISTAT - Hematocrit;ISTAT - Potassium;ISTAT - Sodium   $ Labs Tech Time 15 min   Respiratory Evaluation   $ Care Plan Tech Time 15 min

## 2023-07-19 NOTE — CONSULTS
Consult Note  Infectious Disease    Reason for Consult:  bacteremia E coli and E faecium     HPI: Chanel Cervantes is a 67 y.o. female obese, BMI 37.6 kg/M2, with past medical history of diabetes, hypertension, bariatric surgery, cholecystectomy, prior UTIs, and prior pancreatitis secondary to gallstones.     She presented with acute onset of back pain, radiating into bilateral upper quadrants, with associated shortness of breath.  She was admitted for septic shock, transferred to ICU for further management.    Labs on admission with severe leukopenia 1.1, left shift 87%, bands 8%, H&H 14/44.2, platelet count 213   Creatinine 1.1   Hypokalemia 3.1   Transaminitis, AST 4943/ALT 1475   Total bili 2.7  Lipase 1363 down to 102  Patient CPK 92   Lactic acid 4.6  Hemoglobin A1c 6.9  UA pyuria 6, negative for bacteria, urine culture in process     CT abdomen with broad-based ventral abdominal hernia.  No evidence of obstruction.  Status post cholecystectomy with postsurgical dilatation of the common bile duct and intrahepatic ducts.    Seen by GI, s/p ERCP this morning; with evidence of the entire main bile duct was moderately                      dilated, with an obstruction from suspected sludge ball. A biliary sphincterotomy was performed. The biliary tree was swept.     ERCP this morning.  Hospital course complicated persistent fever and polymicrobial bacteremia, Enterococcus faecium and E coli, no resistance genes detected on [employer], pending sensitivities.      ID consult for E coli and E faecium bacteremia.    Review of patient's allergies indicates:   Allergen Reactions    Adhesive tape-silicones Rash    Dye Hives    Cephalexin     Iodine     Penicillins Edema    Pneumococcal vaccine     Iodinated contrast media Rash     Past Medical History:   Diagnosis Date    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 1/24/2021    Anemia, chronic disease 1/24/2021    CAD (coronary artery disease)     Diabetes mellitus, type 2      "Hypertension     Iron deficiency anemia following bariatric surgery 2021    MI (myocardial infarction)     Secondary thrombocytosis 2021    Sleep apnea     Sleep apnea 2019    Sleep Right      Past Surgical History:   Procedure Laterality Date    ANGIOGRAM, CORONARY, WITH LEFT HEART CATHETERIZATION      APPENDECTOMY      BARIATRIC SURGERY  2019    Dr Nunez    CARPAL TUNNEL RELEASE Bilateral 2002     SECTION      CHOLECYSTECTOMY      COLONOSCOPY      CORONARY ANGIOPLASTY WITH STENT PLACEMENT      CORONARY ARTERY BYPASS GRAFT      EPIDURAL STEROID INJECTION INTO LUMBAR SPINE      x2    ESOPHAGOGASTRODUODENOSCOPY      HERNIA REPAIR  2019    HYSTERECTOMY      THYROID LOBECTOMY Right 2021    Procedure: LOBECTOMY, THYROID;  Surgeon: Mari Chance MD;  Location: [facility];  Service: General;  Laterality: Right;     Social History     Tobacco Use    Smoking status: Former     Packs/day: 1.00     Years: 15.00     Pack years: 15.00     Types: Cigarettes     Quit date:      Years since quittin.5    Smokeless tobacco: Never   Substance Use Topics    Alcohol use: No     Comment: "maybe once a year"        Family History   Problem Relation Age of Onset    Diabetes Mother     Vision loss Mother     Heart disease Father     Vision loss Father     Stroke Father        Review of Systems:   Unable to obtain, patient is lethargic, no family at bedside     EXAM & DIAGNOSTICS REVIEWED:   Vitals:     Temp:  [97 °F (36.1 °C)-101.3 °F (38.5 °C)]   Temp: (!) 101.3 °F (38.5 °C) (23 1115)  Pulse: 101 (23 1115)  Resp: (!) 34 (23 1115)  BP: (!) 120/46 (23 1115)  SpO2: (!) 93 % (23 1115)    Intake/Output Summary (Last 24 hours) at 2023 1208  Last data filed at 2023 0750  Gross per 24 hour   Intake 7716.67 ml   Output 950 ml   Net 6766.67 ml       General:  Ill-appearing, on pressors, obese lady, on O2 by Nc 5L  Eyes:  Anicteric, PERRL  ENT:  Dry oral mucosa, " no thrush, nares patent, dentition is fair   Neck:  Supple, L IJ with bloody dressing, no adenopathy appreciated  Lungs: Trace bibasilar crackles  Heart:  Tachycardic, S1/S2+, regular rhythm, no murmurs  Abd:  Obese, reducible large ventral hernia, +BS, soft, non tender  :  Waller, urine clear  Musc:  Joints without effusion, swelling,  erythema, synovitis  Skin:  Warm, no rash  Wound:   Neuro:  Lethargic    Psych:    Lymphatic:     No cervical, supraclavicular nodes  Extrem: No LE edema b/l  VAD:  L IJ    R A-line       Isolation: contact      General Labs reviewed:  Recent Labs   Lab 07/18/23  1220 07/18/23  2333 07/19/23  0030 07/19/23  0535 07/19/23  0544   WBC 1.17*  --  6.90 14.26*  --    HGB 14.0  --  12.3 11.4*  --    HCT 44.2   < > 39.9 36.6* 38     --  199 186  --     < > = values in this interval not displayed.       Recent Labs   Lab 07/18/23  1220 07/19/23  0030 07/19/23  0535    142 138   K 3.1* 2.8* 3.3*   CL 99 102 105   CO2 29 27 24   BUN 22 31* 34*   CREATININE 1.1 1.8* 1.6*   CALCIUM 10.0 9.2 8.2*   PROT 7.1 5.5* 5.3*   BILITOT 1.8* 2.4* 2.7*   ALKPHOS 217* 232* 239*   ALT 1,475* 1,389* 1,237*   AST 4,943* 3,585* 2,526*     No results for input(s): CRP in the last 168 hours.  No results for input(s): SEDRATE in the last 168 hours.    Estimated Creatinine Clearance: 43.4 mL/min (A) (based on SCr of 1.6 mg/dL (H)).     Micro:  Microbiology Results (last 7 days)       Procedure Component Value Units Date/Time    Blood culture [[id number]]     Order Status: Sent Specimen: Blood     Blood culture [[id number]] Collected: 07/19/23 0002    Order Status: Completed Specimen: Blood Updated: 07/19/23 1204     Blood Culture, Routine Gram stain aer bottle: Gram positive cocci      Gram stain ez bottle: Gram positive cocci      Positive results previously called    Urine Culture High Risk [[id number]] Collected: 07/19/23 1006    Order Status: Sent Specimen: Urine, Clean Catch Updated: 07/19/23 1121     Blood culture [[id number]] Collected: 07/19/23 0030    Order Status: Completed Specimen: Blood from Peripheral, Left Hand Updated: 07/19/23 0717     Blood Culture, Routine No Growth to date    Narrative:      Collection has been rescheduled by KC9 at 07/19/2023 00:20 Reason:   Nurse Draw  Collection has been rescheduled by KC9 at 07/19/2023 00:20 Reason:   Nurse Draw    Rapid Organism ID by PCR (from Blood culture) [[id number]]  (Abnormal) Collected: 07/18/23 1648    Order Status: Completed Updated: 07/19/23 0526     Enterococcus faecalis Not Detected     Enterococcus faecium Detected     Listeria monocytogenes Not Detected     Staphylococcus spp. Not Detected     Staphylococcus aureus Not Detected     Staphylococcus epidermidis Not Detected     Staphylococcus lugdunensis Not Detected     Streptococcus species Not Detected     Streptococcus agalactiae Not Detected     Streptococcus pneumoniae Not Detected     Streptococcus pyogenes Not Detected     Acinetobacter calcoaceticus/baumannii complex Not Detected     Bacteroides fragilis Not Detected     Enterobacterales See species for ID     Enterobacter cloacae complex Not Detected     Escherichia coli Detected     Klebsiella aerogenes Not Detected     Klebsiella oxytoca Not Detected     Klebsiella pneumoniae group Not Detected     Proteus Not Detected     Salmonella sp Not Detected     Serratia marcescens Not Detected     Haemophilus influenzae Not Detected     Neisseria meningtidis Not Detected     Pseudomonas aeruginosa Not Detected     Stenotrophomonas maltophilia Not Detected     Candida albicans Not Detected     Candida auris Not Detected     Candida glabrata Not Detected     Candida krusei Not Detected     Candida parapsilosis Not Detected     Candida tropicalis Not Detected     Cryptococcus neoformans/gattii Not Detected     CTX-M (ESBL ) Not Detected     IMP (Carbapenem resistant) Not Detected     KPC resistance gene (Carbapenem resistant) Not Detected      mcr-1  Not Detected     mec A/C  Test not applicable     mec A/C and MREJ (MRSA) gene Test not applicable     NDM (Carbapenem resistant) Not Detected     OXA-48-like (Carbapenem resistant) Not Detected     van A/B (VRE gene) Not Detected     VIM (Carbapenem resistant) Not Detected    Blood culture [[id number]] Collected: 07/18/23 1738    Order Status: Completed Specimen: Blood Updated: 07/19/23 0523     Blood Culture, Routine Gram stain aer bottle: Gram negative rods      Results called to and read back by:Lesvia Mcdowell RN MICU3.      07/19/2023  05:23 TGC    Blood culture [[id number]] Collected: 07/18/23 1648    Order Status: Completed Specimen: Blood Updated: 07/19/23 0507     Blood Culture, Routine Gram stain aer bottle: Gram negative rods      Results called to and read back by: Lesvia Mcdowell RN MICU3.      07/19/2023  05:06 TGC            Imaging Reviewed:  CXR  Abdominal Us  CTA chest  CT abdomen/pelvis:  1.  Status post cholecystectomy with postsurgical dilatation of the common bile duct and intrahepatic ducts. Should be correlated with bilirubin levels.   2.  Broad-based ventral abdominal wall hernia measures approximately 15 x 13 cm with herniated loops of large and small bowel. No evidence of obstruction.   3.  Exophytic hypoattenuating structure in the mid left kidney is felt to reflect a hemorrhagic cyst.      Cardiology:       IMPRESSION & PLAN     Septic shock likely from suspected cholangitis s/p ERCP, sphincterotomy 7/19 in the setting of prior pancreatitis secondary to gallstones   Lactic acid 5.9-->4.6  Baseline CPK 92    2.   Shock liver    3.   ANTHONY resolved    4.  PMHx: diabetes, hypertension, bariatric surgery, cholecystectomy, prior UTIs    5. Obesity, BMI 37.6 Kg/m2    Recommendations:  CRP and procalcitonin added on   Repeat blood cultures x2 sets  Start daptomycin 10 mg/kg IV adjusted weight for Enterococcus faecium, follow-up sensitivities   Continue meropenem 1 g IV q.12.  Dose per  creatinine clearance   Follow blood cultures and sensitivities  Aspiration precautions     D/w ICU nursing, Dr Hernandez, Dr Serrano    I have spent > 35 minutes providing critical care services for this pt for the above diagnoses. These services have included pt evaluation, pt exam, discussions with staff, chart review, data review, note preparation and . The patient has life threatening illness with a high risk of decompensation and/or death.      Medical Decision Making during this encounter was  [_] Low Complexity  [_] Moderate Complexity  [xx] High Complexity

## 2023-07-19 NOTE — HOSPITAL COURSE
Chanel Cervantes is a 67 year old female with a past medical history of obesity, ventral hernia, DM, HTN, anemia, CAD, NIKOLAS, and hypothyroidism who presented with septic shock secondary to a possible cholangitis with E coli bacteremia. Vancomycin was discontinued and she remained on meropenem. BP was maintained on a Levophed infusion. GI was consulted and performed ERCP which showed biliary sludge with a sludge ball dilating the main duct which was removed; a biliary sphincterotomy was also performed. Repeat blood cultures were negative. She was on IV fluids with LR and was NPO as she was encephalopathic. She also had an ANTHONY as well. She also had acute hypoxic respiratory failure for which she was put on mask O2. She also had demand ischemia; TTE was done and troponin was trended. There was shock liver superimposed on the hepatocellular injury brought on by the acute cholangitis. Pulmonary/Critical Care was consulted given the patient's medical acuity.  Pulmonologist made decision to intubate patient based on patient's deep encephalopathy and inability to protect her airway.  Patient underwent transesophageal echocardiography which did not report any intracardiac thrombus or evidence of endocarditis.  Patient was successfully extubated on July 24, 2023. PT and OT is working with patient. Patient is transferred to medicine floor for further management and rehab.  working on LTAC placement.      Patient continued her antibiotics and completed her course on 08/01.  She was denied LTAC and we attempted placement in rehab however she was initially denied by insurance.  Ultimately she was approved for placement in rehab by her insurance company and she will continue ongoing rehabilitation there.  She will continue Lasix 40 mg daily.  She will also continue hydralazine, metoprolol, amlodipine for blood pressure control.  I saw evaluated the patient the day of discharge.  She was discharged in good condition with  plans for ongoing outpatient follow-up and management.

## 2023-07-19 NOTE — PLAN OF CARE
[facility]  Initial Discharge Assessment       Primary Care Provider: ARIC Almaguer    Admission Diagnosis: Abnormal LFTs [R79.89]    Admission Date: 7/18/2023  Expected Discharge Date:     Transition of Care Barriers: None    Payor: [employer] MANAGED MEDICARE / Plan: [employer] MEDICARE PLAN PPO / Product Type: Medicare Advantage /     Extended Emergency Contact Information  Primary Emergency Contact: RaphaelShaina   [facility]  Home Phone: [phone]  Mobile Phone: [phone]  Relation: Sister  Preferred language: English   needed? No  Secondary Emergency Contact: Angie Cervantes   [facility]  Home Phone: [phone]  Mobile Phone: [phone]  Relation: Daughter   needed? No    Discharge Plan A: Home  Discharge Plan B: Home with family      [facility] [address] [place] [facility] [address]  [address] [facility] [address]  Phone: [phone] Fax: [phone]    TERRY met with patient and patient's sister Shaina Lim at bedside to complete discharge planning assessment.  Shaina verified all demographic information on facesheet is correct.  Shaina verified PCP is JERROD Hein.  Shaina verified primary health insurance is [employer]  Patient with NO home health but has listed DME.  Patient with NO POA or Living Will.  Patient not on dialysis or medication coumadin.  Patient with no 30 day admission.  Patient with no financial issues at this time.  Patient family will provide transportation upon discharge from facility.  Patient independent with ADLs, live alone, drives self.      Initial Assessment (most recent)       Adult Discharge Assessment - 07/19/23 1201          Discharge Assessment    Assessment Type Discharge Planning Assessment     Confirmed/corrected address, phone number and insurance Yes     Confirmed Demographics Correct on Facesheet     Source of Information patient;family     Communicated LUIS ALFREDO with  patient/caregiver Date not available/Unable to determine     People in Home alone     Facility Arrived From: home     Do you expect to return to your current living situation? Yes     Do you have help at home or someone to help you manage your care at home? Yes     Who are your caregiver(s) and their phone number(s)? self     Prior to hospitilization cognitive status: Unable to Assess     Current cognitive status: Unable to Assess     Equipment Currently Used at Home cane, straight     Readmission within 30 days? No     Patient currently being followed by outpatient case management? No     Do you currently have service(s) that help you manage your care at home? No     Do you take prescription medications? Yes     Do you have prescription coverage? Yes     Do you have any problems affording any of your prescribed medications? No     Is the patient taking medications as prescribed? yes     Who is going to help you get home at discharge? self     How do you get to doctors appointments? car, drives self     Are you on dialysis? No     Do you take coumadin? No     Discharge Plan A Home     Discharge Plan B Home with family     DME Needed Upon Discharge  none     Discharge Plan discussed with: Patient     Transition of Care Barriers None

## 2023-07-19 NOTE — CARE UPDATE
07/18/23 2137   Preset CPAP/BiPAP Settings   Mode Of Delivery CPAP  (Not put on pt. Daughter said her mom is very claustrophobic. She is sating 94on NC 3l)

## 2023-07-20 PROBLEM — G93.41 ENCEPHALOPATHY, METABOLIC: Status: ACTIVE | Noted: 2023-07-19

## 2023-07-20 LAB
ALBUMIN SERPL BCP-MCNC: 2.3 G/DL (ref 3.5–5.2)
ALLENS TEST: ABNORMAL
ALLENS TEST: ABNORMAL
ALP SERPL-CCNC: 294 U/L (ref 55–135)
ALT SERPL W/O P-5'-P-CCNC: 893 U/L (ref 10–44)
ANION GAP SERPL CALC-SCNC: 11 MMOL/L (ref 8–16)
ANISOCYTOSIS BLD QL SMEAR: SLIGHT
AST SERPL-CCNC: 977 U/L (ref 10–40)
BASOPHILS NFR BLD: 0 % (ref 0–1.9)
BILIRUB SERPL-MCNC: 2.4 MG/DL (ref 0.1–1)
BUN SERPL-MCNC: 49 MG/DL (ref 8–23)
CALCIUM SERPL-MCNC: 7.8 MG/DL (ref 8.7–10.5)
CHLORIDE SERPL-SCNC: 104 MMOL/L (ref 95–110)
CK SERPL-CCNC: 245 U/L (ref 20–180)
CO2 SERPL-SCNC: 22 MMOL/L (ref 23–29)
CREAT SERPL-MCNC: 2.6 MG/DL (ref 0.5–1.4)
DELSYS: ABNORMAL
DELSYS: ABNORMAL
DIFFERENTIAL METHOD: ABNORMAL
EOSINOPHIL NFR BLD: 0 % (ref 0–8)
ERYTHROCYTE [DISTWIDTH] IN BLOOD BY AUTOMATED COUNT: 16.4 % (ref 11.5–14.5)
ERYTHROCYTE [SEDIMENTATION RATE] IN BLOOD BY WESTERGREN METHOD: 20 MM/H
EST. GFR  (NO RACE VARIABLE): 19.6 ML/MIN/1.73 M^2
FIO2: 100
FLOW: 6
GLUCOSE SERPL-MCNC: 105 MG/DL (ref 70–110)
GLUCOSE SERPL-MCNC: 134 MG/DL (ref 70–110)
GLUCOSE SERPL-MCNC: 137 MG/DL (ref 70–110)
GLUCOSE SERPL-MCNC: 138 MG/DL (ref 70–110)
HAV IGM SERPL QL IA: NEGATIVE
HBV CORE IGM SERPL QL IA: NEGATIVE
HBV SURFACE AG SERPL QL IA: NEGATIVE
HCO3 UR-SCNC: 23.7 MMOL/L (ref 24–28)
HCO3 UR-SCNC: 26.7 MMOL/L (ref 24–28)
HCT VFR BLD AUTO: 35.4 % (ref 37–48.5)
HCT VFR BLD CALC: 32 %PCV (ref 36–54)
HCV AB S/CO SERPL IA: NON REACTIVE
HCV AB SERPL QL IA: NORMAL
HGB BLD-MCNC: 11.3 G/DL (ref 12–16)
IMM GRANULOCYTES # BLD AUTO: ABNORMAL K/UL (ref 0–0.04)
IMM GRANULOCYTES NFR BLD AUTO: ABNORMAL % (ref 0–0.5)
LACTATE SERPL-SCNC: 2.1 MMOL/L (ref 0.5–1.9)
LACTATE SERPL-SCNC: 2.2 MMOL/L (ref 0.5–1.9)
LACTATE SERPL-SCNC: 2.4 MMOL/L (ref 0.5–1.9)
LACTATE SERPL-SCNC: 2.7 MMOL/L (ref 0.5–1.9)
LACTATE SERPL-SCNC: 3 MMOL/L (ref 0.5–1.9)
LACTATE SERPL-SCNC: 3 MMOL/L (ref 0.5–1.9)
LYMPHOCYTES NFR BLD: 3 % (ref 18–48)
MAGNESIUM SERPL-MCNC: 1.9 MG/DL (ref 1.6–2.6)
MCH RBC QN AUTO: 26.3 PG (ref 27–31)
MCHC RBC AUTO-ENTMCNC: 31.9 G/DL (ref 32–36)
MCV RBC AUTO: 83 FL (ref 82–98)
METAMYELOCYTES NFR BLD MANUAL: 1 %
MODE: ABNORMAL
MODE: ABNORMAL
MONOCYTES NFR BLD: 2 % (ref 4–15)
NEUTROPHILS NFR BLD: 58 % (ref 38–73)
NEUTS BAND NFR BLD MANUAL: 36 %
NRBC BLD-RTO: 0 /100 WBC
PCO2 BLDA: 43.2 MMHG (ref 35–45)
PCO2 BLDA: 48 MMHG (ref 35–45)
PEEP: 5
PH SMN: 7.35 [PH] (ref 7.35–7.45)
PH SMN: 7.35 [PH] (ref 7.35–7.45)
PHOSPHATE SERPL-MCNC: 3.5 MG/DL (ref 2.7–4.5)
PLATELET # BLD AUTO: 175 K/UL (ref 150–450)
PLATELET BLD QL SMEAR: ABNORMAL
PMV BLD AUTO: 11.1 FL (ref 9.2–12.9)
PO2 BLDA: 281 MMHG (ref 80–100)
PO2 BLDA: 70 MMHG (ref 80–100)
POC BE: -2 MMOL/L
POC BE: 1 MMOL/L
POC IONIZED CALCIUM: 1.12 MMOL/L (ref 1.06–1.42)
POC SATURATED O2: 100 % (ref 95–100)
POC SATURATED O2: 93 % (ref 95–100)
POC TCO2: 25 MMOL/L (ref 23–27)
POC TCO2: 28 MMOL/L (ref 23–27)
POTASSIUM BLD-SCNC: 4.9 MMOL/L (ref 3.5–5.1)
POTASSIUM SERPL-SCNC: 4.8 MMOL/L (ref 3.5–5.1)
PROT SERPL-MCNC: 4.9 G/DL (ref 6–8.4)
RBC # BLD AUTO: 4.29 M/UL (ref 4–5.4)
SAMPLE: ABNORMAL
SAMPLE: ABNORMAL
SITE: ABNORMAL
SITE: ABNORMAL
SODIUM BLD-SCNC: 137 MMOL/L (ref 136–145)
SODIUM SERPL-SCNC: 137 MMOL/L (ref 136–145)
SP02: 97
TROPONIN I SERPL HS-MCNC: 2384.3 PG/ML (ref 0–14.9)
VT: 450
WBC # BLD AUTO: 11.47 K/UL (ref 3.9–12.7)

## 2023-07-20 PROCEDURE — 94002 VENT MGMT INPAT INIT DAY: CPT

## 2023-07-20 PROCEDURE — 99900035 HC TECH TIME PER 15 MIN (STAT)

## 2023-07-20 PROCEDURE — 99233 PR SUBSEQUENT HOSPITAL CARE,LEVL III: ICD-10-PCS | Mod: ,,, | Performed by: STUDENT IN AN ORGANIZED HEALTH CARE EDUCATION/TRAINING PROGRAM

## 2023-07-20 PROCEDURE — 82330 ASSAY OF CALCIUM: CPT

## 2023-07-20 PROCEDURE — 83735 ASSAY OF MAGNESIUM: CPT | Performed by: FAMILY MEDICINE

## 2023-07-20 PROCEDURE — 94761 N-INVAS EAR/PLS OXIMETRY MLT: CPT

## 2023-07-20 PROCEDURE — 94760 N-INVAS EAR/PLS OXIMETRY 1: CPT

## 2023-07-20 PROCEDURE — 83605 ASSAY OF LACTIC ACID: CPT | Mod: 91 | Performed by: INTERNAL MEDICINE

## 2023-07-20 PROCEDURE — 25000003 PHARM REV CODE 250: Performed by: FAMILY MEDICINE

## 2023-07-20 PROCEDURE — 31500 INSERT EMERGENCY AIRWAY: CPT

## 2023-07-20 PROCEDURE — 99900031 HC PATIENT EDUCATION (STAT)

## 2023-07-20 PROCEDURE — 84484 ASSAY OF TROPONIN QUANT: CPT | Performed by: INTERNAL MEDICINE

## 2023-07-20 PROCEDURE — 84295 ASSAY OF SERUM SODIUM: CPT

## 2023-07-20 PROCEDURE — 63600175 PHARM REV CODE 636 W HCPCS: Performed by: STUDENT IN AN ORGANIZED HEALTH CARE EDUCATION/TRAINING PROGRAM

## 2023-07-20 PROCEDURE — 82803 BLOOD GASES ANY COMBINATION: CPT

## 2023-07-20 PROCEDURE — 82550 ASSAY OF CK (CPK): CPT | Performed by: STUDENT IN AN ORGANIZED HEALTH CARE EDUCATION/TRAINING PROGRAM

## 2023-07-20 PROCEDURE — 63600175 PHARM REV CODE 636 W HCPCS

## 2023-07-20 PROCEDURE — 63600175 PHARM REV CODE 636 W HCPCS: Performed by: INTERNAL MEDICINE

## 2023-07-20 PROCEDURE — 25000003 PHARM REV CODE 250: Performed by: INTERNAL MEDICINE

## 2023-07-20 PROCEDURE — 20000000 HC ICU ROOM

## 2023-07-20 PROCEDURE — 87205 SMEAR GRAM STAIN: CPT | Performed by: INTERNAL MEDICINE

## 2023-07-20 PROCEDURE — 99900026 HC AIRWAY MAINTENANCE (STAT)

## 2023-07-20 PROCEDURE — 99223 PR INITIAL HOSPITAL CARE,LEVL III: ICD-10-PCS | Mod: ,,, | Performed by: INTERNAL MEDICINE

## 2023-07-20 PROCEDURE — 99223 1ST HOSP IP/OBS HIGH 75: CPT | Mod: ,,, | Performed by: INTERNAL MEDICINE

## 2023-07-20 PROCEDURE — 84100 ASSAY OF PHOSPHORUS: CPT | Performed by: STUDENT IN AN ORGANIZED HEALTH CARE EDUCATION/TRAINING PROGRAM

## 2023-07-20 PROCEDURE — 63600175 PHARM REV CODE 636 W HCPCS: Performed by: FAMILY MEDICINE

## 2023-07-20 PROCEDURE — 83605 ASSAY OF LACTIC ACID: CPT | Performed by: INTERNAL MEDICINE

## 2023-07-20 PROCEDURE — 85014 HEMATOCRIT: CPT

## 2023-07-20 PROCEDURE — 85027 COMPLETE CBC AUTOMATED: CPT | Performed by: FAMILY MEDICINE

## 2023-07-20 PROCEDURE — 99233 SBSQ HOSP IP/OBS HIGH 50: CPT | Mod: ,,, | Performed by: STUDENT IN AN ORGANIZED HEALTH CARE EDUCATION/TRAINING PROGRAM

## 2023-07-20 PROCEDURE — 27000221 HC OXYGEN, UP TO 24 HOURS

## 2023-07-20 PROCEDURE — 25000003 PHARM REV CODE 250: Performed by: STUDENT IN AN ORGANIZED HEALTH CARE EDUCATION/TRAINING PROGRAM

## 2023-07-20 PROCEDURE — 87070 CULTURE OTHR SPECIMN AEROBIC: CPT | Performed by: INTERNAL MEDICINE

## 2023-07-20 PROCEDURE — 36415 COLL VENOUS BLD VENIPUNCTURE: CPT | Performed by: INTERNAL MEDICINE

## 2023-07-20 PROCEDURE — 87040 BLOOD CULTURE FOR BACTERIA: CPT | Performed by: STUDENT IN AN ORGANIZED HEALTH CARE EDUCATION/TRAINING PROGRAM

## 2023-07-20 PROCEDURE — 37799 UNLISTED PX VASCULAR SURGERY: CPT

## 2023-07-20 PROCEDURE — 80053 COMPREHEN METABOLIC PANEL: CPT | Performed by: FAMILY MEDICINE

## 2023-07-20 PROCEDURE — 84132 ASSAY OF SERUM POTASSIUM: CPT

## 2023-07-20 PROCEDURE — 85007 BL SMEAR W/DIFF WBC COUNT: CPT | Performed by: FAMILY MEDICINE

## 2023-07-20 RX ORDER — CHLORHEXIDINE GLUCONATE ORAL RINSE 1.2 MG/ML
15 SOLUTION DENTAL 2 TIMES DAILY
Status: DISCONTINUED | OUTPATIENT
Start: 2023-07-20 | End: 2023-08-03 | Stop reason: HOSPADM

## 2023-07-20 RX ORDER — PROPOFOL 10 MG/ML
INJECTION, EMULSION INTRAVENOUS
Status: COMPLETED
Start: 2023-07-20 | End: 2023-07-20

## 2023-07-20 RX ORDER — ETOMIDATE 2 MG/ML
10 INJECTION INTRAVENOUS ONCE
Status: DISCONTINUED | OUTPATIENT
Start: 2023-07-20 | End: 2023-07-20

## 2023-07-20 RX ORDER — PROPOFOL 10 MG/ML
0-50 INJECTION, EMULSION INTRAVENOUS CONTINUOUS
Status: DISCONTINUED | OUTPATIENT
Start: 2023-07-20 | End: 2023-07-23

## 2023-07-20 RX ORDER — ETOMIDATE 2 MG/ML
20 INJECTION INTRAVENOUS ONCE
Status: COMPLETED | OUTPATIENT
Start: 2023-07-20 | End: 2023-07-20

## 2023-07-20 RX ORDER — PROPOFOL 10 MG/ML
0-50 INJECTION, EMULSION INTRAVENOUS CONTINUOUS
Status: DISCONTINUED | OUTPATIENT
Start: 2023-07-20 | End: 2023-07-20

## 2023-07-20 RX ADMIN — ETOMIDATE 20 MG: 2 INJECTION INTRAVENOUS at 09:07

## 2023-07-20 RX ADMIN — CHLORHEXIDINE GLUCONATE 15 ML: 1.2 RINSE ORAL at 09:07

## 2023-07-20 RX ADMIN — HYDROCORTISONE SODIUM SUCCINATE 100 MG: 100 INJECTION, POWDER, FOR SOLUTION INTRAMUSCULAR; INTRAVENOUS at 09:07

## 2023-07-20 RX ADMIN — LORAZEPAM 2 MG: 2 INJECTION INTRAMUSCULAR; INTRAVENOUS at 03:07

## 2023-07-20 RX ADMIN — PROPOFOL 20 MCG/KG/MIN: 10 INJECTION, EMULSION INTRAVENOUS at 03:07

## 2023-07-20 RX ADMIN — MUPIROCIN 1 G: 20 OINTMENT TOPICAL at 09:07

## 2023-07-20 RX ADMIN — NOREPINEPHRINE BITARTRATE 0.3 MCG/KG/MIN: 1 INJECTION, SOLUTION, CONCENTRATE INTRAVENOUS at 11:07

## 2023-07-20 RX ADMIN — SODIUM CHLORIDE, SODIUM LACTATE, POTASSIUM CHLORIDE, AND CALCIUM CHLORIDE: .6; .31; .03; .02 INJECTION, SOLUTION INTRAVENOUS at 07:07

## 2023-07-20 RX ADMIN — LEVOTHYROXINE SODIUM 75 MCG: 0.03 TABLET ORAL at 03:07

## 2023-07-20 RX ADMIN — PROPOFOL 5 MCG/KG/MIN: 10 INJECTION, EMULSION INTRAVENOUS at 09:07

## 2023-07-20 RX ADMIN — LORAZEPAM 2 MG: 2 INJECTION INTRAMUSCULAR; INTRAVENOUS at 05:07

## 2023-07-20 RX ADMIN — CHLORHEXIDINE GLUCONATE 15 ML: 1.2 RINSE ORAL at 11:07

## 2023-07-20 RX ADMIN — PROPOFOL 20 MCG/KG/MIN: 10 INJECTION, EMULSION INTRAVENOUS at 10:07

## 2023-07-20 RX ADMIN — MEROPENEM 1 G: 1 INJECTION, POWDER, FOR SOLUTION INTRAVENOUS at 02:07

## 2023-07-20 RX ADMIN — MUPIROCIN 2 G: 20 OINTMENT TOPICAL at 11:07

## 2023-07-20 RX ADMIN — NOREPINEPHRINE BITARTRATE 0.32 MCG/KG/MIN: 1 INJECTION, SOLUTION, CONCENTRATE INTRAVENOUS at 01:07

## 2023-07-20 RX ADMIN — HYDROCORTISONE SODIUM SUCCINATE 100 MG: 100 INJECTION, POWDER, FOR SOLUTION INTRAMUSCULAR; INTRAVENOUS at 01:07

## 2023-07-20 RX ADMIN — NOREPINEPHRINE BITARTRATE 0.28 MCG/KG/MIN: 1 INJECTION, SOLUTION, CONCENTRATE INTRAVENOUS at 10:07

## 2023-07-20 RX ADMIN — ENOXAPARIN SODIUM 40 MG: 100 INJECTION SUBCUTANEOUS at 04:07

## 2023-07-20 RX ADMIN — MEROPENEM 1 G: 1 INJECTION, POWDER, FOR SOLUTION INTRAVENOUS at 12:07

## 2023-07-20 NOTE — PROGRESS NOTES
ED Provider Note    CHIEF COMPLAINT  Chief Complaint   Patient presents with   • Cough     x 2 days, dry non productive. Flu like s/s. RA 88-89%. Febrile       HPI  Alyssa Funez is a 87 y.o. female who presents with a cough and difficulty with breathing.  The patient states she is been sick over the last 2 days.  She is unaware of any sick contacts.  She has had diffuse myalgias as well as malaise.  She does have a history of asthma.    REVIEW OF SYSTEMS  See HPI for further details. All other systems are negative.     PAST MEDICAL HISTORY  Past Medical History:   Diagnosis Date   • S/P lobectomy of lung 1968   • Asthma    • Breath shortness    • H/O: hysterectomy    • MEDICAL HOME    • Psychiatric disorder     dementia       FAMILY HISTORY  [unfilled]    SOCIAL HISTORY  Social History     Socioeconomic History   • Marital status:      Spouse name: Not on file   • Number of children: Not on file   • Years of education: Not on file   • Highest education level: Not on file   Occupational History   • Not on file   Social Needs   • Financial resource strain: Not on file   • Food insecurity     Worry: Not on file     Inability: Not on file   • Transportation needs     Medical: Not on file     Non-medical: Not on file   Tobacco Use   • Smoking status: Never Smoker   • Smokeless tobacco: Never Used   Substance and Sexual Activity   • Alcohol use: No   • Drug use: No   • Sexual activity: Not on file   Lifestyle   • Physical activity     Days per week: Not on file     Minutes per session: Not on file   • Stress: Not on file   Relationships   • Social connections     Talks on phone: Not on file     Gets together: Not on file     Attends Restorationist service: Not on file     Active member of club or organization: Not on file     Attends meetings of clubs or organizations: Not on file     Relationship status: Not on file   • Intimate partner violence     Fear of current or ex partner: Not on file     Emotionally abused:  Pharmacist Renal Dose Adjustment Note    Chanel Cervantes is a 67 y.o. female being treated with the medication Daptomycin    Patient Data:    Vital Signs (Most Recent):  Temp: (!) 101 °F (38.3 °C) (07/20/23 0301)  Pulse: 97 (07/20/23 0723)  Resp: (!) 28 (07/20/23 0723)  BP: (!) 98/47 (07/20/23 0700)  SpO2: (!) 94 % (07/20/23 0723) Vital Signs (72h Range):  Temp:  [97 °F (36.1 °C)-101.3 °F (38.5 °C)]   Pulse:  [2-109]   Resp:  [14-44]   BP: ()/(32-99)   SpO2:  [88 %-99 %]   Arterial Line BP: ()/(8-60)      Recent Labs   Lab 07/19/23  0535 07/19/23  1604 07/20/23  0307   CREATININE 1.6* 2.4* 2.6*     Serum creatinine: 2.6 mg/dL (H) 07/20/23 0307  Estimated creatinine clearance: 26.7 mL/min (A)    Medication:Daptomycin dose: 805mg frequency q24h will be changed to medication:Daptomycin dose:805mg frequency:q48h    Pharmacist's Name: VIRIDIANA KNIGHT  Pharmacist's Extension: 2088     "Not on file     Physically abused: Not on file     Forced sexual activity: Not on file   Other Topics Concern   • Not on file   Social History Narrative    ** Merged History Encounter **            SURGICAL HISTORY  Past Surgical History:   Procedure Laterality Date   • ERCP IN OR  11/29/2011    Performed by STEPHENIE MONTELONGO at SURGERY [facility] ORS   • TIBIA PLATEAU ORIF  12/3/2009    Performed by IMTIAZ COPPOLA at SURGERY [facility] ORS   • ORIF, FEMUR  1993    left leg   • TIBIA ORIF  1993    left after GLF   • PB REMOVAL OF LUNG,SEGMENTECTOMY  1960    right middle lobe. traumatic injury   • HYSTERECTOMY RADICAL  1960   • GYN SURGERY      hysterectomy   • OTHER      left  inguinal hernia 30 yrs ago       CURRENT MEDICATIONS  Home Medications    **Home medications have not yet been reviewed for this encounter**         ALLERGIES  Allergies   Allergen Reactions   • Avelox [Moxifloxacin Hcl In Nacl] Hives   • Codeine Vomiting   • Morphine Vomiting     Pt informed during admission interview that she gets \"terribly sick\" , violently nausea and vomiting. Present note for updating allergy status.   • Cimetidine Rash   • Prednisone Anaphylaxis       PHYSICAL EXAM  VITAL SIGNS: /70   Pulse 93   Temp (!) 38.2 °C (100.8 °F) (Temporal)   Resp 17   SpO2 89%       Constitutional: Ill in appearance  HENT: Normocephalic, Atraumatic, Bilateral external ears normal, Oropharynx moist, No oral exudates, Nose normal.   Eyes: PERRLA, EOMI, Conjunctiva normal, No discharge.   Neck: Normal range of motion, No tenderness, Supple, No stridor.   Lymphatic: No lymphadenopathy noted.   Cardiovascular: Normal heart rate, Normal rhythm, No murmurs, No rubs, No gallops.   Thorax & Lungs: Symmetrically diminished throughout, diffuse wheezing, diffuse rhonchi abdomen: Bowel sounds normal, Soft, No tenderness, No masses, No pulsatile masses.   Skin: Warm, Dry, No erythema, No rash.   Back: No tenderness, No CVA tenderness. "   Extremities: Intact distal pulses, No edema, No tenderness, No cyanosis, No clubbing.   Neurologic: Alert & oriented x 3, Normal motor function, Normal sensory function, No focal deficits noted.   Psychiatric: Affect normal, Judgment normal, Mood normal.     Results for orders placed or performed during the hospital encounter of 11/15/20   Lactic acid (lactate)   Result Value Ref Range    Lactic Acid 0.9 0.5 - 2.0 mmol/L   Lactic acid (lactate): Repeat if initial lactic acid result is greater than 2   Result Value Ref Range    Lactic Acid 0.8 0.5 - 2.0 mmol/L   CBC WITH DIFFERENTIAL   Result Value Ref Range    WBC 6.5 4.8 - 10.8 K/uL    RBC 4.55 4.20 - 5.40 M/uL    Hemoglobin 14.1 12.0 - 16.0 g/dL    Hematocrit 42.6 37.0 - 47.0 %    MCV 93.6 81.4 - 97.8 fL    MCH 31.0 27.0 - 33.0 pg    MCHC 33.1 (L) 33.6 - 35.0 g/dL    RDW 48.8 35.9 - 50.0 fL    Platelet Count 186 164 - 446 K/uL    MPV 10.1 9.0 - 12.9 fL    Neutrophils-Polys 73.80 (H) 44.00 - 72.00 %    Lymphocytes 16.70 (L) 22.00 - 41.00 %    Monocytes 9.00 0.00 - 13.40 %    Eosinophils 0.00 0.00 - 6.90 %    Basophils 0.20 0.00 - 1.80 %    Immature Granulocytes 0.30 0.00 - 0.90 %    Nucleated RBC 0.00 /100 WBC    Neutrophils (Absolute) 4.83 2.00 - 7.15 K/uL    Lymphs (Absolute) 1.09 1.00 - 4.80 K/uL    Monos (Absolute) 0.59 0.00 - 0.85 K/uL    Eos (Absolute) 0.00 0.00 - 0.51 K/uL    Baso (Absolute) 0.01 0.00 - 0.12 K/uL    Immature Granulocytes (abs) 0.02 0.00 - 0.11 K/uL    NRBC (Absolute) 0.00 K/uL   COMP METABOLIC PANEL   Result Value Ref Range    Sodium 134 (L) 135 - 145 mmol/L    Potassium 4.1 3.6 - 5.5 mmol/L    Chloride 100 96 - 112 mmol/L    Co2 24 20 - 33 mmol/L    Anion Gap 10.0 7.0 - 16.0    Glucose 97 65 - 99 mg/dL    Bun 13 8 - 22 mg/dL    Creatinine 0.78 0.50 - 1.40 mg/dL    Calcium 8.6 8.5 - 10.5 mg/dL    AST(SGOT) 23 12 - 45 U/L    ALT(SGPT) 12 2 - 50 U/L    Alkaline Phosphatase 81 30 - 99 U/L    Total Bilirubin 0.3 0.1 - 1.5 mg/dL    Albumin 4.0  3.2 - 4.9 g/dL    Total Protein 6.9 6.0 - 8.2 g/dL    Globulin 2.9 1.9 - 3.5 g/dL    A-G Ratio 1.4 g/dL   URINALYSIS    Specimen: Urine   Result Value Ref Range    Color Yellow     Character Cloudy (A)     Specific Gravity 1.029 <1.035    Ph 6.0 5.0 - 8.0    Glucose Negative Negative mg/dL    Ketones Negative Negative mg/dL    Protein Negative Negative mg/dL    Bilirubin Negative Negative    Urobilinogen, Urine 0.2 Negative    Nitrite Positive (A) Negative    Leukocyte Esterase Negative Negative    Occult Blood Negative Negative    Micro Urine Req Microscopic    COVID/SARS CoV-2 PCR    Specimen: Nasopharyngeal; Respirate   Result Value Ref Range    COVID Order Status Received    ESTIMATED GFR   Result Value Ref Range    GFR If African American >60 >60 mL/min/1.73 m 2    GFR If Non African American >60 >60 mL/min/1.73 m 2   URINE MICROSCOPIC (W/UA)   Result Value Ref Range    WBC 0-2 /hpf    RBC 0-2 /hpf    Bacteria Many (A) None /hpf    Epithelial Cells Few /hpf    Hyaline Cast 0-2 /lpf   CoV-2, Flu A/B, And RSV by PCR   Result Value Ref Range    Influenza virus A RNA Negative Negative    Influenza virus B, PCR Negative Negative    RSV, PCR Negative Negative    SARS-CoV-2 by PCR DETECTED (AA)     SARS-CoV-2 Source NP Swab        RADIOLOGY/PROCEDURES  DX-CHEST-PORTABLE (1 VIEW)   Final Result         1.  Left basilar atelectasis or early infiltrate   2.  Atherosclerosis            COURSE & MEDICAL DECISION MAKING  Pertinent Labs & Imaging studies reviewed. (See chart for details)  This an 87-year-old female who presents with signs and symptoms consistent with a viral process.  She did not have any focal asymmetry to auscultation however chest x-ray does show possible developing left basilar infiltrate.  Therefore the patient will receive Rocephin and azithromycin for possible bacterial pneumonia.  My suspicion is her presenting symptoms are all due to COVID-19 infection that did come back positive.  She is hypoxic  requiring oxygen supplementation.  Clinically she does not appear septic.  The patient is not acidotic.  She is resting comfortably with oxygen supplementation.  She will be admitted to the hospitalist in stable condition.    FINAL IMPRESSION  1.  COVID-19 pneumonia with hypoxemia  2.  Questionable bacterial pneumonia left lower lobe    Disposition  Patient will be admitted in stable condition         Electronically signed by: Nadir Yeboah M.D., 11/15/2020 11:06 PM

## 2023-07-20 NOTE — PROCEDURES
Intubation    Indication:  Agitated encephalopathy and inability to secure airway  Medications: Etomidate 20 mg  Complications: None    Although the patient's blood gas was not bad, she could not protect her airway and was agitated and delirious.  She was preoxygenated with an Ambu bag.  20 mg of etomidate was given.  A Hendricks with a 3. Blade was used to expose her cords.  A 8. Endotracheal tube was passed under direct vision through her cords.  End-tidal CO2 was confirmed.  Equal breath sounds were confirmed. The tube was secured at 23 at the lips; chest x-ray is ordered.

## 2023-07-20 NOTE — PROGRESS NOTES
"Patient Name: Chanel Cervantes  Patient MRN: [id number]  Patient : 1955    Admit Date: 2023  Service date: 2023    Reason for Consult: abd pain    PCP: ARIC Almaguer    S: pt s/p ercp with sludge extraction.  Remains on levo. Lactate slightly better. Ct imaging from  discussed with radiology and no signs of incarceration as very broad based herniation.     Inpatient Medications:   chlorhexidine  15 mL Mouth/Throat BID    [START ON 2023] DAPTOmycin (CUBICIN) IV (PEDS and ADULTS)  10 mg/kg (Adjusted) Intravenous Q48H    enoxparin  40 mg Subcutaneous Daily    hydrocortisone sodium succinate  100 mg Intravenous Q8H    levothyroxine  75 mcg Oral Before breakfast    meropenem (MERREM) IVPB  1 g Intravenous Q12H    mupirocin   Nasal BID     acetaminophen, albuterol-ipratropium, calcium gluconate IVPB, calcium gluconate IVPB, calcium gluconate IVPB, dextrose 50%, dextrose 50%, glucagon (human recombinant), glucose, glucose, HYDROmorphone, insulin aspart U-100, lorazepam, magnesium sulfate IVPB, magnesium sulfate IVPB, naloxone, ondansetron, polyethylene glycol, potassium chloride in water **AND** potassium chloride in water **AND** potassium chloride in water, senna-docusate 8.6-50 mg, simethicone, sodium chloride 0.9%, sodium phosphate IVPB, sodium phosphate IVPB, sodium phosphate IVPB    Review of Systems:  Unable to obtain      OBJECTIVE:    Physical Exam:  24 Hour Vital Sign Ranges: Temp:  [99.5 °F (37.5 °C)-101.3 °F (38.5 °C)] 99.9 °F (37.7 °C)  Pulse:  [] 101  Resp:  [21-37] 25  SpO2:  [90 %-100 %] 97 %  BP: ()/(47-83) 128/58  Arterial Line BP: ()/(8-70) 127/49  Most recent vitals: BP (!) 128/58   Pulse 101   Temp 99.9 °F (37.7 °C) (Oral)   Resp (!) 25   Ht 5' 7" (1.702 m)   Wt 108.9 kg (239 lb 15.9 oz)   SpO2 97%   BMI 37.60 kg/m²    GEN: obese, intubated on pressors  HEENT: PERRL, sclera anicteric, oral mucosa pink and moist without lesion  NECK: trachea " midline; Good ROM  CV: regular rate and rhythm, no murmurs or gallops  RESP: clear to auscultation bilaterally, no wheezes, rhonci or rales  ABD: soft, non-tender, non-distended, normal bowel sounds  EXT: no swelling or edema, 2+ pulses distally  SKIN: no rashes or jaundice       Labs:   Recent Labs     07/19/23  0030 07/19/23  0535 07/20/23  0307   WBC 6.90 14.26* 11.47   MCV 86 84 83    186 175     Recent Labs     07/19/23  0535 07/19/23  1604 07/20/23  0307    136 137   K 3.3* 5.1 4.8    103 104   CO2 24 21* 22*   BUN 34* 42* 49*   * 170* 134*     No results for input(s): ALB in the last 72 hours.    Invalid input(s): ALKP, SGOT, SGPT, TBIL, DBIL, TPRO  No results for input(s): PT, INR, PTT in the last 72 hours.      Radiology Review:  X-Ray Chest AP Portable   Final Result      FL ERCP Biliary And Pancreatic By Rad Tech   Final Result      X-Ray Chest AP Portable   Final Result      X-Ray Chest 1 View   Final Result      US Abdomen Limited   Final Result      CT Abdomen Pelvis With Contrast   Final Result      CTA Chest Non-Coronary (PE Studies)   Final Result      X-Ray Chest AP Portable    (Results Pending)         IMPRESSION / RECOMMENDATIONS:  67 F with shock liver, septic shock, pancreatitis? s/p ERCP with sludge extraction  -continue iv fluids and abx  -see ERCP note from yesterday.    -nephrology consulted for ANTHONY  -if pressor requirements not decreasing recommend consulting with general surgery or repeat ct scan without contrast    Jorge Alberto Mccord  7/20/2023  4:42 PM

## 2023-07-20 NOTE — PROGRESS NOTES
"[facility]  Adult Nutrition   Progress Note (Initial Assessment)     SUMMARY     Recommendations  Recommendation/Intervention: 1. Pt NPO, intubated and sedated. Recommend initiation of enteral nutrition as medically able. 2. RD to continue to monitor ventilator status, sedation rate, plans for tube feeding, weight and labs and make recommendations accordingly  Goals: 1. Enteral nutrition to be initiated. 2. Nutrition provision to meet at least 75% EEN/EPN.  Nutrition Goal Status: new  Communication of RD Recs: reviewed with physician (reviewed with physician)    Dietitian Rounds Brief  Pt assessed 2' ICU status. Pt s/p cholecystectomy with prost surgical dilation. Pt intubated and sedated. Spoke with MD regarding plans to start enteral nutrition, expect patient to remain intubated but not ready to start feeding yet per MD. Propofol @ 13.1 ml/hr per flowsheet. (346 kcal/day).   LBM: 07/18/23    Reason for Assessment  Reason For Assessment: identified at risk by screening criteria (ICU)  Diagnosis: infection/sepsis  Relevant Medical History: essential HTN; type 2 DM; NIKOLAS on CPAP; s/p bariatric surgery; hypokalemia; CAD; acute obstructive cholangitis; hypothyroidism; obesity; anemia; acute hypoxemic respiratory failure; ANTHONY; septic shock; shock liver; e. coli bacteremia; acute encephalopathy    Nutrition Risk Screen  Nutrition Risk Screen: no indicators present     MST Score: 0  Have you recently lost weight without trying?: No  Weight loss score: 0  Have you been eating poorly because of a decreased appetite?: No  Appetite score: 0       Nutrition/Diet History  Food Allergies: NKFA  Factors Affecting Nutritional Intake: NPO, on mechanical ventilation    Anthropometrics  Temp: 99.5 °F (37.5 °C)  Height Method: Stated  Height: 5' 7" (170.2 cm)  Height (inches): 67 in  Weight Method: Bed Scale  Weight: 108.9 kg (239 lb 15.9 oz)  Weight (lb): 240 lb  Ideal Body Weight (IBW), Female: 135 lb  % Ideal Body " Weight, Female (lb): 177.78 %  BMI (Calculated): 37.6  BMI Grade: 35 - 39.9 - obesity - grade II       Weight History:  Wt Readings from Last 10 Encounters:   07/19/23 108.9 kg (239 lb 15.9 oz)   07/19/23 108.9 kg (240 lb 1.3 oz)   06/02/23 109.1 kg (240 lb 8 oz)   04/13/23 108.8 kg (239 lb 14.4 oz)   02/16/23 108.5 kg (239 lb 4.8 oz)   12/27/22 108.9 kg (240 lb 1.3 oz)   11/30/22 108.9 kg (240 lb)   09/12/22 111.8 kg (246 lb 6.4 oz)   08/05/22 109.8 kg (242 lb)   04/06/22 109.9 kg (242 lb 3.2 oz)       Lab/Procedures/Meds: Pertinent Labs Reviewed    Clinical Chemistry:  Recent Labs   Lab 07/18/23  1220 07/19/23  0030 07/19/23  0535 07/19/23  1604 07/20/23  0307      < > 138   < > 137   K 3.1*   < > 3.3*   < > 4.8   CL 99   < > 105   < > 104   CO2 29   < > 24   < > 22*   *   < > 165*   < > 134*   BUN 22   < > 34*   < > 49*   CREATININE 1.1   < > 1.6*   < > 2.6*   CALCIUM 10.0   < > 8.2*   < > 7.8*   PROT 7.1   < > 5.3*  --  4.9*   ALBUMIN 3.8   < > 2.7*  --  2.3*   BILITOT 1.8*   < > 2.7*  --  2.4*   ALKPHOS 217*   < > 239*  --  294*   AST 4,943*   < > 2,526*  --  977*   ALT 1,475*   < > 1,237*  --  893*   ANIONGAP 13   < > 9   < > 11   MG 1.5*   < > 2.0  --  1.9   PHOS  --   --   --   --  3.5   LIPASE 1363*  --  102*  --   --     < > = values in this interval not displayed.       CBC:   Recent Labs   Lab 07/20/23  0307 07/20/23  0931   WBC 11.47  --    RBC 4.29  --    HGB 11.3*  --    HCT 35.4* 32*     --    MCV 83  --    MCH 26.3*  --    MCHC 31.9*  --        Cardiac Profile:  Recent Labs   Lab 07/18/23  1220 07/20/23  0307   BNP 20  --    CPK 92 245*       Inflammatory Labs:  Recent Labs   Lab 07/19/23  1250   CRP 24.03*       Diabetes:  Recent Labs   Lab 07/18/23  1735   HGBA1C 6.9*       Thyroid & Parathyroid:  Recent Labs   Lab 07/19/23  0535   TSH 0.470       Medications: Pertinent Medications reviewed    Scheduled Meds:   chlorhexidine  15 mL Mouth/Throat BID    [START ON 7/21/2023]  DAPTOmycin (CUBICIN) IV (PEDS and ADULTS)  10 mg/kg (Adjusted) Intravenous Q48H    enoxparin  40 mg Subcutaneous Daily    hydrocortisone sodium succinate  100 mg Intravenous Q8H    levothyroxine  75 mcg Oral Before breakfast    meropenem (MERREM) IVPB  1 g Intravenous Q12H    mupirocin   Nasal BID       Continuous Infusions:   lactated ringers 125 mL/hr at 07/19/23 0920    NORepinephrine bitartrate-D5W 0.32 mcg/kg/min (07/20/23 1355)    propofoL 20 mcg/kg/min (07/20/23 1005)       PRN Meds:.acetaminophen, albuterol-ipratropium, calcium gluconate IVPB, calcium gluconate IVPB, calcium gluconate IVPB, dextrose 50%, dextrose 50%, glucagon (human recombinant), glucose, glucose, HYDROmorphone, insulin aspart U-100, lorazepam, magnesium sulfate IVPB, magnesium sulfate IVPB, naloxone, ondansetron, polyethylene glycol, potassium chloride in water **AND** potassium chloride in water **AND** potassium chloride in water, senna-docusate 8.6-50 mg, simethicone, sodium chloride 0.9%, sodium phosphate IVPB, sodium phosphate IVPB, sodium phosphate IVPB    Estimated/Assessed Needs  Weight Used For Calorie Calculations: 108.9 kg (240 lb 1.3 oz)  Energy Calorie Requirements (kcal): 1198 - 1525 (11 - 14 kcal/kg)  Energy Need Method: Kcal/kg  Protein Requirements: 49 - 73 (0.8 - 73 (0.8 - 1.2 g/kg IBW)  Weight Used For Protein Calculations: 61 kg (134 lb 7.7 oz) (IBW)     Estimated Fluid Requirement Method: RDA Method  RDA Method (mL): 1198       Nutrition Prescription Ordered  Current Diet Order: NPO    Evaluation of Received Nutrient/Fluid Intake  Total Calories (kcal): 346 (Propofol @ 13.1 ml/hr)  Energy Calories Required: not meeting needs  Protein Required: not meeting needs  Fluid Required: meeting needs  Tolerance: other (see comments) (NPO)     Intake/Output Summary (Last 24 hours) at 7/20/2023 1418  Last data filed at 7/20/2023 1300  Gross per 24 hour   Intake 2729.91 ml   Output 663 ml   Net 2066.91 ml      % Intake of  Estimated Energy Needs: 0 - 25 %  % Meal Intake: NPO    Nutrition Risk  Level of Risk/Frequency of Follow-up: high     Monitor and Evaluation  Food and Nutrient Intake: energy intake  Food and Nutrient Adminstration: other (specify) (plans for EN)  Physical Activity and Function: nutrition-related ADLs and IADLs, factors affecting access to physical activity  Anthropometric Measurements: weight, body mass index, weight change  Biochemical Data, Medical Tests and Procedures: electrolyte and renal panel, inflammatory profile, gastrointestinal profile, lipid profile, glucose/endocrine profile  Nutrition-Focused Physical Findings: overall appearance     Nutrition Follow-Up  RD Follow-up?: Yes    Angie Serrano RD 07/20/2023 2:19 PM

## 2023-07-20 NOTE — PLAN OF CARE
Patient was found lying in bed. She is just moaning, not speaking or following commands, eyes are closed. She is restless and reaching up in the air to try to grab imaginary things. She is tachypneic. She had some white foam / saliva in her oral cavity and could not be encouraged to clear her throat, not following commands. With occasional cough. Was intubated for airway protection, see dr gold note.    The schaefer is draining a scant amount of cloudy christina urine about 10-20ml/hr. Levophed requirements have been increasing. She has had a fever this morning. Bowel sounds are very hypoactive / faint.    The bed is low and alarm on. Clinical alarms reviewed and armed. Monitor strips printed. Turning q2h, lines and extremities padded. Mepilex applied to bilat heels, sacrum, on waffle overlay. Placed in bilat wrist restraints, free from harm at this time. Changed chg dressing to left neck IJ with aseptic technique per md as there was a small amount of sanguinous drainage.    Titrating down levophed, at 0.27mcg now and could potentially be titrated down further. Urine output improving with MAP higher now however.

## 2023-07-20 NOTE — CARE UPDATE
07/20/23 0723   Patient Assessment/Suction   Level of Consciousness (AVPU) responds to pain   Respiratory Effort Slight   Expansion/Accessory Muscles/Retractions no use of accessory muscles   All Lung Fields Breath Sounds clear;diminished   Rhythm/Pattern, Respiratory tachypneic   PRE-TX-O2   Device (Oxygen Therapy) nasal cannula with humidification   $ Is the patient on Low Flow Oxygen? Yes   Flow (L/min) 6   SpO2 (!) 94 %   Pulse Oximetry Type Continuous   $ Pulse Oximetry - Single Charge Pulse Oximetry - Single   Pulse 97   Resp (!) 28   Aerosol Therapy   $ Aerosol Therapy Charges PRN treatment not required   Education   $ Education Other (see comment);15 min  (sats)

## 2023-07-20 NOTE — PROGRESS NOTES
Pulmonary/Critical Care Progress Note      PATIENT NAME: Chanel Cervantes  MRN: [id number]  TODAY'S DATE: 2023  8:19 AM  ADMIT DATE: 2023  AGE: 67 y.o. : 1955    CONSULT REQUESTED BY: Harris Dempsey MD    REASON FOR CONSULT:   Critical care management    HPI:  The patient is a 67-year-old female who was brought to the hospital for back pain.  She was having bilateral back pain and upper abdominal pain that was not controlled with ibuprofen and came it.  She was found to have dilated common bile duct and elevated LFTs.  She is in shock requiring Levophed.  She is bacteremic with E coli and E faecium.  Her ERCP released sludge from the common bile duct. She is very encephalopathic.     the patient has an agitated delirium this morning.  She appears very uncomfortable.  She is tachypneic.  Her pressor requirements are increasing, her urine output is decreasing.     REVIEW OF SYSTEMS  Unobtainable.    No change in the patient's Past Medical History, Past Surgical History, Social History or Family History since admission.    VITAL SIGNS (MOST RECENT)  Temp: (!) 100.8 °F (38.2 °C) (23 0808)  Pulse: 98 (23 0900)  Resp: (!) 30 (23 0900)  BP: (!) 109/55 (23 0900)  SpO2: (!) 94 % (23 0900)    INTAKE AND OUTPUT (LAST 24 HOURS):  Intake/Output Summary (Last 24 hours) at 2023 0914  Last data filed at 2023 0807  Gross per 24 hour   Intake 4301.06 ml   Output 563 ml   Net 3738.06 ml       WEIGHT  Wt Readings from Last 1 Encounters:   23 108.9 kg (239 lb 15.9 oz)       PHYSICAL EXAM  GENERAL: Older patient very encephalopathic.Moaning, tachypneic, not following any commands  HEENT: Pupils equal and reactive. Extraocular movements intact. Nose intact. Pharynx dry.  NECK: Supple. L IJ triple lumen catheter.  HEART: Regular rate and rhythm. No murmur or gallop auscultated.  LUNGS:  Can not hear her lungs as she is moaning loudly.   ABDOMEN: Bowel sounds present. Very  large ventral hernia.  Very obese, seems tender especially in the right upper quadrant.  : Normal anatomy.Waller with a little urine.  EXTREMITIES: Normal muscle tone and joint movement, no cyanosis or clubbing. Scar at L shoulder.  There is a 20 in the left upper arm and a midline in the right upper arm.  There is a radial arterial catheter on the right  LYMPHATICS: No adenopathy palpated, no edema.  SKIN: Dry, intact, no lesions.   NEURO: Encephalopathic  PSYCH: Encephalopathic      CBC LAST (LAST 24 HOURS)  Recent Labs   Lab 07/20/23  0307   WBC 11.47   RBC 4.29   HGB 11.3*   HCT 35.4*   MCV 83   MCH 26.3*   MCHC 31.9*   RDW 16.4*      MPV 11.1   GRAN 58.0   LYMPH 3.0*   MONO 2.0*   NRBC 0       CHEMISTRY LAST (LAST 24 HOURS)  Recent Labs   Lab 07/20/23  0307      K 4.8      CO2 22*   ANIONGAP 11   BUN 49*   CREATININE 2.6*   *   CALCIUM 7.8*   MG 1.9   ALBUMIN 2.3*   PROT 4.9*   ALKPHOS 294*   *   *   BILITOT 2.4*         CARDIAC PROFILE (LAST 24 HOURS)  Recent Labs   Lab 07/18/23  1220 07/18/23  2204 07/20/23  0307   BNP 20  --   --    CPK 92  --  245*   TROPONINIHS 14.8   < > 2384.3*    < > = values in this interval not displayed.       LAST 7 DAYS MICROBIOLOGY   Microbiology Results (last 7 days)       Procedure Component Value Units Date/Time    Urine Culture High Risk [[id number]] Collected: 07/19/23 1006    Order Status: Completed Specimen: Urine, Clean Catch Updated: 07/20/23 0720     Urine Culture, Routine No growth to date    Narrative:      Indicated criteria for high risk culture:->Other  Other (specify):->e coli bacteremia    Blood culture [[id number]]  (Abnormal) Collected: 07/19/23 0002    Order Status: Completed Specimen: Blood Updated: 07/20/23 0657     Blood Culture, Routine Gram stain aer bottle: Gram positive cocci      Gram stain ez bottle: Gram positive cocci      Positive results previously called      ENTEROCOCCUS FAECIUM  For susceptibility see  order #[id number]      Blood culture [[id number]]  (Abnormal) Collected: 07/19/23 0030    Order Status: Completed Specimen: Blood from Peripheral, Left Hand Updated: 07/20/23 0655     Blood Culture, Routine Gram stain aer bottle: Gram positive cocci      Gram stain ez bottle: Gram positive cocci      Results called to and read back by:Carmela Mcintosh RN-3ICU;      07/19/2023  16:23 CJD      ENTEROCOCCUS FAECIUM  For susceptibility see order #[id number]      Narrative:      Collection has been rescheduled by KC9 at 07/19/2023 00:20 Reason:   Nurse Draw  Collection has been rescheduled by KC9 at 07/19/2023 00:20 Reason:   Nurse Draw    Blood culture [[id number]]  (Abnormal) Collected: 07/18/23 1738    Order Status: Completed Specimen: Blood Updated: 07/20/23 0654     Blood Culture, Routine Gram stain aer bottle: Gram negative rods      Results called to and read back by:Lesvia Mcdowell RN MICU3.      07/19/2023  05:23 TGC      ESCHERICHIA COLI  For susceptibility see order #[id number]      Blood culture [[id number]]  (Abnormal) Collected: 07/18/23 1648    Order Status: Completed Specimen: Blood Updated: 07/20/23 0651     Blood Culture, Routine Gram stain aer bottle: Gram negative rods      Gram stain aer bottle: Gram positive cocci      Results called to and read back by: Lesvia Mcdowell RN MICU3.      07/19/2023  05:06 TGC      ESCHERICHIA COLI  Identification and susceptibility pending        ENTEROCOCCUS FAECIUM  Identification and susceptibility pending      Blood culture [[id number]]     Order Status: Sent Specimen: Blood     Blood culture [[id number]]     Order Status: Sent Specimen: Blood     Blood culture [[id number]] Collected: 07/19/23 1230    Order Status: Completed Specimen: Blood Updated: 07/19/23 1917     Blood Culture, Routine No Growth to date    Rapid Organism ID by PCR (from Blood culture) [[id number]]  (Abnormal) Collected: 07/18/23 1648    Order Status: Completed Updated: 07/19/23 0526     Enterococcus  faecalis Not Detected     Enterococcus faecium Detected     Listeria monocytogenes Not Detected     Staphylococcus spp. Not Detected     Staphylococcus aureus Not Detected     Staphylococcus epidermidis Not Detected     Staphylococcus lugdunensis Not Detected     Streptococcus species Not Detected     Streptococcus agalactiae Not Detected     Streptococcus pneumoniae Not Detected     Streptococcus pyogenes Not Detected     Acinetobacter calcoaceticus/baumannii complex Not Detected     Bacteroides fragilis Not Detected     Enterobacterales See species for ID     Enterobacter cloacae complex Not Detected     Escherichia coli Detected     Klebsiella aerogenes Not Detected     Klebsiella oxytoca Not Detected     Klebsiella pneumoniae group Not Detected     Proteus Not Detected     Salmonella sp Not Detected     Serratia marcescens Not Detected     Haemophilus influenzae Not Detected     Neisseria meningtidis Not Detected     Pseudomonas aeruginosa Not Detected     Stenotrophomonas maltophilia Not Detected     Candida albicans Not Detected     Candida auris Not Detected     Candida glabrata Not Detected     Candida krusei Not Detected     Candida parapsilosis Not Detected     Candida tropicalis Not Detected     Cryptococcus neoformans/gattii Not Detected     CTX-M (ESBL ) Not Detected     IMP (Carbapenem resistant) Not Detected     KPC resistance gene (Carbapenem resistant) Not Detected     mcr-1  Not Detected     mec A/C  Test not applicable     mec A/C and MREJ (MRSA) gene Test not applicable     NDM (Carbapenem resistant) Not Detected     OXA-48-like (Carbapenem resistant) Not Detected     van A/B (VRE gene) Not Detected     VIM (Carbapenem resistant) Not Detected            MOST RECENT IMAGING  Echo  · The left ventricle is normal in size with mild concentric hypertrophy   and mildly decreased systolic function.  · Mild to moderate tricuspid regurgitation.  · The estimated ejection fraction is 45%.  · Grade I  left ventricular diastolic dysfunction.  · There is abnormal septal wall motion consistent with left bundle branch   block.  · Mild right ventricular enlargement with normal right ventricular   systolic function.  · Moderate left atrial enlargement.  · Normal central venous pressure (3 mmHg).  · The estimated PA systolic pressure is 40 mmHg.  · There is mild pulmonary hypertension.     FL ERCP Biliary And Pancreatic By [employer]  CMS MANDATED QUALITY DATA-FLUOROSCOPY - 145    FLUORO time: 1 minute 34 seconds  FLUORO images: 14  FLUORO dose: DAP unavailable mGycm^2    CLINICAL HISTORY:  67 years (1955) Female pancreatitis ERCP: pancreatitis: sphincterotomy with balloon sweep / FT: 1 min 34 sec    TECHNIQUE:  FL ERCP BILIARY SYSTEM    COMPARISON:  Ultrasound from July 18, 2023.    FINDINGS:  This procedure was performed with the use of Radiology Department fluoroscopic equipment without the assistance or involvement of the radiologist.    Images show ERCP with sphincterotomy and balloon sweep. There are clips from prior cholecystectomy. Please see dedicated procedure note in the electronic medical record for further details, or consider dedicated postprocedural imaging if further characterization is desired.    IMPRESSION:  Fluoroscopic services provided as noted above.    Electronically signed by:  Kirby Hernandez MD  7/19/2023 8:47 AM CDT Workstation: [id number]  X-Ray Chest AP Portable  Reason: Line placement    FINDINGS:    Portable chest with comparison radiograph July 18, 2023 show unchanged cardiomediastinal silhouette. Enlarged central hilar vascular structures and bilateral perihilar interstitial thickening again noted. Unchanged blunting of the costophrenic angles. No pneumothorax.  Interval placement of left internal jugular central venous catheter with tip near anatomic region of the SVC. Pulmonary vasculature is normal. No acute osseous abnormality.    IMPRESSION:    1.  Unchanged CHF lung  pattern.  2.  Left internal jugular central venous catheter tip is in the anatomic region of the SVC. No pneumothorax.    Electronically signed by:  Benjy Woodson DO  7/19/2023 6:59 AM CDT Workstation: [id number]  X-Ray Chest 1 View  Reason: Low oxygen saturation.    FINDINGS:    Portable chest with comparison chest x-ray August 7, 2021 show enlarged cardiomediastinal silhouette. Median sternotomy wires noted.  There is enlargement of the central hilar vascular structures with mild perihilar interstitial thickening. There is blunting of the left costophrenic angle with left basilar hazy opacities. Blunting of the right costophrenic angle. Pulmonary vasculature is normal. No acute osseous abnormality.    IMPRESSION:    1.  Enlarged cardiomediastinal silhouette and central hilar vascular structures. Mild perihilar interstitial thickening. Findings are felt to reflect CHF with mild pulmonary edema.  2.  Small bilateral pleural effusions.    Electronically signed by:  Benjy Woodson DO  7/19/2023 6:57 AM CDT Workstation: [id number]      CURRENT VISIT EKG  Results for orders placed or performed during the hospital encounter of 07/18/23   EKG 12-lead    Narrative    Test Reason : W19.XXXA,    Vent. Rate : 073 BPM     Atrial Rate : 073 BPM     P-R Int : 154 ms          QRS Dur : 162 ms      QT Int : 426 ms       P-R-T Axes : 069 -17 -11 degrees     QTc Int : 469 ms    Sinus rhythm with Premature ventricular complexes or Fusion complexes  Right bundle branch block  Minimal voltage criteria for LVH, may be normal variant ( R in aVL )  Abnormal ECG  When compared with ECG of 07-AUG-2021 22:34,  Fusion complexes are now Present  Premature ventricular complexes are now Present    Referred By: [email or web address]   SELF           Confirmed By:        ECHOCARDIOGRAM RESULTS  No results found for this or any previous visit.        Oxygen INFORMATION   6 liters           LAST ARTERIAL BLOOD GAS  ABG  Recent Labs   Lab 07/19/23  0544   PH  7.356   PO2 89   PCO2 44.4   HCO3 24.9   BE -1       IMPRESSION AND PLAN  Severe sepsis with shock  - bacteremia with E coli and E faecium  - febrile  - leukocytosis has replaced leukopenia, a good sign  - sludge released from ERCP  - Daptomycin added to Merrem  - wean levophed as tolerated ,on 0.25  Severe transaminitis improving  Elevated lipase improving  Morbid obesity/moderate hypoalbuminemia  Hypokalemia  - resolved  Anemia  Lactic acidosis  - trend  - still elevated    Pulmonary edema  - persisting  - will have to tolerate as patient needs volume  Systolic heart failure  Diastolic heart failure  Pulmonary hypertension  Troponinemia  - worsening  - cardiology consulted  Worsening encephalopathy  - check ABG  - patient appears to need intubation as she is not protecting her airway and is unable to follow commands    Discussed with nursing and Respiratory and  Infectious Disease    Updated her daughter and confirmed that the patient is a full code.    Critical care time spent reviewing the chart, examining the patient, reviewing the labs, reviewing the radiological findings, discussing care with nursing, physicians, and respiratory and creating the note and  has been greater than 35 minutes.      Mariajose Serrano MD  Date of Service: 07/20/2023  8:19 AM

## 2023-07-20 NOTE — PROGRESS NOTES
Consult Note  Infectious Disease    Reason for Consult:  bacteremia E coli and E faecium     HPI: Chanel Cervantes is a 67 y.o. female obese, BMI 37.6 kg/M2, with past medical history of diabetes, hypertension, bariatric surgery, cholecystectomy, prior UTIs, and prior pancreatitis secondary to gallstones.     She presented with acute onset of back pain, radiating into bilateral upper quadrants, with associated shortness of breath.  She was admitted for septic shock, transferred to ICU for further management.    Labs on admission with severe leukopenia 1.1, left shift 87%, bands 8%, H&H 14/44.2, platelet count 213   Creatinine 1.1   Hypokalemia 3.1   Transaminitis, AST 4943/ALT 1475   Total bili 2.7  Lipase 1363 down to 102  Patient CPK 92   Lactic acid 4.6  Hemoglobin A1c 6.9  UA pyuria 6, negative for bacteria, urine culture in process     CT abdomen with broad-based ventral abdominal hernia.  No evidence of obstruction.  Status post cholecystectomy with postsurgical dilatation of the common bile duct and intrahepatic ducts.    Seen by GI, s/p ERCP this morning; with evidence of the entire main bile duct was moderately dilated, with an obstruction from suspected sludge ball. A biliary sphincterotomy was performed. The biliary tree was swept.     ERCP this morning.  Hospital course complicated persistent fever and polymicrobial bacteremia, Enterococcus faecium and E coli, no resistance genes detected on [employer], pending sensitivities.      ID consult for E coli and E faecium bacteremia.    7/20: Interim reviewed, patient remains febrile, T max 101.3, currently 100.8, on pressors, on O2 by Nc 5L. She remains encephalopathic, decreased urinary output, 20ml/h, no bowel movements recorded yet. Labs reviewed, WBC 11.4, bands 36%, H/H 11.3/35.4, plt: 175. Worsening ANTHONY 2.6, LFTs trending down. , will watch closely. Alct acid 3, troponins trending up, likely demand ischemia. Micro reviewed, repeat blood cultures 7/19  "Enterococcus faecium  bottles, urine culture no growth to date, pending final. Discussed with Pulmonary, plan for IV steroids.    Review of patient's allergies indicates:   Allergen Reactions    Adhesive tape-silicones Rash    Dye Hives    Cephalexin     Iodine     Penicillins Edema    Pneumococcal vaccine     Iodinated contrast media Rash     Past Medical History:   Diagnosis Date    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 2021    Anemia, chronic disease 2021    CAD (coronary artery disease)     Diabetes mellitus, type 2     Hypertension     Iron deficiency anemia following bariatric surgery 2021    MI (myocardial infarction)     Secondary thrombocytosis 2021    Sleep apnea     Sleep apnea 2019    Sleep Right      Past Surgical History:   Procedure Laterality Date    ANGIOGRAM, CORONARY, WITH LEFT HEART CATHETERIZATION      APPENDECTOMY      BARIATRIC SURGERY  2019    Dr Nunez    CARPAL TUNNEL RELEASE Bilateral 2002     SECTION      CHOLECYSTECTOMY      COLONOSCOPY      CORONARY ANGIOPLASTY WITH STENT PLACEMENT      CORONARY ARTERY BYPASS GRAFT      EPIDURAL STEROID INJECTION INTO LUMBAR SPINE      x2    ESOPHAGOGASTRODUODENOSCOPY      HERNIA REPAIR  2019    HYSTERECTOMY      THYROID LOBECTOMY Right 2021    Procedure: LOBECTOMY, THYROID;  Surgeon: Mari Chance MD;  Location: [facility];  Service: General;  Laterality: Right;     Social History     Tobacco Use    Smoking status: Former     Packs/day: 1.00     Years: 15.00     Pack years: 15.00     Types: Cigarettes     Quit date:      Years since quittin.5    Smokeless tobacco: Never   Substance Use Topics    Alcohol use: No     Comment: "maybe once a year"        Family History   Problem Relation Age of Onset    Diabetes Mother     Vision loss Mother     Heart disease Father     Vision loss Father     Stroke Father        Review of Systems:   Unable to obtain, patient is lethargic, no family at bedside "     EXAM & DIAGNOSTICS REVIEWED:   Vitals:     Temp:  [99.8 °F (37.7 °C)-101.3 °F (38.5 °C)]   Temp: (!) 101 °F (38.3 °C) (07/20/23 0301)  Pulse: 97 (07/20/23 0723)  Resp: (!) 28 (07/20/23 0723)  BP: (!) 98/47 (07/20/23 0700)  SpO2: (!) 94 % (07/20/23 0723)    Intake/Output Summary (Last 24 hours) at 7/20/2023 0745  Last data filed at 7/20/2023 0630  Gross per 24 hour   Intake 4301.06 ml   Output 530 ml   Net 3771.06 ml       General:  Ill-appearing, on pressors, obese lady, on O2 by Nc 5L  Eyes:  Anicteric, PERRL  ENT:  Dry oral mucosa, no thrush, nares patent, dentition is fair   Neck:  Supple, R neck with small hematoma likely from prior central access attempts  L IJ with bloody dressing, no adenopathy appreciated  Lungs: Coarse breath sounds b/l  Heart:  Tachycardic, S1/S2+, regular rhythm, no murmurs  Abd:  Obese, large ventral hernia, +BS, soft, non tender  :  Waller, foul-smelling hazy urine   Musc:  Joints without effusion, swelling,  erythema, synovitis  Skin:  Warm, no rash  Wound:   Neuro:  Lethargic    Psych:    Lymphatic:       Extrem: No LE edema b/l  VAD:  L IJ    R A-line       Isolation: contact      General Labs reviewed:  Recent Labs   Lab 07/19/23  0030 07/19/23  0535 07/19/23  0544 07/20/23  0307   WBC 6.90 14.26*  --  11.47   HGB 12.3 11.4*  --  11.3*   HCT 39.9 36.6* 38 35.4*    186  --  175       Recent Labs   Lab 07/19/23  0030 07/19/23  0535 07/19/23  1604 07/20/23  0307    138 136 137   K 2.8* 3.3* 5.1 4.8    105 103 104   CO2 27 24 21* 22*   BUN 31* 34* 42* 49*   CREATININE 1.8* 1.6* 2.4* 2.6*   CALCIUM 9.2 8.2* 8.1* 7.8*   PROT 5.5* 5.3*  --  4.9*   BILITOT 2.4* 2.7*  --  2.4*   ALKPHOS 232* 239*  --  294*   ALT 1,389* 1,237*  --  893*   AST 3,585* 2,526*  --  977*     Recent Labs   Lab 07/19/23  1250   CRP 24.03*     No results for input(s): SEDRATE in the last 168 hours.    Estimated Creatinine Clearance: 26.7 mL/min (A) (based on SCr of 2.6 mg/dL (H)).      Micro:  Microbiology Results (last 7 days)       Procedure Component Value Units Date/Time    Urine Culture High Risk [[id number]] Collected: 07/19/23 1006    Order Status: Completed Specimen: Urine, Clean Catch Updated: 07/20/23 0720     Urine Culture, Routine No growth to date    Narrative:      Indicated criteria for high risk culture:->Other  Other (specify):->e coli bacteremia    Blood culture [[id number]]  (Abnormal) Collected: 07/19/23 0002    Order Status: Completed Specimen: Blood Updated: 07/20/23 0657     Blood Culture, Routine Gram stain aer bottle: Gram positive cocci      Gram stain ez bottle: Gram positive cocci      Positive results previously called      ENTEROCOCCUS FAECIUM  For susceptibility see order #[id number]      Blood culture [[id number]]  (Abnormal) Collected: 07/19/23 0030    Order Status: Completed Specimen: Blood from Peripheral, Left Hand Updated: 07/20/23 0655     Blood Culture, Routine Gram stain aer bottle: Gram positive cocci      Gram stain ez bottle: Gram positive cocci      Results called to and read back by:Carmela Mcintosh RN-3ICU;      07/19/2023  16:23 CJD      ENTEROCOCCUS FAECIUM  For susceptibility see order #[id number]      Narrative:      Collection has been rescheduled by KC9 at 07/19/2023 00:20 Reason:   Nurse Draw  Collection has been rescheduled by KC9 at 07/19/2023 00:20 Reason:   Nurse Draw    Blood culture [[id number]]  (Abnormal) Collected: 07/18/23 1738    Order Status: Completed Specimen: Blood Updated: 07/20/23 0654     Blood Culture, Routine Gram stain aer bottle: Gram negative rods      Results called to and read back by:Lesvia Mcdowell RN MICU3.      07/19/2023  05:23 TGC      ESCHERICHIA COLI  For susceptibility see order #[id number]      Blood culture [[id number]]  (Abnormal) Collected: 07/18/23 1648    Order Status: Completed Specimen: Blood Updated: 07/20/23 0651     Blood Culture, Routine Gram stain aer bottle: Gram negative rods      Gram stain aer  bottle: Gram positive cocci      Results called to and read back by: Lesvia Mcdowell RN MICU3.      07/19/2023  05:06 TGC      ESCHERICHIA COLI  Identification and susceptibility pending        ENTEROCOCCUS FAECIUM  Identification and susceptibility pending      Blood culture [[id number]]     Order Status: Sent Specimen: Blood     Blood culture [[id number]]     Order Status: Sent Specimen: Blood     Blood culture [[id number]] Collected: 07/19/23 1230    Order Status: Completed Specimen: Blood Updated: 07/19/23 1917     Blood Culture, Routine No Growth to date    Rapid Organism ID by PCR (from Blood culture) [[id number]]  (Abnormal) Collected: 07/18/23 1648    Order Status: Completed Updated: 07/19/23 0526     Enterococcus faecalis Not Detected     Enterococcus faecium Detected     Listeria monocytogenes Not Detected     Staphylococcus spp. Not Detected     Staphylococcus aureus Not Detected     Staphylococcus epidermidis Not Detected     Staphylococcus lugdunensis Not Detected     Streptococcus species Not Detected     Streptococcus agalactiae Not Detected     Streptococcus pneumoniae Not Detected     Streptococcus pyogenes Not Detected     Acinetobacter calcoaceticus/baumannii complex Not Detected     Bacteroides fragilis Not Detected     Enterobacterales See species for ID     Enterobacter cloacae complex Not Detected     Escherichia coli Detected     Klebsiella aerogenes Not Detected     Klebsiella oxytoca Not Detected     Klebsiella pneumoniae group Not Detected     Proteus Not Detected     Salmonella sp Not Detected     Serratia marcescens Not Detected     Haemophilus influenzae Not Detected     Neisseria meningtidis Not Detected     Pseudomonas aeruginosa Not Detected     Stenotrophomonas maltophilia Not Detected     Candida albicans Not Detected     Candida auris Not Detected     Candida glabrata Not Detected     Candida krusei Not Detected     Candida parapsilosis Not Detected     Candida tropicalis Not Detected      Cryptococcus neoformans/gattii Not Detected     CTX-M (ESBL ) Not Detected     IMP (Carbapenem resistant) Not Detected     KPC resistance gene (Carbapenem resistant) Not Detected     mcr-1  Not Detected     mec A/C  Test not applicable     mec A/C and MREJ (MRSA) gene Test not applicable     NDM (Carbapenem resistant) Not Detected     OXA-48-like (Carbapenem resistant) Not Detected     van A/B (VRE gene) Not Detected     VIM (Carbapenem resistant) Not Detected            Imaging Reviewed:  CXR  Abdominal Us  CTA chest  CT abdomen/pelvis:  1.  Status post cholecystectomy with postsurgical dilatation of the common bile duct and intrahepatic ducts. Should be correlated with bilirubin levels.   2.  Broad-based ventral abdominal wall hernia measures approximately 15 x 13 cm with herniated loops of large and small bowel. No evidence of obstruction.   3.  Exophytic hypoattenuating structure in the mid left kidney is felt to reflect a hemorrhagic cyst.      Cardiology: ECHO  The left ventricle is normal in size with mild concentric hypertrophy and mildly decreased systolic function.  Mild to moderate tricuspid regurgitation.  The estimated ejection fraction is 45%.  Grade I left ventricular diastolic dysfunction.  There is abnormal septal wall motion consistent with left bundle branch block.  Mild right ventricular enlargement with normal right ventricular systolic function.  Moderate left atrial enlargement.  Normal central venous pressure (3 mmHg).  The estimated PA systolic pressure is 40 mmHg.  There is mild pulmonary hypertension.       IMPRESSION & PLAN     Septic shock likely from polymicrobial bacteremia Enterococcus faecium and E coli (7/18-19) secondary to cholangitis s/p ERCP, sphincterotomy 7/19, history of prior pancreatitis secondary to gallstones   Lactic acid 5.9-->4.6-->3  Procal 54, positive  CRP 24  Baseline CPK 92-->245  Lipase 1363-->102, trending down     2.   Shock liver, AST  6334/5325-->977/898    3.   ANTHONY, cr 2.6    4.  PMHx: diabetes, hypertension, bariatric surgery, cholecystectomy, prior UTIs    5. Obesity, BMI 37.6 Kg/m2    Recommendations:  Repeat blood cultures x2 sets  Adjust dose of Dapto to q48h, due to current crcl, next dose 7/21  Continue meropenem 1 g IV q.12  Follow blood cultures and sensitivities  Agreed on IV steroids   Monitor urinary oupt and curve temp  Aspiration precautions     D/w ICU nursing, Dr Serrano     Medical Decision Making during this encounter was  [_] Low Complexity  [_] Moderate Complexity  [xx] High Complexity

## 2023-07-20 NOTE — CONSULTS
[facility]  Department of Cardiology  Consult Note      PATIENT NAME: Chanel Cervantes  MRN: [id number]  TODAY'S DATE: 07/20/2023  ADMIT DATE: 7/18/2023                          CONSULT REQUESTED BY: Harris Dempsey MD    SUBJECTIVE     PRINCIPAL PROBLEM: Septic shock      REASON FOR CONSULT:  Elevated Troponin      HPI:    Ms. Cervantes is a 67 year old female patient that has a PMH significant for DM2, HTN, bariatric surgery, Remote cholecystectomy,  Frequent UTIs with kidney stones,  who presents with back pain.  Admitted for pancreatitis with elevated LFTs.  Daughter at bedside supplements her history. She tells us she has been complaining of back and abdominal pain which is chronic she went to open the door for AC man and tripped over area rug and fell and came to ER. She was in ER BP dropped after percocet and she was rushed to ICU She is now intubated and in Multiorgan failure and troponin elevated and we have been consulted for the same. She does have a cardiac history remote of Mini CABG in 1998. Currently intubated and sedated on propofol and on norepi for bp support.    FROM H AND P     Chanel Cervantes is a 67 y.o. White female   With PMH of DM2, HTN, bariatric surgery,  Remote cholecystectomy,  Frequent UTIs with kidney stones,  who presents with back pain.  Admitted for pancreatitis with elevated LFTs     Pt is currently confused after receiving ativan  (ER gave it to calm her for CT scan)  History taken from family at bedside     Onset of back pain overnight  Located b/l lumbar region  Radiating to b/l upper quadrants of abdomen  Occurring intermittently  Progressively worsening  Severe, causes SOB when occurring     Initially pt thought pain was due to a fall yesterday  (Mechanical, tripped over a rug, no head trauma)  +nausea, no vomiting  +intermittent chills  They are not sure about objective fever     Has had similar abdominal pain previously  Per family, this has been attributed to her  "ongoing ventral wall hernia  They don't bring her to ER for this usually  However pain today was much more severe than usual        Review of patient's allergies indicates:   Allergen Reactions    Adhesive tape-silicones Rash    Dye Hives    Cephalexin     Iodine     Penicillins Edema    Pneumococcal vaccine     Iodinated contrast media Rash       Past Medical History:   Diagnosis Date    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 2021    Anemia, chronic disease 2021    CAD (coronary artery disease)     Diabetes mellitus, type 2     Hypertension     Iron deficiency anemia following bariatric surgery 2021    MI (myocardial infarction)     Secondary thrombocytosis 2021    Sleep apnea     Sleep apnea 2019    Sleep Right      Past Surgical History:   Procedure Laterality Date    ANGIOGRAM, CORONARY, WITH LEFT HEART CATHETERIZATION      APPENDECTOMY      BARIATRIC SURGERY  2019    Dr Nunez    CARPAL TUNNEL RELEASE Bilateral 2002     SECTION      CHOLECYSTECTOMY      COLONOSCOPY      CORONARY ANGIOPLASTY WITH STENT PLACEMENT      CORONARY ARTERY BYPASS GRAFT      EPIDURAL STEROID INJECTION INTO LUMBAR SPINE      x2    ERCP N/A 2023    Procedure: ERCP (ENDOSCOPIC RETROGRADE CHOLANGIOPANCREATOGRAPHY);  Surgeon: Lavon Hernandez III, MD;  Location: [facility];  Service: Endoscopy;  Laterality: N/A;    ESOPHAGOGASTRODUODENOSCOPY      HERNIA REPAIR  2019    HYSTERECTOMY      THYROID LOBECTOMY Right 2021    Procedure: LOBECTOMY, THYROID;  Surgeon: Mari Chance MD;  Location: [facility];  Service: General;  Laterality: Right;     Social History     Tobacco Use    Smoking status: Former     Packs/day: 1.00     Years: 15.00     Pack years: 15.00     Types: Cigarettes     Quit date:      Years since quittin.5    Smokeless tobacco: Never   Substance Use Topics    Alcohol use: No     Comment: "maybe once a year"    Drug use: No        REVIEW OF SYSTEMS      UNABLE TO OBTAIN "   INTUBATED AND SEDATED      OBJECTIVE     VITAL SIGNS (Most Recent)  Temp: (!) 100.8 °F (38.2 °C) (07/20/23 0808)  Pulse: 98 (07/20/23 0900)  Resp: (!) 30 (07/20/23 0900)  BP: (!) 109/55 (07/20/23 0900)  SpO2: (!) 94 % (07/20/23 0900)    VENTILATION STATUS  Resp: (!) 30 (07/20/23 0900)  SpO2: (!) 94 % (07/20/23 0900)           I & O (Last 24H):  Intake/Output Summary (Last 24 hours) at 7/20/2023 0923  Last data filed at 7/20/2023 0807  Gross per 24 hour   Intake 4301.06 ml   Output 563 ml   Net 3738.06 ml       WEIGHTS  Wt Readings from Last 3 Encounters:   07/19/23 0400 108.9 kg (239 lb 15.9 oz)   07/18/23 1841 108.9 kg (240 lb)   07/18/23 1112 108.9 kg (240 lb)   07/19/23 1000 108.9 kg (240 lb 1.3 oz)   06/02/23 0758 109.1 kg (240 lb 8 oz)       PHYSICAL EXAM  GENERAL: Adult obese female intubate and sedated resting on ventilator  HEENT: LEFT IJ  NECK: No JVD. No bruit..   THYROID: Thyroid not enlarged. No nodules present..   CARDIAC: Regular rate and rhythm. S1 is normal.S2 is normal.  CHEST ANATOMY: normal.   LUNGS: mechanical  ABDOMEN: Hypoactive  URINARY:  schaefer catheter CHRISSIE YELLOW  EXTREMITIES: No cyanosis, clubbing or edema noted at this time., no calf tenderness bilaterally.         HOME MEDICATIONS:  No current facility-administered medications on file prior to encounter.     Current Outpatient Medications on File Prior to Encounter   Medication Sig Dispense Refill    amLODIPine (NORVASC) 2.5 MG tablet Take 1 tablet (2.5 mg total) by mouth once daily. 90 tablet 1    azelastine (ASTELIN) 137 mcg (0.1 %) nasal spray 1 spray (137 mcg total) by Nasal route 2 (two) times daily. 30 mL 5    calcium carbonate-vitamin D3 600 mg-62.5 mcg (2,500 unit) Cap calcium carbonate 600 mg-vitamin D3 62.5 mcg (2,500 unit) capsule   Take by oral route.      DULoxetine (CYMBALTA) 60 MG capsule Take 1 capsule (60 mg total) by mouth once daily. 90 capsule 1    empaglifloz-linaglip-metformin (TRIJARDY XR) 25-5-1,000 mg TBph Take  1 tablet by mouth once daily. 90 tablet 1    guanFACINE (TENEX) 2 MG tablet Take 1 tablet (2 mg total) by mouth nightly. 90 tablet 3    insulin degludec (TRESIBA FLEXTOUCH U-200) 200 unit/mL (3 mL) insulin pen Inject 76 Units into the skin once daily. 12 pen 3    iron-vitamin C 100-250 mg, ICAR-C, 100-250 mg Tab Take 1 tablet by mouth once daily. 30 each 8    levothyroxine (SYNTHROID) 75 MCG tablet Take 75 mcg by mouth before breakfast.      metFORMIN (GLUCOPHAGE) 1000 MG tablet Take 1,000 mg by mouth 2 (two) times daily with meals.      metOLazone (ZAROXOLYN) 5 MG tablet Take 1 tablet (5 mg total) by mouth once daily. 90 tablet 0    metoprolol succinate (TOPROL-XL) 25 MG 24 hr tablet Take 1 tablet (25 mg total) by mouth once daily. 90 tablet 1    multivitamin capsule Take 1 capsule by mouth once daily.      olmesartan (BENICAR) 40 MG tablet Take 1 tablet (40 mg total) by mouth once daily. 90 tablet 1    potassium chloride (KLOR-CON) 10 MEQ TbSR Take 1 tablet (10 mEq total) by mouth once daily. 90 tablet 1    pregabalin (LYRICA) 50 MG capsule Take 50 mg by mouth every evening.      rosuvastatin (CRESTOR) 40 MG Tab Take 1 tablet (40 mg total) by mouth every evening. 90 tablet 3    aspirin (ECOTRIN) 81 MG EC tablet Take 1 tablet (81 mg total) by mouth once daily. 30 tablet 0    blood sugar diagnostic Strp One Touch Ultra Blue Test strips, Use l strip to check glucose 4 times daily 100 each 11    blood-glucose meter kit Use as instructed 1 each 0    cyanocobalamin 1,000 mcg/mL injection Inject 1 mL (1,000 mcg total) into the skin every 30 days. 3 mL 4    lancets (ONETOUCH DELICA LANCETS) 33 gauge Misc USE 1  TO CHECK GLUCOSE 4 TIMES DAILY 100 each 3    magnesium oxide (MAG-OX) 400 mg (241.3 mg magnesium) tablet Take 1 tablet (400 mg total) by mouth once daily. 90 tablet 1    NYSTOP powder APPLY TO AFFECTED AREA AS NEEDED      prednisoLONE acetate (PRED FORTE) 1 % DrpS Place 1 drop into both eyes as needed.           SCHEDULED MEDS:   [START ON 7/21/2023] DAPTOmycin (CUBICIN) IV (PEDS and ADULTS)  10 mg/kg (Adjusted) Intravenous Q48H    enoxparin  40 mg Subcutaneous Daily    hydrocortisone sodium succinate  100 mg Intravenous Q8H    levothyroxine  75 mcg Oral Before breakfast    meropenem (MERREM) IVPB  1 g Intravenous Q12H    mupirocin   Nasal BID       CONTINUOUS INFUSIONS:   lactated ringers 125 mL/hr at 07/19/23 0920    NORepinephrine bitartrate-D5W 0.25 mcg/kg/min (07/20/23 0830)       PRN MEDS:acetaminophen, albuterol-ipratropium, calcium gluconate IVPB, calcium gluconate IVPB, calcium gluconate IVPB, dextrose 50%, dextrose 50%, glucagon (human recombinant), glucose, glucose, hydrALAZINE, hydrALAZINE, HYDROmorphone, insulin aspart U-100, lorazepam, magnesium sulfate IVPB, magnesium sulfate IVPB, naloxone, ondansetron, polyethylene glycol, potassium chloride in water **AND** potassium chloride in water **AND** potassium chloride in water, senna-docusate 8.6-50 mg, simethicone, sodium chloride 0.9%, sodium phosphate IVPB, sodium phosphate IVPB, sodium phosphate IVPB    LABS AND DIAGNOSTICS     CBC LAST 3 DAYS  Recent Labs   Lab 07/18/23  1220 07/18/23  2333 07/19/23  0030 07/19/23  0535 07/19/23  0544 07/20/23  0307   WBC 1.17*  --  6.90 14.26*  --  11.47   RBC 5.28  --  4.62 4.35  --  4.29   HGB 14.0  --  12.3 11.4*  --  11.3*   HCT 44.2   < > 39.9 36.6* 38 35.4*   MCV 84  --  86 84  --  83   MCH 26.5*  --  26.6* 26.2*  --  26.3*   MCHC 31.7*  --  30.8* 31.1*  --  31.9*   RDW 15.2*  --  15.7* 15.8*  --  16.4*     --  199 186  --  175   MPV 10.6  --  11.2 11.6  --  11.1   GRAN 87.0*  --   --  95.4*  13.6*  --  58.0   LYMPH 4.0*  --   --  1.1*  0.2*  --  3.0*   MONO 1.0*  --   --  2.5*  0.4  --  2.0*   BASO  --   --   --  0.02  --   --    NRBC 0  --   --  0  --  0    < > = values in this interval not displayed.       COAGULATION LAST 3 DAYS  No results for input(s): LABPT, INR, APTT in the last 168  hours.    CHEMISTRY LAST 3 DAYS  Recent Labs   Lab 07/18/23  2333 07/19/23  0030 07/19/23  0535 07/19/23  0544 07/19/23  1604 07/20/23  0307   NA  --  142 138  --  136 137   K  --  2.8* 3.3*  --  5.1 4.8   CL  --  102 105  --  103 104   CO2  --  27 24  --  21* 22*   ANIONGAP  --  13 9  --  12 11   BUN  --  31* 34*  --  42* 49*   CREATININE  --  1.8* 1.6*  --  2.4* 2.6*   GLU  --  107 165*  --  170* 134*   CALCIUM  --  9.2 8.2*  --  8.1* 7.8*   PH 7.382  --   --  7.356  --   --    MG  --  2.2 2.0  --   --  1.9   ALBUMIN  --  2.8* 2.7*  --   --  2.3*   PROT  --  5.5* 5.3*  --   --  4.9*   ALKPHOS  --  232* 239*  --   --  294*   ALT  --  1,389* 1,237*  --   --  893*   AST  --  3,585* 2,526*  --   --  977*   BILITOT  --  2.4* 2.7*  --   --  2.4*       CARDIAC PROFILE LAST 3 DAYS  Recent Labs   Lab 07/18/23  1220 07/18/23  2204 07/19/23  1604 07/19/23  2112 07/20/23  0307   BNP 20  --   --   --   --    CPK 92  --   --   --  245*   TROPONINIHS 14.8   < > 648.1* 1148.1* 2384.3*    < > = values in this interval not displayed.       ENDOCRINE LAST 3 DAYS  Recent Labs   Lab 07/19/23  0535 07/19/23  1250   TSH 0.470  --    PROCAL  --  54.06*       LAST ARTERIAL BLOOD GAS  ABG  Recent Labs   Lab 07/19/23  0544   PH 7.356   PO2 89   PCO2 44.4   HCO3 24.9   BE -1       LAST 7 DAYS MICROBIOLOGY   Microbiology Results (last 7 days)       Procedure Component Value Units Date/Time    Urine Culture High Risk [[id number]] Collected: 07/19/23 1006    Order Status: Completed Specimen: Urine, Clean Catch Updated: 07/20/23 0720     Urine Culture, Routine No growth to date    Narrative:      Indicated criteria for high risk culture:->Other  Other (specify):->e coli bacteremia    Blood culture [[id number]]  (Abnormal) Collected: 07/19/23 0002    Order Status: Completed Specimen: Blood Updated: 07/20/23 0657     Blood Culture, Routine Gram stain aer bottle: Gram positive cocci      Gram stain ez bottle: Gram positive cocci      Positive  results previously called      ENTEROCOCCUS FAECIUM  For susceptibility see order #[id number]      Blood culture [[id number]]  (Abnormal) Collected: 07/19/23 0030    Order Status: Completed Specimen: Blood from Peripheral, Left Hand Updated: 07/20/23 0655     Blood Culture, Routine Gram stain aer bottle: Gram positive cocci      Gram stain ez bottle: Gram positive cocci      Results called to and read back by:Carmela Mcintosh RN-3ICU;      07/19/2023  16:23 CJD      ENTEROCOCCUS FAECIUM  For susceptibility see order #[id number]      Narrative:      Collection has been rescheduled by KC9 at 07/19/2023 00:20 Reason:   Nurse Draw  Collection has been rescheduled by KC9 at 07/19/2023 00:20 Reason:   Nurse Draw    Blood culture [[id number]]  (Abnormal) Collected: 07/18/23 1738    Order Status: Completed Specimen: Blood Updated: 07/20/23 0654     Blood Culture, Routine Gram stain aer bottle: Gram negative rods      Results called to and read back by:Lesvia Mcdowell RN MICU3.      07/19/2023  05:23 TGC      ESCHERICHIA COLI  For susceptibility see order #[id number]      Blood culture [[id number]]  (Abnormal) Collected: 07/18/23 1648    Order Status: Completed Specimen: Blood Updated: 07/20/23 0651     Blood Culture, Routine Gram stain aer bottle: Gram negative rods      Gram stain aer bottle: Gram positive cocci      Results called to and read back by: Lesvia Mcdowell RN MICU3.      07/19/2023  05:06 TGC      ESCHERICHIA COLI  Identification and susceptibility pending        ENTEROCOCCUS FAECIUM  Identification and susceptibility pending      Blood culture [[id number]]     Order Status: Sent Specimen: Blood     Blood culture [[id number]]     Order Status: Sent Specimen: Blood     Blood culture [[id number]] Collected: 07/19/23 1230    Order Status: Completed Specimen: Blood Updated: 07/19/23 1917     Blood Culture, Routine No Growth to date    Rapid Organism ID by PCR (from Blood culture) [[id number]]  (Abnormal) Collected: 07/18/23  1648    Order Status: Completed Updated: 07/19/23 0526     Enterococcus faecalis Not Detected     Enterococcus faecium Detected     Listeria monocytogenes Not Detected     Staphylococcus spp. Not Detected     Staphylococcus aureus Not Detected     Staphylococcus epidermidis Not Detected     Staphylococcus lugdunensis Not Detected     Streptococcus species Not Detected     Streptococcus agalactiae Not Detected     Streptococcus pneumoniae Not Detected     Streptococcus pyogenes Not Detected     Acinetobacter calcoaceticus/baumannii complex Not Detected     Bacteroides fragilis Not Detected     Enterobacterales See species for ID     Enterobacter cloacae complex Not Detected     Escherichia coli Detected     Klebsiella aerogenes Not Detected     Klebsiella oxytoca Not Detected     Klebsiella pneumoniae group Not Detected     Proteus Not Detected     Salmonella sp Not Detected     Serratia marcescens Not Detected     Haemophilus influenzae Not Detected     Neisseria meningtidis Not Detected     Pseudomonas aeruginosa Not Detected     Stenotrophomonas maltophilia Not Detected     Candida albicans Not Detected     Candida auris Not Detected     Candida glabrata Not Detected     Candida krusei Not Detected     Candida parapsilosis Not Detected     Candida tropicalis Not Detected     Cryptococcus neoformans/gattii Not Detected     CTX-M (ESBL ) Not Detected     IMP (Carbapenem resistant) Not Detected     KPC resistance gene (Carbapenem resistant) Not Detected     mcr-1  Not Detected     mec A/C  Test not applicable     mec A/C and MREJ (MRSA) gene Test not applicable     NDM (Carbapenem resistant) Not Detected     OXA-48-like (Carbapenem resistant) Not Detected     van A/B (VRE gene) Not Detected     VIM (Carbapenem resistant) Not Detected            MOST RECENT IMAGING  Echo  · The left ventricle is normal in size with mild concentric hypertrophy   and mildly decreased systolic function.  · Mild to moderate  tricuspid regurgitation.  · The estimated ejection fraction is 45%.  · Grade I left ventricular diastolic dysfunction.  · There is abnormal septal wall motion consistent with left bundle branch   block.  · Mild right ventricular enlargement with normal right ventricular   systolic function.  · Moderate left atrial enlargement.  · Normal central venous pressure (3 mmHg).  · The estimated PA systolic pressure is 40 mmHg.  · There is mild pulmonary hypertension.     FL ERCP Biliary And Pancreatic By [facility] MANDATED QUALITY DATA-FLUOROSCOPY - 145    FLUORO time: 1 minute 34 seconds  FLUORO images: 14  FLUORO dose: DAP unavailable mGycm^2    CLINICAL HISTORY:  67 years (1955) Female pancreatitis ERCP: pancreatitis: sphincterotomy with balloon sweep / FT: 1 min 34 sec    TECHNIQUE:  FL ERCP BILIARY SYSTEM    COMPARISON:  Ultrasound from July 18, 2023.    FINDINGS:  This procedure was performed with the use of Radiology Department fluoroscopic equipment without the assistance or involvement of the radiologist.    Images show ERCP with sphincterotomy and balloon sweep. There are clips from prior cholecystectomy. Please see dedicated procedure note in the electronic medical record for further details, or consider dedicated postprocedural imaging if further characterization is desired.    IMPRESSION:  Fluoroscopic services provided as noted above.    Electronically signed by:  Kirby Hernandez MD  7/19/2023 8:47 AM CDT Workstation: [id number]  X-Ray Chest AP Portable  Reason: Line placement    FINDINGS:    Portable chest with comparison radiograph July 18, 2023 show unchanged cardiomediastinal silhouette. Enlarged central hilar vascular structures and bilateral perihilar interstitial thickening again noted. Unchanged blunting of the costophrenic angles. No pneumothorax.  Interval placement of left internal jugular central venous catheter with tip near anatomic region of the SVC. Pulmonary vasculature is normal. No  acute osseous abnormality.    IMPRESSION:    1.  Unchanged CHF lung pattern.  2.  Left internal jugular central venous catheter tip is in the anatomic region of the SVC. No pneumothorax.    Electronically signed by:  Benjy Woodson DO  7/19/2023 6:59 AM [employer] Workstation: [id number]  X-Ray Chest 1 View  Reason: Low oxygen saturation.    FINDINGS:    Portable chest with comparison chest x-ray August 7, 2021 show enlarged cardiomediastinal silhouette. Median sternotomy wires noted.  There is enlargement of the central hilar vascular structures with mild perihilar interstitial thickening. There is blunting of the left costophrenic angle with left basilar hazy opacities. Blunting of the right costophrenic angle. Pulmonary vasculature is normal. No acute osseous abnormality.    IMPRESSION:    1.  Enlarged cardiomediastinal silhouette and central hilar vascular structures. Mild perihilar interstitial thickening. Findings are felt to reflect CHF with mild pulmonary edema.  2.  Small bilateral pleural effusions.    Electronically signed by:  Benjy Woodson DO  7/19/2023 6:57 AM [employer] Workstation: [id number]      ECHOCARDIOGRAM RESULTS (last 5)  Results for orders placed during the hospital encounter of 07/18/23    Echo    Interpretation Summary  · The left ventricle is normal in size with mild concentric hypertrophy and mildly decreased systolic function.  · Mild to moderate tricuspid regurgitation.  · The estimated ejection fraction is 45%.  · Grade I left ventricular diastolic dysfunction.  · There is abnormal septal wall motion consistent with left bundle branch block.  · Mild right ventricular enlargement with normal right ventricular systolic function.  · Moderate left atrial enlargement.  · Normal central venous pressure (3 mmHg).  · The estimated PA systolic pressure is 40 mmHg.  · There is mild pulmonary hypertension.      CURRENT/PREVIOUS VISIT EKG  Results for orders placed or performed during the hospital encounter  of 07/18/23   EKG 12-lead    Collection Time: 07/18/23 11:24 AM    Narrative    Test Reason : W19.XXXA,    Vent. Rate : 073 BPM     Atrial Rate : 073 BPM     P-R Int : 154 ms          QRS Dur : 162 ms      QT Int : 426 ms       P-R-T Axes : 069 -17 -11 degrees     QTc Int : 469 ms    Sinus rhythm with Premature ventricular complexes or Fusion complexes  Right bundle branch block  Minimal voltage criteria for LVH, may be normal variant ( R in aVL )  Abnormal ECG  When compared with ECG of 07-AUG-2021 22:34,  Fusion complexes are now Present  Premature ventricular complexes are now Present    Referred By: [email or web address]   SELF           Confirmed By:            ASSESSMENT/PLAN:     Active Hospital Problems    Diagnosis    *Septic shock    Hypothyroidism    Obesity (BMI 30-39.9)    Anemia    Acute hypoxemic respiratory failure    Demand ischemia    ANTHONY (acute kidney injury)    Shock liver    E coli bacteremia    Acute encephalopathy    Acute obstructive cholangitis    Hypokalemia    S/P bariatric surgery    NIKOLAS on CPAP    CAD (coronary artery disease)    Benign essential hypertension    Type 2 diabetes mellitus treated with insulin       ASSESSMENT & PLAN:       Elevated Troponin-Multifactorial secondary to demand ischemia and infection  Septic Shock  Multiorgan failure Liver, Kidneys, Heart failure, respiratory failure  DM Type 2  LVEF 45%  E Coli bacteremia  7. H/O CAD and CABG  8. Status post cholecystectomy with postsurgical dilatation of the common bile duct and intrahepatic ducts.    RECOMMENDATIONS:      Troponin is secondary to multiple organs being in shock and failure along with sepsis.  Cardiology will be available as needed  Thank you      Ailyn Walker NP  Department of Cardiology  Date of Service: 07/20/2023      Patient was admitted to the hospital after a fall noted to have E coli sepsis with Enterococcus faecalis and E coli.  Currently on pressor doses of therapies also noted to have acute hepatic and  renal injury and donut to have markedly elevated troponins a result of this septic shock agree with the present management aggressive manner treatment for sepsis.  Will monitor her cardiac status at this point no acute intervention is indicated.  Discussed with patient's daughter and a family members at bedside.    I have personally interviewed and examined the patient, I have reviewed the Nurse Practitioner's history and physical, assessment, and plan. I agree with the findings and plan.      Buddy Lowery M.D.  Department of Cardiology  Date of Service: 07/20/2023  9:23 AM

## 2023-07-20 NOTE — PLAN OF CARE
Problem: Adult Inpatient Plan of Care  Goal: Plan of Care Review  Outcome: Ongoing, Progressing  Goal: Patient-Specific Goal (Individualized)  Outcome: Ongoing, Progressing  Goal: Absence of Hospital-Acquired Illness or Injury  Outcome: Ongoing, Progressing  Goal: Optimal Comfort and Wellbeing  Outcome: Ongoing, Progressing  Goal: Readiness for Transition of Care  Outcome: Ongoing, Progressing     Problem: Diabetes Comorbidity  Goal: Blood Glucose Level Within Targeted Range  Outcome: Ongoing, Progressing     Problem: Skin Injury Risk Increased  Goal: Skin Health and Integrity  Outcome: Ongoing, Progressing     Problem: Infection  Goal: Absence of Infection Signs and Symptoms  Outcome: Ongoing, Progressing     Problem: Adjustment to Illness (Sepsis/Septic Shock)  Goal: Optimal Coping  Outcome: Ongoing, Progressing     Problem: Bleeding (Sepsis/Septic Shock)  Goal: Absence of Bleeding  Outcome: Ongoing, Progressing     Problem: Glycemic Control Impaired (Sepsis/Septic Shock)  Goal: Blood Glucose Level Within Desired Range  Outcome: Ongoing, Progressing     Problem: Infection Progression (Sepsis/Septic Shock)  Goal: Absence of Infection Signs and Symptoms  Outcome: Ongoing, Progressing     Problem: Nutrition Impaired (Sepsis/Septic Shock)  Goal: Optimal Nutrition Intake  Outcome: Ongoing, Progressing     Problem: Fluid and Electrolyte Imbalance (Acute Kidney Injury/Impairment)  Goal: Fluid and Electrolyte Balance  Outcome: Ongoing, Progressing     Problem: Oral Intake Inadequate (Acute Kidney Injury/Impairment)  Goal: Optimal Nutrition Intake  Outcome: Ongoing, Progressing     Problem: Renal Function Impairment (Acute Kidney Injury/Impairment)  Goal: Effective Renal Function  Outcome: Ongoing, Progressing

## 2023-07-21 PROBLEM — K85.10 GALLSTONE PANCREATITIS: Status: ACTIVE | Noted: 2023-07-21

## 2023-07-21 LAB
ALBUMIN SERPL BCP-MCNC: 2 G/DL (ref 3.5–5.2)
ALLENS TEST: ABNORMAL
ALP SERPL-CCNC: 319 U/L (ref 55–135)
ALT SERPL W/O P-5'-P-CCNC: 602 U/L (ref 10–44)
ANION GAP SERPL CALC-SCNC: 12 MMOL/L (ref 8–16)
ANISOCYTOSIS BLD QL SMEAR: SLIGHT
AST SERPL-CCNC: 371 U/L (ref 10–40)
BACTERIA BLD CULT: ABNORMAL
BASOPHILS NFR BLD: 0 % (ref 0–1.9)
BILIRUB SERPL-MCNC: 1.6 MG/DL (ref 0.1–1)
BUN SERPL-MCNC: 56 MG/DL (ref 8–23)
CALCIUM SERPL-MCNC: 7.6 MG/DL (ref 8.7–10.5)
CHLORIDE SERPL-SCNC: 106 MMOL/L (ref 95–110)
CO2 SERPL-SCNC: 22 MMOL/L (ref 23–29)
CREAT SERPL-MCNC: 2.9 MG/DL (ref 0.5–1.4)
DELSYS: ABNORMAL
DIFFERENTIAL METHOD: ABNORMAL
EOSINOPHIL NFR BLD: 0 % (ref 0–8)
ERYTHROCYTE [DISTWIDTH] IN BLOOD BY AUTOMATED COUNT: 16.2 % (ref 11.5–14.5)
ERYTHROCYTE [SEDIMENTATION RATE] IN BLOOD BY WESTERGREN METHOD: 20 MM/H
EST. GFR  (NO RACE VARIABLE): 17.2 ML/MIN/1.73 M^2
FIO2: 35
GLUCOSE SERPL-MCNC: 141 MG/DL (ref 70–110)
GLUCOSE SERPL-MCNC: 206 MG/DL (ref 70–110)
GLUCOSE SERPL-MCNC: 228 MG/DL (ref 70–110)
GLUCOSE SERPL-MCNC: 240 MG/DL (ref 70–110)
HCO3 UR-SCNC: 24.6 MMOL/L (ref 24–28)
HCT VFR BLD AUTO: 32.6 % (ref 37–48.5)
HGB BLD-MCNC: 10.7 G/DL (ref 12–16)
IMM GRANULOCYTES # BLD AUTO: ABNORMAL K/UL (ref 0–0.04)
IMM GRANULOCYTES NFR BLD AUTO: ABNORMAL % (ref 0–0.5)
LACTATE SERPL-SCNC: 1.5 MMOL/L (ref 0.5–1.9)
LACTATE SERPL-SCNC: 1.6 MMOL/L (ref 0.5–1.9)
LACTATE SERPL-SCNC: 1.9 MMOL/L (ref 0.5–1.9)
LACTATE SERPL-SCNC: 1.9 MMOL/L (ref 0.5–1.9)
LYMPHOCYTES NFR BLD: 6 % (ref 18–48)
MAGNESIUM SERPL-MCNC: 2.2 MG/DL (ref 1.6–2.6)
MCH RBC QN AUTO: 26.6 PG (ref 27–31)
MCHC RBC AUTO-ENTMCNC: 32.8 G/DL (ref 32–36)
MCV RBC AUTO: 81 FL (ref 82–98)
MIN VOL: 10
MODE: ABNORMAL
MONOCYTES NFR BLD: 7 % (ref 4–15)
NEUTROPHILS NFR BLD: 57 % (ref 38–73)
NEUTS BAND NFR BLD MANUAL: 30 %
NRBC BLD-RTO: 0 /100 WBC
PCO2 BLDA: 37.5 MMHG (ref 35–45)
PEEP: 5
PH SMN: 7.42 [PH] (ref 7.35–7.45)
PHOSPHATE SERPL-MCNC: 3.7 MG/DL (ref 2.7–4.5)
PIP: 20
PLATELET # BLD AUTO: 85 K/UL (ref 150–450)
PMV BLD AUTO: 11.6 FL (ref 9.2–12.9)
PO2 BLDA: 77 MMHG (ref 80–100)
POC BE: 0 MMOL/L
POC SATURATED O2: 96 % (ref 95–100)
POC TCO2: 26 MMOL/L (ref 23–27)
POTASSIUM SERPL-SCNC: 4.2 MMOL/L (ref 3.5–5.1)
PROT SERPL-MCNC: 4.9 G/DL (ref 6–8.4)
RBC # BLD AUTO: 4.02 M/UL (ref 4–5.4)
SAMPLE: ABNORMAL
SITE: ABNORMAL
SODIUM SERPL-SCNC: 140 MMOL/L (ref 136–145)
SP02: 94
VT: 450
WBC # BLD AUTO: 9.95 K/UL (ref 3.9–12.7)

## 2023-07-21 PROCEDURE — 25000003 PHARM REV CODE 250: Performed by: FAMILY MEDICINE

## 2023-07-21 PROCEDURE — 94761 N-INVAS EAR/PLS OXIMETRY MLT: CPT

## 2023-07-21 PROCEDURE — 63600175 PHARM REV CODE 636 W HCPCS: Performed by: FAMILY MEDICINE

## 2023-07-21 PROCEDURE — 63600175 PHARM REV CODE 636 W HCPCS: Performed by: INTERNAL MEDICINE

## 2023-07-21 PROCEDURE — 27000221 HC OXYGEN, UP TO 24 HOURS

## 2023-07-21 PROCEDURE — 83605 ASSAY OF LACTIC ACID: CPT | Mod: 91 | Performed by: INTERNAL MEDICINE

## 2023-07-21 PROCEDURE — 80053 COMPREHEN METABOLIC PANEL: CPT | Performed by: FAMILY MEDICINE

## 2023-07-21 PROCEDURE — 85027 COMPLETE CBC AUTOMATED: CPT | Performed by: FAMILY MEDICINE

## 2023-07-21 PROCEDURE — 99900035 HC TECH TIME PER 15 MIN (STAT)

## 2023-07-21 PROCEDURE — 99900031 HC PATIENT EDUCATION (STAT)

## 2023-07-21 PROCEDURE — 25000003 PHARM REV CODE 250: Performed by: STUDENT IN AN ORGANIZED HEALTH CARE EDUCATION/TRAINING PROGRAM

## 2023-07-21 PROCEDURE — 99900026 HC AIRWAY MAINTENANCE (STAT)

## 2023-07-21 PROCEDURE — 83735 ASSAY OF MAGNESIUM: CPT | Performed by: FAMILY MEDICINE

## 2023-07-21 PROCEDURE — 25000003 PHARM REV CODE 250: Performed by: INTERNAL MEDICINE

## 2023-07-21 PROCEDURE — 99291 PR CRITICAL CARE, E/M 30-74 MINUTES: ICD-10-PCS | Mod: ,,, | Performed by: INTERNAL MEDICINE

## 2023-07-21 PROCEDURE — 99233 PR SUBSEQUENT HOSPITAL CARE,LEVL III: ICD-10-PCS | Mod: ,,, | Performed by: INTERNAL MEDICINE

## 2023-07-21 PROCEDURE — 84100 ASSAY OF PHOSPHORUS: CPT | Performed by: STUDENT IN AN ORGANIZED HEALTH CARE EDUCATION/TRAINING PROGRAM

## 2023-07-21 PROCEDURE — 63600175 PHARM REV CODE 636 W HCPCS: Performed by: STUDENT IN AN ORGANIZED HEALTH CARE EDUCATION/TRAINING PROGRAM

## 2023-07-21 PROCEDURE — 20000000 HC ICU ROOM

## 2023-07-21 PROCEDURE — 94003 VENT MGMT INPAT SUBQ DAY: CPT

## 2023-07-21 PROCEDURE — 99233 SBSQ HOSP IP/OBS HIGH 50: CPT | Mod: ,,, | Performed by: STUDENT IN AN ORGANIZED HEALTH CARE EDUCATION/TRAINING PROGRAM

## 2023-07-21 PROCEDURE — 85007 BL SMEAR W/DIFF WBC COUNT: CPT | Performed by: FAMILY MEDICINE

## 2023-07-21 PROCEDURE — 99233 PR SUBSEQUENT HOSPITAL CARE,LEVL III: ICD-10-PCS | Mod: ,,, | Performed by: STUDENT IN AN ORGANIZED HEALTH CARE EDUCATION/TRAINING PROGRAM

## 2023-07-21 PROCEDURE — 37799 UNLISTED PX VASCULAR SURGERY: CPT

## 2023-07-21 PROCEDURE — 99291 CRITICAL CARE FIRST HOUR: CPT | Mod: ,,, | Performed by: INTERNAL MEDICINE

## 2023-07-21 PROCEDURE — 99233 SBSQ HOSP IP/OBS HIGH 50: CPT | Mod: ,,, | Performed by: INTERNAL MEDICINE

## 2023-07-21 PROCEDURE — 82803 BLOOD GASES ANY COMBINATION: CPT

## 2023-07-21 RX ADMIN — INSULIN ASPART 4 UNITS: 100 INJECTION, SOLUTION INTRAVENOUS; SUBCUTANEOUS at 01:07

## 2023-07-21 RX ADMIN — MEROPENEM 1 G: 1 INJECTION, POWDER, FOR SOLUTION INTRAVENOUS at 01:07

## 2023-07-21 RX ADMIN — CHLORHEXIDINE GLUCONATE 15 ML: 1.2 RINSE ORAL at 10:07

## 2023-07-21 RX ADMIN — PROPOFOL 20 MCG/KG/MIN: 10 INJECTION, EMULSION INTRAVENOUS at 05:07

## 2023-07-21 RX ADMIN — PROPOFOL 25 MCG/KG/MIN: 10 INJECTION, EMULSION INTRAVENOUS at 12:07

## 2023-07-21 RX ADMIN — HYDROCORTISONE SODIUM SUCCINATE 100 MG: 100 INJECTION, POWDER, FOR SOLUTION INTRAMUSCULAR; INTRAVENOUS at 05:07

## 2023-07-21 RX ADMIN — ENOXAPARIN SODIUM 40 MG: 100 INJECTION SUBCUTANEOUS at 05:07

## 2023-07-21 RX ADMIN — HYDROCORTISONE SODIUM SUCCINATE 100 MG: 100 INJECTION, POWDER, FOR SOLUTION INTRAMUSCULAR; INTRAVENOUS at 02:07

## 2023-07-21 RX ADMIN — POLYETHYLENE GLYCOL 3350 17 G: 17 POWDER, FOR SOLUTION ORAL at 08:07

## 2023-07-21 RX ADMIN — PROPOFOL 25 MCG/KG/MIN: 10 INJECTION, EMULSION INTRAVENOUS at 10:07

## 2023-07-21 RX ADMIN — SODIUM CHLORIDE, SODIUM LACTATE, POTASSIUM CHLORIDE, AND CALCIUM CHLORIDE: .6; .31; .03; .02 INJECTION, SOLUTION INTRAVENOUS at 02:07

## 2023-07-21 RX ADMIN — MUPIROCIN 1 G: 20 OINTMENT TOPICAL at 08:07

## 2023-07-21 RX ADMIN — MEROPENEM 1 G: 1 INJECTION, POWDER, FOR SOLUTION INTRAVENOUS at 02:07

## 2023-07-21 RX ADMIN — INSULIN ASPART 4 UNITS: 100 INJECTION, SOLUTION INTRAVENOUS; SUBCUTANEOUS at 05:07

## 2023-07-21 RX ADMIN — POLYETHYLENE GLYCOL 3350 17 G: 17 POWDER, FOR SOLUTION ORAL at 01:07

## 2023-07-21 RX ADMIN — SODIUM CHLORIDE, SODIUM LACTATE, POTASSIUM CHLORIDE, AND CALCIUM CHLORIDE: .6; .31; .03; .02 INJECTION, SOLUTION INTRAVENOUS at 10:07

## 2023-07-21 RX ADMIN — PROPOFOL 25 MCG/KG/MIN: 10 INJECTION, EMULSION INTRAVENOUS at 05:07

## 2023-07-21 RX ADMIN — MUPIROCIN 1 G: 20 OINTMENT TOPICAL at 10:07

## 2023-07-21 RX ADMIN — LEVOTHYROXINE SODIUM 75 MCG: 0.03 TABLET ORAL at 05:07

## 2023-07-21 RX ADMIN — CHLORHEXIDINE GLUCONATE 15 ML: 1.2 RINSE ORAL at 08:07

## 2023-07-21 RX ADMIN — DAPTOMYCIN 805 MG: 500 INJECTION, POWDER, LYOPHILIZED, FOR SOLUTION INTRAVENOUS at 03:07

## 2023-07-21 NOTE — ASSESSMENT & PLAN NOTE
In setting of septic shock.  -Continue Levophed and IV fluids  -Renally dose medications  -Avoid nephrotoxic agents  -Monitor UOP and electrolytes  -Trend creatinine    Creatinine   Date Value Ref Range Status   07/20/2023 2.6 (H) 0.5 - 1.4 mg/dL Final   07/19/2023 2.4 (H) 0.5 - 1.4 mg/dL Final

## 2023-07-21 NOTE — CARE UPDATE
07/20/23 1942   Patient Assessment/Suction   Level of Consciousness (AVPU) responds to pain   Respiratory Effort Unlabored   Expansion/Accessory Muscles/Retractions no use of accessory muscles   All Lung Fields Breath Sounds diminished   Rhythm/Pattern, Respiratory assisted mechanically   Cough Frequency with stimulation   Cough Type assisted   Suction Method oral;tracheal   Suction Pressure (mmHg) -120 mmHg   $ Suction Charges Inline Suction Procedure Stat Charge   Secretions Amount small   Secretions Color white   Secretions Characteristics thick   PRE-TX-O2   Device (Oxygen Therapy) ventilator   Oxygen Concentration (%) 50   SpO2 97 %   Pulse Oximetry Type Continuous   $ Pulse Oximetry - Multiple Charge Pulse Oximetry - Multiple   Pulse 95   Resp (!) 21   Aerosol Therapy   $ Aerosol Therapy Charges PRN treatment not required        Airway - Non-Surgical 07/20/23 0945 Endotracheal Tube   Placement Date/Time: 07/20/23 0945   Method of Intubation: Hendricks  Inserted by: MD  Staff/Resident Name(s): dr charles  Airway Device: Endotracheal Tube  Airway Device Size: 8.0  Style: Cuffed  Cuff Inflated With: Air  Placement Verified By: Capnometr...   Secured at 23 cm   Measured At Lips   Secured Location Right   Secured by Commercial tube teran   Site Condition Cool   Status Intact;Patent;Secured   Site Assessment Dry;Clean   Airway Safety   Is Ambu Bag and Mask with Patient? Yes, Adult Ambu Bag and Mask   Suction set is at the bedside? Yes   Vent Select   Conventional Vent Y   Charged w/in last 24h YES   Preset Conventional Ventilator Settings   Vent ID [id number]   Vent Type    Ventilation Type VC   Vent Mode A/C   Humidity HME   Set Rate 20 BPM   Vt Set 450 mL   PEEP/CPAP 5 cmH20   Waveform RAMP   Peak Flow 60 L/min   Peak End Inspiratory Pressure 20 cmH20   I-Trigger Type  V-TRIG   Trigger Sensitivity Flow/I-Trigger 3 L/min   Patient Ventilator Parameters   Resp Rate Total 25 br/min   Peak Airway Pressure 20 cmH20    Mean Airway Pressure 11 cmH20   Plateau Pressure 0 cmH20   Exhaled Vt 454 mL   Total Ve 11.3 L/m   I:E Ratio Measured 1:2.00   Auto PEEP 0 cmH20   Conventional Ventilator Alarms   Alarms On Y   Ve High Alarm 23 L/min   Ve Low Alarm 3 L/min   Vt High Alarm 1200 mL   Vt Low Alarm 200 mL   Resp Rate High Alarm 45 br/min   Press High Alarm 60 cmH2O   Apnea Rate 20   Apnea Volume (mL) 0 mL   Apnea Oxygen Concentration  100   Apnea Flow Rate (L/min) 60   T Apnea 20 sec(s)   Ready to Wean/Extubation Screen   FIO2<=50 (chart decimal) 0.5   MV<16L (chart vol.) 11.3   PEEP <=8 (chart #) 5   Ready to Wean Parameters   F/VT Ratio<105 (RSBI) (!) 46.26   Vital Capacity   Vital Capacity (mL) 0

## 2023-07-21 NOTE — ASSESSMENT & PLAN NOTE
Stable.  -Trend Hgb with CBC    Hemoglobin   Date Value Ref Range Status   07/20/2023 11.3 (L) 12.0 - 16.0 g/dL Final   07/19/2023 11.4 (L) 12.0 - 16.0 g/dL Final

## 2023-07-21 NOTE — ASSESSMENT & PLAN NOTE
-Mask O2  -Caution with IV fluids  -ABG and CXR PRN  -Treat septic shock  -Pulmonary/Critical Care consulted  -pt to be intubated this morning

## 2023-07-21 NOTE — ASSESSMENT & PLAN NOTE
Sludge ball removed via ERCP. Likely source of bacteremia.  -Continue meropenem and daptomycin, renally dosed; follow sensitivities  -GI following  -IV fluids  -Levophed infusion  -Follow up repeat blood cultures  -Trend LFTs

## 2023-07-21 NOTE — ASSESSMENT & PLAN NOTE
History of CAD s/p CABG. No acute ST changes on EKG.  -Trend troponin  -Treat septic shock  -Telemetry  -cardiology to consult    Troponin I High Sensitivity   Date Value Ref Range Status   07/20/2023 2384.3 (HH) 0.0 - 14.9 pg/mL Final     Comment:     Troponin results differ between methods. Do not use   results between Troponin methods interchangeably.    Alkaline Phospatase levels above 400 U/L may   cause false positive results.    Access hsTnI should not be used for patients taking   Asfotase anya (Strensiq).  Troponin critical result(s) called and verbal readback obtained   from [place] Avila RN M3 by MS1 07/20/2023 04:54     07/19/2023 1148.1 (HH) 0.0 - 14.9 pg/mL Final     Comment:     Troponin results differ between methods. Do not use   results between Troponin methods interchangeably.    Alkaline Phospatase levels above 400 U/L may   cause false positive results.    Access hsTnI should not be used for patients taking   Asfotase anya (Strensiq).  Troponin critical result(s) called and verbal readback obtained from   Karan Maldonado RN M3  by MS1 07/19/2023 22:10     07/19/2023 648.1 (HH) 0.0 - 14.9 pg/mL Final     Comment:     Troponin results differ between methods. Do not use   results between Troponin methods interchangeably.    Alkaline Phospatase levels above 400 U/L may   cause false positive results.    Access hsTnI should not be used for patients taking   Asfotase anya (Strensiq).  Results confirmed, test repeated  Troponin critical result(s) repeated. Called and verbal readback   obtained from Carmela Mcintosh,ICU, RN.  by [facility] 07/19/2023 17:37        pt complaining of left sided weakness, numbness and tingling as well as slurred speech starting 40 minutes ago. fs 136 at triage. code stroke called.

## 2023-07-21 NOTE — CONSULTS
"[facility]  Adult Nutrition   Consult Note (Nutrition Support Management)    SUMMARY     Recommendations  Recommendation/Intervention: 1. RD ordered tube feeding to be initiated and advanced to goal rate as tolerated. Vial AF 1.2 with goal rate of 35 ml/hr (to provide 1008 kcal/day, 63 g/day protein, and 681 ml/day free water). 2. FWF 30 ml every 4 hours (180 ml/day). 3. RD to continue to monitor ventilator status, sedation rate, tube feeding tolerance, weight and labs and make recommendations and manage enteral nutrition accordingly.  Goals: 1. Patient to tolerate TF. 2. Nutrition provision to meet at least 75% EEN/EPN.  Nutrition Goal Status: new  Communication of RD Recs: reviewed with RN    Dietitian Rounds Brief  Consult to start tube feeding. Pt remains intubated and sedated. Propofol @ 16.3 ml/hr (430 kcal/day). Getting IV fluids LR @ 75 ml/hr. RD ordered tube feeding to be intiaited and advanced as tolerated.     Reason for Assessment  Reason For Assessment: consult  Diagnosis: infection/sepsis  Relevant Medical History: essential HTN; type 2 DM; NIKOLAS on CPAP; s/p bariatric surgery; hypokalemia; CAD; acute obstructive cholangitis; hypothyroidism; obesity; anemia; acute hypoxemic respiratory failure; ANTHONY; septic shock; shock liver; e. coli bacteremia; acute encephalopathy    Nutrition Risk Screen  Nutrition Risk Screen: no indicators present     MST Score: 0  Have you recently lost weight without trying?: No  Weight loss score: 0  Have you been eating poorly because of a decreased appetite?: No  Appetite score: 0       Nutrition/Diet History  Food Allergies: NKFA  Factors Affecting Nutritional Intake: NPO, on mechanical ventilation    Anthropometrics  Temp: 99.2 °F (37.3 °C)  Height Method: Stated  Height: 5' 7" (170.2 cm)  Height (inches): 67 in  Weight Method: Bed Scale  Weight: 108.9 kg (239 lb 15.9 oz)  Weight (lb): 240 lb  Ideal Body Weight (IBW), Female: 135 lb  % Ideal Body Weight, Female " (lb): 177.78 %  BMI (Calculated): 37.6  BMI Grade: 35 - 39.9 - obesity - grade II       Weight History:  Wt Readings from Last 10 Encounters:   07/19/23 108.9 kg (239 lb 15.9 oz)   07/19/23 108.9 kg (240 lb 1.3 oz)   06/02/23 109.1 kg (240 lb 8 oz)   04/13/23 108.8 kg (239 lb 14.4 oz)   02/16/23 108.5 kg (239 lb 4.8 oz)   12/27/22 108.9 kg (240 lb 1.3 oz)   11/30/22 108.9 kg (240 lb)   09/12/22 111.8 kg (246 lb 6.4 oz)   08/05/22 109.8 kg (242 lb)   04/06/22 109.9 kg (242 lb 3.2 oz)       Lab/Procedures/Meds: Pertinent Labs Reviewed    Clinical Chemistry:  Recent Labs   Lab 07/18/23  1220 07/19/23  0030 07/19/23  0535 07/19/23  1604 07/20/23  0307 07/21/23  0331      < > 138   < > 137 140   K 3.1*   < > 3.3*   < > 4.8 4.2   CL 99   < > 105   < > 104 106   CO2 29   < > 24   < > 22* 22*   *   < > 165*   < > 134* 141*   BUN 22   < > 34*   < > 49* 56*   CREATININE 1.1   < > 1.6*   < > 2.6* 2.9*   CALCIUM 10.0   < > 8.2*   < > 7.8* 7.6*   PROT 7.1   < > 5.3*  --  4.9* 4.9*   ALBUMIN 3.8   < > 2.7*  --  2.3* 2.0*   BILITOT 1.8*   < > 2.7*  --  2.4* 1.6*   ALKPHOS 217*   < > 239*  --  294* 319*   AST 4,943*   < > 2,526*  --  977* 371*   ALT 1,475*   < > 1,237*  --  893* 602*   ANIONGAP 13   < > 9   < > 11 12   MG 1.5*   < > 2.0  --  1.9 2.2   PHOS  --   --   --   --  3.5 3.7   LIPASE 1363*  --  102*  --   --   --     < > = values in this interval not displayed.       CBC:   Recent Labs   Lab 07/21/23  0331   WBC 9.95   RBC 4.02   HGB 10.7*   HCT 32.6*   PLT 85*   MCV 81*   MCH 26.6*   MCHC 32.8       Cardiac Profile:  Recent Labs   Lab 07/18/23  1220 07/20/23  0307   BNP 20  --    CPK 92 245*       Inflammatory Labs:  Recent Labs   Lab 07/19/23  1250   CRP 24.03*       Diabetes:  Recent Labs   Lab 07/18/23  1735   HGBA1C 6.9*       Thyroid & Parathyroid:  Recent Labs   Lab 07/19/23  0535   TSH 0.470       Medications: Pertinent Medications reviewed    Scheduled Meds:   chlorhexidine  15 mL Mouth/Throat BID     DAPTOmycin (CUBICIN) IV (PEDS and ADULTS)  10 mg/kg (Adjusted) Intravenous Q48H    enoxparin  40 mg Subcutaneous Daily    hydrocortisone sodium succinate  100 mg Intravenous Q8H    levothyroxine  75 mcg Oral Before breakfast    meropenem (MERREM) IVPB  1 g Intravenous Q12H    mupirocin   Nasal BID       Continuous Infusions:   lactated ringers 75 mL/hr at 07/21/23 1053    NORepinephrine bitartrate-D5W 0.1 mcg/kg/min (07/21/23 0730)    propofoL 25 mcg/kg/min (07/21/23 0846)       PRN Meds:.acetaminophen, albuterol-ipratropium, calcium gluconate IVPB, calcium gluconate IVPB, calcium gluconate IVPB, dextrose 50%, dextrose 50%, glucagon (human recombinant), glucose, glucose, HYDROmorphone, insulin aspart U-100, lorazepam, magnesium sulfate IVPB, magnesium sulfate IVPB, naloxone, ondansetron, polyethylene glycol, potassium chloride in water **AND** potassium chloride in water **AND** potassium chloride in water, senna-docusate 8.6-50 mg, simethicone, sodium chloride 0.9%, sodium phosphate IVPB, sodium phosphate IVPB, sodium phosphate IVPB    Estimated/Assessed Needs  Weight Used For Calorie Calculations: 108.9 kg (240 lb 1.3 oz)  Energy Calorie Requirements (kcal): 1198 - 1525 (11 - 14 kcal/kg)  Energy Need Method: Kcal/kg  Protein Requirements: 49 - 73 (0.8 - 73 (0.8 - 1.2 g/kg IBW)  Weight Used For Protein Calculations: 61 kg (134 lb 7.7 oz) (IBW)     Estimated Fluid Requirement Method: RDA Method  RDA Method (mL): 1198       Nutrition Prescription Ordered  Current Diet Order: NPO    Evaluation of Received Nutrient/Fluid Intake  Total Calories (kcal): 430 (propofol @ 16.3)  % Kcal Needs: 36  Energy Calories Required: not meeting needs  Protein Required: not meeting needs  Fluid Required: meeting needs  Tolerance: other (see comments) (NPO)     Intake/Output Summary (Last 24 hours) at 7/21/2023 1247  Last data filed at 7/21/2023 1127  Gross per 24 hour   Intake 3743.75 ml   Output 1955 ml   Net 1788.75 ml      % Intake  of Estimated Energy Needs: 25 - 50 %  % Meal Intake: NPO    Nutrition Risk  Level of Risk/Frequency of Follow-up: high     Monitor and Evaluation  Food and Nutrient Intake: energy intake, enteral nutrition intake  Food and Nutrient Adminstration: enteral and parenteral nutrition administration  Physical Activity and Function: nutrition-related ADLs and IADLs, factors affecting access to physical activity  Anthropometric Measurements: weight change, body mass index, weight  Biochemical Data, Medical Tests and Procedures: electrolyte and renal panel, inflammatory profile, gastrointestinal profile, lipid profile, glucose/endocrine profile  Nutrition-Focused Physical Findings: overall appearance     Nutrition Follow-Up  RD Follow-up?: Yes    Angie Serrano RD 07/21/2023 12:47 PM

## 2023-07-21 NOTE — PLAN OF CARE
Problem: Adult Inpatient Plan of Care  Goal: Plan of Care Review  Outcome: Ongoing, Progressing  Goal: Patient-Specific Goal (Individualized)  Outcome: Ongoing, Progressing  Goal: Absence of Hospital-Acquired Illness or Injury  Outcome: Ongoing, Progressing  Goal: Optimal Comfort and Wellbeing  Outcome: Ongoing, Progressing  Goal: Readiness for Transition of Care  Outcome: Ongoing, Progressing     Problem: Diabetes Comorbidity  Goal: Blood Glucose Level Within Targeted Range  Outcome: Ongoing, Progressing     Problem: Skin Injury Risk Increased  Goal: Skin Health and Integrity  Outcome: Ongoing, Progressing     Problem: Infection  Goal: Absence of Infection Signs and Symptoms  Outcome: Ongoing, Progressing     Problem: Adjustment to Illness (Sepsis/Septic Shock)  Goal: Optimal Coping  Outcome: Ongoing, Progressing     Problem: Bleeding (Sepsis/Septic Shock)  Goal: Absence of Bleeding  Outcome: Ongoing, Progressing     Problem: Glycemic Control Impaired (Sepsis/Septic Shock)  Goal: Blood Glucose Level Within Desired Range  Outcome: Ongoing, Progressing     Problem: Infection Progression (Sepsis/Septic Shock)  Goal: Absence of Infection Signs and Symptoms  Outcome: Ongoing, Progressing     Problem: Nutrition Impaired (Sepsis/Septic Shock)  Goal: Optimal Nutrition Intake  Outcome: Ongoing, Progressing     Problem: Fluid and Electrolyte Imbalance (Acute Kidney Injury/Impairment)  Goal: Fluid and Electrolyte Balance  Outcome: Ongoing, Progressing     Problem: Oral Intake Inadequate (Acute Kidney Injury/Impairment)  Goal: Optimal Nutrition Intake  Outcome: Ongoing, Progressing     Problem: Renal Function Impairment (Acute Kidney Injury/Impairment)  Goal: Effective Renal Function  Outcome: Ongoing, Progressing     Problem: Fall Injury Risk  Goal: Absence of Fall and Fall-Related Injury  Outcome: Ongoing, Progressing     Problem: Restraint, Nonbehavioral (Nonviolent)  Goal: Absence of Harm or Injury  Outcome: Ongoing,  Progressing     Problem: Communication Impairment (Mechanical Ventilation, Invasive)  Goal: Effective Communication  Outcome: Ongoing, Progressing     Problem: Device-Related Complication Risk (Mechanical Ventilation, Invasive)  Goal: Optimal Device Function  Outcome: Ongoing, Progressing     Problem: Inability to Wean (Mechanical Ventilation, Invasive)  Goal: Mechanical Ventilation Liberation  Outcome: Ongoing, Progressing     Problem: Nutrition Impairment (Mechanical Ventilation, Invasive)  Goal: Optimal Nutrition Delivery  Outcome: Ongoing, Progressing     Problem: Skin and Tissue Injury (Mechanical Ventilation, Invasive)  Goal: Absence of Device-Related Skin and Tissue Injury  Outcome: Ongoing, Progressing     Problem: Ventilator-Induced Lung Injury (Mechanical Ventilation, Invasive)  Goal: Absence of Ventilator-Induced Lung Injury  Outcome: Ongoing, Progressing     Problem: Communication Impairment (Artificial Airway)  Goal: Effective Communication  Outcome: Ongoing, Progressing     Problem: Device-Related Complication Risk (Artificial Airway)  Goal: Optimal Device Function  Outcome: Ongoing, Progressing     Problem: Skin and Tissue Injury (Artificial Airway)  Goal: Absence of Device-Related Skin or Tissue Injury  Outcome: Ongoing, Progressing     Problem: Noninvasive Ventilation Acute  Goal: Effective Unassisted Ventilation and Oxygenation  Outcome: Ongoing, Progressing

## 2023-07-21 NOTE — PLAN OF CARE
Problem: Feeding Intolerance (Enteral Nutrition)  Goal: Feeding Tolerance  Outcome: Ongoing, Progressing  Intervention: Prevent and Manage Feeding Intolerance  Flowsheets (Taken 7/21/2023 1247)  Nutrition Support Management: tube feeding initiated

## 2023-07-21 NOTE — PLAN OF CARE
Patient found lying in bed, on ventilator, on propofol at 20mcg. I had heard that she was woken up this morning but was just restless, agitated, hypertensive, not following commands.    Levophed requirements are imrpoved from yesterday, was at 0.03mcg this morning but map noted to be around 58 and so now is riding at about 0.1 mcg, was up to 0.32 mcg yesterday.    She is doing ok on ventilator peak pressures around 24, breathing over the vent. just a small amount of white secretions per et tube. OG tube is clamped.    Yesterday she had some white foam / saliva in her oral cavity and could not be encouraged to clear her throat, not following commands. With occasional cough. Was intubated for airway protection, see dr gold note.    Belly is soft.     Urine output improved from yesterday. No fever this morning just a little warm 99s. Bowel sounds are hypoactive / faint.     The bed is low and alarm on. Clinical alarms reviewed and armed. Monitor strips printed. Turning q2h, lines and extremities padded. Mepilex applied to bilat heels, sacrum, on waffle overlay. Placed in bilat wrist restraints, free from harm at this time. Yesterday hanged chg dressing to left neck IJ with aseptic technique per md as there was a small amount of sanguinous drainage.    Started on vital af , at 20ml/hr currently. Tolerating.   As no bm had been reported since admit, gave miralax, now starting to have a scant amount of liquid stool leaking from rectum, may be starting some activity

## 2023-07-21 NOTE — PROGRESS NOTES
Consult Note  Infectious Disease    Reason for Consult:  bacteremia E coli and E faecium     HPI: Chanel Cervantes is a 67 y.o. female obese, BMI 37.6 kg/M2, with past medical history of diabetes, hypertension, bariatric surgery, cholecystectomy, prior UTIs, and prior pancreatitis secondary to gallstones.     She presented with acute onset of back pain, radiating into bilateral upper quadrants, with associated shortness of breath.  She was admitted for septic shock, transferred to ICU for further management.    Labs on admission with severe leukopenia 1.1, left shift 87%, bands 8%, H&H 14/44.2, platelet count 213   Creatinine 1.1   Hypokalemia 3.1   Transaminitis, AST 4943/ALT 1475   Total bili 2.7  Lipase 1363 down to 102  Patient CPK 92   Lactic acid 4.6  Hemoglobin A1c 6.9  UA pyuria 6, negative for bacteria, urine culture in process     CT abdomen with broad-based ventral abdominal hernia.  No evidence of obstruction.  Status post cholecystectomy with postsurgical dilatation of the common bile duct and intrahepatic ducts.    Seen by GI, s/p ERCP this morning; with evidence of the entire main bile duct was moderately dilated, with an obstruction from suspected sludge ball. A biliary sphincterotomy was performed. The biliary tree was swept.     ERCP this morning.  Hospital course complicated persistent fever and polymicrobial bacteremia, Enterococcus faecium and E coli, no resistance genes detected on [employer], pending sensitivities.      ID consult for E coli and E faecium bacteremia.    7/20: Interim reviewed, patient remains febrile, T max 101.3, currently 100.8, on pressors, on O2 by [place] 5L. She remains encephalopathic, decreased urinary output, 20ml/h, no bowel movements recorded yet. Labs reviewed, WBC 11.4, bands 36%, H/H 11.3/35.4, plt: 175. Worsening ANTHONY 2.6, LFTs trending down. , will watch closely. Alct acid 3, troponins trending up, likely demand ischemia. Micro reviewed, repeat blood cultures 7/19  Enterococcus faecium / bottles, urine culture no growth to date, pending final. Discussed with Pulmonary, plan for IV steroids.    :  Interim reviewed, patient seen examined at bedside. Pressors requirement coming down, afebrile in the last 24 hours.  She was intubated yesterday at noon for airway protection, FiO2 40%, minimal amount of secretions, sputum sent for culture.  Urinary output improving although creatinine 2.9 today.  LFTs trending down, lactic acid 1.9, normal.  Micro reviewed, repeat blood cultures 7/20 x2 sets no growth to date, pending final.  Awaiting sensitivities on E coli and Enterococcus faecium, to be available later today.    Review of patient's allergies indicates:   Allergen Reactions    Adhesive tape-silicones Rash    Dye Hives    Cephalexin     Iodine     Penicillins Edema    Pneumococcal vaccine     Iodinated contrast media Rash     Past Medical History:   Diagnosis Date    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 2021    Anemia, chronic disease 2021    CAD (coronary artery disease)     Diabetes mellitus, type 2     Hypertension     Iron deficiency anemia following bariatric surgery 2021    MI (myocardial infarction)     Secondary thrombocytosis 2021    Sleep apnea     Sleep apnea 2019    Sleep Right      Past Surgical History:   Procedure Laterality Date    ANGIOGRAM, CORONARY, WITH LEFT HEART CATHETERIZATION      APPENDECTOMY      BARIATRIC SURGERY  2019    Dr Nunez    CARPAL TUNNEL RELEASE Bilateral 2002     SECTION      CHOLECYSTECTOMY      COLONOSCOPY      CORONARY ANGIOPLASTY WITH STENT PLACEMENT      CORONARY ARTERY BYPASS GRAFT      EPIDURAL STEROID INJECTION INTO LUMBAR SPINE      x2    ERCP N/A 2023    Procedure: ERCP (ENDOSCOPIC RETROGRADE CHOLANGIOPANCREATOGRAPHY);  Surgeon: Lavon Hernandez III, MD;  Location: [facility];  Service: Endoscopy;  Laterality: N/A;    ESOPHAGOGASTRODUODENOSCOPY      HERNIA REPAIR  2019     "HYSTERECTOMY      THYROID LOBECTOMY Right 2021    Procedure: LOBECTOMY, THYROID;  Surgeon: Mari Chance MD;  Location: [facility];  Service: General;  Laterality: Right;     Social History     Tobacco Use    Smoking status: Former     Packs/day: 1.00     Years: 15.00     Pack years: 15.00     Types: Cigarettes     Quit date:      Years since quittin.5    Smokeless tobacco: Never   Substance Use Topics    Alcohol use: No     Comment: "maybe once a year"        Family History   Problem Relation Age of Onset    Diabetes Mother     Vision loss Mother     Heart disease Father     Vision loss Father     Stroke Father        Review of Systems:   Unable to obtain, patient is lethargic, no family at bedside     EXAM & DIAGNOSTICS REVIEWED:   Vitals:     Temp:  [98.4 °F (36.9 °C)-100.8 °F (38.2 °C)]   Temp: 99.2 °F (37.3 °C) (23)  Pulse: 87 (23)  Resp: (!) 25 (23)  BP: (!) 99/51 (23)  SpO2: 96 % (23)    Intake/Output Summary (Last 24 hours) at 202346  Last data filed at 2023  Gross per 24 hour   Intake 3743.75 ml   Output 1868 ml   Net 1875.75 ml       General:  Ill-appearing, on pressors, obese lady, intubated, FiO2: 40%  Eyes:  Anicteric, PERRL  ENT:  ET tube    OG tube   Neck:  Supple, R neck with resolvingl hematoma likely from prior central access attempts  L IJ with minimal amount of blood in dressing  Lungs: Coarse breath sounds b/l  Heart:  S1/S2+, regular rhythm, no murmurs  Abd:  Obese, large ventral hernia, +BS, soft, non tender  :  Waller, urine dark yellow  Musc:  Joints without effusion, swelling,  erythema, synovitis  Skin:  Warm, no rash  Wound:   Neuro:  Sedated   Psych:    Lymphatic:       Extrem: No LE edema b/l  VAD:  L IJ    R A-line       Isolation: contact      General Labs reviewed:  Recent Labs   Lab 23  0535 23  0544 23  0307 23  0931 23  0331   WBC 14.26*  --  11.47  --  9.95   HGB " 11.4*  --  11.3*  --  10.7*   HCT 36.6*   < > 35.4* 32* 32.6*     --  175  --  85*    < > = values in this interval not displayed.       Recent Labs   Lab 07/19/23  0535 07/19/23  1604 07/20/23  0307 07/21/23  0331    136 137 140   K 3.3* 5.1 4.8 4.2    103 104 106   CO2 24 21* 22* 22*   BUN 34* 42* 49* 56*   CREATININE 1.6* 2.4* 2.6* 2.9*   CALCIUM 8.2* 8.1* 7.8* 7.6*   PROT 5.3*  --  4.9* 4.9*   BILITOT 2.7*  --  2.4* 1.6*   ALKPHOS 239*  --  294* 319*   ALT 1,237*  --  893* 602*   AST 2,526*  --  977* 371*     Recent Labs   Lab 07/19/23  1250   CRP 24.03*     No results for input(s): SEDRATE in the last 168 hours.    Estimated Creatinine Clearance: 23.9 mL/min (A) (based on SCr of 2.9 mg/dL (H)).     Micro:  Microbiology Results (last 7 days)       Procedure Component Value Units Date/Time    Blood culture [[id number]] Collected: 07/20/23 0940    Order Status: Completed Specimen: Blood from Line, Arterial, Right Updated: 07/20/23 1717     Blood Culture, Routine No Growth to date    Narrative:      Collection has been rescheduled by [email or web address] at 07/20/2023 06:04 Reason:   Unable to collect  Collection has been rescheduled by [email or web address] at 07/20/2023 08:01 Reason:   Contacting zainab for cultures per RN Alfred  Collection has been rescheduled by [email or web address] at 07/20/2023 08:41 Reason:   Nurse Draw  Collection has been rescheduled by [email or web address] at 07/20/2023 06:04 Reason:   Unable to collect  Collection has been rescheduled by [email or web address] at 07/20/2023 08:01 Reason:   Contacting zainab for cultures per RN Alfred  Collection has been rescheduled by [email or web address] at 07/20/2023 08:41 Reason:   Nurse Draw    Blood culture [[id number]] Collected: 07/20/23 0940    Order Status: Completed Specimen: Blood from Peripheral, Antecubital, Right Updated: 07/20/23 1717     Blood Culture, Routine No Growth to date    Narrative:      Collection has been rescheduled by [email or web address] at 07/20/2023 06:04 Reason:   Unable to collect  Collection has been rescheduled by [email or web address]  at 07/20/2023 08:01 Reason:   Contacting zainab for cultures per RN Alfred  Collection has been rescheduled by [email or web address] at 07/20/2023 08:41 Reason:   Nurse Draw  Collection has been rescheduled by [email or web address] at 07/20/2023 06:04 Reason:   Unable to collect  Collection has been rescheduled by [email or web address] at 07/20/2023 08:01 Reason:   Contacting zainab for cultures per RN Alfred  Collection has been rescheduled by [email or web address] at 07/20/2023 08:41 Reason:   Nurse Draw    Culture, Respiratory with Gram Stain [[id number]] Collected: 07/20/23 1003    Order Status: Completed Specimen: Respiratory from Endotracheal Aspirate Updated: 07/20/23 1156     Gram Stain (Respiratory) <10 epithelial cells per low power field.     Gram Stain (Respiratory) Few WBC's     Gram Stain (Respiratory) No organisms seen    Blood culture [[id number]] Collected: 07/19/23 1230    Order Status: Completed Specimen: Blood Updated: 07/20/23 1123     Blood Culture, Routine Gram stain aer bottle: Gram positive cocci      Positive results previously called    Urine Culture High Risk [[id number]] Collected: 07/19/23 1006    Order Status: Completed Specimen: Urine, Clean Catch Updated: 07/20/23 0720     Urine Culture, Routine No growth to date    Narrative:      Indicated criteria for high risk culture:->Other  Other (specify):->e coli bacteremia    Blood culture [[id number]]  (Abnormal) Collected: 07/19/23 0002    Order Status: Completed Specimen: Blood Updated: 07/20/23 0657     Blood Culture, Routine Gram stain aer bottle: Gram positive cocci      Gram stain ez bottle: Gram positive cocci      Positive results previously called      ENTEROCOCCUS FAECIUM  For susceptibility see order #[id number]      Blood culture [[id number]]  (Abnormal) Collected: 07/19/23 0030    Order Status: Completed Specimen: Blood from Peripheral, Left Hand Updated: 07/20/23 0655     Blood Culture, Routine Gram stain aer bottle: Gram positive cocci      Gram stain ez bottle: Gram positive cocci      Results  called to and read back by:Carmela Mcintosh RN-3ICU;      07/19/2023  16:23 CJD      ENTEROCOCCUS FAECIUM  For susceptibility see order #[id number]      Narrative:      Collection has been rescheduled by KC9 at 07/19/2023 00:20 Reason:   Nurse Draw  Collection has been rescheduled by KC9 at 07/19/2023 00:20 Reason:   Nurse Draw    Blood culture [[id number]]  (Abnormal) Collected: 07/18/23 1738    Order Status: Completed Specimen: Blood Updated: 07/20/23 0654     Blood Culture, Routine Gram stain aer bottle: Gram negative rods      Results called to and read back by:Lesvia Mcdowell RN MICU3.      07/19/2023  05:23 TGC      ESCHERICHIA COLI  For susceptibility see order #[id number]      Blood culture [[id number]]  (Abnormal) Collected: 07/18/23 1648    Order Status: Completed Specimen: Blood Updated: 07/20/23 0651     Blood Culture, Routine Gram stain aer bottle: Gram negative rods      Gram stain aer bottle: Gram positive cocci      Results called to and read back by: Lesvia Mcdowell RN MICU3.      07/19/2023  05:06 TGC      ESCHERICHIA COLI  Identification and susceptibility pending        ENTEROCOCCUS FAECIUM  Identification and susceptibility pending      Rapid Organism ID by PCR (from Blood culture) [[id number]]  (Abnormal) Collected: 07/18/23 1648    Order Status: Completed Updated: 07/19/23 0526     Enterococcus faecalis Not Detected     Enterococcus faecium Detected     Listeria monocytogenes Not Detected     Staphylococcus spp. Not Detected     Staphylococcus aureus Not Detected     Staphylococcus epidermidis Not Detected     Staphylococcus lugdunensis Not Detected     Streptococcus species Not Detected     Streptococcus agalactiae Not Detected     Streptococcus pneumoniae Not Detected     Streptococcus pyogenes Not Detected     Acinetobacter calcoaceticus/baumannii complex Not Detected     Bacteroides fragilis Not Detected     Enterobacterales See species for ID     Enterobacter cloacae complex Not Detected      Escherichia coli Detected     Klebsiella aerogenes Not Detected     Klebsiella oxytoca Not Detected     Klebsiella pneumoniae group Not Detected     Proteus Not Detected     Salmonella sp Not Detected     Serratia marcescens Not Detected     Haemophilus influenzae Not Detected     Neisseria meningtidis Not Detected     Pseudomonas aeruginosa Not Detected     Stenotrophomonas maltophilia Not Detected     Candida albicans Not Detected     Candida auris Not Detected     Candida glabrata Not Detected     Candida krusei Not Detected     Candida parapsilosis Not Detected     Candida tropicalis Not Detected     Cryptococcus neoformans/gattii Not Detected     CTX-M (ESBL ) Not Detected     IMP (Carbapenem resistant) Not Detected     KPC resistance gene (Carbapenem resistant) Not Detected     mcr-1  Not Detected     mec A/C  Test not applicable     mec A/C and MREJ (MRSA) gene Test not applicable     NDM (Carbapenem resistant) Not Detected     OXA-48-like (Carbapenem resistant) Not Detected     van A/B (VRE gene) Not Detected     VIM (Carbapenem resistant) Not Detected            Imaging Reviewed:  CXR  Abdominal Us  CTA chest  CT abdomen/pelvis:  1.  Status post cholecystectomy with postsurgical dilatation of the common bile duct and intrahepatic ducts. Should be correlated with bilirubin levels.   2.  Broad-based ventral abdominal wall hernia measures approximately 15 x 13 cm with herniated loops of large and small bowel. No evidence of obstruction.   3.  Exophytic hypoattenuating structure in the mid left kidney is felt to reflect a hemorrhagic cyst.      Cardiology: ECHO  The left ventricle is normal in size with mild concentric hypertrophy and mildly decreased systolic function.  Mild to moderate tricuspid regurgitation.  The estimated ejection fraction is 45%.  Grade I left ventricular diastolic dysfunction.  There is abnormal septal wall motion consistent with left bundle branch block.  Mild right ventricular  enlargement with normal right ventricular systolic function.  Moderate left atrial enlargement.  Normal central venous pressure (3 mmHg).  The estimated PA systolic pressure is 40 mmHg.  There is mild pulmonary hypertension.       IMPRESSION & PLAN     Septic shock likely from polymicrobial bacteremia Enterococcus faecium and E coli (7/18-19) secondary to cholangitis s/p ERCP, sphincterotomy 7/19, history of prior pancreatitis secondary to gallstones   Lactic acid 5.9-->4.6-->3  Procal 54, positive  CRP 24  Baseline CPK 92-->245  Lipase 1363-->102, trending down     2.   Shock liver, AST 4913/1475-->371/602    3.   ANTHONY, cr 2.9    4.  PMHx: diabetes, hypertension, bariatric surgery, cholecystectomy, prior UTIs    5. Obesity, BMI 37.6 Kg/m2    Recommendations:  Daptomycin 800mg IV q48h, due to current crcl  Continue meropenem 1 g IV q.12, if crcl drops below 10ml/min, adjust to daily  Follow blood cultures sensitivities  Consider repeating CT scan abdomen/pelvis   On IV steroids   Monitor urinary oupt and curve temp  Aspiration precautions     D/w ICU nursing, Dr Dempsey    Medical Decision Making during this encounter was  [_] Low Complexity  [_] Moderate Complexity  [xx] High Complexity

## 2023-07-21 NOTE — CARE UPDATE
07/20/23 1946   PRE-TX-O2   Oxygen Concentration (%) (S)  40  (Weaned to 40%. Sat was 97%.)

## 2023-07-21 NOTE — PROGRESS NOTES
[facility]  Department of Cardiology  Consult Note      PATIENT NAME: Chanel Cervantes  MRN: [id number]  TODAY'S DATE: 07/21/2023  ADMIT DATE: 7/18/2023                          CONSULT REQUESTED BY: Harris Dempsey MD    SUBJECTIVE     PRINCIPAL PROBLEM: Septic shock      REASON FOR CONSULT:  Elevated Troponin      7/21/2023    Patient is down on her norepi drip to 0.1  She remains sedated and on vent CXRmild improvement mild improvement on liver enzymes urine output better creatine worse.      HPI:    Ms. Cervantes is a 67 year old female patient that has a PMH significant for DM2, HTN, bariatric surgery, Remote cholecystectomy,  Frequent UTIs with kidney stones,  who presents with back pain.  Admitted for pancreatitis with elevated LFTs.  Daughter at bedside supplements her history. She tells us she has been complaining of back and abdominal pain which is chronic she went to open the door for AC man and tripped over area rug and fell and came to ER. She was in ER BP dropped after percocet and she was rushed to ICU She is now intubated and in Multiorgan failure and troponin elevated and we have been consulted for the same. She does have a cardiac history remote of Mini CABG in 1998. Currently intubated and sedated on propofol and on norepi for bp support.    FROM H AND P     Chanel Cervantes is a 67 y.o. White female   With PMH of DM2, HTN, bariatric surgery,  Remote cholecystectomy,  Frequent UTIs with kidney stones,  who presents with back pain.  Admitted for pancreatitis with elevated LFTs     Pt is currently confused after receiving ativan  (ER gave it to calm her for CT scan)  History taken from family at bedside     Onset of back pain overnight  Located b/l lumbar region  Radiating to b/l upper quadrants of abdomen  Occurring intermittently  Progressively worsening  Severe, causes SOB when occurring     Initially pt thought pain was due to a fall yesterday  (Mechanical, tripped over a rug, no head  trauma)  +nausea, no vomiting  +intermittent chills  They are not sure about objective fever     Has had similar abdominal pain previously  Per family, this has been attributed to her ongoing ventral wall hernia  They don't bring her to ER for this usually  However pain today was much more severe than usual        Review of patient's allergies indicates:   Allergen Reactions    Adhesive tape-silicones Rash    Dye Hives    Cephalexin     Iodine     Penicillins Edema    Pneumococcal vaccine     Iodinated contrast media Rash       Past Medical History:   Diagnosis Date    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 2021    Anemia, chronic disease 2021    CAD (coronary artery disease)     Diabetes mellitus, type 2     Hypertension     Iron deficiency anemia following bariatric surgery 2021    MI (myocardial infarction)     Secondary thrombocytosis 2021    Sleep apnea     Sleep apnea 2019    Sleep Right      Past Surgical History:   Procedure Laterality Date    ANGIOGRAM, CORONARY, WITH LEFT HEART CATHETERIZATION      APPENDECTOMY      BARIATRIC SURGERY  2019    Dr Nunez    CARPAL TUNNEL RELEASE Bilateral 2002     SECTION      CHOLECYSTECTOMY      COLONOSCOPY      CORONARY ANGIOPLASTY WITH STENT PLACEMENT      CORONARY ARTERY BYPASS GRAFT      EPIDURAL STEROID INJECTION INTO LUMBAR SPINE      x2    ERCP N/A 2023    Procedure: ERCP (ENDOSCOPIC RETROGRADE CHOLANGIOPANCREATOGRAPHY);  Surgeon: Lavon Hernandez III, MD;  Location: [facility] ENDO;  Service: Endoscopy;  Laterality: N/A;    ESOPHAGOGASTRODUODENOSCOPY      HERNIA REPAIR  2019    HYSTERECTOMY      THYROID LOBECTOMY Right 2021    Procedure: LOBECTOMY, THYROID;  Surgeon: Mari Chance MD;  Location: [facility] OR;  Service: General;  Laterality: Right;     Social History     Tobacco Use    Smoking status: Former     Packs/day: 1.00     Years: 15.00     Pack years: 15.00     Types: Cigarettes     Quit date:      Years since  "quittin.5    Smokeless tobacco: Never   Substance Use Topics    Alcohol use: No     Comment: "maybe once a year"    Drug use: No        REVIEW OF SYSTEMS      UNABLE TO OBTAIN   INTUBATED AND SEDATED      OBJECTIVE     VITAL SIGNS (Most Recent)  Temp: 99.2 °F (37.3 °C) (23 1127)  Pulse: 88 (23 1001)  Resp: (!) 26 (23 1001)  BP: (!) 111/56 (23 1001)  SpO2: (!) 94 % (23 1001)    VENTILATION STATUS  Resp: (!) 26 (23 1001)  SpO2: (!) 94 % (23 1001)  Vent Mode: A/C  Oxygen Concentration (%):  [40-50] 40  Resp Rate Total:  [21 br/min-33 br/min] 26 br/min  Vt Set:  [450 mL] 450 mL  PEEP/CPAP:  [5 cmH20] 5 cmH20  Pressure Support:  [5 cmH20] 5 cmH20  Mean Airway Pressure:  [10 zsX32-37 cmH20] 14 cmH20        I & O (Last 24H):  Intake/Output Summary (Last 24 hours) at 2023 1157  Last data filed at 2023 1127  Gross per 24 hour   Intake 3743.75 ml   Output 1955 ml   Net 1788.75 ml       WEIGHTS  Wt Readings from Last 3 Encounters:   23 0400 108.9 kg (239 lb 15.9 oz)   23 1841 108.9 kg (240 lb)   23 1112 108.9 kg (240 lb)   23 1000 108.9 kg (240 lb 1.3 oz)   23 0758 109.1 kg (240 lb 8 oz)       PHYSICAL EXAM  GENERAL: Adult obese female intubate and sedated resting on ventilator  HEENT: LEFT IJ  NECK: No JVD. No bruit..   THYROID: Thyroid not enlarged. No nodules present..   CARDIAC: Regular rate and rhythm. S1 is normal.S2 is normal.  CHEST ANATOMY: normal.   LUNGS: mechanical  ABDOMEN: Hypoactive large ventral hernia noted  URINARY:  schaefer catheter CHRISSIE YELLOW  EXTREMITIES: No cyanosis, clubbing or edema noted at this time., no calf tenderness bilaterally.         HOME MEDICATIONS:  No current facility-administered medications on file prior to encounter.     Current Outpatient Medications on File Prior to Encounter   Medication Sig Dispense Refill    amLODIPine (NORVASC) 2.5 MG tablet Take 1 tablet (2.5 mg total) by mouth once daily. 90 " tablet 1    azelastine (ASTELIN) 137 mcg (0.1 %) nasal spray 1 spray (137 mcg total) by Nasal route 2 (two) times daily. 30 mL 5    calcium carbonate-vitamin D3 600 mg-62.5 mcg (2,500 unit) Cap calcium carbonate 600 mg-vitamin D3 62.5 mcg (2,500 unit) capsule   Take by oral route.      DULoxetine (CYMBALTA) 60 MG capsule Take 1 capsule (60 mg total) by mouth once daily. 90 capsule 1    empaglifloz-linaglip-metformin (TRIJARDY XR) 25-5-1,000 mg TBph Take 1 tablet by mouth once daily. 90 tablet 1    guanFACINE (TENEX) 2 MG tablet Take 1 tablet (2 mg total) by mouth nightly. 90 tablet 3    insulin degludec (TRESIBA FLEXTOUCH U-200) 200 unit/mL (3 mL) insulin pen Inject 76 Units into the skin once daily. 12 pen 3    iron-vitamin C 100-250 mg, ICAR-C, 100-250 mg Tab Take 1 tablet by mouth once daily. 30 each 8    levothyroxine (SYNTHROID) 75 MCG tablet Take 75 mcg by mouth before breakfast.      metFORMIN (GLUCOPHAGE) 1000 MG tablet Take 1,000 mg by mouth 2 (two) times daily with meals.      metOLazone (ZAROXOLYN) 5 MG tablet Take 1 tablet (5 mg total) by mouth once daily. 90 tablet 0    metoprolol succinate (TOPROL-XL) 25 MG 24 hr tablet Take 1 tablet (25 mg total) by mouth once daily. 90 tablet 1    multivitamin capsule Take 1 capsule by mouth once daily.      olmesartan (BENICAR) 40 MG tablet Take 1 tablet (40 mg total) by mouth once daily. 90 tablet 1    potassium chloride (KLOR-CON) 10 MEQ TbSR Take 1 tablet (10 mEq total) by mouth once daily. 90 tablet 1    pregabalin (LYRICA) 50 MG capsule Take 50 mg by mouth every evening.      rosuvastatin (CRESTOR) 40 MG Tab Take 1 tablet (40 mg total) by mouth every evening. 90 tablet 3    aspirin (ECOTRIN) 81 MG EC tablet Take 1 tablet (81 mg total) by mouth once daily. 30 tablet 0    blood sugar diagnostic Strp One Touch Ultra Blue Test strips, Use l strip to check glucose 4 times daily 100 each 11    blood-glucose meter kit Use as instructed 1 each 0    cyanocobalamin 1,000  mcg/mL injection Inject 1 mL (1,000 mcg total) into the skin every 30 days. 3 mL 4    lancets (ONETOUCH DELICA LANCETS) 33 gauge Misc USE 1  TO CHECK GLUCOSE 4 TIMES DAILY 100 each 3    magnesium oxide (MAG-OX) 400 mg (241.3 mg magnesium) tablet Take 1 tablet (400 mg total) by mouth once daily. 90 tablet 1    NYSTOP powder APPLY TO AFFECTED AREA AS NEEDED      prednisoLONE acetate (PRED FORTE) 1 % DrpS Place 1 drop into both eyes as needed.          SCHEDULED MEDS:   chlorhexidine  15 mL Mouth/Throat BID    DAPTOmycin (CUBICIN) IV (PEDS and ADULTS)  10 mg/kg (Adjusted) Intravenous Q48H    enoxparin  40 mg Subcutaneous Daily    hydrocortisone sodium succinate  100 mg Intravenous Q8H    levothyroxine  75 mcg Oral Before breakfast    meropenem (MERREM) IVPB  1 g Intravenous Q12H    mupirocin   Nasal BID       CONTINUOUS INFUSIONS:   lactated ringers 75 mL/hr at 07/21/23 1053    NORepinephrine bitartrate-D5W 0.1 mcg/kg/min (07/21/23 0730)    propofoL 25 mcg/kg/min (07/21/23 0846)       PRN MEDS:acetaminophen, albuterol-ipratropium, calcium gluconate IVPB, calcium gluconate IVPB, calcium gluconate IVPB, dextrose 50%, dextrose 50%, glucagon (human recombinant), glucose, glucose, HYDROmorphone, insulin aspart U-100, lorazepam, magnesium sulfate IVPB, magnesium sulfate IVPB, naloxone, ondansetron, polyethylene glycol, potassium chloride in water **AND** potassium chloride in water **AND** potassium chloride in water, senna-docusate 8.6-50 mg, simethicone, sodium chloride 0.9%, sodium phosphate IVPB, sodium phosphate IVPB, sodium phosphate IVPB    LABS AND DIAGNOSTICS     CBC LAST 3 DAYS  Recent Labs   Lab 07/19/23  0535 07/19/23  0544 07/20/23  0307 07/20/23  0931 07/21/23  0331   WBC 14.26*  --  11.47  --  9.95   RBC 4.35  --  4.29  --  4.02   HGB 11.4*  --  11.3*  --  10.7*   HCT 36.6*   < > 35.4* 32* 32.6*   MCV 84  --  83  --  81*   MCH 26.2*  --  26.3*  --  26.6*   MCHC 31.1*  --  31.9*  --  32.8   RDW 15.8*  --  16.4*   --  16.2*     --  175  --  85*   MPV 11.6  --  11.1  --  11.6   GRAN 95.4*  13.6*  --  58.0  --  57.0   LYMPH 1.1*  0.2*  --  3.0*  --  6.0*   MONO 2.5*  0.4  --  2.0*  --  7.0   BASO 0.02  --   --   --   --    NRBC 0  --  0  --  0    < > = values in this interval not displayed.       COAGULATION LAST 3 DAYS  No results for input(s): LABPT, INR, APTT in the last 168 hours.    CHEMISTRY LAST 3 DAYS  Recent Labs   Lab 07/19/23  0535 07/19/23  0544 07/19/23  1604 07/20/23  0307 07/20/23  0931 07/20/23  1106 07/21/23  0331 07/21/23  0411     --  136 137  --   --  140  --    K 3.3*  --  5.1 4.8  --   --  4.2  --      --  103 104  --   --  106  --    CO2 24  --  21* 22*  --   --  22*  --    ANIONGAP 9  --  12 11  --   --  12  --    BUN 34*  --  42* 49*  --   --  56*  --    CREATININE 1.6*  --  2.4* 2.6*  --   --  2.9*  --    *  --  170* 134*  --   --  141*  --    CALCIUM 8.2*  --  8.1* 7.8*  --   --  7.6*  --    PH  --    < >  --   --  7.348* 7.354  --  7.424   MG 2.0  --   --  1.9  --   --  2.2  --    ALBUMIN 2.7*  --   --  2.3*  --   --  2.0*  --    PROT 5.3*  --   --  4.9*  --   --  4.9*  --    ALKPHOS 239*  --   --  294*  --   --  319*  --    ALT 1,237*  --   --  893*  --   --  602*  --    AST 2,526*  --   --  977*  --   --  371*  --    BILITOT 2.7*  --   --  2.4*  --   --  1.6*  --     < > = values in this interval not displayed.       CARDIAC PROFILE LAST 3 DAYS  Recent Labs   Lab 07/18/23  1220 07/18/23  2204 07/19/23  1604 07/19/23  2112 07/20/23  0307   BNP 20  --   --   --   --    CPK 92  --   --   --  245*   TROPONINIHS 14.8   < > 648.1* 1148.1* 2384.3*    < > = values in this interval not displayed.       ENDOCRINE LAST 3 DAYS  Recent Labs   Lab 07/19/23  0535 07/19/23  1250   TSH 0.470  --    PROCAL  --  54.06*       LAST ARTERIAL BLOOD GAS  ABG  Recent Labs   Lab 07/21/23  0411   PH 7.424   PO2 77*   PCO2 37.5   HCO3 24.6   BE 0       LAST 7 DAYS MICROBIOLOGY   Microbiology  Results (last 7 days)       Procedure Component Value Units Date/Time    Culture, Respiratory with Gram Stain [[id number]] Collected: 07/20/23 1003    Order Status: Completed Specimen: Respiratory from Endotracheal Aspirate Updated: 07/21/23 1124     Respiratory Culture No Growth     Gram Stain (Respiratory) <10 epithelial cells per low power field.     Gram Stain (Respiratory) Few WBC's     Gram Stain (Respiratory) No organisms seen    Blood culture [[id number]] Collected: 07/20/23 0940    Order Status: Completed Specimen: Blood from Line, Arterial, Right Updated: 07/21/23 1102     Blood Culture, Routine No Growth to date      Gram stain aer bottle: Gram positive cocci      Positive results previously called    Narrative:      Collection has been rescheduled by [email or web address] at 07/20/2023 06:04 Reason:   Unable to collect  Collection has been rescheduled by [email or web address] at 07/20/2023 08:01 Reason:   Contacting zainab for cultures per RN Alfred  Collection has been rescheduled by [email or web address] at 07/20/2023 08:41 Reason:   Nurse Draw  Collection has been rescheduled by [email or web address] at 07/20/2023 06:04 Reason:   Unable to collect  Collection has been rescheduled by [email or web address] at 07/20/2023 08:01 Reason:   Contacting zainab for cultures per RN Alfred  Collection has been rescheduled by [email or web address] at 07/20/2023 08:41 Reason:   Nurse Draw    Urine Culture High Risk [[id number]] Collected: 07/19/23 1006    Order Status: Completed Specimen: Urine, Clean Catch Updated: 07/21/23 0942     Urine Culture, Routine No growth to date    Narrative:      Indicated criteria for high risk culture:->Other  Other (specify):->e coli bacteremia    Blood culture [[id number]]  (Abnormal) Collected: 07/19/23 1230    Order Status: Completed Specimen: Blood Updated: 07/21/23 0858     Blood Culture, Routine Gram stain aer bottle: Gram positive cocci      Positive results previously called      ENTEROCOCCUS FAECIUM  For susceptibility see order #[id number]      Blood culture [[id number]]  (Abnormal)  Collected: 07/18/23 1738    Order Status: Completed Specimen: Blood Updated: 07/21/23 0853     Blood Culture, Routine Gram stain aer bottle: Gram negative rods      Results called to and read back by:Lesvia Mcdowell RN MICU3.      07/19/2023  05:23 TGC      ESCHERICHIA COLI  For susceptibility see order #[id number]      Blood culture [[id number]]  (Abnormal)  (Susceptibility) Collected: 07/18/23 1648    Order Status: Completed Specimen: Blood Updated: 07/21/23 0852     Blood Culture, Routine Gram stain aer bottle: Gram negative rods      Gram stain aer bottle: Gram positive cocci      Results called to and read back by: Lesvia Mcdowell RN MICU3.      07/19/2023  05:06 TGC      ESCHERICHIA COLI      ENTEROCOCCUS FAECIUM  Identification and susceptibility pending      Blood culture [[id number]] Collected: 07/20/23 0940    Order Status: Completed Specimen: Blood from Peripheral, Antecubital, Right Updated: 07/20/23 1717     Blood Culture, Routine No Growth to date    Narrative:      Collection has been rescheduled by [email or web address] at 07/20/2023 06:04 Reason:   Unable to collect  Collection has been rescheduled by [email or web address] at 07/20/2023 08:01 Reason:   Contacting zainab for cultures per RN Alfred  Collection has been rescheduled by [email or web address] at 07/20/2023 08:41 Reason:   Nurse Draw  Collection has been rescheduled by [email or web address] at 07/20/2023 06:04 Reason:   Unable to collect  Collection has been rescheduled by [email or web address] at 07/20/2023 08:01 Reason:   Contacting zainab for cultures per RN Alfred  Collection has been rescheduled by [facility] at 07/20/2023 08:41 Reason:   Nurse Draw    Blood culture [[id number]]  (Abnormal) Collected: 07/19/23 0002    Order Status: Completed Specimen: Blood Updated: 07/20/23 0657     Blood Culture, Routine Gram stain aer bottle: Gram positive cocci      Gram stain ez bottle: Gram positive cocci      Positive results previously called      ENTEROCOCCUS FAECIUM  For susceptibility see order #[id number]      Blood culture [[id number]]   (Abnormal) Collected: 07/19/23 0030    Order Status: Completed Specimen: Blood from Peripheral, Left Hand Updated: 07/20/23 0655     Blood Culture, Routine Gram stain aer bottle: Gram positive cocci      Gram stain ez bottle: Gram positive cocci      Results called to and read back by:Carmela Mcintosh RN-3ICU;      07/19/2023  16:23 CJD      ENTEROCOCCUS FAECIUM  For susceptibility see order #[id number]      Narrative:      Collection has been rescheduled by KC at 07/19/2023 00:20 Reason:   Nurse Draw  Collection has been rescheduled by [email or web address] at 07/19/2023 00:20 Reason:   Nurse Draw    Rapid Organism ID by PCR (from Blood culture) [[id number]]  (Abnormal) Collected: 07/18/23 1648    Order Status: Completed Updated: 07/19/23 0526     Enterococcus faecalis Not Detected     Enterococcus faecium Detected     Listeria monocytogenes Not Detected     Staphylococcus spp. Not Detected     Staphylococcus aureus Not Detected     Staphylococcus epidermidis Not Detected     Staphylococcus lugdunensis Not Detected     Streptococcus species Not Detected     Streptococcus agalactiae Not Detected     Streptococcus pneumoniae Not Detected     Streptococcus pyogenes Not Detected     Acinetobacter calcoaceticus/baumannii complex Not Detected     Bacteroides fragilis Not Detected     Enterobacterales See species for ID     Enterobacter cloacae complex Not Detected     Escherichia coli Detected     Klebsiella aerogenes Not Detected     Klebsiella oxytoca Not Detected     Klebsiella pneumoniae group Not Detected     Proteus Not Detected     Salmonella sp Not Detected     Serratia marcescens Not Detected     Haemophilus influenzae Not Detected     Neisseria meningtidis Not Detected     Pseudomonas aeruginosa Not Detected     Stenotrophomonas maltophilia Not Detected     Candida albicans Not Detected     Candida auris Not Detected     Candida glabrata Not Detected     Candida krusei Not Detected     Candida parapsilosis Not Detected      Candida tropicalis Not Detected     Cryptococcus neoformans/gattii Not Detected     CTX-M (ESBL ) Not Detected     IMP (Carbapenem resistant) Not Detected     KPC resistance gene (Carbapenem resistant) Not Detected     mcr-1  Not Detected     mec A/C  Test not applicable     mec A/C and MREJ (MRSA) gene Test not applicable     NDM (Carbapenem resistant) Not Detected     OXA-48-like (Carbapenem resistant) Not Detected     van A/B (VRE gene) Not Detected     VIM (Carbapenem resistant) Not Detected            MOST RECENT IMAGING  X-Ray Chest AP Portable  HISTORY: ventilator    FINDINGS: Portable chest radiograph at 0459 hours compared to 07/20/2023 shows ET and NG tubes, and left internal jugular central venous catheter, all unchanged in position. The cardiomediastinal silhouette and pulmonary vasculature are stable.    The lungs are hypoexpanded, with interval improved aeration in both lower lung zones. There is no new pleural or parenchymal abnormality. No pneumothorax.    IMPRESSION: Interval improved aeration in both lung bases, with otherwise no significant change.    Electronically signed by:  Roger Cardona MD  7/21/2023 7:24 AM CDT Workstation: [id number]      ECHOCARDIOGRAM RESULTS (last 5)  Results for orders placed during the hospital encounter of 07/18/23    Echo    Interpretation Summary  · The left ventricle is normal in size with mild concentric hypertrophy and mildly decreased systolic function.  · Mild to moderate tricuspid regurgitation.  · The estimated ejection fraction is 45%.  · Grade I left ventricular diastolic dysfunction.  · There is abnormal septal wall motion consistent with left bundle branch block.  · Mild right ventricular enlargement with normal right ventricular systolic function.  · Moderate left atrial enlargement.  · Normal central venous pressure (3 mmHg).  · The estimated PA systolic pressure is 40 mmHg.  · There is mild pulmonary hypertension.      CURRENT/PREVIOUS VISIT  EKG  Results for orders placed or performed during the hospital encounter of 07/18/23   EKG 12-lead    Collection Time: 07/18/23 11:24 AM    Narrative    Test Reason : W19.XXXA,    Vent. Rate : 073 BPM     Atrial Rate : 073 BPM     P-R Int : 154 ms          QRS Dur : 162 ms      QT Int : 426 ms       P-R-T Axes : 069 -17 -11 degrees     QTc Int : 469 ms    Sinus rhythm with Premature ventricular complexes or Fusion complexes  Right bundle branch block  Minimal voltage criteria for LVH, may be normal variant ( R in aVL )  Abnormal ECG  When compared with ECG of 07-AUG-2021 22:34,  Fusion complexes are now Present  Premature ventricular complexes are now Present    Referred By: [email or web address]   SELF           Confirmed By:            ASSESSMENT/PLAN:     Active Hospital Problems    Diagnosis    *Septic shock    Hypothyroidism    Obesity (BMI 30-39.9)    Anemia    Acute hypoxemic respiratory failure    Demand ischemia    ANTHONY (acute kidney injury)    Shock liver    Encephalopathy, metabolic    Acute obstructive cholangitis    Hypokalemia    S/P bariatric surgery    NIKOLAS on CPAP    CAD (coronary artery disease)    Benign essential hypertension    Type 2 diabetes mellitus treated with insulin       ASSESSMENT & PLAN:       Elevated Troponin-Multifactorial secondary to demand ischemia and infection  Septic Shock  Multiorgan failure Liver, Kidneys, Heart failure, respiratory failure  DM Type 2  LVEF 45%  E Coli bacteremia  7. H/O CAD and CABG  8. Status post cholecystectomy with postsurgical dilatation of the common bile duct and intrahepatic ducts.    RECOMMENDATIONS:      Troponin is secondary to multiple organs being in shock and failure along with sepsis.  Cardiology will be available as needed  Thank you      Ailyn Walker NP  Department of Cardiology  Date of Service: 07/21/2023      Maintaining her hemodynamics with pressor agents appear to be improving her perfusion both in the kidneys as well as improvement in her  hepatic function.  Positive blood cultures for E coli as well as Enterococcus currently on antibiotic therapy.  Continue on aggressive antibiotic therapy pulmonary toilet a may continue to attempt to wean off the ventilator this weekend as tolerated.  From a cardiac perspective patient has seem to be doing fairly well at this time.    I have personally interviewed and examined the patient, I have reviewed the Nurse Practitioner's history and physical, assessment, and plan. I agree with the findings and plan.  Discussed her clinical condition with the patient's sister who happens to be fairly involved with the care.    Buddy Lowery M.D.  Department of Cardiology  Date of Service: 07/21/2023  9:23 AM

## 2023-07-21 NOTE — ASSESSMENT & PLAN NOTE
-Trend K.  Has improved.  -Replete K PRN  -Telemetry    Potassium   Date Value Ref Range Status   07/20/2023 4.8 3.5 - 5.1 mmol/L Final   07/19/2023 5.1 3.5 - 5.1 mmol/L Final

## 2023-07-21 NOTE — ASSESSMENT & PLAN NOTE
"Secondary to acute cholangitis  -continue IVAB  -cultures reviewed  -S/p ERCP; GI following  -Trend lactic acid  -Continue LR IV fluids and Levophed to keep MAP > 65    Antibiotics (From admission, onward)    Start     Stop Route Frequency Ordered    07/21/23 1400  DAPTOmycin (CUBICIN) 805 mg in sodium chloride 0.9% SolP 50 mL IVPB         -- IV Every 48 hours (non-standard times) 07/20/23 0749    07/19/23 1300  meropenem 1 g in sodium chloride 0.9 % 100 mL IVPB (ready to mix system)        Note to Pharmacy: Ht: 5' 7" (1.702 m)  Wt: 108.9 kg (240 lb)  Estimated Creatinine Clearance: 63.1 mL/min (based on SCr of 1.1 mg/dL).  Body mass index is 37.59 kg/m².    -- IV Every 12 hours (non-standard times) 07/19/23 0740            "

## 2023-07-21 NOTE — PLAN OF CARE
07/21/23 0733   Patient Assessment/Suction   Level of Consciousness (AVPU) responds to pain   Respiratory Effort Normal;Unlabored   Expansion/Accessory Muscles/Retractions no retractions;no use of accessory muscles   All Lung Fields Breath Sounds diminished   Rhythm/Pattern, Respiratory assisted mechanically;unlabored;pattern regular;depth regular   Cough Frequency with stimulation   Cough Type assisted   Suction Method tracheal   $ Suction Charges Inline Suction Procedure Stat Charge   Secretions Amount small   Secretions Color white   Secretions Characteristics thick   PRE-TX-O2   Device (Oxygen Therapy) ventilator   $ Is the patient on Low Flow Oxygen? Yes   Oxygen Concentration (%) 40   SpO2 96 %   Pulse Oximetry Type Continuous   $ Pulse Oximetry - Multiple Charge Pulse Oximetry - Multiple   Pulse 87   Resp (!) 25   BP (!) 99/51   Aerosol Therapy   $ Aerosol Therapy Charges PRN treatment not required        Airway - Non-Surgical 07/20/23 0945 Endotracheal Tube   Placement Date/Time: 07/20/23 0945   Method of Intubation: Hendricks  Inserted by: MD  Staff/Resident Name(s): dr charles  Airway Device: Endotracheal Tube  Airway Device Size: 8.0  Style: Cuffed  Cuff Inflated With: Air  Placement Verified By: Capnometr...   Secured at 23 cm   Measured At Lips   Secured Location Center   Secured by Commercial tube teran   Vent Select   Conventional Vent Y   $ Ventilator Subsequent 1   Charged w/in last 24h YES   Preset Conventional Ventilator Settings   Vent ID [id number]   Vent Type    Ventilation Type VC   Vent Mode A/C   Humidity HME   Set Rate 20 BPM   Vt Set 450 mL   PEEP/CPAP 5 cmH20   Peak Flow 60 L/min   Peak End Inspiratory Pressure 24 cmH20   I-Trigger Type  V-TRIG   Trigger Sensitivity Flow/I-Trigger 3 L/min   Patient Ventilator Parameters   Resp Rate Total 21 br/min   Peak Airway Pressure 32 cmH20   Mean Airway Pressure 12 cmH20   Plateau Pressure 0 cmH20   Exhaled Vt 488 mL   Total Ve 9.42 L/m   I:E Ratio  Measured 1:2.70   Auto PEEP 0 cmH20   Tubing ID (mm) 8 mm   Tube Type ET   Conventional Ventilator Alarms   Alarms On Y   Resp Rate High Alarm 45 br/min   Press High Alarm 60 cmH2O   Apnea Rate 20   Apnea Volume (mL) 0 mL   Apnea Oxygen Concentration  100   Apnea Flow Rate (L/min) 60   T Apnea 20 sec(s)   Ready to Wean/Extubation Screen   FIO2<=50 (chart decimal) 0.4   MV<16L (chart vol.) 9.42   PEEP <=8 (chart #) 5   Education   $ Education Bronchodilator;15 min

## 2023-07-21 NOTE — PROGRESS NOTES
[facility] Medicine  Progress Note    Patient Name: Chanel Cervantes  MRN: [id number]  Patient Class: IP- Inpatient   Admission Date: 7/18/2023  Length of Stay: 1 days  Attending Physician: Harris Dempsey MD  Primary Care Provider: ARIC Almaguer        Subjective:     Principal Problem:Septic shock        HPI:  Chanel Cervantes is a 67 y.o. White female   With PMH of DM2, HTN, bariatric surgery,  Remote cholecystectomy,  Frequent UTIs with kidney stones,  who presents with back pain.  Admitted for pancreatitis with elevated LFTs     Pt is currently confused after receiving ativan  (ER gave it to calm her for CT scan)  History taken from family at bedside     Onset of back pain overnight  Located b/l lumbar region  Radiating to b/l upper quadrants of abdomen  Occurring intermittently  Progressively worsening  Severe, causes SOB when occurring     Initially pt thought pain was due to a fall yesterday  (Mechanical, tripped over a rug, no head trauma)  +nausea, no vomiting  +intermittent chills  They are not sure about objective fever     Has had similar abdominal pain previously  Per family, this has been attributed to her ongoing ventral wall hernia  They don't bring her to ER for this usually  However pain today was much more severe than usual      Overview/Hospital Course:  Chanel Cervantes is a 67 year old female with a past medical history of obesity, ventral hernia, DM, HTN, anemia, CAD, NIKOLAS, and hypothyroidism who presented with septic shock secondary to a possible cholangitis with E coli bacteremia. Vancomycin has been discontinued and she remains on meropenem. BP is also maintained on a Levophed infusion. GI was consulted and performed ERCP which showed biliary sludge with a sludge ball dilating the main duct which was removed; a biliary sphincterectomy was also performed. Repeat blood cultures are negative. She is also no IV fluids with LR at this time and is NPO as she is  encephalopathic. She also has an ANTHONY as well. She also has acute hypoxic respiratory failure for which she is on mask O2. She also has demand ischemia; TTE is pending and troponin is being trended. There is also likely shock liver superimposed on the hepatocellular injury brought on by the acute cholangitis. Pulmonary/Critical Care has also been consulted given the patient's medical acuity.      Interval History:  needing higher rate of vasopressor.  Very encephalopathic.  Unable to protect airway.  Pulmonology about to intubate.    Review of Systems   Unable to perform ROS: Mental status change   Objective:     Vital Signs (Most Recent):  Temp: 99.9 °F (37.7 °C) (07/20/23 1543)  Pulse: 97 (07/20/23 1946)  Resp: (!) 25 (07/20/23 1946)  BP: (!) 142/64 (07/20/23 1830)  SpO2: 97 % (07/20/23 1946) Vital Signs (24h Range):  Temp:  [99.5 °F (37.5 °C)-101.3 °F (38.5 °C)] 99.9 °F (37.7 °C)  Pulse:  [] 97  Resp:  [0-37] 25  SpO2:  [90 %-100 %] 97 %  BP: ()/(47-83) 142/64  Arterial Line BP: ()/(8-120) 157/51     Weight: 108.9 kg (239 lb 15.9 oz)  Body mass index is 37.6 kg/m².    Intake/Output Summary (Last 24 hours) at 7/20/2023 2109  Last data filed at 7/20/2023 1801  Gross per 24 hour   Intake 3667.34 ml   Output 1008 ml   Net 2659.34 ml         Physical Exam  Constitutional:       General: She is in acute distress.      Appearance: She is ill-appearing and toxic-appearing.   Eyes:      General:         Right eye: No discharge.         Left eye: No discharge.   Neck:      Vascular: No JVD.   Cardiovascular:      Rate and Rhythm: Normal rate and regular rhythm.   Pulmonary:      Effort: Tachypnea and respiratory distress present.      Breath sounds: Normal breath sounds.   Abdominal:      General: Abdomen is flat. Bowel sounds are normal. There is no distension.      Palpations: Abdomen is soft.      Tenderness: There is abdominal tenderness. There is no rebound.   Musculoskeletal:      Right lower leg: No  "edema.      Left lower leg: No edema.   Skin:     General: Skin is warm and moist.      Findings: No rash.   Neurological:      Comments: Agitated.  Moaning.  Unable to communicate.           Significant Labs: All pertinent labs within the past 24 hours have been reviewed.    Significant Imaging: I have reviewed all pertinent imaging results/findings within the past 24 hours.      Assessment/Plan:      * Septic shock  Secondary to acute cholangitis  -continue IVAB  -cultures reviewed  -S/p ERCP; GI following  -Trend lactic acid  -Continue LR IV fluids and Levophed to keep MAP > 65    Antibiotics (From admission, onward)    Start     Stop Route Frequency Ordered    07/21/23 1400  DAPTOmycin (CUBICIN) 805 mg in sodium chloride 0.9% SolP 50 mL IVPB         -- IV Every 48 hours (non-standard times) 07/20/23 0749    07/19/23 1300  meropenem 1 g in sodium chloride 0.9 % 100 mL IVPB (ready to mix system)        Note to Pharmacy: Ht: 5' 7" (1.702 m)  Wt: 108.9 kg (240 lb)  Estimated Creatinine Clearance: 63.1 mL/min (based on SCr of 1.1 mg/dL).  Body mass index is 37.59 kg/m².    -- IV Every 12 hours (non-standard times) 07/19/23 0740              Encephalopathy, metabolic  Metabolic in setting of septic shock.  -Avoid restraints if possible  -Delirium precautions  -Treat septic shock      Shock liver  -Treat cholangitis  -Trend LFTs  -Continue IV fluids and Levophed    Lab Results   Component Value Date     (H) 07/20/2023     (H) 07/20/2023    GGT 24 05/17/2018    ALKPHOS 294 (H) 07/20/2023    BILITOT 2.4 (H) 07/20/2023         ANTHONY (acute kidney injury)  In setting of septic shock.  -Continue Levophed and IV fluids  -Renally dose medications  -Avoid nephrotoxic agents  -Monitor UOP and electrolytes  -Trend creatinine    Creatinine   Date Value Ref Range Status   07/20/2023 2.6 (H) 0.5 - 1.4 mg/dL Final   07/19/2023 2.4 (H) 0.5 - 1.4 mg/dL Final         Demand ischemia  History of CAD s/p CABG. No acute ST " changes on EKG.  -Trend troponin  -Treat septic shock  -Telemetry  -cardiology to consult    Troponin I High Sensitivity   Date Value Ref Range Status   07/20/2023 2384.3 (HH) 0.0 - 14.9 pg/mL Final     Comment:     Troponin results differ between methods. Do not use   results between Troponin methods interchangeably.    Alkaline Phospatase levels above 400 U/L may   cause false positive results.    Access hsTnI should not be used for patients taking   Asfotase anya (Strensiq).  Troponin critical result(s) called and verbal readback obtained   from [place] Avila RN M3 by MS1 07/20/2023 04:54     07/19/2023 1148.1 (HH) 0.0 - 14.9 pg/mL Final     Comment:     Troponin results differ between methods. Do not use   results between Troponin methods interchangeably.    Alkaline Phospatase levels above 400 U/L may   cause false positive results.    Access hsTnI should not be used for patients taking   Asfotase anya (Strensiq).  Troponin critical result(s) called and verbal readback obtained from   Karan Maldonado RN M3  by MS1 07/19/2023 22:10     07/19/2023 648.1 (HH) 0.0 - 14.9 pg/mL Final     Comment:     Troponin results differ between methods. Do not use   results between Troponin methods interchangeably.    Alkaline Phospatase levels above 400 U/L may   cause false positive results.    Access hsTnI should not be used for patients taking   Asfotase anya (Strensiq).  Results confirmed, test repeated  Troponin critical result(s) repeated. Called and verbal readback   obtained from Carmela Mcintosh,ICU, RN.  by SLT1 07/19/2023 17:37         Acute hypoxemic respiratory failure  -Mask O2  -Caution with IV fluids  -ABG and CXR PRN  -Treat septic shock  -Pulmonary/Critical Care consulted  -pt to be intubated this morning    Anemia  Stable.  -Trend Hgb with CBC    Hemoglobin   Date Value Ref Range Status   07/20/2023 11.3 (L) 12.0 - 16.0 g/dL Final   07/19/2023 11.4 (L) 12.0 - 16.0 g/dL Final           Obesity (BMI  30-39.9)  Body mass index is 37.6 kg/m². Morbid obesity complicates all aspects of disease management from diagnostic modalities to treatment.       Hypothyroidism  TSH unremarkable.  It's 0.470.  -Continue Synthroid      Acute obstructive cholangitis  Sludge ball removed via ERCP. Likely source of bacteremia.  -Continue meropenem and daptomycin, renally dosed; follow sensitivities  -GI following  -IV fluids  -Levophed infusion  -Follow up repeat blood cultures  -Trend LFTs      CAD (coronary artery disease)  -Hold home medications  -Telemetry      Hypokalemia  -Trend K.  Has improved.  -Replete K PRN  -Telemetry    Potassium   Date Value Ref Range Status   07/20/2023 4.8 3.5 - 5.1 mmol/L Final   07/19/2023 5.1 3.5 - 5.1 mmol/L Final         S/P bariatric surgery  Noted.      NIKOLAS on CPAP  -chronic condition.    Type 2 diabetes mellitus treated with insulin  Patient's FSGs are controlled on current medication regimen.  Last A1c reviewed-   Lab Results   Component Value Date    HGBA1C 6.9 (H) 07/18/2023     'Current correctional scale  Medium  Maintain anti-hyperglycemic dose as follows-   Antihyperglycemics (From admission, onward)    Start     Stop Route Frequency Ordered    07/19/23 1018  insulin aspart U-100 pen 1-10 Units         -- SubQ Every 6 hours PRN 07/19/23 0920        Hold Oral hypoglycemics while patient is in the hospital.    Benign essential hypertension  -Hold home medications in setting of shock        VTE Risk Mitigation (From admission, onward)         Ordered     enoxaparin injection 40 mg  Daily         07/18/23 1642     IP VTE HIGH RISK PATIENT  Once         07/18/23 1642     Place sequential compression device  Until discontinued         07/18/23 1642                Discharge Planning   LUIS ALFREDO:      Code Status: Full Code   Is the patient medically ready for discharge?:     Reason for patient still in hospital (select all that apply): Patient unstable, Patient trending condition, Laboratory test,  Treatment and Consult recommendations  Discharge Plan A: Home                  Harris Dempsey MD  Department of Hospital Medicine   [facility]

## 2023-07-21 NOTE — CARE UPDATE
07/21/23 0418   PRE-TX-O2   Oxygen Concentration (%) 40   SpO2 100 %   Pulse 95   Resp (!) 25   Vent Select   Conventional Vent Y   Charged w/in last 24h YES   Preset Conventional Ventilator Settings   Vent ID [id number]   Vent Type    Ventilation Type VC   Vent Mode Spont   Humidity HME   Set Rate 20 BPM   Vt Set 450 mL   PEEP/CPAP 5 cmH20   Pressure Support 5 cmH20   Waveform RAMP   Peak Flow 60 L/min   Peak End Inspiratory Pressure 10 cmH20   Insp Rise Time  70 %   I-Trigger Type  V-TRIG   Trigger Sensitivity Flow/I-Trigger 3 L/min   Patient Ventilator Parameters   Resp Rate Total 29 br/min   Peak Airway Pressure 10 cmH20   Mean Airway Pressure 11 cmH20   Plateau Pressure 0 cmH20   Exhaled Vt 162 mL   Total Ve 12.2 L/m   Spont Ve 5.82 L   I:E Ratio Measured 1:2.70   Auto PEEP 0 cmH20   Inspired Tidal Volume (VTI) 0 mL   Conventional Ventilator Alarms   Alarms On Y   Resp Rate High Alarm 45 br/min   Press High Alarm 60 cmH2O   Apnea Rate 20   Apnea Volume (mL) 0 mL   Apnea Oxygen Concentration  100   Apnea Flow Rate (L/min) 60   T Apnea 20 sec(s)   Ready to Wean/Extubation Screen   FIO2<=50 (chart decimal) 0.4   MV<16L (chart vol.) 12.2   PEEP <=8 (chart #) 5   Ready to Wean Parameters   SBT Safety Screen Pass   Spon. Breathing Trial Initiated? Initiated   Sedation Vacation Completed? Yes   Cough Reflex? Yes   F/VT Ratio<105 (RSBI) 154.32   SBT Results Fail   SBT Failure Reason   (Unable to follow commands. Increased HR and BP. RT and RN at bedside.)   Extubated? No   Vital Capacity   Vital Capacity (mL) 0

## 2023-07-21 NOTE — ASSESSMENT & PLAN NOTE
Body mass index is 37.6 kg/m². Morbid obesity complicates all aspects of disease management from diagnostic modalities to treatment.

## 2023-07-21 NOTE — ASSESSMENT & PLAN NOTE
-Treat cholangitis  -Trend LFTs  -Continue IV fluids and Levophed    Lab Results   Component Value Date     (H) 07/20/2023     (H) 07/20/2023    GGT 24 05/17/2018    ALKPHOS 294 (H) 07/20/2023    BILITOT 2.4 (H) 07/20/2023

## 2023-07-21 NOTE — ASSESSMENT & PLAN NOTE
Patient's FSGs are controlled on current medication regimen.  Last A1c reviewed-   Lab Results   Component Value Date    HGBA1C 6.9 (H) 07/18/2023     'Current correctional scale  Medium  Maintain anti-hyperglycemic dose as follows-   Antihyperglycemics (From admission, onward)    Start     Stop Route Frequency Ordered    07/19/23 1018  insulin aspart U-100 pen 1-10 Units         -- SubQ Every 6 hours PRN 07/19/23 0920        Hold Oral hypoglycemics while patient is in the hospital.

## 2023-07-21 NOTE — SUBJECTIVE & OBJECTIVE
Interval History:  needing higher rate of vasopressor.  Very encephalopathic.  Unable to protect airway.  Pulmonology about to intubate.    Review of Systems   Unable to perform ROS: Mental status change   Objective:     Vital Signs (Most Recent):  Temp: 99.9 °F (37.7 °C) (07/20/23 1543)  Pulse: 97 (07/20/23 1946)  Resp: (!) 25 (07/20/23 1946)  BP: (!) 142/64 (07/20/23 1830)  SpO2: 97 % (07/20/23 1946) Vital Signs (24h Range):  Temp:  [99.5 °F (37.5 °C)-101.3 °F (38.5 °C)] 99.9 °F (37.7 °C)  Pulse:  [] 97  Resp:  [0-37] 25  SpO2:  [90 %-100 %] 97 %  BP: ()/(47-83) 142/64  Arterial Line BP: ()/(8-120) 157/51     Weight: 108.9 kg (239 lb 15.9 oz)  Body mass index is 37.6 kg/m².    Intake/Output Summary (Last 24 hours) at 7/20/2023 2109  Last data filed at 7/20/2023 1801  Gross per 24 hour   Intake 3667.34 ml   Output 1008 ml   Net 2659.34 ml         Physical Exam  Constitutional:       General: She is in acute distress.      Appearance: She is ill-appearing and toxic-appearing.   Eyes:      General:         Right eye: No discharge.         Left eye: No discharge.   Neck:      Vascular: No JVD.   Cardiovascular:      Rate and Rhythm: Normal rate and regular rhythm.   Pulmonary:      Effort: Tachypnea and respiratory distress present.      Breath sounds: Normal breath sounds.   Abdominal:      General: Abdomen is flat. Bowel sounds are normal. There is no distension.      Palpations: Abdomen is soft.      Tenderness: There is abdominal tenderness. There is no rebound.   Musculoskeletal:      Right lower leg: No edema.      Left lower leg: No edema.   Skin:     General: Skin is warm and moist.      Findings: No rash.   Neurological:      Comments: Agitated.  Moaning.  Unable to communicate.           Significant Labs: All pertinent labs within the past 24 hours have been reviewed.    Significant Imaging: I have reviewed all pertinent imaging results/findings within the past 24 hours.

## 2023-07-21 NOTE — CONSULTS
INPATIENT NEPHROLOGY CONSULT   [facility]    Patient Name: Chanel Cervantes  Date: 07/21/2023    Reason for consultation: ANTHONY    Chief Complaint:   Chief Complaint   Patient presents with    Fall     Denies hitting head or LOC, hit right shoulder    Shortness of Breath    Back Pain       History of Present Illness:  Chanel Cervantes is a 67 y.o. female obese, BMI 37.6 kg/M2, with past medical history of diabetes, hypertension, bariatric surgery, cholecystectomy, prior UTIs, and prior pancreatitis secondary to gallstones. She presented with acute onset of back pain, radiating into bilateral upper quadrants, with associated shortness of breath.  She was admitted for septic shock, transferred to ICU for further management. She is s/p ERCP with evidence of the entire main bile duct was moderately dilated, with an obstruction from suspected sludge ball. A biliary sphincterotomy was performed. The biliary tree was swept. Hospital course complicated AMS requiring MV and and polymicrobial bacteremia, Enterococcus faecium and E coli. We are consulted for ANTHONY.    Echo:  The left ventricle is normal in size with mild concentric hypertrophy and mildly decreased systolic function.  Mild to moderate tricuspid regurgitation.  The estimated ejection fraction is 45%.  Grade I left ventricular diastolic dysfunction.  There is abnormal septal wall motion consistent with left bundle branch block.  Mild right ventricular enlargement with normal right ventricular systolic function.  Moderate left atrial enlargement.  Normal central venous pressure (3 mmHg).  The estimated PA systolic pressure is 40 mmHg.  There is mild pulmonary hypertension.       Interval History:  7/21- BP holding on levophed, FIO2 40%, UOP 1.7L    Plan of Care:    Assessment:  Septic shock due biliary sludge with polymicrobial bacteremia  ANTHONY due to sepsis/ischemic ATN  AMS on MV  Shock liver  Lactic acidosis  Demand ischemia (underlying systolic  CHF/pulm HTN)  Anemia/Thrombocytopenia    Plan:    - remains on levophed, IV steroids, BSA and IVFs- SCr is worse but he is nonoliguric- will follow trend- dose meds for CrCl < 20- no nsaids or IV contrast- continue schaefer- no acute RRT needs  - vent management per pulm- CXR today improved  - LFTs improving  - lactate improving  - cards eval noted  - H/H noted- no acute transfusion needs- monitor for DIC    Update: cut back LR to 75cc/hr since she is edematous- if she is started on clinimix, can stop IVFs.    Thank you for allowing us to participate in this patient's care. We will continue to follow.    Vital Signs:  Temp Readings from Last 3 Encounters:   23 99.2 °F (37.3 °C) (Oral)   23 98 °F (36.7 °C) (Oral)   23 97.4 °F (36.3 °C)       Pulse Readings from Last 3 Encounters:   23 90   23 68   23 71       BP Readings from Last 3 Encounters:   23 (!) 124/58   23 (!) 120/59   23 (!) 146/70       Weight:  Wt Readings from Last 3 Encounters:   23 108.9 kg (239 lb 15.9 oz)   23 108.9 kg (240 lb 1.3 oz)   23 109.1 kg (240 lb 8 oz)       INS/OUTS:  I/O last 3 completed shifts:  In: 5588.1 [I.V.:5388.3; IV Piggyback:199.8]  Out:  [Urine:2108]  I/O this shift:  In: -   Out: 125 [Urine:125]    Past Medical & Surgical History:  Past Medical History:   Diagnosis Date    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 2021    Anemia, chronic disease 2021    CAD (coronary artery disease)     Diabetes mellitus, type 2     Hypertension     Iron deficiency anemia following bariatric surgery 2021    MI (myocardial infarction)     Secondary thrombocytosis 2021    Sleep apnea     Sleep apnea 2019    Sleep Right        Past Surgical History:   Procedure Laterality Date    ANGIOGRAM, CORONARY, WITH LEFT HEART CATHETERIZATION      APPENDECTOMY      BARIATRIC SURGERY  2019    Dr Nunez    CARPAL TUNNEL RELEASE Bilateral 2002     SECTION   "    CHOLECYSTECTOMY      COLONOSCOPY      CORONARY ANGIOPLASTY WITH STENT PLACEMENT      CORONARY ARTERY BYPASS GRAFT      EPIDURAL STEROID INJECTION INTO LUMBAR SPINE      x2    ERCP N/A 2023    Procedure: ERCP (ENDOSCOPIC RETROGRADE CHOLANGIOPANCREATOGRAPHY);  Surgeon: Lavon Hernandez III, MD;  Location: [facility];  Service: Endoscopy;  Laterality: N/A;    ESOPHAGOGASTRODUODENOSCOPY      HERNIA REPAIR  2019    HYSTERECTOMY      THYROID LOBECTOMY Right 2021    Procedure: LOBECTOMY, THYROID;  Surgeon: Mari Chance MD;  Location: [facility] OR;  Service: General;  Laterality: Right;       Past Social History:  Social History     Socioeconomic History    Marital status:    Tobacco Use    Smoking status: Former     Packs/day: 1.00     Years: 15.00     Pack years: 15.00     Types: Cigarettes     Quit date:      Years since quittin.    Smokeless tobacco: Never   Substance and Sexual Activity    Alcohol use: No     Comment: "maybe once a year"    Drug use: No    Sexual activity: Not Currently     Social Determinants of Health     Financial Resource Strain: Unknown    Difficulty of Paying Living Expenses: Patient refused   Food Insecurity: Unknown    Worried About Running Out of Food in the Last Year: Patient refused    Ran Out of Food in the Last Year: Patient refused   Transportation Needs: No Transportation Needs    Lack of Transportation (Medical): No    Lack of Transportation (Non-Medical): No   Physical Activity: Inactive    Days of Exercise per Week: 0 days    Minutes of Exercise per Session: 0 min   Stress: Stress Concern Present    Feeling of Stress : To some extent   Social Connections: Unknown    Frequency of Communication with Friends and Family: More than three times a week    Frequency of Social Gatherings with Friends and Family: More than three times a week    Active Member of Clubs or Organizations: No    Attends Club or Organization Meetings: Never    Marital Status: "    Housing Stability: Low Risk     Unable to Pay for Housing in the Last Year: No    Number of Places Lived in the Last Year: 1    Unstable Housing in the Last Year: No       Medications:  Scheduled Meds:   chlorhexidine  15 mL Mouth/Throat BID    DAPTOmycin (CUBICIN) IV (PEDS and ADULTS)  10 mg/kg (Adjusted) Intravenous Q48H    enoxparin  40 mg Subcutaneous Daily    hydrocortisone sodium succinate  100 mg Intravenous Q8H    levothyroxine  75 mcg Oral Before breakfast    meropenem (MERREM) IVPB  1 g Intravenous Q12H    mupirocin   Nasal BID     Continuous Infusions:   lactated ringers 125 mL/hr at 07/21/23 0200    NORepinephrine bitartrate-D5W 0.1 mcg/kg/min (07/21/23 0730)    propofoL 25 mcg/kg/min (07/21/23 0846)     PRN Meds:.acetaminophen, albuterol-ipratropium, calcium gluconate IVPB, calcium gluconate IVPB, calcium gluconate IVPB, dextrose 50%, dextrose 50%, glucagon (human recombinant), glucose, glucose, HYDROmorphone, insulin aspart U-100, lorazepam, magnesium sulfate IVPB, magnesium sulfate IVPB, naloxone, ondansetron, polyethylene glycol, potassium chloride in water **AND** potassium chloride in water **AND** potassium chloride in water, senna-docusate 8.6-50 mg, simethicone, sodium chloride 0.9%, sodium phosphate IVPB, sodium phosphate IVPB, sodium phosphate IVPB  No current facility-administered medications on file prior to encounter.     Current Outpatient Medications on File Prior to Encounter   Medication Sig Dispense Refill    amLODIPine (NORVASC) 2.5 MG tablet Take 1 tablet (2.5 mg total) by mouth once daily. 90 tablet 1    azelastine (ASTELIN) 137 mcg (0.1 %) nasal spray 1 spray (137 mcg total) by Nasal route 2 (two) times daily. 30 mL 5    calcium carbonate-vitamin D3 600 mg-62.5 mcg (2,500 unit) Cap calcium carbonate 600 mg-vitamin D3 62.5 mcg (2,500 unit) capsule   Take by oral route.      DULoxetine (CYMBALTA) 60 MG capsule Take 1 capsule (60 mg total) by mouth once daily. 90 capsule 1     empaglifloz-linaglip-metformin (TRIJARDY XR) 25-5-1,000 mg TBph Take 1 tablet by mouth once daily. 90 tablet 1    guanFACINE (TENEX) 2 MG tablet Take 1 tablet (2 mg total) by mouth nightly. 90 tablet 3    insulin degludec (TRESIBA FLEXTOUCH U-200) 200 unit/mL (3 mL) insulin pen Inject 76 Units into the skin once daily. 12 pen 3    iron-vitamin C 100-250 mg, ICAR-C, 100-250 mg Tab Take 1 tablet by mouth once daily. 30 each 8    levothyroxine (SYNTHROID) 75 MCG tablet Take 75 mcg by mouth before breakfast.      metFORMIN (GLUCOPHAGE) 1000 MG tablet Take 1,000 mg by mouth 2 (two) times daily with meals.      metOLazone (ZAROXOLYN) 5 MG tablet Take 1 tablet (5 mg total) by mouth once daily. 90 tablet 0    metoprolol succinate (TOPROL-XL) 25 MG 24 hr tablet Take 1 tablet (25 mg total) by mouth once daily. 90 tablet 1    multivitamin capsule Take 1 capsule by mouth once daily.      olmesartan (BENICAR) 40 MG tablet Take 1 tablet (40 mg total) by mouth once daily. 90 tablet 1    potassium chloride (KLOR-CON) 10 MEQ TbSR Take 1 tablet (10 mEq total) by mouth once daily. 90 tablet 1    pregabalin (LYRICA) 50 MG capsule Take 50 mg by mouth every evening.      rosuvastatin (CRESTOR) 40 MG Tab Take 1 tablet (40 mg total) by mouth every evening. 90 tablet 3    aspirin (ECOTRIN) 81 MG EC tablet Take 1 tablet (81 mg total) by mouth once daily. 30 tablet 0    blood sugar diagnostic Strp One Touch Ultra Blue Test strips, Use l strip to check glucose 4 times daily 100 each 11    blood-glucose meter kit Use as instructed 1 each 0    cyanocobalamin 1,000 mcg/mL injection Inject 1 mL (1,000 mcg total) into the skin every 30 days. 3 mL 4    lancets (ONETOUCH DELICA LANCETS) 33 gauge Misc USE 1  TO CHECK GLUCOSE 4 TIMES DAILY 100 each 3    magnesium oxide (MAG-OX) 400 mg (241.3 mg magnesium) tablet Take 1 tablet (400 mg total) by mouth once daily. 90 tablet 1    NYSTOP powder APPLY TO AFFECTED AREA AS NEEDED      prednisoLONE acetate  "(PRED FORTE) 1 % DrpS Place 1 drop into both eyes as needed.          Allergies:  Adhesive tape-silicones, Dye, Cephalexin, Iodine, Penicillins, Pneumococcal vaccine, and Iodinated contrast media    Past Family History:  Reviewed; refer to Hospitalist Admission Note    Review of Systems:  On MV    Physical Exam:  BP (!) 124/58   Pulse 90   Temp 99.2 °F (37.3 °C) (Oral)   Resp (!) 24   Ht 5' 7" (1.702 m)   Wt 108.9 kg (239 lb 15.9 oz)   SpO2 (!) 93%   BMI 37.60 kg/m²     General Appearance:    Ill   Head:    Normocephalic, atraumatic   Eyes:    Closed     Mouth:   Moist mucus membranes, no thrush or oral lesions, normal      dentition   Back:     No CVA tenderness   Lungs:     Coarse, on MV   Heart:    Regular rate and rhythm, no rub/gallop   Abdomen:     Soft, non-tender, non-distended guarding, no masses, no organomegaly   Extremities:   Warm and well perfused, distal pulses intact, no cyanosis, edematous   MSK:   No joint or muscle swelling, tenderness or deformity   Skin:   Skin color, texture, turgor normal, no rashes or lesions   Neurologic/Psychiatric:   Sedated     Results:  Lab Results   Component Value Date     07/21/2023    K 4.2 07/21/2023     07/21/2023    CO2 22 (L) 07/21/2023    BUN 56 (H) 07/21/2023    CREATININE 2.9 (H) 07/21/2023    CALCIUM 7.6 (L) 07/21/2023    ANIONGAP 12 07/21/2023    ESTGFRAFRICA >60.0 07/20/2022    EGFRNONAA >60.0 07/20/2022       Lab Results   Component Value Date    CALCIUM 7.6 (L) 07/21/2023    PHOS 3.7 07/21/2023       Recent Labs   Lab 07/21/23  0331   WBC 9.95   RBC 4.02   HGB 10.7*   HCT 32.6*   PLT 85*   MCV 81*   MCH 26.6*   MCHC 32.8       I have personally reviewed pertinent radiological imaging and reports.    I have spent > 70 minutes providing care for this patient for the above diagnoses. These services have included chart/data/imaging review, evaluation, exam, formulation of plan, , note preparation, and discussions with staff " involved in this patient's care.    Rosalva Barros MD MPH  [facility] [address] [address]  [phone] (p)  [phone] (f)    Cheiloplasty (Less Than 50%) Text: A decision was made to reconstruct the defect with a  cheiloplasty.  The defect was undermined extensively.  Additional obicularis oris muscle was excised with a 15 blade scalpel.  The defect was converted into a full thickness wedge, of less than 50% of the vertical height of the lip, to facilite a better cosmetic result.  Small vessels were then tied off with 5-0 monocyrl. The obicularis oris, superficial fascia, adipose and dermis were then reapproximated.  After the deeper layers were approximated the epidermis was reapproximated with particular care given to realign the vermilion border.

## 2023-07-21 NOTE — PROGRESS NOTES
Pulmonary/Critical Care Progress Note      PATIENT NAME: Chanel Cervantes  MRN: [id number]  TODAY'S DATE: 2023  8:19 AM  ADMIT DATE: 2023  AGE: 67 y.o. : 1955    CONSULT REQUESTED BY: Harris Dempsey MD    REASON FOR CONSULT:   Critical care management    HPI:  The patient is a 67-year-old female who was brought to the hospital for back pain.  She was having bilateral back pain and upper abdominal pain that was not controlled with ibuprofen and came it.  She was found to have dilated common bile duct and elevated LFTs.  She is in shock requiring Levophed.  She is bacteremic with E coli and E faecium.  Her ERCP released sludge from the common bile duct. She is very encephalopathic.     the patient has an agitated delirium this morning.  She appears very uncomfortable.  She is tachypneic.  Her pressor requirements are increasing, her urine output is decreasing.    - pt was intubated yesterday due to encephalopathy and continues on ventilator, sedated. Her pressor requirement is improved. Tmax 100.2 this am.  Daughter at bedside states usually pt is independent and has never been sick like this before.     REVIEW OF SYSTEMS  Unobtainable.    No change in the patient's Past Medical History, Past Surgical History, Social History or Family History since admission.    VITAL SIGNS (MOST RECENT)  Temp: 99.2 °F (37.3 °C) (23 1127)  Pulse: (!) 54 (23 1306)  Resp: (!) 25 (23 1306)  BP: (!) 162/77 (23 1230)  SpO2: (!) 94 % (23 1306)    INTAKE AND OUTPUT (LAST 24 HOURS):  Intake/Output Summary (Last 24 hours) at 2023 1327  Last data filed at 2023 1127  Gross per 24 hour   Intake 3743.75 ml   Output 1865 ml   Net 1878.75 ml         WEIGHT  Wt Readings from Last 1 Encounters:   23 108.9 kg (239 lb 15.9 oz)       PHYSICAL EXAM  GENERAL: Older patient sedated and intubated  HEENT: Pupils equal and reactive. Extraocular movements intact. Nose intact. Pharynx  dry.  NECK: Supple. L IJ triple lumen catheter.  HEART: Regular rate and rhythm. No murmur or gallop auscultated.  LUNGS:  clear/diminished bilaterally anteriorly  ABDOMEN: Bowel sounds diminished Very large ventral hernia.  Very obese, soft  : Normal anatomy.Waller with a little urine.  EXTREMITIES: Normal muscle tone and joint movement, no cyanosis or clubbing. Scar at L shoulder.   There is a radial arterial catheter on the right  LYMPHATICS: No adenopathy palpated, 1+ pitting edema bilat upper ext  SKIN: Dry, intact, no lesions.   NEURO: sedated  PSYCH: unable to assess      CBC LAST (LAST 24 HOURS)  Recent Labs   Lab 07/21/23  0331   WBC 9.95   RBC 4.02   HGB 10.7*   HCT 32.6*   MCV 81*   MCH 26.6*   MCHC 32.8   RDW 16.2*   PLT 85*   MPV 11.6   GRAN 57.0   LYMPH 6.0*   MONO 7.0   NRBC 0         CHEMISTRY LAST (LAST 24 HOURS)  Recent Labs   Lab 07/21/23  0331 07/21/23  0411     --    K 4.2  --      --    CO2 22*  --    ANIONGAP 12  --    BUN 56*  --    CREATININE 2.9*  --    *  --    CALCIUM 7.6*  --    PH  --  7.424   MG 2.2  --    ALBUMIN 2.0*  --    PROT 4.9*  --    ALKPHOS 319*  --    *  --    *  --    BILITOT 1.6*  --            CARDIAC PROFILE (LAST 24 HOURS)  Recent Labs   Lab 07/18/23  1220 07/18/23  2204 07/20/23  0307   BNP 20  --   --    CPK 92  --  245*   TROPONINIHS 14.8   < > 2384.3*    < > = values in this interval not displayed.         LAST 7 DAYS MICROBIOLOGY   Microbiology Results (last 7 days)       Procedure Component Value Units Date/Time    Blood culture [[id number]] Collected: 07/20/23 0940    Order Status: Completed Specimen: Blood from Peripheral, Antecubital, Right Updated: 07/21/23 1232     Blood Culture, Routine No Growth to date      No Growth to date    Narrative:      Collection has been rescheduled by [email or web address] at 07/20/2023 06:04 Reason:   Unable to collect  Collection has been rescheduled by [email or web address] at 07/20/2023 08:01 Reason:   Contacting zainab for  cultures per RN Alfred  Collection has been rescheduled by [email or web address] at 07/20/2023 08:41 Reason:   Nurse Draw  Collection has been rescheduled by [email or web address] at 07/20/2023 06:04 Reason:   Unable to collect  Collection has been rescheduled by [email or web address] at 07/20/2023 08:01 Reason:   Contacting zainab for cultures per RN Alfred  Collection has been rescheduled by [email or web address] at 07/20/2023 08:41 Reason:   Nurse Draw    Culture, Respiratory with Gram Stain [[id number]] Collected: 07/20/23 1003    Order Status: Completed Specimen: Respiratory from Endotracheal Aspirate Updated: 07/21/23 1124     Respiratory Culture No Growth     Gram Stain (Respiratory) <10 epithelial cells per low power field.     Gram Stain (Respiratory) Few WBC's     Gram Stain (Respiratory) No organisms seen    Blood culture [[id number]] Collected: 07/20/23 0940    Order Status: Completed Specimen: Blood from Line, Arterial, Right Updated: 07/21/23 1102     Blood Culture, Routine No Growth to date      Gram stain aer bottle: Gram positive cocci      Positive results previously called    Narrative:      Collection has been rescheduled by [email or web address] at 07/20/2023 06:04 Reason:   Unable to collect  Collection has been rescheduled by [email or web address] at 07/20/2023 08:01 Reason:   Contacting zainab for cultures per RN Alfred  Collection has been rescheduled by [email or web address] at 07/20/2023 08:41 Reason:   Nurse Draw  Collection has been rescheduled by [email or web address] at 07/20/2023 06:04 Reason:   Unable to collect  Collection has been rescheduled by [email or web address] at 07/20/2023 08:01 Reason:   Contacting zainab for cultures per RN Alfred  Collection has been rescheduled by [email or web address] at 07/20/2023 08:41 Reason:   Nurse Draw    Urine Culture High Risk [[id number]] Collected: 07/19/23 1006    Order Status: Completed Specimen: Urine, Clean Catch Updated: 07/21/23 0942     Urine Culture, Routine No growth to date    Narrative:      Indicated criteria for high risk culture:->Other  Other (specify):->e coli bacteremia    Blood culture [[id number]]   (Abnormal) Collected: 07/19/23 1230    Order Status: Completed Specimen: Blood Updated: 07/21/23 0858     Blood Culture, Routine Gram stain aer bottle: Gram positive cocci      Positive results previously called      ENTEROCOCCUS FAECIUM  For susceptibility see order #[id number]      Blood culture [[id number]]  (Abnormal) Collected: 07/18/23 1738    Order Status: Completed Specimen: Blood Updated: 07/21/23 0853     Blood Culture, Routine Gram stain aer bottle: Gram negative rods      Results called to and read back by:Lesvia Mcdowell RN MICU3.      07/19/2023  05:23 TGC      ESCHERICHIA COLI  For susceptibility see order #[id number]      Blood culture [[id number]]  (Abnormal)  (Susceptibility) Collected: 07/18/23 1648    Order Status: Completed Specimen: Blood Updated: 07/21/23 0852     Blood Culture, Routine Gram stain aer bottle: Gram negative rods      Gram stain aer bottle: Gram positive cocci      Results called to and read back by: Lesvia Mcdowell RN MICU3.      07/19/2023  05:06 TGC      ESCHERICHIA COLI      ENTEROCOCCUS FAECIUM  Identification and susceptibility pending      Blood culture [[id number]]  (Abnormal) Collected: 07/19/23 0002    Order Status: Completed Specimen: Blood Updated: 07/20/23 0657     Blood Culture, Routine Gram stain aer bottle: Gram positive cocci      Gram stain ez bottle: Gram positive cocci      Positive results previously called      ENTEROCOCCUS FAECIUM  For susceptibility see order #[id number]      Blood culture [[id number]]  (Abnormal) Collected: 07/19/23 0030    Order Status: Completed Specimen: Blood from Peripheral, Left Hand Updated: 07/20/23 0655     Blood Culture, Routine Gram stain aer bottle: Gram positive cocci      Gram stain ez bottle: Gram positive cocci      Results called to and read back by:Carmela Mcintosh RN-3ICU;      07/19/2023  16:23 CJD      ENTEROCOCCUS FAECIUM  For susceptibility see order #[id number]      Narrative:      Collection has been rescheduled by  [email or web address] at 07/19/2023 00:20 Reason:   Nurse Draw  Collection has been rescheduled by [email or web address] at 07/19/2023 00:20 Reason:   Nurse Draw    Rapid Organism ID by PCR (from Blood culture) [[id number]]  (Abnormal) Collected: 07/18/23 1648    Order Status: Completed Updated: 07/19/23 0526     Enterococcus faecalis Not Detected     Enterococcus faecium Detected     Listeria monocytogenes Not Detected     Staphylococcus spp. Not Detected     Staphylococcus aureus Not Detected     Staphylococcus epidermidis Not Detected     Staphylococcus lugdunensis Not Detected     Streptococcus species Not Detected     Streptococcus agalactiae Not Detected     Streptococcus pneumoniae Not Detected     Streptococcus pyogenes Not Detected     Acinetobacter calcoaceticus/baumannii complex Not Detected     Bacteroides fragilis Not Detected     Enterobacterales See species for ID     Enterobacter cloacae complex Not Detected     Escherichia coli Detected     Klebsiella aerogenes Not Detected     Klebsiella oxytoca Not Detected     Klebsiella pneumoniae group Not Detected     Proteus Not Detected     Salmonella sp Not Detected     Serratia marcescens Not Detected     Haemophilus influenzae Not Detected     Neisseria meningtidis Not Detected     Pseudomonas aeruginosa Not Detected     Stenotrophomonas maltophilia Not Detected     Candida albicans Not Detected     Candida auris Not Detected     Candida glabrata Not Detected     Candida krusei Not Detected     Candida parapsilosis Not Detected     Candida tropicalis Not Detected     Cryptococcus neoformans/gattii Not Detected     CTX-M (ESBL ) Not Detected     IMP (Carbapenem resistant) Not Detected     KPC resistance gene (Carbapenem resistant) Not Detected     mcr-1  Not Detected     mec A/C  Test not applicable     mec A/C and MREJ (MRSA) gene Test not applicable     NDM (Carbapenem resistant) Not Detected     OXA-48-like (Carbapenem resistant) Not Detected     van A/B (VRE gene) Not Detected      VIM (Carbapenem resistant) Not Detected            MOST RECENT IMAGING  X-Ray Chest AP Portable  HISTORY: ventilator    FINDINGS: Portable chest radiograph at 0459 hours compared to 07/20/2023 shows ET and NG tubes, and left internal jugular central venous catheter, all unchanged in position. The cardiomediastinal silhouette and pulmonary vasculature are stable.    The lungs are hypoexpanded, with interval improved aeration in both lower lung zones. There is no new pleural or parenchymal abnormality. No pneumothorax.    IMPRESSION: Interval improved aeration in both lung bases, with otherwise no significant change.    Electronically signed by:  Roger Cardona MD  7/21/2023 7:24 AM CDT Workstation: [id number]      CURRENT VISIT EKG  Results for orders placed or performed during the hospital encounter of 07/18/23   EKG 12-lead    Narrative    Test Reason : W19.XXXA,    Vent. Rate : 073 BPM     Atrial Rate : 073 BPM     P-R Int : 154 ms          QRS Dur : 162 ms      QT Int : 426 ms       P-R-T Axes : 069 -17 -11 degrees     QTc Int : 469 ms    Sinus rhythm with Premature ventricular complexes or Fusion complexes  Right bundle branch block  Minimal voltage criteria for LVH, may be normal variant ( R in aVL )  Abnormal ECG  When compared with ECG of 07-AUG-2021 22:34,  Fusion complexes are now Present  Premature ventricular complexes are now Present    Referred By: [email or web address]   SELF           Confirmed By:        ECHOCARDIOGRAM RESULTS  No results found for this or any previous visit.        Oxygen INFORMATION  Vent Mode: A/C  Oxygen Concentration (%):  [40-50] 40  Resp Rate Total:  [21 br/min-33 br/min] 25 br/min  Vt Set:  [450 mL] 450 mL  PEEP/CPAP:  [5 cmH20] 5 cmH20  Pressure Support:  [5 cmH20] 5 cmH20  Mean Airway Pressure:  [9.8 jyD45-87 cmH20] 10 [id number] liters           LAST ARTERIAL BLOOD GAS  ABG  Recent Labs   Lab 07/21/23  0411   PH 7.424   PO2 77*   PCO2 37.5   HCO3 24.6   BE 0         IMPRESSION AND  PLAN  Severe sepsis with shock, biliary source, with gallstone pancreatitis  - bacteremia with E coli and E faecium  - leukocytosis   - monitor WBC  - sludge released from ERCP  - Daptomycin added to Merrem  - wean levophed as tolerated   - stress dose hydrocortisone 50mg q6h    Shock liver  - monitor LFTs- downtrending    Morbid obesity  moderate hypoalbuminemia  - start trophic tube feeds via gastric tube    Anemia  - watch H/H  Lactic acidosis  - improved    Pulmonary edema  - persisting  - will have to tolerate as patient needs volume    Systolic heart failure  Diastolic heart failure  Pulmonary hypertension  Demand ischemia  - appreciate cardiology recs    Acute encephalopathy, metabolic  - intubated for airway protection  - continue vent support  - daily SAT/SBT start in am    D/w daughter at bedside    Critical care time spent reviewing the chart, examining the patient, reviewing the labs, reviewing the radiological findings, discussing care with nursing, physicians, and respiratory and creating the note and  has been greater than 35 minutes.      Ailyn Bruner MD  Date of Service: 07/21/2023  8:19 AM

## 2023-07-22 PROBLEM — R78.81 BACTEREMIA DUE TO ENTEROCOCCUS: Status: ACTIVE | Noted: 2023-07-22

## 2023-07-22 PROBLEM — B95.2 BACTEREMIA DUE TO ENTEROCOCCUS: Status: ACTIVE | Noted: 2023-07-22

## 2023-07-22 LAB
ALBUMIN SERPL BCP-MCNC: 1.8 G/DL (ref 3.5–5.2)
ALLENS TEST: ABNORMAL
ALP SERPL-CCNC: 286 U/L (ref 55–135)
ALT SERPL W/O P-5'-P-CCNC: 321 U/L (ref 10–44)
ANION GAP SERPL CALC-SCNC: 10 MMOL/L (ref 8–16)
AST SERPL-CCNC: 112 U/L (ref 10–40)
BACTERIA BLD CULT: ABNORMAL
BACTERIA SPEC AEROBE CULT: NO GROWTH
BACTERIA SPEC AEROBE CULT: NORMAL
BACTERIA UR CULT: NO GROWTH
BASOPHILS # BLD AUTO: 0.06 K/UL (ref 0–0.2)
BASOPHILS NFR BLD: 0.4 % (ref 0–1.9)
BILIRUB SERPL-MCNC: 1.3 MG/DL (ref 0.1–1)
BUN SERPL-MCNC: 64 MG/DL (ref 8–23)
CALCIUM SERPL-MCNC: 7.4 MG/DL (ref 8.7–10.5)
CHLORIDE SERPL-SCNC: 108 MMOL/L (ref 95–110)
CO2 SERPL-SCNC: 22 MMOL/L (ref 23–29)
CREAT SERPL-MCNC: 2.8 MG/DL (ref 0.5–1.4)
DELSYS: ABNORMAL
DIFFERENTIAL METHOD: ABNORMAL
EOSINOPHIL # BLD AUTO: 0 K/UL (ref 0–0.5)
EOSINOPHIL NFR BLD: 0 % (ref 0–8)
ERYTHROCYTE [DISTWIDTH] IN BLOOD BY AUTOMATED COUNT: 15.9 % (ref 11.5–14.5)
ERYTHROCYTE [SEDIMENTATION RATE] IN BLOOD BY WESTERGREN METHOD: 20 MM/H
EST. GFR  (NO RACE VARIABLE): 17.9 ML/MIN/1.73 M^2
FIO2: 40
GLUCOSE SERPL-MCNC: 237 MG/DL (ref 70–110)
GLUCOSE SERPL-MCNC: 242 MG/DL (ref 70–110)
GLUCOSE SERPL-MCNC: 243 MG/DL (ref 70–110)
GLUCOSE SERPL-MCNC: 293 MG/DL (ref 70–110)
GLUCOSE SERPL-MCNC: 310 MG/DL (ref 70–110)
GRAM STN SPEC: NORMAL
HCO3 UR-SCNC: 23.5 MMOL/L (ref 24–28)
HCT VFR BLD AUTO: 30.1 % (ref 37–48.5)
HCT VFR BLD CALC: 28 %PCV (ref 36–54)
HGB BLD-MCNC: 9.9 G/DL (ref 12–16)
IMM GRANULOCYTES # BLD AUTO: 0.36 K/UL (ref 0–0.04)
IMM GRANULOCYTES NFR BLD AUTO: 2.5 % (ref 0–0.5)
LACTATE SERPL-SCNC: 1.4 MMOL/L (ref 0.5–1.9)
LYMPHOCYTES # BLD AUTO: 0.4 K/UL (ref 1–4.8)
LYMPHOCYTES NFR BLD: 3.1 % (ref 18–48)
MAGNESIUM SERPL-MCNC: 2.5 MG/DL (ref 1.6–2.6)
MCH RBC QN AUTO: 26.8 PG (ref 27–31)
MCHC RBC AUTO-ENTMCNC: 32.9 G/DL (ref 32–36)
MCV RBC AUTO: 81 FL (ref 82–98)
MODE: ABNORMAL
MONOCYTES # BLD AUTO: 0.7 K/UL (ref 0.3–1)
MONOCYTES NFR BLD: 5.1 % (ref 4–15)
NEUTROPHILS # BLD AUTO: 12.7 K/UL (ref 1.8–7.7)
NEUTROPHILS NFR BLD: 88.9 % (ref 38–73)
NRBC BLD-RTO: 0 /100 WBC
PCO2 BLDA: 38 MMHG (ref 35–45)
PEEP: 5
PH SMN: 7.4 [PH] (ref 7.35–7.45)
PHOSPHATE SERPL-MCNC: 3.8 MG/DL (ref 2.7–4.5)
PLATELET # BLD AUTO: 73 K/UL (ref 150–450)
PMV BLD AUTO: 11.9 FL (ref 9.2–12.9)
PO2 BLDA: 102 MMHG (ref 80–100)
POC BE: -1 MMOL/L
POC IONIZED CALCIUM: 1.12 MMOL/L (ref 1.06–1.42)
POC SATURATED O2: 98 % (ref 95–100)
POC TCO2: 25 MMOL/L (ref 23–27)
POTASSIUM BLD-SCNC: 3.8 MMOL/L (ref 3.5–5.1)
POTASSIUM SERPL-SCNC: 4 MMOL/L (ref 3.5–5.1)
PROT SERPL-MCNC: 4.5 G/DL (ref 6–8.4)
RBC # BLD AUTO: 3.7 M/UL (ref 4–5.4)
SAMPLE: ABNORMAL
SITE: ABNORMAL
SODIUM BLD-SCNC: 142 MMOL/L (ref 136–145)
SODIUM SERPL-SCNC: 140 MMOL/L (ref 136–145)
VT: 450
WBC # BLD AUTO: 14.24 K/UL (ref 3.9–12.7)

## 2023-07-22 PROCEDURE — 99233 SBSQ HOSP IP/OBS HIGH 50: CPT | Mod: ,,, | Performed by: INTERNAL MEDICINE

## 2023-07-22 PROCEDURE — 63600175 PHARM REV CODE 636 W HCPCS: Performed by: FAMILY MEDICINE

## 2023-07-22 PROCEDURE — 94003 VENT MGMT INPAT SUBQ DAY: CPT

## 2023-07-22 PROCEDURE — 63600175 PHARM REV CODE 636 W HCPCS: Performed by: HOSPITALIST

## 2023-07-22 PROCEDURE — 84100 ASSAY OF PHOSPHORUS: CPT | Performed by: STUDENT IN AN ORGANIZED HEALTH CARE EDUCATION/TRAINING PROGRAM

## 2023-07-22 PROCEDURE — 86022 PLATELET ANTIBODIES: CPT | Performed by: INTERNAL MEDICINE

## 2023-07-22 PROCEDURE — 25000003 PHARM REV CODE 250: Performed by: INTERNAL MEDICINE

## 2023-07-22 PROCEDURE — 25000003 PHARM REV CODE 250: Performed by: FAMILY MEDICINE

## 2023-07-22 PROCEDURE — 84295 ASSAY OF SERUM SODIUM: CPT

## 2023-07-22 PROCEDURE — 99900035 HC TECH TIME PER 15 MIN (STAT)

## 2023-07-22 PROCEDURE — 99291 CRITICAL CARE FIRST HOUR: CPT | Mod: ,,, | Performed by: INTERNAL MEDICINE

## 2023-07-22 PROCEDURE — 99900026 HC AIRWAY MAINTENANCE (STAT)

## 2023-07-22 PROCEDURE — 25000003 PHARM REV CODE 250: Performed by: HOSPITALIST

## 2023-07-22 PROCEDURE — 99233 PR SUBSEQUENT HOSPITAL CARE,LEVL III: ICD-10-PCS | Mod: ,,, | Performed by: INTERNAL MEDICINE

## 2023-07-22 PROCEDURE — 83605 ASSAY OF LACTIC ACID: CPT | Performed by: INTERNAL MEDICINE

## 2023-07-22 PROCEDURE — 25000003 PHARM REV CODE 250: Performed by: STUDENT IN AN ORGANIZED HEALTH CARE EDUCATION/TRAINING PROGRAM

## 2023-07-22 PROCEDURE — 99291 PR CRITICAL CARE, E/M 30-74 MINUTES: ICD-10-PCS | Mod: ,,, | Performed by: INTERNAL MEDICINE

## 2023-07-22 PROCEDURE — 83735 ASSAY OF MAGNESIUM: CPT | Performed by: FAMILY MEDICINE

## 2023-07-22 PROCEDURE — 37799 UNLISTED PX VASCULAR SURGERY: CPT

## 2023-07-22 PROCEDURE — 27000221 HC OXYGEN, UP TO 24 HOURS

## 2023-07-22 PROCEDURE — 94761 N-INVAS EAR/PLS OXIMETRY MLT: CPT

## 2023-07-22 PROCEDURE — 99900031 HC PATIENT EDUCATION (STAT)

## 2023-07-22 PROCEDURE — 63600175 PHARM REV CODE 636 W HCPCS: Performed by: INTERNAL MEDICINE

## 2023-07-22 PROCEDURE — 85014 HEMATOCRIT: CPT

## 2023-07-22 PROCEDURE — 80053 COMPREHEN METABOLIC PANEL: CPT | Performed by: FAMILY MEDICINE

## 2023-07-22 PROCEDURE — 20000000 HC ICU ROOM

## 2023-07-22 PROCEDURE — 82330 ASSAY OF CALCIUM: CPT

## 2023-07-22 PROCEDURE — 82803 BLOOD GASES ANY COMBINATION: CPT

## 2023-07-22 PROCEDURE — 84132 ASSAY OF SERUM POTASSIUM: CPT

## 2023-07-22 PROCEDURE — 85025 COMPLETE CBC W/AUTO DIFF WBC: CPT | Performed by: FAMILY MEDICINE

## 2023-07-22 RX ORDER — NOREPINEPHRINE BITARTRATE/D5W 8 MG/250ML
0-3 PLASTIC BAG, INJECTION (ML) INTRAVENOUS CONTINUOUS
Status: DISCONTINUED | OUTPATIENT
Start: 2023-07-22 | End: 2023-07-23

## 2023-07-22 RX ORDER — ENOXAPARIN SODIUM 100 MG/ML
30 INJECTION SUBCUTANEOUS
Status: DISCONTINUED | OUTPATIENT
Start: 2023-07-22 | End: 2023-07-29

## 2023-07-22 RX ORDER — NOREPINEPHRINE BITARTRATE/D5W 8 MG/250ML
0-3 PLASTIC BAG, INJECTION (ML) INTRAVENOUS CONTINUOUS
Status: DISCONTINUED | OUTPATIENT
Start: 2023-07-22 | End: 2023-07-22

## 2023-07-22 RX ADMIN — MUPIROCIN 1 G: 20 OINTMENT TOPICAL at 09:07

## 2023-07-22 RX ADMIN — PROPOFOL 25 MCG/KG/MIN: 10 INJECTION, EMULSION INTRAVENOUS at 06:07

## 2023-07-22 RX ADMIN — PROPOFOL 25 MCG/KG/MIN: 10 INJECTION, EMULSION INTRAVENOUS at 09:07

## 2023-07-22 RX ADMIN — NOREPINEPHRINE BITARTRATE 0.02 MCG/KG/MIN: 8 INJECTION, SOLUTION INTRAVENOUS at 11:07

## 2023-07-22 RX ADMIN — HYDROCORTISONE SODIUM SUCCINATE 50 MG: 100 INJECTION, POWDER, FOR SOLUTION INTRAMUSCULAR; INTRAVENOUS at 06:07

## 2023-07-22 RX ADMIN — LEVOTHYROXINE SODIUM 75 MCG: 0.03 TABLET ORAL at 06:07

## 2023-07-22 RX ADMIN — CHLORHEXIDINE GLUCONATE 15 ML: 1.2 RINSE ORAL at 09:07

## 2023-07-22 RX ADMIN — PROPOFOL 30 MCG/KG/MIN: 10 INJECTION, EMULSION INTRAVENOUS at 07:07

## 2023-07-22 RX ADMIN — PROPOFOL 25 MCG/KG/MIN: 10 INJECTION, EMULSION INTRAVENOUS at 01:07

## 2023-07-22 RX ADMIN — HYDROCORTISONE SODIUM SUCCINATE 50 MG: 100 INJECTION, POWDER, FOR SOLUTION INTRAMUSCULAR; INTRAVENOUS at 09:07

## 2023-07-22 RX ADMIN — INSULIN ASPART 2 UNITS: 100 INJECTION, SOLUTION INTRAVENOUS; SUBCUTANEOUS at 12:07

## 2023-07-22 RX ADMIN — MEROPENEM 1 G: 1 INJECTION, POWDER, FOR SOLUTION INTRAVENOUS at 01:07

## 2023-07-22 RX ADMIN — INSULIN ASPART 8 UNITS: 100 INJECTION, SOLUTION INTRAVENOUS; SUBCUTANEOUS at 05:07

## 2023-07-22 RX ADMIN — INSULIN ASPART 6 UNITS: 100 INJECTION, SOLUTION INTRAVENOUS; SUBCUTANEOUS at 01:07

## 2023-07-22 RX ADMIN — HYDROCORTISONE SODIUM SUCCINATE 50 MG: 100 INJECTION, POWDER, FOR SOLUTION INTRAMUSCULAR; INTRAVENOUS at 12:07

## 2023-07-22 RX ADMIN — PROPOFOL 30 MCG/KG/MIN: 10 INJECTION, EMULSION INTRAVENOUS at 11:07

## 2023-07-22 RX ADMIN — ENOXAPARIN SODIUM 30 MG: 30 INJECTION SUBCUTANEOUS at 05:07

## 2023-07-22 RX ADMIN — MEROPENEM 1 G: 1 INJECTION, POWDER, FOR SOLUTION INTRAVENOUS at 02:07

## 2023-07-22 RX ADMIN — INSULIN ASPART 4 UNITS: 100 INJECTION, SOLUTION INTRAVENOUS; SUBCUTANEOUS at 06:07

## 2023-07-22 NOTE — CARE UPDATE
07/21/23 2112   Patient Assessment/Suction   Level of Consciousness (AVPU) responds to pain   Respiratory Effort Unlabored   Expansion/Accessory Muscles/Retractions expansion symmetric   All Lung Fields Breath Sounds coarse   Rhythm/Pattern, Respiratory assisted mechanically   Cough Frequency with stimulation   Cough Type assisted   Suction Method tracheal   $ Suction Charges Inline Suction Procedure Stat Charge   Secretions Amount small   Secretions Color white   Secretions Characteristics thick   PRE-TX-O2   Device (Oxygen Therapy) ventilator   $ Is the patient on Low Flow Oxygen? Yes   Oxygen Concentration (%) 40   SpO2 96 %   Pulse Oximetry Type Continuous   $ Pulse Oximetry - Multiple Charge Pulse Oximetry - Multiple   Pulse 79   Resp (!) 24   Vent Select   Charged w/in last 24h YES   Preset Conventional Ventilator Settings   Vent Type    Ventilation Type VC   Vent Mode A/C   Set Rate 20 BPM   Vt Set 450 mL   PEEP/CPAP 5 cmH20   Peak Flow 60 L/min   Peak End Inspiratory Pressure 20 cmH20   I-Trigger Type  V-TRIG   Trigger Sensitivity Flow/I-Trigger 3 L/min   Patient Ventilator Parameters   Resp Rate Total 21 br/min   Peak Airway Pressure 21 cmH20   Mean Airway Pressure 10 cmH20   Plateau Pressure 0 cmH20   Exhaled Vt 456 mL   Total Ve 9.72 L/m   I:E Ratio Measured 1:2.70   Auto PEEP 0 cmH20   Conventional Ventilator Alarms   Resp Rate High Alarm 45 br/min   Press High Alarm 60 cmH2O   Apnea Rate 20   Apnea Volume (mL) 0 mL   Apnea Oxygen Concentration  100   Apnea Flow Rate (L/min) 60   T Apnea 20 sec(s)   Ready to Wean/Extubation Screen   FIO2<=50 (chart decimal) 0.4   MV<16L (chart vol.) 9.72   PEEP <=8 (chart #) 5   Ready to Wean Parameters   F/VT Ratio<105 (RSBI) (!) 52.63   Vital Capacity   Vital Capacity (mL) 0   Education   $ Education Ventilator Oxygen;15 min

## 2023-07-22 NOTE — SUBJECTIVE & OBJECTIVE
Interval History:  intubated yesterday.  Creatinine higher.  Levophed requirement coming down.  Having good urine output.    Review of Systems   Unable to perform ROS: Intubated   Objective:     Vital Signs (Most Recent):  Temp: 98.8 °F (37.1 °C) (07/21/23 1600)  Pulse: 77 (07/21/23 1913)  Resp: (!) 23 (07/21/23 1913)  BP: (!) 109/57 (07/21/23 1830)  SpO2: 97 % (07/21/23 1913) Vital Signs (24h Range):  Temp:  [98.4 °F (36.9 °C)-100.2 °F (37.9 °C)] 98.8 °F (37.1 °C)  Pulse:  [] 77  Resp:  [0-40] 23  SpO2:  [91 %-100 %] 97 %  BP: ()/(51-92) 109/57  Arterial Line BP: ()/() 115/53     Weight: 108.9 kg (239 lb 15.9 oz)  Body mass index is 37.6 kg/m².    Intake/Output Summary (Last 24 hours) at 7/21/2023 2038  Last data filed at 7/21/2023 1858  Gross per 24 hour   Intake 3010.45 ml   Output 1860 ml   Net 1150.45 ml         Physical Exam  Constitutional:       Interventions: She is sedated and intubated.   Eyes:      Conjunctiva/sclera:      Right eye: No exudate.     Left eye: No exudate.  Neck:      Vascular: No JVD.   Cardiovascular:      Rate and Rhythm: Normal rate and regular rhythm.   Pulmonary:      Effort: She is intubated.      Breath sounds: Decreased air movement present.   Abdominal:      General: Abdomen is flat. Bowel sounds are decreased. There is distension.      Hernia: A hernia is present. Hernia is present in the ventral area.   Skin:     General: Skin is warm and dry.           Significant Labs: All pertinent labs within the past 24 hours have been reviewed.    Significant Imaging: I have reviewed all pertinent imaging results/findings within the past 24 hours.

## 2023-07-22 NOTE — ASSESSMENT & PLAN NOTE
-Trend K.  Has improved.  -Replete K PRN  -Telemetry    Potassium   Date Value Ref Range Status   07/21/2023 4.2 3.5 - 5.1 mmol/L Final   07/20/2023 4.8 3.5 - 5.1 mmol/L Final

## 2023-07-22 NOTE — PROGRESS NOTES
Progress  Note  Infectious Disease    Reason for Consult:  bacteremia E coli and E faecium     HPI: Chanel Cervantes is a 67 y.o. female obese, BMI 37.6 kg/M2, with past medical history of diabetes, hypertension, bariatric surgery, cholecystectomy, prior UTIs, and prior pancreatitis secondary to gallstones.     She presented with acute onset of back pain, radiating into bilateral upper quadrants, with associated shortness of breath.  She was admitted for septic shock, transferred to ICU for further management.    Labs on admission with severe leukopenia 1.1, left shift 87%, bands 8%, H&H 14/44.2, platelet count 213   Creatinine 1.1   Hypokalemia 3.1   Transaminitis, AST 4943/ALT 1475   Total bili 2.7  Lipase 1363 down to 102  Patient CPK 92   Lactic acid 4.6  Hemoglobin A1c 6.9  UA pyuria 6, negative for bacteria, urine culture in process     CT abdomen with broad-based ventral abdominal hernia.  No evidence of obstruction.  Status post cholecystectomy with postsurgical dilatation of the common bile duct and intrahepatic ducts.    Seen by GI, s/p ERCP this morning; with evidence of the entire main bile duct was moderately dilated, with an obstruction from suspected sludge ball. A biliary sphincterotomy was performed. The biliary tree was swept.     ERCP this morning.  Hospital course complicated persistent fever and polymicrobial bacteremia, Enterococcus faecium and E coli, no resistance genes detected on [employer], pending sensitivities.      ID consult for E coli and E faecium bacteremia.    7/20: Interim reviewed, patient remains febrile, T max 101.3, currently 100.8, on pressors, on O2 by N[place] 5L. She remains encephalopathic, decreased urinary output, 20ml/h, no bowel movements recorded yet. Labs reviewed, WBC 11.4, bands 36%, H/H 11.3/35.4, plt: 175. Worsening ANTHONY 2.6, LFTs trending down. , will watch closely. Alct acid 3, troponins trending up, likely demand ischemia. Micro reviewed, repeat blood cultures   Enterococcus faecium 4/4 bottles, urine culture no growth to date, pending final. Discussed with Pulmonary, plan for IV steroids.    :  Interim reviewed, patient seen examined at bedside. Pressors requirement coming down, afebrile in the last 24 hours.  She was intubated yesterday at noon for airway protection, FiO2 40%, minimal amount of secretions, sputum sent for culture.  Urinary output improving although creatinine 2.9 today.  LFTs trending down, lactic acid 1.9, normal.  Micro reviewed, repeat blood cultures 7/20 x2 sets no growth to date, pending final.  Awaiting sensitivities on E coli and Enterococcus faecium, to be available later today.    2023   Afebrile, tmax 100, no pressors since this am,   On sedation 25 mcg/kg/min- intensivist is working daily on extubation, FIo2=30%  WBC 9.9--14;   PLT worsening? (186--175--85--73)   I/o=4795/155.  Cr 1.6--2.4--2.6--2.9--2.9-- LFTs improving     Review of patient's allergies indicates:   Allergen Reactions    Adhesive tape-silicones Rash    Dye Hives    Cephalexin     Iodine     Penicillins Edema    Pneumococcal vaccine     Iodinated contrast media Rash     Past Medical History:   Diagnosis Date    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 2021    Anemia, chronic disease 2021    CAD (coronary artery disease)     Diabetes mellitus, type 2     Hypertension     Iron deficiency anemia following bariatric surgery 2021    MI (myocardial infarction)     Secondary thrombocytosis 2021    Sleep apnea     Sleep apnea 2019    Sleep Right      Past Surgical History:   Procedure Laterality Date    ANGIOGRAM, CORONARY, WITH LEFT HEART CATHETERIZATION      APPENDECTOMY      BARIATRIC SURGERY  2019    Dr Nunez    CARPAL TUNNEL RELEASE Bilateral 2002     SECTION      CHOLECYSTECTOMY      COLONOSCOPY      CORONARY ANGIOPLASTY WITH STENT PLACEMENT      CORONARY ARTERY BYPASS GRAFT      EPIDURAL STEROID INJECTION INTO LUMBAR SPINE       "x2    ERCP N/A 2023    Procedure: ERCP (ENDOSCOPIC RETROGRADE CHOLANGIOPANCREATOGRAPHY);  Surgeon: Lavon Hernandez III, MD;  Location: [facility] ENDO;  Service: Endoscopy;  Laterality: N/A;    ESOPHAGOGASTRODUODENOSCOPY      HERNIA REPAIR  2019    HYSTERECTOMY      THYROID LOBECTOMY Right 2021    Procedure: LOBECTOMY, THYROID;  Surgeon: Mari Chance MD;  Location: [facility] OR;  Service: General;  Laterality: Right;     Social History     Tobacco Use    Smoking status: Former     Packs/day: 1.00     Years: 15.00     Pack years: 15.00     Types: Cigarettes     Quit date:      Years since quittin.5    Smokeless tobacco: Never   Substance Use Topics    Alcohol use: No     Comment: "maybe once a year"        Family History   Problem Relation Age of Onset    Diabetes Mother     Vision loss Mother     Heart disease Father     Vision loss Father     Stroke Father        Review of Systems:   Unable to obtain, patient is lethargic, no family at bedside     EXAM & DIAGNOSTICS REVIEWED:   Vitals:     Temp:  [98.3 °F (36.8 °C)-100 °F (37.8 °C)]   Temp: 98.3 °F (36.8 °C) (23 0722)  Pulse: 63 (23 0945)  Resp: (!) 21 (23 0945)  BP: 114/62 (23 0930)  SpO2: 95 % (23 0945)    Intake/Output Summary (Last 24 hours) at 2023 1019  Last data filed at 2023 0923  Gross per 24 hour   Intake 1765.25 ml   Output 1305 ml   Net 460.25 ml   Vent Mode: A/C  Oxygen Concentration (%):  [30-40] 30  Resp Rate Total:  [20 br/min-26 br/min] 21 br/min  Vt Set:  [450 mL] 450 mL  PEEP/CPAP:  [5 cmH20] 5 cmH20  Mean Airway Pressure:  [9.1 xzL62-36 cmH20] 10 cmH20        General:  Ill-appearing, OFF pressors, obese lady, intubated,   Eyes:  Anicteric, PERRL  ENT:  ET tube    OG tube   Neck:  Supple, R neck with resolvingl hematoma likely from prior central access attempts  L IJ with minimal amount of blood in dressing  Lungs: Coarse breath sounds b/l  Heart:  S1/S2+, regular rhythm, no " murmurs  Abd:  Obese, large ventral hernia, +BS, soft, non tender. (She does grimace to painful stimuli in other parts of the body)  :  Waller 07/19,  urine light  yellow, small amount  Musc:  Joints without effusion, swelling,  erythema, synovitis  Skin:  Warm, no rash, some prior skin lesions are healing  Wound:   Neuro:  Sedated   Psych:    Lymphatic:       Extrem: No LE edema b/l  VAD:  L IJ  07/19    R A-line  07/19      Isolation: contact      General Labs reviewed:  Recent Labs   Lab 07/20/23  0307 07/20/23  0931 07/21/23  0331 07/22/23  0315 07/22/23  0318   WBC 11.47  --  9.95  --  14.24*   HGB 11.3*  --  10.7*  --  9.9*   HCT 35.4*   < > 32.6* 28* 30.1*     --  85*  --  73*    < > = values in this interval not displayed.       Recent Labs   Lab 07/20/23  0307 07/21/23  0331 07/22/23 0318    140 140   K 4.8 4.2 4.0    106 108   CO2 22* 22* 22*   BUN 49* 56* 64*   CREATININE 2.6* 2.9* 2.8*   CALCIUM 7.8* 7.6* 7.4*   PROT 4.9* 4.9* 4.5*   BILITOT 2.4* 1.6* 1.3*   ALKPHOS 294* 319* 286*   * 602* 321*   * 371* 112*     Recent Labs   Lab 07/19/23  1250   CRP 24.03*       Estimated Creatinine Clearance: 24.8 mL/min (A) (based on SCr of 2.8 mg/dL (H)).     Micro:  Microbiology Results (last 7 days)       Procedure Component Value Units Date/Time    Culture, Respiratory with Gram Stain [[id number]] Collected: 07/20/23 1003    Order Status: Completed Specimen: Respiratory from Endotracheal Aspirate Updated: 07/22/23 0736     Respiratory Culture No growth      No normal respiratory shay     Gram Stain (Respiratory) <10 epithelial cells per low power field.     Gram Stain (Respiratory) Few WBC's     Gram Stain (Respiratory) No organisms seen    Urine Culture High Risk [[id number]] Collected: 07/19/23 1006    Order Status: Completed Specimen: Urine, Clean Catch Updated: 07/22/23 0710     Urine Culture, Routine No growth    Narrative:      Indicated criteria for high risk  culture:->Other  Other (specify):->e coli bacteremia    Blood culture [[id number]]  (Abnormal) Collected: 07/20/23 0940    Order Status: Completed Specimen: Blood from Line, Arterial, Right Updated: 07/22/23 0644     Blood Culture, Routine Gram stain aer bottle: Gram positive cocci      Positive results previously called      ENTEROCOCCUS FAECIUM  For susceptibility see order #[id number]      Narrative:      Collection has been rescheduled by [email or web address] at 07/20/2023 06:04 Reason:   Unable to collect  Collection has been rescheduled by [email or web address] at 07/20/2023 08:01 Reason:   Contacting zainab for cultures per RN Alfred  Collection has been rescheduled by [email or web address] at 07/20/2023 08:41 Reason:   Nurse Draw  Collection has been rescheduled by [email or web address] at 07/20/2023 06:04 Reason:   Unable to collect  Collection has been rescheduled by [email or web address] at 07/20/2023 08:01 Reason:   Contacting zainab for cultures per RN Alfred  Collection has been rescheduled by [email or web address] at 07/20/2023 08:41 Reason:   Nurse Draw    Blood culture [[id number]]  (Abnormal) Collected: 07/19/23 1230    Order Status: Completed Specimen: Blood Updated: 07/22/23 0643     Blood Culture, Routine Gram stain aer bottle: Gram positive cocci      Positive results previously called      ENTEROCOCCUS FAECIUM  For susceptibility see order #[id number]      Blood culture [[id number]]  (Abnormal) Collected: 07/19/23 0002    Order Status: Completed Specimen: Blood Updated: 07/22/23 0643     Blood Culture, Routine Gram stain aer bottle: Gram positive cocci      Gram stain ez bottle: Gram positive cocci      Positive results previously called      ENTEROCOCCUS FAECIUM  For susceptibility see order #[id number]      Blood culture [[id number]]  (Abnormal) Collected: 07/19/23 0030    Order Status: Completed Specimen: Blood from Peripheral, Left Hand Updated: 07/22/23 0643     Blood Culture, Routine Gram stain aer bottle: Gram positive cocci      Gram stain ez bottle: Gram positive cocci      Results called to  and read back by:Carmela Mcintosh RN-3ICU;      07/19/2023  16:23 CJD      ENTEROCOCCUS FAECIUM  For susceptibility see order #[id number]      Narrative:      Collection has been rescheduled by KC9 at 07/19/2023 00:20 Reason:   Nurse Draw  Collection has been rescheduled by KC9 at 07/19/2023 00:20 Reason:   Nurse Draw    Blood culture [[id number]]  (Abnormal)  (Susceptibility) Collected: 07/18/23 1648    Order Status: Completed Specimen: Blood Updated: 07/22/23 0642     Blood Culture, Routine Gram stain aer bottle: Gram negative rods      Gram stain aer bottle: Gram positive cocci      Results called to and read back by: Lesvia Mcdowell RN MICU3.      07/19/2023  05:06 TGC      ESCHERICHIA COLI      ENTEROCOCCUS FAECIUM    Blood culture [[id number]] Collected: 07/20/23 0940    Order Status: Completed Specimen: Blood from Peripheral, Antecubital, Right Updated: 07/21/23 1232     Blood Culture, Routine No Growth to date      No Growth to date    Narrative:      Collection has been rescheduled by [email or web address] at 07/20/2023 06:04 Reason:   Unable to collect  Collection has been rescheduled by [email or web address] at 07/20/2023 08:01 Reason:   Contacting zainab for cultures per RN Alfred  Collection has been rescheduled by [email or web address] at 07/20/2023 08:41 Reason:   Nurse Draw  Collection has been rescheduled by [email or web address] at 07/20/2023 06:04 Reason:   Unable to collect  Collection has been rescheduled by [email or web address] at 07/20/2023 08:01 Reason:   Contacting zainab for cultures per RN Alfred  Collection has been rescheduled by [email or web address] at 07/20/2023 08:41 Reason:   Nurse Draw    Blood culture [[id number]]  (Abnormal) Collected: 07/18/23 1738    Order Status: Completed Specimen: Blood Updated: 07/21/23 0853     Blood Culture, Routine Gram stain aer bottle: Gram negative rods      Results called to and read back by:Lesvia Mcdowell RN MICU3.      07/19/2023  05:23 TGC      ESCHERICHIA COLI  For susceptibility see order #[id number]      Rapid Organism ID by PCR (from Blood culture)  [[id number]]  (Abnormal) Collected: 07/18/23 1648    Order Status: Completed Updated: 07/19/23 6518     Enterococcus faecalis Not Detected     Enterococcus faecium Detected     Listeria monocytogenes Not Detected     Staphylococcus spp. Not Detected     Staphylococcus aureus Not Detected     Staphylococcus epidermidis Not Detected     Staphylococcus lugdunensis Not Detected     Streptococcus species Not Detected     Streptococcus agalactiae Not Detected     Streptococcus pneumoniae Not Detected     Streptococcus pyogenes Not Detected     Acinetobacter calcoaceticus/baumannii complex Not Detected     Bacteroides fragilis Not Detected     Enterobacterales See species for ID     Enterobacter cloacae complex Not Detected     Escherichia coli Detected     Klebsiella aerogenes Not Detected     Klebsiella oxytoca Not Detected     Klebsiella pneumoniae group Not Detected     Proteus Not Detected     Salmonella sp Not Detected     Serratia marcescens Not Detected     Haemophilus influenzae Not Detected     Neisseria meningtidis Not Detected     Pseudomonas aeruginosa Not Detected     Stenotrophomonas maltophilia Not Detected     Candida albicans Not Detected     Candida auris Not Detected     Candida glabrata Not Detected     Candida krusei Not Detected     Candida parapsilosis Not Detected     Candida tropicalis Not Detected     Cryptococcus neoformans/gattii Not Detected     CTX-M (ESBL ) Not Detected     IMP (Carbapenem resistant) Not Detected     KPC resistance gene (Carbapenem resistant) Not Detected     mcr-1  Not Detected     mec A/C  Test not applicable     mec A/C and MREJ (MRSA) gene Test not applicable     NDM (Carbapenem resistant) Not Detected     OXA-48-like (Carbapenem resistant) Not Detected     van A/B (VRE gene) Not Detected     VIM (Carbapenem resistant) Not Detected            Imaging Reviewed:  CXR  Abdominal Us  CTA chest  CT abdomen/pelvis:  1.  Status post cholecystectomy with postsurgical  dilatation of the common bile duct and intrahepatic ducts. Should be correlated with bilirubin levels.   2.  Broad-based ventral abdominal wall hernia measures approximately 15 x 13 cm with herniated loops of large and small bowel. No evidence of obstruction.   3.  Exophytic hypoattenuating structure in the mid left kidney is felt to reflect a hemorrhagic cyst.      Cardiology: ECHO  The left ventricle is normal in size with mild concentric hypertrophy and mildly decreased systolic function.  Mild to moderate tricuspid regurgitation.  The estimated ejection fraction is 45%.  Grade I left ventricular diastolic dysfunction.  There is abnormal septal wall motion consistent with left bundle branch block.  Mild right ventricular enlargement with normal right ventricular systolic function.  Moderate left atrial enlargement.  Normal central venous pressure (3 mmHg).  The estimated PA systolic pressure is 40 mmHg.  There is mild pulmonary hypertension.       IMPRESSION & PLAN     Septic shock likely from polymicrobial bacteremia Enterococcus faecium and E coli (7/18-19--20) secondary to cholangitis s/p ERCP, sphincterotomy 7/19, history of prior pancreatitis secondary to gallstones   Lactic acid 5.9-->4.6-->3  Procal 54, positive  CRP 24  Baseline CPK 92-->245  Lipase 1363-->102, trending down     2.   Shock liver, AST 4913/1475-->371/602    3.   ANTHONY, cr 2.9    4.  PMHx: diabetes, hypertension, bariatric surgery, cholecystectomy, prior UTIs    5. Obesity, BMI 37.6 Kg/m2    Recommendations:  Daptomycin 800mg IV q48h, due to current crcl  Meropenem 1 g IV q.12, if crcl drops below 10ml/min, adjust to daily  Repeat blood cultures daily -- till clear  Taper down and off steroids??  Bacteremic  x 3 days : 18th -19th - 20th; source control on 19th--- even if we have a   source of bacteremia, I feel it is prudent to rule out endocarditis. ECHO,  possibly ERICA   Consider repeating CT scan abdomen/pelvis  if worsening     Monitor  urinary oupt and Creatinine    Monitor Platelet,   Check HIT.   If thrombocytopenia persists, will reconsider  Lovenox and Meropenem     Aspiration precautions   D/w ICU nursing, Dr Dempsey,        Medical Decision Making during this encounter was  [_] Low Complexity  [_] Moderate Complexity  [xx] High Complexity

## 2023-07-22 NOTE — PROGRESS NOTES
Pharmacist Renal Dose Adjustment Note    Chanel Cervantes is a 67 y.o. female being treated with the medication Enoxaparin.    Patient Data:    Vital Signs (Most Recent):  Temp: 98.3 °F (36.8 °C) (07/22/23 0722)  Pulse: (!) 59 (07/22/23 0935)  Resp: (!) 21 (07/22/23 0935)  BP: 105/60 (07/22/23 0737)  SpO2: 95 % (07/22/23 0935) Vital Signs (72h Range):  Temp:  [98.3 °F (36.8 °C)-101.3 °F (38.5 °C)]   Pulse:  []   Resp:  [0-40]   BP: ()/(45-92)   SpO2:  [87 %-100 %]   Arterial Line BP: ()/(8-120)      Recent Labs   Lab 07/20/23  0307 07/21/23  0331 07/22/23  0318   CREATININE 2.6* 2.9* 2.8*     Serum creatinine: 2.8 mg/dL (H) 07/22/23 0318  Estimated creatinine clearance: 24.8 mL/min (A)    Enoxaparin 40 mg subq every 24 hours will be changed to Enoxaparin 30 mg subq every 24 hours due to CrCl < 30 ml/min.    Pharmacist's Name: Antoinette Balbuena  Pharmacist's Extension: [phone]

## 2023-07-22 NOTE — ASSESSMENT & PLAN NOTE
Sludge ball removed via ERCP. Likely source of bacteremia.  -Continue meropenem and daptomycin, renally dosed; follow sensitivities  -GI following  -IV fluids  -Levophed infusion  - blood cultures:  Entercoccus faecium and E.coli  -Trend LFTs

## 2023-07-22 NOTE — PLAN OF CARE
Patient found lying in bed, on ventilator, on propofol at 25mcg. Reportedly no spontaneous awakening trial / breathing trial this morning due to concerns about dropping spo2 with bath and turning this morning.    With excessive drooling / oral secretions.    Still with general edema, worse to bilat arms.     Levophed has been off this morning and blood pressure has been stable map around 65-75, although when I turned her again to the right side her blood pressure dropped to map around 58 and had to restart levophed, at 0.02mcg (had changed to 8mg concentration).     She is doing ok on ventilator peak pressures around 24, breathing over the vent. just a small amount of white secretions per et tube. Vital AF at goal 35ml/hr, tolerating.     Belly is soft. There is a protuberant large hernia when she coughs.     Bowel sounds more active today than before. I had heard she had started to have a small bm overnight.    The bed is low and alarm on. Clinical alarms reviewed and armed. Monitor strips printed. Turning q2h, lines and extremities padded. Mepilex applied to bilat heels, sacrum, on waffle overlay. Placed in bilat wrist restraints, free from harm at this time. SCDs in place/on.    Will plan for ERICA tomorrow morning (7/23)    ...    Wanted to wake her to see how her mental status was progressing. So had propofol paused for about 40 minutes and she was moving all extremities, opening eyes but just looking around. Didn't follow commands or look at me, restless. Had to put her back to sleep because I had other obligations with my other patient, notified md. CHG oral care and chg pericare done.    For some reason her blood pressure seems to be lower while lying on right side. Have had levophed off for much of the day but occasionally while lying in a certain position have had to had it at around 0.01-0.02mcg.    Cbg accuchecks still high up to 310, insulin given q6h   fatigue

## 2023-07-22 NOTE — PROGRESS NOTES
INPATIENT NEPHROLOGY Progress Note  [facility]    Patient Name: Chanel Cervantes  Date: 07/22/2023    Reason for consultation: ANTHONY    Chief Complaint:   Chief Complaint   Patient presents with    Fall     Denies hitting head or LOC, hit right shoulder    Shortness of Breath    Back Pain       History of Present Illness:  Chanel Cervantes is a 67 y.o. female obese, BMI 37.6 kg/M2, with past medical history of diabetes, hypertension, bariatric surgery, cholecystectomy, prior UTIs, and prior pancreatitis secondary to gallstones. She presented with acute onset of back pain, radiating into bilateral upper quadrants, with associated shortness of breath.  She was admitted for septic shock, transferred to ICU for further management. She is s/p ERCP with evidence of the entire main bile duct was moderately dilated, with an obstruction from suspected sludge ball. A biliary sphincterotomy was performed. The biliary tree was swept. Hospital course complicated AMS requiring MV and and polymicrobial bacteremia, Enterococcus faecium and E coli. We are consulted for ANTHONY.    Echo:  The left ventricle is normal in size with mild concentric hypertrophy and mildly decreased systolic function.  Mild to moderate tricuspid regurgitation.  The estimated ejection fraction is 45%.  Grade I left ventricular diastolic dysfunction.  There is abnormal septal wall motion consistent with left bundle branch block.  Mild right ventricular enlargement with normal right ventricular systolic function.  Moderate left atrial enlargement.  Normal central venous pressure (3 mmHg).  The estimated PA systolic pressure is 40 mmHg.  There is mild pulmonary hypertension.       Interval History:  7/21- BP holding on levophed, FIO2 40%, UOP 1.7L  7/22- off pressors, VSS, FIO2 30%, UOP 1.3L, tolerating tube feeds, rise in WBC count is noted    Plan of Care:    Assessment:  Septic shock due biliary sludge with polymicrobial bacteremia  ANTHONY due to  sepsis/ischemic ATN  AMS on MV  Shock liver  Lactic acidosis  Demand ischemia (underlying systolic CHF/pulm HTN)  Anemia/Thrombocytopenia    Plan:    - off pressors- remains on IV steroids, BSA and IVFs- SCr is improved and she is nonoliguric- will follow trend- dose meds for CrCl < 20- no nsaids or IV contrast- continue schaefer- no acute RRT needs  - vent management per pulm  - LFTs improving  - lactate improving  - cards eval noted  - heme parameters stable    Thank you for allowing us to participate in this patient's care. We will continue to follow.    Vital Signs:  Temp Readings from Last 3 Encounters:   07/22/23 98.3 °F (36.8 °C) (Oral)   06/02/23 98 °F (36.7 °C) (Oral)   04/13/23 97.4 °F (36.3 °C)       Pulse Readings from Last 3 Encounters:   07/22/23 63   06/02/23 68   04/13/23 71       BP Readings from Last 3 Encounters:   07/22/23 114/62   06/02/23 (!) 120/59   04/13/23 (!) 146/70       Weight:  Wt Readings from Last 3 Encounters:   07/19/23 108.9 kg (239 lb 15.9 oz)   07/19/23 108.9 kg (240 lb 1.3 oz)   06/02/23 109.1 kg (240 lb 8 oz)       INS/OUTS:  I/O last 3 completed shifts:  In: 3686 [I.V.:2956.2; NG/GT:380; IV Piggyback:349.8]  Out: 2460 [Urine:2460]  I/O this shift:  In: -   Out: 155 [Urine:155]      Medications:  Scheduled Meds:   chlorhexidine  15 mL Mouth/Throat BID    DAPTOmycin (CUBICIN) IV (PEDS and ADULTS)  10 mg/kg (Adjusted) Intravenous Q48H    enoxparin  30 mg Subcutaneous Q24H    hydrocortisone sodium succinate  50 mg Intravenous Q12H    levothyroxine  75 mcg Oral Before breakfast    meropenem (MERREM) IVPB  1 g Intravenous Q12H    mupirocin   Nasal BID     Continuous Infusions:   NORepinephrine bitartrate-D5W      propofoL 25 mcg/kg/min (07/22/23 0922)     PRN Meds:.acetaminophen, albuterol-ipratropium, calcium gluconate IVPB, calcium gluconate IVPB, calcium gluconate IVPB, dextrose 50%, dextrose 50%, glucagon (human recombinant), glucose, glucose, HYDROmorphone, insulin aspart U-100,  lorazepam, magnesium sulfate IVPB, magnesium sulfate IVPB, naloxone, ondansetron, polyethylene glycol, potassium chloride in water **AND** potassium chloride in water **AND** potassium chloride in water, senna-docusate 8.6-50 mg, simethicone, sodium chloride 0.9%, sodium phosphate IVPB, sodium phosphate IVPB, sodium phosphate IVPB  No current facility-administered medications on file prior to encounter.     Current Outpatient Medications on File Prior to Encounter   Medication Sig Dispense Refill    amLODIPine (NORVASC) 2.5 MG tablet Take 1 tablet (2.5 mg total) by mouth once daily. 90 tablet 1    azelastine (ASTELIN) 137 mcg (0.1 %) nasal spray 1 spray (137 mcg total) by Nasal route 2 (two) times daily. 30 mL 5    calcium carbonate-vitamin D3 600 mg-62.5 mcg (2,500 unit) Cap calcium carbonate 600 mg-vitamin D3 62.5 mcg (2,500 unit) capsule   Take by oral route.      DULoxetine (CYMBALTA) 60 MG capsule Take 1 capsule (60 mg total) by mouth once daily. 90 capsule 1    empaglifloz-linaglip-metformin (TRIJARDY XR) 25-5-1,000 mg TBph Take 1 tablet by mouth once daily. 90 tablet 1    guanFACINE (TENEX) 2 MG tablet Take 1 tablet (2 mg total) by mouth nightly. 90 tablet 3    insulin degludec (TRESIBA FLEXTOUCH U-200) 200 unit/mL (3 mL) insulin pen Inject 76 Units into the skin once daily. 12 pen 3    iron-vitamin C 100-250 mg, ICAR-C, 100-250 mg Tab Take 1 tablet by mouth once daily. 30 each 8    levothyroxine (SYNTHROID) 75 MCG tablet Take 75 mcg by mouth before breakfast.      metFORMIN (GLUCOPHAGE) 1000 MG tablet Take 1,000 mg by mouth 2 (two) times daily with meals.      metOLazone (ZAROXOLYN) 5 MG tablet Take 1 tablet (5 mg total) by mouth once daily. 90 tablet 0    metoprolol succinate (TOPROL-XL) 25 MG 24 hr tablet Take 1 tablet (25 mg total) by mouth once daily. 90 tablet 1    multivitamin capsule Take 1 capsule by mouth once daily.      olmesartan (BENICAR) 40 MG tablet Take 1 tablet (40 mg total) by mouth once  "daily. 90 tablet 1    potassium chloride (KLOR-CON) 10 MEQ TbSR Take 1 tablet (10 mEq total) by mouth once daily. 90 tablet 1    pregabalin (LYRICA) 50 MG capsule Take 50 mg by mouth every evening.      rosuvastatin (CRESTOR) 40 MG Tab Take 1 tablet (40 mg total) by mouth every evening. 90 tablet 3    aspirin (ECOTRIN) 81 MG EC tablet Take 1 tablet (81 mg total) by mouth once daily. 30 tablet 0    blood sugar diagnostic Strp One Touch Ultra Blue Test strips, Use l strip to check glucose 4 times daily 100 each 11    blood-glucose meter kit Use as instructed 1 each 0    cyanocobalamin 1,000 mcg/mL injection Inject 1 mL (1,000 mcg total) into the skin every 30 days. 3 mL 4    lancets (ONETOUCH DELICA LANCETS) 33 gauge Misc USE 1  TO CHECK GLUCOSE 4 TIMES DAILY 100 each 3    magnesium oxide (MAG-OX) 400 mg (241.3 mg magnesium) tablet Take 1 tablet (400 mg total) by mouth once daily. 90 tablet 1    NYSTOP powder APPLY TO AFFECTED AREA AS NEEDED      prednisoLONE acetate (PRED FORTE) 1 % DrpS Place 1 drop into both eyes as needed.        Review of Systems:  On MV    Physical Exam:  /62   Pulse 63   Temp 98.3 °F (36.8 °C) (Oral)   Resp (!) 21   Ht 5' 7" (1.702 m)   Wt 108.9 kg (239 lb 15.9 oz)   SpO2 95%   BMI 37.60 kg/m²     General Appearance:    Ill   Head:    Normocephalic, atraumatic   Eyes:    Closed     Mouth:   Moist mucus membranes, no thrush or oral lesions, normal      dentition   Back:     No CVA tenderness   Lungs:     Coarse, on MV   Heart:    Regular rate and rhythm, no rub/gallop   Abdomen:     Soft, non-tender, non-distended guarding, no masses, no organomegaly   Extremities:   Warm and well perfused, distal pulses intact, no cyanosis, edematous   MSK:   No joint or muscle swelling, tenderness or deformity   Skin:   Skin color, texture, turgor normal, no rashes or lesions   Neurologic/Psychiatric:   Sedated     Results:  Lab Results   Component Value Date     07/22/2023    K 4.0 " 07/22/2023     07/22/2023    CO2 22 (L) 07/22/2023    BUN 64 (H) 07/22/2023    CREATININE 2.8 (H) 07/22/2023    CALCIUM 7.4 (L) 07/22/2023    ANIONGAP 10 07/22/2023    ESTGFRAFRICA >60.0 07/20/2022    EGFRNONAA >60.0 07/20/2022       Lab Results   Component Value Date    CALCIUM 7.4 (L) 07/22/2023    PHOS 3.8 07/22/2023       Recent Labs   Lab 07/22/23  0318   WBC 14.24*   RBC 3.70*   HGB 9.9*   HCT 30.1*   PLT 73*   MCV 81*   MCH 26.8*   MCHC 32.9         I have personally reviewed pertinent radiological imaging and reports.    I have spent > 35 minutes providing care for this patient for the above diagnoses. These services have included chart/data/imaging review, evaluation, exam, formulation of plan, , note preparation, and discussions with staff involved in this patient's care.    Rosalva Barros MD MPH  [facility]  [address] [place] [address]  [place] [id number]  [phone] (p)  [phone] (f)

## 2023-07-22 NOTE — PROGRESS NOTES
[facility] Medicine  Progress Note    Patient Name: Chanel Cervantes  MRN: [id number]  Patient Class: IP- Inpatient   Admission Date: 7/18/2023  Length of Stay: 2 days  Attending Physician: Harris Dempsey MD  Primary Care Provider: ARIC Almaguer        Subjective:     Principal Problem:Septic shock        HPI:  Chanel Cervantes is a 67 y.o. White female   With PMH of DM2, HTN, bariatric surgery,  Remote cholecystectomy,  Frequent UTIs with kidney stones,  who presents with back pain.  Admitted for pancreatitis with elevated LFTs     Pt is currently confused after receiving ativan  (ER gave it to calm her for CT scan)  History taken from family at bedside     Onset of back pain overnight  Located b/l lumbar region  Radiating to b/l upper quadrants of abdomen  Occurring intermittently  Progressively worsening  Severe, causes SOB when occurring     Initially pt thought pain was due to a fall yesterday  (Mechanical, tripped over a rug, no head trauma)  +nausea, no vomiting  +intermittent chills  They are not sure about objective fever     Has had similar abdominal pain previously  Per family, this has been attributed to her ongoing ventral wall hernia  They don't bring her to ER for this usually  However pain today was much more severe than usual      Overview/Hospital Course:  Chanel Cervantes is a 67 year old female with a past medical history of obesity, ventral hernia, DM, HTN, anemia, CAD, NIKOLAS, and hypothyroidism who presented with septic shock secondary to a possible cholangitis with E coli bacteremia. Vancomycin has been discontinued and she remains on meropenem. BP is also maintained on a Levophed infusion. GI was consulted and performed ERCP which showed biliary sludge with a sludge ball dilating the main duct which was removed; a biliary sphincterectomy was also performed. Repeat blood cultures are negative. She is also no IV fluids with LR at this time and is NPO as she is  encephalopathic. She also has an ANTHONY as well. She also has acute hypoxic respiratory failure for which she is on mask O2. She also has demand ischemia; TTE is pending and troponin is being trended. There is also likely shock liver superimposed on the hepatocellular injury brought on by the acute cholangitis. Pulmonary/Critical Care has also been consulted given the patient's medical acuity.      Interval History:  intubated yesterday.  Creatinine higher.  Levophed requirement coming down.  Having good urine output.    Review of Systems   Unable to perform ROS: Intubated   Objective:     Vital Signs (Most Recent):  Temp: 98.8 °F (37.1 °C) (07/21/23 1600)  Pulse: 77 (07/21/23 1913)  Resp: (!) 23 (07/21/23 1913)  BP: (!) 109/57 (07/21/23 1830)  SpO2: 97 % (07/21/23 1913) Vital Signs (24h Range):  Temp:  [98.4 °F (36.9 °C)-100.2 °F (37.9 °C)] 98.8 °F (37.1 °C)  Pulse:  [] 77  Resp:  [0-40] 23  SpO2:  [91 %-100 %] 97 %  BP: ()/(51-92) 109/57  Arterial Line BP: ()/() 115/53     Weight: 108.9 kg (239 lb 15.9 oz)  Body mass index is 37.6 kg/m².    Intake/Output Summary (Last 24 hours) at 7/21/2023 2038  Last data filed at 7/21/2023 1858  Gross per 24 hour   Intake 3010.45 ml   Output 1860 ml   Net 1150.45 ml         Physical Exam  Constitutional:       Interventions: She is sedated and intubated.   Eyes:      Conjunctiva/sclera:      Right eye: No exudate.     Left eye: No exudate.  Neck:      Vascular: No JVD.   Cardiovascular:      Rate and Rhythm: Normal rate and regular rhythm.   Pulmonary:      Effort: She is intubated.      Breath sounds: Decreased air movement present.   Abdominal:      General: Abdomen is flat. Bowel sounds are decreased. There is distension.      Hernia: A hernia is present. Hernia is present in the ventral area.   Skin:     General: Skin is warm and dry.           Significant Labs: All pertinent labs within the past 24 hours have been reviewed.    Significant Imaging: I  "have reviewed all pertinent imaging results/findings within the past 24 hours.      Assessment/Plan:      * Septic shock  Secondary to acute cholangitis  -continue IVAB  -cultures reviewed  -S/p ERCP; GI following  -Trend lactic acid  -Continue LR IV fluids and Levophed to keep MAP > 65    Antibiotics (From admission, onward)    Start     Stop Route Frequency Ordered    07/21/23 1400  DAPTOmycin (CUBICIN) 805 mg in sodium chloride 0.9% SolP 50 mL IVPB         -- IV Every 48 hours (non-standard times) 07/20/23 0749    07/19/23 1300  meropenem 1 g in sodium chloride 0.9 % 100 mL IVPB (ready to mix system)        Note to Pharmacy: Ht: 5' 7" (1.702 m)  Wt: 108.9 kg (240 lb)  Estimated Creatinine Clearance: 63.1 mL/min (based on SCr of 1.1 mg/dL).  Body mass index is 37.59 kg/m².    -- IV Every 12 hours (non-standard times) 07/19/23 0740              Gallstone pancreatitis  Present on hospital day 1.  Continue vasopressor support and crystalloid.      Encephalopathy, metabolic  Metabolic in setting of septic shock.  -assess mental status when off sedation  -Treat septic shock      Shock liver  -Treat cholangitis  -Trend LFTs  -Continue IV fluids and Levophed    Lab Results   Component Value Date     (H) 07/21/2023     (H) 07/21/2023    GGT 24 05/17/2018    ALKPHOS 319 (H) 07/21/2023    BILITOT 1.6 (H) 07/21/2023         ANTHONY (acute kidney injury)  In setting of septic shock.  -Continue Levophed and IV fluids  -Renally dose medications  -Avoid nephrotoxic agents  -Monitor UOP and electrolytes  -Trend creatinine  -nephrologist consulting    Cr has trended upward:  2.4 to 2.6 to 2.9.      Demand ischemia  History of CAD s/p CABG. No acute ST changes on EKG.  -Trend troponin  -Treat septic shock  -Telemetry  -cardiology consulting    Troponin I High Sensitivity   Date Value Ref Range Status   07/20/2023 2384.3 (HH) 0.0 - 14.9 pg/mL Final     Comment:     Troponin results differ between methods. Do not use "   results between Troponin methods interchangeably.    Alkaline Phospatase levels above 400 U/L may   cause false positive results.    Access hsTnI should not be used for patients taking   Asfotase anya (Strensiq).  Troponin critical result(s) called and verbal readback obtained   from Argentina Avila RN M3 by MS1 07/20/2023 04:54     07/19/2023 1148.1 (HH) 0.0 - 14.9 pg/mL Final     Comment:     Troponin results differ between methods. Do not use   results between Troponin methods interchangeably.    Alkaline Phospatase levels above 400 U/L may   cause false positive results.    Access hsTnI should not be used for patients taking   Asfotase anya (Strensiq).  Troponin critical result(s) called and verbal readback obtained from   Karan Maldonado RN M3  by MS1 07/19/2023 22:10     07/19/2023 648.1 (HH) 0.0 - 14.9 pg/mL Final     Comment:     Troponin results differ between methods. Do not use   results between Troponin methods interchangeably.    Alkaline Phospatase levels above 400 U/L may   cause false positive results.    Access hsTnI should not be used for patients taking   Asfotase anya (Strensiq).  Results confirmed, test repeated  Troponin critical result(s) repeated. Called and verbal readback   obtained from Carmela Mcintosh,ICU, RN.  by SLT1 07/19/2023 17:37         Acute hypoxemic respiratory failure  -worse  Required intubation on hospital day 3.  Pulmonologist consulting.  Get ABG's as needed.      Anemia  Stable.  -Trend Hgb with CBC    Hemoglobin   Date Value Ref Range Status   07/21/2023 10.7 (L) 12.0 - 16.0 g/dL Final   07/20/2023 11.3 (L) 12.0 - 16.0 g/dL Final           Obesity (BMI 30-39.9)  Body mass index is 37.6 kg/m². Morbid obesity complicates all aspects of disease management from diagnostic modalities to treatment.       Hypothyroidism  TSH unremarkable.  It's 0.470.  -Continue Synthroid      Acute obstructive cholangitis  Sludge ball removed via ERCP. Likely source of  bacteremia.  -Continue meropenem and daptomycin, renally dosed; follow sensitivities  -GI following  -IV fluids  -Levophed infusion  - blood cultures:  Entercoccus faecium and E.coli  -Trend LFTs      CAD (coronary artery disease)  -Hold home medications  -Telemetry      Hypokalemia  -Trend K.  Has improved.  -Replete K PRN  -Telemetry    Potassium   Date Value Ref Range Status   07/21/2023 4.2 3.5 - 5.1 mmol/L Final   07/20/2023 4.8 3.5 - 5.1 mmol/L Final         S/P bariatric surgery  Noted.      NIKOLAS on CPAP  -chronic condition.    Type 2 diabetes mellitus treated with insulin  Patient's FSGs are controlled on current medication regimen.  Last A1c reviewed-   Lab Results   Component Value Date    HGBA1C 6.9 (H) 07/18/2023     'Current correctional scale  Medium  Maintain anti-hyperglycemic dose as follows-   Antihyperglycemics (From admission, onward)    Start     Stop Route Frequency Ordered    07/19/23 1018  insulin aspart U-100 pen 1-10 Units         -- SubQ Every 6 hours PRN 07/19/23 0920        Hold Oral hypoglycemics while patient is in the hospital.    Benign essential hypertension  -Hold home medications in setting of shock        VTE Risk Mitigation (From admission, onward)         Ordered     enoxaparin injection 40 mg  Daily         07/18/23 1642     IP VTE HIGH RISK PATIENT  Once         07/18/23 1642     Place sequential compression device  Until discontinued         07/18/23 1642                Discharge Planning   LUIS ALFREDO:      Code Status: Full Code   Is the patient medically ready for discharge?:     Reason for patient still in hospital (select all that apply): Patient unstable, Patient trending condition, Laboratory test and Treatment  Discharge Plan A: Home                  Harris Dempsey MD  Department of Hospital Medicine   [facility]

## 2023-07-22 NOTE — ASSESSMENT & PLAN NOTE
In setting of septic shock.  -Continue Levophed and IV fluids  -Renally dose medications  -Avoid nephrotoxic agents  -Monitor UOP and electrolytes  -Trend creatinine  -nephrologist consulting    Cr has trended upward:  2.4 to 2.6 to 2.9.

## 2023-07-22 NOTE — ASSESSMENT & PLAN NOTE
Metabolic in setting of septic shock.  -assess mental status when off sedation  -Treat septic shock

## 2023-07-22 NOTE — ASSESSMENT & PLAN NOTE
History of CAD s/p CABG. No acute ST changes on EKG.  -Trend troponin  -Treat septic shock  -Telemetry  -cardiology consulting    Troponin I High Sensitivity   Date Value Ref Range Status   07/20/2023 2384.3 (HH) 0.0 - 14.9 pg/mL Final     Comment:     Troponin results differ between methods. Do not use   results between Troponin methods interchangeably.    Alkaline Phospatase levels above 400 U/L may   cause false positive results.    Access hsTnI should not be used for patients taking   Asfotase anya (Strensiq).  Troponin critical result(s) called and verbal readback obtained   from Argentina Avila RN M3 by MS1 07/20/2023 04:54     07/19/2023 1148.1 (HH) 0.0 - 14.9 pg/mL Final     Comment:     Troponin results differ between methods. Do not use   results between Troponin methods interchangeably.    Alkaline Phospatase levels above 400 U/L may   cause false positive results.    Access hsTnI should not be used for patients taking   Asfotase anya (Strensiq).  Troponin critical result(s) called and verbal readback obtained from   Karan Maldonado RN M3  by MS1 07/19/2023 22:10     07/19/2023 648.1 (HH) 0.0 - 14.9 pg/mL Final     Comment:     Troponin results differ between methods. Do not use   results between Troponin methods interchangeably.    Alkaline Phospatase levels above 400 U/L may   cause false positive results.    Access hsTnI should not be used for patients taking   Asfotase anya (Strensiq).  Results confirmed, test repeated  Troponin critical result(s) repeated. Called and verbal readback   obtained from ISABEL Smith, RN.  by [facility] 07/19/2023 17:37

## 2023-07-22 NOTE — PROGRESS NOTES
Pulmonary/Critical Care Progress Note      PATIENT NAME: Chanel Cervantes  MRN: [id number]  TODAY'S DATE: 2023  8:19 AM  ADMIT DATE: 2023  AGE: 67 y.o. : 1955    CONSULT REQUESTED BY: Harris Dempsey MD    REASON FOR CONSULT:   Critical care management    HPI:  The patient is a 67-year-old female who was brought to the hospital for back pain.  She was having bilateral back pain and upper abdominal pain that was not controlled with ibuprofen and came it.  She was found to have dilated common bile duct and elevated LFTs.  She is in shock requiring Levophed.  She is bacteremic with E coli and E faecium.  Her ERCP released sludge from the common bile duct. She is very encephalopathic.     the patient has an agitated delirium this morning.  She appears very uncomfortable.  She is tachypneic.  Her pressor requirements are increasing, her urine output is decreasing.    - pt was intubated yesterday due to encephalopathy and continues on ventilator, sedated. Her pressor requirement is improved. Tmax 100.2 this am.  Daughter at bedside states usually pt is independent and has never been sick like this before.    - continues on vent, sedated, off pressor. Tmax 100 yesterday     REVIEW OF SYSTEMS  Unobtainable.    No change in the patient's Past Medical History, Past Surgical History, Social History or Family History since admission.    VITAL SIGNS (MOST RECENT)  Temp: 98.3 °F (36.8 °C) (23 0722)  Pulse: 63 (23 0945)  Resp: (!) 21 (23 0945)  BP: 114/62 (23 0930)  SpO2: 95 % (23 0945)    INTAKE AND OUTPUT (LAST 24 HOURS):  Intake/Output Summary (Last 24 hours) at 2023 1022  Last data filed at 2023 0923  Gross per 24 hour   Intake 1765.25 ml   Output 1305 ml   Net 460.25 ml         WEIGHT  Wt Readings from Last 1 Encounters:   23 108.9 kg (239 lb 15.9 oz)       PHYSICAL EXAM  GENERAL: Older patient sedated and intubated  HEENT: Pupils equal and reactive.  Nose intact. Pharynx intubated  NECK: Supple. L IJ triple lumen catheter.  HEART: Regular rate and rhythm. No murmur or gallop auscultated.  LUNGS:  clear/diminished bilaterally anteriorly  ABDOMEN: Bowel sounds diminished Very large ventral hernia.  Very obese, soft  : Normal anatomy.Waller with a little urine.  EXTREMITIES: Normal muscle tone and joint movement, no cyanosis or clubbing. Scar at L shoulder.   There is a radial arterial catheter on the right  LYMPHATICS: No adenopathy palpated, 1+ pitting edema bilat upper ext  SKIN: Dry, intact, no lesions.   NEURO: sedated  PSYCH: unable to assess      CBC LAST (LAST 24 HOURS)  Recent Labs   Lab 07/22/23  0318   WBC 14.24*   RBC 3.70*   HGB 9.9*   HCT 30.1*   MCV 81*   MCH 26.8*   MCHC 32.9   RDW 15.9*   PLT 73*   MPV 11.9   GRAN 88.9*  12.7*   LYMPH 3.1*  0.4*   MONO 5.1  0.7   BASO 0.06   NRBC 0         CHEMISTRY LAST (LAST 24 HOURS)  Recent Labs   Lab 07/22/23  0315 07/22/23  0318   NA  --  140   K  --  4.0   CL  --  108   CO2  --  22*   ANIONGAP  --  10   BUN  --  64*   CREATININE  --  2.8*   GLU  --  242*   CALCIUM  --  7.4*   PH 7.399  --    MG  --  2.5   ALBUMIN  --  1.8*   PROT  --  4.5*   ALKPHOS  --  286*   ALT  --  321*   AST  --  112*   BILITOT  --  1.3*           CARDIAC PROFILE (LAST 24 HOURS)  Recent Labs   Lab 07/18/23  1220 07/18/23  2204 07/20/23  0307   BNP 20  --   --    CPK 92  --  245*   TROPONINIHS 14.8   < > 2384.3*    < > = values in this interval not displayed.         LAST 7 DAYS MICROBIOLOGY   Microbiology Results (last 7 days)       Procedure Component Value Units Date/Time    Culture, Respiratory with Gram Stain [[id number]] Collected: 07/20/23 1003    Order Status: Completed Specimen: Respiratory from Endotracheal Aspirate Updated: 07/22/23 0736     Respiratory Culture No growth      No normal respiratory shay     Gram Stain (Respiratory) <10 epithelial cells per low power field.     Gram Stain (Respiratory) Few WBC's     Gram  Stain (Respiratory) No organisms seen    Urine Culture High Risk [[id number]] Collected: 07/19/23 1006    Order Status: Completed Specimen: Urine, Clean Catch Updated: 07/22/23 0710     Urine Culture, Routine No growth    Narrative:      Indicated criteria for high risk culture:->Other  Other (specify):->e coli bacteremia    Blood culture [[id number]]  (Abnormal) Collected: 07/20/23 0940    Order Status: Completed Specimen: Blood from Line, Arterial, Right Updated: 07/22/23 0644     Blood Culture, Routine Gram stain aer bottle: Gram positive cocci      Positive results previously called      ENTEROCOCCUS FAECIUM  For susceptibility see order #[id number]      Narrative:      Collection has been rescheduled by [email or web address] at 07/20/2023 06:04 Reason:   Unable to collect  Collection has been rescheduled by [email or web address] at 07/20/2023 08:01 Reason:   Contacting zainab for cultures per RN Alfred  Collection has been rescheduled by [email or web address] at 07/20/2023 08:41 Reason:   Nurse Draw  Collection has been rescheduled by [email or web address] at 07/20/2023 06:04 Reason:   Unable to collect  Collection has been rescheduled by [email or web address] at 07/20/2023 08:01 Reason:   Contacting zainab for cultures per RN Alfred  Collection has been rescheduled by [email or web address] at 07/20/2023 08:41 Reason:   Nurse Draw    Blood culture [[id number]]  (Abnormal) Collected: 07/19/23 1230    Order Status: Completed Specimen: Blood Updated: 07/22/23 0643     Blood Culture, Routine Gram stain aer bottle: Gram positive cocci      Positive results previously called      ENTEROCOCCUS FAECIUM  For susceptibility see order #[id number]      Blood culture [[id number]]  (Abnormal) Collected: 07/19/23 0002    Order Status: Completed Specimen: Blood Updated: 07/22/23 0643     Blood Culture, Routine Gram stain aer bottle: Gram positive cocci      Gram stain ez bottle: Gram positive cocci      Positive results previously called      ENTEROCOCCUS FAECIUM  For susceptibility see order #[id number]      Blood culture [[id number]]   (Abnormal) Collected: 07/19/23 0030    Order Status: Completed Specimen: Blood from Peripheral, Left Hand Updated: 07/22/23 0643     Blood Culture, Routine Gram stain aer bottle: Gram positive cocci      Gram stain ez bottle: Gram positive cocci      Results called to and read back by:Carmela Mcintosh RN-3ICU;      07/19/2023  16:23 CJD      ENTEROCOCCUS FAECIUM  For susceptibility see order #[id number]      Narrative:      Collection has been rescheduled by KC9 at 07/19/2023 00:20 Reason:   Nurse Draw  Collection has been rescheduled by KC9 at 07/19/2023 00:20 Reason:   Nurse Draw    Blood culture [[id number]]  (Abnormal)  (Susceptibility) Collected: 07/18/23 1648    Order Status: Completed Specimen: Blood Updated: 07/22/23 0642     Blood Culture, Routine Gram stain aer bottle: Gram negative rods      Gram stain aer bottle: Gram positive cocci      Results called to and read back by: Lesvia Mcdowell RN MICU3.      07/19/2023  05:06 TGC      ESCHERICHIA COLI      ENTEROCOCCUS FAECIUM    Blood culture [[id number]] Collected: 07/20/23 0940    Order Status: Completed Specimen: Blood from Peripheral, Antecubital, Right Updated: 07/21/23 1232     Blood Culture, Routine No Growth to date      No Growth to date    Narrative:      Collection has been rescheduled by [email or web address] at 07/20/2023 06:04 Reason:   Unable to collect  Collection has been rescheduled by [email or web address] at 07/20/2023 08:01 Reason:   Contacting zainab for cultures per RN Alfred  Collection has been rescheduled by [email or web address] at 07/20/2023 08:41 Reason:   Nurse Draw  Collection has been rescheduled by [email or web address] at 07/20/2023 06:04 Reason:   Unable to collect  Collection has been rescheduled by [email or web address] at 07/20/2023 08:01 Reason:   Contacting zainab for cultures per RN Alfred  Collection has been rescheduled by [email or web address] at 07/20/2023 08:41 Reason:   Nurse Draw    Blood culture [[id number]]  (Abnormal) Collected: 07/18/23 1738    Order Status: Completed Specimen: Blood Updated: 07/21/23 0853     Blood  Culture, Routine Gram stain aer bottle: Gram negative rods      Results called to and read back by:Lesvia Mcdowell RN MICU3.      07/19/2023  05:23 TGC      ESCHERICHIA COLI  For susceptibility see order #[id number]      Rapid Organism ID by PCR (from Blood culture) [[id number]]  (Abnormal) Collected: 07/18/23 1648    Order Status: Completed Updated: 07/19/23 0526     Enterococcus faecalis Not Detected     Enterococcus faecium Detected     Listeria monocytogenes Not Detected     Staphylococcus spp. Not Detected     Staphylococcus aureus Not Detected     Staphylococcus epidermidis Not Detected     Staphylococcus lugdunensis Not Detected     Streptococcus species Not Detected     Streptococcus agalactiae Not Detected     Streptococcus pneumoniae Not Detected     Streptococcus pyogenes Not Detected     Acinetobacter calcoaceticus/baumannii complex Not Detected     Bacteroides fragilis Not Detected     Enterobacterales See species for ID     Enterobacter cloacae complex Not Detected     Escherichia coli Detected     Klebsiella aerogenes Not Detected     Klebsiella oxytoca Not Detected     Klebsiella pneumoniae group Not Detected     Proteus Not Detected     Salmonella sp Not Detected     Serratia marcescens Not Detected     Haemophilus influenzae Not Detected     Neisseria meningtidis Not Detected     Pseudomonas aeruginosa Not Detected     Stenotrophomonas maltophilia Not Detected     Candida albicans Not Detected     Candida auris Not Detected     Candida glabrata Not Detected     Candida krusei Not Detected     Candida parapsilosis Not Detected     Candida tropicalis Not Detected     Cryptococcus neoformans/gattii Not Detected     CTX-M (ESBL ) Not Detected     IMP (Carbapenem resistant) Not Detected     KPC resistance gene (Carbapenem resistant) Not Detected     mcr-1  Not Detected     mec A/C  Test not applicable     mec A/C and MREJ (MRSA) gene Test not applicable     NDM (Carbapenem resistant) Not Detected      OXA-48-like (Carbapenem resistant) Not Detected     van A/B (VRE gene) Not Detected     VIM (Carbapenem resistant) Not Detected            MOST RECENT IMAGING  X-Ray Chest AP Portable  Portable chest x-ray at 6:00 AM is compared to prior study 7/21/2023    Clinical history is respiratory distress, ventilator    The endotracheal tube, NG tube and left IJ catheter in stable position.    The heart is enlarged. There is hypoinflation there is mild prominence of the pulmonary vasculature suggestive of congestion. There are faint groundglass opacities in the lung bases. There are no confluent infiltrates or large pleural effusion.    There is a reverse right shoulder arthroplasty    IMPRESSION: Hypoinflation with mild prominence of the pulmonary vasculature and faint groundglass opacity in the lung bases suggestive of pulmonary edema    Electronically signed by:  Jane Callahan MD  7/22/2023 8:46 AM CDT Workstation: [id number]      CURRENT VISIT EKG  Results for orders placed or performed during the hospital encounter of 07/18/23   EKG 12-lead    Narrative    Test Reason : W19.XXXA,    Vent. Rate : 073 BPM     Atrial Rate : 073 BPM     P-R Int : 154 ms          QRS Dur : 162 ms      QT Int : 426 ms       P-R-T Axes : 069 -17 -11 degrees     QTc Int : 469 ms    Sinus rhythm with Premature ventricular complexes or Fusion complexes  Right bundle branch block  Minimal voltage criteria for LVH, may be normal variant ( R in aVL )  Abnormal ECG  When compared with ECG of 07-AUG-2021 22:34,  Fusion complexes are now Present  Premature ventricular complexes are now Present    Referred By: [email or web address]   SELF           Confirmed By:        ECHOCARDIOGRAM RESULTS  No results found for this or any previous visit.        Oxygen INFORMATION  Vent Mode: A/C  Oxygen Concentration (%):  [30-40] 30  Resp Rate Total:  [20 br/min-26 br/min] 21 br/min  Vt Set:  [450 mL] 450 mL  PEEP/CPAP:  [5 cmH20] 5 cmH20  Mean Airway Pressure:  [9.1  qbM55-91 cmH20] 10 wfT833 liters           LAST ARTERIAL BLOOD GAS  ABG  Recent Labs   Lab 07/22/23  0315   PH 7.399   PO2 102*   PCO2 38.0   HCO3 23.5*   BE -1         IMPRESSION AND PLAN  Severe sepsis with shock, biliary source, with biliary source and possible pancreatitis  - bacteremia with E coli and E faecium  - sludge released from ERCP  - antibiotics per ID  - levophed as needed  - stress dose hydrocortisone wean to 50mg q12h    Shock liver  - monitor LFTs- downtrending    Morbid obesity  moderate hypoalbuminemia  - start trophic tube feeds via gastric tube    Anemia  - watch H/H    Thrombocytopenia, may be due to sepsis vs meds  - HIT panel pending    Leukocytosis likely due to steroid    Pulmonary edema  - persisting  - will have to tolerate as patient needs volume    Systolic heart failure  Diastolic heart failure  Pulmonary hypertension  Demand ischemia  - appreciate cardiology recs    Acute encephalopathy, metabolic  - intubated for airway protection  - continue vent support  - daily SAT/SBT - will proceed with this today    - d/w ID, cardiology, bedside RN. ID wants ERICA- Dr. Pratt says will be done Sunday.    Critical care time spent reviewing the chart, examining the patient, reviewing the labs, reviewing the radiological findings, discussing care with nursing, physicians, and respiratory and creating the note and  has been greater than 35 minutes.      Ailyn Bruner MD  Date of Service: 07/22/2023  8:19 AM

## 2023-07-22 NOTE — ASSESSMENT & PLAN NOTE
Present on hospital day 1.  Continue vasopressor support and crystalloid.     he patient is a 57y Male complaining of pain, knee, multiple surgeries to knee.

## 2023-07-22 NOTE — ASSESSMENT & PLAN NOTE
-Treat cholangitis  -Trend LFTs  -Continue IV fluids and Levophed    Lab Results   Component Value Date     (H) 07/21/2023     (H) 07/21/2023    GGT 24 05/17/2018    ALKPHOS 319 (H) 07/21/2023    BILITOT 1.6 (H) 07/21/2023

## 2023-07-23 ENCOUNTER — CLINICAL SUPPORT (OUTPATIENT)
Dept: CARDIOLOGY | Facility: HOSPITAL | Age: 68
DRG: 870 | End: 2023-07-23
Attending: SPECIALIST
Payer: MEDICARE

## 2023-07-23 LAB
ALBUMIN SERPL BCP-MCNC: 1.8 G/DL (ref 3.5–5.2)
ALLENS TEST: ABNORMAL
ALLENS TEST: ABNORMAL
ALP SERPL-CCNC: 256 U/L (ref 55–135)
ALT SERPL W/O P-5'-P-CCNC: 194 U/L (ref 10–44)
ANION GAP SERPL CALC-SCNC: 9 MMOL/L (ref 8–16)
AST SERPL-CCNC: 38 U/L (ref 10–40)
BASOPHILS # BLD AUTO: 0.02 K/UL (ref 0–0.2)
BASOPHILS NFR BLD: 0.2 % (ref 0–1.9)
BILIRUB SERPL-MCNC: 1 MG/DL (ref 0.1–1)
BSA FOR ECHO PROCEDURE: 2.27 M2
BUN SERPL-MCNC: 78 MG/DL (ref 8–23)
CALCIUM SERPL-MCNC: 7.6 MG/DL (ref 8.7–10.5)
CHLORIDE SERPL-SCNC: 110 MMOL/L (ref 95–110)
CO2 SERPL-SCNC: 23 MMOL/L (ref 23–29)
CREAT SERPL-MCNC: 2.8 MG/DL (ref 0.5–1.4)
DELSYS: ABNORMAL
DELSYS: ABNORMAL
DIFFERENTIAL METHOD: ABNORMAL
EJECTION FRACTION: 60 %
EOSINOPHIL # BLD AUTO: 0 K/UL (ref 0–0.5)
EOSINOPHIL NFR BLD: 0.2 % (ref 0–8)
ERYTHROCYTE [DISTWIDTH] IN BLOOD BY AUTOMATED COUNT: 15.9 % (ref 11.5–14.5)
ERYTHROCYTE [SEDIMENTATION RATE] IN BLOOD BY WESTERGREN METHOD: 20 MM/H
EST. GFR  (NO RACE VARIABLE): 17.9 ML/MIN/1.73 M^2
FIO2: 30
FIO2: 30
GLUCOSE SERPL-MCNC: 256 MG/DL (ref 70–110)
GLUCOSE SERPL-MCNC: 258 MG/DL (ref 70–110)
GLUCOSE SERPL-MCNC: 282 MG/DL (ref 70–110)
GLUCOSE SERPL-MCNC: 305 MG/DL (ref 70–110)
GLUCOSE SERPL-MCNC: 310 MG/DL (ref 70–110)
HCO3 UR-SCNC: 22.6 MMOL/L (ref 24–28)
HCO3 UR-SCNC: 26.1 MMOL/L (ref 24–28)
HCT VFR BLD AUTO: 31.2 % (ref 37–48.5)
HCT VFR BLD CALC: 29 %PCV (ref 36–54)
HGB BLD-MCNC: 10.5 G/DL (ref 12–16)
IMM GRANULOCYTES # BLD AUTO: 0.18 K/UL (ref 0–0.04)
IMM GRANULOCYTES NFR BLD AUTO: 1.4 % (ref 0–0.5)
LYMPHOCYTES # BLD AUTO: 0.7 K/UL (ref 1–4.8)
LYMPHOCYTES NFR BLD: 4.9 % (ref 18–48)
MAGNESIUM SERPL-MCNC: 2.7 MG/DL (ref 1.6–2.6)
MCH RBC QN AUTO: 27.1 PG (ref 27–31)
MCHC RBC AUTO-ENTMCNC: 33.7 G/DL (ref 32–36)
MCV RBC AUTO: 80 FL (ref 82–98)
MODE: ABNORMAL
MODE: ABNORMAL
MONOCYTES # BLD AUTO: 0.9 K/UL (ref 0.3–1)
MONOCYTES NFR BLD: 6.7 % (ref 4–15)
NEUTROPHILS # BLD AUTO: 11.4 K/UL (ref 1.8–7.7)
NEUTROPHILS NFR BLD: 86.6 % (ref 38–73)
NRBC BLD-RTO: 0 /100 WBC
PCO2 BLDA: 36.7 MMHG (ref 35–45)
PCO2 BLDA: 39 MMHG (ref 35–45)
PEEP: 5
PEEP: 5
PH SMN: 7.4 [PH] (ref 7.35–7.45)
PH SMN: 7.43 [PH] (ref 7.35–7.45)
PHOSPHATE SERPL-MCNC: 3.8 MG/DL (ref 2.7–4.5)
PLATELET # BLD AUTO: 80 K/UL (ref 150–450)
PMV BLD AUTO: 12.7 FL (ref 9.2–12.9)
PO2 BLDA: 78 MMHG (ref 80–100)
PO2 BLDA: 79 MMHG (ref 80–100)
POC BE: -2 MMOL/L
POC BE: 2 MMOL/L
POC IONIZED CALCIUM: 1.12 MMOL/L (ref 1.06–1.42)
POC SATURATED O2: 95 % (ref 95–100)
POC SATURATED O2: 96 % (ref 95–100)
POC TCO2: 24 MMOL/L (ref 23–27)
POC TCO2: 27 MMOL/L (ref 23–27)
POTASSIUM BLD-SCNC: 3.8 MMOL/L (ref 3.5–5.1)
POTASSIUM SERPL-SCNC: 3.8 MMOL/L (ref 3.5–5.1)
PROT SERPL-MCNC: 4.6 G/DL (ref 6–8.4)
PS: 8
RBC # BLD AUTO: 3.88 M/UL (ref 4–5.4)
SAMPLE: ABNORMAL
SAMPLE: ABNORMAL
SITE: ABNORMAL
SITE: ABNORMAL
SODIUM BLD-SCNC: 143 MMOL/L (ref 136–145)
SODIUM SERPL-SCNC: 142 MMOL/L (ref 136–145)
VT: 450
WBC # BLD AUTO: 13.19 K/UL (ref 3.9–12.7)

## 2023-07-23 PROCEDURE — 84132 ASSAY OF SERUM POTASSIUM: CPT

## 2023-07-23 PROCEDURE — 63600175 PHARM REV CODE 636 W HCPCS: Performed by: STUDENT IN AN ORGANIZED HEALTH CARE EDUCATION/TRAINING PROGRAM

## 2023-07-23 PROCEDURE — 27000221 HC OXYGEN, UP TO 24 HOURS

## 2023-07-23 PROCEDURE — 82330 ASSAY OF CALCIUM: CPT

## 2023-07-23 PROCEDURE — 99291 CRITICAL CARE FIRST HOUR: CPT | Mod: ,,, | Performed by: INTERNAL MEDICINE

## 2023-07-23 PROCEDURE — 37799 UNLISTED PX VASCULAR SURGERY: CPT

## 2023-07-23 PROCEDURE — 93312 ECHO TRANSESOPHAGEAL: CPT | Mod: 26,,, | Performed by: SPECIALIST

## 2023-07-23 PROCEDURE — 63600175 PHARM REV CODE 636 W HCPCS: Performed by: FAMILY MEDICINE

## 2023-07-23 PROCEDURE — 20000000 HC ICU ROOM

## 2023-07-23 PROCEDURE — 84100 ASSAY OF PHOSPHORUS: CPT | Performed by: STUDENT IN AN ORGANIZED HEALTH CARE EDUCATION/TRAINING PROGRAM

## 2023-07-23 PROCEDURE — 94003 VENT MGMT INPAT SUBQ DAY: CPT

## 2023-07-23 PROCEDURE — 25000003 PHARM REV CODE 250: Performed by: INTERNAL MEDICINE

## 2023-07-23 PROCEDURE — 99900031 HC PATIENT EDUCATION (STAT)

## 2023-07-23 PROCEDURE — 82803 BLOOD GASES ANY COMBINATION: CPT

## 2023-07-23 PROCEDURE — 99900026 HC AIRWAY MAINTENANCE (STAT)

## 2023-07-23 PROCEDURE — 94761 N-INVAS EAR/PLS OXIMETRY MLT: CPT

## 2023-07-23 PROCEDURE — 83735 ASSAY OF MAGNESIUM: CPT | Performed by: FAMILY MEDICINE

## 2023-07-23 PROCEDURE — 63600175 PHARM REV CODE 636 W HCPCS: Performed by: INTERNAL MEDICINE

## 2023-07-23 PROCEDURE — 84295 ASSAY OF SERUM SODIUM: CPT

## 2023-07-23 PROCEDURE — 99900035 HC TECH TIME PER 15 MIN (STAT)

## 2023-07-23 PROCEDURE — 36415 COLL VENOUS BLD VENIPUNCTURE: CPT | Performed by: INTERNAL MEDICINE

## 2023-07-23 PROCEDURE — 99291 PR CRITICAL CARE, E/M 30-74 MINUTES: ICD-10-PCS | Mod: ,,, | Performed by: INTERNAL MEDICINE

## 2023-07-23 PROCEDURE — 93320 TRANSESOPHAGEAL ECHO (TEE) (CUPID ONLY): ICD-10-PCS | Mod: 26,,, | Performed by: SPECIALIST

## 2023-07-23 PROCEDURE — 93325 DOPPLER ECHO COLOR FLOW MAPG: CPT | Mod: 26,,, | Performed by: SPECIALIST

## 2023-07-23 PROCEDURE — 85025 COMPLETE CBC W/AUTO DIFF WBC: CPT | Performed by: FAMILY MEDICINE

## 2023-07-23 PROCEDURE — 99233 PR SUBSEQUENT HOSPITAL CARE,LEVL III: ICD-10-PCS | Mod: ,,, | Performed by: INTERNAL MEDICINE

## 2023-07-23 PROCEDURE — 85014 HEMATOCRIT: CPT

## 2023-07-23 PROCEDURE — 93312 TRANSESOPHAGEAL ECHO (TEE) (CUPID ONLY): ICD-10-PCS | Mod: 26,,, | Performed by: SPECIALIST

## 2023-07-23 PROCEDURE — 25000003 PHARM REV CODE 250: Performed by: STUDENT IN AN ORGANIZED HEALTH CARE EDUCATION/TRAINING PROGRAM

## 2023-07-23 PROCEDURE — 80053 COMPREHEN METABOLIC PANEL: CPT | Performed by: FAMILY MEDICINE

## 2023-07-23 PROCEDURE — 87040 BLOOD CULTURE FOR BACTERIA: CPT | Performed by: INTERNAL MEDICINE

## 2023-07-23 PROCEDURE — 25000003 PHARM REV CODE 250: Performed by: FAMILY MEDICINE

## 2023-07-23 PROCEDURE — 93320 DOPPLER ECHO COMPLETE: CPT | Mod: 26,,, | Performed by: SPECIALIST

## 2023-07-23 PROCEDURE — 93325 DOPPLER ECHO COLOR FLOW MAPG: CPT

## 2023-07-23 PROCEDURE — 93325 TRANSESOPHAGEAL ECHO (TEE) (CUPID ONLY): ICD-10-PCS | Mod: 26,,, | Performed by: SPECIALIST

## 2023-07-23 PROCEDURE — 99233 SBSQ HOSP IP/OBS HIGH 50: CPT | Mod: ,,, | Performed by: INTERNAL MEDICINE

## 2023-07-23 PROCEDURE — 63600175 PHARM REV CODE 636 W HCPCS: Performed by: HOSPITALIST

## 2023-07-23 PROCEDURE — 63600175 PHARM REV CODE 636 W HCPCS

## 2023-07-23 RX ORDER — ATROPINE SULFATE 0.1 MG/ML
INJECTION INTRAVENOUS
Status: COMPLETED
Start: 2023-07-23 | End: 2023-07-23

## 2023-07-23 RX ORDER — DEXMEDETOMIDINE HYDROCHLORIDE 4 UG/ML
0-1.4 INJECTION, SOLUTION INTRAVENOUS CONTINUOUS
Status: DISCONTINUED | OUTPATIENT
Start: 2023-07-23 | End: 2023-07-24

## 2023-07-23 RX ORDER — ATROPINE SULFATE 0.1 MG/ML
0.5 INJECTION INTRAVENOUS ONCE
Status: COMPLETED | OUTPATIENT
Start: 2023-07-23 | End: 2023-07-23

## 2023-07-23 RX ORDER — CARBOXYMETHYLCELLULOSE SODIUM 5 MG/ML
1 SOLUTION/ DROPS OPHTHALMIC 3 TIMES DAILY PRN
Status: DISCONTINUED | OUTPATIENT
Start: 2023-07-23 | End: 2023-08-03 | Stop reason: HOSPADM

## 2023-07-23 RX ADMIN — INSULIN ASPART 6 UNITS: 100 INJECTION, SOLUTION INTRAVENOUS; SUBCUTANEOUS at 05:07

## 2023-07-23 RX ADMIN — HYDROMORPHONE HYDROCHLORIDE 2 MG: 1 INJECTION, SOLUTION INTRAMUSCULAR; INTRAVENOUS; SUBCUTANEOUS at 07:07

## 2023-07-23 RX ADMIN — MEROPENEM 1 G: 1 INJECTION, POWDER, FOR SOLUTION INTRAVENOUS at 01:07

## 2023-07-23 RX ADMIN — CHLORHEXIDINE GLUCONATE 15 ML: 1.2 RINSE ORAL at 09:07

## 2023-07-23 RX ADMIN — MUPIROCIN 1 G: 20 OINTMENT TOPICAL at 09:07

## 2023-07-23 RX ADMIN — MUPIROCIN 1 G: 20 OINTMENT TOPICAL at 08:07

## 2023-07-23 RX ADMIN — CHLORHEXIDINE GLUCONATE 15 ML: 1.2 RINSE ORAL at 08:07

## 2023-07-23 RX ADMIN — ENOXAPARIN SODIUM 30 MG: 30 INJECTION SUBCUTANEOUS at 04:07

## 2023-07-23 RX ADMIN — INSULIN ASPART 6 UNITS: 100 INJECTION, SOLUTION INTRAVENOUS; SUBCUTANEOUS at 06:07

## 2023-07-23 RX ADMIN — PROPOFOL 20 MCG/KG/MIN: 10 INJECTION, EMULSION INTRAVENOUS at 05:07

## 2023-07-23 RX ADMIN — LEVOTHYROXINE SODIUM 75 MCG: 0.03 TABLET ORAL at 06:07

## 2023-07-23 RX ADMIN — DAPTOMYCIN 805 MG: 500 INJECTION, POWDER, LYOPHILIZED, FOR SOLUTION INTRAVENOUS at 02:07

## 2023-07-23 RX ADMIN — POTASSIUM CHLORIDE 40 MEQ: 29.8 INJECTION, SOLUTION INTRAVENOUS at 11:07

## 2023-07-23 RX ADMIN — ACETAMINOPHEN 650 MG: 325 TABLET ORAL at 05:07

## 2023-07-23 RX ADMIN — MEROPENEM 1 G: 1 INJECTION, POWDER, FOR SOLUTION INTRAVENOUS at 12:07

## 2023-07-23 RX ADMIN — CARBOXYMETHYLCELLULOSE SODIUM 1 DROP: 5 SOLUTION/ DROPS OPHTHALMIC at 02:07

## 2023-07-23 RX ADMIN — INSULIN ASPART 6 UNITS: 100 INJECTION, SOLUTION INTRAVENOUS; SUBCUTANEOUS at 12:07

## 2023-07-23 RX ADMIN — ATROPINE SULFATE 0.5 MG: 0.1 INJECTION INTRAVENOUS at 10:07

## 2023-07-23 RX ADMIN — HYDROCORTISONE SODIUM SUCCINATE 50 MG: 100 INJECTION, POWDER, FOR SOLUTION INTRAMUSCULAR; INTRAVENOUS at 08:07

## 2023-07-23 NOTE — PROGRESS NOTES
INPATIENT NEPHROLOGY Progress Note  [facility]    Patient Name: Chanel Cervantes  Date: 07/23/2023    Reason for consultation: ANTHONY    Chief Complaint:   Chief Complaint   Patient presents with    Fall     Denies hitting head or LOC, hit right shoulder    Shortness of Breath    Back Pain       History of Present Illness:  Chanel Cervantes is a 67 y.o. female obese, BMI 37.6 kg/M2, with past medical history of diabetes, hypertension, bariatric surgery, cholecystectomy, prior UTIs, and prior pancreatitis secondary to gallstones. She presented with acute onset of back pain, radiating into bilateral upper quadrants, with associated shortness of breath.  She was admitted for septic shock, transferred to ICU for further management. She is s/p ERCP with evidence of the entire main bile duct was moderately dilated, with an obstruction from suspected sludge ball. A biliary sphincterotomy was performed. The biliary tree was swept. Hospital course complicated AMS requiring MV and and polymicrobial bacteremia, Enterococcus faecium and E coli. We are consulted for ANTHONY.    Echo:  The left ventricle is normal in size with mild concentric hypertrophy and mildly decreased systolic function.  Mild to moderate tricuspid regurgitation.  The estimated ejection fraction is 45%.  Grade I left ventricular diastolic dysfunction.  There is abnormal septal wall motion consistent with left bundle branch block.  Mild right ventricular enlargement with normal right ventricular systolic function.  Moderate left atrial enlargement.  Normal central venous pressure (3 mmHg).  The estimated PA systolic pressure is 40 mmHg.  There is mild pulmonary hypertension.       Interval History:  7/21- BP holding on levophed, FIO2 40%, UOP 1.7L  7/22- off pressors, VSS, FIO2 30%, UOP 1.3L, tolerating tube feeds, rise in WBC count is noted  7/23- kieran (got atropine this AM for HR 39), SBP , FIO2 30%, UOP 1.4L, on/off levophed overnight,  ERICA today    Plan of Care:    Assessment:  Septic shock due biliary sludge with polymicrobial bacteremia s/p ERCP with sphincterotomy 7/19  ANTHONY due to sepsis/ischemic ATN  AMS on MV  Shock liver  Lactic acidosis  Demand ischemia (underlying systolic CHF/pulm HTN)  Anemia/Thrombocytopenia    Plan:    - leukocytosis stabilized- soft BP on/off levophed- remains on IV steroids, BSA- plan for ERICA today- 7/23 blood cx pending- may need another CT AP if no improvement per ID  - SCr stabilized (stopped improving) but nonoliguric- not sure if she needs dobutamine instead of levophed- may need some diuresis as well- dose meds for CrCl < 20- no nsaids or IV contrast- continue schaefer- no acute RRT needs  - keep off IVFs- ok with normal electrolyte tube feeds   - vent management per pulm  - LFTs improving  - lactate improving  - cards eval noted  - heme parameters stable- HIT pending (on lovenox and meropenem)    Thank you for allowing us to participate in this patient's care. We will continue to follow.    Vital Signs:  Temp Readings from Last 3 Encounters:   07/22/23 99 °F (37.2 °C) (Oral)   06/02/23 98 °F (36.7 °C) (Oral)   04/13/23 97.4 °F (36.3 °C)       Pulse Readings from Last 3 Encounters:   07/23/23 (!) 49   06/02/23 68   04/13/23 71       BP Readings from Last 3 Encounters:   07/23/23 (!) 113/57   06/02/23 (!) 120/59   04/13/23 (!) 146/70       Weight:  Wt Readings from Last 3 Encounters:   07/19/23 108.9 kg (239 lb 15.9 oz)   07/19/23 108.9 kg (240 lb 1.3 oz)   06/02/23 109.1 kg (240 lb 8 oz)       INS/OUTS:  I/O last 3 completed shifts:  In: 2066 [I.V.:671.2; NG/GT:1095; IV Piggyback:299.8]  Out: 2080 [Urine:2080]  I/O this shift:  In: -   Out: 150 [Urine:150]      Medications:  Scheduled Meds:   atropine        atropine  0.5 mg Intravenous Once    chlorhexidine  15 mL Mouth/Throat BID    DAPTOmycin (CUBICIN) IV (PEDS and ADULTS)  10 mg/kg (Adjusted) Intravenous Q48H    enoxparin  30 mg Subcutaneous Q24H     hydrocortisone sodium succinate  50 mg Intravenous Q12H    levothyroxine  75 mcg Oral Before breakfast    meropenem (MERREM) IVPB  1 g Intravenous Q12H    mupirocin   Nasal BID     Continuous Infusions:   NORepinephrine bitartrate-D5W Stopped (07/23/23 0933)    propofoL 15 mcg/kg/min (07/23/23 0932)     PRN Meds:.acetaminophen, albuterol-ipratropium, calcium gluconate IVPB, calcium gluconate IVPB, calcium gluconate IVPB, dextrose 50%, dextrose 50%, glucagon (human recombinant), glucose, glucose, HYDROmorphone, insulin aspart U-100, lorazepam, magnesium sulfate IVPB, magnesium sulfate IVPB, naloxone, ondansetron, polyethylene glycol, potassium chloride in water **AND** potassium chloride in water **AND** potassium chloride in water, senna-docusate 8.6-50 mg, simethicone, sodium chloride 0.9%, sodium phosphate IVPB, sodium phosphate IVPB, sodium phosphate IVPB  No current facility-administered medications on file prior to encounter.     Current Outpatient Medications on File Prior to Encounter   Medication Sig Dispense Refill    amLODIPine (NORVASC) 2.5 MG tablet Take 1 tablet (2.5 mg total) by mouth once daily. 90 tablet 1    azelastine (ASTELIN) 137 mcg (0.1 %) nasal spray 1 spray (137 mcg total) by Nasal route 2 (two) times daily. 30 mL 5    calcium carbonate-vitamin D3 600 mg-62.5 mcg (2,500 unit) Cap calcium carbonate 600 mg-vitamin D3 62.5 mcg (2,500 unit) capsule   Take by oral route.      DULoxetine (CYMBALTA) 60 MG capsule Take 1 capsule (60 mg total) by mouth once daily. 90 capsule 1    empaglifloz-linaglip-metformin (TRIJARDY XR) 25-5-1,000 mg TBph Take 1 tablet by mouth once daily. 90 tablet 1    guanFACINE (TENEX) 2 MG tablet Take 1 tablet (2 mg total) by mouth nightly. 90 tablet 3    insulin degludec (TRESIBA FLEXTOUCH U-200) 200 unit/mL (3 mL) insulin pen Inject 76 Units into the skin once daily. 12 pen 3    iron-vitamin C 100-250 mg, ICAR-C, 100-250 mg Tab Take 1 tablet by mouth once daily. 30 each 8  "   levothyroxine (SYNTHROID) 75 MCG tablet Take 75 mcg by mouth before breakfast.      metFORMIN (GLUCOPHAGE) 1000 MG tablet Take 1,000 mg by mouth 2 (two) times daily with meals.      metOLazone (ZAROXOLYN) 5 MG tablet Take 1 tablet (5 mg total) by mouth once daily. 90 tablet 0    metoprolol succinate (TOPROL-XL) 25 MG 24 hr tablet Take 1 tablet (25 mg total) by mouth once daily. 90 tablet 1    multivitamin capsule Take 1 capsule by mouth once daily.      olmesartan (BENICAR) 40 MG tablet Take 1 tablet (40 mg total) by mouth once daily. 90 tablet 1    potassium chloride (KLOR-CON) 10 MEQ TbSR Take 1 tablet (10 mEq total) by mouth once daily. 90 tablet 1    pregabalin (LYRICA) 50 MG capsule Take 50 mg by mouth every evening.      rosuvastatin (CRESTOR) 40 MG Tab Take 1 tablet (40 mg total) by mouth every evening. 90 tablet 3    aspirin (ECOTRIN) 81 MG EC tablet Take 1 tablet (81 mg total) by mouth once daily. 30 tablet 0    blood sugar diagnostic Strp One Touch Ultra Blue Test strips, Use l strip to check glucose 4 times daily 100 each 11    blood-glucose meter kit Use as instructed 1 each 0    cyanocobalamin 1,000 mcg/mL injection Inject 1 mL (1,000 mcg total) into the skin every 30 days. 3 mL 4    lancets (ONETOUCH DELICA LANCETS) 33 gauge Misc USE 1  TO CHECK GLUCOSE 4 TIMES DAILY 100 each 3    magnesium oxide (MAG-OX) 400 mg (241.3 mg magnesium) tablet Take 1 tablet (400 mg total) by mouth once daily. 90 tablet 1    NYSTOP powder APPLY TO AFFECTED AREA AS NEEDED      prednisoLONE acetate (PRED FORTE) 1 % DrpS Place 1 drop into both eyes as needed.        Review of Systems:  On MV    Physical Exam:  BP (!) 113/57   Pulse (!) 49   Temp 99 °F (37.2 °C) (Oral)   Resp 20   Ht 5' 7" (1.702 m)   Wt 108.9 kg (239 lb 15.9 oz)   SpO2 96%   BMI 37.60 kg/m²     General Appearance:    Ill   Head:    Normocephalic, atraumatic   Eyes:    Closed     Mouth:   Moist mucus membranes, no thrush or oral lesions, normal      " dentition   Back:     No CVA tenderness   Lungs:     Coarse, on MV   Heart:    Regular rate and rhythm, no rub/gallop   Abdomen:     Soft, non-tender, non-distended guarding, no masses, no organomegaly   Extremities:   Warm and well perfused, distal pulses intact, no cyanosis, edematous   MSK:   No joint or muscle swelling, tenderness or deformity   Skin:   Skin color, texture, turgor normal, no rashes or lesions   Neurologic/Psychiatric:   Sedated     Results:  Lab Results   Component Value Date     07/23/2023    K 3.8 07/23/2023     07/23/2023    CO2 23 07/23/2023    BUN 78 (H) 07/23/2023    CREATININE 2.8 (H) 07/23/2023    CALCIUM 7.6 (L) 07/23/2023    ANIONGAP 9 07/23/2023    ESTGFRAFRICA >60.0 07/20/2022    EGFRNONAA >60.0 07/20/2022       Lab Results   Component Value Date    CALCIUM 7.6 (L) 07/23/2023    PHOS 3.8 07/23/2023       Recent Labs   Lab 07/23/23  0608   WBC 13.19*   RBC 3.88*   HGB 10.5*   HCT 31.2*   PLT 80*   MCV 80*   MCH 27.1   MCHC 33.7         I have personally reviewed pertinent radiological imaging and reports.    I have spent > 35 minutes providing care for this patient for the above diagnoses. These services have included chart/data/imaging review, evaluation, exam, formulation of plan, , note preparation, and discussions with staff involved in this patient's care.    Rosalva Barros MD MPH  [facility]  [address] [place] [id number]  [phone] (p)  [phone] (f)

## 2023-07-23 NOTE — ASSESSMENT & PLAN NOTE
-Treat cholangitis  -Trend LFTs, much improved.  -Continue IV fluids and Levophed    Lab Results   Component Value Date     (H) 07/23/2023    AST 38 07/23/2023    GGT 24 05/17/2018    ALKPHOS 256 (H) 07/23/2023    BILITOT 1.0 07/23/2023

## 2023-07-23 NOTE — PLAN OF CARE
07/22/23 1919   Patient Assessment/Suction   Level of Consciousness (AVPU) responds to pain   Respiratory Effort Unlabored   Expansion/Accessory Muscles/Retractions expansion symmetric   All Lung Fields Breath Sounds coarse   Rhythm/Pattern, Respiratory assisted mechanically   Cough Frequency with stimulation   Cough Type assisted   Suction Method tracheal   $ Suction Charges Inline Suction Procedure Stat Charge   Secretions Amount small   Secretions Color white   Secretions Characteristics thin   PRE-TX-O2   Device (Oxygen Therapy) ventilator   $ Is the patient on Low Flow Oxygen? Yes   Oxygen Concentration (%) 30   SpO2 (!) 94 %   Pulse Oximetry Type Continuous   $ Pulse Oximetry - Multiple Charge Pulse Oximetry - Multiple   Pulse (!) 59   Resp (!) 22   Vent Select   Charged w/in last 24h YES   Preset Conventional Ventilator Settings   Vent ID [id number]   Vent Type    Ventilation Type VC   Vent Mode A/C   Humidity HME   Set Rate 20 BPM   Vt Set 450 mL   PEEP/CPAP 5 cmH20   Peak Flow 60 L/min   Peak End Inspiratory Pressure 17 cmH20   I-Trigger Type  V-TRIG   Trigger Sensitivity Flow/I-Trigger 3 L/min   Patient Ventilator Parameters   Resp Rate Total 20 br/min   Peak Airway Pressure 25 cmH20   Mean Airway Pressure 9.8 cmH20   Plateau Pressure 0 cmH20   Exhaled Vt 445 mL   Total Ve 8.97 L/m   I:E Ratio Measured 1:2.70   Auto PEEP 0 cmH20   Conventional Ventilator Alarms   Alarms On Y   Resp Rate High Alarm 40 br/min   Press High Alarm 55 cmH2O   Apnea Rate 20   Apnea Volume (mL) 0 mL   Apnea Oxygen Concentration  100   Apnea Flow Rate (L/min) 60   T Apnea 20 sec(s)   IHI Ventilator Associated Pneumonia Bundle (Required)   Head of Bed Elevated  HOB 30   Oral Care Teeth brushed;Lip moisturizer applied;Mouth swabbed;Mouth moisturizer;Mouth suctioned;Suction toothette;Suction toothette toothbrush;with mouthwash   Vent Circut Breaks Minimized Yes   Ready to Wean/Extubation Screen   FIO2<=50 (chart decimal) 0.3   MV<16L  (chart vol.) 8.97   PEEP <=8 (chart #) 5   Ready to Wean Parameters   F/VT Ratio<105 (RSBI) (!) 49.44   Vital Capacity   Vital Capacity (mL) 0   Education   $ Education DME Oxygen;15 min

## 2023-07-23 NOTE — ASSESSMENT & PLAN NOTE
-Trend K.  Has improved.  -Replete K PRN  -Telemetry    Potassium   Date Value Ref Range Status   07/22/2023 4.0 3.5 - 5.1 mmol/L Final   07/21/2023 4.2 3.5 - 5.1 mmol/L Final

## 2023-07-23 NOTE — PROGRESS NOTES
Progress  Note  Infectious Disease    Reason for Consult:  bacteremia E coli and E faecium     HPI: Chanel Cervantes is a 67 y.o. female obese, BMI 37.6 kg/M2, with past medical history of diabetes, hypertension, bariatric surgery, cholecystectomy, prior UTIs, and prior pancreatitis secondary to gallstones.     She presented with acute onset of back pain, radiating into bilateral upper quadrants, with associated shortness of breath.  She was admitted for septic shock, transferred to ICU for further management.    Labs on admission with severe leukopenia 1.1, left shift 87%, bands 8%, H&H 14/44.2, platelet count 213   Creatinine 1.1   Hypokalemia 3.1   Transaminitis, AST 4943/ALT 1475   Total bili 2.7  Lipase 1363 down to 102  Patient CPK 92   Lactic acid 4.6  Hemoglobin A1c 6.9  UA pyuria 6, negative for bacteria, urine culture in process     CT abdomen with broad-based ventral abdominal hernia.  No evidence of obstruction.  Status post cholecystectomy with postsurgical dilatation of the common bile duct and intrahepatic ducts.    Seen by GI, s/p ERCP this morning; with evidence of the entire main bile duct was moderately dilated, with an obstruction from suspected sludge ball. A biliary sphincterotomy was performed. The biliary tree was swept.     ERCP this morning.  Hospital course complicated persistent fever and polymicrobial bacteremia, Enterococcus faecium and E coli, no resistance genes detected on [employer], pending sensitivities.      ID consult for E coli and E faecium bacteremia.    7/20: Interim reviewed, patient remains febrile, T max 101.3, currently 100.8, on pressors, on O2 by N[place] 5L. She remains encephalopathic, decreased urinary output, 20ml/h, no bowel movements recorded yet. Labs reviewed, WBC 11.4, bands 36%, H/H 11.3/35.4, plt: 175. Worsening ANTHONY 2.6, LFTs trending down. , will watch closely. Alct acid 3, troponins trending up, likely demand ischemia. Micro reviewed, repeat blood cultures  7/19 Enterococcus faecium 4/4 bottles, urine culture no growth to date, pending final. Discussed with Pulmonary, plan for IV steroids.    7/20:  Interim reviewed, patient seen examined at bedside. Pressors requirement coming down, afebrile in the last 24 hours.  She was intubated yesterday at noon for airway protection, FiO2 40%, minimal amount of secretions, sputum sent for culture.  Urinary output improving although creatinine 2.9 today.  LFTs trending down, lactic acid 1.9, normal.  Micro reviewed, repeat blood cultures 7/20 x2 sets no growth to date, pending final.  Awaiting sensitivities on E coli and Enterococcus faecium, to be available later today.    07/22/2023   Afebrile, tmax 100, no pressors since this am,   On sedation 25 mcg/kg/min- intensivist is working daily on extubation, FIo2=30%  WBC 9.9--14;   PLT worsening? (186--175--85--73)   I/l=4103/155.  Cr 1.6--2.4--2.6--2.9--2.9-- LFTs improving   07/23/2023.  Dr. Montejo.   Afebrile.  WBC is stable 14--13.  Blood cultures of 07/23 are negative to date.   Smooth was negative for vegetation.    Urine output is improving.  She had 1 BM.    CXR looks slightly worse, but I think it is volume overload, which will improve as her kidney function improves. Intensivist and nurse are working on vacation sedation. Daughter at bedside.  I discussed with her in detail.    Review of patient's allergies indicates:   Allergen Reactions    Adhesive tape-silicones Rash    Dye Hives    Cephalexin     Iodine     Penicillins Edema    Pneumococcal vaccine     Iodinated contrast media Rash     Past Medical History:   Diagnosis Date    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 1/24/2021    Anemia, chronic disease 1/24/2021    CAD (coronary artery disease)     Diabetes mellitus, type 2     Hypertension     Iron deficiency anemia following bariatric surgery 1/24/2021    MI (myocardial infarction)     Secondary thrombocytosis 1/24/2021    Sleep apnea     Sleep apnea 03/01/2019    Sleep Right   "    Past Surgical History:   Procedure Laterality Date    ANGIOGRAM, CORONARY, WITH LEFT HEART CATHETERIZATION      APPENDECTOMY      BARIATRIC SURGERY  2019    Dr Nunez    CARPAL TUNNEL RELEASE Bilateral 2002     SECTION      CHOLECYSTECTOMY      COLONOSCOPY      CORONARY ANGIOPLASTY WITH STENT PLACEMENT      CORONARY ARTERY BYPASS GRAFT      EPIDURAL STEROID INJECTION INTO LUMBAR SPINE      x2    ERCP N/A 2023    Procedure: ERCP (ENDOSCOPIC RETROGRADE CHOLANGIOPANCREATOGRAPHY);  Surgeon: Lavon Hernandez III, MD;  Location: [facility] ENDO;  Service: Endoscopy;  Laterality: N/A;    ESOPHAGOGASTRODUODENOSCOPY      HERNIA REPAIR  2019    HYSTERECTOMY      THYROID LOBECTOMY Right 2021    Procedure: LOBECTOMY, THYROID;  Surgeon: Mari Chance MD;  Location: [facility] OR;  Service: General;  Laterality: Right;     Social History     Tobacco Use    Smoking status: Former     Packs/day: 1.00     Years: 15.00     Pack years: 15.00     Types: Cigarettes     Quit date:      Years since quittin.5    Smokeless tobacco: Never   Substance Use Topics    Alcohol use: No     Comment: "maybe once a year"        Family History   Problem Relation Age of Onset    Diabetes Mother     Vision loss Mother     Heart disease Father     Vision loss Father     Stroke Father        Review of Systems:   Unable to obtain, patient is lethargic, no family at bedside     EXAM & DIAGNOSTICS REVIEWED:   Vitals:     Temp:  [98.9 °F (37.2 °C)-99 °F (37.2 °C)]   Temp: 99 °F (37.2 °C) (23 1557)  Pulse: (!) 49 (23 0957)  Resp: 20 (23 0957)  BP: (!) 113/57 (23 0630)  SpO2: 96 % (23 0957)    Intake/Output Summary (Last 24 hours) at 2023 1122  Last data filed at 2023 0837  Gross per 24 hour   Intake 1390.47 ml   Output 1475 ml   Net -84.53 ml   Vent Mode: A/C  Oxygen Concentration (%):  [30] 30  Resp Rate Total:  [20 br/min-25 br/min] 20 br/min  Vt Set:  [450 mL] 450 mL  PEEP/CPAP:  [5 " cmH20] 5 cmH20  Mean Airway Pressure:  [8.4 drM13-43 cmH20] 9.5 cmH20    General:   OFF pressors, obese lady, intubated,   Eyes:  Anicteric, PERRL  ENT:  ET tube    OG tube   Neck:  Supple, R neck with resolving hematoma likely from prior central access attempts  L IJ with minimal amount of blood in dressing  Lungs: Coarse breath sounds b/l  Heart:  S1/S2+, regular rhythm, no murmurs  Abd:  Obese, large ventral hernia, +BS, soft, absolutely no pain over the right upper quadrant.  She does not like being touched around the ventral hernia.  :  Waller 07/19,  urine light  yellow, small amount  Musc:  Joints without effusion, swelling,  erythema, synovitis  Skin:  Warm, no rash, some prior skin lesions are healing  Wound:   Neuro:  Sedated   Psych:    Lymphatic:       Extrem: No LE edema b/l  VAD:  L IJ  07/19    R A-line 07/19      Isolation: contact      General Labs reviewed:  Recent Labs   Lab 07/21/23  0331 07/22/23  0315 07/22/23  0318 07/23/23  0319 07/23/23  0608   WBC 9.95  --  14.24*  --  13.19*   HGB 10.7*  --  9.9*  --  10.5*   HCT 32.6*   < > 30.1* 29* 31.2*   PLT 85*  --  73*  --  80*    < > = values in this interval not displayed.       Recent Labs   Lab 07/21/23  0331 07/22/23  0318 07/23/23  0608    140 142   K 4.2 4.0 3.8    108 110   CO2 22* 22* 23   BUN 56* 64* 78*   CREATININE 2.9* 2.8* 2.8*   CALCIUM 7.6* 7.4* 7.6*   PROT 4.9* 4.5* 4.6*   BILITOT 1.6* 1.3* 1.0   ALKPHOS 319* 286* 256*   * 321* 194*   * 112* 38     Recent Labs   Lab 07/19/23  1250   CRP 24.03*       Estimated Creatinine Clearance: 24.8 mL/min (A) (based on SCr of 2.8 mg/dL (H)).     Micro:  Microbiology Results (last 7 days)       Procedure Component Value Units Date/Time    Blood culture [[id number]] Collected: 07/23/23 0825    Order Status: Sent Specimen: Blood Updated: 07/23/23 0900    Blood culture [[id number]] Collected: 07/20/23 0940    Order Status: Completed Specimen: Blood from Peripheral,  Antecubital, Right Updated: 07/22/23 1232     Blood Culture, Routine No Growth to date      No Growth to date      No Growth to date    Narrative:      Collection has been rescheduled by [email or web address] at 07/20/2023 06:04 Reason:   Unable to collect  Collection has been rescheduled by [email or web address] at 07/20/2023 08:01 Reason:   Contacting zainab for cultures per RN Alfred  Collection has been rescheduled by [email or web address] at 07/20/2023 08:41 Reason:   Nurse Draw  Collection has been rescheduled by [email or web address] at 07/20/2023 06:04 Reason:   Unable to collect  Collection has been rescheduled by [email or web address] at 07/20/2023 08:01 Reason:   Contacting zainab for cultures per RN Alfred  Collection has been rescheduled by [email or web address] at 07/20/2023 08:41 Reason:   Nurse Draw    Culture, Respiratory with Gram Stain [[id number]] Collected: 07/20/23 1003    Order Status: Completed Specimen: Respiratory from Endotracheal Aspirate Updated: 07/22/23 0736     Respiratory Culture No growth      No normal respiratory shay     Gram Stain (Respiratory) <10 epithelial cells per low power field.     Gram Stain (Respiratory) Few WBC's     Gram Stain (Respiratory) No organisms seen    Urine Culture High Risk [[id number]] Collected: 07/19/23 1006    Order Status: Completed Specimen: Urine, Clean Catch Updated: 07/22/23 0710     Urine Culture, Routine No growth    Narrative:      Indicated criteria for high risk culture:->Other  Other (specify):->e coli bacteremia    Blood culture [[id number]]  (Abnormal) Collected: 07/20/23 0940    Order Status: Completed Specimen: Blood from Line, Arterial, Right Updated: 07/22/23 0644     Blood Culture, Routine Gram stain aer bottle: Gram positive cocci      Positive results previously called      ENTEROCOCCUS FAECIUM  For susceptibility see order #[id number]      Narrative:      Collection has been rescheduled by [email or web address] at 07/20/2023 06:04 Reason:   Unable to collect  Collection has been rescheduled by [email or web address] at 07/20/2023 08:01 Reason:   Contacting zainab for  cultures per RN Alfred  Collection has been rescheduled by [email or web address] at 07/20/2023 08:41 Reason:   Nurse Draw  Collection has been rescheduled by [email or web address] at 07/20/2023 06:04 Reason:   Unable to collect  Collection has been rescheduled by [email or web address] at 07/20/2023 08:01 Reason:   Contacting zainab for cultures per RN Alfred  Collection has been rescheduled by [email or web address] at 07/20/2023 08:41 Reason:   Nurse Draw    Blood culture [[id number]]  (Abnormal) Collected: 07/19/23 1230    Order Status: Completed Specimen: Blood Updated: 07/22/23 0643     Blood Culture, Routine Gram stain aer bottle: Gram positive cocci      Positive results previously called      ENTEROCOCCUS FAECIUM  For susceptibility see order #[id number]      Blood culture [[id number]]  (Abnormal) Collected: 07/19/23 0002    Order Status: Completed Specimen: Blood Updated: 07/22/23 0643     Blood Culture, Routine Gram stain aer bottle: Gram positive cocci      Gram stain ez bottle: Gram positive cocci      Positive results previously called      ENTEROCOCCUS FAECIUM  For susceptibility see order #[id number]      Blood culture [[id number]]  (Abnormal) Collected: 07/19/23 0030    Order Status: Completed Specimen: Blood from Peripheral, Left Hand Updated: 07/22/23 0643     Blood Culture, Routine Gram stain aer bottle: Gram positive cocci      Gram stain ez bottle: Gram positive cocci      Results called to and read back by:Carmela Mcintosh RN-3ICU;      07/19/2023  16:23 CJD      ENTEROCOCCUS FAECIUM  For susceptibility see order #[id number]      Narrative:      Collection has been rescheduled by 9 at 07/19/2023 00:20 Reason:   Nurse Draw  Collection has been rescheduled by [email or web address] at 07/19/2023 00:20 Reason:   Nurse Draw    Blood culture [[id number]]  (Abnormal)  (Susceptibility) Collected: 07/18/23 1648    Order Status: Completed Specimen: Blood Updated: 07/22/23 0642     Blood Culture, Routine Gram stain aer bottle: Gram negative rods      Gram stain aer bottle: Gram positive cocci       Results called to and read back by: Lesvia Mcdowell RN MICU3.      07/19/2023  05:06 TGC      ESCHERICHIA COLI      ENTEROCOCCUS FAECIUM    Blood culture [[id number]]  (Abnormal) Collected: 07/18/23 1738    Order Status: Completed Specimen: Blood Updated: 07/21/23 0853     Blood Culture, Routine Gram stain aer bottle: Gram negative rods      Results called to and read back by:Lesvia Mcdowell RN MICU3.      07/19/2023  05:23 TGC      ESCHERICHIA COLI  For susceptibility see order #[id number]      Rapid Organism ID by PCR (from Blood culture) [[id number]]  (Abnormal) Collected: 07/18/23 1648    Order Status: Completed Updated: 07/19/23 0526     Enterococcus faecalis Not Detected     Enterococcus faecium Detected     Listeria monocytogenes Not Detected     Staphylococcus spp. Not Detected     Staphylococcus aureus Not Detected     Staphylococcus epidermidis Not Detected     Staphylococcus lugdunensis Not Detected     Streptococcus species Not Detected     Streptococcus agalactiae Not Detected     Streptococcus pneumoniae Not Detected     Streptococcus pyogenes Not Detected     Acinetobacter calcoaceticus/baumannii complex Not Detected     Bacteroides fragilis Not Detected     Enterobacterales See species for ID     Enterobacter cloacae complex Not Detected     Escherichia coli Detected     Klebsiella aerogenes Not Detected     Klebsiella oxytoca Not Detected     Klebsiella pneumoniae group Not Detected     Proteus Not Detected     Salmonella sp Not Detected     Serratia marcescens Not Detected     Haemophilus influenzae Not Detected     Neisseria meningtidis Not Detected     Pseudomonas aeruginosa Not Detected     Stenotrophomonas maltophilia Not Detected     Candida albicans Not Detected     Candida auris Not Detected     Candida glabrata Not Detected     Candida krusei Not Detected     Candida parapsilosis Not Detected     Candida tropicalis Not Detected     Cryptococcus neoformans/gattii Not Detected     CTX-M (ESBL  ) Not Detected     IMP (Carbapenem resistant) Not Detected     KPC resistance gene (Carbapenem resistant) Not Detected     mcr-1  Not Detected     mec A/C  Test not applicable     mec A/C and MREJ (MRSA) gene Test not applicable     NDM (Carbapenem resistant) Not Detected     OXA-48-like (Carbapenem resistant) Not Detected     van A/B (VRE gene) Not Detected     VIM (Carbapenem resistant) Not Detected            Imaging Reviewed:  CXR07/22Hypoinflation with mild prominence of the pulmonary vasculature and faint groundglass opacity in the lung bases suggestive of pulmonary edema   Abdominal Us  CTA chest  CT abdomen/pelvis:  1.  Status post cholecystectomy with postsurgical dilatation of the common bile duct and intrahepatic ducts. Should be correlated with bilirubin levels.   2.  Broad-based ventral abdominal wall hernia measures approximately 15 x 13 cm with herniated loops of large and small bowel. No evidence of obstruction.   3.  Exophytic hypoattenuating structure in the mid left kidney is felt to reflect a hemorrhagic cyst.      Cardiology: ECHO  The left ventricle is normal in size with mild concentric hypertrophy and mildly decreased systolic function.  Mild to moderate tricuspid regurgitation.  The estimated ejection fraction is 45%.  Grade I left ventricular diastolic dysfunction.  There is abnormal septal wall motion consistent with left bundle branch block.  Mild right ventricular enlargement with normal right ventricular systolic function.  Moderate left atrial enlargement.  Normal central venous pressure (3 mmHg).  The estimated PA systolic pressure is 40 mmHg.  There is mild pulmonary hypertension.       IMPRESSION & PLAN     Septic shock likely from polymicrobial bacteremia Enterococcus faecium (7/18-19--20) and E coli (7/18) secondary to cholangitis s/p ERCP, sphincterotomy 7/19, history of prior pancreatitis secondary to gallstones   FABIANA 2  07/23/23 preliminary ERICA valvular structures  are well visualized and no vegetation seen.    Lactic acid elevation resolved  Procal 54 initially   CRP 24  Baseline CPK 92-->245  Lipase 1363-->102, trending down     2. Shock liver, resolved .   LFTs are nicely improving   --600--300--194    3. Monitor Platelet, low, but stable.  Probably due to above.    4. ANTHONY, cr 1.1--2.4--2.6-- 2.9--2.8--2.8--  urine output seems to be picking up    5.   PMHx: diabetes, hypertension, bariatric surgery, cholecystectomy, prior UTIs    6. Obesity, BMI 37.6 Kg/m2      Recommendations:    Daptomycin 800mg IV q48h, renally dosed  (for Enterococcal bacteremia. Will count  duration treatment from 07/23/23)    Meropenem 1 g IV q.12h,  renally dosed  (for E Coli bacteremia. Will count  duration treatment from 07/19/23)    Follow repeat blood cultures   Taper down and off steroids -- as per intensivist  Consider repeating CT scan abdomen/pelvis  if worsening   Monitor urinary oupt and Creatinine,  Follow CRP, procalcitonin, HITabs  If thrombocytopenia persists, will reconsider  Lovenox and Meropenem     D/w ICU nursing      Medical Decision Making during this encounter was  [_] Low Complexity  [_] Moderate Complexity  [xx] High Complexity

## 2023-07-23 NOTE — ASSESSMENT & PLAN NOTE
History of CAD s/p CABG. No acute ST changes on EKG.  -Trend troponin  -Treat septic shock  -Telemetry  -cardiology consulting -- their opinion is that the troponin is high b/c of myocardial demand ischemia    Troponin I High Sensitivity   Date Value Ref Range Status   07/20/2023 2384.3 (HH) 0.0 - 14.9 pg/mL Final     Comment:     Troponin results differ between methods. Do not use   results between Troponin methods interchangeably.    Alkaline Phospatase levels above 400 U/L may   cause false positive results.    Access hsTnI should not be used for patients taking   Asfotase anya (Strensiq).  Troponin critical result(s) called and verbal readback obtained   from Argentina Avila RN M3 by MS1 07/20/2023 04:54     07/19/2023 1148.1 (HH) 0.0 - 14.9 pg/mL Final     Comment:     Troponin results differ between methods. Do not use   results between Troponin methods interchangeably.    Alkaline Phospatase levels above 400 U/L may   cause false positive results.    Access hsTnI should not be used for patients taking   Asfotase anya (Strensiq).  Troponin critical result(s) called and verbal readback obtained from   Karan Maldonado RN M3  by MS1 07/19/2023 22:10     07/19/2023 648.1 (HH) 0.0 - 14.9 pg/mL Final     Comment:     Troponin results differ between methods. Do not use   results between Troponin methods interchangeably.    Alkaline Phospatase levels above 400 U/L may   cause false positive results.    Access hsTnI should not be used for patients taking   Asfotase anya (Strensiq).  Results confirmed, test repeated  Troponin critical result(s) repeated. Called and verbal readback   obtained from Carmela McintoshICU, RN.  by SLT1 07/19/2023 17:37

## 2023-07-23 NOTE — ASSESSMENT & PLAN NOTE
Sludge ball removed via ERCP. Likely source of bacteremia. LFTs much improved.  -Continue meropenem and daptomycin, renally dosed; follow sensitivities  -GI following  -IV fluids  -Levophed infusion  - blood cultures:  Entercoccus faecium and E.coli  -Trend LFTs

## 2023-07-23 NOTE — SUBJECTIVE & OBJECTIVE
Interval History:  Remains on ventilator.  Patient underwent transesophageal echocardiography.  On propofol and Levophed infusions.  Platelet count has dropped.    Review of Systems   Unable to perform ROS: Intubated   Objective:     Vital Signs (Most Recent):  Temp: 99 °F (37.2 °C) (07/22/23 1557)  Pulse: (!) 49 (07/23/23 0957)  Resp: 20 (07/23/23 0957)  BP: (!) 113/57 (07/23/23 0630)  SpO2: 96 % (07/23/23 0957) Vital Signs (24h Range):  Temp:  [98.9 °F (37.2 °C)-99 °F (37.2 °C)] 99 °F (37.2 °C)  Pulse:  [47-80] 49  Resp:  [20-31] 20  SpO2:  [70 %-100 %] 96 %  BP: ()/(40-67) 113/57  Arterial Line BP: ()/() 120/53     Weight: 108.9 kg (239 lb 15.9 oz)  Body mass index is 37.6 kg/m².    Intake/Output Summary (Last 24 hours) at 7/23/2023 1002  Last data filed at 7/23/2023 0837  Gross per 24 hour   Intake 1390.47 ml   Output 1475 ml   Net -84.53 ml           Physical Exam  Constitutional:       Interventions: She is sedated and intubated.   Eyes:      Conjunctiva/sclera:      Right eye: No exudate.     Left eye: No exudate.  Neck:      Vascular: No JVD.   Cardiovascular:      Rate and Rhythm: Normal rate and regular rhythm.      Comments: Upper extremities have mild pitting edema  Pulmonary:      Effort: She is intubated.      Breath sounds: Decreased air movement present.   Abdominal:      General: Abdomen is flat. Bowel sounds are decreased. There is distension.      Hernia: A hernia is present. Hernia is present in the ventral area.   Skin:     General: Skin is warm and dry.           Significant Labs:   Lab Results   Component Value Date    WBC 13.19 (H) 07/23/2023    HGB 10.5 (L) 07/23/2023    HCT 31.2 (L) 07/23/2023    MCV 80 (L) 07/23/2023    PLT 80 (L) 07/23/2023       CMP  Sodium   Date Value Ref Range Status   07/23/2023 142 136 - 145 mmol/L Final   01/14/2019 141 134 - 144 mmol/L      Potassium   Date Value Ref Range Status   07/23/2023 3.8 3.5 - 5.1 mmol/L Final     Chloride   Date Value  Ref Range Status   07/23/2023 110 95 - 110 mmol/L Final   01/14/2019 96 (L) 98 - 110 mmol/L      CO2   Date Value Ref Range Status   07/23/2023 23 23 - 29 mmol/L Final     Glucose   Date Value Ref Range Status   07/23/2023 305 (H) 70 - 110 mg/dL Final   01/14/2019 177 (H) 70 - 99 mg/dL      BUN   Date Value Ref Range Status   07/23/2023 78 (H) 8 - 23 mg/dL Final     Creatinine   Date Value Ref Range Status   07/23/2023 2.8 (H) 0.5 - 1.4 mg/dL Final   01/14/2019 0.99 0.60 - 1.40 mg/dL      Calcium   Date Value Ref Range Status   07/23/2023 7.6 (L) 8.7 - 10.5 mg/dL Final     Total Protein   Date Value Ref Range Status   07/23/2023 4.6 (L) 6.0 - 8.4 g/dL Final     Albumin   Date Value Ref Range Status   07/23/2023 1.8 (L) 3.5 - 5.2 g/dL Final   01/14/2019 3.8 3.1 - 4.7 g/dL      Total Bilirubin   Date Value Ref Range Status   07/23/2023 1.0 0.1 - 1.0 mg/dL Final     Comment:     For infants and newborns, interpretation of results should be based  on gestational age, weight and in agreement with clinical  observations.    Premature Infant recommended reference ranges:  Up to 24 hours.............<8.0 mg/dL  Up to 48 hours............<12.0 mg/dL  3-5 days..................<15.0 mg/dL  6-29 days.................<15.0 mg/dL       Alkaline Phosphatase   Date Value Ref Range Status   07/23/2023 256 (H) 55 - 135 U/L Final     AST   Date Value Ref Range Status   07/23/2023 38 10 - 40 U/L Final     ALT   Date Value Ref Range Status   07/23/2023 194 (H) 10 - 44 U/L Final     Anion Gap   Date Value Ref Range Status   07/23/2023 9 8 - 16 mmol/L Final     eGFR   Date Value Ref Range Status   07/23/2023 17.9 (A) >60 mL/min/1.73 m^2 Final     Microbiology Results (last 7 days)       Procedure Component Value Units Date/Time    Blood culture [[id number]] Collected: 07/23/23 0825    Order Status: Sent Specimen: Blood Updated: 07/23/23 0900    Blood culture [[id number]] Collected: 07/20/23 0940    Order Status: Completed Specimen: Blood  from Peripheral, Antecubital, Right Updated: 07/22/23 1232     Blood Culture, Routine No Growth to date      No Growth to date      No Growth to date    Narrative:      Collection has been rescheduled by [email or web address] at 07/20/2023 06:04 Reason:   Unable to collect  Collection has been rescheduled by [email or web address] at 07/20/2023 08:01 Reason:   Contacting zainab for cultures per RN Alfred  Collection has been rescheduled by [email or web address] at 07/20/2023 08:41 Reason:   Nurse Draw  Collection has been rescheduled by [email or web address] at 07/20/2023 06:04 Reason:   Unable to collect  Collection has been rescheduled by [email or web address] at 07/20/2023 08:01 Reason:   Contacting zainab for cultures per RN Alfred  Collection has been rescheduled by [email or web address] at 07/20/2023 08:41 Reason:   Nurse Draw    Culture, Respiratory with Gram Stain [[id number]] Collected: 07/20/23 1003    Order Status: Completed Specimen: Respiratory from Endotracheal Aspirate Updated: 07/22/23 0736     Respiratory Culture No growth      No normal respiratory shay     Gram Stain (Respiratory) <10 epithelial cells per low power field.     Gram Stain (Respiratory) Few WBC's     Gram Stain (Respiratory) No organisms seen    Urine Culture High Risk [[id number]] Collected: 07/19/23 1006    Order Status: Completed Specimen: Urine, Clean Catch Updated: 07/22/23 0710     Urine Culture, Routine No growth    Narrative:      Indicated criteria for high risk culture:->Other  Other (specify):->e coli bacteremia    Blood culture [[id number]]  (Abnormal) Collected: 07/20/23 0940    Order Status: Completed Specimen: Blood from Line, Arterial, Right Updated: 07/22/23 0644     Blood Culture, Routine Gram stain aer bottle: Gram positive cocci      Positive results previously called      ENTEROCOCCUS FAECIUM  For susceptibility see order #[id number]      Narrative:      Collection has been rescheduled by [email or web address] at 07/20/2023 06:04 Reason:   Unable to collect  Collection has been rescheduled by [email or web address] at 07/20/2023 08:01 Reason:   Contacting  zainab for cultures per RN Alfred  Collection has been rescheduled by [email or web address] at 07/20/2023 08:41 Reason:   Nurse Draw  Collection has been rescheduled by [email or web address] at 07/20/2023 06:04 Reason:   Unable to collect  Collection has been rescheduled by [email or web address] at 07/20/2023 08:01 Reason:   Contacting zainab for cultures per RN Alfred  Collection has been rescheduled by [email or web address] at 07/20/2023 08:41 Reason:   Nurse Draw    Blood culture [[id number]]  (Abnormal) Collected: 07/19/23 1230    Order Status: Completed Specimen: Blood Updated: 07/22/23 0643     Blood Culture, Routine Gram stain aer bottle: Gram positive cocci      Positive results previously called      ENTEROCOCCUS FAECIUM  For susceptibility see order #[id number]      Blood culture [[id number]]  (Abnormal) Collected: 07/19/23 0002    Order Status: Completed Specimen: Blood Updated: 07/22/23 0643     Blood Culture, Routine Gram stain aer bottle: Gram positive cocci      Gram stain ez bottle: Gram positive cocci      Positive results previously called      ENTEROCOCCUS FAECIUM  For susceptibility see order #[id number]      Blood culture [[id number]]  (Abnormal) Collected: 07/19/23 0030    Order Status: Completed Specimen: Blood from Peripheral, Left Hand Updated: 07/22/23 0643     Blood Culture, Routine Gram stain aer bottle: Gram positive cocci      Gram stain ez bottle: Gram positive cocci      Results called to and read back by:Carmela Mcintosh RN-[facility];      07/19/2023  16:23 CJD      ENTEROCOCCUS FAECIUM  For susceptibility see order #[id number]      Narrative:      Collection has been rescheduled by [email or web address] at 07/19/2023 00:20 Reason:   Nurse Draw  Collection has been rescheduled by [email or web address] at 07/19/2023 00:20 Reason:   Nurse Draw    Blood culture [[id number]]  (Abnormal)  (Susceptibility) Collected: 07/18/23 1648    Order Status: Completed Specimen: Blood Updated: 07/22/23 0642     Blood Culture, Routine Gram stain aer bottle: Gram negative rods      Gram stain aer bottle: Gram  positive cocci      Results called to and read back by: Lesvia Mcdowell RN MICU3.      07/19/2023  05:06 TGC      ESCHERICHIA COLI      ENTEROCOCCUS FAECIUM    Blood culture [[id number]]  (Abnormal) Collected: 07/18/23 1738    Order Status: Completed Specimen: Blood Updated: 07/21/23 0853     Blood Culture, Routine Gram stain aer bottle: Gram negative rods      Results called to and read back by:Lesvia Mcdowell RN MIC[facility]3.      07/19/2023  05:23 TGC      ESCHERICHIA COLI  For susceptibility see order #[id number]      Rapid Organism ID by PCR (from Blood culture) [[id number]]  (Abnormal) Collected: 07/18/23 1648    Order Status: Completed Updated: 07/19/23 0526     Enterococcus faecalis Not Detected     Enterococcus faecium Detected     Listeria monocytogenes Not Detected     Staphylococcus spp. Not Detected     Staphylococcus aureus Not Detected     Staphylococcus epidermidis Not Detected     Staphylococcus lugdunensis Not Detected     Streptococcus species Not Detected     Streptococcus agalactiae Not Detected     Streptococcus pneumoniae Not Detected     Streptococcus pyogenes Not Detected     Acinetobacter calcoaceticus/baumannii complex Not Detected     Bacteroides fragilis Not Detected     Enterobacterales See species for ID     Enterobacter cloacae complex Not Detected     Escherichia coli Detected     Klebsiella aerogenes Not Detected     Klebsiella oxytoca Not Detected     Klebsiella pneumoniae group Not Detected     Proteus Not Detected     Salmonella sp Not Detected     Serratia marcescens Not Detected     Haemophilus influenzae Not Detected     Neisseria meningtidis Not Detected     Pseudomonas aeruginosa Not Detected     Stenotrophomonas maltophilia Not Detected     Candida albicans Not Detected     Candida auris Not Detected     Candida glabrata Not Detected     Candida krusei Not Detected     Candida parapsilosis Not Detected     Candida tropicalis Not Detected     Cryptococcus neoformans/gattii Not Detected      CTX-M (ESBL ) Not Detected     IMP (Carbapenem resistant) Not Detected     KPC resistance gene (Carbapenem resistant) Not Detected     mcr-1  Not Detected     mec A/C  Test not applicable     mec A/C and MREJ (MRSA) gene Test not applicable     NDM (Carbapenem resistant) Not Detected     OXA-48-like (Carbapenem resistant) Not Detected     van A/B (VRE gene) Not Detected     VIM (Carbapenem resistant) Not Detected        Significant Imaging:   ECHO:  The left ventricle is normal in size with mild concentric hypertrophy and mildly decreased systolic function.  Mild to moderate tricuspid regurgitation.  The estimated ejection fraction is 45%.  Grade I left ventricular diastolic dysfunction.  There is abnormal septal wall motion consistent with left bundle branch block.  Mild right ventricular enlargement with normal right ventricular systolic function.  Moderate left atrial enlargement.  Normal central venous pressure (3 mmHg).  The estimated PA systolic pressure is 40 mmHg.  There is mild pulmonary hypertension.    ERCP:   - The entire main bile duct was moderately                          dilated, with an obstruction from suspected sludge                          ball                          - The patient has had a cholecystectomy.                          - A biliary sphincterotomy was performed.                          - The biliary tree was swept.     CTA chest:  1. Negative for pulmonary thromboembolism.  2. Scattered lower lung interstitial opacities left greater than right, potentially subsegmental atelectasis and or small airways inflammation.  3. A 7 mm left upper lobe noncalcified pulmonary nodule, nonspecific. Pulmonary neoplasm is not excluded. A follow-up noncontrast chest CT in 6 months is recommended, per Fleischner Society Recommendations. Reference:  Radiology. 2017 Jul; 284(1):228-243.  4. Cardiomegaly, with aortic and coronary arterial calcifications.  5. Hiatal hernia.    CT abdomen  and pelvis with contrast:  1.  Status post cholecystectomy with postsurgical dilatation of the common bile duct and intrahepatic ducts. Should be correlated with bilirubin levels.  2.  Broad-based ventral abdominal wall hernia measures approximately 15 x 13 cm with herniated loops of large and small bowel. No evidence of obstruction.  3.  Exophytic hypoattenuating structure in the mid left kidney is felt to reflect a hemorrhagic cyst.    RUQ US:  The gallbladder surgically absent. The common duct is within normal limits.  Hepatic steatosis and hepatomegaly.    CXR:  1.  Enlarged cardiomediastinal silhouette and central hilar vascular structures. Mild perihilar interstitial thickening. Findings are felt to reflect CHF with mild pulmonary edema.  2.  Small bilateral pleural effusions.    CXR: Interval improved aeration in both lung bases, with otherwise no significant change.    CXR: Hypoinflation with mild prominence of the pulmonary vasculature and faint groundglass opacity in the lung bases suggestive of pulmonary edema

## 2023-07-23 NOTE — ASSESSMENT & PLAN NOTE
Stable.  -Trend Hgb with CBC    Hemoglobin   Date Value Ref Range Status   07/23/2023 10.5 (L) 12.0 - 16.0 g/dL Final   07/22/2023 9.9 (L) 12.0 - 16.0 g/dL Final

## 2023-07-23 NOTE — PROCEDURES
"Chanel Cervantes is a 67 y.o. female patient.    Temp: 99 °F (37.2 °C) (07/22/23 1557)  Pulse: (!) 49 (07/23/23 0957)  Resp: 20 (07/23/23 0957)  BP: (!) 113/57 (07/23/23 0630)  SpO2: 96 % (07/23/23 0957)  Weight: 108.9 kg (239 lb 15.9 oz) (07/19/23 0400)  Height: 5' 7" (170.2 cm) (07/23/23 0746)       Procedures    7/23/2023       ERICA consent obtained   -No absolute contraindications of esophageal stricture, tumor, perforation, laceration,or diverticulum and/or active GI bleed  -The risks, benefits & alternatives of the procedure were explained to the patient.   -The risks of transesophageal echo include but are not limited to:  Dental trauma, esophageal trauma/perforation, bleeding, laryngospasm/brochospasm, aspiration, sore throat/hoarseness, & dislodgement of the endotracheal tube/nasogastric tube (where applicable).    -The risks of moderate sedation include hypotension, respiratory depression, arrhythmias, bronchospasm, & death.    -Informed consent was obtained. The patient is agreeable to proceed with the procedure and all questions and concerns addressed.    M'allampati score 2 patient was on propofol drip    ERICA probe then passed  via  transesophageal route and  imaging performed in the  standard 3 views ie  Upper esophageal, ,mid esophageal, and transgastric views . Saline bubbles were injected IV for PFO imaging of   Intraatrial septum.   At end of procedure, ERICA probe removed. The patient tolerated procedure well .  PRELIMINARY ERICA-VALVULAR STRUCTURES WELL VISUALIZED AND NO VEGETATIONS SEEN  NO ABNORMAL INTRACAVITARY ECHO        "

## 2023-07-23 NOTE — ASSESSMENT & PLAN NOTE
Stable.  -Trend Hgb with CBC    Hemoglobin   Date Value Ref Range Status   07/22/2023 9.9 (L) 12.0 - 16.0 g/dL Final   07/21/2023 10.7 (L) 12.0 - 16.0 g/dL Final

## 2023-07-23 NOTE — ASSESSMENT & PLAN NOTE
-Trend K.  Has improved.  -Replete K PRN  -Telemetry    Potassium   Date Value Ref Range Status   07/23/2023 3.8 3.5 - 5.1 mmol/L Final   07/22/2023 4.0 3.5 - 5.1 mmol/L Final

## 2023-07-23 NOTE — PROGRESS NOTES
Pulmonary/Critical Care Progress Note      PATIENT NAME: Chanel Cervantes  MRN: [id number]  TODAY'S DATE: 2023  8:19 AM  ADMIT DATE: 2023  AGE: 67 y.o. : 1955    CONSULT REQUESTED BY: Enrike Huff MD    REASON FOR CONSULT:   Critical care management    HPI:  The patient is a 67-year-old female who was brought to the hospital for back pain.  She was having bilateral back pain and upper abdominal pain that was not controlled with ibuprofen and came it.  She was found to have dilated common bile duct and elevated LFTs.  She is in shock requiring Levophed.  She is bacteremic with E coli and E faecium.  Her ERCP released sludge from the common bile duct. She is very encephalopathic.     the patient has an agitated delirium this morning.  She appears very uncomfortable.  She is tachypneic.  Her pressor requirements are increasing, her urine output is decreasing.    - pt was intubated yesterday due to encephalopathy and continues on ventilator, sedated. Her pressor requirement is improved. Tmax 100.2 this am.  Daughter at bedside states usually pt is independent and has never been sick like this before.    - continues on vent, sedated, off pressor. Tmax 100 yesterday  - pt had ERICA today which was normal. Pressor off. Continues on vent. Has been afebrile. Off sedation she is very weak but able to open eyes     REVIEW OF SYSTEMS  Unobtainable.    No change in the patient's Past Medical History, Past Surgical History, Social History or Family History since admission.    VITAL SIGNS (MOST RECENT)  Temp: 99 °F (37.2 °C) (23 1557)  Pulse: (!) 49 (23 0957)  Resp: 20 (23 0957)  BP: (!) 113/57 (23 0630)  SpO2: 96 % (23 0957)    INTAKE AND OUTPUT (LAST 24 HOURS):  Intake/Output Summary (Last 24 hours) at 2023 1120  Last data filed at 2023 0837  Gross per 24 hour   Intake 1390.47 ml   Output 1475 ml   Net -84.53 ml         WEIGHT  Wt Readings from Last 1  Encounters:   07/19/23 108.9 kg (239 lb 15.9 oz)       PHYSICAL EXAM  GENERAL: Older patient sedated and intubated. Examined off sedation, weakly opens eyes, weakly moves fingers and toes  HEENT: Pupils equal and reactive. Nose intact. Pharynx intubated  NECK: Supple. L IJ triple lumen catheter.  HEART: Regular rate and rhythm. No murmur or gallop auscultated.  LUNGS:  clear/diminished bilaterally anteriorly  ABDOMEN: Bowel sounds diminished Very large ventral hernia.  Very obese, soft  : Normal anatomy.Waller with a little urine.  EXTREMITIES: Normal muscle tone and joint movement, no cyanosis or clubbing. Scar at L shoulder.   There is a radial arterial catheter on the right  LYMPHATICS: No adenopathy palpated, 1+ pitting edema bilat upper ext  SKIN: Dry, intact, no lesions.   NEURO: sedated  PSYCH: unable to assess      CBC LAST (LAST 24 HOURS)  Recent Labs   Lab 07/23/23  0608   WBC 13.19*   RBC 3.88*   HGB 10.5*   HCT 31.2*   MCV 80*   MCH 27.1   MCHC 33.7   RDW 15.9*   PLT 80*   MPV 12.7   GRAN 86.6*  11.4*   LYMPH 4.9*  0.7*   MONO 6.7  0.9   BASO 0.02   NRBC 0         CHEMISTRY LAST (LAST 24 HOURS)  Recent Labs   Lab 07/23/23  0319 07/23/23  0608   NA  --  142   K  --  3.8   CL  --  110   CO2  --  23   ANIONGAP  --  9   BUN  --  78*   CREATININE  --  2.8*   GLU  --  305*   CALCIUM  --  7.6*   PH 7.398  --    MG  --  2.7*   ALBUMIN  --  1.8*   PROT  --  4.6*   ALKPHOS  --  256*   ALT  --  194*   AST  --  38   BILITOT  --  1.0           CARDIAC PROFILE (LAST 24 HOURS)  Recent Labs   Lab 07/18/23  1220 07/18/23  2204 07/20/23  0307   BNP 20  --   --    CPK 92  --  245*   TROPONINIHS 14.8   < > 2384.3*    < > = values in this interval not displayed.         LAST 7 DAYS MICROBIOLOGY   Microbiology Results (last 7 days)       Procedure Component Value Units Date/Time    Blood culture [[id number]] Collected: 07/23/23 0825    Order Status: Sent Specimen: Blood Updated: 07/23/23 0900    Blood culture [[id number]]  Collected: 07/20/23 0940    Order Status: Completed Specimen: Blood from Peripheral, Antecubital, Right Updated: 07/22/23 1232     Blood Culture, Routine No Growth to date      No Growth to date      No Growth to date    Narrative:      Collection has been rescheduled by [email or web address] at 07/20/2023 06:04 Reason:   Unable to collect  Collection has been rescheduled by [email or web address] at 07/20/2023 08:01 Reason:   Contacting zainab for cultures per RN Alfred  Collection has been rescheduled by [email or web address] at 07/20/2023 08:41 Reason:   Nurse Draw  Collection has been rescheduled by [email or web address] at 07/20/2023 06:04 Reason:   Unable to collect  Collection has been rescheduled by [email or web address] at 07/20/2023 08:01 Reason:   Contacting zainab for cultures per RN Alfred  Collection has been rescheduled by [email or web address] at 07/20/2023 08:41 Reason:   Nurse Draw    Culture, Respiratory with Gram Stain [[id number]] Collected: 07/20/23 1003    Order Status: Completed Specimen: Respiratory from Endotracheal Aspirate Updated: 07/22/23 0736     Respiratory Culture No growth      No normal respiratory shay     Gram Stain (Respiratory) <10 epithelial cells per low power field.     Gram Stain (Respiratory) Few WBC's     Gram Stain (Respiratory) No organisms seen    Urine Culture High Risk [[id number]] Collected: 07/19/23 1006    Order Status: Completed Specimen: Urine, Clean Catch Updated: 07/22/23 0710     Urine Culture, Routine No growth    Narrative:      Indicated criteria for high risk culture:->Other  Other (specify):->e coli bacteremia    Blood culture [[id number]]  (Abnormal) Collected: 07/20/23 0940    Order Status: Completed Specimen: Blood from Line, Arterial, Right Updated: 07/22/23 0644     Blood Culture, Routine Gram stain aer bottle: Gram positive cocci      Positive results previously called      ENTEROCOCCUS FAECIUM  For susceptibility see order #[id number]      Narrative:      Collection has been rescheduled by [email or web address] at 07/20/2023 06:04 Reason:   Unable to collect  Collection  has been rescheduled by [email or web address] at 07/20/2023 08:01 Reason:   Contacting zainab for cultures per RN Alfred  Collection has been rescheduled by [email or web address] at 07/20/2023 08:41 Reason:   Nurse Draw  Collection has been rescheduled by [email or web address] at 07/20/2023 06:04 Reason:   Unable to collect  Collection has been rescheduled by [email or web address] at 07/20/2023 08:01 Reason:   Contacting zainab for cultures per RN Alfred  Collection has been rescheduled by [email or web address] at 07/20/2023 08:41 Reason:   Nurse Draw    Blood culture [[id number]]  (Abnormal) Collected: 07/19/23 1230    Order Status: Completed Specimen: Blood Updated: 07/22/23 0643     Blood Culture, Routine Gram stain aer bottle: Gram positive cocci      Positive results previously called      ENTEROCOCCUS FAECIUM  For susceptibility see order #[id number]      Blood culture [[id number]]  (Abnormal) Collected: 07/19/23 0002    Order Status: Completed Specimen: Blood Updated: 07/22/23 0643     Blood Culture, Routine Gram stain aer bottle: Gram positive cocci      Gram stain ez bottle: Gram positive cocci      Positive results previously called      ENTEROCOCCUS FAECIUM  For susceptibility see order #[id number]      Blood culture [[id number]]  (Abnormal) Collected: 07/19/23 0030    Order Status: Completed Specimen: Blood from Peripheral, Left Hand Updated: 07/22/23 0643     Blood Culture, Routine Gram stain aer bottle: Gram positive cocci      Gram stain ez bottle: Gram positive cocci      Results called to and read back by:Carmela Mcintosh RN-3ICU;      07/19/2023  16:23 CJD      ENTEROCOCCUS FAECIUM  For susceptibility see order #[id number]      Narrative:      Collection has been rescheduled by KC9 at 07/19/2023 00:20 Reason:   Nurse Draw  Collection has been rescheduled by KC9 at 07/19/2023 00:20 Reason:   Nurse Draw    Blood culture [[id number]]  (Abnormal)  (Susceptibility) Collected: 07/18/23 1648    Order Status: Completed Specimen: Blood Updated: 07/22/23 0642     Blood Culture, Routine Gram stain  aer bottle: Gram negative rods      Gram stain aer bottle: Gram positive cocci      Results called to and read back by: Lesvia Mcdowell RN MICU3.      07/19/2023  05:06 TGC      ESCHERICHIA COLI      ENTEROCOCCUS FAECIUM    Blood culture [[id number]]  (Abnormal) Collected: 07/18/23 1738    Order Status: Completed Specimen: Blood Updated: 07/21/23 0853     Blood Culture, Routine Gram stain aer bottle: Gram negative rods      Results called to and read back by:Lesvia Mcdowell RN MICU3.      07/19/2023  05:23 TGC      ESCHERICHIA COLI  For susceptibility see order #[id number]      Rapid Organism ID by PCR (from Blood culture) [[id number]]  (Abnormal) Collected: 07/18/23 1648    Order Status: Completed Updated: 07/19/23 0526     Enterococcus faecalis Not Detected     Enterococcus faecium Detected     Listeria monocytogenes Not Detected     Staphylococcus spp. Not Detected     Staphylococcus aureus Not Detected     Staphylococcus epidermidis Not Detected     Staphylococcus lugdunensis Not Detected     Streptococcus species Not Detected     Streptococcus agalactiae Not Detected     Streptococcus pneumoniae Not Detected     Streptococcus pyogenes Not Detected     Acinetobacter calcoaceticus/baumannii complex Not Detected     Bacteroides fragilis Not Detected     Enterobacterales See species for ID     Enterobacter cloacae complex Not Detected     Escherichia coli Detected     Klebsiella aerogenes Not Detected     Klebsiella oxytoca Not Detected     Klebsiella pneumoniae group Not Detected     Proteus Not Detected     Salmonella sp Not Detected     Serratia marcescens Not Detected     Haemophilus influenzae Not Detected     Neisseria meningtidis Not Detected     Pseudomonas aeruginosa Not Detected     Stenotrophomonas maltophilia Not Detected     Candida albicans Not Detected     Candida auris Not Detected     Candida glabrata Not Detected     Candida krusei Not Detected     Candida parapsilosis Not Detected     Candida  tropicalis Not Detected     Cryptococcus neoformans/gattii Not Detected     CTX-M (ESBL ) Not Detected     IMP (Carbapenem resistant) Not Detected     KPC resistance gene (Carbapenem resistant) Not Detected     mcr-1  Not Detected     mec A/C  Test not applicable     mec A/C and MREJ (MRSA) gene Test not applicable     NDM (Carbapenem resistant) Not Detected     OXA-48-like (Carbapenem resistant) Not Detected     van A/B (VRE gene) Not Detected     VIM (Carbapenem resistant) Not Detected            MOST RECENT IMAGING  X-Ray Chest AP Portable  Portable chest x-ray at 6:00 AM is compared to prior study 7/21/2023    Clinical history is respiratory distress, ventilator    The endotracheal tube, NG tube and left IJ catheter in stable position.    The heart is enlarged. There is hypoinflation there is mild prominence of the pulmonary vasculature suggestive of congestion. There are faint groundglass opacities in the lung bases. There are no confluent infiltrates or large pleural effusion.    There is a reverse right shoulder arthroplasty    IMPRESSION: Hypoinflation with mild prominence of the pulmonary vasculature and faint groundglass opacity in the lung bases suggestive of pulmonary edema    Electronically signed by:  Jane Callahan MD  7/22/2023 8:46 AM CDT Workstation: [id number]      CURRENT VISIT EKG  Results for orders placed or performed during the hospital encounter of 07/18/23   EKG 12-lead    Narrative    Test Reason : W19.XXXA,    Vent. Rate : 073 BPM     Atrial Rate : 073 BPM     P-R Int : 154 ms          QRS Dur : 162 ms      QT Int : 426 ms       P-R-T Axes : 069 -17 -11 degrees     QTc Int : 469 ms    Sinus rhythm with Premature ventricular complexes or Fusion complexes  Right bundle branch block  Minimal voltage criteria for LVH, may be normal variant ( R in aVL )  Abnormal ECG  When compared with ECG of 07-AUG-2021 22:34,  Fusion complexes are now Present  Premature ventricular complexes are  now Present    Referred By: [email or web address]   SELF           Confirmed By:        ECHOCARDIOGRAM RESULTS  No results found for this or any previous visit.        Oxygen INFORMATION  Vent Mode: A/C  Oxygen Concentration (%):  [30] 30  Resp Rate Total:  [20 br/min-25 br/min] 20 br/min  Vt Set:  [450 mL] 450 mL  PEEP/CPAP:  [5 cmH20] 5 cmH20  Mean Airway Pressure:  [8.4 sdY82-67 cmH20] 9.5 [id number] liters           LAST ARTERIAL BLOOD GAS  ABG  Recent Labs   Lab 07/23/23  0319   PH 7.398   PO2 78*   PCO2 36.7   HCO3 22.6*   BE -2         IMPRESSION AND PLAN  Severe sepsis with shock, biliary source, with biliary source and possible pancreatitis  - bacteremia with E coli and E faecium  - sludge released from ERCP  - antibiotics per ID  - levophed as needed  - dc hydrocortisone    Shock liver  - monitor LFTs- downtrending    Morbid obesity  moderate hypoalbuminemia  - start trophic tube feeds via gastric tube    Anemia  - watch H/H    Thrombocytopenia, may be due to sepsis vs meds  - HIT panel pending    Systolic heart failure  Diastolic heart failure  Pulmonary hypertension  Demand ischemia  - appreciate cardiology recs    Acute encephalopathy, metabolic  - intubated for airway protection  - continue vent support- unable to extubate today due to profound weakness  - daily SAT/SBT   - continue to hold sedation, use precedex if needed  - continue PSV for now, back on vent at night    Critical care time spent reviewing the chart, examining the patient, reviewing the labs, reviewing the radiological findings, discussing care with nursing, physicians, and respiratory and creating the note and  has been greater than 35 minutes.      Ailyn Bruner MD  Date of Service: 07/23/2023  8:19 AM

## 2023-07-23 NOTE — SUBJECTIVE & OBJECTIVE
Interval History:  Remains on ventilator.  Uremia is worsening.  Not requiring vasopressor.  Platelet count has dropped.    Review of Systems   Unable to perform ROS: Intubated   Objective:     Vital Signs (Most Recent):  Temp: 99 °F (37.2 °C) (07/22/23 1557)  Pulse: (!) 58 (07/22/23 2057)  Resp: (!) 22 (07/22/23 2057)  BP: (!) 91/51 (07/22/23 1900)  SpO2: 95 % (07/22/23 2057) Vital Signs (24h Range):  Temp:  [98.3 °F (36.8 °C)-99 °F (37.2 °C)] 99 °F (37.2 °C)  Pulse:  [53-80] 58  Resp:  [0-31] 22  SpO2:  [70 %-100 %] 95 %  BP: ()/(40-69) 91/51  Arterial Line BP: ()/(40-99) 90/40     Weight: 108.9 kg (239 lb 15.9 oz)  Body mass index is 37.6 kg/m².    Intake/Output Summary (Last 24 hours) at 7/22/2023 2125  Last data filed at 7/22/2023 1859  Gross per 24 hour   Intake 1510.38 ml   Output 1330 ml   Net 180.38 ml           Physical Exam  Constitutional:       Interventions: She is sedated and intubated.   Eyes:      Conjunctiva/sclera:      Right eye: No exudate.     Left eye: No exudate.  Neck:      Vascular: No JVD.   Cardiovascular:      Rate and Rhythm: Normal rate and regular rhythm.      Comments: Upper extremities have mild pitting edema  Pulmonary:      Effort: She is intubated.      Breath sounds: Decreased air movement present.   Abdominal:      General: Abdomen is flat. Bowel sounds are decreased. There is distension.      Hernia: A hernia is present. Hernia is present in the ventral area.   Skin:     General: Skin is warm and dry.           Significant Labs: All pertinent labs within the past 24 hours have been reviewed.    Significant Imaging: I have reviewed all pertinent imaging results/findings within the past 24 hours.

## 2023-07-23 NOTE — ASSESSMENT & PLAN NOTE
-Treat cholangitis  -Trend LFTs  -Continue IV fluids and Levophed    Lab Results   Component Value Date     (H) 07/22/2023     (H) 07/22/2023    GGT 24 05/17/2018    ALKPHOS 286 (H) 07/22/2023    BILITOT 1.3 (H) 07/22/2023

## 2023-07-23 NOTE — ASSESSMENT & PLAN NOTE
In setting of septic shock.  -Continue Levophed and IV fluids  -Renally dose medications  -Avoid nephrotoxic agents  -Monitor UOP and electrolytes  -Trend creatinine  -nephrologist consulting    Cr has trended upward:  2.4 to 2.6 to 2.9 to 2.8.  BUN is going up as well:  49 to 56 to 64

## 2023-07-23 NOTE — PROGRESS NOTES
[facility]  Progress Note    Patient Name: Chanel Cervantes  MRN: [id number]  Patient Class: IP- Inpatient   Admission Date: 7/18/2023  Length of Stay: 4 days  Attending Physician: Enrike Huff MD  Primary Care Provider: ARIC Almaguer        Subjective:     Principal Problem:Septic shock        HPI:  Chanel Cervantes is a 67 y.o. White female   With PMH of DM2, HTN, bariatric surgery,  Remote cholecystectomy,  Frequent UTIs with kidney stones,  who presents with back pain.  Admitted for pancreatitis with elevated LFTs     Pt is currently confused after receiving ativan  (ER gave it to calm her for CT scan)  History taken from family at bedside     Onset of back pain overnight  Located b/l lumbar region  Radiating to b/l upper quadrants of abdomen  Occurring intermittently  Progressively worsening  Severe, causes SOB when occurring     Initially pt thought pain was due to a fall yesterday  (Mechanical, tripped over a rug, no head trauma)  +nausea, no vomiting  +intermittent chills  They are not sure about objective fever     Has had similar abdominal pain previously  Per family, this has been attributed to her ongoing ventral wall hernia  They don't bring her to ER for this usually  However pain today was much more severe than usual      Overview/Hospital Course:  Chanel Cervantes is a 67 year old female with a past medical history of obesity, ventral hernia, DM, HTN, anemia, CAD, NIKOLAS, and hypothyroidism who presented with septic shock secondary to a possible cholangitis with E coli bacteremia. Vancomycin was discontinued and she remained on meropenem. BP was maintained on a Levophed infusion. GI was consulted and performed ERCP which showed biliary sludge with a sludge ball dilating the main duct which was removed; a biliary sphincterotomy was also performed. Repeat blood cultures were negative. She was on IV fluids with LR and was NPO as she was encephalopathic. She also had an ANTHONY  as well. She also had acute hypoxic respiratory failure for which she was put on mask O2. She also had demand ischemia; TTE was done and troponin was trended. There was shock liver superimposed on the hepatocellular injury brought on by the acute cholangitis. Pulmonary/Critical Care was consulted given the patient's medical acuity.  Pulmonologist made decision to intubate patient based on patient's deep encephalopathy and inability to protect her airway.      Interval History:  Remains on ventilator.  Patient underwent transesophageal echocardiography.  On propofol and Levophed infusions.  Platelet count has dropped.    Review of Systems   Unable to perform ROS: Intubated   Objective:     Vital Signs (Most Recent):  Temp: 99 °F (37.2 °C) (07/22/23 1557)  Pulse: (!) 49 (07/23/23 0957)  Resp: 20 (07/23/23 0957)  BP: (!) 113/57 (07/23/23 0630)  SpO2: 96 % (07/23/23 0957) Vital Signs (24h Range):  Temp:  [98.9 °F (37.2 °C)-99 °F (37.2 °C)] 99 °F (37.2 °C)  Pulse:  [47-80] 49  Resp:  [20-31] 20  SpO2:  [70 %-100 %] 96 %  BP: ()/(40-67) 113/57  Arterial Line BP: ()/() 120/53     Weight: 108.9 kg (239 lb 15.9 oz)  Body mass index is 37.6 kg/m².    Intake/Output Summary (Last 24 hours) at 7/23/2023 1002  Last data filed at 7/23/2023 0837  Gross per 24 hour   Intake 1390.47 ml   Output 1475 ml   Net -84.53 ml           Physical Exam  Constitutional:       Interventions: She is sedated and intubated.   Eyes:      Conjunctiva/sclera:      Right eye: No exudate.     Left eye: No exudate.  Neck:      Vascular: No JVD.   Cardiovascular:      Rate and Rhythm: Normal rate and regular rhythm.      Comments: Upper extremities have mild pitting edema  Pulmonary:      Effort: She is intubated.      Breath sounds: Decreased air movement present.   Abdominal:      General: Abdomen is flat. Bowel sounds are decreased. There is distension.      Hernia: A hernia is present. Hernia is present in the ventral area.   Skin:      General: Skin is warm and dry.           Significant Labs:   Lab Results   Component Value Date    WBC 13.19 (H) 07/23/2023    HGB 10.5 (L) 07/23/2023    HCT 31.2 (L) 07/23/2023    MCV 80 (L) 07/23/2023    PLT 80 (L) 07/23/2023       CMP  Sodium   Date Value Ref Range Status   07/23/2023 142 136 - 145 mmol/L Final   01/14/2019 141 134 - 144 mmol/L      Potassium   Date Value Ref Range Status   07/23/2023 3.8 3.5 - 5.1 mmol/L Final     Chloride   Date Value Ref Range Status   07/23/2023 110 95 - 110 mmol/L Final   01/14/2019 96 (L) 98 - 110 mmol/L      CO2   Date Value Ref Range Status   07/23/2023 23 23 - 29 mmol/L Final     Glucose   Date Value Ref Range Status   07/23/2023 305 (H) 70 - 110 mg/dL Final   01/14/2019 177 (H) 70 - 99 mg/dL      BUN   Date Value Ref Range Status   07/23/2023 78 (H) 8 - 23 mg/dL Final     Creatinine   Date Value Ref Range Status   07/23/2023 2.8 (H) 0.5 - 1.4 mg/dL Final   01/14/2019 0.99 0.60 - 1.40 mg/dL      Calcium   Date Value Ref Range Status   07/23/2023 7.6 (L) 8.7 - 10.5 mg/dL Final     Total Protein   Date Value Ref Range Status   07/23/2023 4.6 (L) 6.0 - 8.4 g/dL Final     Albumin   Date Value Ref Range Status   07/23/2023 1.8 (L) 3.5 - 5.2 g/dL Final   01/14/2019 3.8 3.1 - 4.7 g/dL      Total Bilirubin   Date Value Ref Range Status   07/23/2023 1.0 0.1 - 1.0 mg/dL Final     Comment:     For infants and newborns, interpretation of results should be based  on gestational age, weight and in agreement with clinical  observations.    Premature Infant recommended reference ranges:  Up to 24 hours.............<8.0 mg/dL  Up to 48 hours............<12.0 mg/dL  3-5 days..................<15.0 mg/dL  6-29 days.................<15.0 mg/dL       Alkaline Phosphatase   Date Value Ref Range Status   07/23/2023 256 (H) 55 - 135 U/L Final     AST   Date Value Ref Range Status   07/23/2023 38 10 - 40 U/L Final     ALT   Date Value Ref Range Status   07/23/2023 194 (H) 10 - 44 U/L Final      Anion Gap   Date Value Ref Range Status   07/23/2023 9 8 - 16 mmol/L Final     eGFR   Date Value Ref Range Status   07/23/2023 17.9 (A) >60 mL/min/1.73 m^2 Final     Microbiology Results (last 7 days)       Procedure Component Value Units Date/Time    Blood culture [[id number]] Collected: 07/23/23 0825    Order Status: Sent Specimen: Blood Updated: 07/23/23 0900    Blood culture [[id number]] Collected: 07/20/23 0940    Order Status: Completed Specimen: Blood from Peripheral, Antecubital, Right Updated: 07/22/23 1232     Blood Culture, Routine No Growth to date      No Growth to date      No Growth to date    Narrative:      Collection has been rescheduled by [email or web address] at 07/20/2023 06:04 Reason:   Unable to collect  Collection has been rescheduled by [email or web address] at 07/20/2023 08:01 Reason:   Contacting zainab for cultures per RN Alfred  Collection has been rescheduled by CLC4 at 07/20/2023 08:41 Reason:   Nurse Draw  Collection has been rescheduled by [email or web address] at 07/20/2023 06:04 Reason:   Unable to collect  Collection has been rescheduled by CLC4 at 07/20/2023 08:01 Reason:   Contacting zainab for cultures per RN Alfred  Collection has been rescheduled by CLC4 at 07/20/2023 08:41 Reason:   Nurse Draw    Culture, Respiratory with Gram Stain [[id number]] Collected: 07/20/23 1003    Order Status: Completed Specimen: Respiratory from Endotracheal Aspirate Updated: 07/22/23 0736     Respiratory Culture No growth      No normal respiratory shay     Gram Stain (Respiratory) <10 epithelial cells per low power field.     Gram Stain (Respiratory) Few WBC's     Gram Stain (Respiratory) No organisms seen    Urine Culture High Risk [[id number]] Collected: 07/19/23 1006    Order Status: Completed Specimen: Urine, Clean Catch Updated: 07/22/23 0710     Urine Culture, Routine No growth    Narrative:      Indicated criteria for high risk culture:->Other  Other (specify):->e coli bacteremia    Blood culture [[id number]]  (Abnormal) Collected: 07/20/23  0940    Order Status: Completed Specimen: Blood from Line, Arterial, Right Updated: 07/22/23 0644     Blood Culture, Routine Gram stain aer bottle: Gram positive cocci      Positive results previously called      ENTEROCOCCUS FAECIUM  For susceptibility see order #[id number]      Narrative:      Collection has been rescheduled by CLC4 at 07/20/2023 06:04 Reason:   Unable to collect  Collection has been rescheduled by CLC4 at 07/20/2023 08:01 Reason:   Contacting zainab for cultures per RN Alfred  Collection has been rescheduled by CLC4 at 07/20/2023 08:41 Reason:   Nurse Draw  Collection has been rescheduled by CLC4 at 07/20/2023 06:04 Reason:   Unable to collect  Collection has been rescheduled by CLC4 at 07/20/2023 08:01 Reason:   Contacting zainab for cultures per RN Alfred  Collection has been rescheduled by CLC4 at 07/20/2023 08:41 Reason:   Nurse Draw    Blood culture [[id number]]  (Abnormal) Collected: 07/19/23 1230    Order Status: Completed Specimen: Blood Updated: 07/22/23 0643     Blood Culture, Routine Gram stain aer bottle: Gram positive cocci      Positive results previously called      ENTEROCOCCUS FAECIUM  For susceptibility see order #[id number]      Blood culture [[id number]]  (Abnormal) Collected: 07/19/23 0002    Order Status: Completed Specimen: Blood Updated: 07/22/23 0643     Blood Culture, Routine Gram stain aer bottle: Gram positive cocci      Gram stain ez bottle: Gram positive cocci      Positive results previously called      ENTEROCOCCUS FAECIUM  For susceptibility see order #[id number]      Blood culture [[id number]]  (Abnormal) Collected: 07/19/23 0030    Order Status: Completed Specimen: Blood from Peripheral, Left Hand Updated: 07/22/23 0643     Blood Culture, Routine Gram stain aer bottle: Gram positive cocci      Gram stain ez bottle: Gram positive cocci      Results called to and read back by:Carmela Mcintosh RN-3ICU;      07/19/2023  16:23 JAKY      ENTEROCOCCUS FAECIUM  For  susceptibility see order #[id number]      Narrative:      Collection has been rescheduled by KC9 at 07/19/2023 00:20 Reason:   Nurse Draw  Collection has been rescheduled by KC9 at 07/19/2023 00:20 Reason:   Nurse Draw    Blood culture [[id number]]  (Abnormal)  (Susceptibility) Collected: 07/18/23 1648    Order Status: Completed Specimen: Blood Updated: 07/22/23 0642     Blood Culture, Routine Gram stain aer bottle: Gram negative rods      Gram stain aer bottle: Gram positive cocci      Results called to and read back by: Lesvia Mcdowell RN MICU3.      07/19/2023  05:06 TGC      ESCHERICHIA COLI      ENTEROCOCCUS FAECIUM    Blood culture [[id number]]  (Abnormal) Collected: 07/18/23 1738    Order Status: Completed Specimen: Blood Updated: 07/21/23 0853     Blood Culture, Routine Gram stain aer bottle: Gram negative rods      Results called to and read back by:Lesvia Mcdowell RN MICU3.      07/19/2023  05:23 TGC      ESCHERICHIA COLI  For susceptibility see order #[id number]      Rapid Organism ID by PCR (from Blood culture) [[id number]]  (Abnormal) Collected: 07/18/23 1648    Order Status: Completed Updated: 07/19/23 0526     Enterococcus faecalis Not Detected     Enterococcus faecium Detected     Listeria monocytogenes Not Detected     Staphylococcus spp. Not Detected     Staphylococcus aureus Not Detected     Staphylococcus epidermidis Not Detected     Staphylococcus lugdunensis Not Detected     Streptococcus species Not Detected     Streptococcus agalactiae Not Detected     Streptococcus pneumoniae Not Detected     Streptococcus pyogenes Not Detected     Acinetobacter calcoaceticus/baumannii complex Not Detected     Bacteroides fragilis Not Detected     Enterobacterales See species for ID     Enterobacter cloacae complex Not Detected     Escherichia coli Detected     Klebsiella aerogenes Not Detected     Klebsiella oxytoca Not Detected     Klebsiella pneumoniae group Not Detected     Proteus Not Detected     Salmonella  sp Not Detected     Serratia marcescens Not Detected     Haemophilus influenzae Not Detected     Neisseria meningtidis Not Detected     Pseudomonas aeruginosa Not Detected     Stenotrophomonas maltophilia Not Detected     Candida albicans Not Detected     Candida auris Not Detected     Candida glabrata Not Detected     Candida krusei Not Detected     Candida parapsilosis Not Detected     Candida tropicalis Not Detected     Cryptococcus neoformans/gattii Not Detected     CTX-M (ESBL ) Not Detected     IMP (Carbapenem resistant) Not Detected     KPC resistance gene (Carbapenem resistant) Not Detected     mcr-1  Not Detected     mec A/C  Test not applicable     mec A/C and MREJ (MRSA) gene Test not applicable     NDM (Carbapenem resistant) Not Detected     OXA-48-like (Carbapenem resistant) Not Detected     van A/B (VRE gene) Not Detected     VIM (Carbapenem resistant) Not Detected        Significant Imaging:   ECHO:   The left ventricle is normal in size with mild concentric hypertrophy and mildly decreased systolic function.   Mild to moderate tricuspid regurgitation.   The estimated ejection fraction is 45%.   Grade I left ventricular diastolic dysfunction.   There is abnormal septal wall motion consistent with left bundle branch block.   Mild right ventricular enlargement with normal right ventricular systolic function.   Moderate left atrial enlargement.   Normal central venous pressure (3 mmHg).   The estimated PA systolic pressure is 40 mmHg.   There is mild pulmonary hypertension.    ERCP:   - The entire main bile duct was moderately                          dilated, with an obstruction from suspected sludge                          ball                          - The patient has had a cholecystectomy.                          - A biliary sphincterotomy was performed.                          - The biliary tree was swept.     CTA chest:  1. Negative for pulmonary thromboembolism.  2. Scattered  lower lung interstitial opacities left greater than right, potentially subsegmental atelectasis and or small airways inflammation.  3. A 7 mm left upper lobe noncalcified pulmonary nodule, nonspecific. Pulmonary neoplasm is not excluded. A follow-up noncontrast chest CT in 6 months is recommended, per Fleischner Society Recommendations. Reference:  Radiology. 2017 Jul; 284(1):228-243.  4. Cardiomegaly, with aortic and coronary arterial calcifications.  5. Hiatal hernia.    CT abdomen and pelvis with contrast:  1.  Status post cholecystectomy with postsurgical dilatation of the common bile duct and intrahepatic ducts. Should be correlated with bilirubin levels.  2.  Broad-based ventral abdominal wall hernia measures approximately 15 x 13 cm with herniated loops of large and small bowel. No evidence of obstruction.  3.  Exophytic hypoattenuating structure in the mid left kidney is felt to reflect a hemorrhagic cyst.    RUQ US:  The gallbladder surgically absent. The common duct is within normal limits.  Hepatic steatosis and hepatomegaly.    CXR:  1.  Enlarged cardiomediastinal silhouette and central hilar vascular structures. Mild perihilar interstitial thickening. Findings are felt to reflect CHF with mild pulmonary edema.  2.  Small bilateral pleural effusions.    CXR: Interval improved aeration in both lung bases, with otherwise no significant change.    CXR: Hypoinflation with mild prominence of the pulmonary vasculature and faint groundglass opacity in the lung bases suggestive of pulmonary edema      Assessment/Plan:      * Septic shock due to Entercoccus faecium and E.coli  Secondary to acute cholangitis  -continue IVAB  -cultures reviewed  -S/p ERCP; GI following  -Trend lactic acid  -Continue LR IV fluids and Levophed to keep MAP > 65    Antibiotics (From admission, onward)    Start     Stop Route Frequency Ordered    07/21/23 1400  DAPTOmycin (CUBICIN) 805 mg in sodium chloride 0.9% SolP 50 mL IVPB          "-- IV Every 48 hours (non-standard times) 07/20/23 0749    07/19/23 1300  meropenem 1 g in sodium chloride 0.9 % 100 mL IVPB (ready to mix system)        Note to Pharmacy: Ht: 5' 7" (1.702 m)  Wt: 108.9 kg (240 lb)  Estimated Creatinine Clearance: 63.1 mL/min (based on SCr of 1.1 mg/dL).  Body mass index is 37.59 kg/m².    -- IV Every 12 hours (non-standard times) 07/19/23 0740              Gallstone pancreatitis  Present on hospital day 1.  Continue vasopressor support and crystalloid.      Encephalopathy, metabolic  Metabolic in setting of septic shock.  -assess mental status when off sedation  -Treat septic shock      Shock liver  -Treat cholangitis  -Trend LFTs, much improved.  -Continue IV fluids and Levophed    Lab Results   Component Value Date     (H) 07/23/2023    AST 38 07/23/2023    GGT 24 05/17/2018    ALKPHOS 256 (H) 07/23/2023    BILITOT 1.0 07/23/2023         ANTHONY (acute kidney injury)  In setting of septic shock.  -Continue Levophed and IV fluids  -Renally dose medications  -Avoid nephrotoxic agents  -Monitor UOP and electrolytes  -Trend creatinine  -nephrologist consulting    Cr has trended upward:  2.4 to 2.6 to 2.9 to 2.8.  BUN is going up as well:  49 to 56 to 64       Demand ischemia  History of CAD s/p CABG. No acute ST changes on EKG.  -Trend troponin  -Treat septic shock  -Telemetry  -cardiology consulting -- their opinion is that the troponin is high b/c of myocardial demand ischemia    Troponin I High Sensitivity   Date Value Ref Range Status   07/20/2023 2384.3 (HH) 0.0 - 14.9 pg/mL Final     Comment:     Troponin results differ between methods. Do not use   results between Troponin methods interchangeably.    Alkaline Phospatase levels above 400 U/L may   cause false positive results.    Access hsTnI should not be used for patients taking   Asfotase anya (Strensiq).  Troponin critical result(s) called and verbal readback obtained   from [place] Avila RN M3 by MS1 07/20/2023 " 04:54     07/19/2023 1148.1 (HH) 0.0 - 14.9 pg/mL Final     Comment:     Troponin results differ between methods. Do not use   results between Troponin methods interchangeably.    Alkaline Phospatase levels above 400 U/L may   cause false positive results.    Access hsTnI should not be used for patients taking   Asfotase anya (Strensiq).  Troponin critical result(s) called and verbal readback obtained from   Karan Maldonado RN M3  by MS1 07/19/2023 22:10     07/19/2023 648.1 (HH) 0.0 - 14.9 pg/mL Final     Comment:     Troponin results differ between methods. Do not use   results between Troponin methods interchangeably.    Alkaline Phospatase levels above 400 U/L may   cause false positive results.    Access hsTnI should not be used for patients taking   Asfotase anya (Strensiq).  Results confirmed, test repeated  Troponin critical result(s) repeated. Called and verbal readback   obtained from Carmela McintoshICU, RN.  by SLT1 07/19/2023 17:37         Acute hypoxemic respiratory failure  -worse  Required intubation on hospital day 3.  Pulmonologist consulting.  Get ABG's as needed.      Anemia  Stable.  -Trend Hgb with CBC    Hemoglobin   Date Value Ref Range Status   07/23/2023 10.5 (L) 12.0 - 16.0 g/dL Final   07/22/2023 9.9 (L) 12.0 - 16.0 g/dL Final           Obesity (BMI 30-39.9)  Body mass index is 37.6 kg/m². Morbid obesity complicates all aspects of disease management from diagnostic modalities to treatment.       Hypothyroidism  TSH unremarkable.  It's 0.470.  -Continue Synthroid      Acute obstructive cholangitis  Sludge ball removed via ERCP. Likely source of bacteremia. LFTs much improved.  -Continue meropenem and daptomycin, renally dosed; follow sensitivities  -GI following  -IV fluids  -Levophed infusion  - blood cultures:  Entercoccus faecium and E.coli  -Trend LFTs      CAD (coronary artery disease)  -Hold home medications  -Telemetry      Hypokalemia  -Trend K.  Has improved.  -Replete K  PRN  -Telemetry    Potassium   Date Value Ref Range Status   07/23/2023 3.8 3.5 - 5.1 mmol/L Final   07/22/2023 4.0 3.5 - 5.1 mmol/L Final         S/P bariatric surgery  Noted.      NIKOLAS on CPAP  -chronic condition.    Type 2 diabetes mellitus treated with insulin  Patient's FSGs are controlled on current medication regimen.  Last A1c reviewed-   Lab Results   Component Value Date    HGBA1C 6.9 (H) 07/18/2023     'Current correctional scale  Medium  Maintain anti-hyperglycemic dose as follows-   Antihyperglycemics (From admission, onward)    Start     Stop Route Frequency Ordered    07/19/23 1018  insulin aspart U-100 pen 1-10 Units         -- SubQ Every 6 hours PRN 07/19/23 0920        Hold Oral hypoglycemics while patient is in the hospital.    Benign essential hypertension  -Hold home medications in setting of shock      Reportedly ERICA is negative for endocarditis.  Discussed with Dr. Giordano from critical Care.  VTE Risk Mitigation (From admission, onward)         Ordered     enoxaparin injection 30 mg  Every 24 hours (non-standard times)         07/22/23 0940     IP VTE HIGH RISK PATIENT  Once         07/18/23 1642     Place sequential compression device  Until discontinued         07/18/23 1642                Discharge Planning   LUIS ALFREDO:  TBD    Code Status: Full Code   Is the patient medically ready for discharge?:     Reason for patient still in hospital (select all that apply): Patient trending condition and Consult recommendations  Discharge Plan A: Home                  Enrike Huff MD  Department of Hospital Medicine   [facility] hospital

## 2023-07-23 NOTE — PLAN OF CARE
ERICA completed this am with Dr. Pratt. Patient given atropine prior to start for heart rate sinus kieran in the 40s. Remains kieran this afternoon with heart rate in the 50s. Dr. Pratt is aware. Levophed off all day. BP stable. Afebrile. Propofol off and patient beginning to respond with very weak hand squeeze, opening eyes to voice, turning head to voice. Patient tolerated SBT and will remain on PSV this shift and return to vent settings tonight or sooner if needed per Dr. Bruner. Urine output better. See I/O. Safety measures in place and daughter at bedside most of day - supportive of patient.    Problem: Adult Inpatient Plan of Care  Goal: Plan of Care Review  Outcome: Ongoing, Progressing  Goal: Patient-Specific Goal (Individualized)  Outcome: Ongoing, Progressing  Goal: Absence of Hospital-Acquired Illness or Injury  Outcome: Ongoing, Progressing  Goal: Optimal Comfort and Wellbeing  Outcome: Ongoing, Progressing  Goal: Readiness for Transition of Care  Outcome: Ongoing, Progressing     Problem: Diabetes Comorbidity  Goal: Blood Glucose Level Within Targeted Range  Outcome: Ongoing, Progressing     Problem: Skin Injury Risk Increased  Goal: Skin Health and Integrity  Outcome: Ongoing, Progressing     Problem: Infection  Goal: Absence of Infection Signs and Symptoms  Outcome: Ongoing, Progressing     Problem: Glycemic Control Impaired (Sepsis/Septic Shock)  Goal: Blood Glucose Level Within Desired Range  Outcome: Ongoing, Progressing     Problem: Fluid and Electrolyte Imbalance (Acute Kidney Injury/Impairment)  Goal: Fluid and Electrolyte Balance  Outcome: Ongoing, Progressing     Problem: Fall Injury Risk  Goal: Absence of Fall and Fall-Related Injury  Outcome: Ongoing, Progressing

## 2023-07-24 LAB
ALBUMIN SERPL BCP-MCNC: 1.8 G/DL (ref 3.5–5.2)
ALLENS TEST: ABNORMAL
ALLENS TEST: ABNORMAL
ALP SERPL-CCNC: 217 U/L (ref 55–135)
ALT SERPL W/O P-5'-P-CCNC: 127 U/L (ref 10–44)
ANION GAP SERPL CALC-SCNC: 8 MMOL/L (ref 8–16)
AST SERPL-CCNC: 24 U/L (ref 10–40)
BASOPHILS # BLD AUTO: 0.03 K/UL (ref 0–0.2)
BASOPHILS NFR BLD: 0.2 % (ref 0–1.9)
BILIRUB SERPL-MCNC: 1 MG/DL (ref 0.1–1)
BNP SERPL-MCNC: 504 PG/ML (ref 0–99)
BUN SERPL-MCNC: 82 MG/DL (ref 8–23)
CALCIUM SERPL-MCNC: 7.8 MG/DL (ref 8.7–10.5)
CHLORIDE SERPL-SCNC: 114 MMOL/L (ref 95–110)
CO2 SERPL-SCNC: 24 MMOL/L (ref 23–29)
CREAT SERPL-MCNC: 2.5 MG/DL (ref 0.5–1.4)
CRP SERPL-MCNC: 6.33 MG/DL
DELSYS: ABNORMAL
DELSYS: ABNORMAL
DIFFERENTIAL METHOD: ABNORMAL
EOSINOPHIL # BLD AUTO: 0.2 K/UL (ref 0–0.5)
EOSINOPHIL NFR BLD: 1.2 % (ref 0–8)
ERYTHROCYTE [DISTWIDTH] IN BLOOD BY AUTOMATED COUNT: 15.8 % (ref 11.5–14.5)
ERYTHROCYTE [SEDIMENTATION RATE] IN BLOOD BY WESTERGREN METHOD: 20 MM/H
EST. GFR  (NO RACE VARIABLE): 20.6 ML/MIN/1.73 M^2
FIO2: 30
FIO2: 30
GLUCOSE SERPL-MCNC: 178 MG/DL (ref 70–110)
GLUCOSE SERPL-MCNC: 200 MG/DL (ref 70–110)
GLUCOSE SERPL-MCNC: 202 MG/DL (ref 70–110)
GLUCOSE SERPL-MCNC: 202 MG/DL (ref 70–110)
GLUCOSE SERPL-MCNC: 207 MG/DL (ref 70–110)
GLUCOSE SERPL-MCNC: 207 MG/DL (ref 70–110)
GLUCOSE SERPL-MCNC: 224 MG/DL (ref 70–110)
HCO3 UR-SCNC: 25.7 MMOL/L (ref 24–28)
HCO3 UR-SCNC: 26.3 MMOL/L (ref 24–28)
HCT VFR BLD AUTO: 33.4 % (ref 37–48.5)
HCT VFR BLD CALC: 31 %PCV (ref 36–54)
HCT VFR BLD CALC: 31 %PCV (ref 36–54)
HGB BLD-MCNC: 10.6 G/DL (ref 12–16)
IMM GRANULOCYTES # BLD AUTO: 0.36 K/UL (ref 0–0.04)
IMM GRANULOCYTES NFR BLD AUTO: 2.7 % (ref 0–0.5)
LYMPHOCYTES # BLD AUTO: 1 K/UL (ref 1–4.8)
LYMPHOCYTES NFR BLD: 7.1 % (ref 18–48)
MAGNESIUM SERPL-MCNC: 2.5 MG/DL (ref 1.6–2.6)
MCH RBC QN AUTO: 26.2 PG (ref 27–31)
MCHC RBC AUTO-ENTMCNC: 31.7 G/DL (ref 32–36)
MCV RBC AUTO: 83 FL (ref 82–98)
MODE: ABNORMAL
MODE: ABNORMAL
MONOCYTES # BLD AUTO: 1 K/UL (ref 0.3–1)
MONOCYTES NFR BLD: 7.1 % (ref 4–15)
NEUTROPHILS # BLD AUTO: 11 K/UL (ref 1.8–7.7)
NEUTROPHILS NFR BLD: 81.7 % (ref 38–73)
NRBC BLD-RTO: 0 /100 WBC
PCO2 BLDA: 36.9 MMHG (ref 35–45)
PCO2 BLDA: 38.2 MMHG (ref 35–45)
PEEP: 5
PEEP: 5
PH SMN: 7.44 [PH] (ref 7.35–7.45)
PH SMN: 7.46 [PH] (ref 7.35–7.45)
PHOSPHATE SERPL-MCNC: 3.7 MG/DL (ref 2.7–4.5)
PLATELET # BLD AUTO: 99 K/UL (ref 150–450)
PMV BLD AUTO: 12.7 FL (ref 9.2–12.9)
PO2 BLDA: 63 MMHG (ref 80–100)
PO2 BLDA: 70 MMHG (ref 80–100)
POC BE: 1 MMOL/L
POC BE: 2 MMOL/L
POC IONIZED CALCIUM: 1.17 MMOL/L (ref 1.06–1.42)
POC IONIZED CALCIUM: 1.17 MMOL/L (ref 1.06–1.42)
POC SATURATED O2: 92 % (ref 95–100)
POC SATURATED O2: 95 % (ref 95–100)
POC TCO2: 27 MMOL/L (ref 23–27)
POC TCO2: 27 MMOL/L (ref 23–27)
POTASSIUM BLD-SCNC: 3.4 MMOL/L (ref 3.5–5.1)
POTASSIUM BLD-SCNC: 3.6 MMOL/L (ref 3.5–5.1)
POTASSIUM SERPL-SCNC: 3.5 MMOL/L (ref 3.5–5.1)
PROCALCITONIN SERPL IA-MCNC: 5.77 NG/ML (ref 0–0.5)
PROT SERPL-MCNC: 4.6 G/DL (ref 6–8.4)
PS: 5
RBC # BLD AUTO: 4.05 M/UL (ref 4–5.4)
SAMPLE: ABNORMAL
SAMPLE: ABNORMAL
SITE: ABNORMAL
SITE: ABNORMAL
SODIUM BLD-SCNC: 145 MMOL/L (ref 136–145)
SODIUM BLD-SCNC: 147 MMOL/L (ref 136–145)
SODIUM SERPL-SCNC: 146 MMOL/L (ref 136–145)
VT: 450
WBC # BLD AUTO: 13.49 K/UL (ref 3.9–12.7)

## 2023-07-24 PROCEDURE — 85025 COMPLETE CBC W/AUTO DIFF WBC: CPT | Performed by: FAMILY MEDICINE

## 2023-07-24 PROCEDURE — 37799 UNLISTED PX VASCULAR SURGERY: CPT

## 2023-07-24 PROCEDURE — 94799 UNLISTED PULMONARY SVC/PX: CPT

## 2023-07-24 PROCEDURE — 27000221 HC OXYGEN, UP TO 24 HOURS

## 2023-07-24 PROCEDURE — 99233 PR SUBSEQUENT HOSPITAL CARE,LEVL III: ICD-10-PCS | Mod: ,,, | Performed by: STUDENT IN AN ORGANIZED HEALTH CARE EDUCATION/TRAINING PROGRAM

## 2023-07-24 PROCEDURE — 63600175 PHARM REV CODE 636 W HCPCS: Performed by: HOSPITALIST

## 2023-07-24 PROCEDURE — 99233 SBSQ HOSP IP/OBS HIGH 50: CPT | Mod: ,,, | Performed by: STUDENT IN AN ORGANIZED HEALTH CARE EDUCATION/TRAINING PROGRAM

## 2023-07-24 PROCEDURE — 85014 HEMATOCRIT: CPT

## 2023-07-24 PROCEDURE — 99900035 HC TECH TIME PER 15 MIN (STAT)

## 2023-07-24 PROCEDURE — 63600175 PHARM REV CODE 636 W HCPCS: Performed by: STUDENT IN AN ORGANIZED HEALTH CARE EDUCATION/TRAINING PROGRAM

## 2023-07-24 PROCEDURE — 84132 ASSAY OF SERUM POTASSIUM: CPT

## 2023-07-24 PROCEDURE — 94761 N-INVAS EAR/PLS OXIMETRY MLT: CPT

## 2023-07-24 PROCEDURE — 82803 BLOOD GASES ANY COMBINATION: CPT

## 2023-07-24 PROCEDURE — 25000003 PHARM REV CODE 250: Performed by: FAMILY MEDICINE

## 2023-07-24 PROCEDURE — 84100 ASSAY OF PHOSPHORUS: CPT | Performed by: STUDENT IN AN ORGANIZED HEALTH CARE EDUCATION/TRAINING PROGRAM

## 2023-07-24 PROCEDURE — 83880 ASSAY OF NATRIURETIC PEPTIDE: CPT | Performed by: INTERNAL MEDICINE

## 2023-07-24 PROCEDURE — 80053 COMPREHEN METABOLIC PANEL: CPT | Performed by: FAMILY MEDICINE

## 2023-07-24 PROCEDURE — 86140 C-REACTIVE PROTEIN: CPT | Performed by: INTERNAL MEDICINE

## 2023-07-24 PROCEDURE — 92610 EVALUATE SWALLOWING FUNCTION: CPT

## 2023-07-24 PROCEDURE — 25000003 PHARM REV CODE 250: Performed by: STUDENT IN AN ORGANIZED HEALTH CARE EDUCATION/TRAINING PROGRAM

## 2023-07-24 PROCEDURE — 99900026 HC AIRWAY MAINTENANCE (STAT)

## 2023-07-24 PROCEDURE — 36415 COLL VENOUS BLD VENIPUNCTURE: CPT | Performed by: INTERNAL MEDICINE

## 2023-07-24 PROCEDURE — 25000003 PHARM REV CODE 250: Performed by: INTERNAL MEDICINE

## 2023-07-24 PROCEDURE — 82330 ASSAY OF CALCIUM: CPT

## 2023-07-24 PROCEDURE — 63600175 PHARM REV CODE 636 W HCPCS: Performed by: INTERNAL MEDICINE

## 2023-07-24 PROCEDURE — 83735 ASSAY OF MAGNESIUM: CPT | Performed by: FAMILY MEDICINE

## 2023-07-24 PROCEDURE — 99291 CRITICAL CARE FIRST HOUR: CPT | Mod: ,,, | Performed by: INTERNAL MEDICINE

## 2023-07-24 PROCEDURE — 99900031 HC PATIENT EDUCATION (STAT)

## 2023-07-24 PROCEDURE — 87040 BLOOD CULTURE FOR BACTERIA: CPT | Performed by: INTERNAL MEDICINE

## 2023-07-24 PROCEDURE — 84295 ASSAY OF SERUM SODIUM: CPT

## 2023-07-24 PROCEDURE — 20000000 HC ICU ROOM

## 2023-07-24 PROCEDURE — 63600175 PHARM REV CODE 636 W HCPCS: Performed by: FAMILY MEDICINE

## 2023-07-24 PROCEDURE — 99291 PR CRITICAL CARE, E/M 30-74 MINUTES: ICD-10-PCS | Mod: ,,, | Performed by: INTERNAL MEDICINE

## 2023-07-24 PROCEDURE — 84145 PROCALCITONIN (PCT): CPT | Performed by: INTERNAL MEDICINE

## 2023-07-24 PROCEDURE — 94003 VENT MGMT INPAT SUBQ DAY: CPT

## 2023-07-24 RX ORDER — HYDRALAZINE HYDROCHLORIDE 20 MG/ML
10 INJECTION INTRAMUSCULAR; INTRAVENOUS EVERY 8 HOURS PRN
Status: DISCONTINUED | OUTPATIENT
Start: 2023-07-24 | End: 2023-07-24

## 2023-07-24 RX ORDER — SODIUM CHLORIDE 450 MG/100ML
INJECTION, SOLUTION INTRAVENOUS CONTINUOUS
Status: DISCONTINUED | OUTPATIENT
Start: 2023-07-24 | End: 2023-07-25

## 2023-07-24 RX ORDER — HYDRALAZINE HYDROCHLORIDE 20 MG/ML
10 INJECTION INTRAMUSCULAR; INTRAVENOUS EVERY 6 HOURS PRN
Status: DISCONTINUED | OUTPATIENT
Start: 2023-07-24 | End: 2023-07-28

## 2023-07-24 RX ADMIN — CHLORHEXIDINE GLUCONATE 15 ML: 1.2 RINSE ORAL at 09:07

## 2023-07-24 RX ADMIN — INSULIN ASPART 4 UNITS: 100 INJECTION, SOLUTION INTRAVENOUS; SUBCUTANEOUS at 11:07

## 2023-07-24 RX ADMIN — INSULIN ASPART 4 UNITS: 100 INJECTION, SOLUTION INTRAVENOUS; SUBCUTANEOUS at 04:07

## 2023-07-24 RX ADMIN — LEVOTHYROXINE SODIUM 75 MCG: 0.03 TABLET ORAL at 05:07

## 2023-07-24 RX ADMIN — INSULIN ASPART 2 UNITS: 100 INJECTION, SOLUTION INTRAVENOUS; SUBCUTANEOUS at 05:07

## 2023-07-24 RX ADMIN — ACETAMINOPHEN 650 MG: 325 TABLET ORAL at 11:07

## 2023-07-24 RX ADMIN — ENOXAPARIN SODIUM 30 MG: 30 INJECTION SUBCUTANEOUS at 04:07

## 2023-07-24 RX ADMIN — SODIUM CHLORIDE: 0.45 INJECTION, SOLUTION INTRAVENOUS at 11:07

## 2023-07-24 RX ADMIN — HYDRALAZINE HYDROCHLORIDE 10 MG: 20 INJECTION, SOLUTION INTRAMUSCULAR; INTRAVENOUS at 09:07

## 2023-07-24 RX ADMIN — INSULIN ASPART 1 UNITS: 100 INJECTION, SOLUTION INTRAVENOUS; SUBCUTANEOUS at 02:07

## 2023-07-24 RX ADMIN — HYDROMORPHONE HYDROCHLORIDE 2 MG: 1 INJECTION, SOLUTION INTRAMUSCULAR; INTRAVENOUS; SUBCUTANEOUS at 09:07

## 2023-07-24 RX ADMIN — CARBOXYMETHYLCELLULOSE SODIUM 1 DROP: 5 SOLUTION/ DROPS OPHTHALMIC at 11:07

## 2023-07-24 RX ADMIN — MEROPENEM 1 G: 1 INJECTION, POWDER, FOR SOLUTION INTRAVENOUS at 01:07

## 2023-07-24 RX ADMIN — DEXTROSE MONOHYDRATE 2 G: 50 INJECTION, SOLUTION INTRAVENOUS at 09:07

## 2023-07-24 RX ADMIN — HYDROMORPHONE HYDROCHLORIDE 0.5 MG: 1 INJECTION, SOLUTION INTRAMUSCULAR; INTRAVENOUS; SUBCUTANEOUS at 02:07

## 2023-07-24 NOTE — PT/OT/SLP EVAL
Speech Language Pathology Evaluation  Bedside Swallow    Patient Name:  Chanel Cervantes   MRN:  [id number]  Admitting Diagnosis: Septic shock    Recommendations:                 General Recommendations:  Cognitive-linguistic evaluation  Diet recommendations:  Regular Diet - IDDSI Level 7, Thin   Aspiration Precautions: Standard aspiration precautions   General Precautions: Standard, fall  Communication strategies:  provide increased time to answer    Assessment:     Chanel Cervantes is a 67 y.o. female with an SLP diagnosis of possible Cognitive-Linguistic Impairment.  She displayed no difficulty masticating regular foods and no difficulty with 3 oz water challenge on 2 of 3 trials, but coughed a congested cough so frequently it occurred after most swallows.  Recommend regular textures IDDSI 7 & liquids, monitor for aspiration s/s.  Will follow tomorrow to perform cognitive-linguistic eval as she was slightly sleepy today.      History:     Past Medical History:   Diagnosis Date    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 2021    Anemia, chronic disease 2021    CAD (coronary artery disease)     Diabetes mellitus, type 2     Hypertension     Iron deficiency anemia following bariatric surgery 2021    MI (myocardial infarction)     Secondary thrombocytosis 2021    Sleep apnea     Sleep apnea 2019    Sleep Right        Past Surgical History:   Procedure Laterality Date    ANGIOGRAM, CORONARY, WITH LEFT HEART CATHETERIZATION      APPENDECTOMY      BARIATRIC SURGERY  2019    Dr Nunez    CARPAL TUNNEL RELEASE Bilateral 2002     SECTION      CHOLECYSTECTOMY      COLONOSCOPY      CORONARY ANGIOPLASTY WITH STENT PLACEMENT      CORONARY ARTERY BYPASS GRAFT      EPIDURAL STEROID INJECTION INTO LUMBAR SPINE      x2    ERCP N/A 2023    Procedure: ERCP (ENDOSCOPIC RETROGRADE CHOLANGIOPANCREATOGRAPHY);  Surgeon: Lavon Hernandez III, MD;  Location: [facility];  Service: Endoscopy;  Laterality:  N/A;    ESOPHAGOGASTRODUODENOSCOPY      HERNIA REPAIR  12/04/2019    HYSTERECTOMY      THYROID LOBECTOMY Right 7/20/2021    Procedure: LOBECTOMY, THYROID;  Surgeon: Mari Chance MD;  Location: [facility];  Service: General;  Laterality: Right;       Social History: Patient lives in the community.    Prior Intubation HX:  extubated this morning after 3 days    Modified Barium Swallow: none reported    Chest X-Rays: Previously seen groundglass opacities involving the lung bases have improved. Possible trace pleural fluid on the left as evidenced by blunting of the left costophrenic angle. No pneumothorax. No new or worsening parenchymal abnormality.    Prior diet: retular textures & liquids..    Subjective     I really want some water.  That would be heaven  Patient goals: lots to drink     Objective:     Oral Musculature Evaluation  Dentition: present and adequate (missing a few molars)  Secretion Management: adequate  Mucosal Quality: good  Mandibular Strength and Mobility: WNL  Oral Labial Strength and Mobility: WNL  Lingual Strength and Mobility: WNL  Velar Elevation: WNL  Buccal Strength and Mobility: WNL  Volitional Cough: congested, sounded productie  Volitional Swallow: rise & tilt palpated  Voice Prior to PO Intake: clear, no dysarthria; slightly hoarse    Bedside Swallow Eval:   Consistencies Assessed:  Thin liquids via ice chip, cup and straw sips and 3 oz water challenge x3  Puree via spoon  Mixed consistencies peaches in thin juice  Solids avelino cracker      Oral Phase:   WNL    Pharyngeal Phase:   no overt clinical signs/symptoms of aspiration  no overt clinical signs/symptoms of pharyngeal dysphagia    Compensatory Strategies  none    Treatment: Education to pt & daughter re impressions & recommendations    Goals:   Multidisciplinary Problems       SLP Goals          Problem: SLP    Goal Priority Disciplines Outcome   SLP Goal    High SLP    Description: 1. Consume regular textures IDDSI 7 and liquids  without s/s oropharyngeal dysphagia.  2. Complete cognitive- linguistic eval                       Plan:     Patient to be seen:  3 x/week   Plan of Care expires:  07/31/23  Plan of Care reviewed with:      SLP Follow-Up:  Yes       Discharge recommendations:      Barriers to Discharge:  None    Time Tracking:     SLP Treatment Date:   07/24/23  Speech Start Time:  1415  Speech Stop Time:  1437     Speech Total Time (min):  22 min    Billable Minutes: Eval Swallow and Oral Function 22 07/24/2023

## 2023-07-24 NOTE — PROGRESS NOTES
INPATIENT NEPHROLOGY Progress Note  [facility]    Patient Name: Chanel Cervantes  Date: 07/24/2023    Reason for consultation:   ANTHONY    Chief Complaint:   Chief Complaint   Patient presents with    Fall     Denies hitting head or LOC, hit right shoulder    Shortness of Breath    Back Pain     History of Present Illness:  Chanel Cervantes is a 67 y.o. female obese, BMI 37.6 kg/M2, with past medical history of diabetes, hypertension, bariatric surgery, cholecystectomy, prior UTIs, and prior pancreatitis secondary to gallstones. She presented with acute onset of back pain, radiating into bilateral upper quadrants, with associated shortness of breath.  She was admitted for septic shock, transferred to ICU for further management. She is s/p ERCP with evidence of the entire main bile duct was moderately dilated, with an obstruction from suspected sludge ball. A biliary sphincterotomy was performed. The biliary tree was swept. Hospital course complicated AMS requiring MV and and polymicrobial bacteremia, Enterococcus faecium and E coli. We are consulted for ANTHONY.    Echo:  The left ventricle is normal in size with mild concentric hypertrophy and mildly decreased systolic function.  Mild to moderate tricuspid regurgitation.  The estimated ejection fraction is 45%.  Grade I left ventricular diastolic dysfunction.  There is abnormal septal wall motion consistent with left bundle branch block.  Mild right ventricular enlargement with normal right ventricular systolic function.  Moderate left atrial enlargement.  Normal central venous pressure (3 mmHg).  The estimated PA systolic pressure is 40 mmHg.  There is mild pulmonary hypertension.       Interval History:  7/21- BP holding on levophed, FIO2 40%, UOP 1.7L  7/22- off pressors, VSS, FIO2 30%, UOP 1.3L, tolerating tube feeds, rise in WBC count is noted  7/23- kieran (got atropine this AM for HR 39), SBP , FIO2 30%, UOP 1.4L, on/off levophed overnight,  ERICA today  7/24 VSS, spontaneous ventilation, afebrile, improving UO.scr remains elevated dut to ATN.    Plan of Care:    Hypernatremia  Septic shock due biliary sludge with polymicrobial bacteremia s/p ERCP with sphincterotomy 7/19  ANTHONY due to sepsis/ischemic ATN  AMS on MV  Shock liver  Lactic acidosis  Demand ischemia (underlying systolic CHF/pulm HTN)  Anemia/Thrombocytopenia    Plan:    - if sNa keep rising, will need to increase free water,  - leukocytosis stabilized- soft BP on/off levophed- remains on IV steroids, BSA  - appreciate ID input  - sCr stabilized but remains abnormal consistent wit hATN. Non-oliguric  - dose meds for CrCl < 20- no nsaids or IV contrast  - continue schaefer- no acute RRT needs  - keep off IVFs- ok with normal electrolyte tube feeds   - vent management per pulm  - LFTs improving  - lactate improving  - heme parameters stable- HIT pending (on lovenox and meropenem)    Thank you for allowing us to participate in this patient's care. We will continue to follow.    Vital Signs:  Temp Readings from Last 3 Encounters:   07/24/23 97.8 °F (36.6 °C) (Axillary)   06/02/23 98 °F (36.7 °C) (Oral)   04/13/23 97.4 °F (36.3 °C)       Pulse Readings from Last 3 Encounters:   07/24/23 (!) 59   06/02/23 68   04/13/23 71       BP Readings from Last 3 Encounters:   07/24/23 136/64   06/02/23 (!) 120/59   04/13/23 (!) 146/70       Weight:  Wt Readings from Last 3 Encounters:   07/19/23 108.9 kg (239 lb 15.9 oz)   07/19/23 108.9 kg (240 lb 1.3 oz)   06/02/23 109.1 kg (240 lb 8 oz)       INS/OUTS:  I/O last 3 completed shifts:  In: 862.3 [I.V.:307.5; NG/GT:205; IV Piggyback:349.8]  Out: 2375 [Urine:2375]  I/O this shift:  In: -   Out: 220 [Urine:220]      Medications:  Scheduled Meds:   cefTRIAXone (ROCEPHIN) IVPB  2 g Intravenous Q24H    chlorhexidine  15 mL Mouth/Throat BID    DAPTOmycin (CUBICIN) IV (PEDS and ADULTS)  10 mg/kg (Adjusted) Intravenous Q48H    enoxparin  30 mg Subcutaneous Q24H     levothyroxine  75 mcg Oral Before breakfast     Continuous Infusions:      PRN Meds:.acetaminophen, albuterol-ipratropium, calcium gluconate IVPB, calcium gluconate IVPB, calcium gluconate IVPB, carboxymethylcellulose, dextrose 50%, dextrose 50%, glucagon (human recombinant), glucose, glucose, hydrALAZINE, HYDROmorphone, insulin aspart U-100, lorazepam, magnesium sulfate IVPB, magnesium sulfate IVPB, naloxone, ondansetron, polyethylene glycol, potassium chloride in water **AND** potassium chloride in water **AND** potassium chloride in water, senna-docusate 8.6-50 mg, simethicone, sodium chloride 0.9%, sodium phosphate IVPB, sodium phosphate IVPB, sodium phosphate IVPB  No current facility-administered medications on file prior to encounter.     Current Outpatient Medications on File Prior to Encounter   Medication Sig Dispense Refill    amLODIPine (NORVASC) 2.5 MG tablet Take 1 tablet (2.5 mg total) by mouth once daily. 90 tablet 1    azelastine (ASTELIN) 137 mcg (0.1 %) nasal spray 1 spray (137 mcg total) by Nasal route 2 (two) times daily. 30 mL 5    calcium carbonate-vitamin D3 600 mg-62.5 mcg (2,500 unit) Cap calcium carbonate 600 mg-vitamin D3 62.5 mcg (2,500 unit) capsule   Take by oral route.      DULoxetine (CYMBALTA) 60 MG capsule Take 1 capsule (60 mg total) by mouth once daily. 90 capsule 1    empaglifloz-linaglip-metformin (TRIJARDY XR) 25-5-1,000 mg TBph Take 1 tablet by mouth once daily. 90 tablet 1    guanFACINE (TENEX) 2 MG tablet Take 1 tablet (2 mg total) by mouth nightly. 90 tablet 3    insulin degludec (TRESIBA FLEXTOUCH U-200) 200 unit/mL (3 mL) insulin pen Inject 76 Units into the skin once daily. 12 pen 3    iron-vitamin C 100-250 mg, ICAR-C, 100-250 mg Tab Take 1 tablet by mouth once daily. 30 each 8    levothyroxine (SYNTHROID) 75 MCG tablet Take 75 mcg by mouth before breakfast.      metFORMIN (GLUCOPHAGE) 1000 MG tablet Take 1,000 mg by mouth 2 (two) times daily with meals.       "metOLazone (ZAROXOLYN) 5 MG tablet Take 1 tablet (5 mg total) by mouth once daily. 90 tablet 0    metoprolol succinate (TOPROL-XL) 25 MG 24 hr tablet Take 1 tablet (25 mg total) by mouth once daily. 90 tablet 1    multivitamin capsule Take 1 capsule by mouth once daily.      olmesartan (BENICAR) 40 MG tablet Take 1 tablet (40 mg total) by mouth once daily. 90 tablet 1    potassium chloride (KLOR-CON) 10 MEQ TbSR Take 1 tablet (10 mEq total) by mouth once daily. 90 tablet 1    pregabalin (LYRICA) 50 MG capsule Take 50 mg by mouth every evening.      rosuvastatin (CRESTOR) 40 MG Tab Take 1 tablet (40 mg total) by mouth every evening. 90 tablet 3    aspirin (ECOTRIN) 81 MG EC tablet Take 1 tablet (81 mg total) by mouth once daily. 30 tablet 0    blood sugar diagnostic Strp One Touch Ultra Blue Test strips, Use l strip to check glucose 4 times daily 100 each 11    blood-glucose meter kit Use as instructed 1 each 0    cyanocobalamin 1,000 mcg/mL injection Inject 1 mL (1,000 mcg total) into the skin every 30 days. 3 mL 4    lancets (ONETOUCH DELICA LANCETS) 33 gauge Misc USE 1  TO CHECK GLUCOSE 4 TIMES DAILY 100 each 3    magnesium oxide (MAG-OX) 400 mg (241.3 mg magnesium) tablet Take 1 tablet (400 mg total) by mouth once daily. 90 tablet 1    NYSTOP powder APPLY TO AFFECTED AREA AS NEEDED      prednisoLONE acetate (PRED FORTE) 1 % DrpS Place 1 drop into both eyes as needed.        Review of Systems:  On MV    Physical Exam:  /64   Pulse (!) 59   Temp 97.8 °F (36.6 °C) (Axillary)   Resp (!) 30   Ht 5' 7" (1.702 m)   Wt 108.9 kg (239 lb 15.9 oz)   SpO2 (!) 92%   BMI 37.60 kg/m²     General Appearance:    Ill   Head:    Normocephalic, atraumatic   Eyes:    Closed     Mouth:   Moist mucus membranes, no thrush or oral lesions, normal      dentition   Back:     No CVA tenderness   Lungs:     Coarse, on MV   Heart:    Regular rate and rhythm, no rub/gallop   Abdomen:     Soft, non-tender, non-distended guarding, " no masses, no organomegaly   Extremities:   Warm and well perfused, distal pulses intact, no cyanosis, edematous   MSK:   No joint or muscle swelling, tenderness or deformity   Skin:   Skin color, texture, turgor normal, no rashes or lesions   Neurologic/Psychiatric:   Sedated     Results:  Recent Labs   Lab 07/22/23 0318 07/23/23  0608 07/24/23  0310    142 146*   K 4.0 3.8 3.5    110 114*   CO2 22* 23 24   BUN 64* 78* 82*   CREATININE 2.8* 2.8* 2.5*   * 305* 202*       Recent Labs   Lab 07/22/23 0318 07/23/23  0608 07/24/23  0310   CALCIUM 7.4* 7.6* 7.8*   ALBUMIN 1.8* 1.8* 1.8*   PHOS 3.8 3.8 3.7   MG 2.5 2.7* 2.5             No results for input(s): POCTGLUCOSE in the last 168 hours.    Recent Labs   Lab 12/22/20  1232 07/07/21  1057 07/18/23  1735   Hemoglobin A1C 6.8 H 7.4 H 6.9 H       Recent Labs   Lab 07/22/23 0318 07/23/23  0319 07/23/23  0608 07/24/23  0310 07/24/23  0312 07/24/23  0947   WBC 14.24*  --  13.19* 13.49*  --   --    HGB 9.9*  --  10.5* 10.6*  --   --    HCT 30.1*   < > 31.2* 33.4* 31* 31*   PLT 73*  --  80* 99*  --   --    MCV 81*  --  80* 83  --   --    MCHC 32.9  --  33.7 31.7*  --   --    MONO 5.1  0.7  --  6.7  0.9 7.1  1.0  --   --     < > = values in this interval not displayed.       Recent Labs   Lab 07/22/23 0318 07/23/23  0608 07/24/23  0310   BILITOT 1.3* 1.0 1.0   PROT 4.5* 4.6* 4.6*   ALBUMIN 1.8* 1.8* 1.8*   ALKPHOS 286* 256* 217*   * 194* 127*   * 38 24       Recent Labs   Lab 05/29/23  1040 07/18/23  1221 07/19/23  0116   Color, UA Yellow Yellow Yellow   Appearance, UA Clear Clear Clear   pH, UA 6.0 8.0 6.0   Specific [place], UA 1.015 1.030 >1.030 A   Protein, UA Negative 1+ A 1+ A   Glucose, UA 4+ A 4+ A 4+ A   Ketones, UA Negative Trace A Negative   Urobilinogen, UA Negative 2.0-3.0 A 2.0-3.0 A   Bilirubin (UA) Negative Negative 1+ A   Occult Blood UA Negative 1+ A Trace A   Nitrite, UA Negative Negative Negative   RBC, UA 1 6 H 1    WBC, UA 7 H 6 H 3   Bacteria Negative Negative Negative   Hyaline Casts, UA 4 A 5 A 1       Recent Labs   Lab 07/23/23  1344 07/24/23  0312 07/24/23  0947   POC PH 7.434 7.461 H 7.436   POC PCO2 39.0 36.9 38.2   POC HCO3 26.1 26.3 25.7   POC PO2 79 L 70 L 63 L   POC SATURATED O2 96 95 92 L   POC BE 2 2 1   Sample ARTERIAL ARTERIAL ARTERIAL       Microbiology Results (last 7 days)       Procedure Component Value Units Date/Time    Blood culture [[id number]] Collected: 07/24/23 0348    Order Status: Sent Specimen: Blood Updated: 07/24/23 0426    Narrative:      Collection has been rescheduled by [email or web address] at 07/24/2023 03:28 Reason:   Nurse Draw. Blood was already drawn. I was not able to get the blood   cultures.  Collection has been rescheduled by [email or web address] at 07/24/2023 03:28 Reason:   Nurse Draw. Blood was already drawn. I was not able to get the blood   cultures.    Blood culture [[id number]] Collected: 07/23/23 0825    Order Status: Completed Specimen: Blood Updated: 07/23/23 1517     Blood Culture, Routine No Growth to date    Blood culture [[id number]] Collected: 07/20/23 0940    Order Status: Completed Specimen: Blood from Peripheral, Antecubital, Right Updated: 07/23/23 1232     Blood Culture, Routine No Growth to date      No Growth to date      No Growth to date      No Growth to date    Narrative:      Collection has been rescheduled by [email or web address] at 07/20/2023 06:04 Reason:   Unable to collect  Collection has been rescheduled by [email or web address] at 07/20/2023 08:01 Reason:   Contacting zainab for cultures per RN Alfred  Collection has been rescheduled by [email or web address] at 07/20/2023 08:41 Reason:   Nurse Draw  Collection has been rescheduled by [email or web address] at 07/20/2023 06:04 Reason:   Unable to collect  Collection has been rescheduled by [email or web address] at 07/20/2023 08:01 Reason:   Contacting zainab for cultures per RN Alfred  Collection has been rescheduled by [email or web address] at 07/20/2023 08:41 Reason:   Nurse Draw    Culture, Respiratory with Gram Stain [[id number]]  Collected: 07/20/23 1003    Order Status: Completed Specimen: Respiratory from Endotracheal Aspirate Updated: 07/22/23 0736     Respiratory Culture No growth      No normal respiratory shay     Gram Stain (Respiratory) <10 epithelial cells per low power field.     Gram Stain (Respiratory) Few WBC's     Gram Stain (Respiratory) No organisms seen    Urine Culture High Risk [[id number]] Collected: 07/19/23 1006    Order Status: Completed Specimen: Urine, Clean Catch Updated: 07/22/23 0710     Urine Culture, Routine No growth    Narrative:      Indicated criteria for high risk culture:->Other  Other (specify):->e coli bacteremia    Blood culture [[id number]]  (Abnormal) Collected: 07/20/23 0940    Order Status: Completed Specimen: Blood from Line, Arterial, Right Updated: 07/22/23 0644     Blood Culture, Routine Gram stain aer bottle: Gram positive cocci      Positive results previously called      ENTEROCOCCUS FAECIUM  For susceptibility see order #[id number]      Narrative:      Collection has been rescheduled by [email or web address] at 07/20/2023 06:04 Reason:   Unable to collect  Collection has been rescheduled by [email or web address] at 07/20/2023 08:01 Reason:   Contacting zainab for cultures per RN Alfred  Collection has been rescheduled by [email or web address] at 07/20/2023 08:41 Reason:   Nurse Draw  Collection has been rescheduled by [email or web address] at 07/20/2023 06:04 Reason:   Unable to collect  Collection has been rescheduled by [email or web address] at 07/20/2023 08:01 Reason:   Contacting zainab for cultures per RN Alfred  Collection has been rescheduled by [email or web address] at 07/20/2023 08:41 Reason:   Nurse Draw    Blood culture [[id number]]  (Abnormal) Collected: 07/19/23 1230    Order Status: Completed Specimen: Blood Updated: 07/22/23 0643     Blood Culture, Routine Gram stain aer bottle: Gram positive cocci      Positive results previously called      ENTEROCOCCUS FAECIUM  For susceptibility see order #[id number]      Blood culture [[id number]]  (Abnormal) Collected: 07/19/23 0002    Order  Status: Completed Specimen: Blood Updated: 07/22/23 0643     Blood Culture, Routine Gram stain aer bottle: Gram positive cocci      Gram stain ez bottle: Gram positive cocci      Positive results previously called      ENTEROCOCCUS FAECIUM  For susceptibility see order #[id number]      Blood culture [[id number]]  (Abnormal) Collected: 07/19/23 0030    Order Status: Completed Specimen: Blood from Peripheral, Left Hand Updated: 07/22/23 0643     Blood Culture, Routine Gram stain aer bottle: Gram positive cocci      Gram stain ez bottle: Gram positive cocci      Results called to and read back by:Carmela Mcintosh RN-3ICU;      07/19/2023  16:23 CJD      ENTEROCOCCUS FAECIUM  For susceptibility see order #[id number]      Narrative:      Collection has been rescheduled by [facility] at 07/19/2023 00:20 Reason:   Nurse Draw  Collection has been rescheduled by [facility] at 07/19/2023 00:20 Reason:   Nurse Draw    Blood culture [[id number]]  (Abnormal)  (Susceptibility) Collected: 07/18/23 1648    Order Status: Completed Specimen: Blood Updated: 07/22/23 0642     Blood Culture, Routine Gram stain aer bottle: Gram negative rods      Gram stain aer bottle: Gram positive cocci      Results called to and read back by: Lesvia Mcdowell RN MICU3.      07/19/2023  05:06 TGC      ESCHERICHIA COLI      ENTEROCOCCUS FAECIUM    Blood culture [[id number]]  (Abnormal) Collected: 07/18/23 1738    Order Status: Completed Specimen: Blood Updated: 07/21/23 0853     Blood Culture, Routine Gram stain aer bottle: Gram negative rods      Results called to and read back by:Lesvia Mcdowell RN MICU3.      07/19/2023  05:23 TGC      ESCHERICHIA COLI  For susceptibility see order #[id number]      Rapid Organism ID by PCR (from Blood culture) [[id number]]  (Abnormal) Collected: 07/18/23 1648    Order Status: Completed Updated: 07/19/23 0526     Enterococcus faecalis Not Detected     Enterococcus faecium Detected     Listeria monocytogenes Not Detected      Staphylococcus spp. Not Detected     Staphylococcus aureus Not Detected     Staphylococcus epidermidis Not Detected     Staphylococcus lugdunensis Not Detected     Streptococcus species Not Detected     Streptococcus agalactiae Not Detected     Streptococcus pneumoniae Not Detected     Streptococcus pyogenes Not Detected     Acinetobacter calcoaceticus/baumannii complex Not Detected     Bacteroides fragilis Not Detected     Enterobacterales See species for ID     Enterobacter cloacae complex Not Detected     Escherichia coli Detected     Klebsiella aerogenes Not Detected     Klebsiella oxytoca Not Detected     Klebsiella pneumoniae group Not Detected     Proteus Not Detected     Salmonella sp Not Detected     Serratia marcescens Not Detected     Haemophilus influenzae Not Detected     Neisseria meningtidis Not Detected     Pseudomonas aeruginosa Not Detected     Stenotrophomonas maltophilia Not Detected     Candida albicans Not Detected     Candida auris Not Detected     Candida glabrata Not Detected     Candida krusei Not Detected     Candida parapsilosis Not Detected     Candida tropicalis Not Detected     Cryptococcus neoformans/gattii Not Detected     CTX-M (ESBL ) Not Detected     IMP (Carbapenem resistant) Not Detected     KPC resistance gene (Carbapenem resistant) Not Detected     mcr-1  Not Detected     mec A/C  Test not applicable     mec A/C and MREJ (MRSA) gene Test not applicable     NDM (Carbapenem resistant) Not Detected     OXA-48-like (Carbapenem resistant) Not Detected     van A/B (VRE gene) Not Detected     VIM (Carbapenem resistant) Not Detected          I have spent > minutes providing care for this patient for the above diagnoses. These services have included chart/data/imaging review, evaluation, exam, formulation of plan, , note preparation, and discussions with staff involved in this patient's care.    Rosalva Barros MD MPH  [facility]  [address] [place] [address]  [address]  [place] [address]  [phone] (p)  [phone] (f)

## 2023-07-24 NOTE — PLAN OF CARE
Problem: Adult Inpatient Plan of Care  Goal: Plan of Care Review  Outcome: Ongoing, Progressing  Goal: Patient-Specific Goal (Individualized)  Outcome: Ongoing, Progressing  Goal: Absence of Hospital-Acquired Illness or Injury  Outcome: Ongoing, Progressing  Goal: Optimal Comfort and Wellbeing  Outcome: Ongoing, Progressing  Goal: Readiness for Transition of Care  Outcome: Ongoing, Progressing     Problem: Diabetes Comorbidity  Goal: Blood Glucose Level Within Targeted Range  Outcome: Ongoing, Progressing     Problem: Skin Injury Risk Increased  Goal: Skin Health and Integrity  Outcome: Ongoing, Progressing     Problem: Infection  Goal: Absence of Infection Signs and Symptoms  Outcome: Ongoing, Progressing     Problem: Adjustment to Illness (Sepsis/Septic Shock)  Goal: Optimal Coping  Outcome: Ongoing, Progressing     Problem: Bleeding (Sepsis/Septic Shock)  Goal: Absence of Bleeding  Outcome: Ongoing, Progressing     Problem: Glycemic Control Impaired (Sepsis/Septic Shock)  Goal: Blood Glucose Level Within Desired Range  Outcome: Ongoing, Progressing     Problem: Infection Progression (Sepsis/Septic Shock)  Goal: Absence of Infection Signs and Symptoms  Outcome: Ongoing, Progressing     Problem: Nutrition Impaired (Sepsis/Septic Shock)  Goal: Optimal Nutrition Intake  Outcome: Ongoing, Progressing     Problem: Fluid and Electrolyte Imbalance (Acute Kidney Injury/Impairment)  Goal: Fluid and Electrolyte Balance  Outcome: Ongoing, Progressing     Problem: Oral Intake Inadequate (Acute Kidney Injury/Impairment)  Goal: Optimal Nutrition Intake  Outcome: Ongoing, Progressing     Problem: Renal Function Impairment (Acute Kidney Injury/Impairment)  Goal: Effective Renal Function  Outcome: Ongoing, Progressing     Problem: Fall Injury Risk  Goal: Absence of Fall and Fall-Related Injury  Outcome: Ongoing, Progressing     Problem: Restraint, Nonbehavioral (Nonviolent)  Goal: Absence of Harm or Injury  Outcome: Ongoing,  Progressing     Problem: Communication Impairment (Mechanical Ventilation, Invasive)  Goal: Effective Communication  Outcome: Ongoing, Progressing     Problem: Device-Related Complication Risk (Mechanical Ventilation, Invasive)  Goal: Optimal Device Function  Outcome: Ongoing, Progressing     Problem: Inability to Wean (Mechanical Ventilation, Invasive)  Goal: Mechanical Ventilation Liberation  Outcome: Ongoing, Progressing     Problem: Nutrition Impairment (Mechanical Ventilation, Invasive)  Goal: Optimal Nutrition Delivery  Outcome: Ongoing, Progressing     Problem: Skin and Tissue Injury (Mechanical Ventilation, Invasive)  Goal: Absence of Device-Related Skin and Tissue Injury  Outcome: Ongoing, Progressing     Problem: Ventilator-Induced Lung Injury (Mechanical Ventilation, Invasive)  Goal: Absence of Ventilator-Induced Lung Injury  Outcome: Ongoing, Progressing     Problem: Communication Impairment (Artificial Airway)  Goal: Effective Communication  Outcome: Ongoing, Progressing     Problem: Device-Related Complication Risk (Artificial Airway)  Goal: Optimal Device Function  Outcome: Ongoing, Progressing     Problem: Skin and Tissue Injury (Artificial Airway)  Goal: Absence of Device-Related Skin or Tissue Injury  Outcome: Ongoing, Progressing     Problem: Noninvasive Ventilation Acute  Goal: Effective Unassisted Ventilation and Oxygenation  Outcome: Ongoing, Progressing     Problem: Aspiration (Enteral Nutrition)  Goal: Absence of Aspiration Signs and Symptoms  Outcome: Ongoing, Progressing     Problem: Device-Related Complication Risk (Enteral Nutrition)  Goal: Safe, Effective Therapy Delivery  Outcome: Ongoing, Progressing     Problem: Feeding Intolerance (Enteral Nutrition)  Goal: Feeding Tolerance  Outcome: Ongoing, Progressing

## 2023-07-24 NOTE — ASSESSMENT & PLAN NOTE
Required intubation on hospital day 3.  Patient was successfully extubated on July 24, 2023.  Follow Pulmonary Medicine recommendations.

## 2023-07-24 NOTE — PT/OT/SLP PROGRESS
Physical Therapy      Patient Name:  Chanel Cervantes   MRN:  [id number]    Patient not seen today secondary to Nurse/ JESUS hold, Other (Comment) (pt extubated this am. Nurse requests start tomorrow.). Will follow-up tomorrow.

## 2023-07-24 NOTE — PROGRESS NOTES
SAT   Pt has been off of sedation since - 7/23 @ 1144.  Pt last received Dilaudid on 7/23 @ 1954.    HR 45  /58  Pt calm and able to follow commands.    Will CPAP.

## 2023-07-24 NOTE — PLAN OF CARE
POC reviewed with patient, daughter, son. Extubated at 0955 am today to 3 L NC. Tolerating well. Encouraged to cough and deep breathe. Alert and oriented. Afebrile - antibiotics as ordered. Remains sinus kieran. Blood pressure stable without pressor support. Passed bedside swallow - diabetic diet ordered. Appetite fair. Glucose covered prn. LIJ TLC removed per order. 1/2 NS started at 100 cc/hr for a 24 hour period per Dr. Henao. Waller to gravity with good output. Family at bedside and supportive of patient. Safety measures in place.   Problem: Adult Inpatient Plan of Care  Goal: Plan of Care Review  Outcome: Ongoing, Progressing     Problem: Adult Inpatient Plan of Care  Goal: Optimal Comfort and Wellbeing  Outcome: Ongoing, Progressing     Problem: Diabetes Comorbidity  Goal: Blood Glucose Level Within Targeted Range  Outcome: Ongoing, Progressing     Problem: Skin Injury Risk Increased  Goal: Skin Health and Integrity  Outcome: Ongoing, Progressing     Problem: Adjustment to Illness (Sepsis/Septic Shock)  Goal: Optimal Coping  Outcome: Ongoing, Progressing     Problem: Glycemic Control Impaired (Sepsis/Septic Shock)  Goal: Blood Glucose Level Within Desired Range  Outcome: Ongoing, Progressing     Problem: Nutrition Impaired (Sepsis/Septic Shock)  Goal: Optimal Nutrition Intake  Outcome: Ongoing, Progressing     Problem: Infection Progression (Sepsis/Septic Shock)  Goal: Absence of Infection Signs and Symptoms  Outcome: Ongoing, Progressing     Problem: Renal Function Impairment (Acute Kidney Injury/Impairment)  Goal: Effective Renal Function  Outcome: Ongoing, Progressing

## 2023-07-24 NOTE — PLAN OF CARE
07/23/23 1924   Patient Assessment/Suction   Level of Consciousness (AVPU) responds to pain   Respiratory Effort Unlabored   Expansion/Accessory Muscles/Retractions expansion symmetric   All Lung Fields Breath Sounds coarse   Rhythm/Pattern, Respiratory assisted mechanically   Cough Frequency with stimulation   Cough Type assisted   Suction Method tracheal   $ Suction Charges Inline Suction Procedure Stat Charge   Secretions Amount moderate   Secretions Color white;tan   Secretions Characteristics thick   PRE-TX-O2   Device (Oxygen Therapy) ventilator   $ Is the patient on Low Flow Oxygen? Yes   Oxygen Concentration (%) 30   SpO2 96 %   Pulse Oximetry Type Continuous   $ Pulse Oximetry - Multiple Charge Pulse Oximetry - Multiple   Pulse (!) 50   Resp (!) 21   Vent Select   Charged w/in last 24h YES   Preset Conventional Ventilator Settings   Vent ID [id number]   Vent Type    Ventilation Type VC   Vent Mode Spont   Humidity HME   PEEP/CPAP 5 cmH20   Pressure Support 8 cmH20   Peak End Inspiratory Pressure 14 cmH20   Insp Rise Time  70 %   I-Trigger Type  V-TRIG   Trigger Sensitivity Flow/I-Trigger 3 L/min   Patient Ventilator Parameters   Resp Rate Total 18 br/min   Peak Airway Pressure 14 cmH20   Mean Airway Pressure 7.7 cmH20   Plateau Pressure 0 cmH20   Exhaled Vt 456 mL   Total Ve 8.23 L/m   Spont Ve 8.23 L   I:E Ratio Measured 1:2.20   Auto PEEP 0 cmH20   Inspired Tidal Volume (VTI) 0 mL   Conventional Ventilator Alarms   Alarms On Y   Resp Rate High Alarm 40 br/min   Press High Alarm 55 cmH2O   Apnea Rate 20   Apnea Volume (mL) 0 mL   Apnea Oxygen Concentration  100   Apnea Flow Rate (L/min) 60   T Apnea 20 sec(s)   IHI Ventilator Associated Pneumonia Bundle (Required)   Head of Bed Elevated  HOB 30   Oral Care Lip moisturizer applied;Mouth swabbed;Mouth moisturizer;Mouth suctioned;Suction toothette;Suction toothette toothbrush;with mouthwash   Vent Circut Breaks Minimized Yes   Ready to Wean/Extubation Screen    FIO2<=50 (chart decimal) 0.3   MV<16L (chart vol.) 8.23   PEEP <=8 (chart #) 5   Ready to Wean Parameters   F/VT Ratio<105 (RSBI) (!) 46.05   Vital Capacity   Vital Capacity (mL) 0   Education   $ Education Ventilator Oxygen;15 min

## 2023-07-24 NOTE — PROGRESS NOTES
[facility]  Progress Note    Patient Name: Chanel Cervantes  MRN: [id number]  Patient Class: IP- Inpatient   Admission Date: 7/18/2023  Length of Stay: 5 days  Attending Physician: Enrike Huff MD  Primary Care Provider: ARIC Almaguer        Subjective:     Principal Problem:Septic shock        HPI:  Chanel Cervantes is a 67 y.o. White female   With PMH of DM2, HTN, bariatric surgery,  Remote cholecystectomy,  Frequent UTIs with kidney stones,  who presents with back pain.  Admitted for pancreatitis with elevated LFTs     Pt is currently confused after receiving ativan  (ER gave it to calm her for CT scan)  History taken from family at bedside     Onset of back pain overnight  Located b/l lumbar region  Radiating to b/l upper quadrants of abdomen  Occurring intermittently  Progressively worsening  Severe, causes SOB when occurring     Initially pt thought pain was due to a fall yesterday  (Mechanical, tripped over a rug, no head trauma)  +nausea, no vomiting  +intermittent chills  They are not sure about objective fever     Has had similar abdominal pain previously  Per family, this has been attributed to her ongoing ventral wall hernia  They don't bring her to ER for this usually  However pain today was much more severe than usual      Overview/Hospital Course:  Chanel Cervantes is a 67 year old female with a past medical history of obesity, ventral hernia, DM, HTN, anemia, CAD, NIKOLAS, and hypothyroidism who presented with septic shock secondary to a possible cholangitis with E coli bacteremia. Vancomycin was discontinued and she remained on meropenem. BP was maintained on a Levophed infusion. GI was consulted and performed ERCP which showed biliary sludge with a sludge ball dilating the main duct which was removed; a biliary sphincterotomy was also performed. Repeat blood cultures were negative. She was on IV fluids with LR and was NPO as she was encephalopathic. She also had an ANTHONY  as well. She also had acute hypoxic respiratory failure for which she was put on mask O2. She also had demand ischemia; TTE was done and troponin was trended. There was shock liver superimposed on the hepatocellular injury brought on by the acute cholangitis. Pulmonary/Critical Care was consulted given the patient's medical acuity.  Pulmonologist made decision to intubate patient based on patient's deep encephalopathy and inability to protect her airway.      Interval History:  Patient just got extubated.  Asking to eat.  No other subjective complaints.  Currently afebrile.    Review of Systems  +generalized weakness  Objective:     Vital Signs (Most Recent):  Temp: 97.8 °F (36.6 °C) (07/24/23 0701)  Pulse: 63 (07/24/23 0745)  Resp: (!) 34 (07/24/23 0745)  BP: (!) 149/67 (07/24/23 0745)  SpO2: (!) 94 % (07/24/23 0745) Vital Signs (24h Range):  Temp:  [97.8 °F (36.6 °C)-99.2 °F (37.3 °C)] 97.8 °F (36.6 °C)  Pulse:  [43-67] 63  Resp:  [15-36] 34  SpO2:  [90 %-100 %] 94 %  BP: (102-150)/(55-83) 149/67  Arterial Line BP: ()/() 177/55     Weight: 108.9 kg (239 lb 15.9 oz)  Body mass index is 37.6 kg/m².    Intake/Output Summary (Last 24 hours) at 7/24/2023 0856  Last data filed at 7/24/2023 0600  Gross per 24 hour   Intake 306.7 ml   Output 1475 ml   Net -1168.3 ml           Physical Exam  Constitutional:       Interventions: She is sedated and intubated.   Eyes:      Conjunctiva/sclera:      Right eye: No exudate.     Left eye: No exudate.  Neck:      Vascular: No JVD.   Cardiovascular:      Rate and Rhythm: Normal rate and regular rhythm.      Comments: Upper extremities have mild pitting edema  Pulmonary:      Effort: She is intubated.      Breath sounds: Decreased air movement present.   Abdominal:      General: Abdomen is flat. Bowel sounds are decreased. There is distension.      Hernia: A hernia is present. Hernia is present in the ventral area.   Skin:     General: Skin is warm and dry.            Significant Labs:   Lab Results   Component Value Date    WBC 13.49 (H) 07/24/2023    HGB 10.6 (L) 07/24/2023    HCT 31 (L) 07/24/2023    MCV 83 07/24/2023    PLT 99 (L) 07/24/2023       CMP  Sodium   Date Value Ref Range Status   07/24/2023 146 (H) 136 - 145 mmol/L Final   01/14/2019 141 134 - 144 mmol/L      Potassium   Date Value Ref Range Status   07/24/2023 3.5 3.5 - 5.1 mmol/L Final     Chloride   Date Value Ref Range Status   07/24/2023 114 (H) 95 - 110 mmol/L Final   01/14/2019 96 (L) 98 - 110 mmol/L      CO2   Date Value Ref Range Status   07/24/2023 24 23 - 29 mmol/L Final     Glucose   Date Value Ref Range Status   07/24/2023 202 (H) 70 - 110 mg/dL Final   01/14/2019 177 (H) 70 - 99 mg/dL      BUN   Date Value Ref Range Status   07/24/2023 82 (H) 8 - 23 mg/dL Final     Creatinine   Date Value Ref Range Status   07/24/2023 2.5 (H) 0.5 - 1.4 mg/dL Final   01/14/2019 0.99 0.60 - 1.40 mg/dL      Calcium   Date Value Ref Range Status   07/24/2023 7.8 (L) 8.7 - 10.5 mg/dL Final     Total Protein   Date Value Ref Range Status   07/24/2023 4.6 (L) 6.0 - 8.4 g/dL Final     Albumin   Date Value Ref Range Status   07/24/2023 1.8 (L) 3.5 - 5.2 g/dL Final   01/14/2019 3.8 3.1 - 4.7 g/dL      Total Bilirubin   Date Value Ref Range Status   07/24/2023 1.0 0.1 - 1.0 mg/dL Final     Comment:     For infants and newborns, interpretation of results should be based  on gestational age, weight and in agreement with clinical  observations.    Premature Infant recommended reference ranges:  Up to 24 hours.............<8.0 mg/dL  Up to 48 hours............<12.0 mg/dL  3-5 days..................<15.0 mg/dL  6-29 days.................<15.0 mg/dL       Alkaline Phosphatase   Date Value Ref Range Status   07/24/2023 217 (H) 55 - 135 U/L Final     AST   Date Value Ref Range Status   07/24/2023 24 10 - 40 U/L Final     ALT   Date Value Ref Range Status   07/24/2023 127 (H) 10 - 44 U/L Final     Anion Gap   Date Value Ref Range  Status   07/24/2023 8 8 - 16 mmol/L Final     eGFR   Date Value Ref Range Status   07/24/2023 20.6 (A) >60 mL/min/1.73 m^2 Final     Microbiology Results (last 7 days)       Procedure Component Value Units Date/Time    Blood culture [[id number]] Collected: 07/24/23 0348    Order Status: Sent Specimen: Blood Updated: 07/24/23 0426    Narrative:      Collection has been rescheduled by [email or web address] at 07/24/2023 03:28 Reason:   Nurse Draw. Blood was already drawn. I was not able to get the blood   cultures.  Collection has been rescheduled by [email or web address] at 07/24/2023 03:28 Reason:   Nurse Draw. Blood was already drawn. I was not able to get the blood   cultures.    Blood culture [[id number]] Collected: 07/23/23 0825    Order Status: Completed Specimen: Blood Updated: 07/23/23 1517     Blood Culture, Routine No Growth to date    Blood culture [[id number]] Collected: 07/20/23 0940    Order Status: Completed Specimen: Blood from Peripheral, Antecubital, Right Updated: 07/23/23 1232     Blood Culture, Routine No Growth to date      No Growth to date      No Growth to date      No Growth to date    Narrative:      Collection has been rescheduled by [email or web address] at 07/20/2023 06:04 Reason:   Unable to collect  Collection has been rescheduled by [email or web address] at 07/20/2023 08:01 Reason:   Contacting zainab for cultures per RN Alfred  Collection has been rescheduled by [email or web address] at 07/20/2023 08:41 Reason:   Nurse Draw  Collection has been rescheduled by [email or web address] at 07/20/2023 06:04 Reason:   Unable to collect  Collection has been rescheduled by [email or web address] at 07/20/2023 08:01 Reason:   Contacting zainab for cultures per RN Alfred  Collection has been rescheduled by [email or web address] at 07/20/2023 08:41 Reason:   Nurse Draw    Culture, Respiratory with Gram Stain [[id number]] Collected: 07/20/23 1003    Order Status: Completed Specimen: Respiratory from Endotracheal Aspirate Updated: 07/22/23 0736     Respiratory Culture No growth      No normal respiratory shay     Gram Stain (Respiratory) <10  epithelial cells per low power field.     Gram Stain (Respiratory) Few WBC's     Gram Stain (Respiratory) No organisms seen    Urine Culture High Risk [[id number]] Collected: 07/19/23 1006    Order Status: Completed Specimen: Urine, Clean Catch Updated: 07/22/23 0710     Urine Culture, Routine No growth    Narrative:      Indicated criteria for high risk culture:->Other  Other (specify):->e coli bacteremia    Blood culture [[id number]]  (Abnormal) Collected: 07/20/23 0940    Order Status: Completed Specimen: Blood from Line, Arterial, Right Updated: 07/22/23 0644     Blood Culture, Routine Gram stain aer bottle: Gram positive cocci      Positive results previously called      ENTEROCOCCUS FAECIUM  For susceptibility see order #[id number]      Narrative:      Collection has been rescheduled by [email or web address] at 07/20/2023 06:04 Reason:   Unable to collect  Collection has been rescheduled by [email or web address] at 07/20/2023 08:01 Reason:   Contacting zainab for cultures per RN Alfred  Collection has been rescheduled by [email or web address] at 07/20/2023 08:41 Reason:   Nurse Draw  Collection has been rescheduled by [email or web address] at 07/20/2023 06:04 Reason:   Unable to collect  Collection has been rescheduled by [email or web address] at 07/20/2023 08:01 Reason:   Contacting zainab for cultures per RN Alfred  Collection has been rescheduled by [email or web address] at 07/20/2023 08:41 Reason:   Nurse Draw    Blood culture [[id number]]  (Abnormal) Collected: 07/19/23 1230    Order Status: Completed Specimen: Blood Updated: 07/22/23 0643     Blood Culture, Routine Gram stain aer bottle: Gram positive cocci      Positive results previously called      ENTEROCOCCUS FAECIUM  For susceptibility see order #[id number]      Blood culture [[id number]]  (Abnormal) Collected: 07/19/23 0002    Order Status: Completed Specimen: Blood Updated: 07/22/23 0643     Blood Culture, Routine Gram stain aer bottle: Gram positive cocci      Gram stain ez bottle: Gram positive cocci      Positive results previously called       ENTEROCOCCUS FAECIUM  For susceptibility see order #[id number]      Blood culture [[id number]]  (Abnormal) Collected: 07/19/23 0030    Order Status: Completed Specimen: Blood from Peripheral, Left Hand Updated: 07/22/23 0643     Blood Culture, Routine Gram stain aer bottle: Gram positive cocci      Gram stain ez bottle: Gram positive cocci      Results called to and read back by:Carmela Mcintosh RN-3ICU;      07/19/2023  16:23 CJD      ENTEROCOCCUS FAECIUM  For susceptibility see order #[id number]      Narrative:      Collection has been rescheduled by KC9 at 07/19/2023 00:20 Reason:   Nurse Draw  Collection has been rescheduled by KC9 at 07/19/2023 00:20 Reason:   Nurse Draw    Blood culture [[id number]]  (Abnormal)  (Susceptibility) Collected: 07/18/23 1648    Order Status: Completed Specimen: Blood Updated: 07/22/23 0642     Blood Culture, Routine Gram stain aer bottle: Gram negative rods      Gram stain aer bottle: Gram positive cocci      Results called to and read back by: Lesvia Mcdowell RN MICU3.      07/19/2023  05:06 TGC      ESCHERICHIA COLI      ENTEROCOCCUS FAECIUM    Blood culture [[id number]]  (Abnormal) Collected: 07/18/23 1738    Order Status: Completed Specimen: Blood Updated: 07/21/23 0853     Blood Culture, Routine Gram stain aer bottle: Gram negative rods      Results called to and read back by:Lesvia Mcdowell RN MICU3.      07/19/2023  05:23 TGC      ESCHERICHIA COLI  For susceptibility see order #[id number]      Rapid Organism ID by PCR (from Blood culture) [[id number]]  (Abnormal) Collected: 07/18/23 1648    Order Status: Completed Updated: 07/19/23 0526     Enterococcus faecalis Not Detected     Enterococcus faecium Detected     Listeria monocytogenes Not Detected     Staphylococcus spp. Not Detected     Staphylococcus aureus Not Detected     Staphylococcus epidermidis Not Detected     Staphylococcus lugdunensis Not Detected     Streptococcus species Not Detected     Streptococcus agalactiae Not  Detected     Streptococcus pneumoniae Not Detected     Streptococcus pyogenes Not Detected     Acinetobacter calcoaceticus/baumannii complex Not Detected     Bacteroides fragilis Not Detected     Enterobacterales See species for ID     Enterobacter cloacae complex Not Detected     Escherichia coli Detected     Klebsiella aerogenes Not Detected     Klebsiella oxytoca Not Detected     Klebsiella pneumoniae group Not Detected     Proteus Not Detected     Salmonella sp Not Detected     Serratia marcescens Not Detected     Haemophilus influenzae Not Detected     Neisseria meningtidis Not Detected     Pseudomonas aeruginosa Not Detected     Stenotrophomonas maltophilia Not Detected     Candida albicans Not Detected     Candida auris Not Detected     Candida glabrata Not Detected     Candida krusei Not Detected     Candida parapsilosis Not Detected     Candida tropicalis Not Detected     Cryptococcus neoformans/gattii Not Detected     CTX-M (ESBL ) Not Detected     IMP (Carbapenem resistant) Not Detected     KPC resistance gene (Carbapenem resistant) Not Detected     mcr-1  Not Detected     mec A/C  Test not applicable     mec A/C and MREJ (MRSA) gene Test not applicable     NDM (Carbapenem resistant) Not Detected     OXA-48-like (Carbapenem resistant) Not Detected     van A/B (VRE gene) Not Detected     VIM (Carbapenem resistant) Not Detected        Significant Imaging:   ECHO:   The left ventricle is normal in size with mild concentric hypertrophy and mildly decreased systolic function.   Mild to moderate tricuspid regurgitation.   The estimated ejection fraction is 45%.   Grade I left ventricular diastolic dysfunction.   There is abnormal septal wall motion consistent with left bundle branch block.   Mild right ventricular enlargement with normal right ventricular systolic function.   Moderate left atrial enlargement.   Normal central venous pressure (3 mmHg).   The estimated PA systolic pressure is 40  mmHg.   There is mild pulmonary hypertension.    ERCP:   - The entire main bile duct was moderately                          dilated, with an obstruction from suspected sludge                          ball                          - The patient has had a cholecystectomy.                          - A biliary sphincterotomy was performed.                          - The biliary tree was swept.     CTA chest:  1. Negative for pulmonary thromboembolism.  2. Scattered lower lung interstitial opacities left greater than right, potentially subsegmental atelectasis and or small airways inflammation.  3. A 7 mm left upper lobe noncalcified pulmonary nodule, nonspecific. Pulmonary neoplasm is not excluded. A follow-up noncontrast chest CT in 6 months is recommended, per Fleischner Society Recommendations. Reference:  Radiology. 2017 Jul; 284(1):228-243.  4. Cardiomegaly, with aortic and coronary arterial calcifications.  5. Hiatal hernia.    CT abdomen and pelvis with contrast:  1.  Status post cholecystectomy with postsurgical dilatation of the common bile duct and intrahepatic ducts. Should be correlated with bilirubin levels.  2.  Broad-based ventral abdominal wall hernia measures approximately 15 x 13 cm with herniated loops of large and small bowel. No evidence of obstruction.  3.  Exophytic hypoattenuating structure in the mid left kidney is felt to reflect a hemorrhagic cyst.    RUQ US:  The gallbladder surgically absent. The common duct is within normal limits.  Hepatic steatosis and hepatomegaly.    CXR:  1.  Enlarged cardiomediastinal silhouette and central hilar vascular structures. Mild perihilar interstitial thickening. Findings are felt to reflect CHF with mild pulmonary edema.  2.  Small bilateral pleural effusions.    CXR: Interval improved aeration in both lung bases, with otherwise no significant change.    CXR: Hypoinflation with mild prominence of the pulmonary vasculature and faint groundglass opacity in  "the lung bases suggestive of pulmonary edema    CXR:  Suspected small left pleural effusion.  Improved aeration of the lung bases.  Stable lines and tubes.    ERICA:   The left ventricle is normal in size with normal systolic function.   Normal left ventricular diastolic function.   The estimated ejection fraction is 60%.   Atrial fibrillation not observed.   Normal right ventricular size with normal right ventricular systolic function.   Normal appearing left atrial appendage. No thrombus is present in the appendage. Normal appendage velocities.   No interatrial septal defect present.   There is no pulmonary hypertension.   No abnormal intracavitary echo; valvular structures are normal   Left atrial appendage was clean      Assessment/Plan:      * Septic shock due to Entercoccus faecium and E.coli  Secondary to acute cholangitis  -continue IVAB  -cultures reviewed  -S/p ERCP; GI following  -Trend lactic acid  -Continue LR IV fluids and Levophed to keep MAP > 65    Antibiotics (From admission, onward)    Start     Stop Route Frequency Ordered    07/21/23 1400  DAPTOmycin (CUBICIN) 805 mg in sodium chloride 0.9% SolP 50 mL IVPB         -- IV Every 48 hours (non-standard times) 07/20/23 0749    07/19/23 1300  meropenem 1 g in sodium chloride 0.9 % 100 mL IVPB (ready to mix system)        Note to Pharmacy: Ht: 5' 7" (1.702 m)  Wt: 108.9 kg (240 lb)  Estimated Creatinine Clearance: 63.1 mL/min (based on SCr of 1.1 mg/dL).  Body mass index is 37.59 kg/m².    -- IV Every 12 hours (non-standard times) 07/19/23 0740              Gallstone pancreatitis  Present on hospital day 1.  Continue vasopressor support and crystalloid.      Encephalopathy, metabolic  Metabolic in setting of septic shock.  -assess mental status when off sedation  -Treat septic shock      Shock liver  -Treat cholangitis  -Trend LFTs, much improved.  -Continue IV fluids and Levophed    Lab Results   Component Value Date     (H) 07/23/2023 "    AST 38 07/23/2023    GGT 24 05/17/2018    ALKPHOS 256 (H) 07/23/2023    BILITOT 1.0 07/23/2023         ANTHONY (acute kidney injury)  In setting of septic shock.  -Continue Levophed and IV fluids  -Renally dose medications  -Avoid nephrotoxic agents  -Monitor UOP and electrolytes  -Trend creatinine  -nephrologist consulting    Cr has trended upward:  2.4 to 2.6 to 2.9 to 2.8.  BUN is going up as well:  49 to 56 to 64       Demand ischemia  History of CAD s/p CABG. No acute ST changes on EKG.  -Trend troponin  -Treat septic shock  -Telemetry  -cardiology consulting -- their opinion is that the troponin is high b/c of myocardial demand ischemia    Troponin I High Sensitivity   Date Value Ref Range Status   07/20/2023 2384.3 (HH) 0.0 - 14.9 pg/mL Final     Comment:     Troponin results differ between methods. Do not use   results between Troponin methods interchangeably.    Alkaline Phospatase levels above 400 U/L may   cause false positive results.    Access hsTnI should not be used for patients taking   Asfotase anya (Strensiq).  Troponin critical result(s) called and verbal readback obtained   from Argentina Avila RN M3 by MS1 07/20/2023 04:54     07/19/2023 1148.1 (HH) 0.0 - 14.9 pg/mL Final     Comment:     Troponin results differ between methods. Do not use   results between Troponin methods interchangeably.    Alkaline Phospatase levels above 400 U/L may   cause false positive results.    Access hsTnI should not be used for patients taking   Asfotase anya (Strensiq).  Troponin critical result(s) called and verbal readback obtained from   Karan Maldonado RN M3  by MS1 07/19/2023 22:10     07/19/2023 648.1 (HH) 0.0 - 14.9 pg/mL Final     Comment:     Troponin results differ between methods. Do not use   results between Troponin methods interchangeably.    Alkaline Phospatase levels above 400 U/L may   cause false positive results.    Access hsTnI should not be used for patients taking   Asfotase anya  (Strensiq).  Results confirmed, test repeated  Troponin critical result(s) repeated. Called and verbal readback   obtained from Carmela McintoshICU, RN.  by SLT1 07/19/2023 17:37         Acute hypoxemic respiratory failure  Required intubation on hospital day 3.  Patient was successfully extubated on July 24, 2023.  Follow Pulmonary Medicine recommendations.        Anemia  Stable.  -Trend Hgb with CBC    Hemoglobin   Date Value Ref Range Status   07/23/2023 10.5 (L) 12.0 - 16.0 g/dL Final   07/22/2023 9.9 (L) 12.0 - 16.0 g/dL Final           Obesity (BMI 30-39.9)  Body mass index is 37.6 kg/m². Morbid obesity complicates all aspects of disease management from diagnostic modalities to treatment.       Hypothyroidism  TSH unremarkable.  It's 0.470.  -Continue Synthroid      Acute obstructive cholangitis  Sludge ball removed via ERCP. Likely source of bacteremia. LFTs much improved.  -Continue meropenem and daptomycin, renally dosed; follow sensitivities  -GI following  -IV fluids  -Levophed infusion  - blood cultures:  Entercoccus faecium and E.coli  -Trend LFTs      CAD (coronary artery disease)  -Hold home medications  -Telemetry      Hypokalemia  -Trend K.  Has improved.  -Replete K PRN  -Telemetry    Potassium   Date Value Ref Range Status   07/23/2023 3.8 3.5 - 5.1 mmol/L Final   07/22/2023 4.0 3.5 - 5.1 mmol/L Final         S/P bariatric surgery  Noted.      NIKOLAS on CPAP  -chronic condition.    Type 2 diabetes mellitus treated with insulin  Patient's FSGs are controlled on current medication regimen.  Last A1c reviewed-   Lab Results   Component Value Date    HGBA1C 6.9 (H) 07/18/2023     'Current correctional scale  Medium  Maintain anti-hyperglycemic dose as follows-   Antihyperglycemics (From admission, onward)    Start     Stop Route Frequency Ordered    07/19/23 1018  insulin aspart U-100 pen 1-10 Units         -- SubQ Every 6 hours PRN 07/19/23 0920        Hold Oral hypoglycemics while patient is in the  hospital.    Benign essential hypertension  -Hold home medications in setting of shock      Start PT and OT.  Consulting nutritionist for dietary recommendations.  VTE Risk Mitigation (From admission, onward)         Ordered     enoxaparin injection 30 mg  Every 24 hours (non-standard times)         07/22/23 0940     IP VTE HIGH RISK PATIENT  Once         07/18/23 1642     Place sequential compression device  Until discontinued         07/18/23 1642                Discharge Planning   LUIS ALFREDO:  TBD    Code Status: Full Code   Is the patient medically ready for discharge?:     Reason for patient still in hospital (select all that apply): Patient trending condition and Consult recommendations  Discharge Plan A: Home                  Enrike Huff MD  Department of Hospital Medicine   [facility]

## 2023-07-24 NOTE — PLAN OF CARE
1. Consume regular textures IDDSI 7 and liquids without s/s oropharyngeal dysphagia.  2. Complete cognitive- linguistic eval

## 2023-07-24 NOTE — CONSULTS
"[facility]  Adult Nutrition   Consult Note    SUMMARY      Recommendations:   Continue NPO for now until pt can be evaluated by speech.     Goals:   Nutrition provision to meet at least 75% EEN/EPN.    Dietitian Rounds Brief  Consult per pt extubated: Observed pt today during rounds. Her LBM was yesterday. Will continue to follow prn.    Per MD PN's today"  Principal Problem:Septic shock     HPI:  Chanel Cervantes is a 67 y.o. White female   With PMH of DM2, HTN, bariatric surgery,  Remote cholecystectomy,  Frequent UTIs with kidney stones,  who presents with back pain.  Admitted for pancreatitis with elevated LFTs     Pt is currently confused after receiving ativan  (ER gave it to calm her for CT scan)  History taken from family at bedside     Onset of back pain overnight  Located b/l lumbar region  Radiating to b/l upper quadrants of abdomen  Occurring intermittently  Progressively worsening  Severe, causes SOB when occurring     Initially pt thought pain was due to a fall yesterday  (Mechanical, tripped over a rug, no head trauma)  +nausea, no vomiting  +intermittent chills  They are not sure about objective fever     Has had similar abdominal pain previously  Per family, this has been attributed to her ongoing ventral wall hernia  They don't bring her to ER for this usually  However pain today was much more severe than usual        Overview/Hospital Course:  Chanel Cervantes is a 67 year old female with a past medical history of obesity, ventral hernia, DM, HTN, anemia, CAD, NIKOLAS, and hypothyroidism who presented with septic shock secondary to a possible cholangitis with E coli bacteremia. Vancomycin was discontinued and she remained on meropenem. BP was maintained on a Levophed infusion. GI was consulted and performed ERCP which showed biliary sludge with a sludge ball dilating the main duct which was removed; a biliary sphincterotomy was also performed. Repeat blood cultures were negative. She was on " IV fluids with LR and was NPO as she was encephalopathic. She also had an ANTHONY as well. She also had acute hypoxic respiratory failure for which she was put on mask O2. She also had demand ischemia; TTE was done and troponin was trended. There was shock liver superimposed on the hepatocellular injury brought on by the acute cholangitis. Pulmonary/Critical Care was consulted given the patient's medical acuity.  Pulmonologist made decision to intubate patient based on patient's deep encephalopathy and inability to protect her airway.        Interval History:  Patient just got extubated.  Asking to eat.  No other subjective complaints.  Currently afebrile.     Diet order: NPO      % Intake of Estimated Energy Needs: 0%  % Meal Intake: NPO    Estimated/Assessed Needs  Weight Used For Calorie Calculations: 108.9 kg (240 lb 1.3 oz)  Energy Calorie Requirements (kcal): 1198 - 1525 (11 - 14 kcal/kg)  Energy Need Method: Kcal/kg  Protein Requirements: 49 - 73 (0.8 - 73 (0.8 - 1.2 g/kg IBW)  Weight Used For Protein Calculations: 61 kg (134 lb 7.7 oz) (IBW)     Estimated Fluid Requirement Method: RDA Method  RDA Method (mL): 1198       Weight History:  Wt Readings from Last 5 Encounters:   07/19/23 108.9 kg (239 lb 15.9 oz)   07/19/23 108.9 kg (240 lb 1.3 oz)   06/02/23 109.1 kg (240 lb 8 oz)   04/13/23 108.8 kg (239 lb 14.4 oz)   02/16/23 108.5 kg (239 lb 4.8 oz)        Reason for Assessment  Reason For Assessment: consult  Diagnosis: infection/sepsis  Relevant Medical History: essential HTN; type 2 DM; NIKOLAS on CPAP; s/p bariatric surgery; hypokalemia; CAD; acute obstructive cholangitis; hypothyroidism; obesity; anemia; acute hypoxemic respiratory failure; ANTHONY; septic shock; shock liver; e. coli bacteremia; acute encephalopathy    Medications:Pertinent Medications Reviewed  Scheduled Meds:   cefTRIAXone (ROCEPHIN) IVPB  2 g Intravenous Q24H    chlorhexidine  15 mL Mouth/Throat BID    DAPTOmycin (CUBICIN) IV (PEDS and ADULTS)   10 mg/kg (Adjusted) Intravenous Q48H    enoxparin  30 mg Subcutaneous Q24H    levothyroxine  75 mcg Oral Before breakfast     Continuous Infusions:   sodium chloride 0.45% 100 mL/hr at 07/24/23 1110     PRN Meds:.acetaminophen, albuterol-ipratropium, calcium gluconate IVPB, calcium gluconate IVPB, calcium gluconate IVPB, carboxymethylcellulose, dextrose 50%, dextrose 50%, glucagon (human recombinant), glucose, glucose, hydrALAZINE, HYDROmorphone, insulin aspart U-100, lorazepam, magnesium sulfate IVPB, magnesium sulfate IVPB, naloxone, ondansetron, polyethylene glycol, potassium chloride in water **AND** potassium chloride in water **AND** potassium chloride in water, senna-docusate 8.6-50 mg, simethicone, sodium chloride 0.9%, sodium phosphate IVPB, sodium phosphate IVPB, sodium phosphate IVPB    Labs: Pertinent Labs Reviewed  Clinical Chemistry:  Recent Labs   Lab 07/18/23  1220 07/19/23  0030 07/19/23  0535 07/19/23  1604 07/22/23  0318 07/23/23  0608 07/24/23  0310      < > 138   < > 140 142 146*   K 3.1*   < > 3.3*   < > 4.0 3.8 3.5   CL 99   < > 105   < > 108 110 114*   CO2 29   < > 24   < > 22* 23 24   *   < > 165*   < > 242* 305* 202*   BUN 22   < > 34*   < > 64* 78* 82*   CREATININE 1.1   < > 1.6*   < > 2.8* 2.8* 2.5*   CALCIUM 10.0   < > 8.2*   < > 7.4* 7.6* 7.8*   PROT 7.1   < > 5.3*   < > 4.5* 4.6* 4.6*   ALBUMIN 3.8   < > 2.7*   < > 1.8* 1.8* 1.8*   BILITOT 1.8*   < > 2.7*   < > 1.3* 1.0 1.0   ALKPHOS 217*   < > 239*   < > 286* 256* 217*   AST 4,943*   < > 2,526*   < > 112* 38 24   ALT 1,475*   < > 1,237*   < > 321* 194* 127*   ANIONGAP 13   < > 9   < > 10 9 8   MG 1.5*   < > 2.0   < > 2.5 2.7* 2.5   PHOS  --   --   --    < > 3.8 3.8 3.7   LIPASE 1363*  --  102*  --   --   --   --     < > = values in this interval not displayed.     CBC:   Recent Labs   Lab 07/24/23  0310 07/24/23  0313 07/24/23  0947   WBC 13.49*  --   --    RBC 4.05  --   --    HGB 10.6*  --   --    HCT 33.4*   < > 31*    PLT 99*  --   --    MCV 83  --   --    MCH 26.2*  --   --    MCHC 31.7*  --   --     < > = values in this interval not displayed.     Cardiac Profile:  Recent Labs   Lab 07/18/23  1220 07/20/23  0307 07/24/23  0310   BNP 20  --  504*   CPK 92 245*  --      Inflammatory Labs:  Recent Labs   Lab 07/19/23  1250 07/24/23  0310   CRP 24.03* 6.33*     Diabetes:  Recent Labs   Lab 07/18/23  1735   HGBA1C 6.9*     Thyroid & Parathyroid:  Recent Labs   Lab 07/19/23  0535   TSH 0.470       Monitor and Evaluation  Food and Nutrient Intake: energy intake, enteral nutrition intake  Food and Nutrient Adminstration: enteral and parenteral nutrition administration  Physical Activity and Function: nutrition-related ADLs and IADLs, factors affecting access to physical activity  Anthropometric Measurements: weight change, body mass index, weight  Biochemical Data, Medical Tests and Procedures: electrolyte and renal panel, inflammatory profile, gastrointestinal profile, lipid profile, glucose/endocrine profile  Nutrition-Focused Physical Findings: overall appearance     Nutrition Risk  Level of Risk/Frequency of Follow-up: high     Nutrition Follow-Up  RD Follow-up?: Yes    Mary Arnold, BANDAR 07/24/2023 12:57 PM

## 2023-07-24 NOTE — SUBJECTIVE & OBJECTIVE
Interval History:  Patient just got extubated.  Asking to eat.  No other subjective complaints.  Currently afebrile.    Review of Systems  +generalized weakness  Objective:     Vital Signs (Most Recent):  Temp: 97.8 °F (36.6 °C) (07/24/23 0701)  Pulse: 63 (07/24/23 0745)  Resp: (!) 34 (07/24/23 0745)  BP: (!) 149/67 (07/24/23 0745)  SpO2: (!) 94 % (07/24/23 0745) Vital Signs (24h Range):  Temp:  [97.8 °F (36.6 °C)-99.2 °F (37.3 °C)] 97.8 °F (36.6 °C)  Pulse:  [43-67] 63  Resp:  [15-36] 34  SpO2:  [90 %-100 %] 94 %  BP: (102-150)/(55-83) 149/67  Arterial Line BP: ()/() 177/55     Weight: 108.9 kg (239 lb 15.9 oz)  Body mass index is 37.6 kg/m².    Intake/Output Summary (Last 24 hours) at 7/24/2023 0856  Last data filed at 7/24/2023 0600  Gross per 24 hour   Intake 306.7 ml   Output 1475 ml   Net -1168.3 ml           Physical Exam  Constitutional:       Interventions: She is sedated and intubated.   Eyes:      Conjunctiva/sclera:      Right eye: No exudate.     Left eye: No exudate.  Neck:      Vascular: No JVD.   Cardiovascular:      Rate and Rhythm: Normal rate and regular rhythm.      Comments: Upper extremities have mild pitting edema  Pulmonary:      Effort: She is intubated.      Breath sounds: Decreased air movement present.   Abdominal:      General: Abdomen is flat. Bowel sounds are decreased. There is distension.      Hernia: A hernia is present. Hernia is present in the ventral area.   Skin:     General: Skin is warm and dry.           Significant Labs:   Lab Results   Component Value Date    WBC 13.49 (H) 07/24/2023    HGB 10.6 (L) 07/24/2023    HCT 31 (L) 07/24/2023    MCV 83 07/24/2023    PLT 99 (L) 07/24/2023       CMP  Sodium   Date Value Ref Range Status   07/24/2023 146 (H) 136 - 145 mmol/L Final   01/14/2019 141 134 - 144 mmol/L      Potassium   Date Value Ref Range Status   07/24/2023 3.5 3.5 - 5.1 mmol/L Final     Chloride   Date Value Ref Range Status   07/24/2023 114 (H) 95 - 110  mmol/L Final   01/14/2019 96 (L) 98 - 110 mmol/L      CO2   Date Value Ref Range Status   07/24/2023 24 23 - 29 mmol/L Final     Glucose   Date Value Ref Range Status   07/24/2023 202 (H) 70 - 110 mg/dL Final   01/14/2019 177 (H) 70 - 99 mg/dL      BUN   Date Value Ref Range Status   07/24/2023 82 (H) 8 - 23 mg/dL Final     Creatinine   Date Value Ref Range Status   07/24/2023 2.5 (H) 0.5 - 1.4 mg/dL Final   01/14/2019 0.99 0.60 - 1.40 mg/dL      Calcium   Date Value Ref Range Status   07/24/2023 7.8 (L) 8.7 - 10.5 mg/dL Final     Total Protein   Date Value Ref Range Status   07/24/2023 4.6 (L) 6.0 - 8.4 g/dL Final     Albumin   Date Value Ref Range Status   07/24/2023 1.8 (L) 3.5 - 5.2 g/dL Final   01/14/2019 3.8 3.1 - 4.7 g/dL      Total Bilirubin   Date Value Ref Range Status   07/24/2023 1.0 0.1 - 1.0 mg/dL Final     Comment:     For infants and newborns, interpretation of results should be based  on gestational age, weight and in agreement with clinical  observations.    Premature Infant recommended reference ranges:  Up to 24 hours.............<8.0 mg/dL  Up to 48 hours............<12.0 mg/dL  3-5 days..................<15.0 mg/dL  6-29 days.................<15.0 mg/dL       Alkaline Phosphatase   Date Value Ref Range Status   07/24/2023 217 (H) 55 - 135 U/L Final     AST   Date Value Ref Range Status   07/24/2023 24 10 - 40 U/L Final     ALT   Date Value Ref Range Status   07/24/2023 127 (H) 10 - 44 U/L Final     Anion Gap   Date Value Ref Range Status   07/24/2023 8 8 - 16 mmol/L Final     eGFR   Date Value Ref Range Status   07/24/2023 20.6 (A) >60 mL/min/1.73 m^2 Final     Microbiology Results (last 7 days)       Procedure Component Value Units Date/Time    Blood culture [[id number]] Collected: 07/24/23 0348    Order Status: Sent Specimen: Blood Updated: 07/24/23 0426    Narrative:      Collection has been rescheduled by LEIDY at 07/24/2023 03:28 Reason:   Nurse Draw. Blood was already drawn. I was not able  to get the blood   cultures.  Collection has been rescheduled by JS at 07/24/2023 03:28 Reason:   Nurse Draw. Blood was already drawn. I was not able to get the blood   cultures.    Blood culture [[id number]] Collected: 07/23/23 0825    Order Status: Completed Specimen: Blood Updated: 07/23/23 1517     Blood Culture, Routine No Growth to date    Blood culture [[id number]] Collected: 07/20/23 0940    Order Status: Completed Specimen: Blood from Peripheral, Antecubital, Right Updated: 07/23/23 1232     Blood Culture, Routine No Growth to date      No Growth to date      No Growth to date      No Growth to date    Narrative:      Collection has been rescheduled by [email or web address] at 07/20/2023 06:04 Reason:   Unable to collect  Collection has been rescheduled by [email or web address] at 07/20/2023 08:01 Reason:   Contacting zainab for cultures per RN Alfred  Collection has been rescheduled by [email or web address] at 07/20/2023 08:41 Reason:   Nurse Draw  Collection has been rescheduled by [email or web address] at 07/20/2023 06:04 Reason:   Unable to collect  Collection has been rescheduled by [email or web address] at 07/20/2023 08:01 Reason:   Contacting zainab for cultures per RN Alfred  Collection has been rescheduled by [email or web address] at 07/20/2023 08:41 Reason:   Nurse Draw    Culture, Respiratory with Gram Stain [[id number]] Collected: 07/20/23 1003    Order Status: Completed Specimen: Respiratory from Endotracheal Aspirate Updated: 07/22/23 0736     Respiratory Culture No growth      No normal respiratory shay     Gram Stain (Respiratory) <10 epithelial cells per low power field.     Gram Stain (Respiratory) Few WBC's     Gram Stain (Respiratory) No organisms seen    Urine Culture High Risk [[id number]] Collected: 07/19/23 1006    Order Status: Completed Specimen: Urine, Clean Catch Updated: 07/22/23 0710     Urine Culture, Routine No growth    Narrative:      Indicated criteria for high risk culture:->Other  Other (specify):->e coli bacteremia    Blood culture [[id number]]  (Abnormal) Collected: 07/20/23  0940    Order Status: Completed Specimen: Blood from Line, Arterial, Right Updated: 07/22/23 0644     Blood Culture, Routine Gram stain aer bottle: Gram positive cocci      Positive results previously called      ENTEROCOCCUS FAECIUM  For susceptibility see order #[id number]      Narrative:      Collection has been rescheduled by [email or web address] at 07/20/2023 06:04 Reason:   Unable to collect  Collection has been rescheduled by [email or web address] at 07/20/2023 08:01 Reason:   Contacting zainab for cultures per RN Alfred  Collection has been rescheduled by [email or web address] at 07/20/2023 08:41 Reason:   Nurse Draw  Collection has been rescheduled by [email or web address] at 07/20/2023 06:04 Reason:   Unable to collect  Collection has been rescheduled by [email or web address] at 07/20/2023 08:01 Reason:   Contacting zainab for cultures per RN Alfred  Collection has been rescheduled by [email or web address] at 07/20/2023 08:41 Reason:   Nurse Draw    Blood culture [[id number]]  (Abnormal) Collected: 07/19/23 1230    Order Status: Completed Specimen: Blood Updated: 07/22/23 0643     Blood Culture, Routine Gram stain aer bottle: Gram positive cocci      Positive results previously called      ENTEROCOCCUS FAECIUM  For susceptibility see order #[id number]      Blood culture [[id number]]  (Abnormal) Collected: 07/19/23 0002    Order Status: Completed Specimen: Blood Updated: 07/22/23 0643     Blood Culture, Routine Gram stain aer bottle: Gram positive cocci      Gram stain ez bottle: Gram positive cocci      Positive results previously called      ENTEROCOCCUS FAECIUM  For susceptibility see order #[id number]      Blood culture [[id number]]  (Abnormal) Collected: 07/19/23 0030    Order Status: Completed Specimen: Blood from Peripheral, Left Hand Updated: 07/22/23 0643     Blood Culture, Routine Gram stain aer bottle: Gram positive cocci      Gram stain ez bottle: Gram positive cocci      Results called to and read back by:Carmela Mcintosh RN-3ICU;      07/19/2023  16:23 JAKY      ENTEROCOCCUS FAECIUM  For  susceptibility see order #[id number]      Narrative:      Collection has been rescheduled by KC9 at 07/19/2023 00:20 Reason:   Nurse Draw  Collection has been rescheduled by KC9 at 07/19/2023 00:20 Reason:   Nurse Draw    Blood culture [[id number]]  (Abnormal)  (Susceptibility) Collected: 07/18/23 1648    Order Status: Completed Specimen: Blood Updated: 07/22/23 0642     Blood Culture, Routine Gram stain aer bottle: Gram negative rods      Gram stain aer bottle: Gram positive cocci      Results called to and read back by: Lesvia Mcdowell RN MICU3.      07/19/2023  05:06 TGC      ESCHERICHIA COLI      ENTEROCOCCUS FAECIUM    Blood culture [[id number]]  (Abnormal) Collected: 07/18/23 1738    Order Status: Completed Specimen: Blood Updated: 07/21/23 0853     Blood Culture, Routine Gram stain aer bottle: Gram negative rods      Results called to and read back by:Lesvia Mcdowell RN MICU3.      07/19/2023  05:23 TGC      ESCHERICHIA COLI  For susceptibility see order #[id number]      Rapid Organism ID by PCR (from Blood culture) [[id number]]  (Abnormal) Collected: 07/18/23 1648    Order Status: Completed Updated: 07/19/23 0526     Enterococcus faecalis Not Detected     Enterococcus faecium Detected     Listeria monocytogenes Not Detected     Staphylococcus spp. Not Detected     Staphylococcus aureus Not Detected     Staphylococcus epidermidis Not Detected     Staphylococcus lugdunensis Not Detected     Streptococcus species Not Detected     Streptococcus agalactiae Not Detected     Streptococcus pneumoniae Not Detected     Streptococcus pyogenes Not Detected     Acinetobacter calcoaceticus/baumannii complex Not Detected     Bacteroides fragilis Not Detected     Enterobacterales See species for ID     Enterobacter cloacae complex Not Detected     Escherichia coli Detected     Klebsiella aerogenes Not Detected     Klebsiella oxytoca Not Detected     Klebsiella pneumoniae group Not Detected     Proteus Not Detected     Salmonella  sp Not Detected     Serratia marcescens Not Detected     Haemophilus influenzae Not Detected     Neisseria meningtidis Not Detected     Pseudomonas aeruginosa Not Detected     Stenotrophomonas maltophilia Not Detected     Candida albicans Not Detected     Candida auris Not Detected     Candida glabrata Not Detected     Candida krusei Not Detected     Candida parapsilosis Not Detected     Candida tropicalis Not Detected     Cryptococcus neoformans/gattii Not Detected     CTX-M (ESBL ) Not Detected     IMP (Carbapenem resistant) Not Detected     KPC resistance gene (Carbapenem resistant) Not Detected     mcr-1  Not Detected     mec A/C  Test not applicable     mec A/C and MREJ (MRSA) gene Test not applicable     NDM (Carbapenem resistant) Not Detected     OXA-48-like (Carbapenem resistant) Not Detected     van A/B (VRE gene) Not Detected     VIM (Carbapenem resistant) Not Detected        Significant Imaging:   ECHO:  The left ventricle is normal in size with mild concentric hypertrophy and mildly decreased systolic function.  Mild to moderate tricuspid regurgitation.  The estimated ejection fraction is 45%.  Grade I left ventricular diastolic dysfunction.  There is abnormal septal wall motion consistent with left bundle branch block.  Mild right ventricular enlargement with normal right ventricular systolic function.  Moderate left atrial enlargement.  Normal central venous pressure (3 mmHg).  The estimated PA systolic pressure is 40 mmHg.  There is mild pulmonary hypertension.    ERCP:   - The entire main bile duct was moderately                          dilated, with an obstruction from suspected sludge                          ball                          - The patient has had a cholecystectomy.                          - A biliary sphincterotomy was performed.                          - The biliary tree was swept.     CTA chest:  1. Negative for pulmonary thromboembolism.  2. Scattered lower lung  interstitial opacities left greater than right, potentially subsegmental atelectasis and or small airways inflammation.  3. A 7 mm left upper lobe noncalcified pulmonary nodule, nonspecific. Pulmonary neoplasm is not excluded. A follow-up noncontrast chest CT in 6 months is recommended, per Fleischner Society Recommendations. Reference:  Radiology. 2017 Jul; 284(1):228-243.  4. Cardiomegaly, with aortic and coronary arterial calcifications.  5. Hiatal hernia.    CT abdomen and pelvis with contrast:  1.  Status post cholecystectomy with postsurgical dilatation of the common bile duct and intrahepatic ducts. Should be correlated with bilirubin levels.  2.  Broad-based ventral abdominal wall hernia measures approximately 15 x 13 cm with herniated loops of large and small bowel. No evidence of obstruction.  3.  Exophytic hypoattenuating structure in the mid left kidney is felt to reflect a hemorrhagic cyst.    RUQ US:  The gallbladder surgically absent. The common duct is within normal limits.  Hepatic steatosis and hepatomegaly.    CXR:  1.  Enlarged cardiomediastinal silhouette and central hilar vascular structures. Mild perihilar interstitial thickening. Findings are felt to reflect CHF with mild pulmonary edema.  2.  Small bilateral pleural effusions.    CXR: Interval improved aeration in both lung bases, with otherwise no significant change.    CXR: Hypoinflation with mild prominence of the pulmonary vasculature and faint groundglass opacity in the lung bases suggestive of pulmonary edema    CXR:  Suspected small left pleural effusion.  Improved aeration of the lung bases.  Stable lines and tubes.    ERICA:  The left ventricle is normal in size with normal systolic function.  Normal left ventricular diastolic function.  The estimated ejection fraction is 60%.  Atrial fibrillation not observed.  Normal right ventricular size with normal right ventricular systolic function.  Normal appearing left atrial appendage. No  thrombus is present in the appendage. Normal appendage velocities.  No interatrial septal defect present.  There is no pulmonary hypertension.  No abnormal intracavitary echo; valvular structures are normal  Left atrial appendage was clean

## 2023-07-24 NOTE — CARE UPDATE
07/24/23 0729   Patient Assessment/Suction   Level of Consciousness (AVPU) alert   Respiratory Effort Normal;Unlabored   Expansion/Accessory Muscles/Retractions expansion symmetric;no retractions;no use of accessory muscles   All Lung Fields Breath Sounds coarse   Rhythm/Pattern, Respiratory assisted mechanically   Cough Frequency with stimulation   Cough Type assisted   Suction Method oral;tracheal   $ Suction Charges Inline Suction Procedure Stat Charge;Close Suction System Equipment   Secretions Amount moderate   Secretions Color white   Secretions Characteristics thin   Skin Integrity   $ Wound Care Tech Time 15 min   Area Observed Left;Right;Cheek   Skin Appearance without discoloration   PRE-TX-O2   Device (Oxygen Therapy) ventilator   $ Is the patient on Low Flow Oxygen? Yes   Oxygen Concentration (%) 30   SpO2 100 %   Pulse Oximetry Type Continuous   $ Pulse Oximetry - Multiple Charge Pulse Oximetry - Multiple   Pulse 66   Resp (!) 33        Airway - Non-Surgical 07/20/23 0945 Endotracheal Tube   Placement Date/Time: 07/20/23 0945   Method of Intubation: Hendricks  Inserted by: MD  Staff/Resident Name(s): dr charles  Airway Device: Endotracheal Tube  Airway Device Size: 8.0  Style: Cuffed  Cuff Inflated With: Air  Placement Verified By: Capnometr...   Secured at 23 cm   Measured At Lips   Secured Location Center   Secured by Commercial tube teran   Tube Securement Device Changed? Yes   Site Condition Cool;Dry   Status Intact;Secured;Patent   Site Assessment Clean;Dry   Vent Select   Conventional Vent Y   $ Ventilator Subsequent 1   Charged w/in last 24h YES   Preset Conventional Ventilator Settings   Vent ID [id number]   Vent Type    Ventilation Type VC   Vent Mode Spont   Humidity HME   PEEP/CPAP 5 cmH20   Pressure Support 5 cmH20   Peak End Inspiratory Pressure 11 cmH20   Insp Rise Time  70 %   I-Trigger Type  V-TRIG   Trigger Sensitivity Flow/I-Trigger 3 L/min   Patient Ventilator Parameters   Resp Rate  Total 30 br/min   Peak Airway Pressure 11 cmH20   Mean Airway Pressure 7.1 cmH20   Plateau Pressure 0 cmH20   Exhaled Vt 314 mL   Total Ve 8.84 L/m   Spont Ve 8.84 L   I:E Ratio Measured 1:1.60   Auto PEEP 0 cmH20   Tubing ID (mm) 8 mm   Tube Type ET   Inspired Tidal Volume (VTI) 0 mL   Conventional Ventilator Alarms   Alarms On Y   Resp Rate High Alarm 40 br/min   Press High Alarm 55 cmH2O   Apnea Rate 20   Apnea Volume (mL) 0 mL   Apnea Oxygen Concentration  100   Apnea Flow Rate (L/min) 60   T Apnea 20 sec(s)   Ready to Wean/Extubation Screen   FIO2<=50 (chart decimal) 0.3   MV<16L (chart vol.) 8.84   PEEP <=8 (chart #) 5   Ready to Wean Parameters   F/VT Ratio<105 (RSBI) 105.1   Vital Capacity   Vital Capacity (mL) 0   Education   $ Education Ventilator Oxygen;15 min   Respiratory Evaluation   $ Care Plan Tech Time 15 min   $ Eval/Re-eval Charges Re-evaluation

## 2023-07-24 NOTE — PROGRESS NOTES
Pulmonary/Critical Care Progress Note      PATIENT NAME: Chanel Cervantes  MRN: [id number]  TODAY'S DATE: 2023  8:19 AM  ADMIT DATE: 2023  AGE: 67 y.o. : 1955    CONSULT REQUESTED BY: Enrike Huff MD    REASON FOR CONSULT:   Critical care management    HPI:  The patient is a 67-year-old female who was brought to the hospital for back pain.  She was having bilateral back pain and upper abdominal pain that was not controlled with ibuprofen and came it.  She was found to have dilated common bile duct and elevated LFTs.  She is in shock requiring Levophed.  She is bacteremic with E coli and E faecium.  Her ERCP released sludge from the common bile duct. She is very encephalopathic.     the patient has an agitated delirium this morning.  She appears very uncomfortable.  She is tachypneic.  Her pressor requirements are increasing, her urine output is decreasing.    - pt was intubated yesterday due to encephalopathy and continues on ventilator, sedated. Her pressor requirement is improved. Tmax 100.2 this am.  Daughter at bedside states usually pt is independent and has never been sick like this before.    - continues on vent, sedated, off pressor. Tmax 100 yesterday  - pt had ERICA today which was normal. Pressor off. Continues on vent. Has been afebrile. Off sedation she is very weak but able to open eyes      The patient is on spontaneous ventilation for hours.      REVIEW OF SYSTEMS  Unobtainable.    No change in the patient's Past Medical History, Past Surgical History, Social History or Family History since admission.    VITAL SIGNS (MOST RECENT)  Temp: 97.8 °F (36.6 °C) (23 0701)  Pulse: 63 (23 0745)  Resp: (!) 34 (23)  BP: (!) 149/67 (2345)  SpO2: (!) 94 % (23)    INTAKE AND OUTPUT (LAST 24 HOURS):  Intake/Output Summary (Last 24 hours) at 2023 0931  Last data filed at 2023 0907  Gross per 24 hour   Intake 306.7 ml   Output 1695  ml   Net -1388.3 ml       WEIGHT  Wt Readings from Last 1 Encounters:   07/19/23 108.9 kg (239 lb 15.9 oz)       PHYSICAL EXAM  GENERAL: Older patient awake and following commands.  HEENT: Pupils equal and reactive. Nose intact. Pharynx intubated  NECK: Supple. L IJ triple lumen catheter.  HEART: Regular rate and rhythm. No murmur or gallop auscultated.  LUNGS:  clear/diminished bilaterally   ABDOMEN: Bowel sounds diminished Very large ventral hernia.  Very obese, soft  : Normal anatomy.Waller with yellow urine.  EXTREMITIES: Normal muscle tone and joint movement, no cyanosis or clubbing. Scar at L shoulder.   There is a radial arterial catheter on the right  LYMPHATICS: No adenopathy palpated, no edema  SKIN: Dry, intact, no lesions.   NEURO: awake and folowing commands  PSYCH: unable to assess      CBC LAST (LAST 24 HOURS)  Recent Labs   Lab 07/24/23 0310 07/24/23 0312   WBC 13.49*  --    RBC 4.05  --    HGB 10.6*  --    HCT 33.4* 31*   MCV 83  --    MCH 26.2*  --    MCHC 31.7*  --    RDW 15.8*  --    PLT 99*  --    MPV 12.7  --    GRAN 81.7*  11.0*  --    LYMPH 7.1*  1.0  --    MONO 7.1  1.0  --    BASO 0.03  --    NRBC 0  --        CHEMISTRY LAST (LAST 24 HOURS)  Recent Labs   Lab 07/24/23 0310 07/24/23 0312   *  --    K 3.5  --    *  --    CO2 24  --    ANIONGAP 8  --    BUN 82*  --    CREATININE 2.5*  --    *  --    CALCIUM 7.8*  --    PH  --  7.461*   MG 2.5  --    ALBUMIN 1.8*  --    PROT 4.6*  --    ALKPHOS 217*  --    *  --    AST 24  --    BILITOT 1.0  --          CARDIAC PROFILE (LAST 24 HOURS)  Recent Labs   Lab 07/20/23  0307 07/24/23 0310   BNP  --  504*   *  --    TROPONINIHS 2384.3*  --        LAST 7 DAYS MICROBIOLOGY   Microbiology Results (last 7 days)       Procedure Component Value Units Date/Time    Blood culture [[id number]] Collected: 07/24/23 0348    Order Status: Sent Specimen: Blood Updated: 07/24/23 0426    Narrative:      Collection has been  rescheduled by [facility] at 07/24/2023 03:28 Reason:   Nurse Draw. Blood was already drawn. I was not able to get the blood   cultures.  Collection has been rescheduled by JS at 07/24/2023 03:28 Reason:   Nurse Draw. Blood was already drawn. I was not able to get the blood   cultures.    Blood culture [[id number]] Collected: 07/23/23 0825    Order Status: Completed Specimen: Blood Updated: 07/23/23 1517     Blood Culture, Routine No Growth to date    Blood culture [[id number]] Collected: 07/20/23 0940    Order Status: Completed Specimen: Blood from Peripheral, Antecubital, Right Updated: 07/23/23 1232     Blood Culture, Routine No Growth to date      No Growth to date      No Growth to date      No Growth to date    Narrative:      Collection has been rescheduled by [facility] at 07/20/2023 06:04 Reason:   Unable to collect  Collection has been rescheduled by [email or web address] at 07/20/2023 08:01 Reason:   Contacting zainab for cultures per RN Alfred  Collection has been rescheduled by [facility] at 07/20/2023 08:41 Reason:   Nurse Draw  Collection has been rescheduled by [email or web address] at 07/20/2023 06:04 Reason:   Unable to collect  Collection has been rescheduled by [email or web address] at 07/20/2023 08:01 Reason:   Contacting zainab for cultures per RN Alfred  Collection has been rescheduled by [email or web address] at 07/20/2023 08:41 Reason:   Nurse Draw    Culture, Respiratory with Gram Stain [[id number]] Collected: 07/20/23 1003    Order Status: Completed Specimen: Respiratory from Endotracheal Aspirate Updated: 07/22/23 0736     Respiratory Culture No growth      No normal respiratory shay     Gram Stain (Respiratory) <10 epithelial cells per low power field.     Gram Stain (Respiratory) Few WBC's     Gram Stain (Respiratory) No organisms seen    Urine Culture High Risk [[id number]] Collected: 07/19/23 1006    Order Status: Completed Specimen: Urine, Clean Catch Updated: 07/22/23 0710     Urine Culture, Routine No growth    Narrative:      Indicated criteria for high risk  culture:->Other  Other (specify):->e coli bacteremia    Blood culture [[id number]]  (Abnormal) Collected: 07/20/23 0940    Order Status: Completed Specimen: Blood from Line, Arterial, Right Updated: 07/22/23 0644     Blood Culture, Routine Gram stain aer bottle: Gram positive cocci      Positive results previously called      ENTEROCOCCUS FAECIUM  For susceptibility see order #[id number]      Narrative:      Collection has been rescheduled by [email or web address] at 07/20/2023 06:04 Reason:   Unable to collect  Collection has been rescheduled by [email or web address] at 07/20/2023 08:01 Reason:   Contacting zainab for cultures per RN Alfred  Collection has been rescheduled by [email or web address] at 07/20/2023 08:41 Reason:   Nurse Draw  Collection has been rescheduled by [email or web address] at 07/20/2023 06:04 Reason:   Unable to collect  Collection has been rescheduled by [email or web address] at 07/20/2023 08:01 Reason:   Contacting zainab for cultures per RN Alfred  Collection has been rescheduled by [email or web address] at 07/20/2023 08:41 Reason:   Nurse Draw    Blood culture [[id number]]  (Abnormal) Collected: 07/19/23 1230    Order Status: Completed Specimen: Blood Updated: 07/22/23 0643     Blood Culture, Routine Gram stain aer bottle: Gram positive cocci      Positive results previously called      ENTEROCOCCUS FAECIUM  For susceptibility see order #[id number]      Blood culture [[id number]]  (Abnormal) Collected: 07/19/23 0002    Order Status: Completed Specimen: Blood Updated: 07/22/23 0643     Blood Culture, Routine Gram stain aer bottle: Gram positive cocci      Gram stain ez bottle: Gram positive cocci      Positive results previously called      ENTEROCOCCUS FAECIUM  For susceptibility see order #[id number]      Blood culture [[id number]]  (Abnormal) Collected: 07/19/23 0030    Order Status: Completed Specimen: Blood from Peripheral, Left Hand Updated: 07/22/23 0643     Blood Culture, Routine Gram stain aer bottle: Gram positive cocci      Gram stain ez bottle: Gram positive cocci      Results called to  and read back by:Carmela Mcintosh RN-3ICU;      07/19/2023  16:23 CJD      ENTEROCOCCUS FAECIUM  For susceptibility see order #[id number]      Narrative:      Collection has been rescheduled by [email or web address] at 07/19/2023 00:20 Reason:   Nurse Draw  Collection has been rescheduled by [facility] at 07/19/2023 00:20 Reason:   Nurse Draw    Blood culture [[id number]]  (Abnormal)  (Susceptibility) Collected: 07/18/23 1648    Order Status: Completed Specimen: Blood Updated: 07/22/23 0642     Blood Culture, Routine Gram stain aer bottle: Gram negative rods      Gram stain aer bottle: Gram positive cocci      Results called to and read back by: Lesvia Mcdowell RN MICU3.      07/19/2023  05:06 TGC      ESCHERICHIA COLI      ENTEROCOCCUS FAECIUM    Blood culture [[id number]]  (Abnormal) Collected: 07/18/23 1738    Order Status: Completed Specimen: Blood Updated: 07/21/23 0853     Blood Culture, Routine Gram stain aer bottle: Gram negative rods      Results called to and read back by:Lesvia Mcdowell RN MIC[facility].      07/19/2023  05:23 TGC      ESCHERICHIA COLI  For susceptibility see order #[id number]      Rapid Organism ID by PCR (from Blood culture) [[id number]]  (Abnormal) Collected: 07/18/23 1648    Order Status: Completed Updated: 07/19/23 0526     Enterococcus faecalis Not Detected     Enterococcus faecium Detected     Listeria monocytogenes Not Detected     Staphylococcus spp. Not Detected     Staphylococcus aureus Not Detected     Staphylococcus epidermidis Not Detected     Staphylococcus lugdunensis Not Detected     Streptococcus species Not Detected     Streptococcus agalactiae Not Detected     Streptococcus pneumoniae Not Detected     Streptococcus pyogenes Not Detected     Acinetobacter calcoaceticus/baumannii complex Not Detected     Bacteroides fragilis Not Detected     Enterobacterales See species for ID     Enterobacter cloacae complex Not Detected     Escherichia coli Detected     Klebsiella aerogenes Not Detected     Klebsiella  oxytoca Not Detected     Klebsiella pneumoniae group Not Detected     Proteus Not Detected     Salmonella sp Not Detected     Serratia marcescens Not Detected     Haemophilus influenzae Not Detected     Neisseria meningtidis Not Detected     Pseudomonas aeruginosa Not Detected     Stenotrophomonas maltophilia Not Detected     Candida albicans Not Detected     Candida auris Not Detected     Candida glabrata Not Detected     Candida krusei Not Detected     Candida parapsilosis Not Detected     Candida tropicalis Not Detected     Cryptococcus neoformans/gattii Not Detected     CTX-M (ESBL ) Not Detected     IMP (Carbapenem resistant) Not Detected     KPC resistance gene (Carbapenem resistant) Not Detected     mcr-1  Not Detected     mec A/C  Test not applicable     mec A/C and MREJ (MRSA) gene Test not applicable     NDM (Carbapenem resistant) Not Detected     OXA-48-like (Carbapenem resistant) Not Detected     van A/B (VRE gene) Not Detected     VIM (Carbapenem resistant) Not Detected            MOST RECENT IMAGING  X-Ray Chest AP Portable  XR CHEST 1 VIEW    CLINICAL HISTORY:  67 years Female vent, septic shock    COMPARISON: July 22, 2023    FINDINGS: Cardiac silhouette size is mildly enlarged and stable compared to prior. Endotracheal tube, left IJ CVL, and enteric tube are in stable position.    Previously seen groundglass opacities involving the lung bases have improved. Possible trace pleural fluid on the left as evidenced by blunting of the left costophrenic angle. No pneumothorax. No new or worsening parenchymal abnormality.    IMPRESSION:    Suspected small left pleural effusion.    Improved aeration of the lung bases.    Stable lines and tubes.    Electronically signed by:  Jah Best MD  7/24/2023 8:14 AM CDT Workstation: [id number]      CURRENT VISIT EKG  Results for orders placed or performed during the hospital encounter of 07/18/23   EKG 12-lead    Narrative    Test Reason :  W19.XXXA,    Vent. Rate : 073 BPM     Atrial Rate : 073 BPM     P-R Int : 154 ms          QRS Dur : 162 ms      QT Int : 426 ms       P-R-T Axes : 069 -17 -11 degrees     QTc Int : 469 ms    Sinus rhythm with Premature ventricular complexes or Fusion complexes  Right bundle branch block  Minimal voltage criteria for LVH, may be normal variant ( R in aVL )  Abnormal ECG  When compared with ECG of 07-AUG-2021 22:34,  Fusion complexes are now Present  Premature ventricular complexes are now Present    Referred By: [email or web address]   SELF           Confirmed By:        ECHOCARDIOGRAM RESULTS  No results found for this or any previous visit.        Oxygen INFORMATION  Vent Mode: Spont  Oxygen Concentration (%):  [30] 30  Resp Rate Total:  [14 br/min-35 br/min] 35 br/min  Vt Set:  [450 mL] 450 mL  PEEP/CPAP:  [5 cmH20] 5 cmH20  Pressure Support:  [5 cmH20-8 cmH20] 5 cmH20  Mean Airway Pressure:  [7.1 sqK69-13 cmH20] 7.1 nsV256 liters           LAST ARTERIAL BLOOD GAS  ABG  Recent Labs   Lab 07/24/23  0312   PH 7.461*   PO2 70*   PCO2 36.9   HCO3 26.3   BE 2       IMPRESSION AND PLAN  Severe sepsis with shock, cholangitis and pancreatitis  - bacteremia with E coli and E faecium  - sludge released from ERCP  - antibiotics per ID  - dc hydrocortisone  - d/c isolation, sensitive E faecium    Shock liver  - monitor LFTs- downtrending    Morbid obesity/severe hypoalbuminemia  - start trophic tube feeds via gastric tube    Anemia  - watch H/H    Thrombocytopenia, may be due to sepsis vs meds  - HIT panel pending  - improving    Systolic heart failure  Diastolic heart failure  Pulmonary hypertension  Demand ischemia  - appreciate cardiology recs    Acute encephalopathy, metabolic  - intubated for airway protection  - plan to extubate  - has been on spontaneous for hours    Critical care time spent reviewing the chart, examining the patient, reviewing the labs, reviewing the radiological findings, discussing care with nursing,  physicians, and respiratory and creating the note and  has been greater than 35 minutes.      Mariajose Serrano MD  Date of Service: 07/24/2023  8:19 AM

## 2023-07-25 LAB
ALBUMIN SERPL BCP-MCNC: 2.3 G/DL (ref 3.5–5.2)
ALP SERPL-CCNC: 213 U/L (ref 55–135)
ALT SERPL W/O P-5'-P-CCNC: 101 U/L (ref 10–44)
ANION GAP SERPL CALC-SCNC: 9 MMOL/L (ref 8–16)
AST SERPL-CCNC: 34 U/L (ref 10–40)
BACTERIA BLD CULT: NORMAL
BASOPHILS # BLD AUTO: 0.04 K/UL (ref 0–0.2)
BASOPHILS NFR BLD: 0.4 % (ref 0–1.9)
BILIRUB SERPL-MCNC: 0.8 MG/DL (ref 0.1–1)
BUN SERPL-MCNC: 75 MG/DL (ref 8–23)
CALCIUM SERPL-MCNC: 7.9 MG/DL (ref 8.7–10.5)
CHLORIDE SERPL-SCNC: 108 MMOL/L (ref 95–110)
CO2 SERPL-SCNC: 24 MMOL/L (ref 23–29)
CREAT SERPL-MCNC: 2.3 MG/DL (ref 0.5–1.4)
DIFFERENTIAL METHOD: ABNORMAL
EOSINOPHIL # BLD AUTO: 0.1 K/UL (ref 0–0.5)
EOSINOPHIL NFR BLD: 1.3 % (ref 0–8)
ERYTHROCYTE [DISTWIDTH] IN BLOOD BY AUTOMATED COUNT: 16 % (ref 11.5–14.5)
EST. GFR  (NO RACE VARIABLE): 22.7 ML/MIN/1.73 M^2
GLUCOSE SERPL-MCNC: 145 MG/DL (ref 70–110)
GLUCOSE SERPL-MCNC: 151 MG/DL (ref 70–110)
GLUCOSE SERPL-MCNC: 166 MG/DL (ref 70–110)
GLUCOSE SERPL-MCNC: 205 MG/DL (ref 70–110)
HCT VFR BLD AUTO: 36.3 % (ref 37–48.5)
HGB BLD-MCNC: 11.5 G/DL (ref 12–16)
IMM GRANULOCYTES # BLD AUTO: 0.37 K/UL (ref 0–0.04)
IMM GRANULOCYTES NFR BLD AUTO: 3.3 % (ref 0–0.5)
LYMPHOCYTES # BLD AUTO: 0.8 K/UL (ref 1–4.8)
LYMPHOCYTES NFR BLD: 6.8 % (ref 18–48)
MAGNESIUM SERPL-MCNC: 2.4 MG/DL (ref 1.6–2.6)
MCH RBC QN AUTO: 26.7 PG (ref 27–31)
MCHC RBC AUTO-ENTMCNC: 31.7 G/DL (ref 32–36)
MCV RBC AUTO: 84 FL (ref 82–98)
MONOCYTES # BLD AUTO: 0.8 K/UL (ref 0.3–1)
MONOCYTES NFR BLD: 7.5 % (ref 4–15)
NEUTROPHILS # BLD AUTO: 9 K/UL (ref 1.8–7.7)
NEUTROPHILS NFR BLD: 80.7 % (ref 38–73)
NRBC BLD-RTO: 0 /100 WBC
PHOSPHATE SERPL-MCNC: 4.4 MG/DL (ref 2.7–4.5)
PLATELET # BLD AUTO: 92 K/UL (ref 150–450)
PLATELET BLD QL SMEAR: ABNORMAL
PMV BLD AUTO: 13.2 FL (ref 9.2–12.9)
POTASSIUM SERPL-SCNC: 3.6 MMOL/L (ref 3.5–5.1)
PROT SERPL-MCNC: 5.4 G/DL (ref 6–8.4)
RBC # BLD AUTO: 4.3 M/UL (ref 4–5.4)
SODIUM SERPL-SCNC: 141 MMOL/L (ref 136–145)
WBC # BLD AUTO: 11.11 K/UL (ref 3.9–12.7)

## 2023-07-25 PROCEDURE — 97530 THERAPEUTIC ACTIVITIES: CPT

## 2023-07-25 PROCEDURE — 97166 OT EVAL MOD COMPLEX 45 MIN: CPT

## 2023-07-25 PROCEDURE — 99233 SBSQ HOSP IP/OBS HIGH 50: CPT | Mod: ,,, | Performed by: STUDENT IN AN ORGANIZED HEALTH CARE EDUCATION/TRAINING PROGRAM

## 2023-07-25 PROCEDURE — 25000003 PHARM REV CODE 250: Performed by: FAMILY MEDICINE

## 2023-07-25 PROCEDURE — 94760 N-INVAS EAR/PLS OXIMETRY 1: CPT

## 2023-07-25 PROCEDURE — 25000003 PHARM REV CODE 250: Performed by: INTERNAL MEDICINE

## 2023-07-25 PROCEDURE — 85025 COMPLETE CBC W/AUTO DIFF WBC: CPT | Performed by: FAMILY MEDICINE

## 2023-07-25 PROCEDURE — 63600175 PHARM REV CODE 636 W HCPCS: Performed by: STUDENT IN AN ORGANIZED HEALTH CARE EDUCATION/TRAINING PROGRAM

## 2023-07-25 PROCEDURE — 25000003 PHARM REV CODE 250: Performed by: STUDENT IN AN ORGANIZED HEALTH CARE EDUCATION/TRAINING PROGRAM

## 2023-07-25 PROCEDURE — 27000221 HC OXYGEN, UP TO 24 HOURS

## 2023-07-25 PROCEDURE — 63600175 PHARM REV CODE 636 W HCPCS: Performed by: HOSPITALIST

## 2023-07-25 PROCEDURE — 99233 PR SUBSEQUENT HOSPITAL CARE,LEVL III: ICD-10-PCS | Mod: ,,, | Performed by: STUDENT IN AN ORGANIZED HEALTH CARE EDUCATION/TRAINING PROGRAM

## 2023-07-25 PROCEDURE — 87040 BLOOD CULTURE FOR BACTERIA: CPT | Performed by: INTERNAL MEDICINE

## 2023-07-25 PROCEDURE — 63600175 PHARM REV CODE 636 W HCPCS: Performed by: INTERNAL MEDICINE

## 2023-07-25 PROCEDURE — 84100 ASSAY OF PHOSPHORUS: CPT | Performed by: STUDENT IN AN ORGANIZED HEALTH CARE EDUCATION/TRAINING PROGRAM

## 2023-07-25 PROCEDURE — 97535 SELF CARE MNGMENT TRAINING: CPT

## 2023-07-25 PROCEDURE — 97162 PT EVAL MOD COMPLEX 30 MIN: CPT

## 2023-07-25 PROCEDURE — 80053 COMPREHEN METABOLIC PANEL: CPT | Performed by: FAMILY MEDICINE

## 2023-07-25 PROCEDURE — 99900035 HC TECH TIME PER 15 MIN (STAT)

## 2023-07-25 PROCEDURE — 94761 N-INVAS EAR/PLS OXIMETRY MLT: CPT

## 2023-07-25 PROCEDURE — 99900031 HC PATIENT EDUCATION (STAT)

## 2023-07-25 PROCEDURE — 83735 ASSAY OF MAGNESIUM: CPT | Performed by: FAMILY MEDICINE

## 2023-07-25 PROCEDURE — 12000002 HC ACUTE/MED SURGE SEMI-PRIVATE ROOM

## 2023-07-25 PROCEDURE — 63600175 PHARM REV CODE 636 W HCPCS: Performed by: FAMILY MEDICINE

## 2023-07-25 RX ORDER — IPRATROPIUM BROMIDE AND ALBUTEROL SULFATE 2.5; .5 MG/3ML; MG/3ML
3 SOLUTION RESPIRATORY (INHALATION) EVERY 4 HOURS PRN
Status: DISCONTINUED | OUTPATIENT
Start: 2023-07-25 | End: 2023-08-02

## 2023-07-25 RX ORDER — BUTALBITAL, ACETAMINOPHEN AND CAFFEINE 50; 325; 40 MG/1; MG/1; MG/1
1 TABLET ORAL EVERY 6 HOURS PRN
Status: DISCONTINUED | OUTPATIENT
Start: 2023-07-25 | End: 2023-08-03 | Stop reason: HOSPADM

## 2023-07-25 RX ORDER — TRAMADOL HYDROCHLORIDE 50 MG/1
50 TABLET ORAL EVERY 8 HOURS PRN
Status: DISCONTINUED | OUTPATIENT
Start: 2023-07-25 | End: 2023-08-03 | Stop reason: HOSPADM

## 2023-07-25 RX ORDER — LANOLIN ALCOHOL/MO/W.PET/CERES
800 CREAM (GRAM) TOPICAL
Status: DISCONTINUED | OUTPATIENT
Start: 2023-07-25 | End: 2023-08-03 | Stop reason: HOSPADM

## 2023-07-25 RX ORDER — AMLODIPINE BESYLATE 5 MG/1
5 TABLET ORAL DAILY
Status: DISCONTINUED | OUTPATIENT
Start: 2023-07-25 | End: 2023-07-28

## 2023-07-25 RX ADMIN — HYDRALAZINE HYDROCHLORIDE 10 MG: 20 INJECTION, SOLUTION INTRAMUSCULAR; INTRAVENOUS at 10:07

## 2023-07-25 RX ADMIN — DAPTOMYCIN 805 MG: 500 INJECTION, POWDER, LYOPHILIZED, FOR SOLUTION INTRAVENOUS at 03:07

## 2023-07-25 RX ADMIN — AMLODIPINE BESYLATE 5 MG: 5 TABLET ORAL at 08:07

## 2023-07-25 RX ADMIN — ONDANSETRON 4 MG: 2 INJECTION INTRAMUSCULAR; INTRAVENOUS at 10:07

## 2023-07-25 RX ADMIN — BUTALBITAL, ACETAMINOPHEN, AND CAFFEINE 1 TABLET: 50; 325; 40 TABLET, COATED ORAL at 07:07

## 2023-07-25 RX ADMIN — ENOXAPARIN SODIUM 30 MG: 30 INJECTION SUBCUTANEOUS at 05:07

## 2023-07-25 RX ADMIN — POTASSIUM BICARBONATE 50 MEQ: 977.5 TABLET, EFFERVESCENT ORAL at 05:07

## 2023-07-25 RX ADMIN — CHLORHEXIDINE GLUCONATE 15 ML: 1.2 RINSE ORAL at 08:07

## 2023-07-25 RX ADMIN — ACETAMINOPHEN 650 MG: 325 TABLET ORAL at 10:07

## 2023-07-25 RX ADMIN — BUTALBITAL, ACETAMINOPHEN, AND CAFFEINE 1 TABLET: 50; 325; 40 TABLET, COATED ORAL at 12:07

## 2023-07-25 RX ADMIN — CHLORHEXIDINE GLUCONATE 15 ML: 1.2 RINSE ORAL at 09:07

## 2023-07-25 RX ADMIN — TRAMADOL HYDROCHLORIDE 50 MG: 50 TABLET, COATED ORAL at 04:07

## 2023-07-25 RX ADMIN — LEVOTHYROXINE SODIUM 75 MCG: 0.03 TABLET ORAL at 05:07

## 2023-07-25 RX ADMIN — ACETAMINOPHEN 650 MG: 325 TABLET ORAL at 03:07

## 2023-07-25 RX ADMIN — DEXTROSE MONOHYDRATE 2 G: 50 INJECTION, SOLUTION INTRAVENOUS at 09:07

## 2023-07-25 RX ADMIN — HYDRALAZINE HYDROCHLORIDE 10 MG: 20 INJECTION, SOLUTION INTRAMUSCULAR; INTRAVENOUS at 03:07

## 2023-07-25 NOTE — PROGRESS NOTES
Progress  Note  Infectious Disease    Reason for Consult:  bacteremia E coli and E faecium     HPI: Chanel Cervantes is a 67 y.o. female obese, BMI 37.6 kg/M2, with past medical history of diabetes, hypertension, bariatric surgery, cholecystectomy, prior UTIs, and prior pancreatitis secondary to gallstones.     She presented with acute onset of back pain, radiating into bilateral upper quadrants, with associated shortness of breath.  She was admitted for septic shock, transferred to ICU for further management.    Labs on admission with severe leukopenia 1.1, left shift 87%, bands 8%, H&H 14/44.2, platelet count 213   Creatinine 1.1   Hypokalemia 3.1   Transaminitis, AST 4943/ALT 1475   Total bili 2.7  Lipase 1363 down to 102  Patient CPK 92   Lactic acid 4.6  Hemoglobin A1c 6.9  UA pyuria 6, negative for bacteria, urine culture in process     CT abdomen with broad-based ventral abdominal hernia.  No evidence of obstruction.  Status post cholecystectomy with postsurgical dilatation of the common bile duct and intrahepatic ducts.    Seen by GI, s/p ERCP this morning; with evidence of the entire main bile duct was moderately dilated, with an obstruction from suspected sludge ball. A biliary sphincterotomy was performed. The biliary tree was swept.     ERCP this morning.  Hospital course complicated persistent fever and polymicrobial bacteremia, Enterococcus faecium and E coli, no resistance genes detected on [employer], pending sensitivities.      ID consult for E coli and E faecium bacteremia.    7/20: Interim reviewed, patient remains febrile, T max 101.3, currently 100.8, on pressors, on O2 by N[place] 5L. She remains encephalopathic, decreased urinary output, 20ml/h, no bowel movements recorded yet. Labs reviewed, WBC 11.4, bands 36%, H/H 11.3/35.4, plt: 175. Worsening ANTHONY 2.6, LFTs trending down. , will watch closely. Alct acid 3, troponins trending up, likely demand ischemia. Micro reviewed, repeat blood cultures  7/19 Enterococcus faecium 4/4 bottles, urine culture no growth to date, pending final. Discussed with Pulmonary, plan for IV steroids.    7/20:  Interim reviewed, patient seen examined at bedside. Pressors requirement coming down, afebrile in the last 24 hours.  She was intubated yesterday at noon for airway protection, FiO2 40%, minimal amount of secretions, sputum sent for culture.  Urinary output improving although creatinine 2.9 today.  LFTs trending down, lactic acid 1.9, normal.  Micro reviewed, repeat blood cultures 7/20 x2 sets no growth to date, pending final.  Awaiting sensitivities on E coli and Enterococcus faecium, to be available later today.    07/22/2023   Afebrile, tmax 100, no pressors since this am,   On sedation 25 mcg/kg/min- intensivist is working daily on extubation, FIo2=30%  WBC 9.9--14;   PLT worsening? (186--175--85--73)   I/l=4923/155.  Cr 1.6--2.4--2.6--2.9--2.9-- LFTs improving   07/23/2023.  Dr. Montejo.   Afebrile.  WBC is stable 14--13.  Blood cultures of 07/23 are negative to date.   Smooth was negative for vegetation.    Urine output is improving.  She had 1 BM.    CXR looks slightly worse, but I think it is volume overload, which will improve as her kidney function improves. Intensivist and nurse are working on vacation sedation. Daughter at bedside.  I discussed with her in detail.    7/24 ([place]): interim reviewed, discussed with Dr Montejo, patient seen and examined at bedside. She is coming OFF sedation, awake, nodding to questions, denies pain. Hemodynamically stable, afebrile. Urinary outpt improving, cr 2.5. Repeat blood cultures x1 no growth to date, pending final. Set from today in process.     7/25: interim reviewed, patient seen and examined at bedside, daughter present. They both report she had never had pancreatitis before. She is sitting in the chair, eating breakfast, feeling better. Labs reviewed, awaiting CBC w/diff, creatinine down 2.3, urinary outpt is good, L IJ and  "a-line removed. Repeat blood cultures  &  no growth to date, pending final. Repeat set sent today in process.     Review of patient's allergies indicates:   Allergen Reactions    Adhesive tape-silicones Rash    Dye Hives    Cephalexin     Iodine     Penicillins Edema    Pneumococcal vaccine     Iodinated contrast media Rash     Past Medical History:   Diagnosis Date    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 2021    Anemia, chronic disease 2021    CAD (coronary artery disease)     Diabetes mellitus, type 2     Hypertension     Iron deficiency anemia following bariatric surgery 2021    MI (myocardial infarction)     Secondary thrombocytosis 2021    Sleep apnea     Sleep apnea 2019    Sleep Right      Past Surgical History:   Procedure Laterality Date    ANGIOGRAM, CORONARY, WITH LEFT HEART CATHETERIZATION      APPENDECTOMY      BARIATRIC SURGERY  2019    Dr Nunez    CARPAL TUNNEL RELEASE Bilateral 2002     SECTION      CHOLECYSTECTOMY      COLONOSCOPY      CORONARY ANGIOPLASTY WITH STENT PLACEMENT      CORONARY ARTERY BYPASS GRAFT      EPIDURAL STEROID INJECTION INTO LUMBAR SPINE      x2    ERCP N/A 2023    Procedure: ERCP (ENDOSCOPIC RETROGRADE CHOLANGIOPANCREATOGRAPHY);  Surgeon: Lavon Hernandez III, MD;  Location: [facility] ENDO;  Service: Endoscopy;  Laterality: N/A;    ESOPHAGOGASTRODUODENOSCOPY      HERNIA REPAIR  2019    HYSTERECTOMY      THYROID LOBECTOMY Right 2021    Procedure: LOBECTOMY, THYROID;  Surgeon: Mari Chance MD;  Location: [facility] OR;  Service: General;  Laterality: Right;     Social History     Tobacco Use    Smoking status: Former     Packs/day: 1.00     Years: 15.00     Pack years: 15.00     Types: Cigarettes     Quit date:      Years since quittin.5    Smokeless tobacco: Never   Substance Use Topics    Alcohol use: No     Comment: "maybe once a year"        Family History   Problem Relation Age of Onset    Diabetes Mother     " Vision loss Mother     Heart disease Father     Vision loss Father     Stroke Father        EXAM & DIAGNOSTICS REVIEWED:   Vitals:     Temp:  [97.8 °F (36.6 °C)-98.4 °F (36.9 °C)]   Temp: 98.4 °F (36.9 °C) (07/25/23 0337)  Pulse: 62 (07/25/23 0752)  Resp: (!) 24 (pt. talking) (07/25/23 0752)  BP: 129/60 (07/25/23 0600)  SpO2: 100 % (07/25/23 0752)    Intake/Output Summary (Last 24 hours) at 7/25/2023 0836  Last data filed at 7/25/2023 0652  Gross per 24 hour   Intake 1933.33 ml   Output 1895 ml   Net 38.33 ml   Vent Mode: Spont  Oxygen Concentration (%):  [24-30] 24  Resp Rate Total:  [27 br/min-37 br/min] 32 br/min  PEEP/CPAP:  [5 cmH20] 5 cmH20  Pressure Support:  [5 cmH20] 5 cmH20  Mean Airway Pressure:  [7.2 cmH20-7.3 cmH20] 7.3 cmH20    General:  Awake, alert, sitting in bed, eating breakfast   Eyes:  Anicteric, PERRL  ENT:  Moist oral mucosa, good dentition   Neck:  Supple  Lungs: Better air entry b/l   Heart:  S1/S2+, regular rhythm, no murmurs  Abd:  Obese, large ventral hernia, +BS, soft, non tender   :  Waller 07/19,  yellow urine  Musc:  Joints without effusion, swelling,  erythema, synovitis  Skin:  Warm  Wound:   Neuro:  Awake, alert, speech is clear, moving all extremities   Psych:  Calm  Lymphatic:       Extrem: Trace UE edema b/l  No LE edema b/l  VAD:  R Midline      Isolation: None      General Labs reviewed:  Recent Labs   Lab 07/22/23  0318 07/23/23  0319 07/23/23  0608 07/24/23  0310 07/24/23  0312 07/24/23  0947   WBC 14.24*  --  13.19* 13.49*  --   --    HGB 9.9*  --  10.5* 10.6*  --   --    HCT 30.1*   < > 31.2* 33.4* 31* 31*   PLT 73*  --  80* 99*  --   --     < > = values in this interval not displayed.       Recent Labs   Lab 07/23/23  0608 07/24/23  0310 07/25/23  0327    146* 141   K 3.8 3.5 3.6    114* 108   CO2 23 24 24   BUN 78* 82* 75*   CREATININE 2.8* 2.5* 2.3*   CALCIUM 7.6* 7.8* 7.9*   PROT 4.6* 4.6* 5.4*   BILITOT 1.0 1.0 0.8   ALKPHOS 256* 217* 213*   * 127*  101*   AST 38 24 34     Recent Labs   Lab 07/19/23  1250 07/24/23  0310   CRP 24.03* 6.33*       Estimated Creatinine Clearance: 30.2 mL/min (A) (based on SCr of 2.3 mg/dL (H)).     Micro:  Microbiology Results (last 7 days)       Procedure Component Value Units Date/Time    Blood culture [[id number]] Collected: 07/24/23 0348    Order Status: Completed Specimen: Blood Updated: 07/25/23 0432     Blood Culture, Routine No Growth to date      No Growth to date    Narrative:      Collection has been rescheduled by [email or web address] at 07/24/2023 03:28 Reason:   Nurse Draw. Blood was already drawn. I was not able to get the blood   cultures.  Collection has been rescheduled by JS at 07/24/2023 03:28 Reason:   Nurse Draw. Blood was already drawn. I was not able to get the blood   cultures.    Blood culture [[id number]] Collected: 07/25/23 0327    Order Status: Sent Specimen: Blood Updated: 07/25/23 0404    Blood culture [[id number]] Collected: 07/20/23 0940    Order Status: Completed Specimen: Blood from Peripheral, Antecubital, Right Updated: 07/24/23 1232     Blood Culture, Routine No Growth to date      No Growth to date      No Growth to date      No Growth to date      No Growth to date    Narrative:      Collection has been rescheduled by [email or web address] at 07/20/2023 06:04 Reason:   Unable to collect  Collection has been rescheduled by [email or web address] at 07/20/2023 08:01 Reason:   Contacting zainab for cultures per RN Alfred  Collection has been rescheduled by [email or web address] at 07/20/2023 08:41 Reason:   Nurse Draw  Collection has been rescheduled by [email or web address] at 07/20/2023 06:04 Reason:   Unable to collect  Collection has been rescheduled by [email or web address] at 07/20/2023 08:01 Reason:   Contacting zainab for cultures per RN Alfred  Collection has been rescheduled by [email or web address] at 07/20/2023 08:41 Reason:   Nurse Draw    Blood culture [[id number]] Collected: 07/23/23 0825    Order Status: Completed Specimen: Blood Updated: 07/24/23 1032     Blood Culture, Routine No Growth to date      No  Growth to date    Culture, Respiratory with Gram Stain [[id number]] Collected: 07/20/23 1003    Order Status: Completed Specimen: Respiratory from Endotracheal Aspirate Updated: 07/22/23 0736     Respiratory Culture No growth      No normal respiratory shay     Gram Stain (Respiratory) <10 epithelial cells per low power field.     Gram Stain (Respiratory) Few WBC's     Gram Stain (Respiratory) No organisms seen    Urine Culture High Risk [[id number]] Collected: 07/19/23 1006    Order Status: Completed Specimen: Urine, Clean Catch Updated: 07/22/23 0710     Urine Culture, Routine No growth    Narrative:      Indicated criteria for high risk culture:->Other  Other (specify):->e coli bacteremia    Blood culture [[id number]]  (Abnormal) Collected: 07/20/23 0940    Order Status: Completed Specimen: Blood from Line, Arterial, Right Updated: 07/22/23 0644     Blood Culture, Routine Gram stain aer bottle: Gram positive cocci      Positive results previously called      ENTEROCOCCUS FAECIUM  For susceptibility see order #[id number]      Narrative:      Collection has been rescheduled by [email or web address] at 07/20/2023 06:04 Reason:   Unable to collect  Collection has been rescheduled by [email or web address] at 07/20/2023 08:01 Reason:   Contacting zainab for cultures per RN Alfred  Collection has been rescheduled by [email or web address] at 07/20/2023 08:41 Reason:   Nurse Draw  Collection has been rescheduled by [email or web address] at 07/20/2023 06:04 Reason:   Unable to collect  Collection has been rescheduled by [email or web address] at 07/20/2023 08:01 Reason:   Contacting zainab for cultures per RN Alfred  Collection has been rescheduled by [email or web address] at 07/20/2023 08:41 Reason:   Nurse Draw    Blood culture [[id number]]  (Abnormal) Collected: 07/19/23 1230    Order Status: Completed Specimen: Blood Updated: 07/22/23 0643     Blood Culture, Routine Gram stain aer bottle: Gram positive cocci      Positive results previously called      ENTEROCOCCUS FAECIUM  For susceptibility see order #[id number]      Blood  culture [[id number]]  (Abnormal) Collected: 07/19/23 0002    Order Status: Completed Specimen: Blood Updated: 07/22/23 0643     Blood Culture, Routine Gram stain aer bottle: Gram positive cocci      Gram stain ez bottle: Gram positive cocci      Positive results previously called      ENTEROCOCCUS FAECIUM  For susceptibility see order #[id number]      Blood culture [[id number]]  (Abnormal) Collected: 07/19/23 0030    Order Status: Completed Specimen: Blood from Peripheral, Left Hand Updated: 07/22/23 0643     Blood Culture, Routine Gram stain aer bottle: Gram positive cocci      Gram stain ez bottle: Gram positive cocci      Results called to and read back by:Carmela Mcintosh RN-3ICU;      07/19/2023  16:23 CJD      ENTEROCOCCUS FAECIUM  For susceptibility see order #[id number]      Narrative:      Collection has been rescheduled by [facility] at 07/19/2023 00:20 Reason:   Nurse Draw  Collection has been rescheduled by [facility] at 07/19/2023 00:20 Reason:   Nurse Draw    Blood culture [[id number]]  (Abnormal)  (Susceptibility) Collected: 07/18/23 1648    Order Status: Completed Specimen: Blood Updated: 07/22/23 0642     Blood Culture, Routine Gram stain aer bottle: Gram negative rods      Gram stain aer bottle: Gram positive cocci      Results called to and read back by: Lesvia Mcdowell RN MICU3.      07/19/2023  05:06 TGC      ESCHERICHIA COLI      ENTEROCOCCUS FAECIUM    Blood culture [[id number]]  (Abnormal) Collected: 07/18/23 1738    Order Status: Completed Specimen: Blood Updated: 07/21/23 0853     Blood Culture, Routine Gram stain aer bottle: Gram negative rods      Results called to and read back by:Lesvia Mcdowell RN MICU3.      07/19/2023  05:23 TGC      ESCHERICHIA COLI  For susceptibility see order #[id number]      Rapid Organism ID by PCR (from Blood culture) [[id number]]  (Abnormal) Collected: 07/18/23 1648    Order Status: Completed Updated: 07/19/23 0526     Enterococcus faecalis Not Detected     Enterococcus faecium  Detected     Listeria monocytogenes Not Detected     Staphylococcus spp. Not Detected     Staphylococcus aureus Not Detected     Staphylococcus epidermidis Not Detected     Staphylococcus lugdunensis Not Detected     Streptococcus species Not Detected     Streptococcus agalactiae Not Detected     Streptococcus pneumoniae Not Detected     Streptococcus pyogenes Not Detected     Acinetobacter calcoaceticus/baumannii complex Not Detected     Bacteroides fragilis Not Detected     Enterobacterales See species for ID     Enterobacter cloacae complex Not Detected     Escherichia coli Detected     Klebsiella aerogenes Not Detected     Klebsiella oxytoca Not Detected     Klebsiella pneumoniae group Not Detected     Proteus Not Detected     Salmonella sp Not Detected     Serratia marcescens Not Detected     Haemophilus influenzae Not Detected     Neisseria meningtidis Not Detected     Pseudomonas aeruginosa Not Detected     Stenotrophomonas maltophilia Not Detected     Candida albicans Not Detected     Candida auris Not Detected     Candida glabrata Not Detected     Candida krusei Not Detected     Candida parapsilosis Not Detected     Candida tropicalis Not Detected     Cryptococcus neoformans/gattii Not Detected     CTX-M (ESBL ) Not Detected     IMP (Carbapenem resistant) Not Detected     KPC resistance gene (Carbapenem resistant) Not Detected     mcr-1  Not Detected     mec A/C  Test not applicable     mec A/C and MREJ (MRSA) gene Test not applicable     NDM (Carbapenem resistant) Not Detected     OXA-48-like (Carbapenem resistant) Not Detected     van A/B (VRE gene) Not Detected     VIM (Carbapenem resistant) Not Detected          Specimen: Blood Updated: 07/22/23 0642      Blood Culture, Routine Gram stain aer bottle: Gram negative rods     Gram stain aer bottle: Gram positive cocci     Results called to and read back by: Lesvia Mcdowell RN MICU3.     07/19/2023  05:06 TGC     ESCHERICHIA COLI Abnormal       ENTEROCOCCUS FAECIUM Abnormal    Susceptibility     Escherichia coli Enterococcus faecium     CULTURE, BLOOD CULTURE, BLOOD     Amp/Sulbactam <=4/2 mcg/mL Sensitive       Ampicillin <=8 mcg/mL Sensitive <=2 mcg/mL Sensitive     Cefazolin <=2 mcg/mL Sensitive       Cefepime <=2 mcg/mL Sensitive       Ceftriaxone <=1 mcg/mL Sensitive       Ciprofloxacin <=1 mcg/mL Sensitive       Ertapenem <=0.5 mcg/mL Sensitive       Gentamicin <=4 mcg/mL Sensitive       Gentamicin Synergy Screen   <=500 mcg/mL Sensitive     Levofloxacin <=2 mcg/mL Sensitive       Meropenem <=1 mcg/mL Sensitive       Piperacillin/Tazo <=16 mcg/mL Sensitive       Tetracycline <=4 mcg/mL Sensitive <=4 mcg/mL Sensitive     Tobramycin <=4 mcg/mL Sensitive       Trimeth/Sulfa <=2/38 mcg/mL Sensitive       Vancomycin   2 mcg/mL Sensitive                      Imaging Reviewed:  CXR07/22Hypoinflation with mild prominence of the pulmonary vasculature and faint groundglass opacity in the lung bases suggestive of pulmonary edema   Abdominal Us  CTA chest  CT abdomen/pelvis:  1.  Status post cholecystectomy with postsurgical dilatation of the common bile duct and intrahepatic ducts. Should be correlated with bilirubin levels.   2.  Broad-based ventral abdominal wall hernia measures approximately 15 x 13 cm with herniated loops of large and small bowel. No evidence of obstruction.   3.  Exophytic hypoattenuating structure in the mid left kidney is felt to reflect a hemorrhagic cyst.      Cardiology:   ERICA:   The left ventricle is normal in size with normal systolic function.  Normal left ventricular diastolic function.  The estimated ejection fraction is 60%.  Atrial fibrillation not observed.  Normal right ventricular size with normal right ventricular systolic function.  Normal appearing left atrial appendage. No thrombus is present in the appendage. Normal appendage velocities.  No interatrial septal defect present.  There is no pulmonary hypertension.  No  abnormal intracavitary echo; valvular structures are normal  Left atrial appendage was clean       ECHO  The left ventricle is normal in size with mild concentric hypertrophy and mildly decreased systolic function.  Mild to moderate tricuspid regurgitation.  The estimated ejection fraction is 45%.  Grade I left ventricular diastolic dysfunction.  There is abnormal septal wall motion consistent with left bundle branch block.  Mild right ventricular enlargement with normal right ventricular systolic function.  Moderate left atrial enlargement.  Normal central venous pressure (3 mmHg).  The estimated PA systolic pressure is 40 mmHg.  There is mild pulmonary hypertension.       IMPRESSION & PLAN     Septic shock resolved likely from polymicrobial bacteremia Enterococcus faecium (7/18-19--20) and E coli (7/18) secondary to cholangitis s/p ERCP, sphincterotomy 7/19  Procal 54 -->5.7, trending down   CRP 24  Baseline CPK 92-->245  Lipase 1363-->102, trending down   ERICA trending down     2. Shock liver, resolved     3. Thrombocytopenia     4. ANTHONY, cr 1.1--2.4--2.6-- 2.9--2.8--2.8->2.5-->2.3    5.  PMHx: diabetes, hypertension, bariatric surgery, cholecystectomy, prior UTIs    6. Obesity, BMI 37.6 Kg/m2    Recommendations:  Continue Daptomycon 800mg IV q48, dose as per crcl   Continue Ceftriaxone 2g IV daily  Follow repeat blood cultures   Anticipating midline to continue IV abx for total 10 days from negative blood cultures, end date 8/1/23  PT/OT as tolerated  Aspiration precautions     D/w ICU nursing, Dr Serrano     Medical Decision Making during this encounter was  [_] Low Complexity  [_] Moderate Complexity  [xx] High Complexity

## 2023-07-25 NOTE — NURSING
Nurses Note -- 4 Eyes      7/25/2023   12:58 PM      Skin assessed during: Transfer      [] No Altered Skin Integrity Present    []Prevention Measures Documented      [x] Yes- Altered Skin Integrity Present or Discovered   [] LDA Added if Not in Epic (Describe Wound)   [] New Altered Skin Integrity was Present on Admit and Documented in LDA   [x] Wound Image Taken    Wound Care Consulted? Yes    Attending Nurse:  Lacy Camarena LPN     Second RN/Staff Member:  SONIA Wild-BC, [facility]

## 2023-07-25 NOTE — PROGRESS NOTES
INPATIENT NEPHROLOGY Progress Note  [facility]    Patient Name: Chanel Cervantes  Date: 07/25/2023    Reason for consultation:   ANTHONY    Chief Complaint:   Chief Complaint   Patient presents with    Fall     Denies hitting head or LOC, hit right shoulder    Shortness of Breath    Back Pain     History of Present Illness:  Chanel Cervantes is a 67 y.o. female obese, BMI 37.6 kg/M2, with past medical history of diabetes, hypertension, bariatric surgery, cholecystectomy, prior UTIs, and prior pancreatitis secondary to gallstones. She presented with acute onset of back pain, radiating into bilateral upper quadrants, with associated shortness of breath.  She was admitted for septic shock, transferred to ICU for further management. She is s/p ERCP with evidence of the entire main bile duct was moderately dilated, with an obstruction from suspected sludge ball. A biliary sphincterotomy was performed. The biliary tree was swept. Hospital course complicated AMS requiring MV and and polymicrobial bacteremia, Enterococcus faecium and E coli. We are consulted for ANTHONY.    Echo:  The left ventricle is normal in size with mild concentric hypertrophy and mildly decreased systolic function.  Mild to moderate tricuspid regurgitation.  The estimated ejection fraction is 45%.  Grade I left ventricular diastolic dysfunction.  There is abnormal septal wall motion consistent with left bundle branch block.  Mild right ventricular enlargement with normal right ventricular systolic function.  Moderate left atrial enlargement.  Normal central venous pressure (3 mmHg).  The estimated PA systolic pressure is 40 mmHg.  There is mild pulmonary hypertension.       Interval History:  7/21- BP holding on levophed, FIO2 40%, UOP 1.7L  7/22- off pressors, VSS, FIO2 30%, UOP 1.3L, tolerating tube feeds, rise in WBC count is noted  7/23- kieran (got atropine this AM for HR 39), SBP , FIO2 30%, UOP 1.4L, on/off levophed overnight,  ERICA today  7/24 VSS, spontaneous ventilation, afebrile, improving UO.scr remains elevated dut to ATN.  7/25 VSS. Extubated, lethargic but arousable. sCr better but remains abnormal. UO is oK.    Plan of Care:    Hypernatremia  Septic shock due biliary sludge with polymicrobial bacteremia s/p ERCP with sphincterotomy 7/19  ANTHONY due to sepsis/ischemic ATN  AMS on MV  Shock liver  Lactic acidosis  Demand ischemia (underlying systolic CHF/pulm HTN)  Anemia/Thrombocytopenia    Plan:    - if sNa keep rising, will need to increase free water,  - leukocytosis stabilized- soft BP on/off levophed- remains on IV steroids, BSA  - appreciate ID and Pulmonary input  - sCr stabilized but remains abnormal consistent wit hATN. Non-oliguric  - dose meds for CrCl < 20- no nsaids or IV contrast  - continue schaefer- no acute RRT needs  - keep off IVFs- ok with normal electrolyte tube feeds   - vent management per pulm  - LFTs improving  - lactate improving  - heme parameters stable- HIT pending (on lovenox and meropenem)    Thank you for allowing us to participate in this patient's care. We will continue to follow.    Vital Signs:  Temp Readings from Last 3 Encounters:   07/25/23 98.4 °F (36.9 °C) (Oral)   06/02/23 98 °F (36.7 °C) (Oral)   04/13/23 97.4 °F (36.3 °C)       Pulse Readings from Last 3 Encounters:   07/25/23 62   06/02/23 68   04/13/23 71       BP Readings from Last 3 Encounters:   07/25/23 (!) 122/59   06/02/23 (!) 120/59   04/13/23 (!) 146/70       Weight:  Wt Readings from Last 3 Encounters:   07/19/23 108.9 kg (239 lb 15.9 oz)   07/19/23 108.9 kg (240 lb 1.3 oz)   06/02/23 109.1 kg (240 lb 8 oz)       INS/OUTS:  I/O last 3 completed shifts:  In: 1933.3 [P.O.:1050; I.V.:783.3; IV Piggyback:100]  Out: 2745 [Urine:2745]  No intake/output data recorded.      Medications:  Scheduled Meds:   cefTRIAXone (ROCEPHIN) IVPB  2 g Intravenous Q24H    chlorhexidine  15 mL Mouth/Throat BID    DAPTOmycin (CUBICIN) IV (PEDS and ADULTS)  10  mg/kg (Adjusted) Intravenous Q48H    enoxparin  30 mg Subcutaneous Q24H    levothyroxine  75 mcg Oral Before breakfast     Continuous Infusions:      PRN Meds:.acetaminophen, calcium gluconate IVPB, calcium gluconate IVPB, calcium gluconate IVPB, carboxymethylcellulose, dextrose 50%, dextrose 50%, glucagon (human recombinant), glucose, glucose, hydrALAZINE, insulin aspart U-100, lorazepam, magnesium oxide, magnesium oxide, magnesium sulfate IVPB, magnesium sulfate IVPB, naloxone, ondansetron, polyethylene glycol, potassium bicarbonate, potassium bicarbonate, potassium bicarbonate, potassium chloride in water **AND** potassium chloride in water **AND** potassium chloride in water, senna-docusate 8.6-50 mg, simethicone, sodium chloride 0.9%, sodium phosphate IVPB, sodium phosphate IVPB, sodium phosphate IVPB  No current facility-administered medications on file prior to encounter.     Current Outpatient Medications on File Prior to Encounter   Medication Sig Dispense Refill    amLODIPine (NORVASC) 2.5 MG tablet Take 1 tablet (2.5 mg total) by mouth once daily. 90 tablet 1    azelastine (ASTELIN) 137 mcg (0.1 %) nasal spray 1 spray (137 mcg total) by Nasal route 2 (two) times daily. 30 mL 5    calcium carbonate-vitamin D3 600 mg-62.5 mcg (2,500 unit) Cap calcium carbonate 600 mg-vitamin D3 62.5 mcg (2,500 unit) capsule   Take by oral route.      DULoxetine (CYMBALTA) 60 MG capsule Take 1 capsule (60 mg total) by mouth once daily. 90 capsule 1    empaglifloz-linaglip-metformin (TRIJARDY XR) 25-5-1,000 mg TBph Take 1 tablet by mouth once daily. 90 tablet 1    guanFACINE (TENEX) 2 MG tablet Take 1 tablet (2 mg total) by mouth nightly. 90 tablet 3    insulin degludec (TRESIBA FLEXTOUCH U-200) 200 unit/mL (3 mL) insulin pen Inject 76 Units into the skin once daily. 12 pen 3    iron-vitamin C 100-250 mg, ICAR-C, 100-250 mg Tab Take 1 tablet by mouth once daily. 30 each 8    levothyroxine (SYNTHROID) 75 MCG tablet Take 75 mcg  "by mouth before breakfast.      metFORMIN (GLUCOPHAGE) 1000 MG tablet Take 1,000 mg by mouth 2 (two) times daily with meals.      metOLazone (ZAROXOLYN) 5 MG tablet Take 1 tablet (5 mg total) by mouth once daily. 90 tablet 0    metoprolol succinate (TOPROL-XL) 25 MG 24 hr tablet Take 1 tablet (25 mg total) by mouth once daily. 90 tablet 1    multivitamin capsule Take 1 capsule by mouth once daily.      olmesartan (BENICAR) 40 MG tablet Take 1 tablet (40 mg total) by mouth once daily. 90 tablet 1    potassium chloride (KLOR-CON) 10 MEQ TbSR Take 1 tablet (10 mEq total) by mouth once daily. 90 tablet 1    pregabalin (LYRICA) 50 MG capsule Take 50 mg by mouth every evening.      rosuvastatin (CRESTOR) 40 MG Tab Take 1 tablet (40 mg total) by mouth every evening. 90 tablet 3    aspirin (ECOTRIN) 81 MG EC tablet Take 1 tablet (81 mg total) by mouth once daily. 30 tablet 0    blood sugar diagnostic Strp One Touch Ultra Blue Test strips, Use l strip to check glucose 4 times daily 100 each 11    blood-glucose meter kit Use as instructed 1 each 0    cyanocobalamin 1,000 mcg/mL injection Inject 1 mL (1,000 mcg total) into the skin every 30 days. 3 mL 4    lancets (ONETOUCH DELICA LANCETS) 33 gauge Misc USE 1  TO CHECK GLUCOSE 4 TIMES DAILY 100 each 3    magnesium oxide (MAG-OX) 400 mg (241.3 mg magnesium) tablet Take 1 tablet (400 mg total) by mouth once daily. 90 tablet 1    NYSTOP powder APPLY TO AFFECTED AREA AS NEEDED      prednisoLONE acetate (PRED FORTE) 1 % DrpS Place 1 drop into both eyes as needed.        Review of Systems:  On MV    Physical Exam:  BP (!) 122/59   Pulse 62   Temp 98.4 °F (36.9 °C) (Oral)   Resp (!) 24 Comment: pt. talking  Ht 5' 7" (1.702 m)   Wt 108.9 kg (239 lb 15.9 oz)   SpO2 100%   BMI 37.60 kg/m²     General Appearance:    Ill   Head:    Normocephalic, atraumatic   Eyes:    Closed     Mouth:   Moist mucus membranes, no thrush or oral lesions, normal      dentition   Back:     No CVA " tenderness   Lungs:     Coarse, on MV   Heart:    Regular rate and rhythm, no rub/gallop   Abdomen:     Soft, non-tender, non-distended guarding, no masses, no organomegaly   Extremities:   Warm and well perfused, distal pulses intact, no cyanosis, edematous   MSK:   No joint or muscle swelling, tenderness or deformity   Skin:   Skin color, texture, turgor normal, no rashes or lesions   Neurologic/Psychiatric:   Sedated     Results:  Recent Labs   Lab 07/23/23  0608 07/24/23  0310 07/25/23  0327    146* 141   K 3.8 3.5 3.6    114* 108   CO2 23 24 24   BUN 78* 82* 75*   CREATININE 2.8* 2.5* 2.3*   * 202* 151*         Recent Labs   Lab 07/23/23  0608 07/24/23 0310 07/25/23  0327   CALCIUM 7.6* 7.8* 7.9*   ALBUMIN 1.8* 1.8* 2.3*   PHOS 3.8 3.7 4.4   MG 2.7* 2.5 2.4               No results for input(s): POCTGLUCOSE in the last 168 hours.    Recent Labs   Lab 12/22/20  1232 07/07/21  1057 07/18/23  1735   Hemoglobin A1C 6.8 H 7.4 H 6.9 H         Recent Labs   Lab 07/23/23  0608 07/24/23 0310 07/24/23  0312 07/24/23  0947 07/25/23  0818   WBC 13.19* 13.49*  --   --  11.11   HGB 10.5* 10.6*  --   --  11.5*   HCT 31.2* 33.4* 31* 31* 36.3*   PLT 80* 99*  --   --  92*   MCV 80* 83  --   --  84   MCHC 33.7 31.7*  --   --  31.7*   MONO 6.7  0.9 7.1  1.0  --   --   --          Recent Labs   Lab 07/23/23  0608 07/24/23 0310 07/25/23  0327   BILITOT 1.0 1.0 0.8   PROT 4.6* 4.6* 5.4*   ALBUMIN 1.8* 1.8* 2.3*   ALKPHOS 256* 217* 213*   * 127* 101*   AST 38 24 34         Recent Labs   Lab 05/29/23  1040 07/18/23  1221 07/19/23  0116   Color, UA Yellow Yellow Yellow   Appearance, UA Clear Clear Clear   pH, UA 6.0 8.0 6.0   Specific [place], UA 1.015 1.030 >1.030 A   Protein, UA Negative 1+ A 1+ A   Glucose, UA 4+ A 4+ A 4+ A   Ketones, UA Negative Trace A Negative   Urobilinogen, UA Negative 2.0-3.0 A 2.0-3.0 A   Bilirubin (UA) Negative Negative 1+ A   Occult Blood UA Negative 1+ A Trace A   Nitrite, UA  Negative Negative Negative   RBC, UA 1 6 H 1   WBC, UA 7 H 6 H 3   Bacteria Negative Negative Negative   Hyaline Casts, UA 4 A 5 A 1         Recent Labs   Lab 07/23/23  1344 07/24/23  0312 07/24/23  0947   POC PH 7.434 7.461 H 7.436   POC PCO2 39.0 36.9 38.2   POC HCO3 26.1 26.3 25.7   POC PO2 79 L 70 L 63 L   POC SATURATED O2 96 95 92 L   POC BE 2 2 1   Sample ARTERIAL ARTERIAL ARTERIAL         Microbiology Results (last 7 days)       Procedure Component Value Units Date/Time    Blood culture [[id number]] Collected: 07/24/23 0348    Order Status: Completed Specimen: Blood Updated: 07/25/23 0432     Blood Culture, Routine No Growth to date      No Growth to date    Narrative:      Collection has been rescheduled by JS at 07/24/2023 03:28 Reason:   Nurse Draw. Blood was already drawn. I was not able to get the blood   cultures.  Collection has been rescheduled by JS at 07/24/2023 03:28 Reason:   Nurse Draw. Blood was already drawn. I was not able to get the blood   cultures.    Blood culture [[id number]] Collected: 07/25/23 0327    Order Status: Sent Specimen: Blood Updated: 07/25/23 0404    Blood culture [[id number]] Collected: 07/20/23 0940    Order Status: Completed Specimen: Blood from Peripheral, Antecubital, Right Updated: 07/24/23 1232     Blood Culture, Routine No Growth to date      No Growth to date      No Growth to date      No Growth to date      No Growth to date    Narrative:      Collection has been rescheduled by [email or web address] at 07/20/2023 06:04 Reason:   Unable to collect  Collection has been rescheduled by [email or web address] at 07/20/2023 08:01 Reason:   Contacting zainab for cultures per RN Alfred  Collection has been rescheduled by [email or web address] at 07/20/2023 08:41 Reason:   Nurse Draw  Collection has been rescheduled by [email or web address] at 07/20/2023 06:04 Reason:   Unable to collect  Collection has been rescheduled by [email or web address] at 07/20/2023 08:01 Reason:   Contacting zainab for cultures per RN Alfred  Collection has been rescheduled by [email or web address] at  07/20/2023 08:41 Reason:   Nurse Draw    Blood culture [[id number]] Collected: 07/23/23 0825    Order Status: Completed Specimen: Blood Updated: 07/24/23 1032     Blood Culture, Routine No Growth to date      No Growth to date    Culture, Respiratory with Gram Stain [[id number]] Collected: 07/20/23 1003    Order Status: Completed Specimen: Respiratory from Endotracheal Aspirate Updated: 07/22/23 0736     Respiratory Culture No growth      No normal respiratory shay     Gram Stain (Respiratory) <10 epithelial cells per low power field.     Gram Stain (Respiratory) Few WBC's     Gram Stain (Respiratory) No organisms seen    Urine Culture High Risk [[id number]] Collected: 07/19/23 1006    Order Status: Completed Specimen: Urine, Clean Catch Updated: 07/22/23 0710     Urine Culture, Routine No growth    Narrative:      Indicated criteria for high risk culture:->Other  Other (specify):->e coli bacteremia    Blood culture [[id number]]  (Abnormal) Collected: 07/20/23 0940    Order Status: Completed Specimen: Blood from Line, Arterial, Right Updated: 07/22/23 0644     Blood Culture, Routine Gram stain aer bottle: Gram positive cocci      Positive results previously called      ENTEROCOCCUS FAECIUM  For susceptibility see order #[id number]      Narrative:      Collection has been rescheduled by [email or web address] at 07/20/2023 06:04 Reason:   Unable to collect  Collection has been rescheduled by [email or web address] at 07/20/2023 08:01 Reason:   Contacting zainab for cultures per RN Alfred  Collection has been rescheduled by [email or web address] at 07/20/2023 08:41 Reason:   Nurse Draw  Collection has been rescheduled by [email or web address] at 07/20/2023 06:04 Reason:   Unable to collect  Collection has been rescheduled by [email or web address] at 07/20/2023 08:01 Reason:   Contacting zainab for cultures per RN Alfred  Collection has been rescheduled by [email or web address] at 07/20/2023 08:41 Reason:   Nurse Draw    Blood culture [[id number]]  (Abnormal) Collected: 07/19/23 1230    Order Status: Completed Specimen: Blood  Updated: 07/22/23 0643     Blood Culture, Routine Gram stain aer bottle: Gram positive cocci      Positive results previously called      ENTEROCOCCUS FAECIUM  For susceptibility see order #[id number]      Blood culture [[id number]]  (Abnormal) Collected: 07/19/23 0002    Order Status: Completed Specimen: Blood Updated: 07/22/23 0643     Blood Culture, Routine Gram stain aer bottle: Gram positive cocci      Gram stain ez bottle: Gram positive cocci      Positive results previously called      ENTEROCOCCUS FAECIUM  For susceptibility see order #[id number]      Blood culture [[id number]]  (Abnormal) Collected: 07/19/23 0030    Order Status: Completed Specimen: Blood from Peripheral, Left Hand Updated: 07/22/23 0643     Blood Culture, Routine Gram stain aer bottle: Gram positive cocci      Gram stain ez bottle: Gram positive cocci      Results called to and read back by:Carmela Mcintosh RN-3ICU;      07/19/2023  16:23 CJD      ENTEROCOCCUS FAECIUM  For susceptibility see order #[id number]      Narrative:      Collection has been rescheduled by Chapin at 07/19/2023 00:20 Reason:   Nurse Draw  Collection has been rescheduled by [email or web address] at 07/19/2023 00:20 Reason:   Nurse Draw    Blood culture [[id number]]  (Abnormal)  (Susceptibility) Collected: 07/18/23 1648    Order Status: Completed Specimen: Blood Updated: 07/22/23 0642     Blood Culture, Routine Gram stain aer bottle: Gram negative rods      Gram stain aer bottle: Gram positive cocci      Results called to and read back by: Lesvia Mcdowell RN MICU3.      07/19/2023  05:06 TGC      ESCHERICHIA COLI      ENTEROCOCCUS FAECIUM    Blood culture [[id number]]  (Abnormal) Collected: 07/18/23 1738    Order Status: Completed Specimen: Blood Updated: 07/21/23 0853     Blood Culture, Routine Gram stain aer bottle: Gram negative rods      Results called to and read back by:Lesvia Mcdowell RN MICU3.      07/19/2023  05:23 TGC      ESCHERICHIA COLI  For susceptibility see order #[id number]       Rapid Organism ID by PCR (from Blood culture) [[id number]]  (Abnormal) Collected: 07/18/23 1648    Order Status: Completed Updated: 07/19/23 0536     Enterococcus faecalis Not Detected     Enterococcus faecium Detected     Listeria monocytogenes Not Detected     Staphylococcus spp. Not Detected     Staphylococcus aureus Not Detected     Staphylococcus epidermidis Not Detected     Staphylococcus lugdunensis Not Detected     Streptococcus species Not Detected     Streptococcus agalactiae Not Detected     Streptococcus pneumoniae Not Detected     Streptococcus pyogenes Not Detected     Acinetobacter calcoaceticus/baumannii complex Not Detected     Bacteroides fragilis Not Detected     Enterobacterales See species for ID     Enterobacter cloacae complex Not Detected     Escherichia coli Detected     Klebsiella aerogenes Not Detected     Klebsiella oxytoca Not Detected     Klebsiella pneumoniae group Not Detected     Proteus Not Detected     Salmonella sp Not Detected     Serratia marcescens Not Detected     Haemophilus influenzae Not Detected     Neisseria meningtidis Not Detected     Pseudomonas aeruginosa Not Detected     Stenotrophomonas maltophilia Not Detected     Candida albicans Not Detected     Candida auris Not Detected     Candida glabrata Not Detected     Candida krusei Not Detected     Candida parapsilosis Not Detected     Candida tropicalis Not Detected     Cryptococcus neoformans/gattii Not Detected     CTX-M (ESBL ) Not Detected     IMP (Carbapenem resistant) Not Detected     KPC resistance gene (Carbapenem resistant) Not Detected     mcr-1  Not Detected     mec A/C  Test not applicable     mec A/C and MREJ (MRSA) gene Test not applicable     NDM (Carbapenem resistant) Not Detected     OXA-48-like (Carbapenem resistant) Not Detected     van A/B (VRE gene) Not Detected     VIM (Carbapenem resistant) Not Detected          I have spent > minutes providing care for this patient for the above  diagnoses. These services have included chart/data/imaging review, evaluation, exam, formulation of plan, , note preparation, and discussions with staff involved in this patient's care.    Omar Henao MD  [facility] [address] [address]  [phone] (p)  [phone] (f)

## 2023-07-25 NOTE — CONSULTS
2x0.3cm maroon non broken DTI lower back.      1.6x0.2x0.1cm split marquita fold, appears to be moisture associated.      Right lateral posterior lower leg red area with few small blisters 17x9cm red maroon discoloration DTI    3.6x4.3cm left leg red maroon discoloration DTI.  Dr. Gibson notified to see.

## 2023-07-25 NOTE — PROGRESS NOTES
Pulmonary/Critical Care Progress Note      PATIENT NAME: Chanel Cervantes  MRN: [id number]  TODAY'S DATE: 2023  8:19 AM  ADMIT DATE: 2023  AGE: 67 y.o. : 1955    CONSULT REQUESTED BY: Enrike Huff MD    REASON FOR CONSULT:   Critical care management    HPI:  The patient is a 67-year-old female who was brought to the hospital for back pain.  She was having bilateral back pain and upper abdominal pain that was not controlled with ibuprofen and came it.  She was found to have dilated common bile duct and elevated LFTs.  She is in shock requiring Levophed.  She is bacteremic with E coli and E faecium.  Her ERCP released sludge from the common bile duct. She is very encephalopathic.     the patient has an agitated delirium this morning.  She appears very uncomfortable.  She is tachypneic.  Her pressor requirements are increasing, her urine output is decreasing.    - pt was intubated yesterday due to encephalopathy and continues on ventilator, sedated. Her pressor requirement is improved. Tmax 100.2 this am.  Daughter at bedside states usually pt is independent and has never been sick like this before.    - continues on vent, sedated, off pressor. Tmax 100 yesterday  - pt had ERICA today which was normal. Pressor off. Continues on vent. Has been afebrile. Off sedation she is very weak but able to open eyes      The patient is on spontaneous ventilation for hours.      REVIEW OF SYSTEMS  Unobtainable.    No change in the patient's Past Medical History, Past Surgical History, Social History or Family History since admission.    VITAL SIGNS (MOST RECENT)  Temp: 98.4 °F (36.9 °C) (23 0337)  Pulse: 63 (23 06)  Resp: (!) 22 (23)  BP: 129/60 (23)  SpO2: 98 % (23)    INTAKE AND OUTPUT (LAST 24 HOURS):  Intake/Output Summary (Last 24 hours) at 2023 0801  Last data filed at 2023 0652  Gross per 24 hour   Intake 1933.33 ml   Output 1895 ml    Net 38.33 ml       WEIGHT  Wt Readings from Last 1 Encounters:   07/19/23 108.9 kg (239 lb 15.9 oz)       PHYSICAL EXAM  GENERAL: Older patient awake and following commands.  HEENT: Pupils equal and reactive. Nose intact. Pharynx intubated  NECK: Supple. L IJ triple lumen catheter.  HEART: Regular rate and rhythm. No murmur or gallop auscultated.  LUNGS:  clear/diminished bilaterally   ABDOMEN: Bowel sounds diminished Very large ventral hernia.  Very obese, soft  : Normal anatomy.Waller with yellow urine.  EXTREMITIES: Normal muscle tone and joint movement, no cyanosis or clubbing. Scar at L shoulder.   There is a radial arterial catheter on the right  LYMPHATICS: No adenopathy palpated, no edema  SKIN: Dry, intact, no lesions.   NEURO: awake and folowing commands  PSYCH: unable to assess      CBC LAST (LAST 24 HOURS)  Recent Labs   Lab 07/24/23  0947   HCT 31*       CHEMISTRY LAST (LAST 24 HOURS)  Recent Labs   Lab 07/24/23  0947 07/25/23  0327   NA  --  141   K  --  3.6   CL  --  108   CO2  --  24   ANIONGAP  --  9   BUN  --  75*   CREATININE  --  2.3*   GLU  --  151*   CALCIUM  --  7.9*   PH 7.436  --    MG  --  2.4   ALBUMIN  --  2.3*   PROT  --  5.4*   ALKPHOS  --  213*   ALT  --  101*   AST  --  34   BILITOT  --  0.8         CARDIAC PROFILE (LAST 24 HOURS)  Recent Labs   Lab 07/20/23  0307 07/24/23  0310   BNP  --  504*   *  --    TROPONINIHS 2384.3*  --        LAST 7 DAYS MICROBIOLOGY   Microbiology Results (last 7 days)       Procedure Component Value Units Date/Time    Blood culture [[id number]] Collected: 07/24/23 0348    Order Status: Completed Specimen: Blood Updated: 07/25/23 0432     Blood Culture, Routine No Growth to date      No Growth to date    Narrative:      Collection has been rescheduled by LEIDY at 07/24/2023 03:28 Reason:   Nurse Draw. Blood was already drawn. I was not able to get the blood   cultures.  Collection has been rescheduled by LEIDY at 07/24/2023 03:28 Reason:   Nurse  Draw. Blood was already drawn. I was not able to get the blood   cultures.    Blood culture [[id number]] Collected: 07/25/23 0327    Order Status: Sent Specimen: Blood Updated: 07/25/23 0404    Blood culture [[id number]] Collected: 07/20/23 0940    Order Status: Completed Specimen: Blood from Peripheral, Antecubital, Right Updated: 07/24/23 1232     Blood Culture, Routine No Growth to date      No Growth to date      No Growth to date      No Growth to date      No Growth to date    Narrative:      Collection has been rescheduled by [email or web address] at 07/20/2023 06:04 Reason:   Unable to collect  Collection has been rescheduled by [email or web address] at 07/20/2023 08:01 Reason:   Contacting zainab for cultures per RN Alfred  Collection has been rescheduled by [facility] at 07/20/2023 08:41 Reason:   Nurse Draw  Collection has been rescheduled by [facility] at 07/20/2023 06:04 Reason:   Unable to collect  Collection has been rescheduled by [email or web address] at 07/20/2023 08:01 Reason:   Contacting zainab for cultures per RN Alfred  Collection has been rescheduled by [email or web address] at 07/20/2023 08:41 Reason:   Nurse Draw    Blood culture [[id number]] Collected: 07/23/23 0825    Order Status: Completed Specimen: Blood Updated: 07/24/23 1032     Blood Culture, Routine No Growth to date      No Growth to date    Culture, Respiratory with Gram Stain [[id number]] Collected: 07/20/23 1003    Order Status: Completed Specimen: Respiratory from Endotracheal Aspirate Updated: 07/22/23 0736     Respiratory Culture No growth      No normal respiratory shay     Gram Stain (Respiratory) <10 epithelial cells per low power field.     Gram Stain (Respiratory) Few WBC's     Gram Stain (Respiratory) No organisms seen    Urine Culture High Risk [[id number]] Collected: 07/19/23 1006    Order Status: Completed Specimen: Urine, Clean Catch Updated: 07/22/23 0710     Urine Culture, Routine No growth    Narrative:      Indicated criteria for high risk culture:->Other  Other (specify):->e coli bacteremia     Blood culture [[id number]]  (Abnormal) Collected: 07/20/23 0940    Order Status: Completed Specimen: Blood from Line, Arterial, Right Updated: 07/22/23 0644     Blood Culture, Routine Gram stain aer bottle: Gram positive cocci      Positive results previously called      ENTEROCOCCUS FAECIUM  For susceptibility see order #[id number]      Narrative:      Collection has been rescheduled by [email or web address] at 07/20/2023 06:04 Reason:   Unable to collect  Collection has been rescheduled by [email or web address] at 07/20/2023 08:01 Reason:   Contacting zainab for cultures per RN Alfred  Collection has been rescheduled by [email or web address] at 07/20/2023 08:41 Reason:   Nurse Draw  Collection has been rescheduled by [email or web address] at 07/20/2023 06:04 Reason:   Unable to collect  Collection has been rescheduled by [email or web address] at 07/20/2023 08:01 Reason:   Contacting zainab for cultures per RN Alfred  Collection has been rescheduled by [email or web address] at 07/20/2023 08:41 Reason:   Nurse Draw    Blood culture [[id number]]  (Abnormal) Collected: 07/19/23 1230    Order Status: Completed Specimen: Blood Updated: 07/22/23 0643     Blood Culture, Routine Gram stain aer bottle: Gram positive cocci      Positive results previously called      ENTEROCOCCUS FAECIUM  For susceptibility see order #[id number]      Blood culture [[id number]]  (Abnormal) Collected: 07/19/23 0002    Order Status: Completed Specimen: Blood Updated: 07/22/23 0643     Blood Culture, Routine Gram stain aer bottle: Gram positive cocci      Gram stain ez bottle: Gram positive cocci      Positive results previously called      ENTEROCOCCUS FAECIUM  For susceptibility see order #[id number]      Blood culture [[id number]]  (Abnormal) Collected: 07/19/23 0030    Order Status: Completed Specimen: Blood from Peripheral, Left Hand Updated: 07/22/23 0643     Blood Culture, Routine Gram stain aer bottle: Gram positive cocci      Gram stain ez bottle: Gram positive cocci      Results called to and read back by:Carmela Mcintosh RN-3I;       07/19/2023  16:23 CJD      ENTEROCOCCUS FAECIUM  For susceptibility see order #[id number]      Narrative:      Collection has been rescheduled by KC9 at 07/19/2023 00:20 Reason:   Nurse Draw  Collection has been rescheduled by KC9 at 07/19/2023 00:20 Reason:   Nurse Draw    Blood culture [[id number]]  (Abnormal)  (Susceptibility) Collected: 07/18/23 1648    Order Status: Completed Specimen: Blood Updated: 07/22/23 0642     Blood Culture, Routine Gram stain aer bottle: Gram negative rods      Gram stain aer bottle: Gram positive cocci      Results called to and read back by: Lesvia Mcdowell RN MICU3.      07/19/2023  05:06 TGC      ESCHERICHIA COLI      ENTEROCOCCUS FAECIUM    Blood culture [[id number]]  (Abnormal) Collected: 07/18/23 1738    Order Status: Completed Specimen: Blood Updated: 07/21/23 0853     Blood Culture, Routine Gram stain aer bottle: Gram negative rods      Results called to and read back by:Lesvia Mcdowell RN MICU3.      07/19/2023  05:23 TGC      ESCHERICHIA COLI  For susceptibility see order #[id number]      Rapid Organism ID by PCR (from Blood culture) [[id number]]  (Abnormal) Collected: 07/18/23 1648    Order Status: Completed Updated: 07/19/23 0526     Enterococcus faecalis Not Detected     Enterococcus faecium Detected     Listeria monocytogenes Not Detected     Staphylococcus spp. Not Detected     Staphylococcus aureus Not Detected     Staphylococcus epidermidis Not Detected     Staphylococcus lugdunensis Not Detected     Streptococcus species Not Detected     Streptococcus agalactiae Not Detected     Streptococcus pneumoniae Not Detected     Streptococcus pyogenes Not Detected     Acinetobacter calcoaceticus/baumannii complex Not Detected     Bacteroides fragilis Not Detected     Enterobacterales See species for ID     Enterobacter cloacae complex Not Detected     Escherichia coli Detected     Klebsiella aerogenes Not Detected     Klebsiella oxytoca Not Detected     Klebsiella pneumoniae group  Not Detected     Proteus Not Detected     Salmonella sp Not Detected     Serratia marcescens Not Detected     Haemophilus influenzae Not Detected     Neisseria meningtidis Not Detected     Pseudomonas aeruginosa Not Detected     Stenotrophomonas maltophilia Not Detected     Candida albicans Not Detected     Candida auris Not Detected     Candida glabrata Not Detected     Candida krusei Not Detected     Candida parapsilosis Not Detected     Candida tropicalis Not Detected     Cryptococcus neoformans/gattii Not Detected     CTX-M (ESBL ) Not Detected     IMP (Carbapenem resistant) Not Detected     KPC resistance gene (Carbapenem resistant) Not Detected     mcr-1  Not Detected     mec A/C  Test not applicable     mec A/C and MREJ (MRSA) gene Test not applicable     NDM (Carbapenem resistant) Not Detected     OXA-48-like (Carbapenem resistant) Not Detected     van A/B (VRE gene) Not Detected     VIM (Carbapenem resistant) Not Detected            MOST RECENT IMAGING  X-Ray Chest AP Portable  XR CHEST 1 VIEW    CLINICAL HISTORY:  67 years Female vent, septic shock    COMPARISON: July 22, 2023    FINDINGS: Cardiac silhouette size is mildly enlarged and stable compared to prior. Endotracheal tube, left IJ CVL, and enteric tube are in stable position.    Previously seen groundglass opacities involving the lung bases have improved. Possible trace pleural fluid on the left as evidenced by blunting of the left costophrenic angle. No pneumothorax. No new or worsening parenchymal abnormality.    IMPRESSION:    Suspected small left pleural effusion.    Improved aeration of the lung bases.    Stable lines and tubes.    Electronically signed by:  Jah Best MD  7/24/2023 8:14 AM CDT Workstation: [id number]      CURRENT VISIT EKG  Results for orders placed or performed during the hospital encounter of 07/18/23   EKG 12-lead    Narrative    Test Reason : W19.XXXA,    Vent. Rate : 073 BPM     Atrial Rate : 073 BPM     P-R  Int : 154 ms          QRS Dur : 162 ms      QT Int : 426 ms       P-R-T Axes : 069 -17 -11 degrees     QTc Int : 469 ms    Sinus rhythm with Premature ventricular complexes or Fusion complexes  Right bundle branch block  Minimal voltage criteria for LVH, may be normal variant ( R in aVL )  Abnormal ECG  When compared with ECG of 07-AUG-2021 22:34,  Fusion complexes are now Present  Premature ventricular complexes are now Present    Referred By: [email or web address]   SELF           Confirmed By:        ECHOCARDIOGRAM RESULTS  No results found for this or any previous visit.        Oxygen INFORMATION  Vent Mode: Spont  Oxygen Concentration (%):  [30] 30  Resp Rate Total:  [27 br/min-37 br/min] 32 br/min  PEEP/CPAP:  [5 cmH20] 5 cmH20  Pressure Support:  [5 cmH20] 5 cmH20  Mean Airway Pressure:  [7.2 cmH20-7.3 cmH20] 7.3 [id number] liters           LAST ARTERIAL BLOOD GAS  ABG  Recent Labs   Lab 07/24/23  0947   PH 7.436   PO2 63*   PCO2 38.2   HCO3 25.7   BE 1       IMPRESSION AND PLAN  Severe sepsis with shock, cholangitis and pancreatitis  - bacteremia with E coli and E faecium  - sludge released from ERCP  - antibiotics per ID    Shock liver  - resolved    Morbid obesity/severe hypoalbuminemia  - has a regular diet    Anemia  - watch H/H  - CBC pending    Thrombocytopenia, may be due to sepsis vs meds  - improving  - CBC pending    Systolic heart failure  Diastolic heart failure  Pulmonary hypertension  Demand ischemia  - appreciate cardiology recs    Acute encephalopathy, metabolic  - resolved    Patient needs to be moving.  She is not an ICU patient.  Discussed with Dr. Carpenter and . She has PT and OT consults Patient is on room air. Please call if I can be of assistance. MAR cleaned up.      Mariajose Serrano MD  Date of Service: 07/25/2023  8:19 AM

## 2023-07-25 NOTE — RESPIRATORY THERAPY
07/25/23 0752   Patient Assessment/Suction   Level of Consciousness (AVPU) alert   Respiratory Effort Unlabored   All Lung Fields Breath Sounds clear   PRE-TX-O2   Device (Oxygen Therapy) nasal cannula   $ Is the patient on Low Flow Oxygen? Yes   Flow (L/min) 2   Oxygen Concentration (%) 24   SpO2 100 %   Pulse Oximetry Type Continuous   $ Pulse Oximetry - Single Charge Pulse Oximetry - Single   $ Pulse Oximetry - Multiple Charge Pulse Oximetry - Multiple   Oximetry Probe Site Assessed   Pulse 62   Resp (!) 24  (pt. talking)   Aerosol Therapy   $ Aerosol Therapy Charges PRN treatment not required   Preset CPAP/BiPAP Settings   Mode Of Delivery CPAP  (Not wanted. She does not use at home.)   Education   $ Education 15 min;BiPAP

## 2023-07-25 NOTE — PT/OT/SLP EVAL
Occupational Therapy   Evaluation    Name: Chanel Cervantes  MRN: [id number]  Admitting Diagnosis: Septic shock  Recent Surgery: Procedure(s) (LRB):  ERCP (ENDOSCOPIC RETROGRADE CHOLANGIOPANCREATOGRAPHY) (N/A) 6 Days Post-Op    Recommendations:     Discharge Recommendations: rehabilitation facility  Discharge Equipment Recommendations:  to be determined by next level of care  Barriers to discharge:   (increased fall risk, decreased functional independence)    Assessment:     Chanel Cervantes is a 67 y.o. female with a medical diagnosis of Septic shock. Performance deficits affecting function: weakness, impaired endurance, impaired self care skills, impaired functional mobility, gait instability, impaired balance, decreased upper extremity function, decreased lower extremity function, pain, decreased safety awareness, edema.      Pt sitting up in bed with daughter present upon OT arrival. Pt in severe pain but was able to perform a supine <> sit transfer with contact and HOB elevated. She has demonstrates increased fatigue and was unable to actively lift her arms to 90 degrees against gravity. She required Moderate - Maximal Assistance to maintain sitting balance and upright posture sitting edge of bed during grooming activity. She is motivated to improve and has strong support from family.     Rehab Prognosis: Good; patient would benefit from acute skilled OT services to address these deficits and reach maximum level of function.       Plan:     Patient to be seen 6 x/week to address the above listed problems via self-care/home management, therapeutic activities, therapeutic exercises  Plan of Care Expires: 08/25/23  Plan of Care Reviewed with: patient, daughter    Subjective     Chief Complaint: 10/10 pain  Patient/Family Comments/goals: to return to living alone    Occupational Profile:  Living Environment: pt home SSH with threshold, tub/shower combo with a grab bar; plans to move in with daughter prior to returning  "home: 2 story home with high tub/shower combo, they plan on sink side baths.   Previous level of function: Ind, sometimes Mod Ind with cane "when she feels weak"  Roles and Routines: mother, homemaker  Equipment Used at Home: cane, straight, walker, rolling, bedside commode, grab bar  Assistance upon Discharge: daughter and LENO    Pain/Comfort:  Pain Rating 1: 10/10  Location 1:  (unspecified)  Pain Addressed 1: Distraction, Pre-medicate for activity    Patients cultural, spiritual, Buddhism conflicts given the current situation: no    Objective:     Communicated with: nurse prior to session.  Patient found HOB elevated with telemetry, peripheral IV, schaefer catheter, pulse ox (continuous), arterial line upon OT entry to room.    General Precautions: Standard, fall  Orthopedic Precautions: N/A  Braces: N/A  Respiratory Status: Room air    Occupational Performance:    Bed Mobility:    Patient completed Scooting/Bridging with maximal assistance  Patient completed Supine to Sit with contact guard assistance and head of bed elevated  Patient completed Sit to Supine with maximal assistance and 2 persons      Activities of Daily Living:  Feeding:  minimum assistance and moderate assistance clinically reasoned based on performance with grooming and active range of motion testing  Grooming: minimum assistance to bring mouthwash to mouth  Lower Body Dressing: maximal assistance to don socks  Toileting: schaefer      Cognitive/Visual Perceptual:  Cognitive/Psychosocial Skills:     -       Oriented to: Person, Place, Time, and Situation   -       Follows Commands/attention:Follows multistep  commands  -       Communication: clear/fluent  -       Safety awareness/insight to disability: impaired   -       Mood/Affect/Coping skills/emotional control: Appropriate to situation and Cooperative    Physical Exam:  Upper Extremity Range of Motion:     -       Right Upper Extremity: AROM: 0 degrees; AAROM: 0-90 degrees   -       Left Upper " Extremity: AROM: 0-90 degrees; AAROM: 0-160 degrees  Upper Extremity Strength:    -       Right Upper Extremity: NT  -       Left Upper Extremity: NT   Strength:    -       Right Upper Extremity: poor  -       Left Upper Extremity: poor    AMPAC 6 Click ADL:  AMPAC Total Score: 12    Treatment & Education:  Pt completed bed mobility and required Moderate - Maximal Assistance to maintain sitting balance and upright posture while sitting EOB. She performed grooming task and fatigued FDC, requesting to return to bed.     Patient left HOB elevated with all lines intact, call button in reach, bed alarm on, and daughter present    GOALS:   Multidisciplinary Problems       Occupational Therapy Goals          Problem: Occupational Therapy    Goal Priority Disciplines Outcome Interventions   Occupational Therapy Goal     OT, PT/OT     Description: Goals to be met by: 2023     Patient will increase functional independence with ADLs by performing:    Feeding with [place].  UE Dressing with Modified [place].  LE Dressing with Modified [place].  Grooming while standing at sink with Modified [place].  Toileting from toilet with Modified [place] for hygiene and clothing management.   Toilet transfer to toilet with Modified [place].                         History:     Past Medical History:   Diagnosis Date    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 2021    Anemia, chronic disease 2021    CAD (coronary artery disease)     Diabetes mellitus, type 2     Hypertension     Iron deficiency anemia following bariatric surgery 2021    MI (myocardial infarction)     Secondary thrombocytosis 2021    Sleep apnea     Sleep apnea 2019    Sleep Right          Past Surgical History:   Procedure Laterality Date    ANGIOGRAM, CORONARY, WITH LEFT HEART CATHETERIZATION      APPENDECTOMY      BARIATRIC SURGERY  2019    Dr Nunez    CARPAL TUNNEL RELEASE Bilateral 2002      SECTION      CHOLECYSTECTOMY      COLONOSCOPY      CORONARY ANGIOPLASTY WITH STENT PLACEMENT      CORONARY ARTERY BYPASS GRAFT      EPIDURAL STEROID INJECTION INTO LUMBAR SPINE      x2    ERCP N/A 7/19/2023    Procedure: ERCP (ENDOSCOPIC RETROGRADE CHOLANGIOPANCREATOGRAPHY);  Surgeon: Lavon Hernandez III, MD;  Location: [facility] ENDO;  Service: Endoscopy;  Laterality: N/A;    ESOPHAGOGASTRODUODENOSCOPY      HERNIA REPAIR  12/04/2019    HYSTERECTOMY      THYROID LOBECTOMY Right 7/20/2021    Procedure: LOBECTOMY, THYROID;  Surgeon: Mari Chance MD;  Location: [facility] OR;  Service: General;  Laterality: Right;       Time Tracking:     OT Date of Treatment: 07/25/23  OT Start Time: 0857  OT Stop Time: 0928  OT Total Time (min): 31 min    Billable Minutes:Evaluation 8  Self Care/Home Management 15  Therapeutic Activity 8    7/25/2023

## 2023-07-25 NOTE — CARE UPDATE
07/24/23 1924   Patient Assessment/Suction   Level of Consciousness (AVPU) responds to voice   Respiratory Effort Unlabored   Expansion/Accessory Muscles/Retractions expansion symmetric   All Lung Fields Breath Sounds coarse   Rhythm/Pattern, Respiratory depth regular;pattern regular   Cough Frequency infrequent   Cough Type good;nonproductive   PRE-TX-O2   Device (Oxygen Therapy) nasal cannula   $ Is the patient on Low Flow Oxygen? Yes   Flow (L/min) 3   SpO2 99 %   Pulse Oximetry Type Continuous   $ Pulse Oximetry - Multiple Charge Pulse Oximetry - Multiple   Temp 98 °F (36.7 °C)   Education   $ Education DME Oxygen;15 min

## 2023-07-25 NOTE — SUBJECTIVE & OBJECTIVE
Interval History: Patient is looking and feeling better. Afebrile, without subjective complaints.    Review of Systems  +generalized weakness  Objective:     Vital Signs (Most Recent):  Temp: 98.4 °F (36.9 °C) (07/25/23 0701)  Pulse: 62 (07/25/23 0752)  Resp: (!) 24 (pt. talking) (07/25/23 0752)  BP: (!) 122/59 (07/25/23 0701)  SpO2: 100 % (07/25/23 0752) Vital Signs (24h Range):  Temp:  [97.8 °F (36.6 °C)-98.4 °F (36.9 °C)] 98.4 °F (36.9 °C)  Pulse:  [53-71] 62  Resp:  [14-36] 24  SpO2:  [90 %-100 %] 100 %  BP: (111-171)/(51-98) 122/59  Arterial Line BP: (132-279)/() 162/48     Weight: 108.9 kg (239 lb 15.9 oz)  Body mass index is 37.6 kg/m².    Intake/Output Summary (Last 24 hours) at 7/25/2023 0843  Last data filed at 7/25/2023 0652  Gross per 24 hour   Intake 1933.33 ml   Output 1895 ml   Net 38.33 ml           Physical Exam  Constitutional:       Interventions: She is sedated and intubated.   Eyes:      Conjunctiva/sclera:      Right eye: No exudate.     Left eye: No exudate.  Neck:      Vascular: No JVD.   Cardiovascular:      Rate and Rhythm: Normal rate and regular rhythm.      Comments: Upper extremities have mild pitting edema  Pulmonary:      Effort: She is intubated.      Breath sounds: Decreased air movement present.   Abdominal:      General: Abdomen is flat. Bowel sounds are decreased. There is distension.      Hernia: A hernia is present. Hernia is present in the ventral area.   Skin:     General: Skin is warm and dry.           Significant Labs:   Lab Results   Component Value Date    WBC 13.49 (H) 07/24/2023    HGB 10.6 (L) 07/24/2023    HCT 31 (L) 07/24/2023    MCV 83 07/24/2023    PLT 99 (L) 07/24/2023       CMP  Sodium   Date Value Ref Range Status   07/25/2023 141 136 - 145 mmol/L Final   01/14/2019 141 134 - 144 mmol/L      Potassium   Date Value Ref Range Status   07/25/2023 3.6 3.5 - 5.1 mmol/L Final     Chloride   Date Value Ref Range Status   07/25/2023 108 95 - 110 mmol/L Final    01/14/2019 96 (L) 98 - 110 mmol/L      CO2   Date Value Ref Range Status   07/25/2023 24 23 - 29 mmol/L Final     Glucose   Date Value Ref Range Status   07/25/2023 151 (H) 70 - 110 mg/dL Final   01/14/2019 177 (H) 70 - 99 mg/dL      BUN   Date Value Ref Range Status   07/25/2023 75 (H) 8 - 23 mg/dL Final     Creatinine   Date Value Ref Range Status   07/25/2023 2.3 (H) 0.5 - 1.4 mg/dL Final   01/14/2019 0.99 0.60 - 1.40 mg/dL      Calcium   Date Value Ref Range Status   07/25/2023 7.9 (L) 8.7 - 10.5 mg/dL Final     Total Protein   Date Value Ref Range Status   07/25/2023 5.4 (L) 6.0 - 8.4 g/dL Final     Albumin   Date Value Ref Range Status   07/25/2023 2.3 (L) 3.5 - 5.2 g/dL Final   01/14/2019 3.8 3.1 - 4.7 g/dL      Total Bilirubin   Date Value Ref Range Status   07/25/2023 0.8 0.1 - 1.0 mg/dL Final     Comment:     For infants and newborns, interpretation of results should be based  on gestational age, weight and in agreement with clinical  observations.    Premature Infant recommended reference ranges:  Up to 24 hours.............<8.0 mg/dL  Up to 48 hours............<12.0 mg/dL  3-5 days..................<15.0 mg/dL  6-29 days.................<15.0 mg/dL       Alkaline Phosphatase   Date Value Ref Range Status   07/25/2023 213 (H) 55 - 135 U/L Final     AST   Date Value Ref Range Status   07/25/2023 34 10 - 40 U/L Final     ALT   Date Value Ref Range Status   07/25/2023 101 (H) 10 - 44 U/L Final     Anion Gap   Date Value Ref Range Status   07/25/2023 9 8 - 16 mmol/L Final     eGFR   Date Value Ref Range Status   07/25/2023 22.7 (A) >60 mL/min/1.73 m^2 Final     Microbiology Results (last 7 days)       Procedure Component Value Units Date/Time    Blood culture [[id number]] Collected: 07/24/23 0348    Order Status: Completed Specimen: Blood Updated: 07/25/23 0432     Blood Culture, Routine No Growth to date      No Growth to date    Narrative:      Collection has been rescheduled by LEIDY at 07/24/2023 03:28  Reason:   Nurse Draw. Blood was already drawn. I was not able to get the blood   cultures.  Collection has been rescheduled by [facility] at 07/24/2023 03:28 Reason:   Nurse Draw. Blood was already drawn. I was not able to get the blood   cultures.    Blood culture [[id number]] Collected: 07/25/23 0327    Order Status: Sent Specimen: Blood Updated: 07/25/23 0404    Blood culture [[id number]] Collected: 07/20/23 0940    Order Status: Completed Specimen: Blood from Peripheral, Antecubital, Right Updated: 07/24/23 1232     Blood Culture, Routine No Growth to date      No Growth to date      No Growth to date      No Growth to date      No Growth to date    Narrative:      Collection has been rescheduled by [email or web address] at 07/20/2023 06:04 Reason:   Unable to collect  Collection has been rescheduled by [email or web address] at 07/20/2023 08:01 Reason:   Contacting zainab for cultures per RN Alfred  Collection has been rescheduled by [email or web address] at 07/20/2023 08:41 Reason:   Nurse Draw  Collection has been rescheduled by [email or web address] at 07/20/2023 06:04 Reason:   Unable to collect  Collection has been rescheduled by [email or web address] at 07/20/2023 08:01 Reason:   Contacting zainab for cultures per RN Alfred  Collection has been rescheduled by [facility] at 07/20/2023 08:41 Reason:   Nurse Draw    Blood culture [[id number]] Collected: 07/23/23 0825    Order Status: Completed Specimen: Blood Updated: 07/24/23 1032     Blood Culture, Routine No Growth to date      No Growth to date    Culture, Respiratory with Gram Stain [[id number]] Collected: 07/20/23 1003    Order Status: Completed Specimen: Respiratory from Endotracheal Aspirate Updated: 07/22/23 0736     Respiratory Culture No growth      No normal respiratory shay     Gram Stain (Respiratory) <10 epithelial cells per low power field.     Gram Stain (Respiratory) Few WBC's     Gram Stain (Respiratory) No organisms seen    Urine Culture High Risk [[id number]] Collected: 07/19/23 1006    Order Status: Completed Specimen: Urine, Clean Catch  Updated: 07/22/23 0710     Urine Culture, Routine No growth    Narrative:      Indicated criteria for high risk culture:->Other  Other (specify):->e coli bacteremia    Blood culture [[id number]]  (Abnormal) Collected: 07/20/23 0940    Order Status: Completed Specimen: Blood from Line, Arterial, Right Updated: 07/22/23 0644     Blood Culture, Routine Gram stain aer bottle: Gram positive cocci      Positive results previously called      ENTEROCOCCUS FAECIUM  For susceptibility see order #[id number]      Narrative:      Collection has been rescheduled by [email or web address] at 07/20/2023 06:04 Reason:   Unable to collect  Collection has been rescheduled by [email or web address] at 07/20/2023 08:01 Reason:   Contacting zainab for cultures per RN Alfred  Collection has been rescheduled by [email or web address] at 07/20/2023 08:41 Reason:   Nurse Draw  Collection has been rescheduled by [email or web address] at 07/20/2023 06:04 Reason:   Unable to collect  Collection has been rescheduled by [email or web address] at 07/20/2023 08:01 Reason:   Contacting zainab for cultures per RN Alfred  Collection has been rescheduled by [email or web address] at 07/20/2023 08:41 Reason:   Nurse Draw    Blood culture [[id number]]  (Abnormal) Collected: 07/19/23 1230    Order Status: Completed Specimen: Blood Updated: 07/22/23 0643     Blood Culture, Routine Gram stain aer bottle: Gram positive cocci      Positive results previously called      ENTEROCOCCUS FAECIUM  For susceptibility see order #[id number]      Blood culture [[id number]]  (Abnormal) Collected: 07/19/23 0002    Order Status: Completed Specimen: Blood Updated: 07/22/23 0643     Blood Culture, Routine Gram stain aer bottle: Gram positive cocci      Gram stain ez bottle: Gram positive cocci      Positive results previously called      ENTEROCOCCUS FAECIUM  For susceptibility see order #[id number]      Blood culture [[id number]]  (Abnormal) Collected: 07/19/23 0030    Order Status: Completed Specimen: Blood from Peripheral, Left Hand Updated: 07/22/23 0643     Blood Culture, Routine  Gram stain aer bottle: Gram positive cocci      Gram stain ez bottle: Gram positive cocci      Results called to and read back by:Carmela Mcintosh RN-3ICU;      07/19/2023  16:23 CJD      ENTEROCOCCUS FAECIUM  For susceptibility see order #[id number]      Narrative:      Collection has been rescheduled by [email or web address] at 07/19/2023 00:20 Reason:   Nurse Draw  Collection has been rescheduled by KC9 at 07/19/2023 00:20 Reason:   Nurse Draw    Blood culture [[id number]]  (Abnormal)  (Susceptibility) Collected: 07/18/23 1648    Order Status: Completed Specimen: Blood Updated: 07/22/23 0642     Blood Culture, Routine Gram stain aer bottle: Gram negative rods      Gram stain aer bottle: Gram positive cocci      Results called to and read back by: Lesvia Mcdowell RN MICU3.      07/19/2023  05:06 TGC      ESCHERICHIA COLI      ENTEROCOCCUS FAECIUM    Blood culture [[id number]]  (Abnormal) Collected: 07/18/23 1738    Order Status: Completed Specimen: Blood Updated: 07/21/23 0853     Blood Culture, Routine Gram stain aer bottle: Gram negative rods      Results called to and read back by:Lesvia Mcdowell RN MICU3.      07/19/2023  05:23 TGC      ESCHERICHIA COLI  For susceptibility see order #[id number]      Rapid Organism ID by PCR (from Blood culture) [[id number]]  (Abnormal) Collected: 07/18/23 1648    Order Status: Completed Updated: 07/19/23 0526     Enterococcus faecalis Not Detected     Enterococcus faecium Detected     Listeria monocytogenes Not Detected     Staphylococcus spp. Not Detected     Staphylococcus aureus Not Detected     Staphylococcus epidermidis Not Detected     Staphylococcus lugdunensis Not Detected     Streptococcus species Not Detected     Streptococcus agalactiae Not Detected     Streptococcus pneumoniae Not Detected     Streptococcus pyogenes Not Detected     Acinetobacter calcoaceticus/baumannii complex Not Detected     Bacteroides fragilis Not Detected     Enterobacterales See species for ID     Enterobacter  cloacae complex Not Detected     Escherichia coli Detected     Klebsiella aerogenes Not Detected     Klebsiella oxytoca Not Detected     Klebsiella pneumoniae group Not Detected     Proteus Not Detected     Salmonella sp Not Detected     Serratia marcescens Not Detected     Haemophilus influenzae Not Detected     Neisseria meningtidis Not Detected     Pseudomonas aeruginosa Not Detected     Stenotrophomonas maltophilia Not Detected     Candida albicans Not Detected     Candida auris Not Detected     Candida glabrata Not Detected     Candida krusei Not Detected     Candida parapsilosis Not Detected     Candida tropicalis Not Detected     Cryptococcus neoformans/gattii Not Detected     CTX-M (ESBL ) Not Detected     IMP (Carbapenem resistant) Not Detected     KPC resistance gene (Carbapenem resistant) Not Detected     mcr-1  Not Detected     mec A/C  Test not applicable     mec A/C and MREJ (MRSA) gene Test not applicable     NDM (Carbapenem resistant) Not Detected     OXA-48-like (Carbapenem resistant) Not Detected     van A/B (VRE gene) Not Detected     VIM (Carbapenem resistant) Not Detected        Significant Imaging:   ECHO:  The left ventricle is normal in size with mild concentric hypertrophy and mildly decreased systolic function.  Mild to moderate tricuspid regurgitation.  The estimated ejection fraction is 45%.  Grade I left ventricular diastolic dysfunction.  There is abnormal septal wall motion consistent with left bundle branch block.  Mild right ventricular enlargement with normal right ventricular systolic function.  Moderate left atrial enlargement.  Normal central venous pressure (3 mmHg).  The estimated PA systolic pressure is 40 mmHg.  There is mild pulmonary hypertension.    ERCP:   - The entire main bile duct was moderately                          dilated, with an obstruction from suspected sludge                          ball                          - The patient has had a  cholecystectomy.                          - A biliary sphincterotomy was performed.                          - The biliary tree was swept.     CTA chest:  1. Negative for pulmonary thromboembolism.  2. Scattered lower lung interstitial opacities left greater than right, potentially subsegmental atelectasis and or small airways inflammation.  3. A 7 mm left upper lobe noncalcified pulmonary nodule, nonspecific. Pulmonary neoplasm is not excluded. A follow-up noncontrast chest CT in 6 months is recommended, per Fleischner Society Recommendations. Reference:  Radiology. 2017 Jul; 284(1):228-243.  4. Cardiomegaly, with aortic and coronary arterial calcifications.  5. Hiatal hernia.    CT abdomen and pelvis with contrast:  1.  Status post cholecystectomy with postsurgical dilatation of the common bile duct and intrahepatic ducts. Should be correlated with bilirubin levels.  2.  Broad-based ventral abdominal wall hernia measures approximately 15 x 13 cm with herniated loops of large and small bowel. No evidence of obstruction.  3.  Exophytic hypoattenuating structure in the mid left kidney is felt to reflect a hemorrhagic cyst.    RUQ US:  The gallbladder surgically absent. The common duct is within normal limits.  Hepatic steatosis and hepatomegaly.    CXR:  1.  Enlarged cardiomediastinal silhouette and central hilar vascular structures. Mild perihilar interstitial thickening. Findings are felt to reflect CHF with mild pulmonary edema.  2.  Small bilateral pleural effusions.    CXR: Interval improved aeration in both lung bases, with otherwise no significant change.    CXR: Hypoinflation with mild prominence of the pulmonary vasculature and faint groundglass opacity in the lung bases suggestive of pulmonary edema    CXR:  Suspected small left pleural effusion.  Improved aeration of the lung bases.  Stable lines and tubes.    ERICA:  The left ventricle is normal in size with normal systolic function.  Normal left  ventricular diastolic function.  The estimated ejection fraction is 60%.  Atrial fibrillation not observed.  Normal right ventricular size with normal right ventricular systolic function.  Normal appearing left atrial appendage. No thrombus is present in the appendage. Normal appendage velocities.  No interatrial septal defect present.  There is no pulmonary hypertension.  No abnormal intracavitary echo; valvular structures are normal  Left atrial appendage was clean.    CXR:  Suspected small left pleural effusion.   Improved aeration of the lung bases.  Stable lines and tubes.

## 2023-07-25 NOTE — PRE ADMISSION SCREENING
[facility]   Pre-Admission Patient Screening                    Pre-Screen type:  LTAC:  Reason for Admission:    eptic shock resolved likely from polymicrobial bacteremia Enterococcus faecium (7/18-19--20) and E coli (7/18) secondary to cholangitis s/p ERCP, sphincterotomy 7/19    LTACH Admission Criteria:    Complex wound care for infected/necrotic skin conditions, Stage III or IV pressure injury, surgical wounds, or burns requiring at least one of the following:  Dressing changes requiring IM/IV analgesics  Wound Management requiring 24 hour observation and positioning at least every 2 hours by licensed personnel    Must meet at least one of the following concomitant treatments/intervention daily unless notes: (excludes PO meds unless notes)  IV medication per therapeutic regimen, Bronchodilators, Cardiac Monitoring, Laboratory assessment and medication adjustment(s), and Rehab Therapy (PT/OT/ST) 1-3 hours a day greater than or equal to 5 days a week      LTACH more appropriate than other levels of care (eg, skilled nursing facility, home health care), as indicated by:    Frequent diagnostic services needed on an inpatient basis, including clinical assessments, laboratory, and imaging as evidence by: Frequent monitoring and clinical assessments performed by a licensed RN to identify current and future patient needs by incorporating the recognition of normal vs abnormal body physiology, and to prompt recognition of pertinent changes to identify and prioritize appropriate interventions that can be performed within the acute inpatient setting. , Frequent monitoring and clinical assessments performed by a licensed RT to identify current and future patient needs by incorporating the recognition of normal vs abnormal body physiology of the Respiratory System, and to prompt recognition of pertinent changes to identify and prioritize appropriate interventions that can be performed within the acute  inpatient setting. , and Frequent laboratory testing and/or imaging to aide in the improvement and effectiveness of patient's individualized treatment plan.   More intensive services, such as speciality nursing care, and/or onsite physician assessments needed that are not available at a lower level of care as evidence by: Daily physician intervention , Collaboration between consulting and attending providers still deemed a necessity to aide in the improvement and effectiveness of patient's individualized treatment plan, which can be provided at an LTACH level of care. , and Therapy Services to be included in patient's treatment plan in an effort to restore/improve patient's modality status to a safe level of functioning prior to acute illness.    Lower level of care has failed (eg, patient readmitted to acute care from a lower level of care).     [facility] as indicated by ALL of the following:      Hypotension Absent     Cardiovascular status stable     Stable chest findings     Renal function accepctable   Pain adequately managed    Intake acceptable       No acute significant hepatic dysfunction (eg, new encephalopathy)   No acute severe unstable neurologic abnormalities (eg, Altered mental status that is severe or persistent, or evidence of ongoing CNS embolization or ischemia, worsening hydrocephalus)   No active bleeding or unstable disorders of hemostasis (eg, no recent need for transfusion, severe thrombocytopenia with bleeding)   No need for respiratory or other isolation, OR manageable at LTACH level of care    Long-term enteral feeding (eg, PEG) and intravenous access established, not needed, OR to be placed at LTACH level of care      Anticipated Discharge Disposition:    Home with Home Health    Facility Status: Accept     Referring Physician:  Enrike Huff MD     Admitting Physician:  Sandra Godinez MD    Primary Care Physician:  ARIC Almaguer    History          Patient Active Problem List    Diagnosis Date Noted    Gallstone pancreatitis 07/21/2023    Hypothyroidism 07/19/2023    Obesity (BMI 30-39.9) 07/19/2023    Anemia 07/19/2023    Acute hypoxemic respiratory failure 07/19/2023    Demand ischemia 07/19/2023    ANTHONY (acute kidney injury) 07/19/2023    Septic shock due to Entercoccus faecium and E.coli 07/19/2023    Shock liver 07/19/2023    Encephalopathy, metabolic 07/19/2023    Acute obstructive cholangitis 07/18/2023    Osteoarthritis 01/05/2022    History of lobectomy of thyroid 08/12/2021    Multinodular goiter 07/20/2021    Thyroid nodule 07/09/2021    Hypokalemia 07/09/2021    Iron deficiency anemia following bariatric surgery 01/24/2021    Arthritis of carpometacarpal (CMC) joint of both thumbs 12/22/2020    H/O ventral hernia repair 07/13/2020    Chronic bilateral low back pain with bilateral sciatica 08/01/2019    S/P bariatric surgery 05/20/2019    NIKOLAS on CPAP 04/08/2019    Other insomnia 02/19/2019    CAD (coronary artery disease) 11/23/2018    Benign essential hypertension 11/07/2018    Mixed hyperlipidemia 11/07/2018    H/O acute myocardial infarction 11/07/2018    Type 2 diabetes mellitus treated with insulin 11/07/2018    Candidal intertrigo 11/07/2018    Hx of CABG 11/07/2018    Vitamin D deficiency 11/07/2018    Venous insufficiency 11/07/2018         Previous Specialties/Consulted physicians:      Cardiology , Infectious Diseases , Nephrology , Pulmonology , and Wound Care       Past and Current Medical History    Past Medical History:   Diagnosis Date    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 1/24/2021    Anemia, chronic disease 1/24/2021    CAD (coronary artery disease)     Diabetes mellitus, type 2     Hypertension     Iron deficiency anemia following bariatric surgery 1/24/2021    MI (myocardial infarction)     Secondary thrombocytosis 1/24/2021    Sleep apnea     Sleep apnea 03/01/2019    Sleep Right            History of Present Illness     Reason  for Consult:  bacteremia E coli and E faecium      HPI:     Chanel Cervantes is a 67 y.o. female obese, BMI 37.6 kg/M2, with past medical history of diabetes, hypertension, bariatric surgery, cholecystectomy, prior UTIs, and prior pancreatitis secondary to gallstones.      She presented with acute onset of back pain, radiating into bilateral upper quadrants, with associated shortness of breath.  She was admitted for septic shock, transferred to ICU for further management.     Labs on admission with severe leukopenia 1.1, left shift 87%, bands 8%, H&H 14/44.2, platelet count 213   Creatinine 1.1   Hypokalemia 3.1   Transaminitis, AST 4943/ALT 1475   Total bili 2.7  Lipase 1363 down to 102  Patient CPK 92   Lactic acid 4.6  Hemoglobin A1c 6.9  UA pyuria 6, negative for bacteria, urine culture in process      CT abdomen with broad-based ventral abdominal hernia.  No evidence of obstruction.  Status post cholecystectomy with postsurgical dilatation of the common bile duct and intrahepatic ducts.     Seen by GI, s/p ERCP this morning; with evidence of the entire main bile duct was moderately dilated, with an obstruction from suspected sludge ball. A biliary sphincterotomy was performed. The biliary tree was swept.      ERCP this morning.  Hospital course complicated persistent fever and polymicrobial bacteremia, Enterococcus faecium and E coli, no resistance genes detected on [employer], pending sensitivities.       ID consult for E coli and E faecium bacteremia.     7/20: Interim reviewed, patient remains febrile, T max 101.3, currently 100.8, on pressors, on O2 by N[place] 5L. She remains encephalopathic, decreased urinary output, 20ml/h, no bowel movements recorded yet. Labs reviewed, WBC 11.4, bands 36%, H/H 11.3/35.4, plt: 175. Worsening ANTHONY 2.6, LFTs trending down. , will watch closely. Alct acid 3, troponins trending up, likely demand ischemia. Micro reviewed, repeat blood cultures 7/19 Enterococcus faecium 4/4  bottles, urine culture no growth to date, pending final. Discussed with Pulmonary, plan for IV steroids.     7/20:  Interim reviewed, patient seen examined at bedside. Pressors requirement coming down, afebrile in the last 24 hours.  She was intubated yesterday at noon for airway protection, FiO2 40%, minimal amount of secretions, sputum sent for culture.  Urinary output improving although creatinine 2.9 today.  LFTs trending down, lactic acid 1.9, normal.  Micro reviewed, repeat blood cultures 7/20 x2 sets no growth to date, pending final.  Awaiting sensitivities on E coli and Enterococcus faecium, to be available later today.     07/22/2023   Afebrile, tmax 100, no pressors since this am,   On sedation 25 mcg/kg/min- intensivist is working daily on extubation, FIo2=30%  WBC 9.9--14;   PLT worsening? (186--175--85--73)   I/b=1398/155.  Cr 1.6--2.4--2.6--2.9--2.9-- LFTs improving   07/23/2023.  Dr. Montejo.   Afebrile.  WBC is stable 14--13.  Blood cultures of 07/23 are negative to date.   Smooth was negative for vegetation.    Urine output is improving.  She had 1 BM.    CXR looks slightly worse, but I think it is volume overload, which will improve as her kidney function improves. Intensivist and nurse are working on vacation sedation. Daughter at bedside.  I discussed with her in detail.     7/24 ([place]): interim reviewed, discussed with Dr Montejo, patient seen and examined at bedside. She is coming OFF sedation, awake, nodding to questions, denies pain. Hemodynamically stable, afebrile. Urinary outpt improving, cr 2.5. Repeat blood cultures x1 no growth to date, pending final. Set from today in process.      7/25: interim reviewed, patient seen and examined at bedside, daughter present. They both report she had never had pancreatitis before. She is sitting in the chair, eating breakfast, feeling better. Labs reviewed, awaiting CBC w/diff, creatinine down 2.3, urinary outpt is good, L IJ and a-line removed. Repeat  blood cultures 7/23 & 7/24 no growth to date, pending final. Repeat set sent today in process.     Septic shock resolved likely from polymicrobial bacteremia Enterococcus faecium (7/18-19--20) and E coli (7/18) secondary to cholangitis s/p ERCP, sphincterotomy 7/19  Procal 54 -->5.7, trending down   CRP 24  Baseline CPK 92-->245  Lipase 1363-->102, trending down   ERICA trending down      2. Shock liver, resolved      3. Thrombocytopenia      4. ANTHONY, cr 1.1--2.4--2.6-- 2.9--2.8--2.8->2.5-->2.3     5.  PMHx: diabetes, hypertension, bariatric surgery, cholecystectomy, prior UTIs     6. Obesity, BMI 37.6 Kg/m2     Recommendations:  Continue Daptomycon 800mg IV q48, dose as per crcl   Continue Ceftriaxone 2g IV daily  Follow repeat blood cultures   Anticipating midline to continue IV abx for total 10 days from negative blood cultures, end date 8/1/23  PT/OT as tolerated  Aspiration precautions       Patient Traveled outside of the [place] in the last 3 months? no     Patient discharged from this LTAC to SNF within the last 45 days? no    Patient discharged from this LTAC to Rehab within the last 27 days? no    Prior residence: home    Prior Post-Acute Services: N/A     Allergies:   Review of patient's allergies indicates:   Allergen Reactions    Adhesive tape-silicones Rash    Dye Hives    Cephalexin     Iodine     Penicillins Edema    Pneumococcal vaccine     Iodinated contrast media Rash       Has patient received the current influenza vaccine (Oct 1 - March 31)? No    Has patient received PPD skin test prior to admit? N/A     Code Status: Full Code    Orientation: Time, Place, Person, and Events    Speech: normal     Vital Signs:     Date 7/25/23    Blood Pressure 122/59    Pulse 62    Respiratory Rate 24    O2 Saturation 2 liters 100% NC     Temperature 98.4        Bowel/Bladder: continent of bladder and continent of bowel  Bowel/Bladder Appliance: N/A    Dialysis: N/A         Peripheral IV - Single Lumen 07/19/23 0030  "20 G Anterior;Left;Proximal Upper Arm (Active)   Site Assessment Clean;Dry;Intact 07/25/23 0901   Extremity Assessment Distal to IV No abnormal discoloration 07/25/23 0901   Line Status Flushed;Saline locked 07/25/23 0901   Dressing Status Clean;Dry;Intact 07/25/23 0901   Dressing Intervention Integrity maintained 07/25/23 0901   Dressing Change Due 07/23/23 07/24/23 1501   Site Change Due 07/23/23 07/23/23 0701   Reason Not Rotated Not due 07/19/23 1522   Number of days: 6            Midline Catheter Insertion/Assessment  - Single Lumen 07/19/23 0015 Right (Active)   Site Assessment Clean;Dry;Intact 07/25/23 0901   IV Device Securement catheter securement device 07/25/23 0901   Line Status Flushed;Saline locked 07/25/23 0901   Dressing Type CHG impregnated dressing/sponge 07/25/23 0901   Dressing Status Clean;Dry;Intact 07/25/23 0901   Dressing Intervention Integrity maintained 07/25/23 0901   Dressing Change Due 07/26/23 07/24/23 1501   Site Change Due 08/26/23 07/23/23 1501   Reason Not Rotated Not due 07/24/23 1501   Number of days: 6            Urethral Catheter 07/19/23 0100 (Active)   Site Assessment Clean;Intact;Dry 07/25/23 0901   Collection Container Urimeter 07/25/23 0901   Securement Method secured to top of thigh w/ leg strap 07/25/23 0901   Catheter Care Performed yes 07/25/23 0901   Reason for Continuing Urinary Catheterization Critically ill in ICU and requiring hourly monitoring of intake/output 07/25/23 0901   CAUTI Prevention Bundle Securement Device in place with 1" slack 07/25/23 0701   Output (mL) 300 mL 07/25/23 1023   Bladder/Intra-Abdominal Pressure (mmHg) 40 mmHg 07/23/23 0800   Number of days: 6       CBGs/Accuchecks: Yes     Precautions: Fall, Cardiac, Blood Pressure/Syncope, Aspiration, Anti-Coagulation Meds, and Seizures    Restraints: No     Isolation Precautions: N/A       Facility-Administered Medications as of 7/25/2023   Medication Dose Route Frequency Provider Last Rate Last Admin "    acetaminophen tablet 650 mg  650 mg Oral Q6H PRN Pradip Gary MD   650 mg at 07/25/23 1009    [COMPLETED] atropine injection 0.5 mg  0.5 mg Intravenous Once Himanshu Pratt MD   0.5 mg at 07/23/23 1020    butalbital-acetaminophen-caffeine -40 mg per tablet 1 tablet  1 tablet Oral Q6H PRN Enrike Huff MD   1 tablet at 07/25/23 1253    calcium gluconate 1 g in NS IVPB (premixed)  1 g Intravenous PRN Sandra Godinez MD        calcium gluconate 1 g in NS IVPB (premixed)  2 g Intravenous PRN Sandra Godinez MD        calcium gluconate 1 g in NS IVPB (premixed)  3 g Intravenous PRN Sandra Godinez MD        carboxymethylcellulose 0.5 % ophthalmic solution 1 drop  1 drop Both Eyes TID PRN Enrike Huff MD   1 drop at 07/24/23 1117    cefTRIAXone (ROCEPHIN) 2 g in dextrose 5 % 100 mL IVPB (ready to mix)  2 g Intravenous Q24H Martine Strickland MD   Stopped at 07/25/23 0930    chlorhexidine 0.12 % solution 15 mL  15 mL Mouth/Throat BID Mariajose Serrano MD   15 mL at 07/25/23 0900    DAPTOmycin (CUBICIN) 805 mg in sodium chloride 0.9% SolP 50 mL IVPB  10 mg/kg (Adjusted) Intravenous Q48H Martine Strickland MD   Stopped at 07/23/23 1529    [COMPLETED] dexAMETHasone injection 8 mg  8 mg Intravenous ED 1 Time Kana Chandra DO   4 mg at 07/18/23 1259    dextrose 50% injection 12.5 g  12.5 g Intravenous PRN Pradip Gary MD        dextrose 50% injection 25 g  25 g Intravenous PRN Sandra Godinez MD        [COMPLETED] diphenhydrAMINE injection 25 mg  25 mg Intravenous ED 1 Time Kana Chandra DO   25 mg at 07/18/23 1259    enoxaparin injection 30 mg  30 mg Subcutaneous Q24H Harris Dempsey MD   30 mg at 07/24/23 1612    [COMPLETED] etomidate injection 20 mg  20 mg Intravenous Once Mariajose Serrano MD   20 mg at 07/20/23 0946    glucagon (human recombinant) injection 1 mg  1 mg Intramuscular PRN Pradip Gary MD        glucose chewable tablet 16 g  16 g Oral PRN Sandra Godinez MD         glucose chewable tablet 24 g  24 g Oral PRN Sandra Godinez MD        hydrALAZINE injection 10 mg  10 mg Intravenous Q6H PRN Mariajose Serrano MD   10 mg at 07/25/23 0337    insulin aspart U-100 pen 1-10 Units  1-10 Units Subcutaneous Q6H PRN Pradip Gary MD   4 Units at 07/24/23 1616    [COMPLETED] iohexoL (OMNIPAQUE 350) injection 100 mL  100 mL Intravenous ONCE PRN Kana Chandra, DO   100 mL at 07/18/23 1428    [COMPLETED] lactated ringers bolus 1,000 mL  1,000 mL Intravenous Once Stormy Beckett MD   Stopped at 07/19/23 0148    [COMPLETED] lactated ringers bolus 1,000 mL  1,000 mL Intravenous Once Stormy Beckett MD   Stopped at 07/19/23 0500    [COMPLETED] lactated ringers bolus 1,000 mL  1,000 mL Intravenous Once Stormy Beckett MD   Stopped at 07/19/23 0630    levothyroxine tablet 75 mcg  75 mcg Oral Before breakfast Sandra Godinez MD   75 mcg at 07/25/23 0512    [COMPLETED] LIDOcaine (cardiac) (XYLOCAINE) 100 mg/5 mL (2 %) injection        Given by Other at 07/19/23 0315    [COMPLETED] LORazepam injection 1 mg  1 mg Intravenous ED 1 Time Kana Chandra, DO   1 mg at 07/18/23 1301    LORazepam injection 2 mg  2 mg Intravenous Q15 Min PRN Pradip Gary MD   2 mg at 07/20/23 0522    [COMPLETED] magnesium oxide tablet 400 mg  400 mg Oral Once Kana Chandra, DO   400 mg at 07/18/23 1527    magnesium oxide tablet 800 mg  800 mg Oral PRN Enrike Huff MD        magnesium oxide tablet 800 mg  800 mg Oral PRN Enrike Huff MD        [COMPLETED] magnesium sulfate 2g in water 50mL IVPB (premix)  2 g Intravenous Once Snadra Godinez MD   Stopped at 07/18/23 2206    magnesium sulfate 2g in water 50mL IVPB (premix)  2 g Intravenous PRN Sandra Godinez MD        magnesium sulfate 2g in water 50mL IVPB (premix)  4 g Intravenous PRN Sandra Godinez MD        [COMPLETED] mupirocin 2 % ointment   Nasal BID Pradip Gary MD   1 g at 07/23/23 2130    naloxone 0.4 mg/mL injection 0.02 mg  0.02 mg Intravenous  PRCORETTA Godinez MD        [] NORepinephrine bitartrate-D5W 4 mg/250 mL (16 mcg/mL) infusion Soln             [COMPLETED] ondansetron injection 4 mg  4 mg Intravenous ED 1 Time Kana Chandra, DO   4 mg at 23 1258    ondansetron injection 4 mg  4 mg Intravenous Q6H PRN Sandra Godinez MD   4 mg at 23 1007    polyethylene glycol packet 17 g  17 g Oral BID PRN Sandra Godinez MD   17 g at 23 0847    potassium bicarbonate disintegrating tablet 35 mEq  35 mEq Oral PRN Enrike Huff MD        potassium bicarbonate disintegrating tablet 50 mEq  50 mEq Oral PRN Enrike Huff MD   50 mEq at 23 0512    potassium bicarbonate disintegrating tablet 60 mEq  60 mEq Oral PRN Enrike Huff MD        [COMPLETED] potassium chloride 10 mEq in 100 mL IVPB  10 mEq Intravenous Q1H Stormy Beckett  mL/hr at 23 0330 10 mEq at 23 0330    potassium chloride 40 mEq in 100 mL IVPB (FOR CENTRAL LINE ADMINISTRATION ONLY)  40 mEq Intravenous PRN Sandra Godinez MD   Stopped at 23 1533    And    potassium chloride 20 mEq in 100 mL IVPB (FOR CENTRAL LINE ADMINISTRATION ONLY)  60 mEq Intravenous PRN Sandra Godinez MD        And    potassium chloride 40 mEq in 100 mL IVPB (FOR CENTRAL LINE ADMINISTRATION ONLY)  80 mEq Intravenous PRN Sandra Godinez MD        [COMPLETED] potassium chloride SA CR tablet 40 mEq  40 mEq Oral Once Kana Chandra, DO   40 mEq at 23 1527    senna-docusate 8.6-50 mg per tablet 1 tablet  1 tablet Oral BID PRCORETTA Godinez MD        simethicone chewable tablet 80 mg  1 tablet Oral QID PRCORETTA Godinez MD        sodium chloride 0.9% flush 10 mL  10 mL Intravenous PRN Sandra Godinez MD        sodium phosphate 15 mmol in dextrose 5 % (D5W) 250 mL IVPB  15 mmol Intravenous PRCORETTA Godinez MD        sodium phosphate 20.01 mmol in dextrose 5 % (D5W) 250 mL IVPB  20.01 mmol Intravenous PRN Sandra Godinez MD        sodium phosphate 30 mmol in  dextrose 5 % (D5W) 250 mL IVPB  30 mmol Intravenous PRN Sandra Godinez MD        [COMPLETED] vancomycin (VANCOCIN) 2,000 mg in dextrose 5 % (D5W) 500 mL IVPB  2,000 mg Intravenous Once Stormy Beckett MD   Stopped at 07/19/23 0747     Outpatient Medications as of 7/25/2023   Medication Sig Dispense Refill    amLODIPine (NORVASC) 2.5 MG tablet Take 1 tablet (2.5 mg total) by mouth once daily. 90 tablet 1    azelastine (ASTELIN) 137 mcg (0.1 %) nasal spray 1 spray (137 mcg total) by Nasal route 2 (two) times daily. 30 mL 5    calcium carbonate-vitamin D3 600 mg-62.5 mcg (2,500 unit) Cap calcium carbonate 600 mg-vitamin D3 62.5 mcg (2,500 unit) capsule   Take by oral route.      DULoxetine (CYMBALTA) 60 MG capsule Take 1 capsule (60 mg total) by mouth once daily. 90 capsule 1    empaglifloz-linaglip-metformin (TRIJARDY XR) 25-5-1,000 mg TBph Take 1 tablet by mouth once daily. 90 tablet 1    guanFACINE (TENEX) 2 MG tablet Take 1 tablet (2 mg total) by mouth nightly. 90 tablet 3    insulin degludec (TRESIBA FLEXTOUCH U-200) 200 unit/mL (3 mL) insulin pen Inject 76 Units into the skin once daily. 12 pen 3    iron-vitamin C 100-250 mg, ICAR-C, 100-250 mg Tab Take 1 tablet by mouth once daily. 30 each 8    metOLazone (ZAROXOLYN) 5 MG tablet Take 1 tablet (5 mg total) by mouth once daily. 90 tablet 0    metoprolol succinate (TOPROL-XL) 25 MG 24 hr tablet Take 1 tablet (25 mg total) by mouth once daily. 90 tablet 1    multivitamin capsule Take 1 capsule by mouth once daily.      olmesartan (BENICAR) 40 MG tablet Take 1 tablet (40 mg total) by mouth once daily. 90 tablet 1    potassium chloride (KLOR-CON) 10 MEQ TbSR Take 1 tablet (10 mEq total) by mouth once daily. 90 tablet 1    pregabalin (LYRICA) 50 MG capsule Take 50 mg by mouth every evening.      rosuvastatin (CRESTOR) 40 MG Tab Take 1 tablet (40 mg total) by mouth every evening. 90 tablet 3    blood sugar diagnostic Strp One Touch Ultra Blue Test strips, Use l  "strip to check glucose 4 times daily 100 each 11    cyanocobalamin 1,000 mcg/mL injection Inject 1 mL (1,000 mcg total) into the skin every 30 days. 3 mL 4    lancets (ONETOUCH DELICA LANCETS) 33 gauge Misc USE 1  TO CHECK GLUCOSE 4 TIMES DAILY 100 each 3    magnesium oxide (MAG-OX) 400 mg (241.3 mg magnesium) tablet Take 1 tablet (400 mg total) by mouth once daily. 90 tablet 1    NYSTOP powder APPLY TO AFFECTED AREA AS NEEDED      prednisoLONE acetate (PRED FORTE) 1 % DrpS Place 1 drop into both eyes as needed.           Cardiovascular:    Cardiovascular Review: Within definable limits (WDL)    Telemetry: Yes    Rhythm:  NSR     AICD: No      Respiratory:    Oxygen:  2 NC     CPT/Frequency: N/A    Incentive Spirometer/Frequency: N/A    CPAP/Settings: N/A    BiPAP/Settings: N/A    Oxygen Saturation:  2 liters NC     Suction Frequency: N/A        Nutrition:      Ht Readings from Last 3 Encounters:   07/23/23 5' 7" (1.702 m)   07/19/23 5' 7" (1.702 m)   06/02/23 5' 7" (1.702 m)     Wt Readings from Last 1 Encounters:   07/19/23 0400 108.9 kg (239 lb 15.9 oz)   07/18/23 1841 108.9 kg (240 lb)   07/18/23 1112 108.9 kg (240 lb)       Feeding Status:   Current Diet: 2000 calorie DM    Supplements:N/A   Tube Feeding: N/A   Flushes: N/A      Integumentary:    Integumentary: Special bed and Surgical Incisions              Altered Skin Integrity 07/25/23 1407 Left midline Buttocks Skin Tear Intact skin with non-blanchable redness of localized area (Active)   07/25/23 1407   Altered Skin Integrity Present on Admission - Did Patient arrive to the hospital with altered skin?:    Side: Left   Orientation: midline   Location: Buttocks   Wound Number:    Is this injury device related?: No   Primary Wound Type: Skin tear   Description of Altered Skin Integrity: Intact skin with non-blanchable redness of localized area   Ankle-Brachial Index:    Pulses:    Removal Indication and Assessment:    Wound Outcome: Unknown   (Retired) Wound " Length (cm):    (Retired) Wound Width (cm):    (Retired) Depth (cm):    Wound Description (Comments):    Removal Indications:    Number of days: 0            Altered Skin Integrity 07/25/23 Thoracic spine Other (comment) Intact skin with non-blanchable redness of localized area (Active)   07/25/23    Altered Skin Integrity Present on Admission - Did Patient arrive to the hospital with altered skin?:    Side:    Orientation:    Location: Thoracic spine   Wound Number:    Is this injury device related?: No   Primary Wound Type: Other   Description of Altered Skin Integrity: Intact skin with non-blanchable redness of localized area   Ankle-Brachial Index:    Pulses:    Removal Indication and Assessment:    Wound Outcome:    (Retired) Wound Length (cm):    (Retired) Wound Width (cm):    (Retired) Depth (cm):    Wound Description (Comments):    Removal Indications:    Number of days: 0            Altered Skin Integrity 07/25/23 1414 Left lower Calf (Active)   07/25/23 1414   Altered Skin Integrity Present on Admission - Did Patient arrive to the hospital with altered skin?: suspected hospital acquired   Side: Left   Orientation: lower   Location: Calf   Wound Number:    Is this injury device related?: Other (see comments)   Primary Wound Type:    Description of Altered Skin Integrity:    Ankle-Brachial Index:    Pulses:    Removal Indication and Assessment:    Wound Outcome:    (Retired) Wound Length (cm):    (Retired) Wound Width (cm):    (Retired) Depth (cm):    Wound Description (Comments):    Removal Indications:    Number of days: 0            Altered Skin Integrity 07/25/23 1416 Right lower;posterior Calf (Active)   07/25/23 1416   Altered Skin Integrity Present on Admission - Did Patient arrive to the hospital with altered skin?: suspected hospital acquired   Side: Right   Orientation: lower;posterior   Location: Calf   Wound Number:    Is this injury device related?: Other (see comments)   Primary Wound Type:     Description of Altered Skin Integrity:    Ankle-Brachial Index:    Pulses:    Removal Indication and Assessment:    Wound Outcome:    (Retired) Wound Length (cm):    (Retired) Wound Width (cm):    (Retired) Depth (cm):    Wound Description (Comments):    Removal Indications:    Number of days: 0         Musculoskeletal:    Transfer assist: Maximum Assistance    Weight Bearing Status: Full    Comments:      General Precautions: Standard, fall  Orthopedic Precautions:N/A   Braces: N/A  Respiratory Status: Room air     Exams:  Cognitive Exam:  Patient is oriented to Person, Place, and Time  RLE ROM: WFL  RLE Strength: grossly 3/5  LLE ROM: WFL  LLE Strength: grossly 3/5     Functional Mobility:  Bed Mobility:     Rolling Left:  moderate assistance and vc  Scooting: maximal assistance and in sitting  Supine to Sit: maximal assistance and of 1 persons; pt sat EOB x 20 min with min > close SBA for safety   Sit to Supine: maximal assistance and of 1-2 persons  Transfers:     Sit to Stand:  maximal assistance to attempt transition to standing x 2 trials, but unable to clear pt's bottom due to generalized weakness.    ADL Assist: Maximum Assistance    Special Equipment: cane, walker, wheelchair, and bedside commode    Radiology:    Radiology (Last 168 hours)               07/24 0755 X-Ray Chest AP Portable       07/23 1040 Transesophageal echo (ERICA)       07/22 0618 X-Ray Chest AP Portable       07/21 0508 X-Ray Chest AP Portable       07/20 1012 X-Ray Chest AP Portable       07/19 1013 Echo       07/19 0739 FL ERCP Biliary And Pancreatic By Rad Tech       07/19 0448 X-Ray Chest AP Portable       07/18 2359 X-Ray Chest 1 View       07/18 1711 US Abdomen Limited       07/18 1427 CTA Chest Non-Coronary (PE Studies)       07/18 0000 CARDIAC MONITORING STRIPS              X-Ray Chest AP Portable  XR CHEST 1 VIEW    CLINICAL HISTORY:  67 years Female vent, septic shock    COMPARISON: July 22, 2023    FINDINGS: Cardiac  silhouette size is mildly enlarged and stable compared to prior. Endotracheal tube, left IJ CVL, and enteric tube are in stable position.    Previously seen groundglass opacities involving the lung bases have improved. Possible trace pleural fluid on the left as evidenced by blunting of the left costophrenic angle. No pneumothorax. No new or worsening parenchymal abnormality.    IMPRESSION:    Suspected small left pleural effusion.    Improved aeration of the lung bases.    Stable lines and tubes.    Electronically signed by:  Jah Best MD  7/24/2023 8:14 AM CDT Workstation: [id number]      Lab/Cultures:    BUN   Date Value Ref Range Status   07/25/2023 75 (H) 8 - 23 mg/dL Final   07/24/2023 82 (H) 8 - 23 mg/dL Final     Creatinine   Date Value Ref Range Status   07/25/2023 2.3 (H) 0.5 - 1.4 mg/dL Final   07/24/2023 2.5 (H) 0.5 - 1.4 mg/dL Final   01/14/2019 0.99 0.60 - 1.40 mg/dL    10/29/2018 1.03 0.60 - 1.40 mg/dL      WBC   Date Value Ref Range Status   07/25/2023 11.11 3.90 - 12.70 K/uL Final   07/24/2023 13.49 (H) 3.90 - 12.70 K/uL Final      Blood Culture, Routine   Date Value Ref Range Status   07/25/2023 No Growth to date  Preliminary     Urine Culture, Routine   Date Value Ref Range Status   07/19/2023 No growth  Final     Respiratory Culture   Date Value Ref Range Status   07/20/2023 No growth  Final   07/20/2023 No normal respiratory shay  Final     Recent Labs     07/24/23  0947   PH 7.436   PCO2 38.2   PO2 63*   HCO3 25.7   POCSATURATED 92*   BE 1

## 2023-07-25 NOTE — PT/OT/SLP EVAL
Physical Therapy Evaluation    Patient Name:  Chanel Cervantes   MRN:  [id number]    Recommendations:     Discharge Recommendations: rehabilitation facility   Discharge Equipment Recommendations:  (TBD at next level of care)   Barriers to discharge: Decreased caregiver support( pt currently requires assist of 2 persons for mobility)    Assessment:     Chanel Cervantes is a 67 y.o. female admitted with a medical diagnosis of Septic shock.  She presents with the following impairments/functional limitations: weakness, impaired endurance, impaired self care skills, impaired functional mobility, gait instability, impaired balance, impaired cognition, decreased safety awareness, decreased coordination, decreased upper extremity function, decreased lower extremity function, pain, impaired coordination, impaired cardiopulmonary response to activity, edema.    Pt found HOB elevated and agreeable to working with PT. Pt lethargic & O x  3 and has the following co-morbidities: DM, HTN, CAD, Hx CABG.  Pt tolerated session fairly due to c/o HA 10/10, back pain and nausea and required maximal assist of 1-2 persons for safe mobilization during session today. PT and tech assisted pt with sit <> stand x 2 attempts, but pt unable to clear her bottom from bed. Pt returned to HOB elevated position and RN was informed of activity. Pt would benefit from acute PT during hospitalization to increase strength, endurance and safety with mobility and would benefit from inpatient rehab prior to discharge home.      Rehab Prognosis: Good and Fair; patient would benefit from acute skilled PT services to address these deficits and reach maximum level of function.    Recent Surgery: Procedure(s) (LRB):  ERCP (ENDOSCOPIC RETROGRADE CHOLANGIOPANCREATOGRAPHY) (N/A) 6 Days Post-Op    Plan:     During this hospitalization, patient to be seen 6 x/week to address the identified rehab impairments via gait training, therapeutic activities, therapeutic exercises,  neuromuscular re-education and progress toward the following goals:    Plan of Care Expires:  08/29/23    Subjective     Chief Complaint: headache, back pain & nausea  Patient/Family Comments/goals: IRF (per RN, pt going to [facility] when discharged)  Pain/Comfort:  Pain Rating 1: 10/10  Location 1: head  Pain Addressed 1: Reposition, Distraction, Cessation of Activity, Nurse notified  Pain Rating Post-Intervention 1: 10/10    Patients cultural, spiritual, Scientologist conflicts given the current situation:      Living Environment:  Pt lives alone in a [facility] with RK.   Prior to admission, patients level of function was independent to modified independent with cane.  Equipment used at home: cane, straight, walker, rolling, bedside commode.  DME owned (not currently used): none.  Upon discharge, patient will have assistance from facility staff then her daughter.    Objective:     Communicated with SONIA Horn prior to session.  Patient found HOB elevated with arterial line, bed alarm, blood pressure cuff, schaefer catheter, peripheral IV, pulse ox (continuous), telemetry  upon PT entry to room.    General Precautions: Standard, fall  Orthopedic Precautions:N/A   Braces: N/A  Respiratory Status: Room air    Exams:  Cognitive Exam:  Patient is oriented to Person, Place, and Time  RLE ROM: WFL  RLE Strength: grossly 3/5  LLE ROM: WFL  LLE Strength: grossly 3/5    Functional Mobility:  Bed Mobility:     Rolling Left:  moderate assistance and vc  Scooting: maximal assistance and in sitting  Supine to Sit: maximal assistance and of 1 persons; pt sat EOB x 20 min with min > close SBA for safety   Sit to Supine: maximal assistance and of 1-2 persons  Transfers:     Sit to Stand:  maximal assistance to attempt transition to standing x 2 trials, but unable to clear pt's bottom due to generalized weakness.      AM-PAC 6 CLICK MOBILITY  Total Score:8       Treatment & Education:  Pt was educated on the following: call light use, importance  of OOB activity and functional mobility to negate the negative effects of prolonged bed rest during this hospitalization, safe transfers/ambulation and discharge planning recommendations/options.      Patient left HOB elevated with all lines intact, call button in reach, bed alarm on, RN notified, and pt's daughter present.    GOALS:   Multidisciplinary Problems       Physical Therapy Goals          Problem: Physical Therapy    Goal Priority Disciplines Outcome Goal Variances Interventions   Physical Therapy Goal     PT, PT/OT      Description: Goals to be met by: 23     Patient will increase functional independence with mobility by performin. Supine to sit with Supervision  2. Sit to stand transfer with Supervision  3. Bed to chair transfer with Supervision using Rolling Walker  4. Gait  x 150 feet with Supervision using Rolling Walker.                              History:     Past Medical History:   Diagnosis Date    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 2021    Anemia, chronic disease 2021    CAD (coronary artery disease)     Diabetes mellitus, type 2     Hypertension     Iron deficiency anemia following bariatric surgery 2021    MI (myocardial infarction)     Secondary thrombocytosis 2021    Sleep apnea     Sleep apnea 2019    Sleep Right        Past Surgical History:   Procedure Laterality Date    ANGIOGRAM, CORONARY, WITH LEFT HEART CATHETERIZATION      APPENDECTOMY      BARIATRIC SURGERY  2019    Dr Nunez    CARPAL TUNNEL RELEASE Bilateral 2002     SECTION      CHOLECYSTECTOMY      COLONOSCOPY      CORONARY ANGIOPLASTY WITH STENT PLACEMENT      CORONARY ARTERY BYPASS GRAFT      EPIDURAL STEROID INJECTION INTO LUMBAR SPINE      x2    ERCP N/A 2023    Procedure: ERCP (ENDOSCOPIC RETROGRADE CHOLANGIOPANCREATOGRAPHY);  Surgeon: Lavon Hernandez III, MD;  Location: [facility];  Service: Endoscopy;  Laterality: N/A;    ESOPHAGOGASTRODUODENOSCOPY      HERNIA  REPAIR  12/04/2019    HYSTERECTOMY      THYROID LOBECTOMY Right 7/20/2021    Procedure: LOBECTOMY, THYROID;  Surgeon: Mari Chance MD;  Location: [facility];  Service: General;  Laterality: Right;       Time Tracking:     PT Received On: 07/25/23  PT Start Time: 0937     PT Stop Time: 1011  PT Total Time (min): 34 min     Billable Minutes: Evaluation 10 and Therapeutic Activity 24 07/25/2023

## 2023-07-25 NOTE — NURSING
Pt transferred to RM 1222. Report called to Lacy ASHLEY. Wound care consult put in r/t blister/rash to RLE. No issues noted at this time.

## 2023-07-25 NOTE — PLAN OF CARE
[facility]       07/25/23 0275   Discharge Reassessment   Assessment Type Discharge Planning Reassessment   Did the patient's condition or plan change since previous assessment? Yes   Communicated LUIS ALFREDO with patient/caregiver Yes   Discharge Plan A Long-term acute care facility (LTAC)   Discharge Plan B Long-term acute care facility (LTAC)   DME Needed Upon Discharge  none   Post-Acute Status   Post-Acute Authorization Placement   Post-Acute Placement Status Referrals Sent   Discharge Delays None known at this time

## 2023-07-25 NOTE — PLAN OF CARE
Patient extubated yesterday on day shift to [facility], tolerating well. Afebrile, SR-SB on monitor. Samaria D/C'd. Cuff pressure 171/70 @ 0333 PRN Hydralazine 10mg given per order, improved. PRN Tylenol given for headache and Dilaudid given for pain early in the shift, pain well controled. Lyrica 50mg home dose has not been restarted yet. Waller output 1L clear yellow output. No BM. BG - 145 this AM, no SSI required. K - 3.6 replaced with 50mEq Effer-K. CHG bath and linen change done.     Problem: Adult Inpatient Plan of Care  Goal: Plan of Care Review  Outcome: Ongoing, Progressing  Goal: Patient-Specific Goal (Individualized)  Outcome: Ongoing, Progressing  Goal: Absence of Hospital-Acquired Illness or Injury  Outcome: Ongoing, Progressing  Goal: Optimal Comfort and Wellbeing  Outcome: Ongoing, Progressing  Goal: Readiness for Transition of Care  Outcome: Ongoing, Progressing     Problem: Diabetes Comorbidity  Goal: Blood Glucose Level Within Targeted Range  Outcome: Ongoing, Progressing     Problem: Skin Injury Risk Increased  Goal: Skin Health and Integrity  Outcome: Ongoing, Progressing     Problem: Infection  Goal: Absence of Infection Signs and Symptoms  Outcome: Ongoing, Progressing     Problem: Adjustment to Illness (Sepsis/Septic Shock)  Goal: Optimal Coping  Outcome: Ongoing, Progressing     Problem: Bleeding (Sepsis/Septic Shock)  Goal: Absence of Bleeding  Outcome: Ongoing, Progressing     Problem: Glycemic Control Impaired (Sepsis/Septic Shock)  Goal: Blood Glucose Level Within Desired Range  Outcome: Ongoing, Progressing     Problem: Infection Progression (Sepsis/Septic Shock)  Goal: Absence of Infection Signs and Symptoms  Outcome: Ongoing, Progressing     Problem: Nutrition Impaired (Sepsis/Septic Shock)  Goal: Optimal Nutrition Intake  Outcome: Ongoing, Progressing     Problem: Fluid and Electrolyte Imbalance (Acute Kidney Injury/Impairment)  Goal: Fluid and Electrolyte Balance  Outcome: Ongoing,  Progressing     Problem: Oral Intake Inadequate (Acute Kidney Injury/Impairment)  Goal: Optimal Nutrition Intake  Outcome: Ongoing, Progressing     Problem: Renal Function Impairment (Acute Kidney Injury/Impairment)  Goal: Effective Renal Function  Outcome: Ongoing, Progressing     Problem: Fall Injury Risk  Goal: Absence of Fall and Fall-Related Injury  Outcome: Ongoing, Progressing     Problem: Restraint, Nonbehavioral (Nonviolent)  Goal: Absence of Harm or Injury  Outcome: Ongoing, Progressing     Problem: Communication Impairment (Mechanical Ventilation, Invasive)  Goal: Effective Communication  Outcome: Ongoing, Progressing     Problem: Device-Related Complication Risk (Mechanical Ventilation, Invasive)  Goal: Optimal Device Function  Outcome: Ongoing, Progressing     Problem: Inability to Wean (Mechanical Ventilation, Invasive)  Goal: Mechanical Ventilation Liberation  Outcome: Ongoing, Progressing     Problem: Nutrition Impairment (Mechanical Ventilation, Invasive)  Goal: Optimal Nutrition Delivery  Outcome: Ongoing, Progressing     Problem: Skin and Tissue Injury (Mechanical Ventilation, Invasive)  Goal: Absence of Device-Related Skin and Tissue Injury  Outcome: Ongoing, Progressing     Problem: Ventilator-Induced Lung Injury (Mechanical Ventilation, Invasive)  Goal: Absence of Ventilator-Induced Lung Injury  Outcome: Ongoing, Progressing     Problem: Communication Impairment (Artificial Airway)  Goal: Effective Communication  Outcome: Ongoing, Progressing     Problem: Device-Related Complication Risk (Artificial Airway)  Goal: Optimal Device Function  Outcome: Ongoing, Progressing     Problem: Skin and Tissue Injury (Artificial Airway)  Goal: Absence of Device-Related Skin or Tissue Injury  Outcome: Ongoing, Progressing     Problem: Noninvasive Ventilation Acute  Goal: Effective Unassisted Ventilation and Oxygenation  Outcome: Ongoing, Progressing     Problem: Aspiration (Enteral Nutrition)  Goal: Absence  of Aspiration Signs and Symptoms  Outcome: Ongoing, Progressing     Problem: Device-Related Complication Risk (Enteral Nutrition)  Goal: Safe, Effective Therapy Delivery  Outcome: Ongoing, Progressing     Problem: Feeding Intolerance (Enteral Nutrition)  Goal: Feeding Tolerance  Outcome: Ongoing, Progressing

## 2023-07-25 NOTE — PROGRESS NOTES
[facility] Medicine  Progress Note    Patient Name: Chanel Cervantes  MRN: [id number]  Patient Class: IP- Inpatient   Admission Date: 7/18/2023  Length of Stay: 6 days  Attending Physician: Enrike Huff MD  Primary Care Provider: ARIC Almaguer        Subjective:     Principal Problem:Septic shock        HPI:  Chanel Cervantes is a 67 y.o. White female   With PMH of DM2, HTN, bariatric surgery,  Remote cholecystectomy,  Frequent UTIs with kidney stones,  who presents with back pain.  Admitted for pancreatitis with elevated LFTs     Pt is currently confused after receiving ativan  (ER gave it to calm her for CT scan)  History taken from family at bedside     Onset of back pain overnight  Located b/l lumbar region  Radiating to b/l upper quadrants of abdomen  Occurring intermittently  Progressively worsening  Severe, causes SOB when occurring     Initially pt thought pain was due to a fall yesterday  (Mechanical, tripped over a rug, no head trauma)  +nausea, no vomiting  +intermittent chills  They are not sure about objective fever     Has had similar abdominal pain previously  Per family, this has been attributed to her ongoing ventral wall hernia  They don't bring her to ER for this usually  However pain today was much more severe than usual      Overview/Hospital Course:  Chanel Cervantes is a 67 year old female with a past medical history of obesity, ventral hernia, DM, HTN, anemia, CAD, NIKOLAS, and hypothyroidism who presented with septic shock secondary to a possible cholangitis with E coli bacteremia. Vancomycin was discontinued and she remained on meropenem. BP was maintained on a Levophed infusion. GI was consulted and performed ERCP which showed biliary sludge with a sludge ball dilating the main duct which was removed; a biliary sphincterotomy was also performed. Repeat blood cultures were negative. She was on IV fluids with LR and was NPO as she was encephalopathic. She also had an ANTHONY  as well. She also had acute hypoxic respiratory failure for which she was put on mask O2. She also had demand ischemia; TTE was done and troponin was trended. There was shock liver superimposed on the hepatocellular injury brought on by the acute cholangitis. Pulmonary/Critical Care was consulted given the patient's medical acuity.  Pulmonologist made decision to intubate patient based on patient's deep encephalopathy and inability to protect her airway.  Patient underwent transesophageal echocardiography which did not report any intracardiac thrombus or evidence of endocarditis.  Patient was successfully extubated on July 24, 2023.      Interval History:  Patient just got extubated.  Asking to eat.  No other subjective complaints.  Currently afebrile.    Review of Systems  +generalized weakness  Objective:     Vital Signs (Most Recent):  Temp: 98.4 °F (36.9 °C) (07/25/23 0701)  Pulse: 62 (07/25/23 0752)  Resp: (!) 24 (pt. talking) (07/25/23 0752)  BP: (!) 122/59 (07/25/23 0701)  SpO2: 100 % (07/25/23 0752) Vital Signs (24h Range):  Temp:  [97.8 °F (36.6 °C)-98.4 °F (36.9 °C)] 98.4 °F (36.9 °C)  Pulse:  [53-71] 62  Resp:  [14-36] 24  SpO2:  [90 %-100 %] 100 %  BP: (111-171)/(51-98) 122/59  Arterial Line BP: (132-279)/() 162/48     Weight: 108.9 kg (239 lb 15.9 oz)  Body mass index is 37.6 kg/m².    Intake/Output Summary (Last 24 hours) at 7/25/2023 0843  Last data filed at 7/25/2023 0652  Gross per 24 hour   Intake 1933.33 ml   Output 1895 ml   Net 38.33 ml           Physical Exam  Constitutional:       Interventions: She is sedated and intubated.   Eyes:      Conjunctiva/sclera:      Right eye: No exudate.     Left eye: No exudate.  Neck:      Vascular: No JVD.   Cardiovascular:      Rate and Rhythm: Normal rate and regular rhythm.      Comments: Upper extremities have mild pitting edema  Pulmonary:      Effort: She is intubated.      Breath sounds: Decreased air movement present.   Abdominal:      General:  Abdomen is flat. Bowel sounds are decreased. There is distension.      Hernia: A hernia is present. Hernia is present in the ventral area.   Skin:     General: Skin is warm and dry.           Significant Labs:   Lab Results   Component Value Date    WBC 13.49 (H) 07/24/2023    HGB 10.6 (L) 07/24/2023    HCT 31 (L) 07/24/2023    MCV 83 07/24/2023    PLT 99 (L) 07/24/2023       CMP  Sodium   Date Value Ref Range Status   07/25/2023 141 136 - 145 mmol/L Final   01/14/2019 141 134 - 144 mmol/L      Potassium   Date Value Ref Range Status   07/25/2023 3.6 3.5 - 5.1 mmol/L Final     Chloride   Date Value Ref Range Status   07/25/2023 108 95 - 110 mmol/L Final   01/14/2019 96 (L) 98 - 110 mmol/L      CO2   Date Value Ref Range Status   07/25/2023 24 23 - 29 mmol/L Final     Glucose   Date Value Ref Range Status   07/25/2023 151 (H) 70 - 110 mg/dL Final   01/14/2019 177 (H) 70 - 99 mg/dL      BUN   Date Value Ref Range Status   07/25/2023 75 (H) 8 - 23 mg/dL Final     Creatinine   Date Value Ref Range Status   07/25/2023 2.3 (H) 0.5 - 1.4 mg/dL Final   01/14/2019 0.99 0.60 - 1.40 mg/dL      Calcium   Date Value Ref Range Status   07/25/2023 7.9 (L) 8.7 - 10.5 mg/dL Final     Total Protein   Date Value Ref Range Status   07/25/2023 5.4 (L) 6.0 - 8.4 g/dL Final     Albumin   Date Value Ref Range Status   07/25/2023 2.3 (L) 3.5 - 5.2 g/dL Final   01/14/2019 3.8 3.1 - 4.7 g/dL      Total Bilirubin   Date Value Ref Range Status   07/25/2023 0.8 0.1 - 1.0 mg/dL Final     Comment:     For infants and newborns, interpretation of results should be based  on gestational age, weight and in agreement with clinical  observations.    Premature Infant recommended reference ranges:  Up to 24 hours.............<8.0 mg/dL  Up to 48 hours............<12.0 mg/dL  3-5 days..................<15.0 mg/dL  6-29 days.................<15.0 mg/dL       Alkaline Phosphatase   Date Value Ref Range Status   07/25/2023 213 (H) 55 - 135 U/L Final     AST    Date Value Ref Range Status   07/25/2023 34 10 - 40 U/L Final     ALT   Date Value Ref Range Status   07/25/2023 101 (H) 10 - 44 U/L Final     Anion Gap   Date Value Ref Range Status   07/25/2023 9 8 - 16 mmol/L Final     eGFR   Date Value Ref Range Status   07/25/2023 22.7 (A) >60 mL/min/1.73 m^2 Final     Microbiology Results (last 7 days)       Procedure Component Value Units Date/Time    Blood culture [[id number]] Collected: 07/24/23 0348    Order Status: Completed Specimen: Blood Updated: 07/25/23 0432     Blood Culture, Routine No Growth to date      No Growth to date    Narrative:      Collection has been rescheduled by [email or web address] at 07/24/2023 03:28 Reason:   Nurse Draw. Blood was already drawn. I was not able to get the blood   cultures.  Collection has been rescheduled by JS at 07/24/2023 03:28 Reason:   Nurse Draw. Blood was already drawn. I was not able to get the blood   cultures.    Blood culture [[id number]] Collected: 07/25/23 0327    Order Status: Sent Specimen: Blood Updated: 07/25/23 0404    Blood culture [[id number]] Collected: 07/20/23 0940    Order Status: Completed Specimen: Blood from Peripheral, Antecubital, Right Updated: 07/24/23 1232     Blood Culture, Routine No Growth to date      No Growth to date      No Growth to date      No Growth to date      No Growth to date    Narrative:      Collection has been rescheduled by [email or web address] at 07/20/2023 06:04 Reason:   Unable to collect  Collection has been rescheduled by [email or web address] at 07/20/2023 08:01 Reason:   Contacting zainab for cultures per RN Alfred  Collection has been rescheduled by [email or web address] at 07/20/2023 08:41 Reason:   Nurse Draw  Collection has been rescheduled by [email or web address] at 07/20/2023 06:04 Reason:   Unable to collect  Collection has been rescheduled by [email or web address] at 07/20/2023 08:01 Reason:   Contacting zainab for cultures per RN Alfred  Collection has been rescheduled by [email or web address] at 07/20/2023 08:41 Reason:   Nurse Draw    Blood culture [[id number]] Collected: 07/23/23  0825    Order Status: Completed Specimen: Blood Updated: 07/24/23 1032     Blood Culture, Routine No Growth to date      No Growth to date    Culture, Respiratory with Gram Stain [[id number]] Collected: 07/20/23 1003    Order Status: Completed Specimen: Respiratory from Endotracheal Aspirate Updated: 07/22/23 0736     Respiratory Culture No growth      No normal respiratory shay     Gram Stain (Respiratory) <10 epithelial cells per low power field.     Gram Stain (Respiratory) Few WBC's     Gram Stain (Respiratory) No organisms seen    Urine Culture High Risk [[id number]] Collected: 07/19/23 1006    Order Status: Completed Specimen: Urine, Clean Catch Updated: 07/22/23 0710     Urine Culture, Routine No growth    Narrative:      Indicated criteria for high risk culture:->Other  Other (specify):->e coli bacteremia    Blood culture [[id number]]  (Abnormal) Collected: 07/20/23 0940    Order Status: Completed Specimen: Blood from Line, Arterial, Right Updated: 07/22/23 0644     Blood Culture, Routine Gram stain aer bottle: Gram positive cocci      Positive results previously called      ENTEROCOCCUS FAECIUM  For susceptibility see order #[id number]      Narrative:      Collection has been rescheduled by [email or web address] at 07/20/2023 06:04 Reason:   Unable to collect  Collection has been rescheduled by [email or web address] at 07/20/2023 08:01 Reason:   Contacting zainab for cultures per RN Alfred  Collection has been rescheduled by [email or web address] at 07/20/2023 08:41 Reason:   Nurse Draw  Collection has been rescheduled by [email or web address] at 07/20/2023 06:04 Reason:   Unable to collect  Collection has been rescheduled by [email or web address] at 07/20/2023 08:01 Reason:   Contacting zainab for cultures per RN Alfred  Collection has been rescheduled by [email or web address] at 07/20/2023 08:41 Reason:   Nurse Draw    Blood culture [[id number]]  (Abnormal) Collected: 07/19/23 1230    Order Status: Completed Specimen: Blood Updated: 07/22/23 0643     Blood Culture, Routine Gram stain aer bottle: Gram positive  cocci      Positive results previously called      ENTEROCOCCUS FAECIUM  For susceptibility see order #[id number]      Blood culture [[id number]]  (Abnormal) Collected: 07/19/23 0002    Order Status: Completed Specimen: Blood Updated: 07/22/23 0643     Blood Culture, Routine Gram stain aer bottle: Gram positive cocci      Gram stain ez bottle: Gram positive cocci      Positive results previously called      ENTEROCOCCUS FAECIUM  For susceptibility see order #[id number]      Blood culture [[id number]]  (Abnormal) Collected: 07/19/23 0030    Order Status: Completed Specimen: Blood from Peripheral, Left Hand Updated: 07/22/23 0643     Blood Culture, Routine Gram stain aer bottle: Gram positive cocci      Gram stain ez bottle: Gram positive cocci      Results called to and read back by:Carmela Mcintosh RN-3ICU;      07/19/2023  16:23 CJD      ENTEROCOCCUS FAECIUM  For susceptibility see order #[id number]      Narrative:      Collection has been rescheduled by [facility] at 07/19/2023 00:20 Reason:   Nurse Draw  Collection has been rescheduled by [facility] at 07/19/2023 00:20 Reason:   Nurse Draw    Blood culture [[id number]]  (Abnormal)  (Susceptibility) Collected: 07/18/23 1648    Order Status: Completed Specimen: Blood Updated: 07/22/23 0642     Blood Culture, Routine Gram stain aer bottle: Gram negative rods      Gram stain aer bottle: Gram positive cocci      Results called to and read back by: Lesvia Mcdowell RN MICU3.      07/19/2023  05:06 TGC      ESCHERICHIA COLI      ENTEROCOCCUS FAECIUM    Blood culture [[id number]]  (Abnormal) Collected: 07/18/23 1738    Order Status: Completed Specimen: Blood Updated: 07/21/23 0853     Blood Culture, Routine Gram stain aer bottle: Gram negative rods      Results called to and read back by:Lesvia Mcdowell RN MICU3.      07/19/2023  05:23 TGC      ESCHERICHIA COLI  For susceptibility see order #[id number]      Rapid Organism ID by PCR (from Blood culture) [[id number]]  (Abnormal) Collected:  07/18/23 1648    Order Status: Completed Updated: 07/19/23 0526     Enterococcus faecalis Not Detected     Enterococcus faecium Detected     Listeria monocytogenes Not Detected     Staphylococcus spp. Not Detected     Staphylococcus aureus Not Detected     Staphylococcus epidermidis Not Detected     Staphylococcus lugdunensis Not Detected     Streptococcus species Not Detected     Streptococcus agalactiae Not Detected     Streptococcus pneumoniae Not Detected     Streptococcus pyogenes Not Detected     Acinetobacter calcoaceticus/baumannii complex Not Detected     Bacteroides fragilis Not Detected     Enterobacterales See species for ID     Enterobacter cloacae complex Not Detected     Escherichia coli Detected     Klebsiella aerogenes Not Detected     Klebsiella oxytoca Not Detected     Klebsiella pneumoniae group Not Detected     Proteus Not Detected     Salmonella sp Not Detected     Serratia marcescens Not Detected     Haemophilus influenzae Not Detected     Neisseria meningtidis Not Detected     Pseudomonas aeruginosa Not Detected     Stenotrophomonas maltophilia Not Detected     Candida albicans Not Detected     Candida auris Not Detected     Candida glabrata Not Detected     Candida krusei Not Detected     Candida parapsilosis Not Detected     Candida tropicalis Not Detected     Cryptococcus neoformans/gattii Not Detected     CTX-M (ESBL ) Not Detected     IMP (Carbapenem resistant) Not Detected     KPC resistance gene (Carbapenem resistant) Not Detected     mcr-1  Not Detected     mec A/C  Test not applicable     mec A/C and MREJ (MRSA) gene Test not applicable     NDM (Carbapenem resistant) Not Detected     OXA-48-like (Carbapenem resistant) Not Detected     van A/B (VRE gene) Not Detected     VIM (Carbapenem resistant) Not Detected        Significant Imaging:   ECHO:  The left ventricle is normal in size with mild concentric hypertrophy and mildly decreased systolic function.  Mild to moderate  tricuspid regurgitation.  The estimated ejection fraction is 45%.  Grade I left ventricular diastolic dysfunction.  There is abnormal septal wall motion consistent with left bundle branch block.  Mild right ventricular enlargement with normal right ventricular systolic function.  Moderate left atrial enlargement.  Normal central venous pressure (3 mmHg).  The estimated PA systolic pressure is 40 mmHg.  There is mild pulmonary hypertension.    ERCP:   - The entire main bile duct was moderately                          dilated, with an obstruction from suspected sludge                          ball                          - The patient has had a cholecystectomy.                          - A biliary sphincterotomy was performed.                          - The biliary tree was swept.     CTA chest:  1. Negative for pulmonary thromboembolism.  2. Scattered lower lung interstitial opacities left greater than right, potentially subsegmental atelectasis and or small airways inflammation.  3. A 7 mm left upper lobe noncalcified pulmonary nodule, nonspecific. Pulmonary neoplasm is not excluded. A follow-up noncontrast chest CT in 6 months is recommended, per Fleischner Society Recommendations. Reference:  Radiology. 2017 Jul; 284(1):228-243.  4. Cardiomegaly, with aortic and coronary arterial calcifications.  5. Hiatal hernia.    CT abdomen and pelvis with contrast:  1.  Status post cholecystectomy with postsurgical dilatation of the common bile duct and intrahepatic ducts. Should be correlated with bilirubin levels.  2.  Broad-based ventral abdominal wall hernia measures approximately 15 x 13 cm with herniated loops of large and small bowel. No evidence of obstruction.  3.  Exophytic hypoattenuating structure in the mid left kidney is felt to reflect a hemorrhagic cyst.    RUQ US:  The gallbladder surgically absent. The common duct is within normal limits.  Hepatic steatosis and hepatomegaly.    CXR:  1.  Enlarged  "cardiomediastinal silhouette and central hilar vascular structures. Mild perihilar interstitial thickening. Findings are felt to reflect CHF with mild pulmonary edema.  2.  Small bilateral pleural effusions.    CXR: Interval improved aeration in both lung bases, with otherwise no significant change.    CXR: Hypoinflation with mild prominence of the pulmonary vasculature and faint groundglass opacity in the lung bases suggestive of pulmonary edema    CXR:  Suspected small left pleural effusion.  Improved aeration of the lung bases.  Stable lines and tubes.    ERICA:  The left ventricle is normal in size with normal systolic function.  Normal left ventricular diastolic function.  The estimated ejection fraction is 60%.  Atrial fibrillation not observed.  Normal right ventricular size with normal right ventricular systolic function.  Normal appearing left atrial appendage. No thrombus is present in the appendage. Normal appendage velocities.  No interatrial septal defect present.  There is no pulmonary hypertension.  No abnormal intracavitary echo; valvular structures are normal  Left atrial appendage was clean.    CXR:  Suspected small left pleural effusion.   Improved aeration of the lung bases.  Stable lines and tubes.      Assessment/Plan:      * Septic shock due to Entercoccus faecium and E.coli  Secondary to acute cholangitis  -continue IVAB  -cultures reviewed  -S/p ERCP; GI following  -Trend lactic acid  -Continue LR IV fluids and Levophed to keep MAP > 65    Antibiotics (From admission, onward)      Start     Stop Route Frequency Ordered    07/21/23 1400  DAPTOmycin (CUBICIN) 805 mg in sodium chloride 0.9% SolP 50 mL IVPB         -- IV Every 48 hours (non-standard times) 07/20/23 0749    07/19/23 1300  meropenem 1 g in sodium chloride 0.9 % 100 mL IVPB (ready to mix system)        Note to Pharmacy: Ht: 5' 7" (1.702 m)  Wt: 108.9 kg (240 lb)  Estimated Creatinine Clearance: 63.1 mL/min (based on SCr of 1.1 " mg/dL).  Body mass index is 37.59 kg/m².    -- IV Every 12 hours (non-standard times) 07/19/23 0740              Gallstone pancreatitis  Present on hospital day 1.  Continue vasopressor support and crystalloid.      Encephalopathy, metabolic  Metabolic in setting of septic shock.  -assess mental status when off sedation  -Treat septic shock      Shock liver  -Treat cholangitis  -Trend LFTs, much improved.  -Continue IV fluids and Levophed    Lab Results   Component Value Date     (H) 07/23/2023    AST 38 07/23/2023    GGT 24 05/17/2018    ALKPHOS 256 (H) 07/23/2023    BILITOT 1.0 07/23/2023         ANTHONY (acute kidney injury)  In setting of septic shock.  -Continue Levophed and IV fluids  -Renally dose medications  -Avoid nephrotoxic agents  -Monitor UOP and electrolytes  -Trend creatinine  -nephrologist consulting    Cr has trended upward:  2.4 to 2.6 to 2.9 to 2.8.  BUN is going up as well:  49 to 56 to 64       Demand ischemia  History of CAD s/p CABG. No acute ST changes on EKG.  -Trend troponin  -Treat septic shock  -Telemetry  -cardiology consulting -- their opinion is that the troponin is high b/c of myocardial demand ischemia    Troponin I High Sensitivity   Date Value Ref Range Status   07/20/2023 2384.3 (HH) 0.0 - 14.9 pg/mL Final     Comment:     Troponin results differ between methods. Do not use   results between Troponin methods interchangeably.    Alkaline Phospatase levels above 400 U/L may   cause false positive results.    Access hsTnI should not be used for patients taking   Asfotase anya (Strensiq).  Troponin critical result(s) called and verbal readback obtained   from [place] Avila RN M3 by MS1 07/20/2023 04:54     07/19/2023 1148.1 (HH) 0.0 - 14.9 pg/mL Final     Comment:     Troponin results differ between methods. Do not use   results between Troponin methods interchangeably.    Alkaline Phospatase levels above 400 U/L may   cause false positive results.    Access hsTnI should not be  used for patients taking   Asfotase anya (Strensiq).  Troponin critical result(s) called and verbal readback obtained from   Karan Maldonado RN M3  by MS1 07/19/2023 22:10     07/19/2023 648.1 (HH) 0.0 - 14.9 pg/mL Final     Comment:     Troponin results differ between methods. Do not use   results between Troponin methods interchangeably.    Alkaline Phospatase levels above 400 U/L may   cause false positive results.    Access hsTnI should not be used for patients taking   Asfotase anya (Strensiq).  Results confirmed, test repeated  Troponin critical result(s) repeated. Called and verbal readback   obtained from Carmela Mcintosh,ICU, RN.  by SLT1 07/19/2023 17:37         Acute hypoxemic respiratory failure  Required intubation on hospital day 3.  Patient was successfully extubated on July 24, 2023.  Follow Pulmonary Medicine recommendations.        Anemia  Stable.  -Trend Hgb with CBC    Hemoglobin   Date Value Ref Range Status   07/23/2023 10.5 (L) 12.0 - 16.0 g/dL Final   07/22/2023 9.9 (L) 12.0 - 16.0 g/dL Final           Obesity (BMI 30-39.9)  Body mass index is 37.6 kg/m². Morbid obesity complicates all aspects of disease management from diagnostic modalities to treatment.       Hypothyroidism  TSH unremarkable.  It's 0.470.  -Continue Synthroid      Acute obstructive cholangitis  Sludge ball removed via ERCP. Likely source of bacteremia. LFTs much improved.  -Continue meropenem and daptomycin, renally dosed; follow sensitivities  -GI following  -IV fluids  -Levophed infusion  - blood cultures:  Entercoccus faecium and E.coli  -Trend LFTs      CAD (coronary artery disease)  -Hold home medications  -Telemetry      Hypokalemia  -Trend K.  Has improved.  -Replete K PRN  -Telemetry    Potassium   Date Value Ref Range Status   07/23/2023 3.8 3.5 - 5.1 mmol/L Final   07/22/2023 4.0 3.5 - 5.1 mmol/L Final         S/P bariatric surgery  Noted.      NIKOLAS on CPAP  -chronic condition.    Type 2 diabetes mellitus treated  with insulin  Patient's FSGs are controlled on current medication regimen.  Last A1c reviewed-   Lab Results   Component Value Date    HGBA1C 6.9 (H) 07/18/2023     'Current correctional scale  Medium  Maintain anti-hyperglycemic dose as follows-   Antihyperglycemics (From admission, onward)      Start     Stop Route Frequency Ordered    07/19/23 1018  insulin aspart U-100 pen 1-10 Units         -- SubQ Every 6 hours PRN 07/19/23 0920          Hold Oral hypoglycemics while patient is in the hospital.    Benign essential hypertension  -Hold home medications in setting of shock      Transfer the patient to medicine telemetry floor. D/W Dr. Serrano and SW, patient needs LTAC placement  VTE Risk Mitigation (From admission, onward)           Ordered     enoxaparin injection 30 mg  Every 24 hours (non-standard times)         07/22/23 0940     IP VTE HIGH RISK PATIENT  Once         07/18/23 1642     Place sequential compression device  Until discontinued         07/18/23 1642                    Discharge Planning   LUIS ALFREDO:  7/27/23    Code Status: Full Code   Is the patient medically ready for discharge?:     Reason for patient still in hospital (select all that apply): Patient trending condition and Consult recommendations  Discharge Plan A: LTAC                 Enrike Huff MD  Department of Hospital Medicine   [facility]

## 2023-07-26 LAB
ALLENS TEST: ABNORMAL
CK SERPL-CCNC: 20 U/L (ref 20–180)
DELSYS: ABNORMAL
FLOW: 2
GLUCOSE SERPL-MCNC: 195 MG/DL (ref 70–110)
GLUCOSE SERPL-MCNC: 215 MG/DL (ref 70–110)
GLUCOSE SERPL-MCNC: 241 MG/DL (ref 70–110)
GLUCOSE SERPL-MCNC: 261 MG/DL (ref 70–110)
HCO3 UR-SCNC: 24.4 MMOL/L (ref 24–28)
HCT VFR BLD CALC: 30 %PCV (ref 36–54)
MAGNESIUM SERPL-MCNC: 2.2 MG/DL (ref 1.6–2.6)
MODE: ABNORMAL
PCO2 BLDA: 37.8 MMHG (ref 35–45)
PF4 HEPARIN CMPLX AB SER QL: 0.12 OD (ref 0–0.4)
PH SMN: 7.42 [PH] (ref 7.35–7.45)
PO2 BLDA: 75 MMHG (ref 80–100)
POC BE: 0 MMOL/L
POC IONIZED CALCIUM: 1.13 MMOL/L (ref 1.06–1.42)
POC SATURATED O2: 95 % (ref 95–100)
POC TCO2: 26 MMOL/L (ref 23–27)
POTASSIUM BLD-SCNC: 3 MMOL/L (ref 3.5–5.1)
SAMPLE: ABNORMAL
SARS-COV-2 RDRP RESP QL NAA+PROBE: NEGATIVE
SITE: ABNORMAL
SODIUM BLD-SCNC: 139 MMOL/L (ref 136–145)

## 2023-07-26 PROCEDURE — 92526 ORAL FUNCTION THERAPY: CPT

## 2023-07-26 PROCEDURE — 27000221 HC OXYGEN, UP TO 24 HOURS

## 2023-07-26 PROCEDURE — 82550 ASSAY OF CK (CPK): CPT | Performed by: STUDENT IN AN ORGANIZED HEALTH CARE EDUCATION/TRAINING PROGRAM

## 2023-07-26 PROCEDURE — 25000242 PHARM REV CODE 250 ALT 637 W/ HCPCS: Performed by: STUDENT IN AN ORGANIZED HEALTH CARE EDUCATION/TRAINING PROGRAM

## 2023-07-26 PROCEDURE — 92523 SPEECH SOUND LANG COMPREHEN: CPT

## 2023-07-26 PROCEDURE — 36415 COLL VENOUS BLD VENIPUNCTURE: CPT | Performed by: INTERNAL MEDICINE

## 2023-07-26 PROCEDURE — 25000003 PHARM REV CODE 250: Performed by: STUDENT IN AN ORGANIZED HEALTH CARE EDUCATION/TRAINING PROGRAM

## 2023-07-26 PROCEDURE — 63600175 PHARM REV CODE 636 W HCPCS: Performed by: INTERNAL MEDICINE

## 2023-07-26 PROCEDURE — U0002 COVID-19 LAB TEST NON-CDC: HCPCS | Performed by: INTERNAL MEDICINE

## 2023-07-26 PROCEDURE — 36600 WITHDRAWAL OF ARTERIAL BLOOD: CPT

## 2023-07-26 PROCEDURE — 83735 ASSAY OF MAGNESIUM: CPT | Performed by: INTERNAL MEDICINE

## 2023-07-26 PROCEDURE — 82803 BLOOD GASES ANY COMBINATION: CPT

## 2023-07-26 PROCEDURE — 12000002 HC ACUTE/MED SURGE SEMI-PRIVATE ROOM

## 2023-07-26 PROCEDURE — 97530 THERAPEUTIC ACTIVITIES: CPT

## 2023-07-26 PROCEDURE — 99233 SBSQ HOSP IP/OBS HIGH 50: CPT | Mod: ,,, | Performed by: STUDENT IN AN ORGANIZED HEALTH CARE EDUCATION/TRAINING PROGRAM

## 2023-07-26 PROCEDURE — 25000242 PHARM REV CODE 250 ALT 637 W/ HCPCS: Performed by: INTERNAL MEDICINE

## 2023-07-26 PROCEDURE — 99233 PR SUBSEQUENT HOSPITAL CARE,LEVL III: ICD-10-PCS | Mod: ,,, | Performed by: STUDENT IN AN ORGANIZED HEALTH CARE EDUCATION/TRAINING PROGRAM

## 2023-07-26 PROCEDURE — 25000003 PHARM REV CODE 250: Performed by: INTERNAL MEDICINE

## 2023-07-26 PROCEDURE — 99900031 HC PATIENT EDUCATION (STAT)

## 2023-07-26 PROCEDURE — 99221 1ST HOSP IP/OBS SF/LOW 40: CPT | Mod: ,,, | Performed by: FAMILY MEDICINE

## 2023-07-26 PROCEDURE — 99221 PR INITIAL HOSPITAL CARE,LEVL I: ICD-10-PCS | Mod: ,,, | Performed by: FAMILY MEDICINE

## 2023-07-26 PROCEDURE — 94640 AIRWAY INHALATION TREATMENT: CPT

## 2023-07-26 PROCEDURE — 99900035 HC TECH TIME PER 15 MIN (STAT)

## 2023-07-26 PROCEDURE — 94761 N-INVAS EAR/PLS OXIMETRY MLT: CPT

## 2023-07-26 RX ADMIN — ENOXAPARIN SODIUM 30 MG: 30 INJECTION SUBCUTANEOUS at 05:07

## 2023-07-26 RX ADMIN — LEVOTHYROXINE SODIUM 75 MCG: 0.03 TABLET ORAL at 06:07

## 2023-07-26 RX ADMIN — DEXTROSE MONOHYDRATE 2 G: 50 INJECTION, SOLUTION INTRAVENOUS at 08:07

## 2023-07-26 RX ADMIN — RACEPINEPHRINE HYDROCHLORIDE 0.5 ML: 11.25 SOLUTION RESPIRATORY (INHALATION) at 07:07

## 2023-07-26 RX ADMIN — IPRATROPIUM BROMIDE AND ALBUTEROL SULFATE 3 ML: .5; 3 SOLUTION RESPIRATORY (INHALATION) at 08:07

## 2023-07-26 RX ADMIN — IPRATROPIUM BROMIDE AND ALBUTEROL SULFATE 3 ML: .5; 3 SOLUTION RESPIRATORY (INHALATION) at 07:07

## 2023-07-26 RX ADMIN — IPRATROPIUM BROMIDE AND ALBUTEROL SULFATE 3 ML: .5; 3 SOLUTION RESPIRATORY (INHALATION) at 05:07

## 2023-07-26 RX ADMIN — RACEPINEPHRINE HYDROCHLORIDE 0.5 ML: 11.25 SOLUTION RESPIRATORY (INHALATION) at 05:07

## 2023-07-26 RX ADMIN — INSULIN ASPART 4 UNITS: 100 INJECTION, SOLUTION INTRAVENOUS; SUBCUTANEOUS at 09:07

## 2023-07-26 RX ADMIN — IPRATROPIUM BROMIDE AND ALBUTEROL SULFATE 3 ML: .5; 3 SOLUTION RESPIRATORY (INHALATION) at 12:07

## 2023-07-26 RX ADMIN — ACETAMINOPHEN 650 MG: 325 TABLET ORAL at 10:07

## 2023-07-26 RX ADMIN — HYDRALAZINE HYDROCHLORIDE 10 MG: 20 INJECTION, SOLUTION INTRAMUSCULAR; INTRAVENOUS at 07:07

## 2023-07-26 RX ADMIN — HYDRALAZINE HYDROCHLORIDE 10 MG: 20 INJECTION, SOLUTION INTRAMUSCULAR; INTRAVENOUS at 10:07

## 2023-07-26 RX ADMIN — INSULIN ASPART 4 UNITS: 100 INJECTION, SOLUTION INTRAVENOUS; SUBCUTANEOUS at 06:07

## 2023-07-26 RX ADMIN — AMLODIPINE BESYLATE 5 MG: 5 TABLET ORAL at 08:07

## 2023-07-26 RX ADMIN — CHLORHEXIDINE GLUCONATE 15 ML: 1.2 RINSE ORAL at 08:07

## 2023-07-26 NOTE — ASSESSMENT & PLAN NOTE
"Secondary to acute cholangitis  -continue IVAB, stop date is 08/01  -Enterococcus bacteremia now cleared  -S/p ERCP; GI following  -Continue LR IV fluids and Levophed to keep MAP > 65    Antibiotics (From admission, onward)    Start     Stop Route Frequency Ordered    07/21/23 1400  DAPTOmycin (CUBICIN) 805 mg in sodium chloride 0.9% SolP 50 mL IVPB         -- IV Every 48 hours (non-standard times) 07/20/23 0749    07/19/23 1300  meropenem 1 g in sodium chloride 0.9 % 100 mL IVPB (ready to mix system)        Note to Pharmacy: Ht: 5' 7" (1.702 m)  Wt: 108.9 kg (240 lb)  Estimated Creatinine Clearance: 63.1 mL/min (based on SCr of 1.1 mg/dL).  Body mass index is 37.59 kg/m².    -- IV Every 12 hours (non-standard times) 07/19/23 0740            "

## 2023-07-26 NOTE — SUBJECTIVE & OBJECTIVE
Interval History:  No acute events overnight.  She is feeling somewhat unwell this morning.  Wants to get up and move about with physical therapy.  Continuing to have headaches.  Somewhat groggy with pain medicine.    Review of Systems   All other systems reviewed and are negative.  Objective:     Vital Signs (Most Recent):  Temp: 98.1 °F (36.7 °C) (07/26/23 0719)  Pulse: 74 (07/26/23 0853)  Resp: 16 (07/26/23 0853)  BP: (!) 155/70 (07/26/23 0719)  SpO2: 97 % (07/26/23 0856) Vital Signs (24h Range):  Temp:  [97.5 °F (36.4 °C)-98.1 °F (36.7 °C)] 98.1 °F (36.7 °C)  Pulse:  [57-76] 74  Resp:  [15-18] 16  SpO2:  [92 %-98 %] 97 %  BP: (127-178)/(64-77) 155/70     Weight: 108.9 kg (239 lb 15.9 oz)  Body mass index is 37.6 kg/m².    Intake/Output Summary (Last 24 hours) at 7/26/2023 0909  Last data filed at 7/26/2023 0908  Gross per 24 hour   Intake 380 ml   Output 1800 ml   Net -1420 ml         Physical Exam  Constitutional:       Appearance: Normal appearance. She is normal weight.      Comments: Very weak   HENT:      Head: Normocephalic and atraumatic.      Nose: Nose normal.      Mouth/Throat:      Mouth: Mucous membranes are moist.   Eyes:      Conjunctiva/sclera: Conjunctivae normal.      Pupils: Pupils are equal, round, and reactive to light.   Cardiovascular:      Rate and Rhythm: Normal rate and regular rhythm.      Pulses: Normal pulses.      Heart sounds: Normal heart sounds. No murmur heard.    No friction rub. No gallop.   Pulmonary:      Effort: Pulmonary effort is normal.      Breath sounds: Wheezing present. No rhonchi or rales.      Comments: Some wheezes, no crackles or rhonchi  Abdominal:      General: Abdomen is flat. Bowel sounds are normal. There is no distension.      Palpations: Abdomen is soft.      Tenderness: There is no abdominal tenderness. There is no guarding.   Musculoskeletal:         General: No swelling. Normal range of motion.      Cervical back: Normal range of motion and neck supple.       Right lower leg: No edema.      Left lower leg: No edema.   Skin:     General: Skin is warm and dry.   Neurological:      General: No focal deficit present.      Mental Status: She is alert.   Psychiatric:         Mood and Affect: Mood normal.         Thought Content: Thought content normal.         Judgment: Judgment normal.           Significant Labs: All pertinent labs within the past 24 hours have been reviewed.    Significant Imaging: I have reviewed all pertinent imaging results/findings within the past 24 hours.

## 2023-07-26 NOTE — PT/OT/SLP PROGRESS
Physical Therapy Treatment    Patient Name:  Chanel Cervantes   MRN:  [id number]    Recommendations:     Discharge Recommendations: rehabilitation facility  Discharge Equipment Recommendations: to be determined by next level of care  Barriers to discharge:  2 person assist for transfers     Assessment:     Chanel Cervantes is a 67 y.o. female admitted with a medical diagnosis of Septic shock.  She presents with the following impairments/functional limitations: weakness, impaired endurance, impaired self care skills, impaired functional mobility, impaired balance, decreased upper extremity function, decreased lower extremity function, pain, impaired cardiopulmonary response to activity. Patient is agreeable to participation with PT treatment. She requires MaxA x2 for supine to sit and ModA x2 for sit to stand with RW. Patient attempted to take side steps, but was unable to. She endorses fatigue and requests to return to bed with bed alarm on and all needs met.     Rehab Prognosis: Good; patient would benefit from acute skilled PT services to address these deficits and reach maximum level of function.    Recent Surgery: Procedure(s) (LRB):  ERCP (ENDOSCOPIC RETROGRADE CHOLANGIOPANCREATOGRAPHY) (N/A) 7 Days Post-Op    Plan:     During this hospitalization, patient to be seen 6 x/week to address the identified rehab impairments via gait training, therapeutic activities, therapeutic exercises, neuromuscular re-education and progress toward the following goals:    Plan of Care Expires:  08/29/23    Subjective     Chief Complaint: fatigue  Patient/Family Comments/goals: D/C to daughter's house   Pain/Comfort:  Pain Rating 1:  (not rated)  Location - Side 1: Bilateral  Location 1: ear  Pain Addressed 1: Reposition, Distraction      Objective:     Communicated with RN prior to session.  Patient found HOB elevated with bed alarm, schaefer catheter, telemetry upon PT entry to room.     General Precautions: Standard, fall  Orthopedic  Precautions: N/A  Braces: N/A  Respiratory Status: Room air     Functional Mobility:  Bed Mobility:     Supine to Sit: maximal assistance and of 2 persons  Transfers:     Sit to Stand:  moderate assistance and of 2 persons with rolling walker      AM-PAC 6 CLICK MOBILITY  Turning over in bed (including adjusting bedclothes, sheets and blankets)?: 2  Sitting down on and standing up from a chair with arms (e.g., wheelchair, bedside commode, etc.): 2  Moving from lying on back to sitting on the side of the bed?: 2  Moving to and from a bed to a chair (including a wheelchair)?: 1  Need to walk in hospital room?: 1  Climbing 3-5 steps with a railing?: 1  Basic Mobility Total Score: 9       Treatment & Education:  Patient was educated on the importance of OOB activity and functional mobility to negate negative effects of prolonged bed rest during hospitalization, safe transfers, IRF, and D/C planning     Patient left HOB elevated with all lines intact, call button in reach, and bed alarm on..    GOALS:   Multidisciplinary Problems       Physical Therapy Goals          Problem: Physical Therapy    Goal Priority Disciplines Outcome Goal Variances Interventions   Physical Therapy Goal     PT, PT/OT Ongoing, Progressing     Description: Goals to be met by: 23     Patient will increase functional independence with mobility by performin. Supine to sit with Supervision  2. Sit to stand transfer with Supervision  3. Bed to chair transfer with Supervision using Rolling Walker  4. Gait  x 150 feet with Supervision using Rolling Walker.                              Time Tracking:     PT Received On: 23  PT Start Time: 1146     PT Stop Time: 1206  PT Total Time (min): 20 min     Billable Minutes: Therapeutic Activity 20    Treatment Type: Treatment  PT/PTA: PT     Number of PTA visits since last PT visit: 0     2023

## 2023-07-26 NOTE — ASSESSMENT & PLAN NOTE
Stable.  -Trend Hgb with CBC    Hemoglobin   Date Value Ref Range Status   07/25/2023 11.5 (L) 12.0 - 16.0 g/dL Final   07/24/2023 10.6 (L) 12.0 - 16.0 g/dL Final

## 2023-07-26 NOTE — PLAN OF CARE
Problem: SLP  Goal: SLP Goal  Description: 1. Consume regular textures IDDSI 7 and liquids without s/s oropharyngeal dysphagia. -MET  2. Complete cognitive- linguistic eval -MET  3. Pt will complete sustained attention tasks w/ 80% accuracy given min cuing  4. Pt will recall information given mild delay w/ 80% accuracy given min cues for memory strategies    Outcome: Ongoing, Progressing     Cognitive-linguistic deficits noted; goals added. Will follow to target attention and memory deficits.

## 2023-07-26 NOTE — ASSESSMENT & PLAN NOTE
Sludge ball removed via ERCP. Likely source of bacteremia. LFTs much improved.  -appreciate ID recommendations, treatment of E coli and Enterococcus as noted above  -GI following  -IV fluids  - blood cultures:  Entercoccus faecium and E.coli  -Trend LFTs

## 2023-07-26 NOTE — ASSESSMENT & PLAN NOTE
Required intubation on hospital day 3.  Patient was successfully extubated on July 24, 2023.  Follow Pulmonary Medicine recommendations.  Still with some wheezing this morning  Adjust Lev

## 2023-07-26 NOTE — PLAN OF CARE
Patient will be discharging to LTAC at [facility].  Per En[place] at [facility], patient is accepted and she has submitted for authorization yesterday.  If auth is obtained, patient can discharge today, 7/26.  Enid stated the patient will go to Rehab once discharged from LTAC.  CM following.       07/26/23 0924   Discharge Reassessment   Assessment Type Discharge Planning Reassessment   Did the patient's condition or plan change since previous assessment? No   Discharge Plan A Long-term acute care facility (LTAC)   Discharge Plan B Rehab   Post-Acute Status   Post-Acute Authorization Placement   Post-Acute Placement Status Pending payor review/awaiting authorization (if required)   Discharge Delays (!) Post-Acute Set-up

## 2023-07-26 NOTE — PROGRESS NOTES
[facility] Medicine  Progress Note    Patient Name: Chanel Cervantes  MRN: [id number]  Patient Class: IP- Inpatient   Admission Date: 7/18/2023  Length of Stay: 7 days  Attending Physician: Kartik Farris MD  Primary Care Provider: ARIC Almaguer        Subjective:     Principal Problem:Septic shock        HPI:  Chanel Cervantes is a 67 y.o. White female   With PMH of DM2, HTN, bariatric surgery,  Remote cholecystectomy,  Frequent UTIs with kidney stones,  who presents with back pain.  Admitted for pancreatitis with elevated LFTs     Pt is currently confused after receiving ativan  (ER gave it to calm her for CT scan)  History taken from family at bedside     Onset of back pain overnight  Located b/l lumbar region  Radiating to b/l upper quadrants of abdomen  Occurring intermittently  Progressively worsening  Severe, causes SOB when occurring     Initially pt thought pain was due to a fall yesterday  (Mechanical, tripped over a rug, no head trauma)  +nausea, no vomiting  +intermittent chills  They are not sure about objective fever     Has had similar abdominal pain previously  Per family, this has been attributed to her ongoing ventral wall hernia  They don't bring her to ER for this usually  However pain today was much more severe than usual      Overview/Hospital Course:  Chanel Cervantes is a 67 year old female with a past medical history of obesity, ventral hernia, DM, HTN, anemia, CAD, NIKOLAS, and hypothyroidism who presented with septic shock secondary to a possible cholangitis with E coli bacteremia. Vancomycin was discontinued and she remained on meropenem. BP was maintained on a Levophed infusion. GI was consulted and performed ERCP which showed biliary sludge with a sludge ball dilating the main duct which was removed; a biliary sphincterotomy was also performed. Repeat blood cultures were negative. She was on IV fluids with LR and was NPO as she was encephalopathic. She also had  an ANTHONY as well. She also had acute hypoxic respiratory failure for which she was put on mask O2. She also had demand ischemia; TTE was done and troponin was trended. There was shock liver superimposed on the hepatocellular injury brought on by the acute cholangitis. Pulmonary/Critical Care was consulted given the patient's medical acuity.  Pulmonologist made decision to intubate patient based on patient's deep encephalopathy and inability to protect her airway.  Patient underwent transesophageal echocardiography which did not report any intracardiac thrombus or evidence of endocarditis.  Patient was successfully extubated on July 24, 2023. PT and OT is working with patient. Patient is transferred to medicine floor for further management and rehab.  working on LTAC placement.      Interval History:  No acute events overnight.  She is feeling somewhat unwell this morning.  Wants to get up and move about with physical therapy.  Continuing to have headaches.  Somewhat groggy with pain medicine.    Review of Systems   All other systems reviewed and are negative.  Objective:     Vital Signs (Most Recent):  Temp: 98.1 °F (36.7 °C) (07/26/23 0719)  Pulse: 74 (07/26/23 0853)  Resp: 16 (07/26/23 0853)  BP: (!) 155/70 (07/26/23 0719)  SpO2: 97 % (07/26/23 0856) Vital Signs (24h Range):  Temp:  [97.5 °F (36.4 °C)-98.1 °F (36.7 °C)] 98.1 °F (36.7 °C)  Pulse:  [57-76] 74  Resp:  [15-18] 16  SpO2:  [92 %-98 %] 97 %  BP: (127-178)/(64-77) 155/70     Weight: 108.9 kg (239 lb 15.9 oz)  Body mass index is 37.6 kg/m².    Intake/Output Summary (Last 24 hours) at 7/26/2023 0909  Last data filed at 7/26/2023 0908  Gross per 24 hour   Intake 380 ml   Output 1800 ml   Net -1420 ml         Physical Exam  Constitutional:       Appearance: Normal appearance. She is normal weight.      Comments: Very weak   HENT:      Head: Normocephalic and atraumatic.      Nose: Nose normal.      Mouth/Throat:      Mouth: Mucous membranes are  moist.   Eyes:      Conjunctiva/sclera: Conjunctivae normal.      Pupils: Pupils are equal, round, and reactive to light.   Cardiovascular:      Rate and Rhythm: Normal rate and regular rhythm.      Pulses: Normal pulses.      Heart sounds: Normal heart sounds. No murmur heard.    No friction rub. No gallop.   Pulmonary:      Effort: Pulmonary effort is normal.      Breath sounds: Wheezing present. No rhonchi or rales.      Comments: Some wheezes, no crackles or rhonchi  Abdominal:      General: Abdomen is flat. Bowel sounds are normal. There is no distension.      Palpations: Abdomen is soft.      Tenderness: There is no abdominal tenderness. There is no guarding.   Musculoskeletal:         General: No swelling. Normal range of motion.      Cervical back: Normal range of motion and neck supple.      Right lower leg: No edema.      Left lower leg: No edema.   Skin:     General: Skin is warm and dry.   Neurological:      General: No focal deficit present.      Mental Status: She is alert.   Psychiatric:         Mood and Affect: Mood normal.         Thought Content: Thought content normal.         Judgment: Judgment normal.           Significant Labs: All pertinent labs within the past 24 hours have been reviewed.    Significant Imaging: I have reviewed all pertinent imaging results/findings within the past 24 hours.      Assessment/Plan:      * Septic shock due to Entercoccus faecium and E.coli  Secondary to acute cholangitis  -continue IVAB, stop date is 08/01  -Enterococcus bacteremia now cleared  -S/p ERCP; GI following  -Continue LR IV fluids and Levophed to keep MAP > 65    Antibiotics (From admission, onward)    Start     Stop Route Frequency Ordered    07/21/23 1400  DAPTOmycin (CUBICIN) 805 mg in sodium chloride 0.9% SolP 50 mL IVPB         -- IV Every 48 hours (non-standard times) 07/20/23 0749    07/19/23 1300  meropenem 1 g in sodium chloride 0.9 % 100 mL IVPB (ready to mix system)        Note to Pharmacy:  "Ht: 5' 7" (1.702 m)  Wt: 108.9 kg (240 lb)  Estimated Creatinine Clearance: 63.1 mL/min (based on SCr of 1.1 mg/dL).  Body mass index is 37.59 kg/m².    -- IV Every 12 hours (non-standard times) 07/19/23 0740              Gallstone pancreatitis  Present on hospital day 1.  Continue vasopressor support and crystalloid.      Encephalopathy, metabolic  Metabolic in setting of septic shock.  -assess mental status when off sedation  -Treat septic shock      Shock liver  -Treat cholangitis  -Trend LFTs, much improved.  -Continue IV fluids and Levophed    Lab Results   Component Value Date     (H) 07/23/2023    AST 38 07/23/2023    GGT 24 05/17/2018    ALKPHOS 256 (H) 07/23/2023    BILITOT 1.0 07/23/2023         ANTHONY (acute kidney injury)  In setting of septic shock.  -appreciate nephrology recommendations  -Renally dose medications  -Avoid nephrotoxic agents  -Monitor UOP and electrolytes  -Trend creatinine  -nephrologist consulting    Cr has trended upward:  2.4 to 2.6 to 2.9 to 2.8.  Now down to 2.3  BUN is going up as well:  49 to 56 to 64       Demand ischemia  History of CAD s/p CABG. No acute ST changes on EKG.  -Trend troponin  -Treat septic shock  -Telemetry  -cardiology consulting -- their opinion is that the troponin is high b/c of myocardial demand ischemia    Troponin I High Sensitivity   Date Value Ref Range Status   07/20/2023 2384.3 (HH) 0.0 - 14.9 pg/mL Final     Comment:     Troponin results differ between methods. Do not use   results between Troponin methods interchangeably.    Alkaline Phospatase levels above 400 U/L may   cause false positive results.    Access hsTnI should not be used for patients taking   Asfotase anya (Strensiq).  Troponin critical result(s) called and verbal readback obtained   from Argentina Avila RN M3 by MS1 07/20/2023 04:54     07/19/2023 1148.1 (HH) 0.0 - 14.9 pg/mL Final     Comment:     Troponin results differ between methods. Do not use   results between Troponin " methods interchangeably.    Alkaline Phospatase levels above 400 U/L may   cause false positive results.    Access hsTnI should not be used for patients taking   Asfotase anya (Strensiq).  Troponin critical result(s) called and verbal readback obtained from   Karan Maldonado RN M3  by MS1 07/19/2023 22:10     07/19/2023 648.1 (HH) 0.0 - 14.9 pg/mL Final     Comment:     Troponin results differ between methods. Do not use   results between Troponin methods interchangeably.    Alkaline Phospatase levels above 400 U/L may   cause false positive results.    Access hsTnI should not be used for patients taking   Asfotase anya (Strensiq).  Results confirmed, test repeated  Troponin critical result(s) repeated. Called and verbal readback   obtained from Carmela McintoshICU, RN.  by SLT1 07/19/2023 17:37         Acute hypoxemic respiratory failure  Required intubation on hospital day 3.  Patient was successfully extubated on July 24, 2023.  Follow Pulmonary Medicine recommendations.  Still with some wheezing this morning  Adjust DuoNebs        Anemia  Stable.  -Trend Hgb with CBC    Hemoglobin   Date Value Ref Range Status   07/25/2023 11.5 (L) 12.0 - 16.0 g/dL Final   07/24/2023 10.6 (L) 12.0 - 16.0 g/dL Final           Obesity (BMI 30-39.9)  Body mass index is 37.6 kg/m². Morbid obesity complicates all aspects of disease management from diagnostic modalities to treatment.       Hypothyroidism  TSH unremarkable.  It's 0.470.  -Continue Synthroid      Acute obstructive cholangitis  Sludge ball removed via ERCP. Likely source of bacteremia. LFTs much improved.  -appreciate ID recommendations, treatment of E coli and Enterococcus as noted above  -GI following  -IV fluids  - blood cultures:  Entercoccus faecium and E.coli  -Trend LFTs      CAD (coronary artery disease)  -Hold home medications  -Telemetry      Hypokalemia  -Trend K.  Has improved.  -Replete K PRN  -Telemetry    Potassium   Date Value Ref Range Status    07/23/2023 3.8 3.5 - 5.1 mmol/L Final   07/22/2023 4.0 3.5 - 5.1 mmol/L Final         S/P bariatric surgery  Noted.      NIKOLAS on CPAP  -chronic condition.    Type 2 diabetes mellitus treated with insulin  Patient's FSGs are controlled on current medication regimen.  Last A1c reviewed-   Lab Results   Component Value Date    HGBA1C 6.9 (H) 07/18/2023     'Current correctional scale  Medium  Maintain anti-hyperglycemic dose as follows-   Antihyperglycemics (From admission, onward)    Start     Stop Route Frequency Ordered    07/19/23 1018  insulin aspart U-100 pen 1-10 Units         -- SubQ Every 6 hours PRN 07/19/23 0920        Hold Oral hypoglycemics while patient is in the hospital.    Benign essential hypertension  -Hold home medications in setting of shock        VTE Risk Mitigation (From admission, onward)         Ordered     enoxaparin injection 30 mg  Every 24 hours (non-standard times)         07/22/23 0940     IP VTE HIGH RISK PATIENT  Once         07/18/23 1642     Place sequential compression device  Until discontinued         07/18/23 1642                Discharge Planning   LUIS ALFREDO:      Code Status: Full Code   Is the patient medically ready for discharge?:     Reason for patient still in hospital (select all that apply): Treatment  Discharge Plan A: Long-term acute care facility (LTAC)   Discharge Delays: None known at this time              Kartik Farris MD  Department of Hospital Medicine   [facility]

## 2023-07-26 NOTE — PROGRESS NOTES
Progress  Note  Infectious Disease    Reason for Consult:  bacteremia E coli and E faecium     HPI: Chanel Cervantes is a 67 y.o. female obese, BMI 37.6 kg/M2, with past medical history of diabetes, hypertension, bariatric surgery, cholecystectomy, prior UTIs, and prior pancreatitis secondary to gallstones.     She presented with acute onset of back pain, radiating into bilateral upper quadrants, with associated shortness of breath.  She was admitted for septic shock, transferred to ICU for further management.    Labs on admission with severe leukopenia 1.1, left shift 87%, bands 8%, H&H 14/44.2, platelet count 213   Creatinine 1.1   Hypokalemia 3.1   Transaminitis, AST 4943/ALT 1475   Total bili 2.7  Lipase 1363 down to 102  Patient CPK 92   Lactic acid 4.6  Hemoglobin A1c 6.9  UA pyuria 6, negative for bacteria, urine culture in process     CT abdomen with broad-based ventral abdominal hernia.  No evidence of obstruction.  Status post cholecystectomy with postsurgical dilatation of the common bile duct and intrahepatic ducts.    Seen by GI, s/p ERCP this morning; with evidence of the entire main bile duct was moderately dilated, with an obstruction from suspected sludge ball. A biliary sphincterotomy was performed. The biliary tree was swept.     ERCP this morning.  Hospital course complicated persistent fever and polymicrobial bacteremia, Enterococcus faecium and E coli, no resistance genes detected on [employer], pending sensitivities.      ID consult for E coli and E faecium bacteremia.    7/20: Interim reviewed, patient remains febrile, T max 101.3, currently 100.8, on pressors, on O2 by [place] 5L. She remains encephalopathic, decreased urinary output, 20ml/h, no bowel movements recorded yet. Labs reviewed, WBC 11.4, bands 36%, H/H 11.3/35.4, plt: 175. Worsening ANTHONY 2.6, LFTs trending down. , will watch closely. Alct acid 3, troponins trending up, likely demand ischemia. Micro reviewed, repeat blood cultures  7/19 Enterococcus faecium 4/4 bottles, urine culture no growth to date, pending final. Discussed with Pulmonary, plan for IV steroids.    7/20:  Interim reviewed, patient seen examined at bedside. Pressors requirement coming down, afebrile in the last 24 hours.  She was intubated yesterday at noon for airway protection, FiO2 40%, minimal amount of secretions, sputum sent for culture.  Urinary output improving although creatinine 2.9 today.  LFTs trending down, lactic acid 1.9, normal.  Micro reviewed, repeat blood cultures 7/20 x2 sets no growth to date, pending final.  Awaiting sensitivities on E coli and Enterococcus faecium, to be available later today.    07/22/2023   Afebrile, tmax 100, no pressors since this am,   On sedation 25 mcg/kg/min- intensivist is working daily on extubation, FIo2=30%  WBC 9.9--14;   PLT worsening? (186--175--85--73)   I/a=8654/155.  Cr 1.6--2.4--2.6--2.9--2.9-- LFTs improving   07/23/2023.  Dr. Montejo.   Afebrile.  WBC is stable 14--13.  Blood cultures of 07/23 are negative to date.   Smooth was negative for vegetation.    Urine output is improving.  She had 1 BM.    CXR looks slightly worse, but I think it is volume overload, which will improve as her kidney function improves. Intensivist and nurse are working on vacation sedation. Daughter at bedside.  I discussed with her in detail.    7/24 ([place]): interim reviewed, discussed with Dr Montejo, patient seen and examined at bedside. She is coming OFF sedation, awake, nodding to questions, denies pain. Hemodynamically stable, afebrile. Urinary outpt improving, cr 2.5. Repeat blood cultures x1 no growth to date, pending final. Set from today in process.     7/25: interim reviewed, patient seen and examined at bedside, daughter present. They both report she had never had pancreatitis before. She is sitting in the chair, eating breakfast, feeling better. Labs reviewed, stable WBC 11, left shift 80.7%, thrombocytopenia 92,  creatinine down  2.3, urinary outpt is good, L IJ and a-line removed. Repeat blood cultures  &  no growth to date, pending final. Repeat set sent today in process.     :  Interim reviewed, patient seen and examined at bedside, working with PT needing a lot of assistance.  Patient reports had a rough night, high high blood pressure.  Her appetite is slowly improving, still feels a little weak.  Labs to be drawn tomorrow.  Micro reviewed, repeat blood cultures since  no growth to date, pending final.     Review of patient's allergies indicates:   Allergen Reactions    Adhesive tape-silicones Rash    Dye Hives    Cephalexin     Iodine     Penicillins Edema    Pneumococcal vaccine     Iodinated contrast media Rash     Past Medical History:   Diagnosis Date    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 2021    Anemia, chronic disease 2021    CAD (coronary artery disease)     Diabetes mellitus, type 2     Hypertension     Iron deficiency anemia following bariatric surgery 2021    MI (myocardial infarction)     Secondary thrombocytosis 2021    Sleep apnea     Sleep apnea 2019    Sleep Right      Past Surgical History:   Procedure Laterality Date    ANGIOGRAM, CORONARY, WITH LEFT HEART CATHETERIZATION      APPENDECTOMY      BARIATRIC SURGERY  2019    Dr Nunez    CARPAL TUNNEL RELEASE Bilateral 2002     SECTION      CHOLECYSTECTOMY      COLONOSCOPY      CORONARY ANGIOPLASTY WITH STENT PLACEMENT      CORONARY ARTERY BYPASS GRAFT      EPIDURAL STEROID INJECTION INTO LUMBAR SPINE      x2    ERCP N/A 2023    Procedure: ERCP (ENDOSCOPIC RETROGRADE CHOLANGIOPANCREATOGRAPHY);  Surgeon: Lavon Hernandez III, MD;  Location: [facility] ENDO;  Service: Endoscopy;  Laterality: N/A;    ESOPHAGOGASTRODUODENOSCOPY      HERNIA REPAIR  2019    HYSTERECTOMY      THYROID LOBECTOMY Right 2021    Procedure: LOBECTOMY, THYROID;  Surgeon: Mari Chance MD;  Location: [facility] OR;  Service: General;   "Laterality: Right;     Social History     Tobacco Use    Smoking status: Former     Packs/day: 1.00     Years: 15.00     Pack years: 15.00     Types: Cigarettes     Quit date:      Years since quittin.5    Smokeless tobacco: Never   Substance Use Topics    Alcohol use: No     Comment: "maybe once a year"        Family History   Problem Relation Age of Onset    Diabetes Mother     Vision loss Mother     Heart disease Father     Vision loss Father     Stroke Father        EXAM & DIAGNOSTICS REVIEWED:   Vitals:     Temp:  [97.5 °F (36.4 °C)-98.5 °F (36.9 °C)]   Temp: 98.5 °F (36.9 °C) (23 1100)  Pulse: 80 (23 1100)  Resp: 18 (23 1100)  BP: (!) 155/70 (23 07)  SpO2: 98 % (23 1100)    Intake/Output Summary (Last 24 hours) at 2023 1459  Last data filed at 2023 0908  Gross per 24 hour   Intake 280 ml   Output 1500 ml   Net -1220 ml        General:  Awake, alert, sitting in bed, comfortable on room air   Eyes:  Anicteric, PERRL  ENT:  Moist oral mucosa, good dentition   Neck:  Supple  Lungs: Better air entry b/l   Heart:  S1/S2+, regular rhythm, no murmurs  Abd:  Obese, large ventral hernia, +BS, soft, non tender   :  Waller ,  yellow urine  Musc:  Joints without effusion, swelling,  erythema, synovitis  Skin:  Warm  Wound:   Neuro:  Awake, alert, speech is clear, moving all extremities   Psych:  Calm  Lymphatic:       Extrem: Trace UE edema b/l  No LE edema b/l  VAD:  R Midline      Isolation: None      General Labs reviewed:  Recent Labs   Lab 23  0608 23  0310 23  0312 23  0947 23  0818   WBC 13.19* 13.49*  --   --  11.11   HGB 10.5* 10.6*  --   --  11.5*   HCT 31.2* 33.4* 31* 31* 36.3*   PLT 80* 99*  --   --  92*       Recent Labs   Lab 23  0608 23  0310 23  0327    146* 141   K 3.8 3.5 3.6    114* 108   CO2 23 24 24   BUN 78* 82* 75*   CREATININE 2.8* 2.5* 2.3*   CALCIUM 7.6* 7.8* 7.9*   PROT 4.6* 4.6* " 5.4*   BILITOT 1.0 1.0 0.8   ALKPHOS 256* 217* 213*   * 127* 101*   AST 38 24 34     Recent Labs   Lab 07/24/23  0310   CRP 6.33*       Estimated Creatinine Clearance: 30.2 mL/min (A) (based on SCr of 2.3 mg/dL (H)).     Micro:  Microbiology Results (last 7 days)       Procedure Component Value Units Date/Time    Blood culture [[id number]] Collected: 07/23/23 0825    Order Status: Completed Specimen: Blood Updated: 07/26/23 1032     Blood Culture, Routine No Growth to date      No Growth to date      No Growth to date      No Growth to date    Blood culture [[id number]] Collected: 07/25/23 0327    Order Status: Completed Specimen: Blood Updated: 07/26/23 0432     Blood Culture, Routine No Growth to date      No Growth to date    Blood culture [[id number]] Collected: 07/24/23 0348    Order Status: Completed Specimen: Blood Updated: 07/26/23 0432     Blood Culture, Routine No Growth to date      No Growth to date      No Growth to date    Narrative:      Collection has been rescheduled by [email or web address] at 07/24/2023 03:28 Reason:   Nurse Draw. Blood was already drawn. I was not able to get the blood   cultures.  Collection has been rescheduled by [email or web address] at 07/24/2023 03:28 Reason:   Nurse Draw. Blood was already drawn. I was not able to get the blood   cultures.    Blood culture [[id number]] Collected: 07/20/23 0940    Order Status: Completed Specimen: Blood from Peripheral, Antecubital, Right Updated: 07/25/23 1232     Blood Culture, Routine No growth after 5 days.    Narrative:      Collection has been rescheduled by [email or web address] at 07/20/2023 06:04 Reason:   Unable to collect  Collection has been rescheduled by [email or web address] at 07/20/2023 08:01 Reason:   Contacting zainab for cultures per RN Alfred  Collection has been rescheduled by [email or web address] at 07/20/2023 08:41 Reason:   Nurse Draw  Collection has been rescheduled by [email or web address] at 07/20/2023 06:04 Reason:   Unable to collect  Collection has been rescheduled by [email or web address] at 07/20/2023 08:01 Reason:    Contacting zainab for cultures per RN Alfred  Collection has been rescheduled by [email or web address] at 07/20/2023 08:41 Reason:   Nurse Draw    Culture, Respiratory with Gram Stain [[id number]] Collected: 07/20/23 1003    Order Status: Completed Specimen: Respiratory from Endotracheal Aspirate Updated: 07/22/23 0736     Respiratory Culture No growth      No normal respiratory shay     Gram Stain (Respiratory) <10 epithelial cells per low power field.     Gram Stain (Respiratory) Few WBC's     Gram Stain (Respiratory) No organisms seen    Urine Culture High Risk [[id number]] Collected: 07/19/23 1006    Order Status: Completed Specimen: Urine, Clean Catch Updated: 07/22/23 0710     Urine Culture, Routine No growth    Narrative:      Indicated criteria for high risk culture:->Other  Other (specify):->e coli bacteremia    Blood culture [[id number]]  (Abnormal) Collected: 07/20/23 0940    Order Status: Completed Specimen: Blood from Line, Arterial, Right Updated: 07/22/23 0644     Blood Culture, Routine Gram stain aer bottle: Gram positive cocci      Positive results previously called      ENTEROCOCCUS FAECIUM  For susceptibility see order #[id number]      Narrative:      Collection has been rescheduled by [email or web address] at 07/20/2023 06:04 Reason:   Unable to collect  Collection has been rescheduled by [email or web address] at 07/20/2023 08:01 Reason:   Contacting zainab for cultures per RN Alfred  Collection has been rescheduled by [email or web address] at 07/20/2023 08:41 Reason:   Nurse Draw  Collection has been rescheduled by [email or web address] at 07/20/2023 06:04 Reason:   Unable to collect  Collection has been rescheduled by [email or web address] at 07/20/2023 08:01 Reason:   Contacting zainab for cultures per RN Alfred  Collection has been rescheduled by [facility] at 07/20/2023 08:41 Reason:   Nurse Draw    Blood culture [[id number]]  (Abnormal) Collected: 07/19/23 1230    Order Status: Completed Specimen: Blood Updated: 07/22/23 0643     Blood Culture, Routine Gram stain aer bottle: Gram positive cocci       Positive results previously called      ENTEROCOCCUS FAECIUM  For susceptibility see order #[id number]      Blood culture [[id number]]  (Abnormal) Collected: 07/19/23 0002    Order Status: Completed Specimen: Blood Updated: 07/22/23 0643     Blood Culture, Routine Gram stain aer bottle: Gram positive cocci      Gram stain ez bottle: Gram positive cocci      Positive results previously called      ENTEROCOCCUS FAECIUM  For susceptibility see order #[id number]      Blood culture [[id number]]  (Abnormal) Collected: 07/19/23 0030    Order Status: Completed Specimen: Blood from Peripheral, Left Hand Updated: 07/22/23 0643     Blood Culture, Routine Gram stain aer bottle: Gram positive cocci      Gram stain ez bottle: Gram positive cocci      Results called to and read back by:Carmela Mcintosh RN-3ICU;      07/19/2023  16:23 CJD      ENTEROCOCCUS FAECIUM  For susceptibility see order #[id number]      Narrative:      Collection has been rescheduled by [email or web address] at 07/19/2023 00:20 Reason:   Nurse Draw  Collection has been rescheduled by 9 at 07/19/2023 00:20 Reason:   Nurse Draw    Blood culture [[id number]]  (Abnormal)  (Susceptibility) Collected: 07/18/23 1648    Order Status: Completed Specimen: Blood Updated: 07/22/23 0642     Blood Culture, Routine Gram stain aer bottle: Gram negative rods      Gram stain aer bottle: Gram positive cocci      Results called to and read back by: Lesvia Mcdowell RN MICU3.      07/19/2023  05:06 TGC      ESCHERICHIA COLI      ENTEROCOCCUS FAECIUM    Blood culture [[id number]]  (Abnormal) Collected: 07/18/23 1738    Order Status: Completed Specimen: Blood Updated: 07/21/23 0853     Blood Culture, Routine Gram stain aer bottle: Gram negative rods      Results called to and read back by:Lesvia Mcdowell RN MICU3.      07/19/2023  05:23 TGC      ESCHERICHIA COLI  For susceptibility see order #[id number]            Specimen: Blood Updated: 07/22/23 0642      Blood Culture, Routine Gram stain aer bottle:  Gram negative rods     Gram stain aer bottle: Gram positive cocci     Results called to and read back by: Lesvia Mcdowell RN MICU3.     07/19/2023  05:06 TGC     ESCHERICHIA COLI Abnormal      ENTEROCOCCUS FAECIUM Abnormal    Susceptibility     Escherichia coli Enterococcus faecium     CULTURE, BLOOD CULTURE, BLOOD     Amp/Sulbactam <=4/2 mcg/mL Sensitive       Ampicillin <=8 mcg/mL Sensitive <=2 mcg/mL Sensitive     Cefazolin <=2 mcg/mL Sensitive       Cefepime <=2 mcg/mL Sensitive       Ceftriaxone <=1 mcg/mL Sensitive       Ciprofloxacin <=1 mcg/mL Sensitive       Ertapenem <=0.5 mcg/mL Sensitive       Gentamicin <=4 mcg/mL Sensitive       Gentamicin Synergy Screen   <=500 mcg/mL Sensitive     Levofloxacin <=2 mcg/mL Sensitive       Meropenem <=1 mcg/mL Sensitive       Piperacillin/Tazo <=16 mcg/mL Sensitive       Tetracycline <=4 mcg/mL Sensitive <=4 mcg/mL Sensitive     Tobramycin <=4 mcg/mL Sensitive       Trimeth/Sulfa <=2/38 mcg/mL Sensitive       Vancomycin   2 mcg/mL Sensitive                      Imaging Reviewed:  CXR07/22Hypoinflation with mild prominence of the pulmonary vasculature and faint groundglass opacity in the lung bases suggestive of pulmonary edema   Abdominal Us  CTA chest  CT abdomen/pelvis:  1.  Status post cholecystectomy with postsurgical dilatation of the common bile duct and intrahepatic ducts. Should be correlated with bilirubin levels.   2.  Broad-based ventral abdominal wall hernia measures approximately 15 x 13 cm with herniated loops of large and small bowel. No evidence of obstruction.   3.  Exophytic hypoattenuating structure in the mid left kidney is felt to reflect a hemorrhagic cyst.      Cardiology:   ERICA:   The left ventricle is normal in size with normal systolic function.  Normal left ventricular diastolic function.  The estimated ejection fraction is 60%.  Atrial fibrillation not observed.  Normal right ventricular size with normal right ventricular systolic  function.  Normal appearing left atrial appendage. No thrombus is present in the appendage. Normal appendage velocities.  No interatrial septal defect present.  There is no pulmonary hypertension.  No abnormal intracavitary echo; valvular structures are normal  Left atrial appendage was clean       ECHO  The left ventricle is normal in size with mild concentric hypertrophy and mildly decreased systolic function.  Mild to moderate tricuspid regurgitation.  The estimated ejection fraction is 45%.  Grade I left ventricular diastolic dysfunction.  There is abnormal septal wall motion consistent with left bundle branch block.  Mild right ventricular enlargement with normal right ventricular systolic function.  Moderate left atrial enlargement.  Normal central venous pressure (3 mmHg).  The estimated PA systolic pressure is 40 mmHg.  There is mild pulmonary hypertension.       IMPRESSION & PLAN     Septic shock resolved likely from polymicrobial bacteremia Enterococcus faecium (7/18-19--20) and E coli (7/18) secondary to cholangitis s/p ERCP, sphincterotomy 7/19  Procal 54 -->5.7, trending down   CRP 24  CPK 92-->245-->20  Lipase 1363-->102, trending down   ERICA negative for endocarditis     2. Shock liver, resolved     3. Thrombocytopenia     4. ANTHONY, cr 1.1--2.4--2.6-- 2.9--2.8--2.8->2.5-->2.3    5.  PMHx: diabetes, hypertension, bariatric surgery, cholecystectomy, prior UTIs    6. Obesity, BMI 37.6 Kg/m2    Recommendations:  Labs ordered for tomorrow   Consider removing Waller catheter   Continue Daptomycon 800mg IV q48, dose as per crcl   Continue Ceftriaxone 2g IV daily  Follow repeat blood cultures   Anticipating midline to continue IV abx for total 10 days from negative blood cultures, end date 8/1/23  PT/OT as tolerated  Aspiration precautions     D/w patient, Dr Farris    Medical Decision Making during this encounter was  [_] Low Complexity  [_] Moderate Complexity  [xx] High Complexity

## 2023-07-26 NOTE — PT/OT/SLP PROGRESS
Occupational Therapy   Treatment    Name: Chanel Cervantes  MRN: [id number]  Admitting Diagnosis:  Septic shock  7 Days Post-Op    Recommendations:     Discharge Recommendations: rehabilitation facility  Discharge Equipment Recommendations:  to be determined by next level of care  Barriers to discharge:  Decreased caregiver support    Assessment:     Chanel Cervantes is a 67 y.o. female with a medical diagnosis of Septic shock.  Performance deficits affecting function are weakness, impaired endurance, impaired self care skills, impaired functional mobility, gait instability, impaired balance, edema.     Pt presented supine with HOB elevated; she reported fatigue from PT session earlier this PM, but agreed to bed level activity; she reported weakness in her upper extremities affecting her ability to complete basic ADL tasks; OT provided various tools to modify ADL tasks and for UE strengthening.      Rehab Prognosis:  Good; patient would benefit from acute skilled OT services to address these deficits and reach maximum level of function.       Plan:     Patient to be seen 6 x/week to address the above listed problems via self-care/home management, therapeutic activities, therapeutic exercises  Plan of Care Expires: 08/25/23  Plan of Care Reviewed with: patient    Subjective     Chief Complaint: fatigue/weakness   Patient/Family Comments/goals: increased ADL independence   Pain/Comfort:  Pain Rating 1: 0/10  Pain Rating Post-Intervention 1: 0/10    Objective:     Communicated with: nurse prior to session.  Patient found HOB elevated with telemetry upon OT entry to room.    General Precautions: Standard, fall    Orthopedic Precautions:N/A  Braces: N/A  Respiratory Status: Room air    Treatment & Education:  Pt provided with built-up handles to increase independence with self feeding; pt educated on their purpose/how to use them; she verbalized understanding  Pt provided with  strengthening ball and instructed in use;  she demonstrated understanding  Pt requested assistance with set-up of her snack (/pinch strength were not sufficient for opening packages); OT provided assistance    Patient left HOB elevated with all lines intact, call button in reach, and bed alarm on    GOALS:   Multidisciplinary Problems       Occupational Therapy Goals          Problem: Occupational Therapy    Goal Priority Disciplines Outcome Interventions   Occupational Therapy Goal     OT, PT/OT     Description: Goals to be met by: 8/25/2023     Patient will increase functional independence with ADLs by performing:    Feeding with [place].  UE Dressing with Modified [place].  LE Dressing with Modified [place].  Grooming while standing at sink with Modified [place].  Toileting from toilet with Modified [place] for hygiene and clothing management.   Toilet transfer to toilet with Modified [place].                         Time Tracking:     OT Date of Treatment: 07/26/23  OT Start Time: 1345  OT Stop Time: 1400  OT Total Time (min): 15 min    Billable Minutes:Therapeutic Activity 15    OT/RUTHY: OT          7/26/2023

## 2023-07-26 NOTE — PLAN OF CARE
Problem: Adult Inpatient Plan of Care  Goal: Plan of Care Review  Outcome: Ongoing, Progressing  Goal: Patient-Specific Goal (Individualized)  Outcome: Ongoing, Progressing  Goal: Absence of Hospital-Acquired Illness or Injury  Outcome: Ongoing, Progressing  Goal: Optimal Comfort and Wellbeing  Outcome: Ongoing, Progressing  Goal: Readiness for Transition of Care  Outcome: Ongoing, Progressing     Problem: Diabetes Comorbidity  Goal: Blood Glucose Level Within Targeted Range  Outcome: Ongoing, Progressing     Problem: Skin Injury Risk Increased  Goal: Skin Health and Integrity  Outcome: Ongoing, Progressing     Problem: Infection  Goal: Absence of Infection Signs and Symptoms  Outcome: Ongoing, Progressing     Problem: Adjustment to Illness (Sepsis/Septic Shock)  Goal: Optimal Coping  Outcome: Ongoing, Progressing     Problem: Bleeding (Sepsis/Septic Shock)  Goal: Absence of Bleeding  Outcome: Ongoing, Progressing     Problem: Glycemic Control Impaired (Sepsis/Septic Shock)  Goal: Blood Glucose Level Within Desired Range  Outcome: Ongoing, Progressing     Problem: Infection Progression (Sepsis/Septic Shock)  Goal: Absence of Infection Signs and Symptoms  Outcome: Ongoing, Progressing     Problem: Nutrition Impaired (Sepsis/Septic Shock)  Goal: Optimal Nutrition Intake  Outcome: Ongoing, Progressing     Problem: Fluid and Electrolyte Imbalance (Acute Kidney Injury/Impairment)  Goal: Fluid and Electrolyte Balance  Outcome: Ongoing, Progressing     Problem: Oral Intake Inadequate (Acute Kidney Injury/Impairment)  Goal: Optimal Nutrition Intake  Outcome: Ongoing, Progressing     Problem: Renal Function Impairment (Acute Kidney Injury/Impairment)  Goal: Effective Renal Function  Outcome: Ongoing, Progressing     Problem: Fall Injury Risk  Goal: Absence of Fall and Fall-Related Injury  Outcome: Ongoing, Progressing     Problem: Restraint, Nonbehavioral (Nonviolent)  Goal: Absence of Harm or Injury  Outcome: Ongoing,  Progressing     Problem: Communication Impairment (Mechanical Ventilation, Invasive)  Goal: Effective Communication  Outcome: Ongoing, Progressing     Problem: Device-Related Complication Risk (Mechanical Ventilation, Invasive)  Goal: Optimal Device Function  Outcome: Ongoing, Progressing     Problem: Inability to Wean (Mechanical Ventilation, Invasive)  Goal: Mechanical Ventilation Liberation  Outcome: Ongoing, Progressing     Problem: Nutrition Impairment (Mechanical Ventilation, Invasive)  Goal: Optimal Nutrition Delivery  Outcome: Ongoing, Progressing     Problem: Skin and Tissue Injury (Mechanical Ventilation, Invasive)  Goal: Absence of Device-Related Skin and Tissue Injury  Outcome: Ongoing, Progressing     Problem: Ventilator-Induced Lung Injury (Mechanical Ventilation, Invasive)  Goal: Absence of Ventilator-Induced Lung Injury  Outcome: Ongoing, Progressing     Problem: Communication Impairment (Artificial Airway)  Goal: Effective Communication  Outcome: Ongoing, Progressing     Problem: Device-Related Complication Risk (Artificial Airway)  Goal: Optimal Device Function  Outcome: Ongoing, Progressing     Problem: Skin and Tissue Injury (Artificial Airway)  Goal: Absence of Device-Related Skin or Tissue Injury  Outcome: Ongoing, Progressing     Problem: Noninvasive Ventilation Acute  Goal: Effective Unassisted Ventilation and Oxygenation  Outcome: Ongoing, Progressing     Problem: Aspiration (Enteral Nutrition)  Goal: Absence of Aspiration Signs and Symptoms  Outcome: Ongoing, Progressing     Problem: Device-Related Complication Risk (Enteral Nutrition)  Goal: Safe, Effective Therapy Delivery  Outcome: Ongoing, Progressing     Problem: Feeding Intolerance (Enteral Nutrition)  Goal: Feeding Tolerance  Outcome: Ongoing, Progressing     Problem: Impaired Wound Healing  Goal: Optimal Wound Healing  Outcome: Ongoing, Progressing

## 2023-07-26 NOTE — PT/OT/SLP EVAL
Speech Language Pathology Evaluation  Cognitive Communication    Patient Name:  Chanel Cervantes   MRN:  [id number]  Admitting Diagnosis: Septic shock    Recommendations:     Recommendations:                General Recommendations:  Cognitive-linguistic therapy  Diet recommendations:  Regular Diet - IDDSI Level 7, Thin liquids - IDDSI Level 0   Aspiration Precautions: Strict aspiration precautions   General Precautions: Standard, fall  Communication strategies:  none    Assessment:     Chanel Cervantes is a 67 y.o. female with an SLP diagnosis of mild-moderate Cognitive-Linguistic Impairment.  She presents with impaired attention, memory, and planning/organization as determined by results from MoCA assessment. Pt tolerating regular diet and thin liquids. Rec targeting new cognitive-linguistic goals, as previous goals have been met. Rec outpatient ST upon d'c.    History:     Past Medical History:   Diagnosis Date    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 2021    Anemia, chronic disease 2021    CAD (coronary artery disease)     Diabetes mellitus, type 2     Hypertension     Iron deficiency anemia following bariatric surgery 2021    MI (myocardial infarction)     Secondary thrombocytosis 2021    Sleep apnea     Sleep apnea 2019    Sleep Right        Past Surgical History:   Procedure Laterality Date    ANGIOGRAM, CORONARY, WITH LEFT HEART CATHETERIZATION      APPENDECTOMY      BARIATRIC SURGERY  2019    Dr Nunez    CARPAL TUNNEL RELEASE Bilateral 2002     SECTION      CHOLECYSTECTOMY      COLONOSCOPY      CORONARY ANGIOPLASTY WITH STENT PLACEMENT      CORONARY ARTERY BYPASS GRAFT      EPIDURAL STEROID INJECTION INTO LUMBAR SPINE      x2    ERCP N/A 2023    Procedure: ERCP (ENDOSCOPIC RETROGRADE CHOLANGIOPANCREATOGRAPHY);  Surgeon: Lavon Hernandez III, MD;  Location: [facility];  Service: Endoscopy;  Laterality: N/A;    ESOPHAGOGASTRODUODENOSCOPY      HERNIA REPAIR  2019     HYSTERECTOMY      THYROID LOBECTOMY Right 7/20/2021    Procedure: LOBECTOMY, THYROID;  Surgeon: Mari Chance MD;  [place] [facility] [place];  Service: General;  Laterality: Right;       Social History: Patient lives alone; however, she reports she will be d'c to her daughter's home.    Prior Intubation HX:  7/22-7/24    Modified Barium Swallow: none found in epic or reported by pt    Imaging Results              US Abdomen Limited (Final result)  Result time 07/18/23 17:57:00      Final result by Albina Estrada DO (07/18/23 17:57:00)                   Narrative:    Ultrasound of the right upper quadrant of the abdomen:    Technique: Multiple grayscale color Doppler images of the right upper quadrant of the abdomen were obtained.    Comparison: 7/18/2023    History: 67 years  old Female with RUQ US, elevated LFTs.    Findings: The visualized portions of the hepatic parenchyma appears mildly echogenic. The hepatic silhouette is enlarged and measures at least 21.5 cm in length.    There is normal direction of flow seen in the main portal vein.    The gallbladder is surgically absent.    The common duct measures 4.0 mm.    The right kidney measures up to 9.9 cm in length. The right kidney demonstrates normal cortical echogenicity with no evidence to suggest hydronephrosis.    The visualized portions of the IVC, abdominal aorta, and pancreatic head appear normal.    No free intraperitoneal fluid is seen.    Impression: The gallbladder surgically absent. The common duct is within normal limits.    Hepatic steatosis and hepatomegaly    Electronically signed by:  Albina Estrada DO  7/18/2023 5:57 PM CDT Workstation: [id number]                                     CT Abdomen Pelvis With Contrast (Final result)  Result time 07/18/23 14:45:54      Final result by Benjy Woodson MD (07/18/23 14:45:54)                   Narrative:    CMS MANDATED QUALITY DATA - CT RADIATION - 436    All CT scans at this facility utilize dose  modulation, iterative reconstruction, and/or weight based dosing when appropriate to reduce radiation dose to as low as reasonably achievable.        Reason: Nausea/vomiting; Abdominal pain, acute, nonlocalized    TECHNIQUE: CT abdomen and pelvis with 100 mL Omnipaque 350.    COMPARISON: CT abdomen pelvis July 5, 2017.    FINDINGS:    There is subsegmental atelectasis of the lung bases. Heart size is normal.    The liver is normal size. No hepatic lesions identified. Gallbladder is been removed and there is postsurgical dilatation of the common bile duct and intrahepatic bile ducts. Pancreas, spleen and adrenal glands are unremarkable.    The kidneys are normal size. There is no hydronephrosis or nephrolithiasis. Exophytic hyperattenuating structure of the mid left kidney measures 1.7 cm. The ureters are normal caliber without obstructions. Bladder is fluid-filled with no focal wall abnormalities. The uterus and adnexal structures are identified. As noted.    There is diverticulosis of the colon. The appendix is been removed. Large and small bowel are normal caliber. There is no bowel wall thickening or inflammatory changes.    There is a broad fat filled ventral abdominal wall hernia measuring approximately 15 x 13 cm. Herniated loops of large and small bowel are noted with no evidence of obstruction or strangulation.    There is atherosclerosis of the abdominal aorta. There is no intra-abdominal lymphadenopathy. No mesenteric fat stranding or free fluid. There are degenerative changes of the spine. No acute osseous abnormality.    IMPRESSION:    1.  Status post cholecystectomy with postsurgical dilatation of the common bile duct and intrahepatic ducts. Should be correlated with bilirubin levels.  2.  Broad-based ventral abdominal wall hernia measures approximately 15 x 13 cm with herniated loops of large and small bowel. No evidence of obstruction.  3.  Exophytic hypoattenuating structure in the mid left kidney is  felt to reflect a hemorrhagic cyst.    Electronically signed by:  Benjy Woodson DO  7/18/2023 2:45 PM CDT Workstation: [id number]                                     CTA Chest Non-Coronary (PE Studies) (Final result)  Result time 07/18/23 14:56:14      Final result by Roger Cardona MD (07/18/23 14:56:14)                   Narrative:    CMS MANDATED QUALITY DATA-CT RADIATION DOSE-436  All CT scans at this facility use dose modulation, iterative reconstruction, and or weight-based dosing when appropriate to reduce radiation dose to as low as reasonably achievable.    HISTORY: Pulmonary embolism (PE) suspected, high prob  dyspnea, back pain.    FINDINGS: Thin axial imaging through the chest was performed with 100 mL Omnipaque 350 IV contrast, with sagittal and coronal reformatted images and MIP reconstructions performed, and images stored in the patient's permanent electronic medical record.    Comparison to multiple prior exams. There are no pulmonary arterial filling defects to suggest pulmonary thromboembolism. The central pulmonary arteries are normal in caliber.    Diffuse calcified plaque involves normal caliber thoracic aorta, with no aortic dissection or evidence of aortic intramural hematoma. The heart is enlarged, with extensive coronary arterial calcifications, and no pericardial effusion. No enlarged mediastinal or hilar lymph nodes. There is a moderate-sized sliding-type hiatal hernia.    The lungs are normally expanded, with scattered reticulonodular and groundglass densities in both lower lobes, left greater than right, nonspecific. There is a 7 mm left upper lobe noncalcified pulmonary nodule image 112 series 4, nonspecific. No pleural effusion, evidence of pulmonary edema, or pneumothorax.    There are no acute thoracic spine fractures or other acute fractures, with intervertebral disc space narrowing in the spine with osteophytes.    IMPRESSION:  1. Negative for pulmonary thromboembolism.  2.  "Scattered lower lung interstitial opacities left greater than right, potentially subsegmental atelectasis and or small airways inflammation.  3. A 7 mm left upper lobe noncalcified pulmonary nodule, nonspecific. Pulmonary neoplasm is not excluded. A follow-up noncontrast chest CT in 6 months is recommended, per Fleischner Society Recommendations. Reference:  Radiology. 2017 Jul; 284(1):228-243.  4. Cardiomegaly, with aortic and coronary arterial calcifications.  5. Hiatal hernia.    Electronically signed by:  Roger Cardona MD  7/18/2023 2:56 PM CDT Workstation: [id number]                                      Prior diet: Regular, thin.    Occupation/hobbies/homemaking: keeps her daughter's animals, cooking, cleaning, computer activity, crossword puzzles, and trying new recipes.      Subjective     Pt awake sitting upright in bed. Agreeable to ST.  Patient goals: "I want to get my brain to be less foggy"     Pain/Comfort:       Respiratory Status: Room air    Objective:     Cognitive Status:      MoCA (Version 8.1) administered with pt achieving a score of 16/30 suggesting moderate cognitive-linguistic impairment. Pt earned 0 of 5 points on visuospatial/executive functions, 3 of 3 points on naming, 3 of 6 points for attention, 1 of 3 points for language, 1 of 2 points for abstraction, 2 of 5 points for delayed recall and 5 of 6 points for orientation. 1 point added to final score for education level. It should be noted that pt has hx of glaucoma and did not have her glasses present for evaluation, impacting performance on visuospatial assessment. Deficits noted in attention, language, abstraction, and memory/delayed recall.        Receptive Language:   Comprehension:      WFL    Pragmatics:    Appropriate    Expressive Language:  Verbal:    WFL      Motor Speech:  WFL    Voice:   Hoarse quality    Visual-Spatial:  Impaired due to glaucoma    Reading:   Not assessed      Written Expression:   Dominant hand: Right- " attempted to write name; however, illegible; pt reports her hand witting is difficulty to read at baseline and she had difficulty seeing what she was witting due to glaucoma and no glasses being present    Swallowing: Pt observed during bites of dry cracker and straw sips of thin liquid. No overt s/s aspiration noted across trials. Timely mastication, adequate bolus manipulation, and adequate oral clearance across trials. Reports no overt s/s aspiration or oral dysphagia when consuming meals/liquids.    Treatment: Pt educated re purpose of evaluation, role of SLP, results of evaluation, and recs. Pt expressed understanding of recs.       Goals:   Multidisciplinary Problems       SLP Goals          Problem: SLP    Goal Priority Disciplines Outcome   SLP Goal    High SLP Ongoing, Progressing   Description: 1. Consume regular textures IDDSI 7 and liquids without s/s oropharyngeal dysphagia. -MET  2. Complete cognitive- linguistic eval -MET  3. Pt will complete sustained attention tasks w/ 80% accuracy given min cuing  4. Pt will recall information given mild delay w/ 80% accuracy given min cues for memory strategies                         Plan:   Patient to be seen:  3 x/week, 4 x/week   Plan of Care expires:  07/31/23  Plan of Care reviewed with:  patient   SLP Follow-Up:  Yes       Discharge recommendations:  Discharge Facility/Level of Care Needs: outpatient speech therapy   Barriers to Discharge:  None    Time Tracking:     SLP Treatment Date:   07/26/23  Speech Start Time:  1111  Speech Stop Time:  1139     Speech Total Time (min):  28 min    Billable Minutes: Eval Cognitive-linguistic: 20 min  and Treatment Swallowing Dysfunction 8 min    07/26/2023

## 2023-07-26 NOTE — ASSESSMENT & PLAN NOTE
In setting of septic shock.  -appreciate nephrology recommendations  -Renally dose medications  -Avoid nephrotoxic agents  -Monitor UOP and electrolytes  -Trend creatinine  -nephrologist consulting    Cr has trended upward:  2.4 to 2.6 to 2.9 to 2.8.  Now down to 2.3  BUN is going up as well:  49 to 56 to 64

## 2023-07-26 NOTE — PROGRESS NOTES
INPATIENT NEPHROLOGY Progress Note  [facility]    Patient Name: Chanel Cervantes  Date: 07/26/2023    Reason for consultation:   ANTHONY    Chief Complaint:   Chief Complaint   Patient presents with    Fall     Denies hitting head or LOC, hit right shoulder    Shortness of Breath    Back Pain     History of Present Illness:  Chanel Cervantes is a 67 y.o. female obese, BMI 37.6 kg/M2, with past medical history of diabetes, hypertension, bariatric surgery, cholecystectomy, prior UTIs, and prior pancreatitis secondary to gallstones. She presented with acute onset of back pain, radiating into bilateral upper quadrants, with associated shortness of breath.  She was admitted for septic shock, transferred to ICU for further management. She is s/p ERCP with evidence of the entire main bile duct was moderately dilated, with an obstruction from suspected sludge ball. A biliary sphincterotomy was performed. The biliary tree was swept. Hospital course complicated AMS requiring MV and and polymicrobial bacteremia, Enterococcus faecium and E coli. We are consulted for ANTHONY.    Echo:  The left ventricle is normal in size with mild concentric hypertrophy and mildly decreased systolic function.  Mild to moderate tricuspid regurgitation.  The estimated ejection fraction is 45%.  Grade I left ventricular diastolic dysfunction.  There is abnormal septal wall motion consistent with left bundle branch block.  Mild right ventricular enlargement with normal right ventricular systolic function.  Moderate left atrial enlargement.  Normal central venous pressure (3 mmHg).  The estimated PA systolic pressure is 40 mmHg.  There is mild pulmonary hypertension.       Interval History:  7/21- BP holding on levophed, FIO2 40%, UOP 1.7L  7/22- off pressors, VSS, FIO2 30%, UOP 1.3L, tolerating tube feeds, rise in WBC count is noted  7/23- kieran (got atropine this AM for HR 39), SBP , FIO2 30%, UOP 1.4L, on/off levophed overnight,  ERICA today  7/24 VSS, spontaneous ventilation, afebrile, improving UO.scr remains elevated dut to ATN.  7/25 VSS. Extubated, lethargic but arousable. sCr better but remains abnormal. UO is oK.  7/26 VSS, no new complains.    Plan of Care:    Biliary sludge with polymicrobial bacteremia s/p ERCP with sphincterotomy 7/19, septic shock resolved  ANTHONY due to sepsis/ischemic ATN  Shock liver, improving  Demand ischemia (underlying systolic CHF/pulm HTN)  Anemia/Thrombocytopenia    Plan:    - sCr remains abnormal consistent with ATN. Non-oliguric  - dose meds for CrCl < 30  - no nsaids or IV contrast  - LFTs improving  - heme parameters stable    Thank you for allowing us to participate in this patient's care. We will continue to follow.    Vital Signs:  Temp Readings from Last 3 Encounters:   07/26/23 98.1 °F (36.7 °C) (Oral)   06/02/23 98 °F (36.7 °C) (Oral)   04/13/23 97.4 °F (36.3 °C)       Pulse Readings from Last 3 Encounters:   07/26/23 74   06/02/23 68   04/13/23 71       BP Readings from Last 3 Encounters:   07/26/23 (!) 155/70   06/02/23 (!) 120/59   04/13/23 (!) 146/70       Weight:  Wt Readings from Last 3 Encounters:   07/19/23 108.9 kg (239 lb 15.9 oz)   07/19/23 108.9 kg (240 lb 1.3 oz)   06/02/23 109.1 kg (240 lb 8 oz)       INS/OUTS:  I/O last 3 completed shifts:  In: 1040 [P.O.:940; IV Piggyback:100]  Out: 2800 [Urine:2800]  No intake/output data recorded.      Medications:  Scheduled Meds:   amLODIPine  5 mg Oral Daily    cefTRIAXone (ROCEPHIN) IVPB  2 g Intravenous Q24H    chlorhexidine  15 mL Mouth/Throat BID    DAPTOmycin (CUBICIN) IV (PEDS and ADULTS)  10 mg/kg (Adjusted) Intravenous Q48H    enoxparin  30 mg Subcutaneous Q24H    levothyroxine  75 mcg Oral Before breakfast     Continuous Infusions:      PRN Meds:.acetaminophen, albuterol-ipratropium, butalbital-acetaminophen-caffeine -40 mg, calcium gluconate IVPB, calcium gluconate IVPB, calcium gluconate IVPB, carboxymethylcellulose, dextrose  50%, dextrose 50%, glucagon (human recombinant), glucose, glucose, hydrALAZINE, insulin aspart U-100, lorazepam, magnesium oxide, magnesium oxide, magnesium sulfate IVPB, magnesium sulfate IVPB, naloxone, ondansetron, polyethylene glycol, potassium bicarbonate, potassium bicarbonate, potassium bicarbonate, potassium chloride in water **AND** potassium chloride in water **AND** potassium chloride in water, senna-docusate 8.6-50 mg, simethicone, sodium chloride 0.9%, sodium phosphate IVPB, sodium phosphate IVPB, sodium phosphate IVPB, traMADoL  No current facility-administered medications on file prior to encounter.     Current Outpatient Medications on File Prior to Encounter   Medication Sig Dispense Refill    amLODIPine (NORVASC) 2.5 MG tablet Take 1 tablet (2.5 mg total) by mouth once daily. 90 tablet 1    azelastine (ASTELIN) 137 mcg (0.1 %) nasal spray 1 spray (137 mcg total) by Nasal route 2 (two) times daily. 30 mL 5    calcium carbonate-vitamin D3 600 mg-62.5 mcg (2,500 unit) Cap calcium carbonate 600 mg-vitamin D3 62.5 mcg (2,500 unit) capsule   Take by oral route.      DULoxetine (CYMBALTA) 60 MG capsule Take 1 capsule (60 mg total) by mouth once daily. 90 capsule 1    empaglifloz-linaglip-metformin (TRIJARDY XR) 25-5-1,000 mg TBph Take 1 tablet by mouth once daily. 90 tablet 1    guanFACINE (TENEX) 2 MG tablet Take 1 tablet (2 mg total) by mouth nightly. 90 tablet 3    insulin degludec (TRESIBA FLEXTOUCH U-200) 200 unit/mL (3 mL) insulin pen Inject 76 Units into the skin once daily. 12 pen 3    iron-vitamin C 100-250 mg, ICAR-C, 100-250 mg Tab Take 1 tablet by mouth once daily. 30 each 8    levothyroxine (SYNTHROID) 75 MCG tablet Take 75 mcg by mouth before breakfast.      metFORMIN (GLUCOPHAGE) 1000 MG tablet Take 1,000 mg by mouth 2 (two) times daily with meals.      metOLazone (ZAROXOLYN) 5 MG tablet Take 1 tablet (5 mg total) by mouth once daily. 90 tablet 0    metoprolol succinate (TOPROL-XL) 25 MG 24  "hr tablet Take 1 tablet (25 mg total) by mouth once daily. 90 tablet 1    multivitamin capsule Take 1 capsule by mouth once daily.      olmesartan (BENICAR) 40 MG tablet Take 1 tablet (40 mg total) by mouth once daily. 90 tablet 1    potassium chloride (KLOR-CON) 10 MEQ TbSR Take 1 tablet (10 mEq total) by mouth once daily. 90 tablet 1    pregabalin (LYRICA) 50 MG capsule Take 50 mg by mouth every evening.      rosuvastatin (CRESTOR) 40 MG Tab Take 1 tablet (40 mg total) by mouth every evening. 90 tablet 3    aspirin (ECOTRIN) 81 MG EC tablet Take 1 tablet (81 mg total) by mouth once daily. 30 tablet 0    blood sugar diagnostic Strp One Touch Ultra Blue Test strips, Use l strip to check glucose 4 times daily 100 each 11    blood-glucose meter kit Use as instructed 1 each 0    cyanocobalamin 1,000 mcg/mL injection Inject 1 mL (1,000 mcg total) into the skin every 30 days. 3 mL 4    lancets (ONETOUCH DELICA LANCETS) 33 gauge Misc USE 1  TO CHECK GLUCOSE 4 TIMES DAILY 100 each 3    magnesium oxide (MAG-OX) 400 mg (241.3 mg magnesium) tablet Take 1 tablet (400 mg total) by mouth once daily. 90 tablet 1    NYSTOP powder APPLY TO AFFECTED AREA AS NEEDED      prednisoLONE acetate (PRED FORTE) 1 % DrpS Place 1 drop into both eyes as needed.        Review of Systems:  On MV    Physical Exam:  BP (!) 155/70   Pulse 74   Temp 98.1 °F (36.7 °C) (Oral)   Resp 18   Ht 5' 7" (1.702 m)   Wt 108.9 kg (239 lb 15.9 oz)   SpO2 (!) 94%   BMI 37.60 kg/m²     General Appearance:    Ill   Head:    Normocephalic, atraumatic   Eyes:    Closed     Mouth:   Moist mucus membranes, no thrush or oral lesions, normal      dentition   Back:     No CVA tenderness   Lungs:     Coarse, on MV   Heart:    Regular rate and rhythm, no rub/gallop   Abdomen:     Soft, non-tender, non-distended guarding, no masses, no organomegaly   Extremities:   Warm and well perfused, distal pulses intact, no cyanosis, edematous   MSK:   No joint or muscle " swelling, tenderness or deformity   Skin:   Skin color, texture, turgor normal, no rashes or lesions   Neurologic/Psychiatric:   Sedated     Results:  Recent Labs   Lab 07/23/23  0608 07/24/23  0310 07/25/23  0327    146* 141   K 3.8 3.5 3.6    114* 108   CO2 23 24 24   BUN 78* 82* 75*   CREATININE 2.8* 2.5* 2.3*   * 202* 151*         Recent Labs   Lab 07/23/23  0608 07/24/23  0310 07/25/23  0327 07/26/23  0503   CALCIUM 7.6* 7.8* 7.9*  --    ALBUMIN 1.8* 1.8* 2.3*  --    PHOS 3.8 3.7 4.4  --    MG 2.7* 2.5 2.4 2.2               No results for input(s): POCTGLUCOSE in the last 168 hours.    Recent Labs   Lab 12/22/20  1232 07/07/21  1057 07/18/23  1735   Hemoglobin A1C 6.8 H 7.4 H 6.9 H         Recent Labs   Lab 07/23/23  0608 07/24/23  0310 07/24/23  0312 07/24/23  0947 07/25/23  0818   WBC 13.19* 13.49*  --   --  11.11   HGB 10.5* 10.6*  --   --  11.5*   HCT 31.2* 33.4* 31* 31* 36.3*   PLT 80* 99*  --   --  92*   MCV 80* 83  --   --  84   MCHC 33.7 31.7*  --   --  31.7*   MONO 6.7  0.9 7.1  1.0  --   --  7.5  0.8         Recent Labs   Lab 07/23/23  0608 07/24/23 0310 07/25/23  0327   BILITOT 1.0 1.0 0.8   PROT 4.6* 4.6* 5.4*   ALBUMIN 1.8* 1.8* 2.3*   ALKPHOS 256* 217* 213*   * 127* 101*   AST 38 24 34         Recent Labs   Lab 05/29/23  1040 07/18/23  1221 07/19/23  0116   Color, UA Yellow Yellow Yellow   Appearance, UA Clear Clear Clear   pH, UA 6.0 8.0 6.0   Specific [place], UA 1.015 1.030 >1.030 A   Protein, UA Negative 1+ A 1+ A   Glucose, UA 4+ A 4+ A 4+ A   Ketones, UA Negative Trace A Negative   Urobilinogen, UA Negative 2.0-3.0 A 2.0-3.0 A   Bilirubin (UA) Negative Negative 1+ A   Occult Blood UA Negative 1+ A Trace A   Nitrite, UA Negative Negative Negative   RBC, UA 1 6 H 1   WBC, UA 7 H 6 H 3   Bacteria Negative Negative Negative   Hyaline Casts, UA 4 A 5 A 1         Recent Labs   Lab 07/23/23  1344 07/24/23  0312 07/24/23  0947   POC PH 7.434 7.461 H 7.436   POC PCO2  39.0 36.9 38.2   POC HCO3 26.1 26.3 25.7   POC PO2 79 L 70 L 63 L   POC SATURATED O2 96 95 92 L   POC BE 2 2 1   Sample ARTERIAL ARTERIAL ARTERIAL         Microbiology Results (last 7 days)       Procedure Component Value Units Date/Time    Blood culture [[id number]] Collected: 07/25/23 0327    Order Status: Completed Specimen: Blood Updated: 07/26/23 0432     Blood Culture, Routine No Growth to date      No Growth to date    Blood culture [[id number]] Collected: 07/24/23 0348    Order Status: Completed Specimen: Blood Updated: 07/26/23 0432     Blood Culture, Routine No Growth to date      No Growth to date      No Growth to date    Narrative:      Collection has been rescheduled by [email or web address] at 07/24/2023 03:28 Reason:   Nurse Draw. Blood was already drawn. I was not able to get the blood   cultures.  Collection has been rescheduled by JS at 07/24/2023 03:28 Reason:   Nurse Draw. Blood was already drawn. I was not able to get the blood   cultures.    Blood culture [[id number]] Collected: 07/20/23 0940    Order Status: Completed Specimen: Blood from Peripheral, Antecubital, Right Updated: 07/25/23 1232     Blood Culture, Routine No growth after 5 days.    Narrative:      Collection has been rescheduled by [email or web address] at 07/20/2023 06:04 Reason:   Unable to collect  Collection has been rescheduled by [email or web address] at 07/20/2023 08:01 Reason:   Contacting zainab for cultures per RN Alfred  Collection has been rescheduled by [email or web address] at 07/20/2023 08:41 Reason:   Nurse Draw  Collection has been rescheduled by [email or web address] at 07/20/2023 06:04 Reason:   Unable to collect  Collection has been rescheduled by [email or web address] at 07/20/2023 08:01 Reason:   Contacting zainab for cultures per RN Alfred  Collection has been rescheduled by [email or web address] at 07/20/2023 08:41 Reason:   Nurse Draw    Blood culture [[id number]] Collected: 07/23/23 0825    Order Status: Completed Specimen: Blood Updated: 07/25/23 1032     Blood Culture, Routine No Growth to date      No Growth to date      No  Growth to date    Culture, Respiratory with Gram Stain [[id number]] Collected: 07/20/23 1003    Order Status: Completed Specimen: Respiratory from Endotracheal Aspirate Updated: 07/22/23 0736     Respiratory Culture No growth      No normal respiratory shay     Gram Stain (Respiratory) <10 epithelial cells per low power field.     Gram Stain (Respiratory) Few WBC's     Gram Stain (Respiratory) No organisms seen    Urine Culture High Risk [[id number]] Collected: 07/19/23 1006    Order Status: Completed Specimen: Urine, Clean Catch Updated: 07/22/23 0710     Urine Culture, Routine No growth    Narrative:      Indicated criteria for high risk culture:->Other  Other (specify):->e coli bacteremia    Blood culture [[id number]]  (Abnormal) Collected: 07/20/23 0940    Order Status: Completed Specimen: Blood from Line, Arterial, Right Updated: 07/22/23 0644     Blood Culture, Routine Gram stain aer bottle: Gram positive cocci      Positive results previously called      ENTEROCOCCUS FAECIUM  For susceptibility see order #[id number]      Narrative:      Collection has been rescheduled by [email or web address] at 07/20/2023 06:04 Reason:   Unable to collect  Collection has been rescheduled by [email or web address] at 07/20/2023 08:01 Reason:   Contacting zainab for cultures per RN Alfred  Collection has been rescheduled by [email or web address] at 07/20/2023 08:41 Reason:   Nurse Draw  Collection has been rescheduled by [email or web address] at 07/20/2023 06:04 Reason:   Unable to collect  Collection has been rescheduled by [email or web address] at 07/20/2023 08:01 Reason:   Contacting zainab for cultures per RN Alfred  Collection has been rescheduled by [email or web address] at 07/20/2023 08:41 Reason:   Nurse Draw    Blood culture [[id number]]  (Abnormal) Collected: 07/19/23 1230    Order Status: Completed Specimen: Blood Updated: 07/22/23 0643     Blood Culture, Routine Gram stain aer bottle: Gram positive cocci      Positive results previously called      ENTEROCOCCUS FAECIUM  For susceptibility see order #[id number]      Blood  culture [[id number]]  (Abnormal) Collected: 07/19/23 0002    Order Status: Completed Specimen: Blood Updated: 07/22/23 0643     Blood Culture, Routine Gram stain aer bottle: Gram positive cocci      Gram stain ez bottle: Gram positive cocci      Positive results previously called      ENTEROCOCCUS FAECIUM  For susceptibility see order #[id number]      Blood culture [[id number]]  (Abnormal) Collected: 07/19/23 0030    Order Status: Completed Specimen: Blood from Peripheral, Left Hand Updated: 07/22/23 0643     Blood Culture, Routine Gram stain aer bottle: Gram positive cocci      Gram stain ez bottle: Gram positive cocci      Results called to and read back by:Carmela Mcintosh RN-3ICU;      07/19/2023  16:23 CJD      ENTEROCOCCUS FAECIUM  For susceptibility see order #[id number]      Narrative:      Collection has been rescheduled by [facility] at 07/19/2023 00:20 Reason:   Nurse Draw  Collection has been rescheduled by 9 at 07/19/2023 00:20 Reason:   Nurse Draw    Blood culture [[id number]]  (Abnormal)  (Susceptibility) Collected: 07/18/23 1648    Order Status: Completed Specimen: Blood Updated: 07/22/23 0642     Blood Culture, Routine Gram stain aer bottle: Gram negative rods      Gram stain aer bottle: Gram positive cocci      Results called to and read back by: Lesvia Mcdowell RN MICU3.      07/19/2023  05:06 TGC      ESCHERICHIA COLI      ENTEROCOCCUS FAECIUM    Blood culture [[id number]]  (Abnormal) Collected: 07/18/23 1738    Order Status: Completed Specimen: Blood Updated: 07/21/23 0853     Blood Culture, Routine Gram stain aer bottle: Gram negative rods      Results called to and read back by:Lesvia Mcdowell RN MICU3.      07/19/2023  05:23 TGC      ESCHERICHIA COLI  For susceptibility see order #[id number]            I have spent > minutes providing care for this patient for the above diagnoses. These services have included chart/data/imaging review, evaluation, exam, formulation of plan, , note  preparation, and discussions with staff involved in this patient's care.    Omar Henao MD  [facility] [address] [address]  [phone] (p)  [phone] (f)

## 2023-07-26 NOTE — CONSULTS
"Chief complaint:  Fall (Denies hitting head or LOC, hit right shoulder), Shortness of Breath, and Back Pain      HPI:  Chanel Cervantes is a 67 y.o. female presenting with a DTI of the low back, MAD of the Real fold, right posterior leg blisters and a left leg DTI all POA. Pt has no idea how the wounds appeared, and did not even know they were there. Pt denies any pain from the wounds, and has no other complaints today    PMH:  As per HPI and below:  Past Medical History:   Diagnosis Date    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 2021    Anemia, chronic disease 2021    CAD (coronary artery disease)     Diabetes mellitus, type 2     Hypertension     Iron deficiency anemia following bariatric surgery 2021    MI (myocardial infarction)     Secondary thrombocytosis 2021    Sleep apnea     Sleep apnea 2019    Sleep Right        Social History     Socioeconomic History    Marital status:    Tobacco Use    Smoking status: Former     Packs/day: 1.00     Years: 15.00     Pack years: 15.00     Types: Cigarettes     Quit date:      Years since quittin.5    Smokeless tobacco: Never   Substance and Sexual Activity    Alcohol use: No     Comment: "maybe once a year"    Drug use: No    Sexual activity: Not Currently     Social Determinants of Health     Financial Resource Strain: Unknown    Difficulty of Paying Living Expenses: Patient refused   Food Insecurity: Unknown    Worried About Running Out of Food in the Last Year: Patient refused    Ran Out of Food in the Last Year: Patient refused   Transportation Needs: No Transportation Needs    Lack of Transportation (Medical): No    Lack of Transportation (Non-Medical): No   Physical Activity: Inactive    Days of Exercise per Week: 0 days    Minutes of Exercise per Session: 0 min   Stress: Stress Concern Present    Feeling of Stress : To some extent   Social Connections: Unknown    Frequency of Communication with Friends and Family: More than " three times a week    Frequency of Social Gatherings with Friends and Family: More than three times a week    Active Member of Clubs or Organizations: No    Attends Club or Organization Meetings: Never    Marital Status:    Housing Stability: Low Risk     Unable to Pay for Housing in the Last Year: No    Number of Places Lived in the Last Year: 1    Unstable Housing in the Last Year: No       Past Surgical History:   Procedure Laterality Date    ANGIOGRAM, CORONARY, WITH LEFT HEART CATHETERIZATION      APPENDECTOMY      BARIATRIC SURGERY  2019    Dr Nunez    CARPAL TUNNEL RELEASE Bilateral 2002     SECTION      CHOLECYSTECTOMY      COLONOSCOPY      CORONARY ANGIOPLASTY WITH STENT PLACEMENT      CORONARY ARTERY BYPASS GRAFT      EPIDURAL STEROID INJECTION INTO LUMBAR SPINE      x2    ERCP N/A 2023    Procedure: ERCP (ENDOSCOPIC RETROGRADE CHOLANGIOPANCREATOGRAPHY);  Surgeon: Lavon Hernandez III, MD;  Location: [facility] ENDO;  Service: Endoscopy;  Laterality: N/A;    ESOPHAGOGASTRODUODENOSCOPY      HERNIA REPAIR  2019    HYSTERECTOMY      THYROID LOBECTOMY Right 2021    Procedure: LOBECTOMY, THYROID;  Surgeon: Mari Chance MD;  Location: [facility] OR;  Service: General;  Laterality: Right;       Family History   Problem Relation Age of Onset    Diabetes Mother     Vision loss Mother     Heart disease Father     Vision loss Father     Stroke Father        Review of patient's allergies indicates:   Allergen Reactions    Adhesive tape-silicones Rash    Dye Hives    Cephalexin     Iodine     Penicillins Edema    Pneumococcal vaccine     Iodinated contrast media Rash       No current facility-administered medications on file prior to encounter.     Current Outpatient Medications on File Prior to Encounter   Medication Sig Dispense Refill    amLODIPine (NORVASC) 2.5 MG tablet Take 1 tablet (2.5 mg total) by mouth once daily. 90 tablet 1    azelastine (ASTELIN) 137 mcg (0.1 %) nasal spray  1 spray (137 mcg total) by Nasal route 2 (two) times daily. 30 mL 5    calcium carbonate-vitamin D3 600 mg-62.5 mcg (2,500 unit) Cap calcium carbonate 600 mg-vitamin D3 62.5 mcg (2,500 unit) capsule   Take by oral route.      DULoxetine (CYMBALTA) 60 MG capsule Take 1 capsule (60 mg total) by mouth once daily. 90 capsule 1    empaglifloz-linaglip-metformin (TRIJARDY XR) 25-5-1,000 mg TBph Take 1 tablet by mouth once daily. 90 tablet 1    guanFACINE (TENEX) 2 MG tablet Take 1 tablet (2 mg total) by mouth nightly. 90 tablet 3    insulin degludec (TRESIBA FLEXTOUCH U-200) 200 unit/mL (3 mL) insulin pen Inject 76 Units into the skin once daily. 12 pen 3    iron-vitamin C 100-250 mg, ICAR-C, 100-250 mg Tab Take 1 tablet by mouth once daily. 30 each 8    levothyroxine (SYNTHROID) 75 MCG tablet Take 75 mcg by mouth before breakfast.      metFORMIN (GLUCOPHAGE) 1000 MG tablet Take 1,000 mg by mouth 2 (two) times daily with meals.      metOLazone (ZAROXOLYN) 5 MG tablet Take 1 tablet (5 mg total) by mouth once daily. 90 tablet 0    metoprolol succinate (TOPROL-XL) 25 MG 24 hr tablet Take 1 tablet (25 mg total) by mouth once daily. 90 tablet 1    multivitamin capsule Take 1 capsule by mouth once daily.      olmesartan (BENICAR) 40 MG tablet Take 1 tablet (40 mg total) by mouth once daily. 90 tablet 1    potassium chloride (KLOR-CON) 10 MEQ TbSR Take 1 tablet (10 mEq total) by mouth once daily. 90 tablet 1    pregabalin (LYRICA) 50 MG capsule Take 50 mg by mouth every evening.      rosuvastatin (CRESTOR) 40 MG Tab Take 1 tablet (40 mg total) by mouth every evening. 90 tablet 3    aspirin (ECOTRIN) 81 MG EC tablet Take 1 tablet (81 mg total) by mouth once daily. 30 tablet 0    blood sugar diagnostic Strp One Touch Ultra Blue Test strips, Use l strip to check glucose 4 times daily 100 each 11    blood-glucose meter kit Use as instructed 1 each 0    cyanocobalamin 1,000 mcg/mL injection Inject 1 mL (1,000 mcg total) into the skin  "every 30 days. 3 mL 4    lancets (ONETOUCH DELICA LANCETS) 33 gauge Misc USE 1  TO CHECK GLUCOSE 4 TIMES DAILY 100 each 3    magnesium oxide (MAG-OX) 400 mg (241.3 mg magnesium) tablet Take 1 tablet (400 mg total) by mouth once daily. 90 tablet 1    NYSTOP powder APPLY TO AFFECTED AREA AS NEEDED      prednisoLONE acetate (PRED FORTE) 1 % DrpS Place 1 drop into both eyes as needed.          ROS: As per HPI and below:  Pertinent items are noted in HPI.      Physical Exam:     Vitals:    23 0820 23 0853 23 0856 23 1100   BP:       BP Location:       Patient Position:       Pulse:  74  80   Resp:  16  18   Temp:    98.5 °F (36.9 °C)   TempSrc:    Oral   SpO2: (!) 94%  97% 98%   Weight:       Height:           BP  Min: 68/32  Max: 188/75  Temp  Av.1 °F (37.3 °C)  Min: 97 °F (36.1 °C)  Max: 101.3 °F (38.5 °C)  Pulse  Av.4  Min: 2  Max: 119  Resp  Av.8  Min: 0  Max: 44  SpO2  Av.2 %  Min: 70 %  Max: 100 %  Height  Av' 7" (170.2 cm)  Min: 5' 7" (170.2 cm)  Max: 5' 7" (170.2 cm)  Weight  Av.9 kg (240 lb 0.3 oz)  Min: 108.9 kg (239 lb 15.9 oz)  Max: 108.9 kg (240 lb 1.3 oz)    Body mass index is 37.6 kg/m².          General:             Well developed, well nourished, no apparent distress  HEENT:              NCAT, no JVD, mucous membranes moist, EOM intact  Cardiovascular:  Regular rate and rhythm, normal S1, normal S2, No murmurs, rubs, or gallops  Respiratory:        Normal breath sounds, no wheezes, no rales, no rhonchi  Abdomen:           Bowel sounds present, non tender, non distended, no masses, no hepatojugular reflux  Extremities:        No clubbing, no cyanosis, no edema  Vascular:            2+ b/l radial.  Peripheral pulses intact.  No carotid bruits.  Neurological:      No focal deficits  Skin:                   As below  DTI of the low back  Open area of the  fold secondary to MAD  BLE reddened areas with blisters present on the right " leg                          Lab Results   Component Value Date    WBC 11.11 2023    HGB 11.5 (L) 2023    HCT 36.3 (L) 2023    MCV 84 2023    PLT 92 (L) 2023     Lab Results   Component Value Date    CHOL 103 (L) 2023    CHOL 93 (L) 2022    CHOL 98 2020     Lab Results   Component Value Date    HDL 42 2023    HDL 33 (L) 2022    HDL 33 (L) 2020     Lab Results   Component Value Date    LDLCALC 35.2 (L) 2023    LDLCALC 31.6 (L) 2022    LDLCALC 48 2020     Lab Results   Component Value Date    TRIG 129 2023    TRIG 142 2022    TRIG 87 2020     Lab Results   Component Value Date    CHOLHDL 40.8 2023    CHOLHDL 35.5 2022    CHOLHDL 2.97 2020     CMP  No results for input(s): GLU, CALCIUM, ALBUMIN, PROT, NA, K, CO2, CL, BUN, CREATININE, ALKPHOS, ALT, AST, BILITOT in the last 24 hours.   Lab Results   Component Value Date    TSH 0.470 2023       Assessment and Recommendations       Diagnoses:    1. DTI low back  2. Open wound of the  fold  3. MAD of the  fold  4. BLE erythema areas    All POA    Plan:  1. Triad to all areas daily  2. FU at the wound clinic as needed    Complexity:    low

## 2023-07-27 LAB
ALBUMIN SERPL BCP-MCNC: 2.3 G/DL (ref 3.5–5.2)
ALP SERPL-CCNC: 171 U/L (ref 55–135)
ALT SERPL W/O P-5'-P-CCNC: 51 U/L (ref 10–44)
ANION GAP SERPL CALC-SCNC: 8 MMOL/L (ref 8–16)
AST SERPL-CCNC: 21 U/L (ref 10–40)
BASOPHILS # BLD AUTO: 0.03 K/UL (ref 0–0.2)
BASOPHILS NFR BLD: 0.3 % (ref 0–1.9)
BILIRUB SERPL-MCNC: 0.8 MG/DL (ref 0.1–1)
BUN SERPL-MCNC: 55 MG/DL (ref 8–23)
CALCIUM SERPL-MCNC: 8 MG/DL (ref 8.7–10.5)
CHLORIDE SERPL-SCNC: 107 MMOL/L (ref 95–110)
CO2 SERPL-SCNC: 28 MMOL/L (ref 23–29)
CREAT SERPL-MCNC: 1.8 MG/DL (ref 0.5–1.4)
DIFFERENTIAL METHOD: ABNORMAL
EOSINOPHIL # BLD AUTO: 0.1 K/UL (ref 0–0.5)
EOSINOPHIL NFR BLD: 1.1 % (ref 0–8)
ERYTHROCYTE [DISTWIDTH] IN BLOOD BY AUTOMATED COUNT: 16.1 % (ref 11.5–14.5)
EST. GFR  (NO RACE VARIABLE): 30.5 ML/MIN/1.73 M^2
GLUCOSE SERPL-MCNC: 198 MG/DL (ref 70–110)
GLUCOSE SERPL-MCNC: 207 MG/DL (ref 70–110)
GLUCOSE SERPL-MCNC: 208 MG/DL (ref 70–110)
GLUCOSE SERPL-MCNC: 234 MG/DL (ref 70–110)
GLUCOSE SERPL-MCNC: 248 MG/DL (ref 70–110)
HCT VFR BLD AUTO: 33.5 % (ref 37–48.5)
HGB BLD-MCNC: 10.7 G/DL (ref 12–16)
IMM GRANULOCYTES # BLD AUTO: 0.12 K/UL (ref 0–0.04)
IMM GRANULOCYTES NFR BLD AUTO: 1.4 % (ref 0–0.5)
LYMPHOCYTES # BLD AUTO: 0.6 K/UL (ref 1–4.8)
LYMPHOCYTES NFR BLD: 6.6 % (ref 18–48)
MAGNESIUM SERPL-MCNC: 2 MG/DL (ref 1.6–2.6)
MCH RBC QN AUTO: 26.3 PG (ref 27–31)
MCHC RBC AUTO-ENTMCNC: 31.9 G/DL (ref 32–36)
MCV RBC AUTO: 82 FL (ref 82–98)
MONOCYTES # BLD AUTO: 0.6 K/UL (ref 0.3–1)
MONOCYTES NFR BLD: 7.3 % (ref 4–15)
NEUTROPHILS # BLD AUTO: 7.3 K/UL (ref 1.8–7.7)
NEUTROPHILS NFR BLD: 83.3 % (ref 38–73)
NRBC BLD-RTO: 0 /100 WBC
PLATELET # BLD AUTO: 283 K/UL (ref 150–450)
PLATELET BLD QL SMEAR: ABNORMAL
PMV BLD AUTO: 11.5 FL (ref 9.2–12.9)
POTASSIUM SERPL-SCNC: 3.5 MMOL/L (ref 3.5–5.1)
PROCALCITONIN SERPL IA-MCNC: 1.59 NG/ML (ref 0–0.5)
PROT SERPL-MCNC: 5.6 G/DL (ref 6–8.4)
RBC # BLD AUTO: 4.07 M/UL (ref 4–5.4)
SODIUM SERPL-SCNC: 143 MMOL/L (ref 136–145)
WBC # BLD AUTO: 8.73 K/UL (ref 3.9–12.7)

## 2023-07-27 PROCEDURE — 25000003 PHARM REV CODE 250: Performed by: STUDENT IN AN ORGANIZED HEALTH CARE EDUCATION/TRAINING PROGRAM

## 2023-07-27 PROCEDURE — 25000242 PHARM REV CODE 250 ALT 637 W/ HCPCS: Performed by: INTERNAL MEDICINE

## 2023-07-27 PROCEDURE — 25000003 PHARM REV CODE 250: Performed by: INTERNAL MEDICINE

## 2023-07-27 PROCEDURE — 94640 AIRWAY INHALATION TREATMENT: CPT

## 2023-07-27 PROCEDURE — 36415 COLL VENOUS BLD VENIPUNCTURE: CPT | Performed by: STUDENT IN AN ORGANIZED HEALTH CARE EDUCATION/TRAINING PROGRAM

## 2023-07-27 PROCEDURE — 97530 THERAPEUTIC ACTIVITIES: CPT | Mod: CQ

## 2023-07-27 PROCEDURE — 82962 GLUCOSE BLOOD TEST: CPT

## 2023-07-27 PROCEDURE — 92507 TX SP LANG VOICE COMM INDIV: CPT

## 2023-07-27 PROCEDURE — 27000221 HC OXYGEN, UP TO 24 HOURS

## 2023-07-27 PROCEDURE — 63600175 PHARM REV CODE 636 W HCPCS: Performed by: INTERNAL MEDICINE

## 2023-07-27 PROCEDURE — 83735 ASSAY OF MAGNESIUM: CPT | Performed by: INTERNAL MEDICINE

## 2023-07-27 PROCEDURE — 87070 CULTURE OTHR SPECIMN AEROBIC: CPT | Performed by: STUDENT IN AN ORGANIZED HEALTH CARE EDUCATION/TRAINING PROGRAM

## 2023-07-27 PROCEDURE — 80053 COMPREHEN METABOLIC PANEL: CPT | Performed by: STUDENT IN AN ORGANIZED HEALTH CARE EDUCATION/TRAINING PROGRAM

## 2023-07-27 PROCEDURE — 12000002 HC ACUTE/MED SURGE SEMI-PRIVATE ROOM

## 2023-07-27 PROCEDURE — 99233 SBSQ HOSP IP/OBS HIGH 50: CPT | Mod: ,,, | Performed by: STUDENT IN AN ORGANIZED HEALTH CARE EDUCATION/TRAINING PROGRAM

## 2023-07-27 PROCEDURE — 84145 PROCALCITONIN (PCT): CPT | Performed by: STUDENT IN AN ORGANIZED HEALTH CARE EDUCATION/TRAINING PROGRAM

## 2023-07-27 PROCEDURE — 94761 N-INVAS EAR/PLS OXIMETRY MLT: CPT

## 2023-07-27 PROCEDURE — 94799 UNLISTED PULMONARY SVC/PX: CPT

## 2023-07-27 PROCEDURE — 94664 DEMO&/EVAL PT USE INHALER: CPT

## 2023-07-27 PROCEDURE — 97530 THERAPEUTIC ACTIVITIES: CPT

## 2023-07-27 PROCEDURE — 97129 THER IVNTJ 1ST 15 MIN: CPT

## 2023-07-27 PROCEDURE — 85025 COMPLETE CBC W/AUTO DIFF WBC: CPT | Performed by: STUDENT IN AN ORGANIZED HEALTH CARE EDUCATION/TRAINING PROGRAM

## 2023-07-27 PROCEDURE — 87205 SMEAR GRAM STAIN: CPT | Performed by: STUDENT IN AN ORGANIZED HEALTH CARE EDUCATION/TRAINING PROGRAM

## 2023-07-27 PROCEDURE — 99900035 HC TECH TIME PER 15 MIN (STAT)

## 2023-07-27 PROCEDURE — 99233 PR SUBSEQUENT HOSPITAL CARE,LEVL III: ICD-10-PCS | Mod: ,,, | Performed by: STUDENT IN AN ORGANIZED HEALTH CARE EDUCATION/TRAINING PROGRAM

## 2023-07-27 RX ORDER — GUAIFENESIN 600 MG/1
600 TABLET, EXTENDED RELEASE ORAL 2 TIMES DAILY
Status: DISCONTINUED | OUTPATIENT
Start: 2023-07-27 | End: 2023-08-03 | Stop reason: HOSPADM

## 2023-07-27 RX ORDER — GUAIFENESIN 100 MG/5ML
200 SOLUTION ORAL EVERY 4 HOURS PRN
Status: DISCONTINUED | OUTPATIENT
Start: 2023-07-27 | End: 2023-08-03 | Stop reason: HOSPADM

## 2023-07-27 RX ADMIN — HYDRALAZINE HYDROCHLORIDE 10 MG: 20 INJECTION, SOLUTION INTRAMUSCULAR; INTRAVENOUS at 04:07

## 2023-07-27 RX ADMIN — CHLORHEXIDINE GLUCONATE 15 ML: 1.2 RINSE ORAL at 09:07

## 2023-07-27 RX ADMIN — IPRATROPIUM BROMIDE AND ALBUTEROL SULFATE 3 ML: .5; 3 SOLUTION RESPIRATORY (INHALATION) at 10:07

## 2023-07-27 RX ADMIN — AMLODIPINE BESYLATE 5 MG: 5 TABLET ORAL at 06:07

## 2023-07-27 RX ADMIN — POTASSIUM BICARBONATE 50 MEQ: 977.5 TABLET, EFFERVESCENT ORAL at 09:07

## 2023-07-27 RX ADMIN — INSULIN ASPART 2 UNITS: 100 INJECTION, SOLUTION INTRAVENOUS; SUBCUTANEOUS at 09:07

## 2023-07-27 RX ADMIN — DEXTROSE MONOHYDRATE 2 G: 50 INJECTION, SOLUTION INTRAVENOUS at 09:07

## 2023-07-27 RX ADMIN — GUAIFENESIN 200 MG: 200 SOLUTION ORAL at 09:07

## 2023-07-27 RX ADMIN — HYDRALAZINE HYDROCHLORIDE 10 MG: 20 INJECTION, SOLUTION INTRAMUSCULAR; INTRAVENOUS at 11:07

## 2023-07-27 RX ADMIN — HYDRALAZINE HYDROCHLORIDE 10 MG: 20 INJECTION, SOLUTION INTRAMUSCULAR; INTRAVENOUS at 09:07

## 2023-07-27 RX ADMIN — ACETAMINOPHEN 650 MG: 325 TABLET ORAL at 09:07

## 2023-07-27 RX ADMIN — INSULIN ASPART 4 UNITS: 100 INJECTION, SOLUTION INTRAVENOUS; SUBCUTANEOUS at 11:07

## 2023-07-27 RX ADMIN — LEVOTHYROXINE SODIUM 75 MCG: 0.03 TABLET ORAL at 06:07

## 2023-07-27 RX ADMIN — GUAIFENESIN 600 MG: 600 TABLET, EXTENDED RELEASE ORAL at 09:07

## 2023-07-27 RX ADMIN — DAPTOMYCIN 805 MG: 500 INJECTION, POWDER, LYOPHILIZED, FOR SOLUTION INTRAVENOUS at 02:07

## 2023-07-27 RX ADMIN — INSULIN ASPART 4 UNITS: 100 INJECTION, SOLUTION INTRAVENOUS; SUBCUTANEOUS at 05:07

## 2023-07-27 RX ADMIN — ENOXAPARIN SODIUM 30 MG: 30 INJECTION SUBCUTANEOUS at 05:07

## 2023-07-27 NOTE — ASSESSMENT & PLAN NOTE
Required intubation on hospital day 3.  Patient was successfully extubated on July 24, 2023.  Follow Pulmonary Medicine recommendations.  Still with some wheezing this morning  Adjust DuoNebs  Add mucus clearance therapy

## 2023-07-27 NOTE — PLAN OF CARE
Problem: Occupational Therapy  Goal: Occupational Therapy Goal  Description: Goals to be met by: 8/25/2023     Patient will increase functional independence with ADLs by performing:    Feeding with [place].  UE Dressing with Modified [place].  LE Dressing with Modified [place].  Grooming while standing at sink with Modified [place].  Toileting from toilet with Modified [place] for hygiene and clothing management.   Toilet transfer to toilet with Modified [place].    Outcome: Ongoing, Progressing

## 2023-07-27 NOTE — SUBJECTIVE & OBJECTIVE
Interval History:  No acute events overnight.  She is somewhat short of breath.  Symptoms seem primarily related to mucus production and congestion.  No stridorous symptoms today.  Workup mucus clearance and working on rehab placement    Review of Systems   All other systems reviewed and are negative.  Objective:     Vital Signs (Most Recent):  Temp: 98.1 °F (36.7 °C) (07/27/23 1134)  Pulse: 82 (07/27/23 1134)  Resp: 18 (07/27/23 1134)  BP: (!) 178/76 (notified nurse Juliette) (07/27/23 1134)  SpO2: 97 % (07/27/23 1134) Vital Signs (24h Range):  Temp:  [98.1 °F (36.7 °C)-98.5 °F (36.9 °C)] 98.1 °F (36.7 °C)  Pulse:  [64-98] 82  Resp:  [18-24] 18  SpO2:  [93 %-100 %] 97 %  BP: (125-184)/(62-77) 178/76     Weight: 108.9 kg (239 lb 15.9 oz)  Body mass index is 37.6 kg/m².    Intake/Output Summary (Last 24 hours) at 7/27/2023 1548  Last data filed at 7/27/2023 0936  Gross per 24 hour   Intake 340 ml   Output 1700 ml   Net -1360 ml           Physical Exam  Constitutional:       Appearance: Normal appearance. She is normal weight.      Comments: Very weak   HENT:      Head: Normocephalic and atraumatic.      Nose: Nose normal.      Mouth/Throat:      Mouth: Mucous membranes are moist.   Eyes:      Conjunctiva/sclera: Conjunctivae normal.      Pupils: Pupils are equal, round, and reactive to light.   Cardiovascular:      Rate and Rhythm: Normal rate and regular rhythm.      Pulses: Normal pulses.      Heart sounds: Normal heart sounds. No murmur heard.    No friction rub. No gallop.   Pulmonary:      Effort: Pulmonary effort is normal.      Breath sounds: Wheezing present. No rhonchi or rales.      Comments: Some wheezes, no crackles or rhonchi  Abdominal:      General: Abdomen is flat. Bowel sounds are normal. There is no distension.      Palpations: Abdomen is soft.      Tenderness: There is no abdominal tenderness. There is no guarding.   Musculoskeletal:         General: No swelling. Normal range of motion.      Cervical  back: Normal range of motion and neck supple.      Right lower leg: No edema.      Left lower leg: No edema.   Skin:     General: Skin is warm and dry.   Neurological:      General: No focal deficit present.      Mental Status: She is alert.   Psychiatric:         Mood and Affect: Mood normal.         Thought Content: Thought content normal.         Judgment: Judgment normal.           Significant Labs: All pertinent labs within the past 24 hours have been reviewed.    Significant Imaging: I have reviewed all pertinent imaging results/findings within the past 24 hours.

## 2023-07-27 NOTE — PT/OT/SLP PROGRESS
"Speech Language Pathology Treatment    Patient Name:  Chanel Cervantes   MRN:  [id number]  Admitting Diagnosis: Septic shock    Recommendations:                 General Recommendations:  Cognitive-linguistic therapy; ENT evaluation and Voice therapy IF voice remains extremely hoarse  Diet recommendations:  Regular Diet - IDDSI Level 7, Liquid Diet Level: Thin liquids - IDDSI Level 0   Aspiration Precautions: Standard aspiration precautions   General Precautions: Standard, fall  Communication strategies:  none    Assessment:     Chanel Cervantes is a 67 y.o. female with an SLP diagnosis of Cognitive-Linguistic Impairment and Dysphonia.  She presents with continued severe hoarseness and cognitive deficits including delayed recall and attention. Gradual improvement of deficits today. Continue POC and recs for ST at next level of care. Pt may benefit from ENT evaluation and formal voice evaluation if voice continues to present w/ severe hoarseness.    Subjective     Pt awake, upright in bed. Agreeable to ST.  Patient goals: "I can't believe insurance denied my first discharge option"     Pain/Comfort:       Respiratory Status: Nasal cannula, flow 2 L/min    Objective:     Has the patient been evaluated by SLP for swallowing?      Keep patient NPO?     Current Respiratory Status:        Pt sustained attention to complex 2-3 step directive task w/o redirection or repetition of directives in % of trials given min cuing. She recalled details from read message w/ 80% accuracy IND; she was able to recall details re her discharge plans and information for hospital course IND and accurately (per chart review). Calendar activity completed w/ 80% accuracy given min cues. Vocal hygiene and proper breath support for voice discussed and practiced by pt. Some difficulty w/ diaphragmatic respiration; however, pt demonstrating understanding of concept and use of vocal hygiene precautions.    Goals:   Multidisciplinary Problems       " SLP Goals          Problem: SLP    Goal Priority Disciplines Outcome   SLP Goal    High SLP Ongoing, Progressing   Description: 1. Consume regular textures IDDSI 7 and liquids without s/s oropharyngeal dysphagia. -MET  2. Complete cognitive- linguistic eval -MET  3. Pt will complete sustained attention tasks w/ 80% accuracy given min cuing  4. Pt will recall information given mild delay w/ 80% accuracy given min cues for memory strategies                         Plan:     Patient to be seen:  3 x/week, 4 x/week   Plan of Care expires:  07/31/23  Plan of Care reviewed with:  patient   SLP Follow-Up:  Yes       Discharge recommendations:  outpatient speech therapy, rehabilitation facility (w/ ST)   Barriers to Discharge:  None    Time Tracking:     SLP Treatment Date:   07/27/23  Speech Start Time:  1109  Speech Stop Time:  1133     Speech Total Time (min):  24 min    Billable Minutes: Total Time : Cognitive- 15 min; Voice- 9 min    07/27/2023

## 2023-07-27 NOTE — PROGRESS NOTES
Progress  Note  Infectious Disease    Reason for Consult:  bacteremia E coli and E faecium     HPI: Chanel Cervantes is a 67 y.o. female obese, BMI 37.6 kg/M2, with past medical history of diabetes, hypertension, bariatric surgery, cholecystectomy, prior UTIs, and prior pancreatitis secondary to gallstones.     She presented with acute onset of back pain, radiating into bilateral upper quadrants, with associated shortness of breath.  She was admitted for septic shock, transferred to ICU for further management.    Labs on admission with severe leukopenia 1.1, left shift 87%, bands 8%, H&H 14/44.2, platelet count 213   Creatinine 1.1   Hypokalemia 3.1   Transaminitis, AST 4943/ALT 1475   Total bili 2.7  Lipase 1363 down to 102  Patient CPK 92   Lactic acid 4.6  Hemoglobin A1c 6.9  UA pyuria 6, negative for bacteria, urine culture in process     CT abdomen with broad-based ventral abdominal hernia.  No evidence of obstruction.  Status post cholecystectomy with postsurgical dilatation of the common bile duct and intrahepatic ducts.    Seen by GI, s/p ERCP this morning; with evidence of the entire main bile duct was moderately dilated, with an obstruction from suspected sludge ball. A biliary sphincterotomy was performed. The biliary tree was swept.     ERCP this morning.  Hospital course complicated persistent fever and polymicrobial bacteremia, Enterococcus faecium and E coli, no resistance genes detected on [employer], pending sensitivities.      ID consult for E coli and E faecium bacteremia.    7/20: Interim reviewed, patient remains febrile, T max 101.3, currently 100.8, on pressors, on O2 by [place] 5L. She remains encephalopathic, decreased urinary output, 20ml/h, no bowel movements recorded yet. Labs reviewed, WBC 11.4, bands 36%, H/H 11.3/35.4, plt: 175. Worsening ANTHONY 2.6, LFTs trending down. , will watch closely. Alct acid 3, troponins trending up, likely demand ischemia. Micro reviewed, repeat blood cultures  7/19 Enterococcus faecium 4/4 bottles, urine culture no growth to date, pending final. Discussed with Pulmonary, plan for IV steroids.    7/20:  Interim reviewed, patient seen examined at bedside. Pressors requirement coming down, afebrile in the last 24 hours.  She was intubated yesterday at noon for airway protection, FiO2 40%, minimal amount of secretions, sputum sent for culture.  Urinary output improving although creatinine 2.9 today.  LFTs trending down, lactic acid 1.9, normal.  Micro reviewed, repeat blood cultures 7/20 x2 sets no growth to date, pending final.  Awaiting sensitivities on E coli and Enterococcus faecium, to be available later today.    07/22/2023   Afebrile, tmax 100, no pressors since this am,   On sedation 25 mcg/kg/min- intensivist is working daily on extubation, FIo2=30%  WBC 9.9--14;   PLT worsening? (186--175--85--73)   I/f=7380/155.  Cr 1.6--2.4--2.6--2.9--2.9-- LFTs improving   07/23/2023.  Dr. Mnotejo.   Afebrile.  WBC is stable 14--13.  Blood cultures of 07/23 are negative to date.   Smooth was negative for vegetation.    Urine output is improving.  She had 1 BM.    CXR looks slightly worse, but I think it is volume overload, which will improve as her kidney function improves. Intensivist and nurse are working on vacation sedation. Daughter at bedside.  I discussed with her in detail.    7/24 ([facility]): interim reviewed, discussed with Dr Montejo, patient seen and examined at bedside. She is coming OFF sedation, awake, nodding to questions, denies pain. Hemodynamically stable, afebrile. Urinary outpt improving, cr 2.5. Repeat blood cultures x1 no growth to date, pending final. Set from today in process.     7/25: interim reviewed, patient seen and examined at bedside, daughter present. They both report she had never had pancreatitis before. She is sitting in the chair, eating breakfast, feeling better. Labs reviewed, stable WBC 11, left shift 80.7%, thrombocytopenia 92,  creatinine down  2.3, urinary outpt is good, L IJ and a-line removed. Repeat blood cultures  &  no growth to date, pending final. Repeat set sent today in process.     :  Interim reviewed, patient seen and examined at bedside, working with PT needing a lot of assistance.  Patient reports had a rough night, high high blood pressure.  Her appetite is slowly improving, still feels a little weak.  Labs to be drawn tomorrow.  Micro reviewed, repeat blood cultures since  no growth to date, pending final.     :  Interim reviewed, patient seen examined at bedside, she is hoarse voice today, reports having breathing treatments back-to-back. She feels a little better overall, afebrile. Labs reviewed, WBC 8.7, left shift: 83.3%, H/H 10.7/33.5, plt improved 283. Procal 1.59, creatinine down to 1.8. Repeat blood cultures no growth.     Review of patient's allergies indicates:   Allergen Reactions    Adhesive tape-silicones Rash    Dye Hives    Cephalexin     Iodine     Penicillins Edema    Pneumococcal vaccine     Iodinated contrast media Rash     Past Medical History:   Diagnosis Date    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 2021    Anemia, chronic disease 2021    CAD (coronary artery disease)     Diabetes mellitus, type 2     Hypertension     Iron deficiency anemia following bariatric surgery 2021    MI (myocardial infarction)     Secondary thrombocytosis 2021    Sleep apnea     Sleep apnea 2019    Sleep Right      Past Surgical History:   Procedure Laterality Date    ANGIOGRAM, CORONARY, WITH LEFT HEART CATHETERIZATION      APPENDECTOMY      BARIATRIC SURGERY  2019    Dr Nunez    CARPAL TUNNEL RELEASE Bilateral 2002     SECTION      CHOLECYSTECTOMY      COLONOSCOPY      CORONARY ANGIOPLASTY WITH STENT PLACEMENT      CORONARY ARTERY BYPASS GRAFT      EPIDURAL STEROID INJECTION INTO LUMBAR SPINE      x2    ERCP N/A 2023    Procedure: ERCP (ENDOSCOPIC RETROGRADE  "CHOLANGIOPANCREATOGRAPHY);  Surgeon: Lavon Hernandez III, MD;  Location: [facility] ENDO;  Service: Endoscopy;  Laterality: N/A;    ESOPHAGOGASTRODUODENOSCOPY      HERNIA REPAIR  2019    HYSTERECTOMY      THYROID LOBECTOMY Right 2021    Procedure: LOBECTOMY, THYROID;  Surgeon: Mari Chance MD;  Location: [facility] OR;  Service: General;  Laterality: Right;     Social History     Tobacco Use    Smoking status: Former     Packs/day: 1.00     Years: 15.00     Pack years: 15.00     Types: Cigarettes     Quit date:      Years since quittin.5    Smokeless tobacco: Never   Substance Use Topics    Alcohol use: No     Comment: "maybe once a year"        Family History   Problem Relation Age of Onset    Diabetes Mother     Vision loss Mother     Heart disease Father     Vision loss Father     Stroke Father        EXAM & DIAGNOSTICS REVIEWED:   Vitals:     Temp:  [98.1 °F (36.7 °C)-98.5 °F (36.9 °C)]   Temp: 98.1 °F (36.7 °C) (23 1134)  Pulse: 82 (23 1134)  Resp: 18 (23 1134)  BP: (!) 178/76 (notified nurse Juliette) (23 1134)  SpO2: 97 % (23 1134)    Intake/Output Summary (Last 24 hours) at 2023 1557  Last data filed at 2023 0936  Gross per 24 hour   Intake 340 ml   Output 1700 ml   Net -1360 ml        General:  Awake, alert, sitting in bed, comfortable on room air   Eyes:  Anicteric, PERRL  ENT:  Moist oral mucosa, good dentition   Neck:  Supple, hoarse voice noted   Lungs: Better air entry b/l   Heart:  S1/S2+, regular rhythm, no murmurs  Abd:  Obese, large ventral hernia, +BS, soft, non tender   :  Waller ,  yellow urine  Musc:  Joints without effusion, swelling,  erythema, synovitis  Skin:  Warm  Wound:   Neuro:  Awake, alert, speech is clear, moving all extremities   Psych:  Calm  Lymphatic:       Extrem: Trace UE edema b/l  No LE edema b/l  VAD:  R Midline      Isolation: None      General Labs reviewed:  Recent Labs   Lab 23  0310 23  0313 " 07/25/23  0818 07/26/23  1908 07/27/23  0857   WBC 13.49*  --  11.11  --  8.73   HGB 10.6*  --  11.5*  --  10.7*   HCT 33.4*   < > 36.3* 30* 33.5*   PLT 99*  --  92*  --  283    < > = values in this interval not displayed.       Recent Labs   Lab 07/24/23  0310 07/25/23 0327 07/27/23  0434   * 141 143   K 3.5 3.6 3.5   * 108 107   CO2 24 24 28   BUN 82* 75* 55*   CREATININE 2.5* 2.3* 1.8*   CALCIUM 7.8* 7.9* 8.0*   PROT 4.6* 5.4* 5.6*   BILITOT 1.0 0.8 0.8   ALKPHOS 217* 213* 171*   * 101* 51*   AST 24 34 21     Recent Labs   Lab 07/24/23 0310   CRP 6.33*       Estimated Creatinine Clearance: 38.5 mL/min (A) (based on SCr of 1.8 mg/dL (H)).     Micro:  Microbiology Results (last 7 days)       Procedure Component Value Units Date/Time    Blood culture [[id number]] Collected: 07/23/23 0825    Order Status: Completed Specimen: Blood Updated: 07/27/23 1032     Blood Culture, Routine No Growth to date      No Growth to date      No Growth to date      No Growth to date      No Growth to date    Blood culture [[id number]] Collected: 07/24/23 0348    Order Status: Completed Specimen: Blood Updated: 07/27/23 0432     Blood Culture, Routine No Growth to date      No Growth to date      No Growth to date      No Growth to date    Narrative:      Collection has been rescheduled by LEIDY at 07/24/2023 03:28 Reason:   Nurse Draw. Blood was already drawn. I was not able to get the blood   cultures.  Collection has been rescheduled by LEIDY at 07/24/2023 03:28 Reason:   Nurse Draw. Blood was already drawn. I was not able to get the blood   cultures.    Blood culture [[id number]] Collected: 07/25/23 0327    Order Status: Completed Specimen: Blood Updated: 07/27/23 0432     Blood Culture, Routine No Growth to date      No Growth to date      No Growth to date    Blood culture [[id number]] Collected: 07/20/23 0940    Order Status: Completed Specimen: Blood from Peripheral, Antecubital, Right Updated: 07/25/23 1232      Blood Culture, Routine No growth after 5 days.    Narrative:      Collection has been rescheduled by [email or web address] at 07/20/2023 06:04 Reason:   Unable to collect  Collection has been rescheduled by [email or web address] at 07/20/2023 08:01 Reason:   Contacting zainab for cultures per RN Alfred  Collection has been rescheduled by [email or web address] at 07/20/2023 08:41 Reason:   Nurse Draw  Collection has been rescheduled by [email or web address] at 07/20/2023 06:04 Reason:   Unable to collect  Collection has been rescheduled by [email or web address] at 07/20/2023 08:01 Reason:   Contacting zainab for cultures per RN Alfred  Collection has been rescheduled by [email or web address] at 07/20/2023 08:41 Reason:   Nurse Draw    Culture, Respiratory with Gram Stain [[id number]] Collected: 07/20/23 1003    Order Status: Completed Specimen: Respiratory from Endotracheal Aspirate Updated: 07/22/23 0736     Respiratory Culture No growth      No normal respiratory shay     Gram Stain (Respiratory) <10 epithelial cells per low power field.     Gram Stain (Respiratory) Few WBC's     Gram Stain (Respiratory) No organisms seen    Urine Culture High Risk [[id number]] Collected: 07/19/23 1006    Order Status: Completed Specimen: Urine, Clean Catch Updated: 07/22/23 0710     Urine Culture, Routine No growth    Narrative:      Indicated criteria for high risk culture:->Other  Other (specify):->e coli bacteremia    Blood culture [[id number]]  (Abnormal) Collected: 07/20/23 0940    Order Status: Completed Specimen: Blood from Line, Arterial, Right Updated: 07/22/23 0644     Blood Culture, Routine Gram stain aer bottle: Gram positive cocci      Positive results previously called      ENTEROCOCCUS FAECIUM  For susceptibility see order #[id number]      Narrative:      Collection has been rescheduled by [email or web address] at 07/20/2023 06:04 Reason:   Unable to collect  Collection has been rescheduled by [email or web address] at 07/20/2023 08:01 Reason:   Contacting zainab for cultures per RN Alfred  Collection has been rescheduled by [email or web address] at 07/20/2023 08:41 Reason:    Nurse Draw  Collection has been rescheduled by [email or web address] at 07/20/2023 06:04 Reason:   Unable to collect  Collection has been rescheduled by [email or web address] at 07/20/2023 08:01 Reason:   Contacting zainab for cultures per RN Alfred  Collection has been rescheduled by [email or web address] at 07/20/2023 08:41 Reason:   Nurse Draw    Blood culture [[id number]]  (Abnormal) Collected: 07/19/23 1230    Order Status: Completed Specimen: Blood Updated: 07/22/23 0643     Blood Culture, Routine Gram stain aer bottle: Gram positive cocci      Positive results previously called      ENTEROCOCCUS FAECIUM  For susceptibility see order #[id number]      Blood culture [[id number]]  (Abnormal) Collected: 07/19/23 0002    Order Status: Completed Specimen: Blood Updated: 07/22/23 0643     Blood Culture, Routine Gram stain aer bottle: Gram positive cocci      Gram stain ez bottle: Gram positive cocci      Positive results previously called      ENTEROCOCCUS FAECIUM  For susceptibility see order #[id number]      Blood culture [[id number]]  (Abnormal) Collected: 07/19/23 0030    Order Status: Completed Specimen: Blood from Peripheral, Left Hand Updated: 07/22/23 0643     Blood Culture, Routine Gram stain aer bottle: Gram positive cocci      Gram stain ez bottle: Gram positive cocci      Results called to and read back by:Carmela Mcintosh RN-3ICU;      07/19/2023  16:23 CJD      ENTEROCOCCUS FAECIUM  For susceptibility see order #[id number]      Narrative:      Collection has been rescheduled by [facility] at 07/19/2023 00:20 Reason:   Nurse Draw  Collection has been rescheduled by [facility] at 07/19/2023 00:20 Reason:   Nurse Draw    Blood culture [[id number]]  (Abnormal)  (Susceptibility) Collected: 07/18/23 1648    Order Status: Completed Specimen: Blood Updated: 07/22/23 0642     Blood Culture, Routine Gram stain aer bottle: Gram negative rods      Gram stain aer bottle: Gram positive cocci      Results called to and read back by: Lesvia Mcdowell RN MICU3.      07/19/2023  05:06 TGC       ESCHERICHIA COLI      ENTEROCOCCUS FAECIUM    Blood culture [[id number]]  (Abnormal) Collected: 07/18/23 1738    Order Status: Completed Specimen: Blood Updated: 07/21/23 0853     Blood Culture, Routine Gram stain aer bottle: Gram negative rods      Results called to and read back by:Lesvia Mcdowell RN MICU3.      07/19/2023  05:23 TGC      ESCHERICHIA COLI  For susceptibility see order #[id number]            Specimen: Blood Updated: 07/22/23 0642      Blood Culture, Routine Gram stain aer bottle: Gram negative rods     Gram stain aer bottle: Gram positive cocci     Results called to and read back by: Lesvia Mcdowell RN MICU3.     07/19/2023  05:06 TGC     ESCHERICHIA COLI Abnormal      ENTEROCOCCUS FAECIUM Abnormal    Susceptibility     Escherichia coli Enterococcus faecium     CULTURE, BLOOD CULTURE, BLOOD     Amp/Sulbactam <=4/2 mcg/mL Sensitive       Ampicillin <=8 mcg/mL Sensitive <=2 mcg/mL Sensitive     Cefazolin <=2 mcg/mL Sensitive       Cefepime <=2 mcg/mL Sensitive       Ceftriaxone <=1 mcg/mL Sensitive       Ciprofloxacin <=1 mcg/mL Sensitive       Ertapenem <=0.5 mcg/mL Sensitive       Gentamicin <=4 mcg/mL Sensitive       Gentamicin Synergy Screen   <=500 mcg/mL Sensitive     Levofloxacin <=2 mcg/mL Sensitive       Meropenem <=1 mcg/mL Sensitive       Piperacillin/Tazo <=16 mcg/mL Sensitive       Tetracycline <=4 mcg/mL Sensitive <=4 mcg/mL Sensitive     Tobramycin <=4 mcg/mL Sensitive       Trimeth/Sulfa <=2/38 mcg/mL Sensitive       Vancomycin   2 mcg/mL Sensitive                      Imaging Reviewed:  CXR07/22Hypoinflation with mild prominence of the pulmonary vasculature and faint groundglass opacity in the lung bases suggestive of pulmonary edema   Abdominal Us  CTA chest  CT abdomen/pelvis:  1.  Status post cholecystectomy with postsurgical dilatation of the common bile duct and intrahepatic ducts. Should be correlated with bilirubin levels.   2.  Broad-based ventral abdominal wall hernia  measures approximately 15 x 13 cm with herniated loops of large and small bowel. No evidence of obstruction.   3.  Exophytic hypoattenuating structure in the mid left kidney is felt to reflect a hemorrhagic cyst.      Cardiology:   ERICA:   The left ventricle is normal in size with normal systolic function.  Normal left ventricular diastolic function.  The estimated ejection fraction is 60%.  Atrial fibrillation not observed.  Normal right ventricular size with normal right ventricular systolic function.  Normal appearing left atrial appendage. No thrombus is present in the appendage. Normal appendage velocities.  No interatrial septal defect present.  There is no pulmonary hypertension.  No abnormal intracavitary echo; valvular structures are normal  Left atrial appendage was clean       ECHO  The left ventricle is normal in size with mild concentric hypertrophy and mildly decreased systolic function.  Mild to moderate tricuspid regurgitation.  The estimated ejection fraction is 45%.  Grade I left ventricular diastolic dysfunction.  There is abnormal septal wall motion consistent with left bundle branch block.  Mild right ventricular enlargement with normal right ventricular systolic function.  Moderate left atrial enlargement.  Normal central venous pressure (3 mmHg).  The estimated PA systolic pressure is 40 mmHg.  There is mild pulmonary hypertension.       IMPRESSION & PLAN     Septic shock resolved likely from polymicrobial bacteremia Enterococcus faecium (7/18-19--20) and E coli (7/18) secondary to cholangitis s/p ERCP, sphincterotomy 7/19  Procal 54 -->5.7, trending down   CRP 24  CPK 92-->245-->20  Lipase 1363-->102, trending down   ERICA negative for endocarditis     2. Shock liver, resolved     3. Thrombocytopenia, resolved    4. ANTHONY, cr 1.1--2.4--2.6-- 2.9--2.8--2.8->2.5-->2.3    5.  PMHx: diabetes, hypertension, bariatric surgery, cholecystectomy, prior UTIs    6. Obesity, BMI 37.6  Kg/m2    Recommendations:  Adjust Daptomycon 800mg IV daily  Continue Ceftriaxone 2g IV daily  Consider removing Waller catheter   Anticipating to continue IV abx for total 10 days from negative blood cultures, end date 8/1/23  PT/OT as tolerated  Aspiration precautions   CM working on SNF    D/w patient, Dr Farris    Medical Decision Making during this encounter was  [_] Low Complexity  [_] Moderate Complexity  [xx] High Complexity

## 2023-07-27 NOTE — PROGRESS NOTES
"[facility]  Adult Nutrition   Progress Note (Follow-Up)    SUMMARY     Recommendations  Recommendation/Intervention: 1. Recommend Unjury BID. 2. Encourage po intake at meals.3. RD to monitor intake, weight and labs.  Goals: PO intake to meet 75% of EEN/EPN.  Nutrition Goal Status: progressing towards goal  Communication of RD Recs: reviewed with RN    Dietitian Rounds Brief  Pt with daughter in room. States she appetite is slowly improving. Pt willing to try Unjury with meals.    Diet order:   Current Diet Order: 2000 DM diet                 Evaluation of Received Nutrient/Fluid Intake  Energy Calories Required: meeting needs  Protein Required: meeting needs  Fluid Required: meeting needs  Comments:   Tolerance: tolerating     % Intake of Estimated Energy Needs: 0 - 25 % and 50%  % Meal Intake: 50 - 75 %      Intake/Output Summary (Last 24 hours) at 7/27/2023 1553  Last data filed at 7/27/2023 0936  Gross per 24 hour   Intake 340 ml   Output 1700 ml   Net -1360 ml        Anthropometrics  Temp: 98.1 °F (36.7 °C)  Height Method: Stated  Height: 5' 7" (170.2 cm)  Height (inches): 67 in  Weight Method: Bed Scale  Weight: 108.9 kg (239 lb 15.9 oz)  Weight (lb): 240 lb  Ideal Body Weight (IBW), Female: 135 lb  % Ideal Body Weight, Female (lb): 177.78 %  BMI (Calculated): 37.6  BMI Grade: 35 - 39.9 - obesity - grade II       Estimated/Assessed Needs  Weight Used For Calorie Calculations: 108.9 kg (240 lb 1.3 oz)  Energy Calorie Requirements (kcal): 1198 - 1525 (11 - 14 kcal/kg)  Energy Need Method: Kcal/kg  Protein Requirements: 49 - 73 (0.8 - 73 (0.8 - 1.2 g/kg IBW)  Weight Used For Protein Calculations: 61 kg (134 lb 7.7 oz) (IBW)     Estimated Fluid Requirement Method: RDA Method  RDA Method (mL): 1198       Reason for Assessment  Reason For Assessment: RD follow-up  Diagnosis: infection/sepsis  Relevant Medical History: essential HTN; type 2 DM; NIKOLAS on CPAP; s/p bariatric surgery; hypokalemia; CAD; acute " obstructive cholangitis; hypothyroidism; obesity; anemia; acute hypoxemic respiratory failure; ANTHONY; septic shock; shock liver; e. coli bacteremia; acute encephalopathy    Nutrition/Diet History  Spiritual, Cultural Beliefs, Congregation Practices, Values that Affect Care: no  Food Allergies: NKFA  Factors Affecting Nutritional Intake: NPO, on mechanical ventilation    Nutrition Risk Screen  Nutrition Risk Screen: no indicators present     MST Score: 0  Have you recently lost weight without trying?: No  Weight loss score: 0  Have you been eating poorly because of a decreased appetite?: No  Appetite score: 0       Weight History:  Wt Readings from Last 10 Encounters:   07/19/23 108.9 kg (239 lb 15.9 oz)   07/19/23 108.9 kg (240 lb 1.3 oz)   06/02/23 109.1 kg (240 lb 8 oz)   04/13/23 108.8 kg (239 lb 14.4 oz)   02/16/23 108.5 kg (239 lb 4.8 oz)   12/27/22 108.9 kg (240 lb 1.3 oz)   11/30/22 108.9 kg (240 lb)   09/12/22 111.8 kg (246 lb 6.4 oz)   08/05/22 109.8 kg (242 lb)   04/06/22 109.9 kg (242 lb 3.2 oz)        Lab/Procedures/Meds: Pertinent Labs/Meds Reviewed    Medications:Pertinent Medications Reviewed  Scheduled Meds:   amLODIPine  5 mg Oral Daily    cefTRIAXone (ROCEPHIN) IVPB  2 g Intravenous Q24H    chlorhexidine  15 mL Mouth/Throat BID    [START ON 7/28/2023] DAPTOmycin (CUBICIN) IV (PEDS and ADULTS)  10 mg/kg (Adjusted) Intravenous Q24H    enoxparin  30 mg Subcutaneous Q24H    guaiFENesin  600 mg Oral BID    levothyroxine  75 mcg Oral Before breakfast     Continuous Infusions:  PRN Meds:.acetaminophen, albuterol-ipratropium, butalbital-acetaminophen-caffeine -40 mg, calcium gluconate IVPB, calcium gluconate IVPB, calcium gluconate IVPB, carboxymethylcellulose, dextrose 50%, dextrose 50%, glucagon (human recombinant), glucose, glucose, hydrALAZINE, insulin aspart U-100, lorazepam, magnesium oxide, magnesium oxide, magnesium sulfate IVPB, magnesium sulfate IVPB, naloxone, ondansetron, polyethylene glycol,  potassium bicarbonate, potassium bicarbonate, potassium bicarbonate, potassium chloride in water **AND** potassium chloride in water **AND** potassium chloride in water, racepinephrine, senna-docusate 8.6-50 mg, simethicone, sodium chloride 0.9%, sodium phosphate IVPB, sodium phosphate IVPB, sodium phosphate IVPB, traMADoL    Labs: Pertinent Labs Reviewed  Clinical Chemistry:  Recent Labs   Lab 07/23/23  0608 07/24/23  0310 07/25/23  0327 07/26/23  0503 07/27/23  0434    146* 141  --  143   K 3.8 3.5 3.6  --  3.5    114* 108  --  107   CO2 23 24 24  --  28   * 202* 151*  --  208*   BUN 78* 82* 75*  --  55*   CREATININE 2.8* 2.5* 2.3*  --  1.8*   CALCIUM 7.6* 7.8* 7.9*  --  8.0*   PROT 4.6* 4.6* 5.4*  --  5.6*   ALBUMIN 1.8* 1.8* 2.3*  --  2.3*   BILITOT 1.0 1.0 0.8  --  0.8   ALKPHOS 256* 217* 213*  --  171*   AST 38 24 34  --  21   * 127* 101*  --  51*   ANIONGAP 9 8 9  --  8   MG 2.7* 2.5 2.4   < > 2.0   PHOS 3.8 3.7 4.4  --   --     < > = values in this interval not displayed.     CBC:   Recent Labs   Lab 07/27/23  0857   WBC 8.73   RBC 4.07   HGB 10.7*   HCT 33.5*      MCV 82   MCH 26.3*   MCHC 31.9*     Lipid Panel:  No results for input(s): CHOL, HDL, LDLCALC, TRIG, CHOLHDL in the last 168 hours.  Cardiac Profile:  Recent Labs   Lab 07/24/23  0310 07/26/23  0503   *  --    CPK  --  20     Inflammatory Labs:  Recent Labs   Lab 07/24/23  0310   CRP 6.33*     Diabetes:  No results for input(s): HGBA1C, POCTGLUCOSE in the last 168 hours.  Thyroid & Parathyroid:  No results for input(s): TSH, FREET4, U2VIBNQ, M2KNDZW, THYROIDAB in the last 168 hours.    Monitor and Evaluation  Food and Nutrient Intake: energy intake, food and beverage intake  Food and Nutrient Adminstration: diet order  Knowledge/Beliefs/Attitudes: food and nutrition knowledge/skill  Physical Activity and Function: nutrition-related ADLs and IADLs  Anthropometric Measurements: height/length, weight, weight  change  Biochemical Data, Medical Tests and Procedures: electrolyte and renal panel, inflammatory profile, gastrointestinal profile, lipid profile, glucose/endocrine profile  Nutrition-Focused Physical Findings: overall appearance     Nutrition Risk  Level of Risk/Frequency of Follow-up: moderate     Nutrition Follow-Up  RD Follow-up?: Yes      Meme Mac RD 07/27/2023 3:53 PM

## 2023-07-27 NOTE — PLAN OF CARE
Per Enid with [facility], LTAC placement denied by payor source.  MD notified and stated to look into Rehab placement.  Catina with [facility] is reviewing.  CM following.    11:57- [facility] Acute [facility] denied due to payor source.  [facility] [place] [facility]  SNF referrals sent as well.         07/27/23 0845   Post-Acute Status   Post-Acute Authorization Placement   Post-Acute Placement Status Referrals Sent   Discharge Delays (!) Post-Acute Set-up   Discharge Plan   Discharge Plan A Rehab   Discharge Plan B Rehab

## 2023-07-27 NOTE — PT/OT/SLP PROGRESS
Occupational Therapy   Treatment    Name: Chanel Cervantes  MRN: [id number]  Admitting Diagnosis:  Septic shock  8 Days Post-Op    Recommendations:     Discharge Recommendations: rehabilitation facility  Discharge Equipment Recommendations:  to be determined by next level of care  Barriers to discharge:  Decreased caregiver support    Assessment:     Chanel Cervantes is a 67 y.o. female with a medical diagnosis of Septic shock.   Performance deficits affecting function are weakness, impaired endurance, impaired self care skills, impaired functional mobility, gait instability, impaired balance, pain, impaired cardiopulmonary response to activity.     Pt completed bed mobility and transfer to bedside chair with minimal assistance; pt's daughter was present/encouraging.     Rehab Prognosis:  Good; patient would benefit from acute skilled OT services to address these deficits and reach maximum level of function.       Plan:     Patient to be seen 6 x/week to address the above listed problems via self-care/home management, therapeutic activities, therapeutic exercises  Plan of Care Expires: 08/25/23  Plan of Care Reviewed with: patient, daughter    Subjective     Pain/Comfort:  Pain Rating 1: 0/10  Pain Rating Post-Intervention 1: 0/10    Objective:     Communicated with: nurse prior to session.  Patient found supine with oxygen upon OT entry to room.    General Precautions: Standard, fall    Orthopedic Precautions:N/A  Braces: N/A     Occupational Performance:     Bed Mobility:    Patient completed Supine to Sit with minimum assistance     Functional Mobility/Transfers:  Patient completed Sit <> Stand Transfer with minimum assistance  with  rolling walker   Patient completed Bed <> Chair Transfer using Stand Pivot technique with minimum assistance with rolling walker    Patient left up in chair with all lines intact, nurse notified, and daughter present    GOALS:   Multidisciplinary Problems       Occupational Therapy Goals           Problem: Occupational Therapy    Goal Priority Disciplines Outcome Interventions   Occupational Therapy Goal     OT, PT/OT Ongoing, Progressing    Description: Goals to be met by: 8/25/2023     Patient will increase functional independence with ADLs by performing:    Feeding with [place].  UE Dressing with Modified [place].  LE Dressing with Modified [place].  Grooming while standing at sink with Modified [place].  Toileting from toilet with Modified [place] for hygiene and clothing management.   Toilet transfer to toilet with Modified [place].                         Time Tracking:     OT Date of Treatment: 07/27/23  OT Start Time: 1421  OT Stop Time: 1438  OT Total Time (min): 17 min    Billable Minutes:Therapeutic Activity 17    OT/RUTHY: OT          7/27/2023

## 2023-07-27 NOTE — PROGRESS NOTES
[facility]  Progress Note    Patient Name: Chanel Cervantes  MRN: [id number]  Patient Class: IP- Inpatient   Admission Date: 7/18/2023  Length of Stay: 8 days  Attending Physician: Kartik Farris MD  Primary Care Provider: ARIC Almaguer        Subjective:     Principal Problem:Septic shock        HPI:  Chanel Cervantes is a 67 y.o. White female   With PMH of DM2, HTN, bariatric surgery,  Remote cholecystectomy,  Frequent UTIs with kidney stones,  who presents with back pain.  Admitted for pancreatitis with elevated LFTs     Pt is currently confused after receiving ativan  (ER gave it to calm her for CT scan)  History taken from family at bedside     Onset of back pain overnight  Located b/l lumbar region  Radiating to b/l upper quadrants of abdomen  Occurring intermittently  Progressively worsening  Severe, causes SOB when occurring     Initially pt thought pain was due to a fall yesterday  (Mechanical, tripped over a rug, no head trauma)  +nausea, no vomiting  +intermittent chills  They are not sure about objective fever     Has had similar abdominal pain previously  Per family, this has been attributed to her ongoing ventral wall hernia  They don't bring her to ER for this usually  However pain today was much more severe than usual      Overview/Hospital Course:  Cahnel Cervantes is a 67 year old female with a past medical history of obesity, ventral hernia, DM, HTN, anemia, CAD, NIKOLAS, and hypothyroidism who presented with septic shock secondary to a possible cholangitis with E coli bacteremia. Vancomycin was discontinued and she remained on meropenem. BP was maintained on a Levophed infusion. GI was consulted and performed ERCP which showed biliary sludge with a sludge ball dilating the main duct which was removed; a biliary sphincterotomy was also performed. Repeat blood cultures were negative. She was on IV fluids with LR and was NPO as she was encephalopathic. She also had  an ANTHONY as well. She also had acute hypoxic respiratory failure for which she was put on mask O2. She also had demand ischemia; TTE was done and troponin was trended. There was shock liver superimposed on the hepatocellular injury brought on by the acute cholangitis. Pulmonary/Critical Care was consulted given the patient's medical acuity.  Pulmonologist made decision to intubate patient based on patient's deep encephalopathy and inability to protect her airway.  Patient underwent transesophageal echocardiography which did not report any intracardiac thrombus or evidence of endocarditis.  Patient was successfully extubated on July 24, 2023. PT and OT is working with patient. Patient is transferred to medicine floor for further management and rehab.  working on LTAC placement.      Interval History:  No acute events overnight.  She is somewhat short of breath.  Symptoms seem primarily related to mucus production and congestion.  No stridorous symptoms today.  Workup mucus clearance and working on rehab placement    Review of Systems   All other systems reviewed and are negative.  Objective:     Vital Signs (Most Recent):  Temp: 98.1 °F (36.7 °C) (07/27/23 1134)  Pulse: 82 (07/27/23 1134)  Resp: 18 (07/27/23 1134)  BP: (!) 178/76 (notified nurse Juliette) (07/27/23 1134)  SpO2: 97 % (07/27/23 1134) Vital Signs (24h Range):  Temp:  [98.1 °F (36.7 °C)-98.5 °F (36.9 °C)] 98.1 °F (36.7 °C)  Pulse:  [64-98] 82  Resp:  [18-24] 18  SpO2:  [93 %-100 %] 97 %  BP: (125-184)/(62-77) 178/76     Weight: 108.9 kg (239 lb 15.9 oz)  Body mass index is 37.6 kg/m².    Intake/Output Summary (Last 24 hours) at 7/27/2023 1547  Last data filed at 7/27/2023 0936  Gross per 24 hour   Intake 340 ml   Output 1700 ml   Net -1360 ml           Physical Exam  Constitutional:       Appearance: Normal appearance. She is normal weight.      Comments: Very weak   HENT:      Head: Normocephalic and atraumatic.      Nose: Nose normal.       Mouth/Throat:      Mouth: Mucous membranes are moist.   Eyes:      Conjunctiva/sclera: Conjunctivae normal.      Pupils: Pupils are equal, round, and reactive to light.   Cardiovascular:      Rate and Rhythm: Normal rate and regular rhythm.      Pulses: Normal pulses.      Heart sounds: Normal heart sounds. No murmur heard.    No friction rub. No gallop.   Pulmonary:      Effort: Pulmonary effort is normal.      Breath sounds: Wheezing present. No rhonchi or rales.      Comments: Some wheezes, no crackles or rhonchi  Abdominal:      General: Abdomen is flat. Bowel sounds are normal. There is no distension.      Palpations: Abdomen is soft.      Tenderness: There is no abdominal tenderness. There is no guarding.   Musculoskeletal:         General: No swelling. Normal range of motion.      Cervical back: Normal range of motion and neck supple.      Right lower leg: No edema.      Left lower leg: No edema.   Skin:     General: Skin is warm and dry.   Neurological:      General: No focal deficit present.      Mental Status: She is alert.   Psychiatric:         Mood and Affect: Mood normal.         Thought Content: Thought content normal.         Judgment: Judgment normal.           Significant Labs: All pertinent labs within the past 24 hours have been reviewed.    Significant Imaging: I have reviewed all pertinent imaging results/findings within the past 24 hours.      Assessment/Plan:      * Septic shock due to Entercoccus faecium and E.coli  Secondary to acute cholangitis  -continue IVAB, stop date is 08/01  -Enterococcus bacteremia now cleared  -S/p ERCP; GI following  -Continue LR IV fluids and Levophed to keep MAP > 65    Antibiotics (From admission, onward)    Start     Stop Route Frequency Ordered    07/21/23 1400  DAPTOmycin (CUBICIN) 805 mg in sodium chloride 0.9% SolP 50 mL IVPB         -- IV Every 48 hours (non-standard times) 07/20/23 0749    07/19/23 1300  meropenem 1 g in sodium chloride 0.9 % 100 mL IVPB  "(ready to mix system)        Note to Pharmacy: Ht: 5' 7" (1.702 m)  Wt: 108.9 kg (240 lb)  Estimated Creatinine Clearance: 63.1 mL/min (based on SCr of 1.1 mg/dL).  Body mass index is 37.59 kg/m².    -- IV Every 12 hours (non-standard times) 07/19/23 0740              Gallstone pancreatitis  Present on hospital day 1.  Continue vasopressor support and crystalloid.      Encephalopathy, metabolic  Metabolic in setting of septic shock.  -assess mental status when off sedation  -Treat septic shock      Shock liver  -Treat cholangitis  -Trend LFTs, much improved.  -Continue IV fluids and Levophed    Lab Results   Component Value Date     (H) 07/23/2023    AST 38 07/23/2023    GGT 24 05/17/2018    ALKPHOS 256 (H) 07/23/2023    BILITOT 1.0 07/23/2023         ANTHONY (acute kidney injury)  In setting of septic shock.  -appreciate nephrology recommendations  -Renally dose medications  -Avoid nephrotoxic agents  -Monitor UOP and electrolytes  -Trend creatinine  -nephrologist consulting    Cr has trended upward:  2.4 to 2.6 to 2.9 to 2.8.  Now down to 2.3  BUN is going up as well:  49 to 56 to 64       Demand ischemia  History of CAD s/p CABG. No acute ST changes on EKG.  -Trend troponin  -Treat septic shock  -Telemetry  -cardiology consulting -- their opinion is that the troponin is high b/c of myocardial demand ischemia    Troponin I High Sensitivity   Date Value Ref Range Status   07/20/2023 2384.3 (HH) 0.0 - 14.9 pg/mL Final     Comment:     Troponin results differ between methods. Do not use   results between Troponin methods interchangeably.    Alkaline Phospatase levels above 400 U/L may   cause false positive results.    Access hsTnI should not be used for patients taking   Asfotase anya (Strensiq).  Troponin critical result(s) called and verbal readback obtained   from Argentina Avila RN M3 by MS1 07/20/2023 04:54     07/19/2023 1148.1 (HH) 0.0 - 14.9 pg/mL Final     Comment:     Troponin results differ between " methods. Do not use   results between Troponin methods interchangeably.    Alkaline Phospatase levels above 400 U/L may   cause false positive results.    Access hsTnI should not be used for patients taking   Asfotase anya (Strensiq).  Troponin critical result(s) called and verbal readback obtained from   Karan Maldonado RN M3  by MS1 07/19/2023 22:10     07/19/2023 648.1 (HH) 0.0 - 14.9 pg/mL Final     Comment:     Troponin results differ between methods. Do not use   results between Troponin methods interchangeably.    Alkaline Phospatase levels above 400 U/L may   cause false positive results.    Access hsTnI should not be used for patients taking   Asfotase anya (Strensiq).  Results confirmed, test repeated  Troponin critical result(s) repeated. Called and verbal readback   obtained from Carmela Mcintosh,ICU, RN.  by SLT1 07/19/2023 17:37         Acute hypoxemic respiratory failure  Required intubation on hospital day 3.  Patient was successfully extubated on July 24, 2023.  Follow Pulmonary Medicine recommendations.  Still with some wheezing this morning  Adjust DuoNebs  Add mucus clearance therapy        Anemia  Stable.  -Trend Hgb with CBC    Hemoglobin   Date Value Ref Range Status   07/25/2023 11.5 (L) 12.0 - 16.0 g/dL Final   07/24/2023 10.6 (L) 12.0 - 16.0 g/dL Final           Obesity (BMI 30-39.9)  Body mass index is 37.6 kg/m². Morbid obesity complicates all aspects of disease management from diagnostic modalities to treatment.       Hypothyroidism  TSH unremarkable.  It's 0.470.  -Continue Synthroid      Acute obstructive cholangitis  Sludge ball removed via ERCP. Likely source of bacteremia. LFTs much improved.  -appreciate ID recommendations, treatment of E coli and Enterococcus as noted above  -GI following  -IV fluids  - blood cultures:  Entercoccus faecium and E.coli  -Trend LFTs      CAD (coronary artery disease)  -Hold home medications  -Telemetry      Hypokalemia  -Trend [place]  Has  improved.  -Replete K PRN  -Telemetry    Potassium   Date Value Ref Range Status   07/23/2023 3.8 3.5 - 5.1 mmol/L Final   07/22/2023 4.0 3.5 - 5.1 mmol/L Final         S/P bariatric surgery  Noted.      NIKOLAS on CPAP  -chronic condition.    Type 2 diabetes mellitus treated with insulin  Patient's FSGs are controlled on current medication regimen.  Last A1c reviewed-   Lab Results   Component Value Date    HGBA1C 6.9 (H) 07/18/2023     'Current correctional scale  Medium  Maintain anti-hyperglycemic dose as follows-   Antihyperglycemics (From admission, onward)    Start     Stop Route Frequency Ordered    07/19/23 1018  insulin aspart U-100 pen 1-10 Units         -- SubQ Every 6 hours PRN 07/19/23 0920        Hold Oral hypoglycemics while patient is in the hospital.    Benign essential hypertension  -Hold home medications in setting of shock        VTE Risk Mitigation (From admission, onward)         Ordered     enoxaparin injection 30 mg  Every 24 hours (non-standard times)         07/22/23 0940     IP VTE HIGH RISK PATIENT  Once         07/18/23 1642     Place sequential compression device  Until discontinued         07/18/23 1642                Discharge Planning   LUIS ALFREDO: 7/28/2023     Code Status: Full Code   Is the patient medically ready for discharge?:     Reason for patient still in hospital (select all that apply): Treatment  Discharge Plan A: Rehab   Discharge Delays: (!) Post-Acute Set-up              Kartik Farris MD  Department of Hospital Medicine   [facility]

## 2023-07-27 NOTE — PT/OT/SLP PROGRESS
Physical Therapy Treatment    Patient Name:  Chanel Cervantes   MRN:  [id number]    Recommendations:     Discharge Recommendations: rehabilitation facility  Discharge Equipment Recommendations: to be determined by next level of care  Barriers to discharge:  assist x1-2 for mobility; decreased mobility from baseline; poor endurance     Assessment:     Chanel Cervantes is a 67 y.o. female admitted with a medical diagnosis of Septic shock.  She presents with the following impairments/functional limitations: weakness, impaired endurance, impaired self care skills, impaired functional mobility, gait instability, decreased upper extremity function, pain.    Pt transferred to sit EOB with maxA and Vcs. Sitting balance SBA. Stood to RW with modA. Two standing trials: 1) 20-second endurance, and 2) sidesteps x 3' with modA.    Attempted third stand unsuccessfully, reporting significant fatigue.    Declined transfer to chair until daughter present, expressing fear of falling.     Communicated with OT re: potential chair transfer later in day.     Rehab Prognosis: Good; patient would benefit from acute skilled PT services to address these deficits and reach maximum level of function.    Recent Surgery: Procedure(s) (LRB):  ERCP (ENDOSCOPIC RETROGRADE CHOLANGIOPANCREATOGRAPHY) (N/A) 8 Days Post-Op    Plan:     During this hospitalization, patient to be seen 6 x/week to address the identified rehab impairments via gait training, therapeutic activities, therapeutic exercises, neuromuscular re-education and progress toward the following goals:    Plan of Care Expires:  08/29/23    Subjective     Chief Complaint: fatigue, weakness, R arm pain  Patient/Family Comments/goals: discharge to live at daughter's house  Pain/Comfort:  Pain Rating 1: other (see comments) (unrated)  Location - Side 1: Right  Location 1: arm  Pain Addressed 1: Distraction, Reposition  Pain Rating Post-Intervention 1: 0/10      Objective:     Communicated with  nurse prior to session.  Patient found HOB elevated with telemetry, bed alarm, schaefer catheter, peripheral IV upon PT entry to room.     General Precautions: Standard, fall  Orthopedic Precautions: N/A  Braces: N/A  Respiratory Status: Nasal cannula, flow 2 L/min     Functional Mobility:  Bed Mobility:     Supine to Sit: maximal assistance  Sit to Supine: maximal assistance  Transfers:     Sit to Stand:  moderate assistance with rolling walker  Gait: 2-3' sidesteps with RW and modA; sufficient clearance of bilat feet from floor      AM-PAC 6 CLICK MOBILITY          Treatment & Education:  -Static stand endurnace 20 seconds  -Sidesteps 2-3' distance with RW and modA  -Seated EOB: LAQs, AP, Ankle circles CW/CCW  -Pt educ: benefits of participation with PT, transfer to chair, OOB during day; call light/fall prevention    Patient left HOB elevated with all lines intact, call button in reach, bed alarm on, and nurse notified..    GOALS:   Multidisciplinary Problems       Physical Therapy Goals          Problem: Physical Therapy    Goal Priority Disciplines Outcome Goal Variances Interventions   Physical Therapy Goal     PT, PT/OT Ongoing, Progressing     Description: Goals to be met by: 23     Patient will increase functional independence with mobility by performin. Supine to sit with Supervision  2. Sit to stand transfer with Supervision  3. Bed to chair transfer with Supervision using Rolling Walker  4. Gait  x 150 feet with Supervision using Rolling Walker.                              Time Tracking:     PT Received On: 23  PT Start Time: 905     PT Stop Time: 932  PT Total Time (min): 27 min     Billable Minutes: Therapeutic Activity 27    Treatment Type: Treatment  PT/PTA: PTA     Number of PTA visits since last PT visit: 2023

## 2023-07-27 NOTE — PLAN OF CARE
CM called [facility] into [facility].  PAS[facility] faxed to [facility].  Waiting on 142 at this time for possible SNF placement.     14:49- Chelsea with [facility] stated they can accept the patient for Rehab and they are submitting for auth.       07/27/23 1231   Post-Acute Status   Post-Acute Authorization Placement   Post-Acute Placement Status Referrals Sent   Patient choice form signed by patient/caregiver List with quality metrics by geographic area provided   Discharge Delays (!) Post-Acute Set-up   Discharge Plan   Discharge Plan A Rehab   Discharge Plan B Skilled Nursing Facility

## 2023-07-27 NOTE — PROGRESS NOTES
INPATIENT NEPHROLOGY Progress Note  [facility]    Patient Name: Chanel Cervantes  Date: 07/27/2023    Reason for consultation:   ANTHONY    Chief Complaint:   Chief Complaint   Patient presents with    Fall     Denies hitting head or LOC, hit right shoulder    Shortness of Breath    Back Pain     History of Present Illness:  Chanel Cervantes is a 67 y.o. female obese, BMI 37.6 kg/M2, with past medical history of diabetes, hypertension, bariatric surgery, cholecystectomy, prior UTIs, and prior pancreatitis secondary to gallstones. She presented with acute onset of back pain, radiating into bilateral upper quadrants, with associated shortness of breath.  She was admitted for septic shock, transferred to ICU for further management. She is s/p ERCP with evidence of the entire main bile duct was moderately dilated, with an obstruction from suspected sludge ball. A biliary sphincterotomy was performed. The biliary tree was swept. Hospital course complicated AMS requiring MV and and polymicrobial bacteremia, Enterococcus faecium and E coli. We are consulted for ANTHONY.    Echo:  The left ventricle is normal in size with mild concentric hypertrophy and mildly decreased systolic function.  Mild to moderate tricuspid regurgitation.  The estimated ejection fraction is 45%.  Grade I left ventricular diastolic dysfunction.  There is abnormal septal wall motion consistent with left bundle branch block.  Mild right ventricular enlargement with normal right ventricular systolic function.  Moderate left atrial enlargement.  Normal central venous pressure (3 mmHg).  The estimated PA systolic pressure is 40 mmHg.  There is mild pulmonary hypertension.       Interval History:  7/21- BP holding on levophed, FIO2 40%, UOP 1.7L  7/22- off pressors, VSS, FIO2 30%, UOP 1.3L, tolerating tube feeds, rise in WBC count is noted  7/23- kieran (got atropine this AM for HR 39), SBP , FIO2 30%, UOP 1.4L, on/off levophed overnight,  ERICA today  7/24 VSS, spontaneous ventilation, afebrile, improving UO.scr remains elevated dut to ATN.  7/25 VSS. Extubated, lethargic but arousable. sCr better but remains abnormal. UO is oK.  7/26 VSS, no new complains.  7/27 VSS, no new complains.    Plan of Care:    Biliary sludge with polymicrobial bacteremia s/p ERCP with sphincterotomy 7/19, septic shock resolved  ANTHONY due to sepsis/ischemic ATN  Shock liver, improving  Demand ischemia (underlying systolic CHF/pulm HTN)  Anemia/Thrombocytopenia    Plan:    - sCr remains abnormal consistent with ATN. Non-oliguric  - dose meds for CrCl < 30  - no nsaids or IV contrast  - LFTs improving  - heme parameters stable    Thank you for allowing us to participate in this patient's care. We will continue to follow.    Vital Signs:  Temp Readings from Last 3 Encounters:   07/27/23 98.1 °F (36.7 °C) (Oral)   06/02/23 98 °F (36.7 °C) (Oral)   04/13/23 97.4 °F (36.3 °C)       Pulse Readings from Last 3 Encounters:   07/27/23 82   06/02/23 68   04/13/23 71       BP Readings from Last 3 Encounters:   07/27/23 (!) 178/76   06/02/23 (!) 120/59   04/13/23 (!) 146/70       Weight:  Wt Readings from Last 3 Encounters:   07/19/23 108.9 kg (239 lb 15.9 oz)   07/19/23 108.9 kg (240 lb 1.3 oz)   06/02/23 109.1 kg (240 lb 8 oz)       INS/OUTS:  I/O last 3 completed shifts:  In: 280 [P.O.:280]  Out: 2700 [Urine:2700]  I/O this shift:  In: 340 [P.O.:240; IV Piggyback:100]  Out: -       Medications:  Scheduled Meds:   amLODIPine  5 mg Oral Daily    cefTRIAXone (ROCEPHIN) IVPB  2 g Intravenous Q24H    chlorhexidine  15 mL Mouth/Throat BID    DAPTOmycin (CUBICIN) IV (PEDS and ADULTS)  10 mg/kg (Adjusted) Intravenous Q48H    enoxparin  30 mg Subcutaneous Q24H    levothyroxine  75 mcg Oral Before breakfast     Continuous Infusions:      PRN Meds:.acetaminophen, albuterol-ipratropium, butalbital-acetaminophen-caffeine -40 mg, calcium gluconate IVPB, calcium gluconate IVPB, calcium gluconate  IVPB, carboxymethylcellulose, dextrose 50%, dextrose 50%, glucagon (human recombinant), glucose, glucose, hydrALAZINE, insulin aspart U-100, lorazepam, magnesium oxide, magnesium oxide, magnesium sulfate IVPB, magnesium sulfate IVPB, naloxone, ondansetron, polyethylene glycol, potassium bicarbonate, potassium bicarbonate, potassium bicarbonate, potassium chloride in water **AND** potassium chloride in water **AND** potassium chloride in water, racepinephrine, senna-docusate 8.6-50 mg, simethicone, sodium chloride 0.9%, sodium phosphate IVPB, sodium phosphate IVPB, sodium phosphate IVPB, traMADoL  No current facility-administered medications on file prior to encounter.     Current Outpatient Medications on File Prior to Encounter   Medication Sig Dispense Refill    amLODIPine (NORVASC) 2.5 MG tablet Take 1 tablet (2.5 mg total) by mouth once daily. 90 tablet 1    azelastine (ASTELIN) 137 mcg (0.1 %) nasal spray 1 spray (137 mcg total) by Nasal route 2 (two) times daily. 30 mL 5    calcium carbonate-vitamin D3 600 mg-62.5 mcg (2,500 unit) Cap calcium carbonate 600 mg-vitamin D3 62.5 mcg (2,500 unit) capsule   Take by oral route.      DULoxetine (CYMBALTA) 60 MG capsule Take 1 capsule (60 mg total) by mouth once daily. 90 capsule 1    empaglifloz-linaglip-metformin (TRIJARDY XR) 25-5-1,000 mg TBph Take 1 tablet by mouth once daily. 90 tablet 1    guanFACINE (TENEX) 2 MG tablet Take 1 tablet (2 mg total) by mouth nightly. 90 tablet 3    insulin degludec (TRESIBA FLEXTOUCH U-200) 200 unit/mL (3 mL) insulin pen Inject 76 Units into the skin once daily. 12 pen 3    iron-vitamin C 100-250 mg, ICAR-C, 100-250 mg Tab Take 1 tablet by mouth once daily. 30 each 8    levothyroxine (SYNTHROID) 75 MCG tablet Take 75 mcg by mouth before breakfast.      metFORMIN (GLUCOPHAGE) 1000 MG tablet Take 1,000 mg by mouth 2 (two) times daily with meals.      metOLazone (ZAROXOLYN) 5 MG tablet Take 1 tablet (5 mg total) by mouth once daily.  "90 tablet 0    metoprolol succinate (TOPROL-XL) 25 MG 24 hr tablet Take 1 tablet (25 mg total) by mouth once daily. 90 tablet 1    multivitamin capsule Take 1 capsule by mouth once daily.      olmesartan (BENICAR) 40 MG tablet Take 1 tablet (40 mg total) by mouth once daily. 90 tablet 1    potassium chloride (KLOR-CON) 10 MEQ TbSR Take 1 tablet (10 mEq total) by mouth once daily. 90 tablet 1    pregabalin (LYRICA) 50 MG capsule Take 50 mg by mouth every evening.      rosuvastatin (CRESTOR) 40 MG Tab Take 1 tablet (40 mg total) by mouth every evening. 90 tablet 3    aspirin (ECOTRIN) 81 MG EC tablet Take 1 tablet (81 mg total) by mouth once daily. 30 tablet 0    blood sugar diagnostic Strp One Touch Ultra Blue Test strips, Use l strip to check glucose 4 times daily 100 each 11    blood-glucose meter kit Use as instructed 1 each 0    cyanocobalamin 1,000 mcg/mL injection Inject 1 mL (1,000 mcg total) into the skin every 30 days. 3 mL 4    lancets (ONETOUCH DELICA LANCETS) 33 gauge Misc USE 1  TO CHECK GLUCOSE 4 TIMES DAILY 100 each 3    magnesium oxide (MAG-OX) 400 mg (241.3 mg magnesium) tablet Take 1 tablet (400 mg total) by mouth once daily. 90 tablet 1    NYSTOP powder APPLY TO AFFECTED AREA AS NEEDED      prednisoLONE acetate (PRED FORTE) 1 % DrpS Place 1 drop into both eyes as needed.        Review of Systems:  On MV    Physical Exam:  BP (!) 178/76 Comment: notified nurse Juliette  Pulse 82   Temp 98.1 °F (36.7 °C) (Oral)   Resp 18   Ht 5' 7" (1.702 m)   Wt 108.9 kg (239 lb 15.9 oz)   SpO2 97%   BMI 37.60 kg/m²     General Appearance:    Ill   Head:    Normocephalic, atraumatic   Eyes:    Closed     Mouth:   Moist mucus membranes, no thrush or oral lesions, normal      dentition   Back:     No CVA tenderness   Lungs:     Coarse, on MV   Heart:    Regular rate and rhythm, no rub/gallop   Abdomen:     Soft, non-tender, non-distended guarding, no masses, no organomegaly   Extremities:   Warm and well " perfused, distal pulses intact, no cyanosis, edematous   MSK:   No joint or muscle swelling, tenderness or deformity   Skin:   Skin color, texture, turgor normal, no rashes or lesions   Neurologic/Psychiatric:   Sedated     Results:  Recent Labs   Lab 07/24/23 0310 07/25/23 0327 07/27/23  0434   * 141 143   K 3.5 3.6 3.5   * 108 107   CO2 24 24 28   BUN 82* 75* 55*   CREATININE 2.5* 2.3* 1.8*   * 151* 208*         Recent Labs   Lab 07/23/23  0608 07/24/23  0310 07/25/23 0327 07/26/23  0503 07/27/23  0434   CALCIUM 7.6* 7.8* 7.9*  --  8.0*   ALBUMIN 1.8* 1.8* 2.3*  --  2.3*   PHOS 3.8 3.7 4.4  --   --    MG 2.7* 2.5 2.4 2.2 2.0               No results for input(s): POCTGLUCOSE in the last 168 hours.    Recent Labs   Lab 12/22/20  1232 07/07/21  1057 07/18/23  1735   Hemoglobin A1C 6.8 H 7.4 H 6.9 H         Recent Labs   Lab 07/24/23 0310 07/24/23  0313 07/25/23  0818 07/26/23  1908 07/27/23  0857   WBC 13.49*  --  11.11  --  8.73   HGB 10.6*  --  11.5*  --  10.7*   HCT 33.4*   < > 36.3* 30* 33.5*   PLT 99*  --  92*  --  283   MCV 83  --  84  --  82   MCHC 31.7*  --  31.7*  --  31.9*   MONO 7.1  1.0  --  7.5  0.8  --  7.3  0.6    < > = values in this interval not displayed.         Recent Labs   Lab 07/24/23 0310 07/25/23 0327 07/27/23  0434   BILITOT 1.0 0.8 0.8   PROT 4.6* 5.4* 5.6*   ALBUMIN 1.8* 2.3* 2.3*   ALKPHOS 217* 213* 171*   * 101* 51*   AST 24 34 21         Recent Labs   Lab 05/29/23  1040 07/18/23  1221 07/19/23  0116   Color, UA Yellow Yellow Yellow   Appearance, UA Clear Clear Clear   pH, UA 6.0 8.0 6.0   Specific Crosby, UA 1.015 1.030 >1.030 A   Protein, UA Negative 1+ A 1+ A   Glucose, UA 4+ A 4+ A 4+ A   Ketones, UA Negative Trace A Negative   Urobilinogen, UA Negative 2.0-3.0 A 2.0-3.0 A   Bilirubin (UA) Negative Negative 1+ A   Occult Blood UA Negative 1+ A Trace A   Nitrite, UA Negative Negative Negative   RBC, UA 1 6 H 1   WBC, UA 7 H 6 H 3   Bacteria  Negative Negative Negative   Hyaline Casts, UA 4 A 5 A 1         Recent Labs   Lab 07/24/23  0312 07/24/23  0947 07/26/23  1908   POC PH 7.461 H 7.436 7.418   POC PCO2 36.9 38.2 37.8   POC HCO3 26.3 25.7 24.4   POC PO2 70 L 63 L 75 L   POC SATURATED O2 95 92 L 95   POC BE 2 1 0   Sample ARTERIAL ARTERIAL ARTERIAL         Microbiology Results (last 7 days)       Procedure Component Value Units Date/Time    Blood culture [[id number]] Collected: 07/23/23 0825    Order Status: Completed Specimen: Blood Updated: 07/27/23 1032     Blood Culture, Routine No Growth to date      No Growth to date      No Growth to date      No Growth to date      No Growth to date    Blood culture [[id number]] Collected: 07/24/23 0348    Order Status: Completed Specimen: Blood Updated: 07/27/23 0432     Blood Culture, Routine No Growth to date      No Growth to date      No Growth to date      No Growth to date    Narrative:      Collection has been rescheduled by [email or web address] at 07/24/2023 03:28 Reason:   Nurse Draw. Blood was already drawn. I was not able to get the blood   cultures.  Collection has been rescheduled by JS at 07/24/2023 03:28 Reason:   Nurse Draw. Blood was already drawn. I was not able to get the blood   cultures.    Blood culture [[id number]] Collected: 07/25/23 0327    Order Status: Completed Specimen: Blood Updated: 07/27/23 0432     Blood Culture, Routine No Growth to date      No Growth to date      No Growth to date    Blood culture [[id number]] Collected: 07/20/23 0940    Order Status: Completed Specimen: Blood from Peripheral, Antecubital, Right Updated: 07/25/23 1232     Blood Culture, Routine No growth after 5 days.    Narrative:      Collection has been rescheduled by [email or web address] at 07/20/2023 06:04 Reason:   Unable to collect  Collection has been rescheduled by [email or web address] at 07/20/2023 08:01 Reason:   Contacting zainab for cultures per RN Alfred  Collection has been rescheduled by [email or web address] at 07/20/2023 08:41 Reason:   Nurse  Draw  Collection has been rescheduled by [email or web address] at 07/20/2023 06:04 Reason:   Unable to collect  Collection has been rescheduled by [email or web address] at 07/20/2023 08:01 Reason:   Contacting zainab for cultures per RN Alfred  Collection has been rescheduled by [email or web address] at 07/20/2023 08:41 Reason:   Nurse Draw    Culture, Respiratory with Gram Stain [[id number]] Collected: 07/20/23 1003    Order Status: Completed Specimen: Respiratory from Endotracheal Aspirate Updated: 07/22/23 0736     Respiratory Culture No growth      No normal respiratory shay     Gram Stain (Respiratory) <10 epithelial cells per low power field.     Gram Stain (Respiratory) Few WBC's     Gram Stain (Respiratory) No organisms seen    Urine Culture High Risk [[id number]] Collected: 07/19/23 1006    Order Status: Completed Specimen: Urine, Clean Catch Updated: 07/22/23 0710     Urine Culture, Routine No growth    Narrative:      Indicated criteria for high risk culture:->Other  Other (specify):->e coli bacteremia    Blood culture [[id number]]  (Abnormal) Collected: 07/20/23 0940    Order Status: Completed Specimen: Blood from Line, Arterial, Right Updated: 07/22/23 0644     Blood Culture, Routine Gram stain aer bottle: Gram positive cocci      Positive results previously called      ENTEROCOCCUS FAECIUM  For susceptibility see order #[id number]      Narrative:      Collection has been rescheduled by [email or web address] at 07/20/2023 06:04 Reason:   Unable to collect  Collection has been rescheduled by [email or web address] at 07/20/2023 08:01 Reason:   Contacting zainab for cultures per RN Alfred  Collection has been rescheduled by [email or web address] at 07/20/2023 08:41 Reason:   Nurse Draw  Collection has been rescheduled by [email or web address] at 07/20/2023 06:04 Reason:   Unable to collect  Collection has been rescheduled by [email or web address] at 07/20/2023 08:01 Reason:   Contacting zainab for cultures per RN Alfred  Collection has been rescheduled by [email or web address] at 07/20/2023 08:41 Reason:   Nurse Draw    Blood culture [[id number]]  (Abnormal)  Collected: 07/19/23 1230    Order Status: Completed Specimen: Blood Updated: 07/22/23 0643     Blood Culture, Routine Gram stain aer bottle: Gram positive cocci      Positive results previously called      ENTEROCOCCUS FAECIUM  For susceptibility see order #[id number]      Blood culture [[id number]]  (Abnormal) Collected: 07/19/23 0002    Order Status: Completed Specimen: Blood Updated: 07/22/23 0643     Blood Culture, Routine Gram stain aer bottle: Gram positive cocci      Gram stain ez bottle: Gram positive cocci      Positive results previously called      ENTEROCOCCUS FAECIUM  For susceptibility see order #[id number]      Blood culture [[id number]]  (Abnormal) Collected: 07/19/23 0030    Order Status: Completed Specimen: Blood from Peripheral, Left Hand Updated: 07/22/23 0643     Blood Culture, Routine Gram stain aer bottle: Gram positive cocci      Gram stain ez bottle: Gram positive cocci      Results called to and read back by:Carmela Mcintosh RN-3ICU;      07/19/2023  16:23 CJD      ENTEROCOCCUS FAECIUM  For susceptibility see order #[id number]      Narrative:      Collection has been rescheduled by [email or web address] at 07/19/2023 00:20 Reason:   Nurse Draw  Collection has been rescheduled by [email or web address] at 07/19/2023 00:20 Reason:   Nurse Draw    Blood culture [[id number]]  (Abnormal)  (Susceptibility) Collected: 07/18/23 1648    Order Status: Completed Specimen: Blood Updated: 07/22/23 0642     Blood Culture, Routine Gram stain aer bottle: Gram negative rods      Gram stain aer bottle: Gram positive cocci      Results called to and read back by: Lesvia Mcdowell RN MICU3.      07/19/2023  05:06 TGC      ESCHERICHIA COLI      ENTEROCOCCUS FAECIUM    Blood culture [[id number]]  (Abnormal) Collected: 07/18/23 1738    Order Status: Completed Specimen: Blood Updated: 07/21/23 0853     Blood Culture, Routine Gram stain aer bottle: Gram negative rods      Results called to and read back by:Lesvia Mcdowell RN MICU3.      07/19/2023  05:23  TGC      ESCHERICHIA COLI  For susceptibility see order #[id number]            I have spent > minutes providing care for this patient for the above diagnoses. These services have included chart/data/imaging review, evaluation, exam, formulation of plan, , note preparation, and discussions with staff involved in this patient's care.    Omar Henao MD  [facility] [address] [address]  [phone] (p)  [phone] (f)

## 2023-07-28 PROBLEM — K59.00 CONSTIPATION: Status: ACTIVE | Noted: 2023-07-28

## 2023-07-28 LAB
ALBUMIN SERPL BCP-MCNC: 2.3 G/DL (ref 3.5–5.2)
ALP SERPL-CCNC: 191 U/L (ref 55–135)
ALT SERPL W/O P-5'-P-CCNC: 47 U/L (ref 10–44)
ANION GAP SERPL CALC-SCNC: 11 MMOL/L (ref 8–16)
ANION GAP SERPL CALC-SCNC: 12 MMOL/L (ref 8–16)
AST SERPL-CCNC: 48 U/L (ref 10–40)
BACTERIA BLD CULT: NORMAL
BASOPHILS # BLD AUTO: 0.06 K/UL (ref 0–0.2)
BASOPHILS # BLD AUTO: 0.07 K/UL (ref 0–0.2)
BASOPHILS NFR BLD: 0.6 % (ref 0–1.9)
BASOPHILS NFR BLD: 0.7 % (ref 0–1.9)
BILIRUB SERPL-MCNC: 0.8 MG/DL (ref 0.1–1)
BNP SERPL-MCNC: 447 PG/ML (ref 0–99)
BUN SERPL-MCNC: 42 MG/DL (ref 8–23)
BUN SERPL-MCNC: 44 MG/DL (ref 8–23)
CALCIUM SERPL-MCNC: 8.3 MG/DL (ref 8.7–10.5)
CALCIUM SERPL-MCNC: 8.3 MG/DL (ref 8.7–10.5)
CHLORIDE SERPL-SCNC: 104 MMOL/L (ref 95–110)
CHLORIDE SERPL-SCNC: 105 MMOL/L (ref 95–110)
CO2 SERPL-SCNC: 25 MMOL/L (ref 23–29)
CO2 SERPL-SCNC: 26 MMOL/L (ref 23–29)
CREAT SERPL-MCNC: 1.3 MG/DL (ref 0.5–1.4)
CREAT SERPL-MCNC: 1.3 MG/DL (ref 0.5–1.4)
DIFFERENTIAL METHOD: ABNORMAL
DIFFERENTIAL METHOD: ABNORMAL
EOSINOPHIL # BLD AUTO: 0.1 K/UL (ref 0–0.5)
EOSINOPHIL # BLD AUTO: 0.1 K/UL (ref 0–0.5)
EOSINOPHIL NFR BLD: 0.9 % (ref 0–8)
EOSINOPHIL NFR BLD: 1 % (ref 0–8)
ERYTHROCYTE [DISTWIDTH] IN BLOOD BY AUTOMATED COUNT: 16.3 % (ref 11.5–14.5)
ERYTHROCYTE [DISTWIDTH] IN BLOOD BY AUTOMATED COUNT: 16.4 % (ref 11.5–14.5)
EST. GFR  (NO RACE VARIABLE): 45.1 ML/MIN/1.73 M^2
EST. GFR  (NO RACE VARIABLE): 45.1 ML/MIN/1.73 M^2
GLUCOSE SERPL-MCNC: 239 MG/DL (ref 70–110)
GLUCOSE SERPL-MCNC: 252 MG/DL (ref 70–110)
GLUCOSE SERPL-MCNC: 258 MG/DL (ref 70–110)
GLUCOSE SERPL-MCNC: 267 MG/DL (ref 70–110)
GLUCOSE SERPL-MCNC: 268 MG/DL (ref 70–110)
GLUCOSE SERPL-MCNC: 307 MG/DL (ref 70–110)
HCT VFR BLD AUTO: 36.4 % (ref 37–48.5)
HCT VFR BLD AUTO: 38.3 % (ref 37–48.5)
HGB BLD-MCNC: 11.4 G/DL (ref 12–16)
HGB BLD-MCNC: 11.9 G/DL (ref 12–16)
IMM GRANULOCYTES # BLD AUTO: 0.08 K/UL (ref 0–0.04)
IMM GRANULOCYTES # BLD AUTO: 0.1 K/UL (ref 0–0.04)
IMM GRANULOCYTES NFR BLD AUTO: 0.9 % (ref 0–0.5)
IMM GRANULOCYTES NFR BLD AUTO: 1.1 % (ref 0–0.5)
LYMPHOCYTES # BLD AUTO: 0.7 K/UL (ref 1–4.8)
LYMPHOCYTES # BLD AUTO: 0.8 K/UL (ref 1–4.8)
LYMPHOCYTES NFR BLD: 7.5 % (ref 18–48)
LYMPHOCYTES NFR BLD: 8.8 % (ref 18–48)
MAGNESIUM SERPL-MCNC: 1.9 MG/DL (ref 1.6–2.6)
MCH RBC QN AUTO: 25.6 PG (ref 27–31)
MCH RBC QN AUTO: 26.1 PG (ref 27–31)
MCHC RBC AUTO-ENTMCNC: 31.1 G/DL (ref 32–36)
MCHC RBC AUTO-ENTMCNC: 31.3 G/DL (ref 32–36)
MCV RBC AUTO: 83 FL (ref 82–98)
MCV RBC AUTO: 84 FL (ref 82–98)
MONOCYTES # BLD AUTO: 0.7 K/UL (ref 0.3–1)
MONOCYTES # BLD AUTO: 0.7 K/UL (ref 0.3–1)
MONOCYTES NFR BLD: 6.9 % (ref 4–15)
MONOCYTES NFR BLD: 7.1 % (ref 4–15)
NEUTROPHILS # BLD AUTO: 7.7 K/UL (ref 1.8–7.7)
NEUTROPHILS # BLD AUTO: 7.7 K/UL (ref 1.8–7.7)
NEUTROPHILS NFR BLD: 81.9 % (ref 38–73)
NEUTROPHILS NFR BLD: 82.6 % (ref 38–73)
NRBC BLD-RTO: 0 /100 WBC
NRBC BLD-RTO: 0 /100 WBC
PLATELET # BLD AUTO: 317 K/UL (ref 150–450)
PLATELET # BLD AUTO: 426 K/UL (ref 150–450)
PMV BLD AUTO: 11.2 FL (ref 9.2–12.9)
PMV BLD AUTO: 11.4 FL (ref 9.2–12.9)
POTASSIUM SERPL-SCNC: 3.5 MMOL/L (ref 3.5–5.1)
POTASSIUM SERPL-SCNC: 3.7 MMOL/L (ref 3.5–5.1)
PROT SERPL-MCNC: 5.9 G/DL (ref 6–8.4)
RBC # BLD AUTO: 4.36 M/UL (ref 4–5.4)
RBC # BLD AUTO: 4.64 M/UL (ref 4–5.4)
SODIUM SERPL-SCNC: 141 MMOL/L (ref 136–145)
SODIUM SERPL-SCNC: 142 MMOL/L (ref 136–145)
TROPONIN I SERPL HS-MCNC: 33.6 PG/ML (ref 0–14.9)
TROPONIN I SERPL HS-MCNC: 33.6 PG/ML (ref 0–14.9)
WBC # BLD AUTO: 9.35 K/UL (ref 3.9–12.7)
WBC # BLD AUTO: 9.4 K/UL (ref 3.9–12.7)

## 2023-07-28 PROCEDURE — 99233 PR SUBSEQUENT HOSPITAL CARE,LEVL III: ICD-10-PCS | Mod: ,,, | Performed by: STUDENT IN AN ORGANIZED HEALTH CARE EDUCATION/TRAINING PROGRAM

## 2023-07-28 PROCEDURE — 83880 ASSAY OF NATRIURETIC PEPTIDE: CPT | Performed by: INTERNAL MEDICINE

## 2023-07-28 PROCEDURE — 63600175 PHARM REV CODE 636 W HCPCS: Performed by: INTERNAL MEDICINE

## 2023-07-28 PROCEDURE — 36415 COLL VENOUS BLD VENIPUNCTURE: CPT | Performed by: STUDENT IN AN ORGANIZED HEALTH CARE EDUCATION/TRAINING PROGRAM

## 2023-07-28 PROCEDURE — 63600175 PHARM REV CODE 636 W HCPCS: Performed by: STUDENT IN AN ORGANIZED HEALTH CARE EDUCATION/TRAINING PROGRAM

## 2023-07-28 PROCEDURE — 36415 COLL VENOUS BLD VENIPUNCTURE: CPT | Performed by: INTERNAL MEDICINE

## 2023-07-28 PROCEDURE — 25000003 PHARM REV CODE 250: Performed by: INTERNAL MEDICINE

## 2023-07-28 PROCEDURE — 25000003 PHARM REV CODE 250: Performed by: STUDENT IN AN ORGANIZED HEALTH CARE EDUCATION/TRAINING PROGRAM

## 2023-07-28 PROCEDURE — 85025 COMPLETE CBC W/AUTO DIFF WBC: CPT | Performed by: STUDENT IN AN ORGANIZED HEALTH CARE EDUCATION/TRAINING PROGRAM

## 2023-07-28 PROCEDURE — 94664 DEMO&/EVAL PT USE INHALER: CPT

## 2023-07-28 PROCEDURE — 99900031 HC PATIENT EDUCATION (STAT)

## 2023-07-28 PROCEDURE — 94799 UNLISTED PULMONARY SVC/PX: CPT

## 2023-07-28 PROCEDURE — 85025 COMPLETE CBC W/AUTO DIFF WBC: CPT | Mod: 91 | Performed by: INTERNAL MEDICINE

## 2023-07-28 PROCEDURE — 80053 COMPREHEN METABOLIC PANEL: CPT | Performed by: INTERNAL MEDICINE

## 2023-07-28 PROCEDURE — 80048 BASIC METABOLIC PNL TOTAL CA: CPT | Performed by: STUDENT IN AN ORGANIZED HEALTH CARE EDUCATION/TRAINING PROGRAM

## 2023-07-28 PROCEDURE — 93010 ELECTROCARDIOGRAM REPORT: CPT | Mod: ,,, | Performed by: INTERNAL MEDICINE

## 2023-07-28 PROCEDURE — 94761 N-INVAS EAR/PLS OXIMETRY MLT: CPT

## 2023-07-28 PROCEDURE — 99233 SBSQ HOSP IP/OBS HIGH 50: CPT | Mod: ,,, | Performed by: STUDENT IN AN ORGANIZED HEALTH CARE EDUCATION/TRAINING PROGRAM

## 2023-07-28 PROCEDURE — 93010 EKG 12-LEAD: ICD-10-PCS | Mod: ,,, | Performed by: INTERNAL MEDICINE

## 2023-07-28 PROCEDURE — 27000221 HC OXYGEN, UP TO 24 HOURS

## 2023-07-28 PROCEDURE — 83735 ASSAY OF MAGNESIUM: CPT | Performed by: INTERNAL MEDICINE

## 2023-07-28 PROCEDURE — 93005 ELECTROCARDIOGRAM TRACING: CPT | Performed by: INTERNAL MEDICINE

## 2023-07-28 PROCEDURE — 12000002 HC ACUTE/MED SURGE SEMI-PRIVATE ROOM

## 2023-07-28 PROCEDURE — 84484 ASSAY OF TROPONIN QUANT: CPT | Performed by: INTERNAL MEDICINE

## 2023-07-28 PROCEDURE — 99900035 HC TECH TIME PER 15 MIN (STAT)

## 2023-07-28 RX ORDER — HYDRALAZINE HYDROCHLORIDE 25 MG/1
50 TABLET, FILM COATED ORAL EVERY 12 HOURS
Status: DISCONTINUED | OUTPATIENT
Start: 2023-07-28 | End: 2023-07-29

## 2023-07-28 RX ORDER — HYDRALAZINE HYDROCHLORIDE 20 MG/ML
20 INJECTION INTRAMUSCULAR; INTRAVENOUS EVERY 4 HOURS PRN
Status: DISCONTINUED | OUTPATIENT
Start: 2023-07-28 | End: 2023-08-03 | Stop reason: HOSPADM

## 2023-07-28 RX ORDER — METOPROLOL SUCCINATE 25 MG/1
25 TABLET, EXTENDED RELEASE ORAL DAILY
Status: DISCONTINUED | OUTPATIENT
Start: 2023-07-28 | End: 2023-07-29

## 2023-07-28 RX ORDER — LACTULOSE 10 G/15ML
20 SOLUTION ORAL 3 TIMES DAILY
Status: DISCONTINUED | OUTPATIENT
Start: 2023-07-28 | End: 2023-07-29

## 2023-07-28 RX ORDER — FUROSEMIDE 10 MG/ML
40 INJECTION INTRAMUSCULAR; INTRAVENOUS ONCE
Status: COMPLETED | OUTPATIENT
Start: 2023-07-28 | End: 2023-07-28

## 2023-07-28 RX ORDER — MAGNESIUM SULFATE 1 G/100ML
1 INJECTION INTRAVENOUS ONCE
Status: COMPLETED | OUTPATIENT
Start: 2023-07-28 | End: 2023-07-28

## 2023-07-28 RX ORDER — AMLODIPINE BESYLATE 5 MG/1
10 TABLET ORAL DAILY
Status: DISCONTINUED | OUTPATIENT
Start: 2023-07-28 | End: 2023-08-03 | Stop reason: HOSPADM

## 2023-07-28 RX ORDER — POTASSIUM CHLORIDE 20 MEQ/1
20 TABLET, EXTENDED RELEASE ORAL ONCE
Status: COMPLETED | OUTPATIENT
Start: 2023-07-28 | End: 2023-07-28

## 2023-07-28 RX ADMIN — GUAIFENESIN 600 MG: 600 TABLET, EXTENDED RELEASE ORAL at 09:07

## 2023-07-28 RX ADMIN — INSULIN ASPART 6 UNITS: 100 INJECTION, SOLUTION INTRAVENOUS; SUBCUTANEOUS at 12:07

## 2023-07-28 RX ADMIN — FUROSEMIDE 40 MG: 10 INJECTION, SOLUTION INTRAVENOUS at 10:07

## 2023-07-28 RX ADMIN — DEXTROSE MONOHYDRATE 2 G: 50 INJECTION, SOLUTION INTRAVENOUS at 09:07

## 2023-07-28 RX ADMIN — CHLORHEXIDINE GLUCONATE 15 ML: 1.2 RINSE ORAL at 09:07

## 2023-07-28 RX ADMIN — INSULIN DETEMIR 10 UNITS: 100 INJECTION, SOLUTION SUBCUTANEOUS at 12:07

## 2023-07-28 RX ADMIN — ENOXAPARIN SODIUM 30 MG: 30 INJECTION SUBCUTANEOUS at 06:07

## 2023-07-28 RX ADMIN — TRAZODONE HYDROCHLORIDE 25 MG: 50 TABLET ORAL at 12:07

## 2023-07-28 RX ADMIN — LACTULOSE 20 G: 20 SOLUTION ORAL at 09:07

## 2023-07-28 RX ADMIN — METOPROLOL SUCCINATE 25 MG: 25 TABLET, FILM COATED, EXTENDED RELEASE ORAL at 10:07

## 2023-07-28 RX ADMIN — HYDRALAZINE HYDROCHLORIDE 20 MG: 20 INJECTION INTRAMUSCULAR; INTRAVENOUS at 06:07

## 2023-07-28 RX ADMIN — POTASSIUM BICARBONATE 50 MEQ: 977.5 TABLET, EFFERVESCENT ORAL at 06:07

## 2023-07-28 RX ADMIN — POTASSIUM CHLORIDE 20 MEQ: 1500 TABLET, EXTENDED RELEASE ORAL at 10:07

## 2023-07-28 RX ADMIN — HYDRALAZINE HYDROCHLORIDE 50 MG: 25 TABLET ORAL at 09:07

## 2023-07-28 RX ADMIN — ACETAMINOPHEN 650 MG: 325 TABLET ORAL at 03:07

## 2023-07-28 RX ADMIN — DAPTOMYCIN 805 MG: 500 INJECTION, POWDER, LYOPHILIZED, FOR SOLUTION INTRAVENOUS at 03:07

## 2023-07-28 RX ADMIN — AMLODIPINE BESYLATE 10 MG: 5 TABLET ORAL at 09:07

## 2023-07-28 RX ADMIN — MAGNESIUM SULFATE HEPTAHYDRATE 1 G: 1 INJECTION, SOLUTION INTRAVENOUS at 10:07

## 2023-07-28 RX ADMIN — LACTULOSE 20 G: 20 SOLUTION ORAL at 12:07

## 2023-07-28 RX ADMIN — HYDRALAZINE HYDROCHLORIDE 20 MG: 20 INJECTION INTRAMUSCULAR; INTRAVENOUS at 05:07

## 2023-07-28 RX ADMIN — INSULIN ASPART 4 UNITS: 100 INJECTION, SOLUTION INTRAVENOUS; SUBCUTANEOUS at 09:07

## 2023-07-28 RX ADMIN — GUAIFENESIN 200 MG: 200 SOLUTION ORAL at 05:07

## 2023-07-28 RX ADMIN — INSULIN ASPART 6 UNITS: 100 INJECTION, SOLUTION INTRAVENOUS; SUBCUTANEOUS at 06:07

## 2023-07-28 RX ADMIN — LEVOTHYROXINE SODIUM 75 MCG: 0.03 TABLET ORAL at 06:07

## 2023-07-28 RX ADMIN — INSULIN ASPART 3 UNITS: 100 INJECTION, SOLUTION INTRAVENOUS; SUBCUTANEOUS at 09:07

## 2023-07-28 NOTE — CARE UPDATE
07/28/23 0000   Chest Physiotherapy   Daily Review of Necessity (CPT)   (ptsfamily refused towake the patient up for treatment)

## 2023-07-28 NOTE — PROGRESS NOTES
Progress  Note  Infectious Disease    Reason for Consult:  bacteremia E coli and E faecium     HPI: Chanel Cervantes is a 67 y.o. female obese, BMI 37.6 kg/M2, with past medical history of diabetes, hypertension, bariatric surgery, cholecystectomy, prior UTIs, and prior pancreatitis secondary to gallstones.     She presented with acute onset of back pain, radiating into bilateral upper quadrants, with associated shortness of breath.  She was admitted for septic shock, transferred to ICU for further management.    Labs on admission with severe leukopenia 1.1, left shift 87%, bands 8%, H&H 14/44.2, platelet count 213   Creatinine 1.1   Hypokalemia 3.1   Transaminitis, AST 4943/ALT 1475   Total bili 2.7  Lipase 1363 down to 102  Patient CPK 92   Lactic acid 4.6  Hemoglobin A1c 6.9  UA pyuria 6, negative for bacteria, urine culture in process     CT abdomen with broad-based ventral abdominal hernia.  No evidence of obstruction.  Status post cholecystectomy with postsurgical dilatation of the common bile duct and intrahepatic ducts.    Seen by GI, s/p ERCP this morning; with evidence of the entire main bile duct was moderately dilated, with an obstruction from suspected sludge ball. A biliary sphincterotomy was performed. The biliary tree was swept.     ERCP this morning.  Hospital course complicated persistent fever and polymicrobial bacteremia, Enterococcus faecium and E coli, no resistance genes detected on [employer], pending sensitivities.      ID consult for E coli and E faecium bacteremia.    7/20: Interim reviewed, patient remains febrile, T max 101.3, currently 100.8, on pressors, on O2 by Nc 5L. She remains encephalopathic, decreased urinary output, 20ml/h, no bowel movements recorded yet. Labs reviewed, WBC 11.4, bands 36%, H/H 11.3/35.4, plt: 175. Worsening ANTHONY 2.6, LFTs trending down. , will watch closely. Alct acid 3, troponins trending up, likely demand ischemia. Micro reviewed, repeat blood cultures  7/19 Enterococcus faecium 4/4 bottles, urine culture no growth to date, pending final. Discussed with Pulmonary, plan for IV steroids.    7/20:  Interim reviewed, patient seen examined at bedside. Pressors requirement coming down, afebrile in the last 24 hours.  She was intubated yesterday at noon for airway protection, FiO2 40%, minimal amount of secretions, sputum sent for culture.  Urinary output improving although creatinine 2.9 today.  LFTs trending down, lactic acid 1.9, normal.  Micro reviewed, repeat blood cultures 7/20 x2 sets no growth to date, pending final.  Awaiting sensitivities on E coli and Enterococcus faecium, to be available later today.    07/22/2023   Afebrile, tmax 100, no pressors since this am,   On sedation 25 mcg/kg/min- intensivist is working daily on extubation, FIo2=30%  WBC 9.9--14;   PLT worsening? (186--175--85--73)   I/e=0824/155.  Cr 1.6--2.4--2.6--2.9--2.9-- LFTs improving   07/23/2023.  Dr. Montejo.   Afebrile.  WBC is stable 14--13.  Blood cultures of 07/23 are negative to date.   Smooth was negative for vegetation.    Urine output is improving.  She had 1 BM.    CXR looks slightly worse, but I think it is volume overload, which will improve as her kidney function improves. Intensivist and nurse are working on vacation sedation. Daughter at bedside.  I discussed with her in detail.    7/24 ([place]): interim reviewed, discussed with Dr Montejo, patient seen and examined at bedside. She is coming OFF sedation, awake, nodding to questions, denies pain. Hemodynamically stable, afebrile. Urinary outpt improving, cr 2.5. Repeat blood cultures x1 no growth to date, pending final. Set from today in process.     7/25: interim reviewed, patient seen and examined at bedside, daughter present. They both report she had never had pancreatitis before. She is sitting in the chair, eating breakfast, feeling better. Labs reviewed, stable WBC 11, left shift 80.7%, thrombocytopenia 92,  creatinine down  2.3, urinary outpt is good, L IJ and a-line removed. Repeat blood cultures 7/23 & 7/24 no growth to date, pending final. Repeat set sent today in process.     7/26:  Interim reviewed, patient seen and examined at bedside, working with PT needing a lot of assistance.  Patient reports had a rough night, high high blood pressure.  Her appetite is slowly improving, still feels a little weak.  Labs to be drawn tomorrow.  Micro reviewed, repeat blood cultures since 07/23-24-25 no growth to date, pending final.     7/27:  Interim reviewed, patient seen examined at bedside, she is hoarse voice today, reports having breathing treatments back-to-back. She feels a little better overall, afebrile. Labs reviewed, WBC 8.7, left shift: 83.3%, H/H 10.7/33.5, plt improved 283. Procal 1.59, creatinine down to 1.8. Repeat blood cultures no growth.     7/28:  Interim reviewed, patient seen examined at bedside, she is little tired today, her voice remains hoarse, does not want a work with PT today, is asking for a break, she feels tired.  Remains on O2 by NC 2 L.  She reports cough is persistent, dry unable to bring anything up.  Chest x-ray with persistent bibasilar pleural parenchymal opacities suggesting tiny bilateral pleural effusions minimal atelectasis versus consolidation.  Procalcitonin trending down.    Review of patient's allergies indicates:   Allergen Reactions    Adhesive tape-silicones Rash    Dye Hives    Cephalexin     Iodine     Penicillins Edema    Pneumococcal vaccine     Iodinated contrast media Rash     Past Medical History:   Diagnosis Date    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 1/24/2021    Anemia, chronic disease 1/24/2021    CAD (coronary artery disease)     Diabetes mellitus, type 2     Hypertension     Iron deficiency anemia following bariatric surgery 1/24/2021    MI (myocardial infarction)     Secondary thrombocytosis 1/24/2021    Sleep apnea     Sleep apnea 03/01/2019    Sleep Right      Past Surgical  "History:   Procedure Laterality Date    ANGIOGRAM, CORONARY, WITH LEFT HEART CATHETERIZATION      APPENDECTOMY      BARIATRIC SURGERY  2019    Dr Nunez    CARPAL TUNNEL RELEASE Bilateral 2002     SECTION      CHOLECYSTECTOMY      COLONOSCOPY      CORONARY ANGIOPLASTY WITH STENT PLACEMENT      CORONARY ARTERY BYPASS GRAFT      EPIDURAL STEROID INJECTION INTO LUMBAR SPINE      x2    ERCP N/A 2023    Procedure: ERCP (ENDOSCOPIC RETROGRADE CHOLANGIOPANCREATOGRAPHY);  Surgeon: Lavon Hernandez III, MD;  Location: [facility] ENDO;  Service: Endoscopy;  Laterality: N/A;    ESOPHAGOGASTRODUODENOSCOPY      HERNIA REPAIR  2019    HYSTERECTOMY      THYROID LOBECTOMY Right 2021    Procedure: LOBECTOMY, THYROID;  Surgeon: Mari Chance MD;  Location: [facility] OR;  Service: General;  Laterality: Right;     Social History     Tobacco Use    Smoking status: Former     Packs/day: 1.00     Years: 15.00     Pack years: 15.00     Types: Cigarettes     Quit date:      Years since quittin.5    Smokeless tobacco: Never   Substance Use Topics    Alcohol use: No     Comment: "maybe once a year"        Family History   Problem Relation Age of Onset    Diabetes Mother     Vision loss Mother     Heart disease Father     Vision loss Father     Stroke Father        EXAM & DIAGNOSTICS REVIEWED:   Vitals:     Temp:  [97.5 °F (36.4 °C)-98.8 °F (37.1 °C)]   Temp: 98.2 °F (36.8 °C) (23 1150)  Pulse: 74 (23 1150)  Resp: 20 (23 1150)  BP: (!) 182/77 (notified nurse Martine) (23 1150)  SpO2: (!) 94 % (23 1150)    Intake/Output Summary (Last 24 hours) at 2023 1459  Last data filed at 2023 1156  Gross per 24 hour   Intake 290 ml   Output 2650 ml   Net -2360 ml        General:  Awake, alert, sitting in bed, comfortable on room air   Eyes:  Anicteric, PERRL  ENT:  Moist oral mucosa, good dentition   Neck:  Supple, hoarse voice noted   Lungs: Scattered bibasilar rales    Heart:  S1/S2+, " regular rhythm, no murmurs  Abd:  Obese, large ventral hernia, +BS, soft, non tender   :  Waller 07/19,  yellow urine  Musc:  Joints without effusion, swelling,  erythema, synovitis  Skin:  Warm  Wound:   Neuro:  Awake, alert, speech is clear, moving all extremities   Psych:  Calm  Lymphatic:       Extrem: Trace UE edema b/l  No LE edema b/l  VAD:  R Midline      Isolation: None      General Labs reviewed:  Recent Labs   Lab 07/25/23  0818 07/26/23  1908 07/27/23  0857 07/28/23  0903   WBC 11.11  --  8.73 9.35   HGB 11.5*  --  10.7* 11.4*   HCT 36.3* 30* 33.5* 36.4*   PLT 92*  --  283 317       Recent Labs   Lab 07/24/23  0310 07/25/23  0327 07/27/23  0434 07/28/23  0903   * 141 143 141   K 3.5 3.6 3.5 3.5   * 108 107 104   CO2 24 24 28 26   BUN 82* 75* 55* 44*   CREATININE 2.5* 2.3* 1.8* 1.3   CALCIUM 7.8* 7.9* 8.0* 8.3*   PROT 4.6* 5.4* 5.6*  --    BILITOT 1.0 0.8 0.8  --    ALKPHOS 217* 213* 171*  --    * 101* 51*  --    AST 24 34 21  --      Recent Labs   Lab 07/24/23 0310   CRP 6.33*       Estimated Creatinine Clearance: 53.4 mL/min (based on SCr of 1.3 mg/dL).     Micro:  Microbiology Results (last 7 days)       Procedure Component Value Units Date/Time    Blood culture [[id number]] Collected: 07/23/23 0825    Order Status: Completed Specimen: Blood Updated: 07/28/23 1032     Blood Culture, Routine No growth after 5 days.    Culture, Respiratory with Gram Stain [[id number]] Collected: 07/27/23 2156    Order Status: Completed Specimen: Sputum, Expectorated Updated: 07/28/23 0834     Respiratory Culture No Growth     Gram Stain (Respiratory) <10 epithelial cells per low power field.     Gram Stain (Respiratory) Rare WBC's     Gram Stain (Respiratory) Rare Gram positive cocci    Blood culture [[id number]] Collected: 07/24/23 0348    Order Status: Completed Specimen: Blood Updated: 07/28/23 0432     Blood Culture, Routine No Growth to date      No Growth to date      No Growth to date      No  Growth to date      No Growth to date    Narrative:      Collection has been rescheduled by [email or web address] at 07/24/2023 03:28 Reason:   Nurse Draw. Blood was already drawn. I was not able to get the blood   cultures.  Collection has been rescheduled by [email or web address] at 07/24/2023 03:28 Reason:   Nurse Draw. Blood was already drawn. I was not able to get the blood   cultures.    Blood culture [[id number]] Collected: 07/25/23 0327    Order Status: Completed Specimen: Blood Updated: 07/28/23 0432     Blood Culture, Routine No Growth to date      No Growth to date      No Growth to date      No Growth to date    Blood culture [[id number]] Collected: 07/20/23 0940    Order Status: Completed Specimen: Blood from Peripheral, Antecubital, Right Updated: 07/25/23 1232     Blood Culture, Routine No growth after 5 days.    Narrative:      Collection has been rescheduled by [email or web address] at 07/20/2023 06:04 Reason:   Unable to collect  Collection has been rescheduled by [email or web address] at 07/20/2023 08:01 Reason:   Contacting zainab for cultures per RN Alfred  Collection has been rescheduled by [email or web address] at 07/20/2023 08:41 Reason:   Nurse Draw  Collection has been rescheduled by [email or web address] at 07/20/2023 06:04 Reason:   Unable to collect  Collection has been rescheduled by [email or web address] at 07/20/2023 08:01 Reason:   Contacting zainab for cultures per RN Alfred  Collection has been rescheduled by [email or web address] at 07/20/2023 08:41 Reason:   Nurse Draw    Culture, Respiratory with Gram Stain [[id number]] Collected: 07/20/23 1003    Order Status: Completed Specimen: Respiratory from Endotracheal Aspirate Updated: 07/22/23 0736     Respiratory Culture No growth      No normal respiratory shay     Gram Stain (Respiratory) <10 epithelial cells per low power field.     Gram Stain (Respiratory) Few WBC's     Gram Stain (Respiratory) No organisms seen    Urine Culture High Risk [[id number]] Collected: 07/19/23 1006    Order Status: Completed Specimen: Urine, Clean Catch Updated: 07/22/23 0710     Urine Culture,  Routine No growth    Narrative:      Indicated criteria for high risk culture:->Other  Other (specify):->e coli bacteremia    Blood culture [[id number]]  (Abnormal) Collected: 07/20/23 0940    Order Status: Completed Specimen: Blood from Line, Arterial, Right Updated: 07/22/23 0644     Blood Culture, Routine Gram stain aer bottle: Gram positive cocci      Positive results previously called      ENTEROCOCCUS FAECIUM  For susceptibility see order #[id number]      Narrative:      Collection has been rescheduled by [email or web address] at 07/20/2023 06:04 Reason:   Unable to collect  Collection has been rescheduled by [email or web address] at 07/20/2023 08:01 Reason:   Contacting zainab for cultures per RN Alfred  Collection has been rescheduled by [email or web address] at 07/20/2023 08:41 Reason:   Nurse Draw  Collection has been rescheduled by [email or web address] at 07/20/2023 06:04 Reason:   Unable to collect  Collection has been rescheduled by [email or web address] at 07/20/2023 08:01 Reason:   Contacting zainab for cultures per RN Alfred  Collection has been rescheduled by [email or web address] at 07/20/2023 08:41 Reason:   Nurse Draw    Blood culture [[id number]]  (Abnormal) Collected: 07/19/23 1230    Order Status: Completed Specimen: Blood Updated: 07/22/23 0643     Blood Culture, Routine Gram stain aer bottle: Gram positive cocci      Positive results previously called      ENTEROCOCCUS FAECIUM  For susceptibility see order #[id number]      Blood culture [[id number]]  (Abnormal) Collected: 07/19/23 0002    Order Status: Completed Specimen: Blood Updated: 07/22/23 0643     Blood Culture, Routine Gram stain aer bottle: Gram positive cocci      Gram stain ez bottle: Gram positive cocci      Positive results previously called      ENTEROCOCCUS FAECIUM  For susceptibility see order #[id number]      Blood culture [[id number]]  (Abnormal) Collected: 07/19/23 0030    Order Status: Completed Specimen: Blood from Peripheral, Left Hand Updated: 07/22/23 0643     Blood Culture, Routine Gram stain aer bottle: Gram positive cocci       Gram stain ez bottle: Gram positive cocci      Results called to and read back by:Carmela Mcintosh RN-3ICU;      07/19/2023  16:23 CJD      ENTEROCOCCUS FAECIUM  For susceptibility see order #[id number]      Narrative:      Collection has been rescheduled by [email or web address] at 07/19/2023 00:20 Reason:   Nurse Draw  Collection has been rescheduled by [email or web address] at 07/19/2023 00:20 Reason:   Nurse Draw    Blood culture [[id number]]  (Abnormal)  (Susceptibility) Collected: 07/18/23 1648    Order Status: Completed Specimen: Blood Updated: 07/22/23 0642     Blood Culture, Routine Gram stain aer bottle: Gram negative rods      Gram stain aer bottle: Gram positive cocci      Results called to and read back by: Lesvia Mcdowell RN MICU3.      07/19/2023  05:06 TGC      ESCHERICHIA COLI      ENTEROCOCCUS FAECIUM          Specimen: Blood Updated: 07/22/23 0642      Blood Culture, Routine Gram stain aer bottle: Gram negative rods     Gram stain aer bottle: Gram positive cocci     Results called to and read back by: Lesvia Mcdowell RN MICU3.     07/19/2023  05:06 TGC     ESCHERICHIA COLI Abnormal      ENTEROCOCCUS FAECIUM Abnormal    Susceptibility     Escherichia coli Enterococcus faecium     CULTURE, BLOOD CULTURE, BLOOD     Amp/Sulbactam <=4/2 mcg/mL Sensitive       Ampicillin <=8 mcg/mL Sensitive <=2 mcg/mL Sensitive     Cefazolin <=2 mcg/mL Sensitive       Cefepime <=2 mcg/mL Sensitive       Ceftriaxone <=1 mcg/mL Sensitive       Ciprofloxacin <=1 mcg/mL Sensitive       Ertapenem <=0.5 mcg/mL Sensitive       Gentamicin <=4 mcg/mL Sensitive       Gentamicin Synergy Screen   <=500 mcg/mL Sensitive     Levofloxacin <=2 mcg/mL Sensitive       Meropenem <=1 mcg/mL Sensitive       Piperacillin/Tazo <=16 mcg/mL Sensitive       Tetracycline <=4 mcg/mL Sensitive <=4 mcg/mL Sensitive     Tobramycin <=4 mcg/mL Sensitive       Trimeth/Sulfa <=2/38 mcg/mL Sensitive       Vancomycin   2 mcg/mL Sensitive                      Imaging  Reviewed:  CXR07/22Hypoinflation with mild prominence of the pulmonary vasculature and faint groundglass opacity in the lung bases suggestive of pulmonary edema   Abdominal Us  CTA chest  CT abdomen/pelvis:  1.  Status post cholecystectomy with postsurgical dilatation of the common bile duct and intrahepatic ducts. Should be correlated with bilirubin levels.   2.  Broad-based ventral abdominal wall hernia measures approximately 15 x 13 cm with herniated loops of large and small bowel. No evidence of obstruction.   3.  Exophytic hypoattenuating structure in the mid left kidney is felt to reflect a hemorrhagic cyst.      Cardiology:   ERICA:   The left ventricle is normal in size with normal systolic function.  Normal left ventricular diastolic function.  The estimated ejection fraction is 60%.  Atrial fibrillation not observed.  Normal right ventricular size with normal right ventricular systolic function.  Normal appearing left atrial appendage. No thrombus is present in the appendage. Normal appendage velocities.  No interatrial septal defect present.  There is no pulmonary hypertension.  No abnormal intracavitary echo; valvular structures are normal  Left atrial appendage was clean       ECHO  The left ventricle is normal in size with mild concentric hypertrophy and mildly decreased systolic function.  Mild to moderate tricuspid regurgitation.  The estimated ejection fraction is 45%.  Grade I left ventricular diastolic dysfunction.  There is abnormal septal wall motion consistent with left bundle branch block.  Mild right ventricular enlargement with normal right ventricular systolic function.  Moderate left atrial enlargement.  Normal central venous pressure (3 mmHg).  The estimated PA systolic pressure is 40 mmHg.  There is mild pulmonary hypertension.       IMPRESSION & PLAN     Septic shock resolved likely from polymicrobial bacteremia Enterococcus faecium (7/18-19--20) and E coli (7/18) secondary to cholangitis  s/p ERCP, sphincterotomy 7/19  Procal 54 -->5.7-->1.59, trending down   CRP 24  CPK 92-->245-->20  Lipase 1363-->102, trending down   ERICA negative for endocarditis     2. Shock liver, resolved     3. Thrombocytopenia, resolved    4. ANTHONY, cr 1.1--2.4--2.6-- 2.9--2.8--2.8->2.5-->2.3-->1.3, improved     5.  PMHx: diabetes, hypertension, bariatric surgery, cholecystectomy, prior UTIs    6. Obesity, BMI 37.6 Kg/m2    Recommendations:  Daptomycin 800mg IV daily  Continue Ceftriaxone 2g IV daily  Anticipating to continue IV abx for total 10 days from negative blood cultures, end date 8/1/23  PT/OT as tolerated  Aspiration precautions   CM working on SNF    D/w patient, Dr Farris    Will be available over the weekend if needed     Medical Decision Making during this encounter was  [_] Low Complexity  [_] Moderate Complexity  [xx] High Complexity

## 2023-07-28 NOTE — PT/OT/SLP PROGRESS
Physical Therapy      Patient Name:  Chanel Cervantes   MRN:  [id number]    Patient not seen today secondary to patient reporting significant fatigue requesting no follow up this afternoon, but agrees to participate tomorrow. Will follow-up 07/29/23.

## 2023-07-28 NOTE — ASSESSMENT & PLAN NOTE
In setting of septic shock.  -appreciate nephrology recommendations  -Renally dose medications  -Avoid nephrotoxic agents  -Monitor UOP and electrolytes  -Trend creatinine  -nephrologist consulting appreciate recommendations    Cr has trended upward:  2.4 to 2.6 to 2.9 to 2.8.  Now down to 2.3  BUN is going up as well:  BUN is stabilized

## 2023-07-28 NOTE — SUBJECTIVE & OBJECTIVE
Interval History:  No acute events overnight.  The patient says she has not been able to sleep very well.  She is also not been having any bowel movements.  Will work on getting her Waller catheter out today.  Continuing to plan for rehab.  She continues to have difficulty breathing though her lungs are much more clear and less wheezing today.  Will discuss with Pulmonary if any further optimization as possible.    Review of Systems   All other systems reviewed and are negative.  Objective:     Vital Signs (Most Recent):  Temp: 98.8 °F (37.1 °C) (07/28/23 0715)  Pulse: 89 (07/28/23 0715)  Resp: 18 (07/28/23 0715)  BP: (!) 174/76 (notified nurse Martine) (07/28/23 0715)  SpO2: 95 % (07/28/23 0715) Vital Signs (24h Range):  Temp:  [97.5 °F (36.4 °C)-98.8 °F (37.1 °C)] 98.8 °F (37.1 °C)  Pulse:  [79-96] 89  Resp:  [18-20] 18  SpO2:  [94 %-97 %] 95 %  BP: (151-188)/(65-79) 174/76     Weight: 108.9 kg (239 lb 15.9 oz)  Body mass index is 37.6 kg/m².    Intake/Output Summary (Last 24 hours) at 7/28/2023 1111  Last data filed at 7/28/2023 1041  Gross per 24 hour   Intake 290 ml   Output 1500 ml   Net -1210 ml           Physical Exam  Constitutional:       Appearance: Normal appearance. She is normal weight.      Comments: Very weak   HENT:      Head: Normocephalic and atraumatic.      Nose: Nose normal.      Mouth/Throat:      Mouth: Mucous membranes are moist.   Eyes:      Conjunctiva/sclera: Conjunctivae normal.      Pupils: Pupils are equal, round, and reactive to light.   Cardiovascular:      Rate and Rhythm: Normal rate and regular rhythm.      Pulses: Normal pulses.      Heart sounds: Normal heart sounds. No murmur heard.    No friction rub. No gallop.   Pulmonary:      Effort: Pulmonary effort is normal.      Breath sounds: No wheezing, rhonchi or rales.      Comments: Clear breath sounds today, no stridor. Few crackles. 3L Nc  Abdominal:      General: Abdomen is flat. Bowel sounds are normal. There is no distension.       Palpations: Abdomen is soft.      Tenderness: There is no abdominal tenderness. There is no guarding.   Musculoskeletal:         General: No swelling. Normal range of motion.      Cervical back: Normal range of motion and neck supple.      Right lower leg: No edema.      Left lower leg: No edema.   Skin:     General: Skin is warm and dry.   Neurological:      General: No focal deficit present.      Mental Status: She is alert.   Psychiatric:         Mood and Affect: Mood normal.         Thought Content: Thought content normal.         Judgment: Judgment normal.           Significant Labs: All pertinent labs within the past 24 hours have been reviewed.    Significant Imaging: I have reviewed all pertinent imaging results/findings within the past 24 hours.

## 2023-07-28 NOTE — PROGRESS NOTES
INPATIENT NEPHROLOGY Progress Note  [facility]    Patient Name: Chanel Cervantes  Date: 07/28/2023    Reason for consultation:   ANTHONY    Chief Complaint:   Chief Complaint   Patient presents with    Fall     Denies hitting head or LOC, hit right shoulder    Shortness of Breath    Back Pain     History of Present Illness:  Chanel Cervantes is a 67 y.o. female obese, BMI 37.6 kg/M2, with past medical history of diabetes, hypertension, bariatric surgery, cholecystectomy, prior UTIs, and prior pancreatitis secondary to gallstones. She presented with acute onset of back pain, radiating into bilateral upper quadrants, with associated shortness of breath.  She was admitted for septic shock, transferred to ICU for further management. She is s/p ERCP with evidence of the entire main bile duct was moderately dilated, with an obstruction from suspected sludge ball. A biliary sphincterotomy was performed. The biliary tree was swept. Hospital course complicated AMS requiring MV and and polymicrobial bacteremia, Enterococcus faecium and E coli. We are consulted for ANTHONY.    Echo:  The left ventricle is normal in size with mild concentric hypertrophy and mildly decreased systolic function.  Mild to moderate tricuspid regurgitation.  The estimated ejection fraction is 45%.  Grade I left ventricular diastolic dysfunction.  There is abnormal septal wall motion consistent with left bundle branch block.  Mild right ventricular enlargement with normal right ventricular systolic function.  Moderate left atrial enlargement.  Normal central venous pressure (3 mmHg).  The estimated PA systolic pressure is 40 mmHg.  There is mild pulmonary hypertension.       Interval History:  7/21- BP holding on levophed, FIO2 40%, UOP 1.7L  7/22- off pressors, VSS, FIO2 30%, UOP 1.3L, tolerating tube feeds, rise in WBC count is noted  7/23- kieran (got atropine this AM for HR 39), SBP , FIO2 30%, UOP 1.4L, on/off levophed overnight,  ERICA today  7/24 VSS, spontaneous ventilation, afebrile, improving UO.scr remains elevated dut to ATN.  7/25 VSS. Extubated, lethargic but arousable. sCr better but remains abnormal. UO is oK.  7/26 VSS, no new complains.  7/27 VSS, no new complains.  7/28 VSS, good UO, doing better.    Plan of Care:    Biliary sludge with polymicrobial bacteremia s/p ERCP with sphincterotomy 7/19, septic shock resolved  ANTHONY due to sepsis/ischemic ATN  Shock liver, improving  Demand ischemia (underlying systolic CHF/pulm HTN)  Anemia/Thrombocytopenia    Plan:    - sCr improving consistent with ATN. Non-oliguric  - dose meds for CrCl < 60  - LFTs improving  - heme parameters stable    Thank you for allowing us to participate in this patient's care. We will continue to follow.    Vital Signs:  Temp Readings from Last 3 Encounters:   07/28/23 98.8 °F (37.1 °C) (Oral)   06/02/23 98 °F (36.7 °C) (Oral)   04/13/23 97.4 °F (36.3 °C)       Pulse Readings from Last 3 Encounters:   07/28/23 89   06/02/23 68   04/13/23 71       BP Readings from Last 3 Encounters:   07/28/23 (!) 174/76   06/02/23 (!) 120/59   04/13/23 (!) 146/70       Weight:  Wt Readings from Last 3 Encounters:   07/19/23 108.9 kg (239 lb 15.9 oz)   07/19/23 108.9 kg (240 lb 1.3 oz)   06/02/23 109.1 kg (240 lb 8 oz)       INS/OUTS:  I/O last 3 completed shifts:  In: 390 [P.O.:240; IV Piggyback:150]  Out: 2500 [Urine:2500]  No intake/output data recorded.      Medications:  Scheduled Meds:   amLODIPine  10 mg Oral Daily    cefTRIAXone (ROCEPHIN) IVPB  2 g Intravenous Q24H    chlorhexidine  15 mL Mouth/Throat BID    DAPTOmycin (CUBICIN) IV (PEDS and ADULTS)  10 mg/kg (Adjusted) Intravenous Q24H    enoxparin  30 mg Subcutaneous Q24H    guaiFENesin  600 mg Oral BID    hydrALAZINE  50 mg Oral Q12H    insulin detemir U-100  10 Units Subcutaneous Daily    levothyroxine  75 mcg Oral Before breakfast     Continuous Infusions:      PRN Meds:.acetaminophen, albuterol-ipratropium,  butalbital-acetaminophen-caffeine -40 mg, calcium gluconate IVPB, calcium gluconate IVPB, calcium gluconate IVPB, carboxymethylcellulose, dextrose 50%, dextrose 50%, glucagon (human recombinant), glucose, glucose, guaiFENesin 100 mg/5 ml, hydrALAZINE, insulin aspart U-100, lorazepam, magnesium oxide, magnesium oxide, magnesium sulfate IVPB, magnesium sulfate IVPB, naloxone, ondansetron, polyethylene glycol, potassium bicarbonate, potassium bicarbonate, potassium bicarbonate, potassium chloride in water **AND** potassium chloride in water **AND** potassium chloride in water, racepinephrine, senna-docusate 8.6-50 mg, simethicone, sodium chloride 0.9%, sodium phosphate IVPB, sodium phosphate IVPB, sodium phosphate IVPB, traMADoL  No current facility-administered medications on file prior to encounter.     Current Outpatient Medications on File Prior to Encounter   Medication Sig Dispense Refill    amLODIPine (NORVASC) 2.5 MG tablet Take 1 tablet (2.5 mg total) by mouth once daily. 90 tablet 1    azelastine (ASTELIN) 137 mcg (0.1 %) nasal spray 1 spray (137 mcg total) by Nasal route 2 (two) times daily. 30 mL 5    calcium carbonate-vitamin D3 600 mg-62.5 mcg (2,500 unit) Cap calcium carbonate 600 mg-vitamin D3 62.5 mcg (2,500 unit) capsule   Take by oral route.      DULoxetine (CYMBALTA) 60 MG capsule Take 1 capsule (60 mg total) by mouth once daily. 90 capsule 1    empaglifloz-linaglip-metformin (TRIJARDY XR) 25-5-1,000 mg TBph Take 1 tablet by mouth once daily. 90 tablet 1    guanFACINE (TENEX) 2 MG tablet Take 1 tablet (2 mg total) by mouth nightly. 90 tablet 3    insulin degludec (TRESIBA FLEXTOUCH U-200) 200 unit/mL (3 mL) insulin pen Inject 76 Units into the skin once daily. 12 pen 3    iron-vitamin C 100-250 mg, ICAR-C, 100-250 mg Tab Take 1 tablet by mouth once daily. 30 each 8    levothyroxine (SYNTHROID) 75 MCG tablet Take 75 mcg by mouth before breakfast.      metFORMIN (GLUCOPHAGE) 1000 MG tablet Take  "1,000 mg by mouth 2 (two) times daily with meals.      metOLazone (ZAROXOLYN) 5 MG tablet Take 1 tablet (5 mg total) by mouth once daily. 90 tablet 0    metoprolol succinate (TOPROL-XL) 25 MG 24 hr tablet Take 1 tablet (25 mg total) by mouth once daily. 90 tablet 1    multivitamin capsule Take 1 capsule by mouth once daily.      olmesartan (BENICAR) 40 MG tablet Take 1 tablet (40 mg total) by mouth once daily. 90 tablet 1    potassium chloride (KLOR-CON) 10 MEQ TbSR Take 1 tablet (10 mEq total) by mouth once daily. 90 tablet 1    pregabalin (LYRICA) 50 MG capsule Take 50 mg by mouth every evening.      rosuvastatin (CRESTOR) 40 MG Tab Take 1 tablet (40 mg total) by mouth every evening. 90 tablet 3    aspirin (ECOTRIN) 81 MG EC tablet Take 1 tablet (81 mg total) by mouth once daily. 30 tablet 0    blood sugar diagnostic Strp One Touch Ultra Blue Test strips, Use l strip to check glucose 4 times daily 100 each 11    blood-glucose meter kit Use as instructed 1 each 0    cyanocobalamin 1,000 mcg/mL injection Inject 1 mL (1,000 mcg total) into the skin every 30 days. 3 mL 4    lancets (ONETOUCH DELICA LANCETS) 33 gauge Misc USE 1  TO CHECK GLUCOSE 4 TIMES DAILY 100 each 3    magnesium oxide (MAG-OX) 400 mg (241.3 mg magnesium) tablet Take 1 tablet (400 mg total) by mouth once daily. 90 tablet 1    NYSTOP powder APPLY TO AFFECTED AREA AS NEEDED      prednisoLONE acetate (PRED FORTE) 1 % DrpS Place 1 drop into both eyes as needed.        Review of Systems:  On MV    Physical Exam:  BP (!) 174/76 Comment: notified nurse Martine  Pulse 89   Temp 98.8 °F (37.1 °C) (Oral)   Resp 18   Ht 5' 7" (1.702 m)   Wt 108.9 kg (239 lb 15.9 oz)   SpO2 95%   BMI 37.60 kg/m²     General Appearance:    Ill   Head:    Normocephalic, atraumatic   Eyes:    Closed     Mouth:   Moist mucus membranes, no thrush or oral lesions, normal      dentition   Back:     No CVA tenderness   Lungs:     Coarse, on MV   Heart:    Regular rate and rhythm, " no rub/gallop   Abdomen:     Soft, non-tender, non-distended guarding, no masses, no organomegaly   Extremities:   Warm and well perfused, distal pulses intact, no cyanosis, edematous   MSK:   No joint or muscle swelling, tenderness or deformity   Skin:   Skin color, texture, turgor normal, no rashes or lesions   Neurologic/Psychiatric:   Sedated     Results:  Recent Labs   Lab 07/25/23  0327 07/27/23  0434 07/28/23  0903    143 141   K 3.6 3.5 3.5    107 104   CO2 24 28 26   BUN 75* 55* 44*   CREATININE 2.3* 1.8* 1.3   * 208* 268*         Recent Labs   Lab 07/23/23  0608 07/24/23  0310 07/25/23  0327 07/26/23  0503 07/27/23  0434 07/28/23  0520 07/28/23  0903   CALCIUM 7.6* 7.8* 7.9*  --  8.0*  --  8.3*   ALBUMIN 1.8* 1.8* 2.3*  --  2.3*  --   --    PHOS 3.8 3.7 4.4  --   --   --   --    MG 2.7* 2.5 2.4 2.2 2.0 1.9  --                No results for input(s): POCTGLUCOSE in the last 168 hours.    Recent Labs   Lab 12/22/20  1232 07/07/21  1057 07/18/23  1735   Hemoglobin A1C 6.8 H 7.4 H 6.9 H         Recent Labs   Lab 07/25/23  0818 07/26/23  1908 07/27/23  0857 07/28/23  0903   WBC 11.11  --  8.73 9.35   HGB 11.5*  --  10.7* 11.4*   HCT 36.3* 30* 33.5* 36.4*   PLT 92*  --  283 317   MCV 84  --  82 84   MCHC 31.7*  --  31.9* 31.3*   MONO 7.5  0.8  --  7.3  0.6 7.1  0.7         Recent Labs   Lab 07/24/23  0310 07/25/23  0327 07/27/23  0434   BILITOT 1.0 0.8 0.8   PROT 4.6* 5.4* 5.6*   ALBUMIN 1.8* 2.3* 2.3*   ALKPHOS 217* 213* 171*   * 101* 51*   AST 24 34 21         Recent Labs   Lab 05/29/23  1040 07/18/23  1221 07/19/23  0116   Color, UA Yellow Yellow Yellow   Appearance, UA Clear Clear Clear   pH, UA 6.0 8.0 6.0   Specific [place], UA 1.015 1.030 >1.030 A   Protein, UA Negative 1+ A 1+ A   Glucose, UA 4+ A 4+ A 4+ A   Ketones, UA Negative Trace A Negative   Urobilinogen, UA Negative 2.0-3.0 A 2.0-3.0 A   Bilirubin (UA) Negative Negative 1+ A   Occult Blood UA Negative 1+ A Trace A    Nitrite, UA Negative Negative Negative   RBC, UA 1 6 H 1   WBC, UA 7 H 6 H 3   Bacteria Negative Negative Negative   Hyaline Casts, UA 4 A 5 A 1         Recent Labs   Lab 07/24/23  0312 07/24/23  0947 07/26/23  1908   POC PH 7.461 H 7.436 7.418   POC PCO2 36.9 38.2 37.8   POC HCO3 26.3 25.7 24.4   POC PO2 70 L 63 L 75 L   POC SATURATED O2 95 92 L 95   POC BE 2 1 0   Sample ARTERIAL ARTERIAL ARTERIAL         Microbiology Results (last 7 days)       Procedure Component Value Units Date/Time    Blood culture [[id number]] Collected: 07/23/23 0825    Order Status: Completed Specimen: Blood Updated: 07/28/23 1032     Blood Culture, Routine No growth after 5 days.    Culture, Respiratory with Gram Stain [[id number]] Collected: 07/27/23 2156    Order Status: Completed Specimen: Sputum, Expectorated Updated: 07/28/23 0834     Respiratory Culture No Growth     Gram Stain (Respiratory) <10 epithelial cells per low power field.     Gram Stain (Respiratory) Rare WBC's     Gram Stain (Respiratory) Rare Gram positive cocci    Blood culture [[id number]] Collected: 07/24/23 0348    Order Status: Completed Specimen: Blood Updated: 07/28/23 0432     Blood Culture, Routine No Growth to date      No Growth to date      No Growth to date      No Growth to date      No Growth to date    Narrative:      Collection has been rescheduled by LEIDY at 07/24/2023 03:28 Reason:   Nurse Draw. Blood was already drawn. I was not able to get the blood   cultures.  Collection has been rescheduled by LEIDY at 07/24/2023 03:28 Reason:   Nurse Draw. Blood was already drawn. I was not able to get the blood   cultures.    Blood culture [[id number]] Collected: 07/25/23 0327    Order Status: Completed Specimen: Blood Updated: 07/28/23 0432     Blood Culture, Routine No Growth to date      No Growth to date      No Growth to date      No Growth to date    Blood culture [[id number]] Collected: 07/20/23 0940    Order Status: Completed Specimen: Blood from  Peripheral, Antecubital, Right Updated: 07/25/23 1232     Blood Culture, Routine No growth after 5 days.    Narrative:      Collection has been rescheduled by [email or web address] at 07/20/2023 06:04 Reason:   Unable to collect  Collection has been rescheduled by [email or web address] at 07/20/2023 08:01 Reason:   Contacting zainab for cultures per RN Alfred  Collection has been rescheduled by [email or web address] at 07/20/2023 08:41 Reason:   Nurse Draw  Collection has been rescheduled by [email or web address] at 07/20/2023 06:04 Reason:   Unable to collect  Collection has been rescheduled by [email or web address] at 07/20/2023 08:01 Reason:   Contacting zainab for cultures per RN Alfred  Collection has been rescheduled by [email or web address] at 07/20/2023 08:41 Reason:   Nurse Draw    Culture, Respiratory with Gram Stain [[id number]] Collected: 07/20/23 1003    Order Status: Completed Specimen: Respiratory from Endotracheal Aspirate Updated: 07/22/23 0736     Respiratory Culture No growth      No normal respiratory shay     Gram Stain (Respiratory) <10 epithelial cells per low power field.     Gram Stain (Respiratory) Few WBC's     Gram Stain (Respiratory) No organisms seen    Urine Culture High Risk [[id number]] Collected: 07/19/23 1006    Order Status: Completed Specimen: Urine, Clean Catch Updated: 07/22/23 0710     Urine Culture, Routine No growth    Narrative:      Indicated criteria for high risk culture:->Other  Other (specify):->e coli bacteremia    Blood culture [[id number]]  (Abnormal) Collected: 07/20/23 0940    Order Status: Completed Specimen: Blood from Line, Arterial, Right Updated: 07/22/23 0644     Blood Culture, Routine Gram stain aer bottle: Gram positive cocci      Positive results previously called      ENTEROCOCCUS FAECIUM  For susceptibility see order #[id number]      Narrative:      Collection has been rescheduled by [email or web address] at 07/20/2023 06:04 Reason:   Unable to collect  Collection has been rescheduled by [email or web address] at 07/20/2023 08:01 Reason:   Contacting zainab for cultures per RN Alfred  Collection  has been rescheduled by [email or web address] at 07/20/2023 08:41 Reason:   Nurse Draw  Collection has been rescheduled by [email or web address] at 07/20/2023 06:04 Reason:   Unable to collect  Collection has been rescheduled by [email or web address] at 07/20/2023 08:01 Reason:   Contacting zainab for cultures per RN Alfred  Collection has been rescheduled by [email or web address] at 07/20/2023 08:41 Reason:   Nurse Draw    Blood culture [[id number]]  (Abnormal) Collected: 07/19/23 1230    Order Status: Completed Specimen: Blood Updated: 07/22/23 0643     Blood Culture, Routine Gram stain aer bottle: Gram positive cocci      Positive results previously called      ENTEROCOCCUS FAECIUM  For susceptibility see order #[id number]      Blood culture [[id number]]  (Abnormal) Collected: 07/19/23 0002    Order Status: Completed Specimen: Blood Updated: 07/22/23 0643     Blood Culture, Routine Gram stain aer bottle: Gram positive cocci      Gram stain ez bottle: Gram positive cocci      Positive results previously called      ENTEROCOCCUS FAECIUM  For susceptibility see order #[id number]      Blood culture [[id number]]  (Abnormal) Collected: 07/19/23 0030    Order Status: Completed Specimen: Blood from Peripheral, Left Hand Updated: 07/22/23 0643     Blood Culture, Routine Gram stain aer bottle: Gram positive cocci      Gram stain ez bottle: Gram positive cocci      Results called to and read back by:Carmela Mcintosh RN-3ICU;      07/19/2023  16:23 CJD      ENTEROCOCCUS FAECIUM  For susceptibility see order #[id number]      Narrative:      Collection has been rescheduled by 9 at 07/19/2023 00:20 Reason:   Nurse Draw  Collection has been rescheduled by 9 at 07/19/2023 00:20 Reason:   Nurse Draw    Blood culture [[id number]]  (Abnormal)  (Susceptibility) Collected: 07/18/23 1648    Order Status: Completed Specimen: Blood Updated: 07/22/23 0642     Blood Culture, Routine Gram stain aer bottle: Gram negative rods      Gram stain aer bottle: Gram positive cocci      Results called to and read  back by: Lesvia Mcdowell RN MICU3.      07/19/2023  05:06 TGC      ESCHERICHIA COLI      ENTEROCOCCUS FAECIUM          I have spent > minutes providing care for this patient for the above diagnoses. These services have included chart/data/imaging review, evaluation, exam, formulation of plan, , note preparation, and discussions with staff involved in this patient's care.    Omar Henao MD  [facility] [address] [address]  [phone] (p)  [phone] (f)

## 2023-07-28 NOTE — PLAN OF CARE
CM called and spoke to Chelsea with [facility].  They have not received authorization yet.  CM following.       07/28/23 1118   Post-Acute Status   Post-Acute Authorization Placement   Post-Acute Placement Status Pending payor review/awaiting authorization (if required)   Patient choice form signed by patient/caregiver List with quality metrics by geographic area provided   Discharge Delays (!) Post-Acute Set-up   Discharge Plan   Discharge Plan A Rehab   Discharge Plan B Skilled Nursing Facility

## 2023-07-28 NOTE — PT/OT/SLP PROGRESS
Occupational Therapy      Patient Name:  Chanel Cervantes   MRN:  [id number]    Patient not seen today secondary to Patient unwilling to participate (Per PT, pt fatigued and declined any therapy today.). Will follow-up next service date.    7/28/2023

## 2023-07-28 NOTE — ASSESSMENT & PLAN NOTE
"Secondary to acute cholangitis  -continue IVAB, stop date is 08/01  - will complete antibiotics rehab  -Enterococcus bacteremia now cleared  -S/p ERCP  -Continue LR IV fluids and Levophed to keep MAP > 65    Antibiotics (From admission, onward)    Start     Stop Route Frequency Ordered    07/21/23 1400  DAPTOmycin (CUBICIN) 805 mg in sodium chloride 0.9% SolP 50 mL IVPB         -- IV Every 48 hours (non-standard times) 07/20/23 0749    07/19/23 1300  meropenem 1 g in sodium chloride 0.9 % 100 mL IVPB (ready to mix system)        Note to Pharmacy: Ht: 5' 7" (1.702 m)  Wt: 108.9 kg (240 lb)  Estimated Creatinine Clearance: 63.1 mL/min (based on SCr of 1.1 mg/dL).  Body mass index is 37.59 kg/m².    -- IV Every 12 hours (non-standard times) 07/19/23 0740            "

## 2023-07-28 NOTE — ASSESSMENT & PLAN NOTE
- LFTs improved    Lab Results   Component Value Date    ALT 51 (H) 07/27/2023    AST 21 07/27/2023    GGT 24 05/17/2018    ALKPHOS 171 (H) 07/27/2023    BILITOT 0.8 07/27/2023

## 2023-07-28 NOTE — CARE UPDATE
07/28/23 0810   Patient Assessment/Suction   Level of Consciousness (AVPU) alert   Respiratory Effort Normal;Unlabored   Expansion/Accessory Muscles/Retractions no use of accessory muscles;no retractions;expansion symmetric   All Lung Fields Breath Sounds diminished   Rhythm/Pattern, Respiratory unlabored;pattern regular;depth regular   Cough Frequency frequent   Cough Type congested;nonproductive   PRE-TX-O2   Device (Oxygen Therapy) nasal cannula   $ Is the patient on Low Flow Oxygen? Yes   Flow (L/min) 3   SpO2 (!) 93 %   Pulse Oximetry Type Intermittent   $ Pulse Oximetry - Multiple Charge Pulse Oximetry - Multiple   Pulse 90   Resp 20   Positioning HOB elevated 45 degrees   Aerosol Therapy   $ Aerosol Therapy Charges PRN treatment not required   Incentive Spirometer   $ Incentive Spirometer Charges done with encouragement   Administration (IS) mouthpiece utilized   Number of Repetitions (IS) 5   Level Incentive Spirometer (mL) 700   Patient Tolerance (IS) fair   Vibratory PEP Therapy   $ Vibratory PEP Charges Aerobika Therapy   $ Vibratory PEP Tech Time Charges 15 min   Type (PEP Therapy) vibratory/oscillatory   Device (PEP Therapy) flutter   Route (PEP Therapy) mouthpiece   Breaths per Cycle (PEP Therapy) 10   Settings (PEP Therapy) PEP 5   Patient Position (PEP Therapy) HOB elevated   Post Treatment Assessment (PEP) increased aeration   Signs of Intolerance (PEP Therapy) none   Education   $ Education 15 min;Bronchodilator;Other (see comment)  (lung expansion)

## 2023-07-28 NOTE — ASSESSMENT & PLAN NOTE
Required intubation on hospital day 3.  Patient was successfully extubated on July 24, 2023.  Follow Pulmonary Medicine recommendations.  Wheezing has improved but she still feels extremely short of breath  Will discuss with pulmonology  Adjust Lev  Added mucus clearance therapy yesterday

## 2023-07-29 LAB
ALBUMIN SERPL BCP-MCNC: 2.4 G/DL (ref 3.5–5.2)
ALP SERPL-CCNC: 197 U/L (ref 55–135)
ALT SERPL W/O P-5'-P-CCNC: 45 U/L (ref 10–44)
ANION GAP SERPL CALC-SCNC: 10 MMOL/L (ref 8–16)
AST SERPL-CCNC: 35 U/L (ref 10–40)
BACTERIA BLD CULT: NORMAL
BACTERIA SPEC AEROBE CULT: NORMAL
BILIRUB DIRECT SERPL-MCNC: 0.1 MG/DL (ref 0.1–0.3)
BILIRUB SERPL-MCNC: 0.7 MG/DL (ref 0.1–1)
BUN SERPL-MCNC: 38 MG/DL (ref 8–23)
CALCIUM SERPL-MCNC: 8.2 MG/DL (ref 8.7–10.5)
CHLORIDE SERPL-SCNC: 102 MMOL/L (ref 95–110)
CO2 SERPL-SCNC: 27 MMOL/L (ref 23–29)
CREAT SERPL-MCNC: 1.2 MG/DL (ref 0.5–1.4)
ERYTHROCYTE [DISTWIDTH] IN BLOOD BY AUTOMATED COUNT: 16.3 % (ref 11.5–14.5)
EST. GFR  (NO RACE VARIABLE): 49.6 ML/MIN/1.73 M^2
GLUCOSE SERPL-MCNC: 193 MG/DL (ref 70–110)
GLUCOSE SERPL-MCNC: 234 MG/DL (ref 70–110)
GLUCOSE SERPL-MCNC: 245 MG/DL (ref 70–110)
GLUCOSE SERPL-MCNC: 274 MG/DL (ref 70–110)
GLUCOSE SERPL-MCNC: 284 MG/DL (ref 70–110)
GRAM STN SPEC: NORMAL
HCT VFR BLD AUTO: 36.8 % (ref 37–48.5)
HGB BLD-MCNC: 11.6 G/DL (ref 12–16)
MAGNESIUM SERPL-MCNC: 1.8 MG/DL (ref 1.6–2.6)
MCH RBC QN AUTO: 26.1 PG (ref 27–31)
MCHC RBC AUTO-ENTMCNC: 31.5 G/DL (ref 32–36)
MCV RBC AUTO: 83 FL (ref 82–98)
PLATELET # BLD AUTO: 401 K/UL (ref 150–450)
PMV BLD AUTO: 11.7 FL (ref 9.2–12.9)
POTASSIUM SERPL-SCNC: 3.5 MMOL/L (ref 3.5–5.1)
PROT SERPL-MCNC: 5.8 G/DL (ref 6–8.4)
RBC # BLD AUTO: 4.45 M/UL (ref 4–5.4)
SODIUM SERPL-SCNC: 139 MMOL/L (ref 136–145)
WBC # BLD AUTO: 9.87 K/UL (ref 3.9–12.7)

## 2023-07-29 PROCEDURE — 25000003 PHARM REV CODE 250: Performed by: INTERNAL MEDICINE

## 2023-07-29 PROCEDURE — 94761 N-INVAS EAR/PLS OXIMETRY MLT: CPT

## 2023-07-29 PROCEDURE — 80048 BASIC METABOLIC PNL TOTAL CA: CPT | Performed by: STUDENT IN AN ORGANIZED HEALTH CARE EDUCATION/TRAINING PROGRAM

## 2023-07-29 PROCEDURE — 97535 SELF CARE MNGMENT TRAINING: CPT

## 2023-07-29 PROCEDURE — 36415 COLL VENOUS BLD VENIPUNCTURE: CPT | Performed by: INTERNAL MEDICINE

## 2023-07-29 PROCEDURE — 97110 THERAPEUTIC EXERCISES: CPT

## 2023-07-29 PROCEDURE — 25000003 PHARM REV CODE 250: Performed by: STUDENT IN AN ORGANIZED HEALTH CARE EDUCATION/TRAINING PROGRAM

## 2023-07-29 PROCEDURE — 27000221 HC OXYGEN, UP TO 24 HOURS

## 2023-07-29 PROCEDURE — 25000242 PHARM REV CODE 250 ALT 637 W/ HCPCS: Performed by: INTERNAL MEDICINE

## 2023-07-29 PROCEDURE — 80076 HEPATIC FUNCTION PANEL: CPT | Performed by: INTERNAL MEDICINE

## 2023-07-29 PROCEDURE — 94664 DEMO&/EVAL PT USE INHALER: CPT

## 2023-07-29 PROCEDURE — 63600175 PHARM REV CODE 636 W HCPCS: Performed by: INTERNAL MEDICINE

## 2023-07-29 PROCEDURE — 12000002 HC ACUTE/MED SURGE SEMI-PRIVATE ROOM

## 2023-07-29 PROCEDURE — 97112 NEUROMUSCULAR REEDUCATION: CPT | Mod: CQ

## 2023-07-29 PROCEDURE — 94799 UNLISTED PULMONARY SVC/PX: CPT

## 2023-07-29 PROCEDURE — 63600175 PHARM REV CODE 636 W HCPCS: Performed by: STUDENT IN AN ORGANIZED HEALTH CARE EDUCATION/TRAINING PROGRAM

## 2023-07-29 PROCEDURE — 94640 AIRWAY INHALATION TREATMENT: CPT

## 2023-07-29 PROCEDURE — 85027 COMPLETE CBC AUTOMATED: CPT | Performed by: STUDENT IN AN ORGANIZED HEALTH CARE EDUCATION/TRAINING PROGRAM

## 2023-07-29 PROCEDURE — 99900031 HC PATIENT EDUCATION (STAT)

## 2023-07-29 PROCEDURE — 97530 THERAPEUTIC ACTIVITIES: CPT | Mod: CQ

## 2023-07-29 PROCEDURE — 99900035 HC TECH TIME PER 15 MIN (STAT)

## 2023-07-29 PROCEDURE — 94668 MNPJ CHEST WALL SBSQ: CPT

## 2023-07-29 PROCEDURE — 94667 MNPJ CHEST WALL 1ST: CPT

## 2023-07-29 PROCEDURE — 83735 ASSAY OF MAGNESIUM: CPT | Performed by: INTERNAL MEDICINE

## 2023-07-29 RX ORDER — FUROSEMIDE 10 MG/ML
40 INJECTION INTRAMUSCULAR; INTRAVENOUS DAILY
Status: DISCONTINUED | OUTPATIENT
Start: 2023-07-29 | End: 2023-08-03

## 2023-07-29 RX ORDER — HYDRALAZINE HYDROCHLORIDE 25 MG/1
50 TABLET, FILM COATED ORAL ONCE
Status: COMPLETED | OUTPATIENT
Start: 2023-07-29 | End: 2023-07-29

## 2023-07-29 RX ORDER — HYDRALAZINE HYDROCHLORIDE 25 MG/1
100 TABLET, FILM COATED ORAL EVERY 12 HOURS
Status: DISCONTINUED | OUTPATIENT
Start: 2023-07-29 | End: 2023-07-30

## 2023-07-29 RX ORDER — CLONIDINE HYDROCHLORIDE 0.1 MG/1
0.1 TABLET ORAL ONCE
Status: COMPLETED | OUTPATIENT
Start: 2023-07-29 | End: 2023-07-29

## 2023-07-29 RX ORDER — POTASSIUM CHLORIDE 20 MEQ/1
40 TABLET, EXTENDED RELEASE ORAL ONCE
Status: COMPLETED | OUTPATIENT
Start: 2023-07-29 | End: 2023-07-29

## 2023-07-29 RX ORDER — METOPROLOL SUCCINATE 50 MG/1
50 TABLET, EXTENDED RELEASE ORAL DAILY
Status: DISCONTINUED | OUTPATIENT
Start: 2023-07-29 | End: 2023-07-30

## 2023-07-29 RX ORDER — LORAZEPAM 2 MG/ML
0.5 INJECTION INTRAMUSCULAR EVERY 4 HOURS PRN
Status: DISCONTINUED | OUTPATIENT
Start: 2023-07-29 | End: 2023-07-30

## 2023-07-29 RX ORDER — ENOXAPARIN SODIUM 100 MG/ML
40 INJECTION SUBCUTANEOUS
Status: DISCONTINUED | OUTPATIENT
Start: 2023-07-29 | End: 2023-08-03 | Stop reason: HOSPADM

## 2023-07-29 RX ADMIN — SIMETHICONE 80 MG: 80 TABLET, CHEWABLE ORAL at 01:07

## 2023-07-29 RX ADMIN — Medication 800 MG: at 03:07

## 2023-07-29 RX ADMIN — IPRATROPIUM BROMIDE AND ALBUTEROL SULFATE 3 ML: .5; 3 SOLUTION RESPIRATORY (INHALATION) at 08:07

## 2023-07-29 RX ADMIN — POTASSIUM BICARBONATE 50 MEQ: 977.5 TABLET, EFFERVESCENT ORAL at 09:07

## 2023-07-29 RX ADMIN — INSULIN ASPART 4 UNITS: 100 INJECTION, SOLUTION INTRAVENOUS; SUBCUTANEOUS at 09:07

## 2023-07-29 RX ADMIN — DEXTROSE MONOHYDRATE 2 G: 50 INJECTION, SOLUTION INTRAVENOUS at 09:07

## 2023-07-29 RX ADMIN — Medication 800 MG: at 09:07

## 2023-07-29 RX ADMIN — INSULIN ASPART 4 UNITS: 100 INJECTION, SOLUTION INTRAVENOUS; SUBCUTANEOUS at 05:07

## 2023-07-29 RX ADMIN — LEVOTHYROXINE SODIUM 75 MCG: 0.03 TABLET ORAL at 05:07

## 2023-07-29 RX ADMIN — GUAIFENESIN 600 MG: 600 TABLET, EXTENDED RELEASE ORAL at 09:07

## 2023-07-29 RX ADMIN — CHLORHEXIDINE GLUCONATE 15 ML: 1.2 RINSE ORAL at 09:07

## 2023-07-29 RX ADMIN — HYDRALAZINE HYDROCHLORIDE 20 MG: 20 INJECTION INTRAMUSCULAR; INTRAVENOUS at 05:07

## 2023-07-29 RX ADMIN — FUROSEMIDE 40 MG: 10 INJECTION, SOLUTION INTRAVENOUS at 09:07

## 2023-07-29 RX ADMIN — HYDRALAZINE HYDROCHLORIDE 100 MG: 25 TABLET ORAL at 08:07

## 2023-07-29 RX ADMIN — ENOXAPARIN SODIUM 40 MG: 100 INJECTION SUBCUTANEOUS at 05:07

## 2023-07-29 RX ADMIN — METOPROLOL SUCCINATE 50 MG: 50 TABLET, FILM COATED, EXTENDED RELEASE ORAL at 09:07

## 2023-07-29 RX ADMIN — TRAZODONE HYDROCHLORIDE 25 MG: 50 TABLET ORAL at 08:07

## 2023-07-29 RX ADMIN — POTASSIUM CHLORIDE 40 MEQ: 1500 TABLET, EXTENDED RELEASE ORAL at 01:07

## 2023-07-29 RX ADMIN — AMLODIPINE BESYLATE 10 MG: 5 TABLET ORAL at 09:07

## 2023-07-29 RX ADMIN — INSULIN DETEMIR 20 UNITS: 100 INJECTION, SOLUTION SUBCUTANEOUS at 09:07

## 2023-07-29 RX ADMIN — CLONIDINE HYDROCHLORIDE 0.1 MG: 0.1 TABLET ORAL at 12:07

## 2023-07-29 RX ADMIN — SIMETHICONE 80 MG: 80 TABLET, CHEWABLE ORAL at 09:07

## 2023-07-29 RX ADMIN — TRAMADOL HYDROCHLORIDE 50 MG: 50 TABLET, COATED ORAL at 03:07

## 2023-07-29 RX ADMIN — GUAIFENESIN 600 MG: 600 TABLET, EXTENDED RELEASE ORAL at 08:07

## 2023-07-29 RX ADMIN — HYDRALAZINE HYDROCHLORIDE 100 MG: 25 TABLET ORAL at 09:07

## 2023-07-29 RX ADMIN — DAPTOMYCIN 805 MG: 500 INJECTION, POWDER, LYOPHILIZED, FOR SOLUTION INTRAVENOUS at 03:07

## 2023-07-29 RX ADMIN — CHLORHEXIDINE GLUCONATE 15 ML: 1.2 RINSE ORAL at 08:07

## 2023-07-29 RX ADMIN — HYDRALAZINE HYDROCHLORIDE 50 MG: 25 TABLET ORAL at 12:07

## 2023-07-29 NOTE — CARE UPDATE
Informed by RN patient states not feeling well.  Chart reviewed and patient assessed at bedside.  Patient reports generalized shakes, admits to shortness breath and chest discomfort.  Vitals HR 99, /76 but has been up trending throughout today, 94% O2 saturation, afebrile 97.7, glucose 252.  Patient states she takes metoprolol and amlodipine at home.  Lung examination with rhonchi, intermittent expiratory wheeze.  Ordered stat labs including troponin and BNP level.  EKG and portable chest x-ray.  20 mEq potassium supplementation, 1 g IV magnesium supplementation, 40 mg IV Lasix.  Will continue to follow blood pressure and adjust medications as indicated.  Following treatments patient states does feel improved.  Daughter states patient history of NIKOLAS status post gastric bypass but not using CPAP due to claustrophobia.  Discussed with nursing.  With the plan as per clinical course.      Stormy Beckett MD  Covering hospitalist

## 2023-07-29 NOTE — CARE UPDATE
07/29/23 0812   Patient Assessment/Suction   Level of Consciousness (AVPU) alert   All Lung Fields Breath Sounds diminished;coarse   PRE-TX-O2   Device (Oxygen Therapy) nasal cannula   $ Is the patient on Low Flow Oxygen? Yes   Flow (L/min) 3   SpO2 (!) 93 %   Pulse Oximetry Type Intermittent   $ Pulse Oximetry - Multiple Charge Pulse Oximetry - Multiple   Pulse 68   Resp 18   Temp 97.7 °F (36.5 °C)   Aerosol Therapy   $ Aerosol Therapy Charges Aerosol Treatment   Daily Review of Necessity (SVN) completed   Respiratory Treatment Status (SVN) given   Treatment Route (SVN) mask   Patient Position (SVN) semi-Almeida's   Post Treatment Assessment (SVN) increased aeration   Signs of Intolerance (SVN) none   Breath Sounds Post-Respiratory Treatment   Throughout All Fields Post-Treatment aeration increased   Post-treatment Heart Rate (beats/min) 69   Post-treatment Resp Rate (breaths/min) 15   Vibratory PEP Therapy   $ Vibratory PEP Charges Acapella Therapy   $ Vibratory PEP Tech Time Charges 15 min   Daily Review of Necessity (PEP Therapy) completed   Type (PEP Therapy) vibratory/oscillatory   Device (PEP Therapy) flutter   Route (PEP Therapy) mouthpiece   Breaths per Cycle (PEP Therapy) 10   Signs of Intolerance (PEP Therapy) none   Chest Physiotherapy   $ Chest Physiotherapy Charges Initial   Daily Review of Necessity (CPT) completed   Type (CPT) percussion   Method (CPT) by hand   Signs of Intolerance (CPT) none   Education   $ Education Bronchodilator;15 min   Respiratory Evaluation   $ Care Plan Tech Time 15 min

## 2023-07-29 NOTE — PT/OT/SLP PROGRESS
"Physical Therapy Treatment    Patient Name:  Chanel Cervantes   MRN:  [id number]    Recommendations:     Discharge Recommendations: rehabilitation facility  Discharge Equipment Recommendations: to be determined by next level of care  Barriers to discharge:  Medical status    Assessment:     Chanel Cervantes is a 67 y.o. female admitted with a medical diagnosis of Septic shock.  She presents with the following impairments/functional limitations: weakness, impaired endurance, impaired self care skills, impaired functional mobility, gait instability, impaired balance, decreased lower extremity function, impaired cardiopulmonary response to activity .    Pt found with HOB elevated and agreeable to OOB activtiy. Instructed pt in bed mobility incorporating training techniques to decrease care giver burden, pt required min A for LE assistance. Pt performed stand pivot t/f from bed to chair with rw CGA prior to sitting, pt performed dynamic standing balance activities. Pt was able to march in place for approximately 20 seconds prior to requiring a seated rest break. Once seated pt was instructed in B LE ex with cueing for proper joint alignment to increase all major LE musculature to facilitate increased strength and endurance during functional activities.     Rehab Prognosis: Fair; patient would benefit from acute skilled PT services to address these deficits and reach maximum level of function.    Recent Surgery: Procedure(s) (LRB):  ERCP (ENDOSCOPIC RETROGRADE CHOLANGIOPANCREATOGRAPHY) (N/A) 10 Days Post-Op    Plan:     During this hospitalization, patient to be seen 6 x/week to address the identified rehab impairments via gait training, therapeutic activities, therapeutic exercises, neuromuscular re-education and progress toward the following goals:    Plan of Care Expires:  08/29/23    Subjective     Chief Complaint: "I'm ready to get out of this bed."  Patient/Family Comments/goals: Pt agreeable to PT.  Pain/Comfort:  Pain " Rating 1: 0/10      Objective:     Communicated with RN prior to session.  Patient found up in chair with oxygen, peripheral IV, PureWick upon PT entry to room.     General Precautions: Standard, fall  Orthopedic Precautions: N/A  Braces: N/A  Respiratory Status: Nasal cannula, flow 3 L/min     Functional Mobility:  Bed Mobility:     Supine to Sit: minimum assistance  Transfers:     Sit to Stand:  modified independence with rolling walker  Bed to Chair: minimum assistance with  rolling walker  using  Step Transfer  Balance: Fair- standing balance      AM-PAC 6 CLICK MOBILITY          Treatment & Education:  Pt was educated on the following: call light use, importance of OOB activity and functional mobility to negate the negative effects of prolonged bed rest during this hospitalization, safe transfers/ambulation and discharge planning recommendations/options.   Pt performed 3 LE there ex sitting x 10 reps with vc for proper execution and joint protection: ap, laq, marching, hip abd/add, gs/qs.     Patient left up in chair with all lines intact, call button in reach, and family present..    GOALS:   Multidisciplinary Problems       Physical Therapy Goals          Problem: Physical Therapy    Goal Priority Disciplines Outcome Goal Variances Interventions   Physical Therapy Goal     PT, PT/OT Ongoing, Progressing     Description: Goals to be met by: 23     Patient will increase functional independence with mobility by performin. Supine to sit with Supervision  2. Sit to stand transfer with Supervision  3. Bed to chair transfer with Supervision using Rolling Walker  4. Gait  x 150 feet with Supervision using Rolling Walker.                              Time Tracking:     PT Received On: 23  PT Start Time: 957     PT Stop Time: 1020  PT Total Time (min): 23 min     Billable Minutes: Therapeutic Activity 15 and Neuromuscular Re-education 8    Treatment Type: Treatment  PT/PTA: PTA     Number of PTA  visits since last PT visit: 2 07/29/2023

## 2023-07-29 NOTE — ASSESSMENT & PLAN NOTE
In setting of septic shock.  -appreciate nephrology recommendations  -Renally dose medications  -Avoid nephrotoxic agents  -Monitor UOP and electrolytes  -Trend creatinine  -nephrologist consulting appreciate recommendations    Cr has trended upward:  2.4 to 2.6 to 2.9 to 2.8.  Now down to 1.2  BUN is going up as well:  BUN is improving also

## 2023-07-29 NOTE — PROGRESS NOTES
INPATIENT NEPHROLOGY Progress Note  [facility]    Patient Name: Chanel Cervantes  Date: 07/29/2023    Reason for consultation:   ANTHONY    Chief Complaint:   Chief Complaint   Patient presents with    Fall     Denies hitting head or LOC, hit right shoulder    Shortness of Breath    Back Pain     History of Present Illness:  Chanel Cervantes is a 67 y.o. female obese, BMI 37.6 kg/M2, with past medical history of diabetes, hypertension, bariatric surgery, cholecystectomy, prior UTIs, and prior pancreatitis secondary to gallstones. She presented with acute onset of back pain, radiating into bilateral upper quadrants, with associated shortness of breath.  She was admitted for septic shock, transferred to ICU for further management. She is s/p ERCP with evidence of the entire main bile duct was moderately dilated, with an obstruction from suspected sludge ball. A biliary sphincterotomy was performed. The biliary tree was swept. Hospital course complicated AMS requiring MV and and polymicrobial bacteremia, Enterococcus faecium and E coli. We are consulted for ANTHONY.    Echo:  The left ventricle is normal in size with mild concentric hypertrophy and mildly decreased systolic function.  Mild to moderate tricuspid regurgitation.  The estimated ejection fraction is 45%.  Grade I left ventricular diastolic dysfunction.  There is abnormal septal wall motion consistent with left bundle branch block.  Mild right ventricular enlargement with normal right ventricular systolic function.  Moderate left atrial enlargement.  Normal central venous pressure (3 mmHg).  The estimated PA systolic pressure is 40 mmHg.  There is mild pulmonary hypertension.       Interval History:  7/21- BP holding on levophed, FIO2 40%, UOP 1.7L  7/22- off pressors, VSS, FIO2 30%, UOP 1.3L, tolerating tube feeds, rise in WBC count is noted  7/23- kieran (got atropine this AM for HR 39), SBP , FIO2 30%, UOP 1.4L, on/off levophed overnight,  ERICA today  7/24 VSS, spontaneous ventilation, afebrile, improving UO.scr remains elevated dut to ATN.  7/25 VSS. Extubated, lethargic but arousable. sCr better but remains abnormal. UO is oK.  7/26 VSS, no new complains.  7/27 VSS, no new complains.  7/28 VSS, good UO, doing better.  7/29 VSS, no new complains.    Plan of Care:    Biliary sludge with polymicrobial bacteremia s/p ERCP with sphincterotomy 7/19, septic shock resolved  ANTHONY due to sepsis/ischemic ATN  Shock liver, improving  Demand ischemia (underlying systolic CHF/pulm HTN)  Anemia/Thrombocytopenia    Plan:    - sCr improving consistent with resolving ATN  - LFTs improving  - heme parameters stable    Thank you for allowing us to participate in this patient's care. We will continue to follow.    Vital Signs:  Temp Readings from Last 3 Encounters:   07/29/23 97.7 °F (36.5 °C) (Oral)   06/02/23 98 °F (36.7 °C) (Oral)   04/13/23 97.4 °F (36.3 °C)       Pulse Readings from Last 3 Encounters:   07/29/23 68   06/02/23 68   04/13/23 71       BP Readings from Last 3 Encounters:   07/29/23 (!) 163/83   06/02/23 (!) 120/59   04/13/23 (!) 146/70       Weight:  Wt Readings from Last 3 Encounters:   07/19/23 108.9 kg (239 lb 15.9 oz)   07/19/23 108.9 kg (240 lb 1.3 oz)   06/02/23 109.1 kg (240 lb 8 oz)       INS/OUTS:  I/O last 3 completed shifts:  In: 480 [P.O.:480]  Out: 1950 [Urine:1950]  No intake/output data recorded.      Medications:  Scheduled Meds:   amLODIPine  10 mg Oral Daily    cefTRIAXone (ROCEPHIN) IVPB  2 g Intravenous Q24H    chlorhexidine  15 mL Mouth/Throat BID    DAPTOmycin (CUBICIN) IV (PEDS and ADULTS)  10 mg/kg (Adjusted) Intravenous Q24H    enoxparin  30 mg Subcutaneous Q24H    furosemide (LASIX) injection  40 mg Intravenous Daily    guaiFENesin  600 mg Oral BID    hydrALAZINE  100 mg Oral Q12H    insulin detemir U-100  20 Units Subcutaneous Daily    lactulose  20 g Oral TID    levothyroxine  75 mcg Oral Before breakfast    metoprolol  succinate  50 mg Oral Daily    traZODone  25 mg Oral QHS     Continuous Infusions:      PRN Meds:.acetaminophen, albuterol-ipratropium, butalbital-acetaminophen-caffeine -40 mg, calcium gluconate IVPB, calcium gluconate IVPB, calcium gluconate IVPB, carboxymethylcellulose, dextrose 50%, dextrose 50%, glucagon (human recombinant), glucose, glucose, guaiFENesin 100 mg/5 ml, hydrALAZINE, insulin aspart U-100, lorazepam, magnesium oxide, magnesium oxide, magnesium sulfate IVPB, magnesium sulfate IVPB, naloxone, ondansetron, polyethylene glycol, potassium bicarbonate, potassium bicarbonate, potassium bicarbonate, potassium chloride in water **AND** potassium chloride in water **AND** potassium chloride in water, racepinephrine, senna-docusate 8.6-50 mg, simethicone, sodium chloride 0.9%, sodium phosphate 15 mmol in dextrose 5 % (D5W) 250 mL IVPB, sodium phosphate 20.01 mmol in dextrose 5 % (D5W) 250 mL IVPB, sodium phosphate 30 mmol in dextrose 5 % (D5W) 250 mL IVPB, traMADoL  No current facility-administered medications on file prior to encounter.     Current Outpatient Medications on File Prior to Encounter   Medication Sig Dispense Refill    amLODIPine (NORVASC) 2.5 MG tablet Take 1 tablet (2.5 mg total) by mouth once daily. 90 tablet 1    azelastine (ASTELIN) 137 mcg (0.1 %) nasal spray 1 spray (137 mcg total) by Nasal route 2 (two) times daily. 30 mL 5    calcium carbonate-vitamin D3 600 mg-62.5 mcg (2,500 unit) Cap calcium carbonate 600 mg-vitamin D3 62.5 mcg (2,500 unit) capsule   Take by oral route.      DULoxetine (CYMBALTA) 60 MG capsule Take 1 capsule (60 mg total) by mouth once daily. 90 capsule 1    empaglifloz-linaglip-metformin (TRIJARDY XR) 25-5-1,000 mg TBph Take 1 tablet by mouth once daily. 90 tablet 1    guanFACINE (TENEX) 2 MG tablet Take 1 tablet (2 mg total) by mouth nightly. 90 tablet 3    insulin degludec (TRESIBA FLEXTOUCH U-200) 200 unit/mL (3 mL) insulin pen Inject 76 Units into the skin  "once daily. 12 pen 3    iron-vitamin C 100-250 mg, ICAR-C, 100-250 mg Tab Take 1 tablet by mouth once daily. 30 each 8    levothyroxine (SYNTHROID) 75 MCG tablet Take 75 mcg by mouth before breakfast.      metFORMIN (GLUCOPHAGE) 1000 MG tablet Take 1,000 mg by mouth 2 (two) times daily with meals.      metOLazone (ZAROXOLYN) 5 MG tablet Take 1 tablet (5 mg total) by mouth once daily. 90 tablet 0    metoprolol succinate (TOPROL-XL) 25 MG 24 hr tablet Take 1 tablet (25 mg total) by mouth once daily. 90 tablet 1    multivitamin capsule Take 1 capsule by mouth once daily.      olmesartan (BENICAR) 40 MG tablet Take 1 tablet (40 mg total) by mouth once daily. 90 tablet 1    potassium chloride (KLOR-CON) 10 MEQ TbSR Take 1 tablet (10 mEq total) by mouth once daily. 90 tablet 1    pregabalin (LYRICA) 50 MG capsule Take 50 mg by mouth every evening.      rosuvastatin (CRESTOR) 40 MG Tab Take 1 tablet (40 mg total) by mouth every evening. 90 tablet 3    aspirin (ECOTRIN) 81 MG EC tablet Take 1 tablet (81 mg total) by mouth once daily. 30 tablet 0    blood sugar diagnostic Strp One Touch Ultra Blue Test strips, Use l strip to check glucose 4 times daily 100 each 11    blood-glucose meter kit Use as instructed 1 each 0    cyanocobalamin 1,000 mcg/mL injection Inject 1 mL (1,000 mcg total) into the skin every 30 days. 3 mL 4    lancets (ONETOUCH DELICA LANCETS) 33 gauge Misc USE 1  TO CHECK GLUCOSE 4 TIMES DAILY 100 each 3    magnesium oxide (MAG-OX) 400 mg (241.3 mg magnesium) tablet Take 1 tablet (400 mg total) by mouth once daily. 90 tablet 1    NYSTOP powder APPLY TO AFFECTED AREA AS NEEDED      prednisoLONE acetate (PRED FORTE) 1 % DrpS Place 1 drop into both eyes as needed.        Review of Systems:  On MV    Physical Exam:  BP (!) 163/83 Comment: notified nurse Martine  Pulse 68   Temp 97.7 °F (36.5 °C) (Oral)   Resp 18   Ht 5' 7" (1.702 m)   Wt 108.9 kg (239 lb 15.9 oz)   SpO2 95% Comment: Simultaneous filing. User " "may not have seen previous data.  BMI 37.60 kg/m²     General Appearance:    Ill   Head:    Normocephalic, atraumatic   Eyes:    Closed     Mouth:   Moist mucus membranes, no thrush or oral lesions, normal      dentition   Back:     No CVA tenderness   Lungs:     Coarse, on MV   Heart:    Regular rate and rhythm, no rub/gallop   Abdomen:     Soft, non-tender, non-distended guarding, no masses, no organomegaly   Extremities:   Warm and well perfused, distal pulses intact, no cyanosis, edematous   MSK:   No joint or muscle swelling, tenderness or deformity   Skin:   Skin color, texture, turgor normal, no rashes or lesions   Neurologic/Psychiatric:   Sedated     Results:  Recent Labs   Lab 07/28/23  0903 07/28/23 2112 07/29/23  0554    142 139   K 3.5 3.7 3.5    105 102   CO2 26 25 27   BUN 44* 42* 38*   CREATININE 1.3 1.3 1.2   * 307* 274*         Recent Labs   Lab 07/23/23  0608 07/24/23  0310 07/25/23  0327 07/26/23  0503 07/27/23  0434 07/28/23  0520 07/28/23  0903 07/28/23 2112 07/29/23  0554   CALCIUM 7.6* 7.8* 7.9*  --  8.0*  --  8.3* 8.3* 8.2*   ALBUMIN 1.8* 1.8* 2.3*  --  2.3*  --   --  2.3* 2.4*   PHOS 3.8 3.7 4.4  --   --   --   --   --   --    MG 2.7* 2.5 2.4   < > 2.0 1.9  --   --  1.8    < > = values in this interval not displayed.               No results for input(s): "POCTGLUCOSE" in the last 168 hours.    Recent Labs   Lab 12/22/20  1232 07/07/21  1057 07/18/23  1735   Hemoglobin A1C 6.8 H 7.4 H 6.9 H         Recent Labs   Lab 07/27/23  0857 07/28/23  0903 07/28/23 2113 07/29/23  0554   WBC 8.73 9.35 9.40 9.87   HGB 10.7* 11.4* 11.9* 11.6*   HCT 33.5* 36.4* 38.3 36.8*    317 426 401   MCV 82 84 83 83   MCHC 31.9* 31.3* 31.1* 31.5*   MONO 7.3  0.6 7.1  0.7 6.9  0.7  --    EOSINOPHIL 1.1 1.0 0.9  --          Recent Labs   Lab 07/27/23  0434 07/28/23  2112 07/29/23  0554   BILITOT 0.8 0.8 0.7   BILIDIR  --   --  0.1   PROT 5.6* 5.9* 5.8*   ALBUMIN 2.3* 2.3* 2.4*   ALKPHOS " 171* 191* 197*   ALT 51* 47* 45*   AST 21 48* 35         Recent Labs   Lab 05/29/23  1040 07/18/23  1221 07/19/23  0116   Color, UA Yellow Yellow Yellow   Appearance, UA Clear Clear Clear   pH, UA 6.0 8.0 6.0   Specific [place], UA 1.015 1.030 >1.030 A   Protein, UA Negative 1+ A 1+ A   Glucose, UA 4+ A 4+ A 4+ A   Ketones, UA Negative Trace A Negative   Urobilinogen, UA Negative 2.0-3.0 A 2.0-3.0 A   Bilirubin (UA) Negative Negative 1+ A   Occult Blood UA Negative 1+ A Trace A   Nitrite, UA Negative Negative Negative   RBC, UA 1 6 H 1   WBC, UA 7 H 6 H 3   Bacteria Negative Negative Negative   Hyaline Casts, UA 4 A 5 A 1         Recent Labs   Lab 07/24/23  0312 07/24/23  0947 07/26/23  1908   POC PH 7.461 H 7.436 7.418   POC PCO2 36.9 38.2 37.8   POC HCO3 26.3 25.7 24.4   POC PO2 70 L 63 L 75 L   POC SATURATED O2 95 92 L 95   POC BE 2 1 0   Sample ARTERIAL ARTERIAL ARTERIAL         Microbiology Results (last 7 days)       Procedure Component Value Units Date/Time    Blood culture [[id number]] Collected: 07/24/23 0348    Order Status: Completed Specimen: Blood Updated: 07/29/23 0432     Blood Culture, Routine No growth after 5 days.    Narrative:      Collection has been rescheduled by [facility] at 07/24/2023 03:28 Reason:   Nurse Draw. Blood was already drawn. I was not able to get the blood   cultures.  Collection has been rescheduled by JS at 07/24/2023 03:28 Reason:   Nurse Draw. Blood was already drawn. I was not able to get the blood   cultures.    Blood culture [[id number]] Collected: 07/25/23 0327    Order Status: Completed Specimen: Blood Updated: 07/29/23 0432     Blood Culture, Routine No Growth to date      No Growth to date      No Growth to date      No Growth to date      No Growth to date    Culture, Respiratory with Gram Stain [[id number]] Collected: 07/27/23 2156    Order Status: Completed Specimen: Sputum, Expectorated Updated: 07/28/23 1541     Respiratory Culture No Growth     Gram Stain (Respiratory)  <10 epithelial cells per low power field.     Gram Stain (Respiratory) Rare WBC's     Gram Stain (Respiratory) Rare Gram positive cocci    Blood culture [[id number]] Collected: 07/23/23 0825    Order Status: Completed Specimen: Blood Updated: 07/28/23 1032     Blood Culture, Routine No growth after 5 days.    Blood culture [[id number]] Collected: 07/20/23 0940    Order Status: Completed Specimen: Blood from Peripheral, Antecubital, Right Updated: 07/25/23 1232     Blood Culture, Routine No growth after 5 days.    Narrative:      Collection has been rescheduled by [email or web address] at 07/20/2023 06:04 Reason:   Unable to collect  Collection has been rescheduled by [email or web address] at 07/20/2023 08:01 Reason:   Contacting zainab for cultures per RN Alfred  Collection has been rescheduled by [email or web address] at 07/20/2023 08:41 Reason:   Nurse Draw  Collection has been rescheduled by [email or web address] at 07/20/2023 06:04 Reason:   Unable to collect  Collection has been rescheduled by [email or web address] at 07/20/2023 08:01 Reason:   Contacting zainab for cultures per RN Alfred  Collection has been rescheduled by [email or web address] at 07/20/2023 08:41 Reason:   Nurse Draw          I have spent > minutes providing care for this patient for the above diagnoses. These services have included chart/data/imaging review, evaluation, exam, formulation of plan, , note preparation, and discussions with staff involved in this patient's care.    Omar Henao MD  [facility] [address] [address]  [phone] (p)  [phone] (f)

## 2023-07-29 NOTE — PT/OT/SLP PROGRESS
Occupational Therapy   Treatment    Name: Chanel Cervantes  MRN: [id number]  Admitting Diagnosis:  Septic shock  10 Days Post-Op  Procedure(s):  ERCP (ENDOSCOPIC RETROGRADE CHOLANGIOPANCREATOGRAPHY)     The primary encounter diagnosis was Abnormal LFTs. Diagnoses of Fall, Other acute pancreatitis, unspecified complication status, Calculus of bile duct without cholecystitis with obstruction, Pulmonary nodule, Leukopenia, unspecified type, Hypomagnesemia, Hypokalemia, Chest pain, CHF (congestive heart failure), Hypotensive episode, Septic shock, Ventricular bigeminy, Acute obstructive cholangitis, Gallstone pancreatitis, NIKOLAS on CPAP, Acute hypoxemic respiratory failure, Shock liver, Encephalopathy, metabolic, ANTHONY (acute kidney injury), Demand ischemia, Bacteremia due to Enterococcus, Bacteremia, and SBE (subacute bacterial endocarditis) were also pertinent to this visit.    Recommendations:     Discharge Recommendations: rehabilitation facility  Discharge Equipment Recommendations:  to be determined by next level of care  Barriers to discharge:   (increased level of assist)    Assessment:     Chanel Cervantes is a 67 y.o. female with a medical diagnosis of Septic shock.  She presents with Performance deficits affecting function are weakness, impaired endurance, impaired self care skills, impaired functional mobility, gait instability, impaired balance, pain, impaired cardiopulmonary response to activity.     Rehab Prognosis:  Good; patient would benefit from acute skilled OT services to address these deficits and reach maximum level of function.       Plan:     Patient to be seen 6 x/week to address the above listed problems via self-care/home management, therapeutic activities, therapeutic exercises  Plan of Care Expires: 08/25/23  Plan of Care Reviewed with: daughter, patient    Subjective     Chief Complaint: B hand/fingers edema and pain, increased swelling in arms, nate. RUE.  Patient/Family Comments/goals: I had a bad  night last night, my chest was hurting like my heart was jumping out of my body.  Pain/Comfort:  Pain Rating 1:  (not rated)  Location - Side 1: Bilateral  Location - Orientation 1: generalized  Location 1:  (edematous fingers)  Pain Addressed 1: Reposition, Distraction (AROM ex in warm water)  Pain Rating Post-Intervention 1:  (less pain)    Objective:     Communicated with: nurse prior to session.  Patient found up in chair with telemetry, PureWick, PICC line, oxygen upon OT entry to room.    General Precautions: Standard, fall, diabetic    Orthopedic Precautions:N/A  Braces: N/A  Respiratory Status: Nasal cannula, flow 3 L/min     Occupational Performance:     Functional Mobility/Transfers:  Patient completed Sit <> Stand Transfer with moderate assistance  with  rolling walker   Patient completed Toilet Transfer Step Transfer technique with moderate assistance with  rolling walker and bedside commode  Functional Mobility: NA    Activities of Daily Living:  Grooming: stand by assistance washed hands with cloth and brushed teeth seated BS chair   Toileting: total assistance x 2 persons for CGA standing with RW and second person performed te-rectal hygiene and clothes management in standing. Pt was fatigued standing ~1 MIN.      Treatment & Education:  Purpose of OT and POC  Edema reduction tech to B hands 2/2 significant /sever edema. Fingers placed in warm water in basin while performing active finger flex/ext ex x 30 reps followed by coband compression wrapping to B hands and fingers then pt performed active finger flex/ext pumping ex x 30 reps. Pt want to keep on compression wraps longer.pt and daughter educated in how to remove wraps if they became uncomfortable and if circulation is reduced.  All questions/concerns addressed within scope.     Patient left up in chair with all lines intact, call button in reach, and daughter present    GOALS:   Multidisciplinary Problems       Occupational Therapy Goals           Problem: Occupational Therapy    Goal Priority Disciplines Outcome Interventions   Occupational Therapy Goal     OT, PT/OT Ongoing, Progressing    Description: Goals to be met by: 8/25/2023     Patient will increase functional independence with ADLs by performing:    Feeding with [place].  UE Dressing with Modified [place].  LE Dressing with Modified [place].  Grooming while standing at sink with Modified [place].  Toileting from toilet with Modified [place] for hygiene and clothing management.   Toilet transfer to toilet with Modified [place].                         Time Tracking:     OT Date of Treatment: 07/29/23  OT Start Time: 1038 (8233)  OT Stop Time: 1102 (1139)  OT Total Time (min): 24 min+6=30 min    Billable Minutes:Self Care/Home Management 15  Therapeutic Exercise 15  Total Time 30    OT/RUTHY: OT          7/29/2023

## 2023-07-29 NOTE — PLAN OF CARE
Pt resting in bed, daughter at bedside.  Tylenol given for headache.  Poor oral intake.  Hypertensive.  PRN hydralazine given. PO antihypertensives given.  Remains on O2 2L, lungs diminished.  Pt reports extreme exhaustion from being awake multiple days.  Calm and cooperative.  Waller removed, able to urinate, purewick in place.  Problem: Adult Inpatient Plan of Care  Goal: Plan of Care Review  Outcome: Ongoing, Progressing  Goal: Patient-Specific Goal (Individualized)  Outcome: Ongoing, Progressing  Goal: Absence of Hospital-Acquired Illness or Injury  Outcome: Ongoing, Progressing  Goal: Optimal Comfort and Wellbeing  Outcome: Ongoing, Progressing  Goal: Readiness for Transition of Care  Outcome: Ongoing, Progressing

## 2023-07-29 NOTE — PROGRESS NOTES
[facility] Medicine  Progress Note    Patient Name: Chanel Cervantes  MRN: [id number]  Patient Class: IP- Inpatient   Admission Date: 7/18/2023  Length of Stay: 10 days  Attending Physician: Kartik Farris MD  Primary Care Provider: ARIC Almaguer        Subjective:     Principal Problem:Septic shock        HPI:  Chanel Cervantes is a 67 y.o. White female   With PMH of DM2, HTN, bariatric surgery,  Remote cholecystectomy,  Frequent UTIs with kidney stones,  who presents with back pain.  Admitted for pancreatitis with elevated LFTs     Pt is currently confused after receiving ativan  (ER gave it to calm her for CT scan)  History taken from family at bedside     Onset of back pain overnight  Located b/l lumbar region  Radiating to b/l upper quadrants of abdomen  Occurring intermittently  Progressively worsening  Severe, causes SOB when occurring     Initially pt thought pain was due to a fall yesterday  (Mechanical, tripped over a rug, no head trauma)  +nausea, no vomiting  +intermittent chills  They are not sure about objective fever     Has had similar abdominal pain previously  Per family, this has been attributed to her ongoing ventral wall hernia  They don't bring her to ER for this usually  However pain today was much more severe than usual      Overview/Hospital Course:  Chanel Cervantes is a 67 year old female with a past medical history of obesity, ventral hernia, DM, HTN, anemia, CAD, NIKOLAS, and hypothyroidism who presented with septic shock secondary to a possible cholangitis with E coli bacteremia. Vancomycin was discontinued and she remained on meropenem. BP was maintained on a Levophed infusion. GI was consulted and performed ERCP which showed biliary sludge with a sludge ball dilating the main duct which was removed; a biliary sphincterotomy was also performed. Repeat blood cultures were negative. She was on IV fluids with LR and was NPO as she was encephalopathic. She also had  an ANTHONY as well. She also had acute hypoxic respiratory failure for which she was put on mask O2. She also had demand ischemia; TTE was done and troponin was trended. There was shock liver superimposed on the hepatocellular injury brought on by the acute cholangitis. Pulmonary/Critical Care was consulted given the patient's medical acuity.  Pulmonologist made decision to intubate patient based on patient's deep encephalopathy and inability to protect her airway.  Patient underwent transesophageal echocardiography which did not report any intracardiac thrombus or evidence of endocarditis.  Patient was successfully extubated on July 24, 2023. PT and OT is working with patient. Patient is transferred to medicine floor for further management and rehab.  working on LTAC placement.      Interval History: she got more SOB overnight. She has been tremulous. She has no wheezing this morning. Coughing up thick mucus. She has pure wick in place today we need to remove this so she is moving around more. Will stop lactulose as she is having BM's at this point. Continue to focus on mobility.     Review of Systems   All other systems reviewed and are negative.    Objective:     Vital Signs (Most Recent):  Temp: 97.7 °F (36.5 °C) (07/29/23 0812)  Pulse: 68 (07/29/23 0812)  Resp: 18 (07/29/23 0812)  BP: (!) 163/83 (notified nurse Martine) (07/29/23 0812)  SpO2: 95 % (Simultaneous filing. User may not have seen previous data.) (07/29/23 0812) Vital Signs (24h Range):  Temp:  [97.5 °F (36.4 °C)-98.6 °F (37 °C)] 97.7 °F (36.5 °C)  Pulse:  [64-97] 68  Resp:  [16-20] 18  SpO2:  [91 %-95 %] 95 %  BP: (163-217)/(74-94) 163/83     Weight: 108.9 kg (239 lb 15.9 oz)  Body mass index is 37.6 kg/m².    Intake/Output Summary (Last 24 hours) at 7/29/2023 0935  Last data filed at 7/28/2023 1156  Gross per 24 hour   Intake 480 ml   Output 800 ml   Net -320 ml           Physical Exam  Constitutional:       Appearance: Normal appearance.  She is normal weight.      Comments: Very weak   HENT:      Head: Normocephalic and atraumatic.      Nose: Nose normal.      Mouth/Throat:      Mouth: Mucous membranes are moist.   Eyes:      Conjunctiva/sclera: Conjunctivae normal.      Pupils: Pupils are equal, round, and reactive to light.   Cardiovascular:      Rate and Rhythm: Normal rate and regular rhythm.      Pulses: Normal pulses.      Heart sounds: Normal heart sounds. No murmur heard.     No friction rub. No gallop.   Pulmonary:      Effort: Pulmonary effort is normal.      Breath sounds: No wheezing, rhonchi or rales.      Comments: Clear breath sounds today, no stridor. No wheezing  Abdominal:      General: Abdomen is flat. Bowel sounds are normal. There is no distension.      Palpations: Abdomen is soft.      Tenderness: There is no abdominal tenderness. There is no guarding.   Musculoskeletal:         General: No swelling. Normal range of motion.      Cervical back: Normal range of motion and neck supple.      Right lower leg: No edema.      Left lower leg: No edema.   Skin:     General: Skin is warm and dry.   Neurological:      General: No focal deficit present.      Mental Status: She is alert.   Psychiatric:         Mood and Affect: Mood normal.         Thought Content: Thought content normal.         Judgment: Judgment normal.             Significant Labs: All pertinent labs within the past 24 hours have been reviewed.    Significant Imaging: I have reviewed all pertinent imaging results/findings within the past 24 hours.      Assessment/Plan:      * Septic shock due to Entercoccus faecium and E.coli  Secondary to acute cholangitis  -continue IVAB, stop date is 08/01  - will complete antibiotics rehab  -Enterococcus bacteremia now cleared  -S/p ERCP  -Continue LR IV fluids and Levophed to keep MAP > 65    Antibiotics (From admission, onward)    Start     Stop Route Frequency Ordered    07/21/23 1400  DAPTOmycin (CUBICIN) 805 mg in sodium  "chloride 0.9% SolP 50 mL IVPB         -- IV Every 48 hours (non-standard times) 07/20/23 0749    07/19/23 1300  meropenem 1 g in sodium chloride 0.9 % 100 mL IVPB (ready to mix system)        Note to Pharmacy: Ht: 5' 7" (1.702 m)  Wt: 108.9 kg (240 lb)  Estimated Creatinine Clearance: 63.1 mL/min (based on SCr of 1.1 mg/dL).  Body mass index is 37.59 kg/m².    -- IV Every 12 hours (non-standard times) 07/19/23 0740              Constipation  resolved      Gallstone pancreatitis  Present on hospital day 1.  Continue vasopressor support and crystalloid.      Encephalopathy, metabolic  Metabolic in setting of septic shock.  -assess mental status when off sedation  -Treat septic shock      Shock liver  - LFTs improved    Lab Results   Component Value Date    ALT 51 (H) 07/27/2023    AST 21 07/27/2023    GGT 24 05/17/2018    ALKPHOS 171 (H) 07/27/2023    BILITOT 0.8 07/27/2023         ANTHONY (acute kidney injury)  In setting of septic shock.  -appreciate nephrology recommendations  -Renally dose medications  -Avoid nephrotoxic agents  -Monitor UOP and electrolytes  -Trend creatinine  -nephrologist consulting appreciate recommendations    Cr has trended upward:  2.4 to 2.6 to 2.9 to 2.8.  Now down to 1.2  BUN is going up as well:  BUN is improving also      Demand ischemia  History of CAD s/p CABG. No acute ST changes on EKG.  -check second trop level  -Telemetry  -cardiology consulting -- their opinion is that the troponin is high b/c of myocardial demand ischemia    Troponin I High Sensitivity   Date Value Ref Range Status   07/28/2023 33.6 (H) 0.0 - 14.9 pg/mL Final     Comment:     Troponin results differ between methods. Do not use   results between Troponin methods interchangeably.    Alkaline Phospatase levels above 400 U/L may   cause false positive results.    Access hsTnI should not be used for patients taking   Asfotase anya (Strensiq).     07/28/2023 33.6 (H) 0.0 - 14.9 pg/mL Final     Comment:     Troponin " results differ between methods. Do not use   results between Troponin methods interchangeably.    Alkaline Phospatase levels above 400 U/L may   cause false positive results.    Access hsTnI should not be used for patients taking   Asfotase anya (Strensiq).     07/20/2023 2384.3 (HH) 0.0 - 14.9 pg/mL Final     Comment:     Troponin results differ between methods. Do not use   results between Troponin methods interchangeably.    Alkaline Phospatase levels above 400 U/L may   cause false positive results.    Access hsTnI should not be used for patients taking   Asfotase anya (Strensiq).  Troponin critical result(s) called and verbal readback obtained   from [place] Avila RN M3 by MS1 07/20/2023 04:54         Acute hypoxemic respiratory failure  Required intubation on hospital day 3.  Patient was successfully extubated on July 24, 2023.  Follow Pulmonary Medicine recommendations.  Wheezing has improved but she still feels extremely short of breath  Seems to have fluid overload  Diuresing with lasix further today.   Continue mucus clearance  Check DVT u/s   Discussed with Dr Serrano yesterday  Adjust DuoNebs  Added mucus clearance therapy yesterday        Anemia  Stable.  -Trend Hgb with CBC    Hemoglobin   Date Value Ref Range Status   07/25/2023 11.5 (L) 12.0 - 16.0 g/dL Final   07/24/2023 10.6 (L) 12.0 - 16.0 g/dL Final           Obesity (BMI 30-39.9)  Body mass index is 37.6 kg/m². Morbid obesity complicates all aspects of disease management from diagnostic modalities to treatment.       Hypothyroidism  TSH unremarkable.  It's 0.470.  -Continue Synthroid      Acute obstructive cholangitis  Sludge ball removed via ERCP. Likely source of bacteremia. LFTs much improved.  -appreciate ID recommendations, treatment of E coli and Enterococcus as noted above  -GI following  -IV fluids  - blood cultures:  Entercoccus faecium and E.coli  -Trend LFTs      CAD (coronary artery disease)  -Hold home  medications  -Telemetry      Hypokalemia  -Trend K.  Has improved.  -Replete K PRN  -Telemetry    Potassium   Date Value Ref Range Status   07/23/2023 3.8 3.5 - 5.1 mmol/L Final   07/22/2023 4.0 3.5 - 5.1 mmol/L Final         S/P bariatric surgery  Noted.      NIKOLAS on CPAP  -chronic condition.  - needs nightly CPAP    Type 2 diabetes mellitus treated with insulin  Patient's FSGs are controlled on current medication regimen.  Last A1c reviewed-   Lab Results   Component Value Date    HGBA1C 6.9 (H) 07/18/2023     'Current correctional scale  Medium  Maintain anti-hyperglycemic dose as follows-   Antihyperglycemics (From admission, onward)    Start     Stop Route Frequency Ordered    07/19/23 1018  insulin aspart U-100 pen 1-10 Units         -- SubQ Every 6 hours PRN 07/19/23 0920        Hold Oral hypoglycemics while patient is in the hospital.    Benign essential hypertension  - resumed home medications  - blood pressure improving      VTE Risk Mitigation (From admission, onward)         Ordered     enoxaparin injection 30 mg  Every 24 hours (non-standard times)         07/22/23 0940     IP VTE HIGH RISK PATIENT  Once         07/18/23 1642     Place sequential compression device  Until discontinued         07/18/23 1642                Discharge Planning   LUIS ALFREDO: 7/29/2023     Code Status: Full Code   Is the patient medically ready for discharge?:     Reason for patient still in hospital (select all that apply): Treatment  Discharge Plan A: Rehab   Discharge Delays: (!) Post-Acute Set-up              Kartik Farris MD  Department of Hospital Medicine   [facility]

## 2023-07-29 NOTE — ASSESSMENT & PLAN NOTE
Required intubation on hospital day 3.  Patient was successfully extubated on July 24, 2023.  Follow Pulmonary Medicine recommendations.  Wheezing has improved but she still feels extremely short of breath  Seems to have fluid overload  Diuresing with lasix further today.   Continue mucus clearance  Check DVT u/s   Discussed with Dr Serrano yesterday  Carrol Ohara  Added mucus clearance therapy yesterday

## 2023-07-29 NOTE — SUBJECTIVE & OBJECTIVE
Interval History: she got more SOB overnight. She has been tremulous. She has no wheezing this morning. Coughing up thick mucus. She has pure wick in place today we need to remove this so she is moving around more. Will stop lactulose as she is having BM's at this point. Continue to focus on mobility.     Review of Systems   All other systems reviewed and are negative.    Objective:     Vital Signs (Most Recent):  Temp: 97.7 °F (36.5 °C) (07/29/23 0812)  Pulse: 68 (07/29/23 0812)  Resp: 18 (07/29/23 0812)  BP: (!) 163/83 (notified nurse Martine) (07/29/23 0812)  SpO2: 95 % (Simultaneous filing. User may not have seen previous data.) (07/29/23 0812) Vital Signs (24h Range):  Temp:  [97.5 °F (36.4 °C)-98.6 °F (37 °C)] 97.7 °F (36.5 °C)  Pulse:  [64-97] 68  Resp:  [16-20] 18  SpO2:  [91 %-95 %] 95 %  BP: (163-217)/(74-94) 163/83     Weight: 108.9 kg (239 lb 15.9 oz)  Body mass index is 37.6 kg/m².    Intake/Output Summary (Last 24 hours) at 7/29/2023 0935  Last data filed at 7/28/2023 1156  Gross per 24 hour   Intake 480 ml   Output 800 ml   Net -320 ml           Physical Exam  Constitutional:       Appearance: Normal appearance. She is normal weight.      Comments: Very weak   HENT:      Head: Normocephalic and atraumatic.      Nose: Nose normal.      Mouth/Throat:      Mouth: Mucous membranes are moist.   Eyes:      Conjunctiva/sclera: Conjunctivae normal.      Pupils: Pupils are equal, round, and reactive to light.   Cardiovascular:      Rate and Rhythm: Normal rate and regular rhythm.      Pulses: Normal pulses.      Heart sounds: Normal heart sounds. No murmur heard.     No friction rub. No gallop.   Pulmonary:      Effort: Pulmonary effort is normal.      Breath sounds: No wheezing, rhonchi or rales.      Comments: Clear breath sounds today, no stridor. No wheezing  Abdominal:      General: Abdomen is flat. Bowel sounds are normal. There is no distension.      Palpations: Abdomen is soft.      Tenderness: There is  no abdominal tenderness. There is no guarding.   Musculoskeletal:         General: No swelling. Normal range of motion.      Cervical back: Normal range of motion and neck supple.      Right lower leg: No edema.      Left lower leg: No edema.   Skin:     General: Skin is warm and dry.   Neurological:      General: No focal deficit present.      Mental Status: She is alert.   Psychiatric:         Mood and Affect: Mood normal.         Thought Content: Thought content normal.         Judgment: Judgment normal.             Significant Labs: All pertinent labs within the past 24 hours have been reviewed.    Significant Imaging: I have reviewed all pertinent imaging results/findings within the past 24 hours.

## 2023-07-29 NOTE — ASSESSMENT & PLAN NOTE
History of CAD s/p CABG. No acute ST changes on EKG.  -check second trop level  -Telemetry  -cardiology consulting -- their opinion is that the troponin is high b/c of myocardial demand ischemia    Troponin I High Sensitivity   Date Value Ref Range Status   07/28/2023 33.6 (H) 0.0 - 14.9 pg/mL Final     Comment:     Troponin results differ between methods. Do not use   results between Troponin methods interchangeably.    Alkaline Phospatase levels above 400 U/L may   cause false positive results.    Access hsTnI should not be used for patients taking   Asfotase anya (Strensiq).     07/28/2023 33.6 (H) 0.0 - 14.9 pg/mL Final     Comment:     Troponin results differ between methods. Do not use   results between Troponin methods interchangeably.    Alkaline Phospatase levels above 400 U/L may   cause false positive results.    Access hsTnI should not be used for patients taking   Asfotase ayna (Strensiq).     07/20/2023 2384.3 (HH) 0.0 - 14.9 pg/mL Final     Comment:     Troponin results differ between methods. Do not use   results between Troponin methods interchangeably.    Alkaline Phospatase levels above 400 U/L may   cause false positive results.    Access hsTnI should not be used for patients taking   Asfotase anya (Strensiq).  Troponin critical result(s) called and verbal readback obtained   from [place] Avila RN M3 by MS1 07/20/2023 04:54

## 2023-07-30 LAB
ANION GAP SERPL CALC-SCNC: 11 MMOL/L (ref 8–16)
BACTERIA BLD CULT: NORMAL
BUN SERPL-MCNC: 30 MG/DL (ref 8–23)
CALCIUM SERPL-MCNC: 8.3 MG/DL (ref 8.7–10.5)
CHLORIDE SERPL-SCNC: 104 MMOL/L (ref 95–110)
CO2 SERPL-SCNC: 27 MMOL/L (ref 23–29)
CREAT SERPL-MCNC: 1 MG/DL (ref 0.5–1.4)
ERYTHROCYTE [DISTWIDTH] IN BLOOD BY AUTOMATED COUNT: 16.3 % (ref 11.5–14.5)
EST. GFR  (NO RACE VARIABLE): >60 ML/MIN/1.73 M^2
GLUCOSE SERPL-MCNC: 156 MG/DL (ref 70–110)
GLUCOSE SERPL-MCNC: 188 MG/DL (ref 70–110)
GLUCOSE SERPL-MCNC: 231 MG/DL (ref 70–110)
GLUCOSE SERPL-MCNC: 254 MG/DL (ref 70–110)
HCT VFR BLD AUTO: 36.3 % (ref 37–48.5)
HGB BLD-MCNC: 11.4 G/DL (ref 12–16)
MAGNESIUM SERPL-MCNC: 1.7 MG/DL (ref 1.6–2.6)
MCH RBC QN AUTO: 25.9 PG (ref 27–31)
MCHC RBC AUTO-ENTMCNC: 31.4 G/DL (ref 32–36)
MCV RBC AUTO: 82 FL (ref 82–98)
PLATELET # BLD AUTO: 445 K/UL (ref 150–450)
PMV BLD AUTO: 11.3 FL (ref 9.2–12.9)
POTASSIUM SERPL-SCNC: 3.3 MMOL/L (ref 3.5–5.1)
PROCALCITONIN SERPL IA-MCNC: 0.72 NG/ML (ref 0–0.5)
RBC # BLD AUTO: 4.41 M/UL (ref 4–5.4)
SODIUM SERPL-SCNC: 142 MMOL/L (ref 136–145)
WBC # BLD AUTO: 10.04 K/UL (ref 3.9–12.7)

## 2023-07-30 PROCEDURE — 36415 COLL VENOUS BLD VENIPUNCTURE: CPT | Performed by: STUDENT IN AN ORGANIZED HEALTH CARE EDUCATION/TRAINING PROGRAM

## 2023-07-30 PROCEDURE — 99900031 HC PATIENT EDUCATION (STAT)

## 2023-07-30 PROCEDURE — 85027 COMPLETE CBC AUTOMATED: CPT | Performed by: STUDENT IN AN ORGANIZED HEALTH CARE EDUCATION/TRAINING PROGRAM

## 2023-07-30 PROCEDURE — 12000002 HC ACUTE/MED SURGE SEMI-PRIVATE ROOM

## 2023-07-30 PROCEDURE — 83735 ASSAY OF MAGNESIUM: CPT | Performed by: INTERNAL MEDICINE

## 2023-07-30 PROCEDURE — 25000242 PHARM REV CODE 250 ALT 637 W/ HCPCS: Performed by: INTERNAL MEDICINE

## 2023-07-30 PROCEDURE — 25000003 PHARM REV CODE 250: Performed by: INTERNAL MEDICINE

## 2023-07-30 PROCEDURE — 94668 MNPJ CHEST WALL SBSQ: CPT

## 2023-07-30 PROCEDURE — 80048 BASIC METABOLIC PNL TOTAL CA: CPT | Performed by: STUDENT IN AN ORGANIZED HEALTH CARE EDUCATION/TRAINING PROGRAM

## 2023-07-30 PROCEDURE — 63600175 PHARM REV CODE 636 W HCPCS: Performed by: STUDENT IN AN ORGANIZED HEALTH CARE EDUCATION/TRAINING PROGRAM

## 2023-07-30 PROCEDURE — 99900035 HC TECH TIME PER 15 MIN (STAT)

## 2023-07-30 PROCEDURE — 94640 AIRWAY INHALATION TREATMENT: CPT

## 2023-07-30 PROCEDURE — 27000221 HC OXYGEN, UP TO 24 HOURS

## 2023-07-30 PROCEDURE — 94761 N-INVAS EAR/PLS OXIMETRY MLT: CPT

## 2023-07-30 PROCEDURE — 63600175 PHARM REV CODE 636 W HCPCS: Performed by: INTERNAL MEDICINE

## 2023-07-30 PROCEDURE — 84145 PROCALCITONIN (PCT): CPT | Performed by: STUDENT IN AN ORGANIZED HEALTH CARE EDUCATION/TRAINING PROGRAM

## 2023-07-30 PROCEDURE — 25000003 PHARM REV CODE 250: Performed by: STUDENT IN AN ORGANIZED HEALTH CARE EDUCATION/TRAINING PROGRAM

## 2023-07-30 PROCEDURE — 94664 DEMO&/EVAL PT USE INHALER: CPT

## 2023-07-30 RX ORDER — POTASSIUM CHLORIDE 20 MEQ/1
40 TABLET, EXTENDED RELEASE ORAL ONCE
Status: COMPLETED | OUTPATIENT
Start: 2023-07-30 | End: 2023-07-30

## 2023-07-30 RX ORDER — HYDRALAZINE HYDROCHLORIDE 25 MG/1
100 TABLET, FILM COATED ORAL EVERY 8 HOURS
Status: DISCONTINUED | OUTPATIENT
Start: 2023-07-30 | End: 2023-08-03 | Stop reason: HOSPADM

## 2023-07-30 RX ORDER — TRAZODONE HYDROCHLORIDE 50 MG/1
50 TABLET ORAL NIGHTLY
Status: DISCONTINUED | OUTPATIENT
Start: 2023-07-30 | End: 2023-08-02

## 2023-07-30 RX ORDER — POTASSIUM CHLORIDE 20 MEQ/1
20 TABLET, EXTENDED RELEASE ORAL 2 TIMES DAILY
Status: DISCONTINUED | OUTPATIENT
Start: 2023-07-30 | End: 2023-08-03 | Stop reason: HOSPADM

## 2023-07-30 RX ORDER — METOPROLOL SUCCINATE 50 MG/1
100 TABLET, EXTENDED RELEASE ORAL DAILY
Status: DISCONTINUED | OUTPATIENT
Start: 2023-07-30 | End: 2023-08-03 | Stop reason: HOSPADM

## 2023-07-30 RX ORDER — ALPRAZOLAM 0.5 MG/1
0.5 TABLET ORAL 3 TIMES DAILY PRN
Status: DISCONTINUED | OUTPATIENT
Start: 2023-07-30 | End: 2023-07-31

## 2023-07-30 RX ADMIN — IPRATROPIUM BROMIDE AND ALBUTEROL SULFATE 3 ML: .5; 3 SOLUTION RESPIRATORY (INHALATION) at 08:07

## 2023-07-30 RX ADMIN — POTASSIUM CHLORIDE 40 MEQ: 1500 TABLET, EXTENDED RELEASE ORAL at 09:07

## 2023-07-30 RX ADMIN — POTASSIUM CHLORIDE 20 MEQ: 1500 TABLET, EXTENDED RELEASE ORAL at 09:07

## 2023-07-30 RX ADMIN — INSULIN DETEMIR 20 UNITS: 100 INJECTION, SOLUTION SUBCUTANEOUS at 09:07

## 2023-07-30 RX ADMIN — FUROSEMIDE 40 MG: 10 INJECTION, SOLUTION INTRAVENOUS at 08:07

## 2023-07-30 RX ADMIN — CHLORHEXIDINE GLUCONATE 15 ML: 1.2 RINSE ORAL at 08:07

## 2023-07-30 RX ADMIN — HYDRALAZINE HYDROCHLORIDE 100 MG: 25 TABLET ORAL at 09:07

## 2023-07-30 RX ADMIN — HYDRALAZINE HYDROCHLORIDE 100 MG: 25 TABLET ORAL at 05:07

## 2023-07-30 RX ADMIN — DAPTOMYCIN 805 MG: 500 INJECTION, POWDER, LYOPHILIZED, FOR SOLUTION INTRAVENOUS at 05:07

## 2023-07-30 RX ADMIN — DEXTROSE MONOHYDRATE 2 G: 50 INJECTION, SOLUTION INTRAVENOUS at 08:07

## 2023-07-30 RX ADMIN — ENOXAPARIN SODIUM 40 MG: 100 INJECTION SUBCUTANEOUS at 05:07

## 2023-07-30 RX ADMIN — METOPROLOL SUCCINATE 100 MG: 50 TABLET, FILM COATED, EXTENDED RELEASE ORAL at 08:07

## 2023-07-30 RX ADMIN — AMLODIPINE BESYLATE 10 MG: 5 TABLET ORAL at 08:07

## 2023-07-30 RX ADMIN — IPRATROPIUM BROMIDE AND ALBUTEROL SULFATE 3 ML: .5; 3 SOLUTION RESPIRATORY (INHALATION) at 02:07

## 2023-07-30 RX ADMIN — INSULIN ASPART 3 UNITS: 100 INJECTION, SOLUTION INTRAVENOUS; SUBCUTANEOUS at 09:07

## 2023-07-30 RX ADMIN — HYDRALAZINE HYDROCHLORIDE 20 MG: 20 INJECTION INTRAMUSCULAR; INTRAVENOUS at 05:07

## 2023-07-30 RX ADMIN — TRAZODONE HYDROCHLORIDE 50 MG: 50 TABLET ORAL at 09:07

## 2023-07-30 RX ADMIN — GUAIFENESIN 600 MG: 600 TABLET, EXTENDED RELEASE ORAL at 08:07

## 2023-07-30 RX ADMIN — ALPRAZOLAM 0.5 MG: 0.5 TABLET ORAL at 09:07

## 2023-07-30 RX ADMIN — ALPRAZOLAM 0.5 MG: 0.5 TABLET ORAL at 11:07

## 2023-07-30 RX ADMIN — CHLORHEXIDINE GLUCONATE 15 ML: 1.2 RINSE ORAL at 09:07

## 2023-07-30 RX ADMIN — SIMETHICONE 80 MG: 80 TABLET, CHEWABLE ORAL at 09:07

## 2023-07-30 RX ADMIN — GUAIFENESIN 600 MG: 600 TABLET, EXTENDED RELEASE ORAL at 09:07

## 2023-07-30 RX ADMIN — LEVOTHYROXINE SODIUM 75 MCG: 0.03 TABLET ORAL at 05:07

## 2023-07-30 NOTE — CARE UPDATE
07/30/23 0816   Patient Assessment/Suction   Level of Consciousness (AVPU) alert   Respiratory Effort Unlabored   All Lung Fields Breath Sounds diminished;crackles   PRE-TX-O2   Device (Oxygen Therapy) nasal cannula   $ Is the patient on Low Flow Oxygen? Yes   Flow (L/min) 3   SpO2 (!) 94 %   Pulse Oximetry Type Intermittent   $ Pulse Oximetry - Multiple Charge Pulse Oximetry - Multiple   Pulse 89   Resp 15   Aerosol Therapy   $ Aerosol Therapy Charges Aerosol Treatment   Daily Review of Necessity (SVN) completed   Respiratory Treatment Status (SVN) given   Treatment Route (SVN) mask   Patient Position (SVN) semi-Almedia's   Post Treatment Assessment (SVN) increased aeration   Signs of Intolerance (SVN) none   Breath Sounds Post-Respiratory Treatment   Throughout All Fields Post-Treatment aeration increased   Post-treatment Heart Rate (beats/min) 91   Post-treatment Resp Rate (breaths/min) 15   Vibratory PEP Therapy   $ Vibratory PEP Charges Acapella Therapy   $ Vibratory PEP Tech Time Charges 15 min   Daily Review of Necessity (PEP Therapy) completed   Type (PEP Therapy) vibratory/oscillatory   Device (PEP Therapy) flutter   Route (PEP Therapy) mouthpiece   Breaths per Cycle (PEP Therapy) 10   Signs of Intolerance (PEP Therapy) none   Chest Physiotherapy   $ Chest Physiotherapy Charges Subsequent   Type (CPT) percussion   Method (CPT) by hand   Signs of Intolerance (CPT) none   Education   $ Education Bronchodilator;15 min   Respiratory Evaluation   $ Care Plan Tech Time 15 min

## 2023-07-30 NOTE — SUBJECTIVE & OBJECTIVE
Interval History:  She says that her breathing was better overnight but worse this morning.  Slept okay overnight.  Still feeling shaky.  Blood pressure is up and down.  I am concerned that she is having some symptoms of anxiety will try to address with Xanax.    Review of Systems   All other systems reviewed and are negative.    Objective:     Vital Signs (Most Recent):  Temp: 97.5 °F (36.4 °C) (07/30/23 1136)  Pulse: 73 (07/30/23 1136)  Resp: 17 (07/30/23 1136)  BP: (!) 151/68 (07/30/23 1136)  SpO2: (!) 93 % (07/30/23 1136) Vital Signs (24h Range):  Temp:  [97.3 °F (36.3 °C)-98 °F (36.7 °C)] 97.5 °F (36.4 °C)  Pulse:  [54-88] 73  Resp:  [15-18] 17  SpO2:  [91 %-94 %] 93 %  BP: (123-190)/(67-88) 151/68     Weight: 108.9 kg (239 lb 15.9 oz)  Body mass index is 37.6 kg/m².  No intake or output data in the 24 hours ending 07/30/23 1348        Physical Exam  Constitutional:       Appearance: Normal appearance. She is normal weight.      Comments: Very weak   HENT:      Head: Normocephalic and atraumatic.      Nose: Nose normal.      Mouth/Throat:      Mouth: Mucous membranes are moist.   Eyes:      Conjunctiva/sclera: Conjunctivae normal.      Pupils: Pupils are equal, round, and reactive to light.   Cardiovascular:      Rate and Rhythm: Normal rate and regular rhythm.      Pulses: Normal pulses.      Heart sounds: Normal heart sounds. No murmur heard.     No friction rub. No gallop.   Pulmonary:      Effort: Pulmonary effort is normal.      Breath sounds: No wheezing, rhonchi or rales.      Comments: Clear breath sounds today, no stridor. No wheezing  Abdominal:      General: Abdomen is flat. Bowel sounds are normal. There is no distension.      Palpations: Abdomen is soft.      Tenderness: There is no abdominal tenderness. There is no guarding.   Musculoskeletal:         General: No swelling. Normal range of motion.      Cervical back: Normal range of motion and neck supple.      Right lower leg: No edema.      Left  lower leg: No edema.   Skin:     General: Skin is warm and dry.   Neurological:      General: No focal deficit present.      Mental Status: She is alert.   Psychiatric:         Mood and Affect: Mood normal.         Thought Content: Thought content normal.         Judgment: Judgment normal.             Significant Labs: All pertinent labs within the past 24 hours have been reviewed.    Significant Imaging: I have reviewed all pertinent imaging results/findings within the past 24 hours.

## 2023-07-30 NOTE — PROGRESS NOTES
INPATIENT NEPHROLOGY Progress Note  [facility]    Patient Name: Chanel Cervantes  Date: 07/30/2023    Reason for consultation:   ANTHONY    Chief Complaint:   Chief Complaint   Patient presents with    Fall     Denies hitting head or LOC, hit right shoulder    Shortness of Breath    Back Pain     History of Present Illness:  Chanel Cervantes is a 67 y.o. female obese, BMI 37.6 kg/M2, with past medical history of diabetes, hypertension, bariatric surgery, cholecystectomy, prior UTIs, and prior pancreatitis secondary to gallstones. She presented with acute onset of back pain, radiating into bilateral upper quadrants, with associated shortness of breath.  She was admitted for septic shock, transferred to ICU for further management. She is s/p ERCP with evidence of the entire main bile duct was moderately dilated, with an obstruction from suspected sludge ball. A biliary sphincterotomy was performed. The biliary tree was swept. Hospital course complicated AMS requiring MV and and polymicrobial bacteremia, Enterococcus faecium and E coli. We are consulted for ANTHONY.    Echo:  The left ventricle is normal in size with mild concentric hypertrophy and mildly decreased systolic function.  Mild to moderate tricuspid regurgitation.  The estimated ejection fraction is 45%.  Grade I left ventricular diastolic dysfunction.  There is abnormal septal wall motion consistent with left bundle branch block.  Mild right ventricular enlargement with normal right ventricular systolic function.  Moderate left atrial enlargement.  Normal central venous pressure (3 mmHg).  The estimated PA systolic pressure is 40 mmHg.  There is mild pulmonary hypertension.       Interval History:  7/21- BP holding on levophed, FIO2 40%, UOP 1.7L  7/22- off pressors, VSS, FIO2 30%, UOP 1.3L, tolerating tube feeds, rise in WBC count is noted  7/23- kieran (got atropine this AM for HR 39), SBP , FIO2 30%, UOP 1.4L, on/off levophed overnight,  ERICA today  7/24 VSS, spontaneous ventilation, afebrile, improving UO.scr remains elevated dut to ATN.  7/25 VSS. Extubated, lethargic but arousable. sCr better but remains abnormal. UO is oK.  7/26 VSS, no new complains.  7/27 VSS, no new complains.  7/28 VSS, good UO, doing better.  7/29 VSS, no new complains.  7/30 VSS. No new complains. Will add oral KCL for hypokalemia and adjust diet.    Plan of Care:    Hypokalemia  Biliary sludge with polymicrobial bacteremia s/p ERCP with sphincterotomy 7/19, septic shock resolved  ANTHONY due to sepsis/ischemic ATN  Shock liver, improving  Demand ischemia (underlying systolic CHF/pulm HTN)  Anemia/Thrombocytopenia    Plan:    - add oral KCL supplement and adjust diet  - sCr improving consistent with resolving ATN  - LFTs improving  - heme parameters stable    Thank you for allowing us to participate in this patient's care. We will continue to follow.    Vital Signs:  Temp Readings from Last 3 Encounters:   07/30/23 97.5 °F (36.4 °C)   06/02/23 98 °F (36.7 °C) (Oral)   04/13/23 97.4 °F (36.3 °C)       Pulse Readings from Last 3 Encounters:   07/30/23 87   06/02/23 68   04/13/23 71       BP Readings from Last 3 Encounters:   07/30/23 (!) 164/67   06/02/23 (!) 120/59   04/13/23 (!) 146/70       Weight:  Wt Readings from Last 3 Encounters:   07/19/23 108.9 kg (239 lb 15.9 oz)   07/19/23 108.9 kg (240 lb 1.3 oz)   06/02/23 109.1 kg (240 lb 8 oz)       INS/OUTS:  I/O last 3 completed shifts:  In: 240 [P.O.:240]  Out: 1 [Stool:1]  No intake/output data recorded.      Medications:  Scheduled Meds:   amLODIPine  10 mg Oral Daily    cefTRIAXone (ROCEPHIN) IVPB  2 g Intravenous Q24H    chlorhexidine  15 mL Mouth/Throat BID    DAPTOmycin (CUBICIN) IV (PEDS and ADULTS)  10 mg/kg (Adjusted) Intravenous Q24H    enoxparin  40 mg Subcutaneous Q24H    furosemide (LASIX) injection  40 mg Intravenous Daily    guaiFENesin  600 mg Oral BID    hydrALAZINE  100 mg Oral Q8H    insulin detemir U-100  20  Units Subcutaneous Daily    levothyroxine  75 mcg Oral Before breakfast    metoprolol succinate  100 mg Oral Daily    traZODone  50 mg Oral QHS     Continuous Infusions:      PRN Meds:.acetaminophen, albuterol-ipratropium, ALPRAZolam, butalbital-acetaminophen-caffeine -40 mg, calcium gluconate IVPB, calcium gluconate IVPB, calcium gluconate IVPB, carboxymethylcellulose, dextrose 50%, dextrose 50%, glucagon (human recombinant), glucose, glucose, guaiFENesin 100 mg/5 ml, hydrALAZINE, insulin aspart U-100, magnesium oxide, magnesium oxide, magnesium sulfate IVPB, magnesium sulfate IVPB, naloxone, ondansetron, polyethylene glycol, potassium bicarbonate, potassium bicarbonate, potassium bicarbonate, potassium chloride in water **AND** potassium chloride in water **AND** potassium chloride in water, racepinephrine, senna-docusate 8.6-50 mg, simethicone, sodium chloride 0.9%, sodium phosphate 15 mmol in dextrose 5 % (D5W) 250 mL IVPB, sodium phosphate 20.01 mmol in dextrose 5 % (D5W) 250 mL IVPB, sodium phosphate 30 mmol in dextrose 5 % (D5W) 250 mL IVPB, traMADoL  No current facility-administered medications on file prior to encounter.     Current Outpatient Medications on File Prior to Encounter   Medication Sig Dispense Refill    amLODIPine (NORVASC) 2.5 MG tablet Take 1 tablet (2.5 mg total) by mouth once daily. 90 tablet 1    azelastine (ASTELIN) 137 mcg (0.1 %) nasal spray 1 spray (137 mcg total) by Nasal route 2 (two) times daily. 30 mL 5    calcium carbonate-vitamin D3 600 mg-62.5 mcg (2,500 unit) Cap calcium carbonate 600 mg-vitamin D3 62.5 mcg (2,500 unit) capsule   Take by oral route.      DULoxetine (CYMBALTA) 60 MG capsule Take 1 capsule (60 mg total) by mouth once daily. 90 capsule 1    empaglifloz-linaglip-metformin (TRIJARDY XR) 25-5-1,000 mg TBph Take 1 tablet by mouth once daily. 90 tablet 1    guanFACINE (TENEX) 2 MG tablet Take 1 tablet (2 mg total) by mouth nightly. 90 tablet 3    insulin  "degludec (TRESIBA FLEXTOUCH U-200) 200 unit/mL (3 mL) insulin pen Inject 76 Units into the skin once daily. 12 pen 3    iron-vitamin C 100-250 mg, ICAR-C, 100-250 mg Tab Take 1 tablet by mouth once daily. 30 each 8    levothyroxine (SYNTHROID) 75 MCG tablet Take 75 mcg by mouth before breakfast.      metFORMIN (GLUCOPHAGE) 1000 MG tablet Take 1,000 mg by mouth 2 (two) times daily with meals.      metOLazone (ZAROXOLYN) 5 MG tablet Take 1 tablet (5 mg total) by mouth once daily. 90 tablet 0    metoprolol succinate (TOPROL-XL) 25 MG 24 hr tablet Take 1 tablet (25 mg total) by mouth once daily. 90 tablet 1    multivitamin capsule Take 1 capsule by mouth once daily.      olmesartan (BENICAR) 40 MG tablet Take 1 tablet (40 mg total) by mouth once daily. 90 tablet 1    potassium chloride (KLOR-CON) 10 MEQ TbSR Take 1 tablet (10 mEq total) by mouth once daily. 90 tablet 1    pregabalin (LYRICA) 50 MG capsule Take 50 mg by mouth every evening.      rosuvastatin (CRESTOR) 40 MG Tab Take 1 tablet (40 mg total) by mouth every evening. 90 tablet 3    aspirin (ECOTRIN) 81 MG EC tablet Take 1 tablet (81 mg total) by mouth once daily. 30 tablet 0    blood sugar diagnostic Strp One Touch Ultra Blue Test strips, Use l strip to check glucose 4 times daily 100 each 11    blood-glucose meter kit Use as instructed 1 each 0    cyanocobalamin 1,000 mcg/mL injection Inject 1 mL (1,000 mcg total) into the skin every 30 days. 3 mL 4    lancets (ONETOUCH DELICA LANCETS) 33 gauge Misc USE 1  TO CHECK GLUCOSE 4 TIMES DAILY 100 each 3    magnesium oxide (MAG-OX) 400 mg (241.3 mg magnesium) tablet Take 1 tablet (400 mg total) by mouth once daily. 90 tablet 1    NYSTOP powder APPLY TO AFFECTED AREA AS NEEDED      prednisoLONE acetate (PRED FORTE) 1 % DrpS Place 1 drop into both eyes as needed.        Review of Systems:  On MV    Physical Exam:  BP (!) 164/67   Pulse 87   Temp 97.5 °F (36.4 °C)   Resp 15   Ht 5' 7" (1.702 m)   Wt 108.9 kg (239 " "lb 15.9 oz)   SpO2 (!) 94%   BMI 37.60 kg/m²     General Appearance:    Ill   Head:    Normocephalic, atraumatic   Eyes:    Closed     Mouth:   Moist mucus membranes, no thrush or oral lesions, normal      dentition   Back:     No CVA tenderness   Lungs:     Coarse, on MV   Heart:    Regular rate and rhythm, no rub/gallop   Abdomen:     Soft, non-tender, non-distended guarding, no masses, no organomegaly   Extremities:   Warm and well perfused, distal pulses intact, no cyanosis, edematous   MSK:   No joint or muscle swelling, tenderness or deformity   Skin:   Skin color, texture, turgor normal, no rashes or lesions   Neurologic/Psychiatric:   Sedated     Results:  Recent Labs   Lab 07/28/23 2112 07/29/23  0554 07/30/23  0610    139 142   K 3.7 3.5 3.3*    102 104   CO2 25 27 27   BUN 42* 38* 30*   CREATININE 1.3 1.2 1.0   * 274* 156*         Recent Labs   Lab 07/24/23  0310 07/25/23  0327 07/26/23  0503 07/27/23  0434 07/28/23  0520 07/28/23  0903 07/28/23 2112 07/29/23  0554 07/30/23  0610   CALCIUM 7.8* 7.9*  --  8.0*  --    < > 8.3* 8.2* 8.3*   ALBUMIN 1.8* 2.3*  --  2.3*  --   --  2.3* 2.4*  --    PHOS 3.7 4.4  --   --   --   --   --   --   --    MG 2.5 2.4   < > 2.0 1.9  --   --  1.8 1.7    < > = values in this interval not displayed.               No results for input(s): "POCTGLUCOSE" in the last 168 hours.    Recent Labs   Lab 12/22/20  1232 07/07/21  1057 07/18/23  1735   Hemoglobin A1C 6.8 H 7.4 H 6.9 H         Recent Labs   Lab 07/27/23  0857 07/28/23  0903 07/28/23 2113 07/29/23  0554 07/30/23  0610   WBC 8.73 9.35 9.40 9.87 10.04   HGB 10.7* 11.4* 11.9* 11.6* 11.4*   HCT 33.5* 36.4* 38.3 36.8* 36.3*    317 426 401 445   MCV 82 84 83 83 82   MCHC 31.9* 31.3* 31.1* 31.5* 31.4*   MONO 7.3  0.6 7.1  0.7 6.9  0.7  --   --    EOSINOPHIL 1.1 1.0 0.9  --   --          Recent Labs   Lab 07/27/23  0434 07/28/23  2112 07/29/23  0554   BILITOT 0.8 0.8 0.7   BILIDIR  --   --  0.1 "   PROT 5.6* 5.9* 5.8*   ALBUMIN 2.3* 2.3* 2.4*   ALKPHOS 171* 191* 197*   ALT 51* 47* 45*   AST 21 48* 35         Recent Labs   Lab 05/29/23  1040 07/18/23  1221 07/19/23  0116   Color, UA Yellow Yellow Yellow   Appearance, UA Clear Clear Clear   pH, UA 6.0 8.0 6.0   Specific [place], UA 1.015 1.030 >1.030 A   Protein, UA Negative 1+ A 1+ A   Glucose, UA 4+ A 4+ A 4+ A   Ketones, UA Negative Trace A Negative   Urobilinogen, UA Negative 2.0-3.0 A 2.0-3.0 A   Bilirubin (UA) Negative Negative 1+ A   Occult Blood UA Negative 1+ A Trace A   Nitrite, UA Negative Negative Negative   RBC, UA 1 6 H 1   WBC, UA 7 H 6 H 3   Bacteria Negative Negative Negative   Hyaline Casts, UA 4 A 5 A 1         Recent Labs   Lab 07/24/23  0312 07/24/23  0947 07/26/23  1908   POC PH 7.461 H 7.436 7.418   POC PCO2 36.9 38.2 37.8   POC HCO3 26.3 25.7 24.4   POC PO2 70 L 63 L 75 L   POC SATURATED O2 95 92 L 95   POC BE 2 1 0   Sample ARTERIAL ARTERIAL ARTERIAL         Microbiology Results (last 7 days)       Procedure Component Value Units Date/Time    Blood culture [[id number]] Collected: 07/25/23 0327    Order Status: Completed Specimen: Blood Updated: 07/30/23 0432     Blood Culture, Routine No growth after 5 days.    Culture, Respiratory with Gram Stain [[id number]] Collected: 07/27/23 2156    Order Status: Completed Specimen: Sputum, Expectorated Updated: 07/29/23 1018     Respiratory Culture No normal respiratory shay     Gram Stain (Respiratory) <10 epithelial cells per low power field.     Gram Stain (Respiratory) Rare WBC's     Gram Stain (Respiratory) Rare Gram positive cocci    Blood culture [[id number]] Collected: 07/24/23 0348    Order Status: Completed Specimen: Blood Updated: 07/29/23 0432     Blood Culture, Routine No growth after 5 days.    Narrative:      Collection has been rescheduled by LEIDY at 07/24/2023 03:28 Reason:   Nurse Draw. Blood was already drawn. I was not able to get the blood   cultures.  Collection has been  rescheduled by JS at 07/24/2023 03:28 Reason:   Nurse Draw. Blood was already drawn. I was not able to get the blood   cultures.    Blood culture [[id number]] Collected: 07/23/23 0825    Order Status: Completed Specimen: Blood Updated: 07/28/23 1032     Blood Culture, Routine No growth after 5 days.    Blood culture [[id number]] Collected: 07/20/23 0940    Order Status: Completed Specimen: Blood from Peripheral, Antecubital, Right Updated: 07/25/23 1232     Blood Culture, Routine No growth after 5 days.    Narrative:      Collection has been rescheduled by [email or web address] at 07/20/2023 06:04 Reason:   Unable to collect  Collection has been rescheduled by [email or web address] at 07/20/2023 08:01 Reason:   Contacting zainab for cultures per RN Alfred  Collection has been rescheduled by [email or web address] at 07/20/2023 08:41 Reason:   Nurse Draw  Collection has been rescheduled by [email or web address] at 07/20/2023 06:04 Reason:   Unable to collect  Collection has been rescheduled by [email or web address] at 07/20/2023 08:01 Reason:   Contacting zainab for cultures per RN Alfred  Collection has been rescheduled by [email or web address] at 07/20/2023 08:41 Reason:   Nurse Draw          I have spent > minutes providing care for this patient for the above diagnoses. These services have included chart/data/imaging review, evaluation, exam, formulation of plan, , note preparation, and discussions with staff involved in this patient's care.    Omar Henao MD  [facility] [address] [address]  [phone] (p)  [phone] (f)

## 2023-07-30 NOTE — ASSESSMENT & PLAN NOTE
In setting of septic shock.  -appreciate nephrology recommendations  -Renally dose medications  -Avoid nephrotoxic agents  -Monitor UOP and electrolytes  -Trend creatinine  -nephrologist consulting appreciate recommendations    Cr has trended upward:  2.4 to 2.6 to 2.9 to 2.8.  Now down to 1.0  BUN is going up as well:  BUN is improving also

## 2023-07-30 NOTE — ASSESSMENT & PLAN NOTE
Required intubation on hospital day 3.  Patient was successfully extubated on July 24, 2023.  Follow Pulmonary Medicine recommendations.  Wheezing has improved but she still feels extremely short of breath  Seems to have fluid overload  Diuresing with lasix further today.   Continue mucus clearance  Check DVT u/s > no signs of DVT  Adjust DuoNebs  Added mucus clearance therapy yesterday

## 2023-07-30 NOTE — CARE UPDATE
07/29/23 9120   Patient Assessment/Suction   Level of Consciousness (AVPU) alert   Respiratory Effort Normal;Unlabored   PRE-TX-O2   Device (Oxygen Therapy) nasal cannula   $ Is the patient on Low Flow Oxygen? Yes   Flow (L/min) 3   Oxygen Analyzed Concentration (%) 96 %   Pulse Oximetry Type Intermittent   $ Pulse Oximetry - Multiple Charge Pulse Oximetry - Multiple   Pulse 86   Resp 18   Positioning HOB elevated 30 degrees   Vibratory PEP Therapy   $ Vibratory PEP Charges Acapella Therapy   $ Vibratory PEP Equipment Aerobika Equipment   $ Vibratory PEP Tech Time Charges   (pt has been using on her own)   Respiratory Evaluation   $ Care Plan Tech Time 15 min   $ Eval/Re-eval Charges Re-evaluation

## 2023-07-30 NOTE — PROGRESS NOTES
[facility]  Progress Note    Patient Name: Chanel Cervantes  MRN: [id number]  Patient Class: IP- Inpatient   Admission Date: 7/18/2023  Length of Stay: 11 days  Attending Physician: Kartik Farris MD  Primary Care Provider: ARIC Almaguer        Subjective:     Principal Problem:Septic shock        HPI:  Chanel Cervantes is a 67 y.o. White female   With PMH of DM2, HTN, bariatric surgery,  Remote cholecystectomy,  Frequent UTIs with kidney stones,  who presents with back pain.  Admitted for pancreatitis with elevated LFTs     Pt is currently confused after receiving ativan  (ER gave it to calm her for CT scan)  History taken from family at bedside     Onset of back pain overnight  Located b/l lumbar region  Radiating to b/l upper quadrants of abdomen  Occurring intermittently  Progressively worsening  Severe, causes SOB when occurring     Initially pt thought pain was due to a fall yesterday  (Mechanical, tripped over a rug, no head trauma)  +nausea, no vomiting  +intermittent chills  They are not sure about objective fever     Has had similar abdominal pain previously  Per family, this has been attributed to her ongoing ventral wall hernia  They don't bring her to ER for this usually  However pain today was much more severe than usual      Overview/Hospital Course:  Chanel Cervantes is a 67 year old female with a past medical history of obesity, ventral hernia, DM, HTN, anemia, CAD, NIKOLAS, and hypothyroidism who presented with septic shock secondary to a possible cholangitis with E coli bacteremia. Vancomycin was discontinued and she remained on meropenem. BP was maintained on a Levophed infusion. GI was consulted and performed ERCP which showed biliary sludge with a sludge ball dilating the main duct which was removed; a biliary sphincterotomy was also performed. Repeat blood cultures were negative. She was on IV fluids with LR and was NPO as she was encephalopathic. She also had  an ANTHONY as well. She also had acute hypoxic respiratory failure for which she was put on mask O2. She also had demand ischemia; TTE was done and troponin was trended. There was shock liver superimposed on the hepatocellular injury brought on by the acute cholangitis. Pulmonary/Critical Care was consulted given the patient's medical acuity.  Pulmonologist made decision to intubate patient based on patient's deep encephalopathy and inability to protect her airway.  Patient underwent transesophageal echocardiography which did not report any intracardiac thrombus or evidence of endocarditis.  Patient was successfully extubated on July 24, 2023. PT and OT is working with patient. Patient is transferred to medicine floor for further management and rehab.  working on LTAC placement.      Interval History:  She says that her breathing was better overnight but worse this morning.  Slept okay overnight.  Still feeling shaky.  Blood pressure is up and down.  I am concerned that she is having some symptoms of anxiety will try to address with Xanax.    Review of Systems   All other systems reviewed and are negative.    Objective:     Vital Signs (Most Recent):  Temp: 97.5 °F (36.4 °C) (07/30/23 1136)  Pulse: 73 (07/30/23 1136)  Resp: 17 (07/30/23 1136)  BP: (!) 151/68 (07/30/23 1136)  SpO2: (!) 93 % (07/30/23 1136) Vital Signs (24h Range):  Temp:  [97.3 °F (36.3 °C)-98 °F (36.7 °C)] 97.5 °F (36.4 °C)  Pulse:  [54-88] 73  Resp:  [15-18] 17  SpO2:  [91 %-94 %] 93 %  BP: (123-190)/(67-88) 151/68     Weight: 108.9 kg (239 lb 15.9 oz)  Body mass index is 37.6 kg/m².  No intake or output data in the 24 hours ending 07/30/23 1348        Physical Exam  Constitutional:       Appearance: Normal appearance. She is normal weight.      Comments: Very weak   HENT:      Head: Normocephalic and atraumatic.      Nose: Nose normal.      Mouth/Throat:      Mouth: Mucous membranes are moist.   Eyes:      Conjunctiva/sclera: Conjunctivae  "normal.      Pupils: Pupils are equal, round, and reactive to light.   Cardiovascular:      Rate and Rhythm: Normal rate and regular rhythm.      Pulses: Normal pulses.      Heart sounds: Normal heart sounds. No murmur heard.     No friction rub. No gallop.   Pulmonary:      Effort: Pulmonary effort is normal.      Breath sounds: No wheezing, rhonchi or rales.      Comments: Clear breath sounds today, no stridor. No wheezing  Abdominal:      General: Abdomen is flat. Bowel sounds are normal. There is no distension.      Palpations: Abdomen is soft.      Tenderness: There is no abdominal tenderness. There is no guarding.   Musculoskeletal:         General: No swelling. Normal range of motion.      Cervical back: Normal range of motion and neck supple.      Right lower leg: No edema.      Left lower leg: No edema.   Skin:     General: Skin is warm and dry.   Neurological:      General: No focal deficit present.      Mental Status: She is alert.   Psychiatric:         Mood and Affect: Mood normal.         Thought Content: Thought content normal.         Judgment: Judgment normal.             Significant Labs: All pertinent labs within the past 24 hours have been reviewed.    Significant Imaging: I have reviewed all pertinent imaging results/findings within the past 24 hours.      Assessment/Plan:      * Septic shock due to Entercoccus faecium and E.coli  Secondary to acute cholangitis  -continue IVAB, stop date is 08/01  - will complete antibiotics rehab  -Enterococcus bacteremia now cleared  -S/p ERCP  -Continue LR IV fluids and Levophed to keep MAP > 65    Antibiotics (From admission, onward)    Start     Stop Route Frequency Ordered    07/21/23 1400  DAPTOmycin (CUBICIN) 805 mg in sodium chloride 0.9% SolP 50 mL IVPB         -- IV Every 48 hours (non-standard times) 07/20/23 0749    07/19/23 1300  meropenem 1 g in sodium chloride 0.9 % 100 mL IVPB (ready to mix system)        Note to Pharmacy: Ht: 5' 7" (1.702 " m)  Wt: 108.9 kg (240 lb)  Estimated Creatinine Clearance: 63.1 mL/min (based on SCr of 1.1 mg/dL).  Body mass index is 37.59 kg/m².    -- IV Every 12 hours (non-standard times) 07/19/23 0740              Constipation  resolved      Gallstone pancreatitis  Present on hospital day 1.  Continue vasopressor support and crystalloid.      Encephalopathy, metabolic  Metabolic in setting of septic shock.  -assess mental status when off sedation  -Treat septic shock      Shock liver  - LFTs improved    Lab Results   Component Value Date    ALT 51 (H) 07/27/2023    AST 21 07/27/2023    GGT 24 05/17/2018    ALKPHOS 171 (H) 07/27/2023    BILITOT 0.8 07/27/2023         ANTHONY (acute kidney injury)  In setting of septic shock.  -appreciate nephrology recommendations  -Renally dose medications  -Avoid nephrotoxic agents  -Monitor UOP and electrolytes  -Trend creatinine  -nephrologist consulting appreciate recommendations    Cr has trended upward:  2.4 to 2.6 to 2.9 to 2.8.  Now down to 1.0  BUN is going up as well:  BUN is improving also      Demand ischemia  History of CAD s/p CABG. No acute ST changes on EKG.  -check second trop level  -Telemetry  -cardiology consulting -- their opinion is that the troponin is high b/c of myocardial demand ischemia    Troponin I High Sensitivity   Date Value Ref Range Status   07/28/2023 33.6 (H) 0.0 - 14.9 pg/mL Final     Comment:     Troponin results differ between methods. Do not use   results between Troponin methods interchangeably.    Alkaline Phospatase levels above 400 U/L may   cause false positive results.    Access hsTnI should not be used for patients taking   Asfotase anya (Strensiq).     07/28/2023 33.6 (H) 0.0 - 14.9 pg/mL Final     Comment:     Troponin results differ between methods. Do not use   results between Troponin methods interchangeably.    Alkaline Phospatase levels above 400 U/L may   cause false positive results.    Access hsTnI should not be used for patients taking    Asfotase anya (Strensiq).     07/20/2023 2384.3 (HH) 0.0 - 14.9 pg/mL Final     Comment:     Troponin results differ between methods. Do not use   results between Troponin methods interchangeably.    Alkaline Phospatase levels above 400 U/L may   cause false positive results.    Access hsTnI should not be used for patients taking   Asfotase anya (Strensiq).  Troponin critical result(s) called and verbal readback obtained   from [place] Avila RN M3 by MS1 07/20/2023 04:54         Acute hypoxemic respiratory failure  Required intubation on hospital day 3.  Patient was successfully extubated on July 24, 2023.  Follow Pulmonary Medicine recommendations.  Wheezing has improved but she still feels extremely short of breath  Seems to have fluid overload  Diuresing with lasix further today.   Continue mucus clearance  Check DVT u/s > no signs of DVT  Adjust DuoNebs  Added mucus clearance therapy yesterday        Anemia  Stable.  -Trend Hgb with CBC    Hemoglobin   Date Value Ref Range Status   07/25/2023 11.5 (L) 12.0 - 16.0 g/dL Final   07/24/2023 10.6 (L) 12.0 - 16.0 g/dL Final           Obesity (BMI 30-39.9)  Body mass index is 37.6 kg/m². Morbid obesity complicates all aspects of disease management from diagnostic modalities to treatment.       Hypothyroidism  TSH unremarkable.  It's 0.470.  -Continue Synthroid      Acute obstructive cholangitis  Sludge ball removed via ERCP. Likely source of bacteremia. LFTs much improved.  -appreciate ID recommendations, treatment of E coli and Enterococcus as noted above  -GI following  -IV fluids  - blood cultures:  Entercoccus faecium and E.coli  -Trend LFTs      CAD (coronary artery disease)  -Hold home medications  -Telemetry      Hypokalemia  -Trend K.  Has improved.  -Replete K PRN  -Telemetry    Potassium   Date Value Ref Range Status   07/23/2023 3.8 3.5 - 5.1 mmol/L Final   07/22/2023 4.0 3.5 - 5.1 mmol/L Final         S/P bariatric surgery  Noted.      NIKOLAS on  CPAP  -chronic condition.  - needs nightly CPAP    Type 2 diabetes mellitus treated with insulin  Patient's FSGs are controlled on current medication regimen.  Last A1c reviewed-   Lab Results   Component Value Date    HGBA1C 6.9 (H) 07/18/2023     'Current correctional scale  Medium  Maintain anti-hyperglycemic dose as follows-   Antihyperglycemics (From admission, onward)    Start     Stop Route Frequency Ordered    07/19/23 1018  insulin aspart U-100 pen 1-10 Units         -- SubQ Every 6 hours PRN 07/19/23 0920        Hold Oral hypoglycemics while patient is in the hospital.    Benign essential hypertension  - resumed home medications  - blood pressure improving      VTE Risk Mitigation (From admission, onward)         Ordered     enoxaparin injection 40 mg  Every 24 hours (non-standard times)         07/29/23 1637     IP VTE HIGH RISK PATIENT  Once         07/18/23 1642     Place sequential compression device  Until discontinued         07/18/23 1642                Discharge Planning   LUIS ALFREDO: 7/29/2023     Code Status: Full Code   Is the patient medically ready for discharge?:     Reason for patient still in hospital (select all that apply): Treatment  Discharge Plan A: Rehab   Discharge Delays: (!) Post-Acute Set-up              Kartik Farris MD  Department of Hospital Medicine   Formerly [facility], 1928–1983

## 2023-07-31 LAB
ANION GAP SERPL CALC-SCNC: 11 MMOL/L (ref 8–16)
BUN SERPL-MCNC: 28 MG/DL (ref 8–23)
CALCIUM SERPL-MCNC: 8.3 MG/DL (ref 8.7–10.5)
CHLORIDE SERPL-SCNC: 100 MMOL/L (ref 95–110)
CO2 SERPL-SCNC: 29 MMOL/L (ref 23–29)
CREAT SERPL-MCNC: 1 MG/DL (ref 0.5–1.4)
ERYTHROCYTE [DISTWIDTH] IN BLOOD BY AUTOMATED COUNT: 16.4 % (ref 11.5–14.5)
EST. GFR  (NO RACE VARIABLE): >60 ML/MIN/1.73 M^2
GLUCOSE SERPL-MCNC: 151 MG/DL (ref 70–110)
GLUCOSE SERPL-MCNC: 162 MG/DL (ref 70–110)
GLUCOSE SERPL-MCNC: 219 MG/DL (ref 70–110)
GLUCOSE SERPL-MCNC: 272 MG/DL (ref 70–110)
HCT VFR BLD AUTO: 33.2 % (ref 37–48.5)
HGB BLD-MCNC: 10.3 G/DL (ref 12–16)
MAGNESIUM SERPL-MCNC: 1.5 MG/DL (ref 1.6–2.6)
MCH RBC QN AUTO: 26 PG (ref 27–31)
MCHC RBC AUTO-ENTMCNC: 31 G/DL (ref 32–36)
MCV RBC AUTO: 84 FL (ref 82–98)
PLATELET # BLD AUTO: 409 K/UL (ref 150–450)
PMV BLD AUTO: 11.8 FL (ref 9.2–12.9)
POTASSIUM SERPL-SCNC: 3.4 MMOL/L (ref 3.5–5.1)
RBC # BLD AUTO: 3.96 M/UL (ref 4–5.4)
SODIUM SERPL-SCNC: 140 MMOL/L (ref 136–145)
WBC # BLD AUTO: 7.72 K/UL (ref 3.9–12.7)

## 2023-07-31 PROCEDURE — 97130 THER IVNTJ EA ADDL 15 MIN: CPT

## 2023-07-31 PROCEDURE — 25000003 PHARM REV CODE 250: Performed by: INTERNAL MEDICINE

## 2023-07-31 PROCEDURE — 12000002 HC ACUTE/MED SURGE SEMI-PRIVATE ROOM

## 2023-07-31 PROCEDURE — 25000003 PHARM REV CODE 250: Performed by: STUDENT IN AN ORGANIZED HEALTH CARE EDUCATION/TRAINING PROGRAM

## 2023-07-31 PROCEDURE — 99233 SBSQ HOSP IP/OBS HIGH 50: CPT | Mod: ,,, | Performed by: STUDENT IN AN ORGANIZED HEALTH CARE EDUCATION/TRAINING PROGRAM

## 2023-07-31 PROCEDURE — 63600175 PHARM REV CODE 636 W HCPCS: Performed by: STUDENT IN AN ORGANIZED HEALTH CARE EDUCATION/TRAINING PROGRAM

## 2023-07-31 PROCEDURE — 94664 DEMO&/EVAL PT USE INHALER: CPT

## 2023-07-31 PROCEDURE — 36415 COLL VENOUS BLD VENIPUNCTURE: CPT | Performed by: INTERNAL MEDICINE

## 2023-07-31 PROCEDURE — 94761 N-INVAS EAR/PLS OXIMETRY MLT: CPT

## 2023-07-31 PROCEDURE — 85027 COMPLETE CBC AUTOMATED: CPT | Performed by: STUDENT IN AN ORGANIZED HEALTH CARE EDUCATION/TRAINING PROGRAM

## 2023-07-31 PROCEDURE — 97129 THER IVNTJ 1ST 15 MIN: CPT

## 2023-07-31 PROCEDURE — 99900035 HC TECH TIME PER 15 MIN (STAT)

## 2023-07-31 PROCEDURE — 27000221 HC OXYGEN, UP TO 24 HOURS

## 2023-07-31 PROCEDURE — 83735 ASSAY OF MAGNESIUM: CPT | Performed by: INTERNAL MEDICINE

## 2023-07-31 PROCEDURE — 99233 PR SUBSEQUENT HOSPITAL CARE,LEVL III: ICD-10-PCS | Mod: ,,, | Performed by: STUDENT IN AN ORGANIZED HEALTH CARE EDUCATION/TRAINING PROGRAM

## 2023-07-31 PROCEDURE — 80048 BASIC METABOLIC PNL TOTAL CA: CPT | Performed by: STUDENT IN AN ORGANIZED HEALTH CARE EDUCATION/TRAINING PROGRAM

## 2023-07-31 RX ORDER — ALPRAZOLAM 0.5 MG/1
0.5 TABLET ORAL 3 TIMES DAILY
Status: DISCONTINUED | OUTPATIENT
Start: 2023-07-31 | End: 2023-08-02

## 2023-07-31 RX ADMIN — ALPRAZOLAM 0.5 MG: 0.5 TABLET ORAL at 09:07

## 2023-07-31 RX ADMIN — CHLORHEXIDINE GLUCONATE 15 ML: 1.2 RINSE ORAL at 08:07

## 2023-07-31 RX ADMIN — HYDRALAZINE HYDROCHLORIDE 100 MG: 25 TABLET ORAL at 06:07

## 2023-07-31 RX ADMIN — POTASSIUM CHLORIDE 20 MEQ: 1500 TABLET, EXTENDED RELEASE ORAL at 09:07

## 2023-07-31 RX ADMIN — INSULIN DETEMIR 20 UNITS: 100 INJECTION, SOLUTION SUBCUTANEOUS at 09:07

## 2023-07-31 RX ADMIN — INSULIN ASPART 4 UNITS: 100 INJECTION, SOLUTION INTRAVENOUS; SUBCUTANEOUS at 05:07

## 2023-07-31 RX ADMIN — ALPRAZOLAM 0.5 MG: 0.5 TABLET ORAL at 03:07

## 2023-07-31 RX ADMIN — INSULIN ASPART 2 UNITS: 100 INJECTION, SOLUTION INTRAVENOUS; SUBCUTANEOUS at 09:07

## 2023-07-31 RX ADMIN — GUAIFENESIN 600 MG: 600 TABLET, EXTENDED RELEASE ORAL at 09:07

## 2023-07-31 RX ADMIN — HYDRALAZINE HYDROCHLORIDE 100 MG: 25 TABLET ORAL at 03:07

## 2023-07-31 RX ADMIN — TRAMADOL HYDROCHLORIDE 50 MG: 50 TABLET, COATED ORAL at 04:07

## 2023-07-31 RX ADMIN — INSULIN ASPART 6 UNITS: 100 INJECTION, SOLUTION INTRAVENOUS; SUBCUTANEOUS at 12:07

## 2023-07-31 RX ADMIN — HYDRALAZINE HYDROCHLORIDE 100 MG: 25 TABLET ORAL at 09:07

## 2023-07-31 RX ADMIN — CHLORHEXIDINE GLUCONATE 15 ML: 1.2 RINSE ORAL at 09:07

## 2023-07-31 RX ADMIN — DEXTROSE MONOHYDRATE 2 G: 50 INJECTION, SOLUTION INTRAVENOUS at 09:07

## 2023-07-31 RX ADMIN — LEVOTHYROXINE SODIUM 75 MCG: 0.03 TABLET ORAL at 06:07

## 2023-07-31 RX ADMIN — AMLODIPINE BESYLATE 10 MG: 5 TABLET ORAL at 09:07

## 2023-07-31 RX ADMIN — ALPRAZOLAM 0.5 MG: 0.5 TABLET ORAL at 08:07

## 2023-07-31 RX ADMIN — TRAZODONE HYDROCHLORIDE 50 MG: 50 TABLET ORAL at 08:07

## 2023-07-31 RX ADMIN — GUAIFENESIN 600 MG: 600 TABLET, EXTENDED RELEASE ORAL at 08:07

## 2023-07-31 RX ADMIN — DAPTOMYCIN 805 MG: 500 INJECTION, POWDER, LYOPHILIZED, FOR SOLUTION INTRAVENOUS at 03:07

## 2023-07-31 RX ADMIN — POTASSIUM CHLORIDE 20 MEQ: 1500 TABLET, EXTENDED RELEASE ORAL at 08:07

## 2023-07-31 RX ADMIN — SIMETHICONE 80 MG: 80 TABLET, CHEWABLE ORAL at 08:07

## 2023-07-31 RX ADMIN — FUROSEMIDE 40 MG: 10 INJECTION, SOLUTION INTRAVENOUS at 09:07

## 2023-07-31 RX ADMIN — METOPROLOL SUCCINATE 100 MG: 50 TABLET, FILM COATED, EXTENDED RELEASE ORAL at 09:07

## 2023-07-31 RX ADMIN — ENOXAPARIN SODIUM 40 MG: 100 INJECTION SUBCUTANEOUS at 05:07

## 2023-07-31 NOTE — PT/OT/SLP PROGRESS
"Speech Language Pathology Treatment    Patient Name:  Chanel Cervantse   MRN:  [id number]  Admitting Diagnosis: Septic shock    Recommendations:                 General Recommendations:  Follow-up not indicated  Diet recommendations:  Regular Diet - IDDSI Level 7, Liquid Diet Level: Thin liquids - IDDSI Level 0   Aspiration Precautions: Standard aspiration precautions   General Precautions: Standard, fall  Communication strategies:  none    Assessment:     Chanel Cervantes is a 67 y.o. female with an SLP diagnosis of  resolved cognitive-linguistic deficits .  She presents with improved attention to detail/sustained attention and delayed recall of functional information. No further St warranted; pt to be discharged from ST.    Subjective     Pt asleep in bed upon entering room. Easily awoke and agreeable to ST.  Patient goals: "I think I'm leaving to go home tomorrow"     Pain/Comfort:       Respiratory Status: Nasal cannula, flow 2 L/min    Objective:     Has the patient been evaluated by SLP for swallowing?      Keep patient NPO?     Current Respiratory Status:        Pt completed recipe activity sustaining attention to read information throughout task w/o redirection. She required redirection to appropriate areas of recipe in 3/10 trials, completing activity w/ 70% accuracy to attention to details IND. She recalled details from recipe w/ IND use of memory strategies w/ 80% accuracy. She was able to use working memory and attention appropriately to recall each place she had stopped reading and questions she had left off on throughout the task w/ IND.    Goals:   Multidisciplinary Problems       SLP Goals       Not on file              Multidisciplinary Problems (Resolved)          Problem: SLP    Goal Priority Disciplines Outcome   SLP Goal   (Resolved)    High SLP Met   Description: 1. Consume regular textures IDDSI 7 and liquids without s/s oropharyngeal dysphagia. -MET  2. Complete cognitive- linguistic eval -MET  3. " Pt will complete sustained attention tasks w/ 80% accuracy given min cuing  4. Pt will recall information given mild delay w/ 80% accuracy given min cues for memory strategies                         Plan:     Patient to be seen:  3 x/week, 4 x/week   Plan of Care expires:  07/31/23  Plan of Care reviewed with:  patient   SLP Follow-Up:  No       Discharge recommendations:  home   Barriers to Discharge:  None    Time Tracking:     SLP Treatment Date:   07/31/23  Speech Start Time:  1438  Speech Stop Time:  1511     Speech Total Time (min):  33 min    Billable Minutes: Total Time 33 min cognitive-linguistic tx    07/31/2023

## 2023-07-31 NOTE — PT/OT/SLP PROGRESS
Occupational Therapy      Patient Name:  Chanel Cervantes   MRN:  [id number]    Attempted OT tx. Patient refused due to fatigue. Will follow-up when appropriate.    7/31/2023

## 2023-07-31 NOTE — PT/OT/SLP PROGRESS
Physical Therapy      Patient Name:  Chanel Cervantes   MRN:  [id number]    Patient not seen today secondary to Patient fatigue x2 attempts. Will follow-up 08/01/23.

## 2023-07-31 NOTE — PLAN OF CARE
Problem: SLP  Goal: SLP Goal  Description: 1. Consume regular textures IDDSI 7 and liquids without s/s oropharyngeal dysphagia. -MET  2. Complete cognitive- linguistic eval -MET  3. Pt will complete sustained attention tasks w/ 80% accuracy given min cuing  4. Pt will recall information given mild delay w/ 80% accuracy given min cues for memory strategies    Outcome: Met

## 2023-07-31 NOTE — ASSESSMENT & PLAN NOTE
"Secondary to acute cholangitis  -continue IVAB, stop date is 08/01, will finish tomorrow.  - will complete antibiotics rehab  -Enterococcus bacteremia now cleared  -S/p ERCP    Antibiotics (From admission, onward)    Start     Stop Route Frequency Ordered    07/21/23 1400  DAPTOmycin (CUBICIN) 805 mg in sodium chloride 0.9% SolP 50 mL IVPB         -- IV Every 48 hours (non-standard times) 07/20/23 0749    07/19/23 1300  meropenem 1 g in sodium chloride 0.9 % 100 mL IVPB (ready to mix system)        Note to Pharmacy: Ht: 5' 7" (1.702 m)  Wt: 108.9 kg (240 lb)  Estimated Creatinine Clearance: 63.1 mL/min (based on SCr of 1.1 mg/dL).  Body mass index is 37.59 kg/m².    -- IV Every 12 hours (non-standard times) 07/19/23 0740            "

## 2023-07-31 NOTE — PROGRESS NOTES
INPATIENT NEPHROLOGY Progress Note  [facility]    Patient Name: Chanel Cervantes  Date: 07/31/2023    Reason for consultation:   ANTHONY    Chief Complaint:   Chief Complaint   Patient presents with    Fall     Denies hitting head or LOC, hit right shoulder    Shortness of Breath    Back Pain     History of Present Illness:  Chanel Cervantes is a 67 y.o. female obese, BMI 37.6 kg/M2, with past medical history of diabetes, hypertension, bariatric surgery, cholecystectomy, prior UTIs, and prior pancreatitis secondary to gallstones. She presented with acute onset of back pain, radiating into bilateral upper quadrants, with associated shortness of breath.  She was admitted for septic shock, transferred to ICU for further management. She is s/p ERCP with evidence of the entire main bile duct was moderately dilated, with an obstruction from suspected sludge ball. A biliary sphincterotomy was performed. The biliary tree was swept. Hospital course complicated AMS requiring MV and and polymicrobial bacteremia, Enterococcus faecium and E coli. We are consulted for ANTHONY.    Echo:  The left ventricle is normal in size with mild concentric hypertrophy and mildly decreased systolic function.  Mild to moderate tricuspid regurgitation.  The estimated ejection fraction is 45%.  Grade I left ventricular diastolic dysfunction.  There is abnormal septal wall motion consistent with left bundle branch block.  Mild right ventricular enlargement with normal right ventricular systolic function.  Moderate left atrial enlargement.  Normal central venous pressure (3 mmHg).  The estimated PA systolic pressure is 40 mmHg.  There is mild pulmonary hypertension.       Interval History:  7/21- BP holding on levophed, FIO2 40%, UOP 1.7L  7/22- off pressors, VSS, FIO2 30%, UOP 1.3L, tolerating tube feeds, rise in WBC count is noted  7/23- kieran (got atropine this AM for HR 39), SBP , FIO2 30%, UOP 1.4L, on/off levophed overnight,  ERICA today  7/24 VSS, spontaneous ventilation, afebrile, improving UO.scr remains elevated dut to ATN.  7/25 VSS. Extubated, lethargic but arousable. sCr better but remains abnormal. UO is oK.  7/26 VSS, no new complains.  7/27 VSS, no new complains.  7/28 VSS, good UO, doing better.  7/29 VSS, no new complains.  7/30 VSS. No new complains. Will add oral KCL for hypokalemia and adjust diet.  7/31  VSS, Mg+, K+ low.  Has PRN Mg+ repletion, has bid K+ repletion.  No complaints.    Plan of Care:    Hypokalemia  Biliary sludge with polymicrobial bacteremia s/p ERCP with sphincterotomy 7/19, septic shock resolved  ANTHONY due to sepsis/ischemic ATN  Shock liver, improving  Demand ischemia (underlying systolic CHF/pulm HTN)  Anemia/Thrombocytopenia    Plan:    - added oral KCL supplement and adjusted diet 7/24  - sCr improving consistent with resolving ATN  - LFTs improving  - heme parameters stable    Thank you for allowing us to participate in this patient's care. We will continue to follow.    Vital Signs:  Temp Readings from Last 3 Encounters:   07/31/23 (P) 97.9 °F (36.6 °C) ((P) Oral)   06/02/23 98 °F (36.7 °C) (Oral)   04/13/23 97.4 °F (36.3 °C)       Pulse Readings from Last 3 Encounters:   07/31/23 72   06/02/23 68   04/13/23 71       BP Readings from Last 3 Encounters:   07/31/23 (!) 179/75   06/02/23 (!) 120/59   04/13/23 (!) 146/70       Weight:  Wt Readings from Last 3 Encounters:   07/19/23 108.9 kg (239 lb 15.9 oz)   07/19/23 108.9 kg (240 lb 1.3 oz)   06/02/23 109.1 kg (240 lb 8 oz)       INS/OUTS:  I/O last 3 completed shifts:  In: -   Out: 950 [Urine:950]  No intake/output data recorded.      Medications:  Scheduled Meds:   ALPRAZolam  0.5 mg Oral TID    amLODIPine  10 mg Oral Daily    cefTRIAXone (ROCEPHIN) IVPB  2 g Intravenous Q24H    chlorhexidine  15 mL Mouth/Throat BID    DAPTOmycin (CUBICIN) IV (PEDS and ADULTS)  10 mg/kg (Adjusted) Intravenous Q24H    enoxparin  40 mg Subcutaneous Q24H    furosemide  (LASIX) injection  40 mg Intravenous Daily    guaiFENesin  600 mg Oral BID    hydrALAZINE  100 mg Oral Q8H    insulin detemir U-100  20 Units Subcutaneous Daily    levothyroxine  75 mcg Oral Before breakfast    metoprolol succinate  100 mg Oral Daily    potassium chloride  20 mEq Oral BID    traZODone  50 mg Oral QHS     Continuous Infusions:      PRN Meds:.acetaminophen, albuterol-ipratropium, butalbital-acetaminophen-caffeine -40 mg, calcium gluconate IVPB, calcium gluconate IVPB, calcium gluconate IVPB, carboxymethylcellulose, dextrose 50%, dextrose 50%, glucagon (human recombinant), glucose, glucose, guaiFENesin 100 mg/5 ml, hydrALAZINE, insulin aspart U-100, magnesium oxide, magnesium oxide, magnesium sulfate IVPB, magnesium sulfate IVPB, naloxone, ondansetron, polyethylene glycol, potassium bicarbonate, potassium bicarbonate, potassium bicarbonate, potassium chloride in water **AND** potassium chloride in water **AND** potassium chloride in water, racepinephrine, senna-docusate 8.6-50 mg, simethicone, sodium chloride 0.9%, sodium phosphate 15 mmol in dextrose 5 % (D5W) 250 mL IVPB, sodium phosphate 20.01 mmol in dextrose 5 % (D5W) 250 mL IVPB, sodium phosphate 30 mmol in dextrose 5 % (D5W) 250 mL IVPB, traMADoL  No current facility-administered medications on file prior to encounter.     Current Outpatient Medications on File Prior to Encounter   Medication Sig Dispense Refill    amLODIPine (NORVASC) 2.5 MG tablet Take 1 tablet (2.5 mg total) by mouth once daily. 90 tablet 1    azelastine (ASTELIN) 137 mcg (0.1 %) nasal spray 1 spray (137 mcg total) by Nasal route 2 (two) times daily. 30 mL 5    calcium carbonate-vitamin D3 600 mg-62.5 mcg (2,500 unit) Cap calcium carbonate 600 mg-vitamin D3 62.5 mcg (2,500 unit) capsule   Take by oral route.      DULoxetine (CYMBALTA) 60 MG capsule Take 1 capsule (60 mg total) by mouth once daily. 90 capsule 1    empaglifloz-linaglip-metformin (TRIJARDY XR) 25-5-1,000  mg TBph Take 1 tablet by mouth once daily. 90 tablet 1    guanFACINE (TENEX) 2 MG tablet Take 1 tablet (2 mg total) by mouth nightly. 90 tablet 3    insulin degludec (TRESIBA FLEXTOUCH U-200) 200 unit/mL (3 mL) insulin pen Inject 76 Units into the skin once daily. 12 pen 3    iron-vitamin C 100-250 mg, ICAR-C, 100-250 mg Tab Take 1 tablet by mouth once daily. 30 each 8    levothyroxine (SYNTHROID) 75 MCG tablet Take 75 mcg by mouth before breakfast.      metFORMIN (GLUCOPHAGE) 1000 MG tablet Take 1,000 mg by mouth 2 (two) times daily with meals.      metOLazone (ZAROXOLYN) 5 MG tablet Take 1 tablet (5 mg total) by mouth once daily. 90 tablet 0    metoprolol succinate (TOPROL-XL) 25 MG 24 hr tablet Take 1 tablet (25 mg total) by mouth once daily. 90 tablet 1    multivitamin capsule Take 1 capsule by mouth once daily.      olmesartan (BENICAR) 40 MG tablet Take 1 tablet (40 mg total) by mouth once daily. 90 tablet 1    potassium chloride (KLOR-CON) 10 MEQ TbSR Take 1 tablet (10 mEq total) by mouth once daily. 90 tablet 1    pregabalin (LYRICA) 50 MG capsule Take 50 mg by mouth every evening.      rosuvastatin (CRESTOR) 40 MG Tab Take 1 tablet (40 mg total) by mouth every evening. 90 tablet 3    aspirin (ECOTRIN) 81 MG EC tablet Take 1 tablet (81 mg total) by mouth once daily. 30 tablet 0    blood sugar diagnostic Strp One Touch Ultra Blue Test strips, Use l strip to check glucose 4 times daily 100 each 11    blood-glucose meter kit Use as instructed 1 each 0    cyanocobalamin 1,000 mcg/mL injection Inject 1 mL (1,000 mcg total) into the skin every 30 days. 3 mL 4    lancets (ONETOUCH DELICA LANCETS) 33 gauge Misc USE 1  TO CHECK GLUCOSE 4 TIMES DAILY 100 each 3    magnesium oxide (MAG-OX) 400 mg (241.3 mg magnesium) tablet Take 1 tablet (400 mg total) by mouth once daily. 90 tablet 1    NYSTOP powder APPLY TO AFFECTED AREA AS NEEDED      prednisoLONE acetate (PRED FORTE) 1 % DrpS Place 1 drop into both eyes as needed.  "       Review of Systems:  On MV    Physical Exam:  BP (!) 179/75   Pulse 72   Temp (P) 97.9 °F (36.6 °C) (Oral)   Resp 18   Ht 5' 7" (1.702 m)   Wt 108.9 kg (239 lb 15.9 oz)   SpO2 98%   BMI 37.60 kg/m²     General Appearance:    Ill   Head:    Normocephalic, atraumatic   Eyes:    Closed     Mouth:   Moist mucus membranes, no thrush or oral lesions, normal      dentition   Back:     No CVA tenderness   Lungs:     Coarse, on MV   Heart:    Regular rate and rhythm, no rub/gallop   Abdomen:     Soft, non-tender, non-distended guarding, no masses, no organomegaly   Extremities:   Warm and well perfused, distal pulses intact, no cyanosis, edematous   MSK:   No joint or muscle swelling, tenderness or deformity   Skin:   Skin color, texture, turgor normal, no rashes or lesions   Neurologic/Psychiatric:   Sedated     Results:  Recent Labs   Lab 07/29/23  0554 07/30/23  0610 07/31/23  0356    142 140   K 3.5 3.3* 3.4*    104 100   CO2 27 27 29   BUN 38* 30* 28*   CREATININE 1.2 1.0 1.0   * 156* 162*         Recent Labs   Lab 07/25/23  0327 07/26/23  0503 07/27/23  0434 07/28/23  0520 07/28/23  2112 07/29/23  0554 07/30/23  0610 07/31/23  0356   CALCIUM 7.9*  --  8.0*   < > 8.3* 8.2* 8.3* 8.3*   ALBUMIN 2.3*  --  2.3*  --  2.3* 2.4*  --   --    PHOS 4.4  --   --   --   --   --   --   --    MG 2.4   < > 2.0   < >  --  1.8 1.7 1.5*    < > = values in this interval not displayed.               No results for input(s): "POCTGLUCOSE" in the last 168 hours.    Recent Labs   Lab 12/22/20  1232 07/07/21  1057 07/18/23  1735   Hemoglobin A1C 6.8 H 7.4 H 6.9 H         Recent Labs   Lab 07/27/23  0857 07/28/23  0903 07/28/23  2113 07/29/23  0554 07/30/23  0610 07/31/23  0356   WBC 8.73 9.35 9.40 9.87 10.04 7.72   HGB 10.7* 11.4* 11.9* 11.6* 11.4* 10.3*   HCT 33.5* 36.4* 38.3 36.8* 36.3* 33.2*    317 426 401 445 409   MCV 82 84 83 83 82 84   MCHC 31.9* 31.3* 31.1* 31.5* 31.4* 31.0*   MONO 7.3  0.6 7.1  " 0.7 6.9  0.7  --   --   --    EOSINOPHIL 1.1 1.0 0.9  --   --   --          Recent Labs   Lab 07/27/23  0434 07/28/23  2112 07/29/23  0554   BILITOT 0.8 0.8 0.7   BILIDIR  --   --  0.1   PROT 5.6* 5.9* 5.8*   ALBUMIN 2.3* 2.3* 2.4*   ALKPHOS 171* 191* 197*   ALT 51* 47* 45*   AST 21 48* 35         Recent Labs   Lab 05/29/23  1040 07/18/23  1221 07/19/23  0116   Color, UA Yellow Yellow Yellow   Appearance, UA Clear Clear Clear   pH, UA 6.0 8.0 6.0   Specific [place], UA 1.015 1.030 >1.030 A   Protein, UA Negative 1+ A 1+ A   Glucose, UA 4+ A 4+ A 4+ A   Ketones, UA Negative Trace A Negative   Urobilinogen, UA Negative 2.0-3.0 A 2.0-3.0 A   Bilirubin (UA) Negative Negative 1+ A   Occult Blood UA Negative 1+ A Trace A   Nitrite, UA Negative Negative Negative   RBC, UA 1 6 H 1   WBC, UA 7 H 6 H 3   Bacteria Negative Negative Negative   Hyaline Casts, UA 4 A 5 A 1         Recent Labs   Lab 07/24/23  0312 07/24/23  0947 07/26/23  1908   POC PH 7.461 H 7.436 7.418   POC PCO2 36.9 38.2 37.8   POC HCO3 26.3 25.7 24.4   POC PO2 70 L 63 L 75 L   POC SATURATED O2 95 92 L 95   POC BE 2 1 0   Sample ARTERIAL ARTERIAL ARTERIAL         Microbiology Results (last 7 days)       Procedure Component Value Units Date/Time    Blood culture [[id number]] Collected: 07/25/23 0327    Order Status: Completed Specimen: Blood Updated: 07/30/23 0432     Blood Culture, Routine No growth after 5 days.    Culture, Respiratory with Gram Stain [[id number]] Collected: 07/27/23 2156    Order Status: Completed Specimen: Sputum, Expectorated Updated: 07/29/23 1018     Respiratory Culture No normal respiratory shay     Gram Stain (Respiratory) <10 epithelial cells per low power field.     Gram Stain (Respiratory) Rare WBC's     Gram Stain (Respiratory) Rare Gram positive cocci    Blood culture [[id number]] Collected: 07/24/23 0348    Order Status: Completed Specimen: Blood Updated: 07/29/23 0432     Blood Culture, Routine No growth after 5 days.     Narrative:      Collection has been rescheduled by [email or web address] at 07/24/2023 03:28 Reason:   Nurse Draw. Blood was already drawn. I was not able to get the blood   cultures.  Collection has been rescheduled by [email or web address] at 07/24/2023 03:28 Reason:   Nurse Draw. Blood was already drawn. I was not able to get the blood   cultures.    Blood culture [[id number]] Collected: 07/23/23 0825    Order Status: Completed Specimen: Blood Updated: 07/28/23 1032     Blood Culture, Routine No growth after 5 days.    Blood culture [[id number]] Collected: 07/20/23 0940    Order Status: Completed Specimen: Blood from Peripheral, Antecubital, Right Updated: 07/25/23 1232     Blood Culture, Routine No growth after 5 days.    Narrative:      Collection has been rescheduled by [email or web address] at 07/20/2023 06:04 Reason:   Unable to collect  Collection has been rescheduled by [email or web address] at 07/20/2023 08:01 Reason:   Contacting zainab for cultures per RN Alfred  Collection has been rescheduled by [email or web address] at 07/20/2023 08:41 Reason:   Nurse Draw  Collection has been rescheduled by [email or web address] at 07/20/2023 06:04 Reason:   Unable to collect  Collection has been rescheduled by [email or web address] at 07/20/2023 08:01 Reason:   Contacting zainab for cultures per RN Alfred  Collection has been rescheduled by [email or web address] at 07/20/2023 08:41 Reason:   Nurse Draw          I have spent > minutes providing care for this patient for the above diagnoses. These services have included chart/data/imaging review, evaluation, exam, formulation of plan, , note preparation, and discussions with staff involved in this patient's care.    Marah Jhaveri NP    [facility]  [address] [place] [id number]  [phone] (p)  [phone] (f)

## 2023-07-31 NOTE — ASSESSMENT & PLAN NOTE
Required intubation on hospital day 3.  Patient was successfully extubated on July 24, 2023.  Follow Pulmonary Medicine recommendations.  Wheezing has improved but she still feels extremely short of breath  Continue to diurese as she continues to have fluid overload  Continue mucus clearance  Check DVT u/s > no signs of DVT  Adjust DuoNebs  Added mucus clearance therapy yesterday

## 2023-07-31 NOTE — PLAN OF CARE
ISABELL spoke to Chelsea with [facility] this AM.  They are still waiting on auth at this time.  ISABELL following.    14:35- Auth denied. ISABELL waiting for peer to peer information from Chelsea at [facility]       07/31/23 1058   Post-Acute Status   Post-Acute Authorization Placement   Post-Acute Placement Status Pending payor review/awaiting authorization (if required)   Patient choice form signed by patient/caregiver List with quality metrics by geographic area provided   Discharge Delays (!) Post-Acute Set-up   Discharge Plan   Discharge Plan A Rehab   Discharge Plan B Skilled Nursing Facility

## 2023-07-31 NOTE — CARE UPDATE
07/30/23 3070   Patient Assessment/Suction   Level of Consciousness (AVPU) alert   Respiratory Effort Normal;Unlabored   Expansion/Accessory Muscles/Retractions no use of accessory muscles   PRE-TX-O2   Device (Oxygen Therapy) nasal cannula   $ Is the patient on Low Flow Oxygen? Yes   Flow (L/min) 3   SpO2 95 %   Pulse Oximetry Type Intermittent   $ Pulse Oximetry - Multiple Charge Pulse Oximetry - Multiple   Pulse 70   Resp 18   Positioning HOB elevated 30 degrees   Positioning   Body Position 30 degrees;supine   Head of Bed (HOB) Positioning HOB at 20-30 degrees   Aerosol Therapy   $ Aerosol Therapy Charges PRN treatment not required   Incentive Spirometer   $ Incentive Spirometer Charges refused  (pt is using the device on her own)   Vibratory PEP Therapy   $ Vibratory PEP Charges Acapella Therapy  (pt using on her own)

## 2023-07-31 NOTE — PROGRESS NOTES
Progress  Note  Infectious Disease    Reason for Consult:  bacteremia E coli and E faecium     HPI: Chanel Cervantes is a 67 y.o. female obese, BMI 37.6 kg/M2, with past medical history of diabetes, hypertension, bariatric surgery, cholecystectomy, prior UTIs, and prior pancreatitis secondary to gallstones.     She presented with acute onset of back pain, radiating into bilateral upper quadrants, with associated shortness of breath.  She was admitted for septic shock, transferred to ICU for further management.    Labs on admission with severe leukopenia 1.1, left shift 87%, bands 8%, H&H 14/44.2, platelet count 213   Creatinine 1.1   Hypokalemia 3.1   Transaminitis, AST 4943/ALT 1475   Total bili 2.7  Lipase 1363 down to 102  Patient CPK 92   Lactic acid 4.6  Hemoglobin A1c 6.9  UA pyuria 6, negative for bacteria, urine culture in process     CT abdomen with broad-based ventral abdominal hernia.  No evidence of obstruction.  Status post cholecystectomy with postsurgical dilatation of the common bile duct and intrahepatic ducts.    Seen by GI, s/p ERCP this morning; with evidence of the entire main bile duct was moderately dilated, with an obstruction from suspected sludge ball. A biliary sphincterotomy was performed. The biliary tree was swept.     ERCP this morning.  Hospital course complicated persistent fever and polymicrobial bacteremia, Enterococcus faecium and E coli, no resistance genes detected on [employer], pending sensitivities.      ID consult for E coli and E faecium bacteremia.    7/20: Interim reviewed, patient remains febrile, T max 101.3, currently 100.8, on pressors, on O2 by [place] 5L. She remains encephalopathic, decreased urinary output, 20ml/h, no bowel movements recorded yet. Labs reviewed, WBC 11.4, bands 36%, H/H 11.3/35.4, plt: 175. Worsening ANTHONY 2.6, LFTs trending down. , will watch closely. Alct acid 3, troponins trending up, likely demand ischemia. Micro reviewed, repeat blood cultures  7/19 Enterococcus faecium 4/4 bottles, urine culture no growth to date, pending final. Discussed with Pulmonary, plan for IV steroids.    7/20:  Interim reviewed, patient seen examined at bedside. Pressors requirement coming down, afebrile in the last 24 hours.  She was intubated yesterday at noon for airway protection, FiO2 40%, minimal amount of secretions, sputum sent for culture.  Urinary output improving although creatinine 2.9 today.  LFTs trending down, lactic acid 1.9, normal.  Micro reviewed, repeat blood cultures 7/20 x2 sets no growth to date, pending final.  Awaiting sensitivities on E coli and Enterococcus faecium, to be available later today.    07/22/2023   Afebrile, tmax 100, no pressors since this am,   On sedation 25 mcg/kg/min- intensivist is working daily on extubation, FIo2=30%  WBC 9.9--14;   PLT worsening? (186--175--85--73)   I/q=5198/155.  Cr 1.6--2.4--2.6--2.9--2.9-- LFTs improving   07/23/2023.  Dr. Montejo.   Afebrile.  WBC is stable 14--13.  Blood cultures of 07/23 are negative to date.   Smooth was negative for vegetation.    Urine output is improving.  She had 1 BM.    CXR looks slightly worse, but I think it is volume overload, which will improve as her kidney function improves. Intensivist and nurse are working on vacation sedation. Daughter at bedside.  I discussed with her in detail.    7/24 ([facility]): interim reviewed, discussed with Dr Montejo, patient seen and examined at bedside. She is coming OFF sedation, awake, nodding to questions, denies pain. Hemodynamically stable, afebrile. Urinary outpt improving, cr 2.5. Repeat blood cultures x1 no growth to date, pending final. Set from today in process.     7/25: interim reviewed, patient seen and examined at bedside, daughter present. They both report she had never had pancreatitis before. She is sitting in the chair, eating breakfast, feeling better. Labs reviewed, stable WBC 11, left shift 80.7%, thrombocytopenia 92,  creatinine down  2.3, urinary outpt is good, L IJ and a-line removed. Repeat blood cultures 7/23 & 7/24 no growth to date, pending final. Repeat set sent today in process.     7/26:  Interim reviewed, patient seen and examined at bedside, working with PT needing a lot of assistance.  Patient reports had a rough night, high high blood pressure.  Her appetite is slowly improving, still feels a little weak.  Labs to be drawn tomorrow.  Micro reviewed, repeat blood cultures since 07/23-24-25 no growth to date, pending final.     7/27:  Interim reviewed, patient seen examined at bedside, she is hoarse voice today, reports having breathing treatments back-to-back. She feels a little better overall, afebrile. Labs reviewed, WBC 8.7, left shift: 83.3%, H/H 10.7/33.5, plt improved 283. Procal 1.59, creatinine down to 1.8. Repeat blood cultures no growth.     7/28:  Interim reviewed, patient seen examined at bedside, she is little tired today, her voice remains hoarse, does not want a work with PT today, is asking for a break, she feels tired.  Remains on O2 by NC 2 L.  She reports cough is persistent, dry unable to bring anything up.  Chest x-ray with persistent bibasilar pleural parenchymal opacities suggesting tiny bilateral pleural effusions minimal atelectasis versus consolidation.  Procalcitonin trending down.    7/31:  Interim reviewed, patient seen examined at bedside, she still has hoarse voice although reports she is feeling a little better. Unable to get auth for SNF. Will complete course of IV abx inpatient.     Review of patient's allergies indicates:   Allergen Reactions    Adhesive tape-silicones Rash    Dye Hives    Cephalexin     Iodine     Penicillins Edema    Pneumococcal vaccine     Iodinated contrast media Rash     Past Medical History:   Diagnosis Date    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 1/24/2021    Anemia, chronic disease 1/24/2021    CAD (coronary artery disease)     Diabetes mellitus, type 2     Hypertension      "Iron deficiency anemia following bariatric surgery 2021    MI (myocardial infarction)     Secondary thrombocytosis 2021    Sleep apnea     Sleep apnea 2019    Sleep Right      Past Surgical History:   Procedure Laterality Date    ANGIOGRAM, CORONARY, WITH LEFT HEART CATHETERIZATION      APPENDECTOMY      BARIATRIC SURGERY  2019    Dr Nunez    CARPAL TUNNEL RELEASE Bilateral 2002     SECTION      CHOLECYSTECTOMY      COLONOSCOPY      CORONARY ANGIOPLASTY WITH STENT PLACEMENT      CORONARY ARTERY BYPASS GRAFT      EPIDURAL STEROID INJECTION INTO LUMBAR SPINE      x2    ERCP N/A 2023    Procedure: ERCP (ENDOSCOPIC RETROGRADE CHOLANGIOPANCREATOGRAPHY);  Surgeon: Lavon Hernandez III, MD;  Location: [facility] ENDO;  Service: Endoscopy;  Laterality: N/A;    ESOPHAGOGASTRODUODENOSCOPY      HERNIA REPAIR  2019    HYSTERECTOMY      THYROID LOBECTOMY Right 2021    Procedure: LOBECTOMY, THYROID;  Surgeon: Mari Chance MD;  Location: [facility] OR;  Service: General;  Laterality: Right;     Social History     Tobacco Use    Smoking status: Former     Current packs/day: 0.00     Average packs/day: 1 pack/day for 15.0 years (15.0 ttl pk-yrs)     Types: Cigarettes     Start date:      Quit date:      Years since quittin.5    Smokeless tobacco: Never   Substance Use Topics    Alcohol use: No     Comment: "maybe once a year"        Family History   Problem Relation Age of Onset    Diabetes Mother     Vision loss Mother     Heart disease Father     Vision loss Father     Stroke Father        EXAM & DIAGNOSTICS REVIEWED:   Vitals:     Temp:  [97.4 °F (36.3 °C)-98.2 °F (36.8 °C)]   Temp: 98.2 °F (36.8 °C) (23 1132)  Pulse: 78 (23 1352)  Resp: 18 (23 1352)  BP: (!) 157/67 (23 1132)  SpO2: 95 % (23 1352)    Intake/Output Summary (Last 24 hours) at 2023 1409  Last data filed at 2023 1144  Gross per 24 hour   Intake 280 ml   Output 900 ml   Net " "-620 ml        General:  Awake, alert, sitting in bed, comfortable on room air   Eyes:  Anicteric, PERRL  ENT:  Moist oral mucosa, good dentition   Neck:  Supple, hoarse voice noted   Lungs: Scattered bibasilar rales    Heart:  S1/S2+, regular rhythm, no murmurs  Abd:  Obese, large ventral hernia, +BS, soft, non tender   :  Purewick, yellow urine   Musc:  Joints without effusion, swelling,  erythema, synovitis  Skin:  Warm  Wound:   Neuro:  Awake, alert, speech is clear, moving all extremities   Psych:  Calm  Lymphatic:       Extrem: Trace UE edema b/l  No LE edema b/l  VAD:  R Midline      Isolation: None      General Labs reviewed:  Recent Labs   Lab 07/29/23  0554 07/30/23  0610 07/31/23  0356   WBC 9.87 10.04 7.72   HGB 11.6* 11.4* 10.3*   HCT 36.8* 36.3* 33.2*    445 409       Recent Labs   Lab 07/27/23  0434 07/28/23  0903 07/28/23  2112 07/29/23  0554 07/30/23  0610 07/31/23  0356      < > 142 139 142 140   K 3.5   < > 3.7 3.5 3.3* 3.4*      < > 105 102 104 100   CO2 28   < > 25 27 27 29   BUN 55*   < > 42* 38* 30* 28*   CREATININE 1.8*   < > 1.3 1.2 1.0 1.0   CALCIUM 8.0*   < > 8.3* 8.2* 8.3* 8.3*   PROT 5.6*  --  5.9* 5.8*  --   --    BILITOT 0.8  --  0.8 0.7  --   --    ALKPHOS 171*  --  191* 197*  --   --    ALT 51*  --  47* 45*  --   --    AST 21  --  48* 35  --   --     < > = values in this interval not displayed.     No results for input(s): "CRP" in the last 168 hours.      Estimated Creatinine Clearance: 69.4 mL/min (based on SCr of 1 mg/dL).     Micro:  Microbiology Results (last 7 days)       Procedure Component Value Units Date/Time    Blood culture [[id number]] Collected: 07/25/23 0327    Order Status: Completed Specimen: Blood Updated: 07/30/23 0432     Blood Culture, Routine No growth after 5 days.    Culture, Respiratory with Gram Stain [[id number]] Collected: 07/27/23 2156    Order Status: Completed Specimen: Sputum, Expectorated Updated: 07/29/23 1018     Respiratory " Culture No normal respiratory shay     Gram Stain (Respiratory) <10 epithelial cells per low power field.     Gram Stain (Respiratory) Rare WBC's     Gram Stain (Respiratory) Rare Gram positive cocci    Blood culture [[id number]] Collected: 07/24/23 0348    Order Status: Completed Specimen: Blood Updated: 07/29/23 0432     Blood Culture, Routine No growth after 5 days.    Narrative:      Collection has been rescheduled by JS at 07/24/2023 03:28 Reason:   Nurse Draw. Blood was already drawn. I was not able to get the blood   cultures.  Collection has been rescheduled by JS at 07/24/2023 03:28 Reason:   Nurse Draw. Blood was already drawn. I was not able to get the blood   cultures.    Blood culture [[id number]] Collected: 07/23/23 0825    Order Status: Completed Specimen: Blood Updated: 07/28/23 1032     Blood Culture, Routine No growth after 5 days.    Blood culture [[id number]] Collected: 07/20/23 0940    Order Status: Completed Specimen: Blood from Peripheral, Antecubital, Right Updated: 07/25/23 1232     Blood Culture, Routine No growth after 5 days.    Narrative:      Collection has been rescheduled by [email or web address] at 07/20/2023 06:04 Reason:   Unable to collect  Collection has been rescheduled by [email or web address] at 07/20/2023 08:01 Reason:   Contacting zainab for cultures per RN Alfred  Collection has been rescheduled by [email or web address] at 07/20/2023 08:41 Reason:   Nurse Draw  Collection has been rescheduled by [email or web address] at 07/20/2023 06:04 Reason:   Unable to collect  Collection has been rescheduled by [email or web address] at 07/20/2023 08:01 Reason:   Contacting zainab for cultures per RN Alfred  Collection has been rescheduled by [email or web address] at 07/20/2023 08:41 Reason:   Nurse Draw          Specimen: Blood Updated: 07/22/23 0642      Blood Culture, Routine Gram stain aer bottle: Gram negative rods     Gram stain aer bottle: Gram positive cocci     Results called to and read back by: Lesvia Mcdowell RN [id number].     07/19/2023  05:06 TGC     ESCHERICHIA COLI Abnormal       ENTEROCOCCUS FAECIUM Abnormal    Susceptibility     Escherichia coli Enterococcus faecium     CULTURE, BLOOD CULTURE, BLOOD     Amp/Sulbactam <=4/2 mcg/mL Sensitive       Ampicillin <=8 mcg/mL Sensitive <=2 mcg/mL Sensitive     Cefazolin <=2 mcg/mL Sensitive       Cefepime <=2 mcg/mL Sensitive       Ceftriaxone <=1 mcg/mL Sensitive       Ciprofloxacin <=1 mcg/mL Sensitive       Ertapenem <=0.5 mcg/mL Sensitive       Gentamicin <=4 mcg/mL Sensitive       Gentamicin Synergy Screen   <=500 mcg/mL Sensitive     Levofloxacin <=2 mcg/mL Sensitive       Meropenem <=1 mcg/mL Sensitive       Piperacillin/Tazo <=16 mcg/mL Sensitive       Tetracycline <=4 mcg/mL Sensitive <=4 mcg/mL Sensitive     Tobramycin <=4 mcg/mL Sensitive       Trimeth/Sulfa <=2/38 mcg/mL Sensitive       Vancomycin   2 mcg/mL Sensitive                      Imaging Reviewed:  CXR07/22Hypoinflation with mild prominence of the pulmonary vasculature and faint groundglass opacity in the lung bases suggestive of pulmonary edema   Abdominal Us  CTA chest  CT abdomen/pelvis:  1.  Status post cholecystectomy with postsurgical dilatation of the common bile duct and intrahepatic ducts. Should be correlated with bilirubin levels.   2.  Broad-based ventral abdominal wall hernia measures approximately 15 x 13 cm with herniated loops of large and small bowel. No evidence of obstruction.   3.  Exophytic hypoattenuating structure in the mid left kidney is felt to reflect a hemorrhagic cyst.      Cardiology:   ERICA:   The left ventricle is normal in size with normal systolic function.  Normal left ventricular diastolic function.  The estimated ejection fraction is 60%.  Atrial fibrillation not observed.  Normal right ventricular size with normal right ventricular systolic function.  Normal appearing left atrial appendage. No thrombus is present in the appendage. Normal appendage velocities.  No interatrial septal defect present.  There is no pulmonary hypertension.  No  abnormal intracavitary echo; valvular structures are normal  Left atrial appendage was clean       ECHO  The left ventricle is normal in size with mild concentric hypertrophy and mildly decreased systolic function.  Mild to moderate tricuspid regurgitation.  The estimated ejection fraction is 45%.  Grade I left ventricular diastolic dysfunction.  There is abnormal septal wall motion consistent with left bundle branch block.  Mild right ventricular enlargement with normal right ventricular systolic function.  Moderate left atrial enlargement.  Normal central venous pressure (3 mmHg).  The estimated PA systolic pressure is 40 mmHg.  There is mild pulmonary hypertension.       IMPRESSION & PLAN     Septic shock resolved likely from polymicrobial bacteremia Enterococcus faecium (7/18-19--20) and E coli (7/18) secondary to cholangitis s/p ERCP, sphincterotomy 7/19  Procal 54 -->5.7-->1.59-->0.72, trending down   CRP 24  CPK 92-->245-->20  Lipase 1363-->102, trending down   ERICA negative for endocarditis     2. Shock liver, resolved     3. Thrombocytopenia, resolved    4. ANTHONY, cr 1.1--2.4--2.6-- 2.9--2.8--2.8->2.5-->2.3-->1.3, improved     5.  PMHx: diabetes, hypertension, bariatric surgery, cholecystectomy, prior UTIs    6. Obesity, BMI 37.6 Kg/m2    Recommendations:  Daptomycin 800mg IV daily  Continue Ceftriaxone 2g IV daily  Above until tomorrow, 8/1/23  Please remove Midline before discharge   PT/OT as tolerated  Aspiration precautions   CM working on SNF    D/w patient, Dr Farris    Will sign-off, call us back with any questions     Medical Decision Making during this encounter was  [_] Low Complexity  [_] Moderate Complexity  [xx] High Complexity

## 2023-07-31 NOTE — PROGRESS NOTES
[facility] Medicine  Progress Note    Patient Name: Chanel Cervantes  MRN: [id number]  Patient Class: IP- Inpatient   Admission Date: 7/18/2023  Length of Stay: 12 days  Attending Physician: Kartik Farris MD  Primary Care Provider: ARIC Almaguer        Subjective:     Principal Problem:Septic shock        HPI:  Chanel Cervantes is a 67 y.o. White female   With PMH of DM2, HTN, bariatric surgery,  Remote cholecystectomy,  Frequent UTIs with kidney stones,  who presents with back pain.  Admitted for pancreatitis with elevated LFTs     Pt is currently confused after receiving ativan  (ER gave it to calm her for CT scan)  History taken from family at bedside     Onset of back pain overnight  Located b/l lumbar region  Radiating to b/l upper quadrants of abdomen  Occurring intermittently  Progressively worsening  Severe, causes SOB when occurring     Initially pt thought pain was due to a fall yesterday  (Mechanical, tripped over a rug, no head trauma)  +nausea, no vomiting  +intermittent chills  They are not sure about objective fever     Has had similar abdominal pain previously  Per family, this has been attributed to her ongoing ventral wall hernia  They don't bring her to ER for this usually  However pain today was much more severe than usual      Overview/Hospital Course:  Chanel Cervantes is a 67 year old female with a past medical history of obesity, ventral hernia, DM, HTN, anemia, CAD, NIKOLAS, and hypothyroidism who presented with septic shock secondary to a possible cholangitis with E coli bacteremia. Vancomycin was discontinued and she remained on meropenem. BP was maintained on a Levophed infusion. GI was consulted and performed ERCP which showed biliary sludge with a sludge ball dilating the main duct which was removed; a biliary sphincterotomy was also performed. Repeat blood cultures were negative. She was on IV fluids with LR and was NPO as she was encephalopathic. She also had  an ANTHONY as well. She also had acute hypoxic respiratory failure for which she was put on mask O2. She also had demand ischemia; TTE was done and troponin was trended. There was shock liver superimposed on the hepatocellular injury brought on by the acute cholangitis. Pulmonary/Critical Care was consulted given the patient's medical acuity.  Pulmonologist made decision to intubate patient based on patient's deep encephalopathy and inability to protect her airway.  Patient underwent transesophageal echocardiography which did not report any intracardiac thrombus or evidence of endocarditis.  Patient was successfully extubated on July 24, 2023. PT and OT is working with patient. Patient is transferred to medicine floor for further management and rehab.  working on LTAC placement.      Interval History:  She continues to improve every day.  She is doing a great job of getting up in the chair and moving around.  Needs further diuresis.  Will complete her antibiotics tomorrow.  Her breathing is feeling better however she remains tremulous.  Will try to stop the nebulizers as I suspect this may be part of the reason she is having tremors.    Review of Systems   All other systems reviewed and are negative.    Objective:     Vital Signs (Most Recent):  Temp: (P) 97.9 °F (36.6 °C) (07/31/23 0722)  Pulse: 72 (07/31/23 0921)  Resp: 18 (07/31/23 0824)  BP: (!) 179/68 (07/31/23 0921)  SpO2: 98 % (07/31/23 0824) Vital Signs (24h Range):  Temp:  [97.4 °F (36.3 °C)-97.9 °F (36.6 °C)] (P) 97.9 °F (36.6 °C)  Pulse:  [62-81] 72  Resp:  [16-18] 18  SpO2:  [93 %-98 %] 98 %  BP: (151-186)/(66-78) 179/68     Weight: 108.9 kg (239 lb 15.9 oz)  Body mass index is 37.6 kg/m².    Intake/Output Summary (Last 24 hours) at 7/31/2023 1112  Last data filed at 7/31/2023 0842  Gross per 24 hour   Intake 280 ml   Output 950 ml   Net -670 ml           Physical Exam  Constitutional:       Appearance: Normal appearance. She is normal weight.       Comments: Very weak   HENT:      Head: Normocephalic and atraumatic.      Nose: Nose normal.      Mouth/Throat:      Mouth: Mucous membranes are moist.   Eyes:      Conjunctiva/sclera: Conjunctivae normal.      Pupils: Pupils are equal, round, and reactive to light.   Cardiovascular:      Rate and Rhythm: Normal rate and regular rhythm.      Pulses: Normal pulses.      Heart sounds: Normal heart sounds. No murmur heard.     No friction rub. No gallop.   Pulmonary:      Effort: Pulmonary effort is normal.      Breath sounds: No wheezing, rhonchi or rales.      Comments: Clear breath sounds today, no stridor. No wheezing  Abdominal:      General: Abdomen is flat. Bowel sounds are normal. There is no distension.      Palpations: Abdomen is soft.      Tenderness: There is no abdominal tenderness. There is no guarding.   Musculoskeletal:         General: No swelling. Normal range of motion.      Cervical back: Normal range of motion and neck supple.      Right lower leg: Edema present.      Left lower leg: Edema present.   Skin:     General: Skin is warm and dry.   Neurological:      General: No focal deficit present.      Mental Status: She is alert.   Psychiatric:         Mood and Affect: Mood normal.         Thought Content: Thought content normal.         Judgment: Judgment normal.             Significant Labs: All pertinent labs within the past 24 hours have been reviewed.    Significant Imaging: I have reviewed all pertinent imaging results/findings within the past 24 hours.      Assessment/Plan:      * Septic shock due to Entercoccus faecium and E.coli  Secondary to acute cholangitis  -continue IVAB, stop date is 08/01, will finish tomorrow.  - will complete antibiotics rehab  -Enterococcus bacteremia now cleared  -S/p ERCP    Antibiotics (From admission, onward)    Start     Stop Route Frequency Ordered    07/21/23 1400  DAPTOmycin (CUBICIN) 805 mg in sodium chloride 0.9% SolP 50 mL IVPB         -- IV Every  "48 hours (non-standard times) 07/20/23 0749    07/19/23 1300  meropenem 1 g in sodium chloride 0.9 % 100 mL IVPB (ready to mix system)        Note to Pharmacy: Ht: 5' 7" (1.702 m)  Wt: 108.9 kg (240 lb)  Estimated Creatinine Clearance: 63.1 mL/min (based on SCr of 1.1 mg/dL).  Body mass index is 37.59 kg/m².    -- IV Every 12 hours (non-standard times) 07/19/23 0740              Constipation  resolved      Gallstone pancreatitis  Present on hospital day 1.  Continue vasopressor support and crystalloid.      Encephalopathy, metabolic  Metabolic in setting of septic shock.  -assess mental status when off sedation  -Treat septic shock      Shock liver  - LFTs improved    Lab Results   Component Value Date    ALT 51 (H) 07/27/2023    AST 21 07/27/2023    GGT 24 05/17/2018    ALKPHOS 171 (H) 07/27/2023    BILITOT 0.8 07/27/2023         ANTHONY (acute kidney injury)  In setting of septic shock.  -appreciate nephrology recommendations  -Renally dose medications  -Avoid nephrotoxic agents  -Monitor UOP and electrolytes  -Trend creatinine  -nephrologist consulting appreciate recommendations    Cr has trended upward:  2.4 to 2.6 to 2.9 to 2.8.  Now down to 1.0  BUN is going up as well:  BUN is improving also      Demand ischemia  History of CAD s/p CABG. No acute ST changes on EKG.  -check second trop level  -Telemetry  -cardiology consulting -- their opinion is that the troponin is high b/c of myocardial demand ischemia    Troponin I High Sensitivity   Date Value Ref Range Status   07/28/2023 33.6 (H) 0.0 - 14.9 pg/mL Final     Comment:     Troponin results differ between methods. Do not use   results between Troponin methods interchangeably.    Alkaline Phospatase levels above 400 U/L may   cause false positive results.    Access hsTnI should not be used for patients taking   Asfotase anya (Strensiq).     07/28/2023 33.6 (H) 0.0 - 14.9 pg/mL Final     Comment:     Troponin results differ between methods. Do not use   results " between Troponin methods interchangeably.    Alkaline Phospatase levels above 400 U/L may   cause false positive results.    Access hsTnI should not be used for patients taking   Asfotase anya (Strensiq).     07/20/2023 2384.3 (HH) 0.0 - 14.9 pg/mL Final     Comment:     Troponin results differ between methods. Do not use   results between Troponin methods interchangeably.    Alkaline Phospatase levels above 400 U/L may   cause false positive results.    Access hsTnI should not be used for patients taking   Asfotase anya (Strensiq).  Troponin critical result(s) called and verbal readback obtained   from [place] Avila RN M3 by MS1 07/20/2023 04:54         Acute hypoxemic respiratory failure  Required intubation on hospital day 3.  Patient was successfully extubated on July 24, 2023.  Follow Pulmonary Medicine recommendations.  Wheezing has improved but she still feels extremely short of breath  Continue to diurese as she continues to have fluid overload  Continue mucus clearance  Check DVT u/s > no signs of DVT  Adjust DuoNebs  Added mucus clearance therapy yesterday        Anemia  Stable.  -Trend Hgb with CBC    Hemoglobin   Date Value Ref Range Status   07/25/2023 11.5 (L) 12.0 - 16.0 g/dL Final   07/24/2023 10.6 (L) 12.0 - 16.0 g/dL Final           Obesity (BMI 30-39.9)  Body mass index is 37.6 kg/m². Morbid obesity complicates all aspects of disease management from diagnostic modalities to treatment.       Hypothyroidism  TSH unremarkable.  It's 0.470.  -Continue Synthroid      Acute obstructive cholangitis  Sludge ball removed via ERCP. Likely source of bacteremia. LFTs much improved.  -appreciate ID recommendations, treatment of E coli and Enterococcus as noted above  -GI following  -IV fluids  - blood cultures:  Entercoccus faecium and E.coli  -Trend LFTs      CAD (coronary artery disease)  -Hold home medications  -Telemetry      Hypokalemia  -Trend K.  Has improved.  -Replete K PRN  -Telemetry    Potassium    Date Value Ref Range Status   07/23/2023 3.8 3.5 - 5.1 mmol/L Final   07/22/2023 4.0 3.5 - 5.1 mmol/L Final         S/P bariatric surgery  Noted.      NIKOLAS on CPAP  -chronic condition.  - needs nightly CPAP    Type 2 diabetes mellitus treated with insulin  Patient's FSGs are controlled on current medication regimen.  Last A1c reviewed-   Lab Results   Component Value Date    HGBA1C 6.9 (H) 07/18/2023     'Current correctional scale  Medium  Maintain anti-hyperglycemic dose as follows-   Antihyperglycemics (From admission, onward)    Start     Stop Route Frequency Ordered    07/19/23 1018  insulin aspart U-100 pen 1-10 Units         -- SubQ Every 6 hours PRN 07/19/23 0920        Hold Oral hypoglycemics while patient is in the hospital.    Benign essential hypertension  - resumed home medications  - blood pressure improving      VTE Risk Mitigation (From admission, onward)         Ordered     enoxaparin injection 40 mg  Every 24 hours (non-standard times)         07/29/23 1637     IP VTE HIGH RISK PATIENT  Once         07/18/23 1642     Place sequential compression device  Until discontinued         07/18/23 1642                Discharge Planning   LUIS ALFREDO: 7/31/2023     Code Status: Full Code   Is the patient medically ready for discharge?:     Reason for patient still in hospital (select all that apply): Treatment  Discharge Plan A: Rehab   Discharge Delays: (!) Post-Acute Set-up              Kartik Farris MD  Department of Hospital Medicine   [facility]

## 2023-07-31 NOTE — CARE UPDATE
07/31/23 0000   Incentive Spirometer   $ Incentive Spirometer Charges refused  (pt asleep)

## 2023-07-31 NOTE — SUBJECTIVE & OBJECTIVE
Interval History:  She continues to improve every day.  She is doing a great job of getting up in the chair and moving around.  Needs further diuresis.  Will complete her antibiotics tomorrow.  Her breathing is feeling better however she remains tremulous.  Will try to stop the nebulizers as I suspect this may be part of the reason she is having tremors.    Review of Systems   All other systems reviewed and are negative.    Objective:     Vital Signs (Most Recent):  Temp: (P) 97.9 °F (36.6 °C) (07/31/23 0722)  Pulse: 72 (07/31/23 0921)  Resp: 18 (07/31/23 0824)  BP: (!) 179/68 (07/31/23 0921)  SpO2: 98 % (07/31/23 0824) Vital Signs (24h Range):  Temp:  [97.4 °F (36.3 °C)-97.9 °F (36.6 °C)] (P) 97.9 °F (36.6 °C)  Pulse:  [62-81] 72  Resp:  [16-18] 18  SpO2:  [93 %-98 %] 98 %  BP: (151-186)/(66-78) 179/68     Weight: 108.9 kg (239 lb 15.9 oz)  Body mass index is 37.6 kg/m².    Intake/Output Summary (Last 24 hours) at 7/31/2023 1112  Last data filed at 7/31/2023 0842  Gross per 24 hour   Intake 280 ml   Output 950 ml   Net -670 ml           Physical Exam  Constitutional:       Appearance: Normal appearance. She is normal weight.      Comments: Very weak   HENT:      Head: Normocephalic and atraumatic.      Nose: Nose normal.      Mouth/Throat:      Mouth: Mucous membranes are moist.   Eyes:      Conjunctiva/sclera: Conjunctivae normal.      Pupils: Pupils are equal, round, and reactive to light.   Cardiovascular:      Rate and Rhythm: Normal rate and regular rhythm.      Pulses: Normal pulses.      Heart sounds: Normal heart sounds. No murmur heard.     No friction rub. No gallop.   Pulmonary:      Effort: Pulmonary effort is normal.      Breath sounds: No wheezing, rhonchi or rales.      Comments: Clear breath sounds today, no stridor. No wheezing  Abdominal:      General: Abdomen is flat. Bowel sounds are normal. There is no distension.      Palpations: Abdomen is soft.      Tenderness: There is no abdominal  tenderness. There is no guarding.   Musculoskeletal:         General: No swelling. Normal range of motion.      Cervical back: Normal range of motion and neck supple.      Right lower leg: Edema present.      Left lower leg: Edema present.   Skin:     General: Skin is warm and dry.   Neurological:      General: No focal deficit present.      Mental Status: She is alert.   Psychiatric:         Mood and Affect: Mood normal.         Thought Content: Thought content normal.         Judgment: Judgment normal.             Significant Labs: All pertinent labs within the past 24 hours have been reviewed.    Significant Imaging: I have reviewed all pertinent imaging results/findings within the past 24 hours.

## 2023-07-31 NOTE — CARE UPDATE
07/31/23 0824   Patient Assessment/Suction   Level of Consciousness (AVPU) alert   All Lung Fields Breath Sounds diminished   PRE-TX-O2   Device (Oxygen Therapy) nasal cannula   $ Is the patient on Low Flow Oxygen? Yes   Flow (L/min) 3  (decreased to 2lpm)   SpO2 98 %   Pulse Oximetry Type Intermittent   $ Pulse Oximetry - Multiple Charge Pulse Oximetry - Multiple   Pulse 72   Resp 18   Aerosol Therapy   $ Aerosol Therapy Charges PRN treatment not required

## 2023-08-01 LAB
ANION GAP SERPL CALC-SCNC: 7 MMOL/L (ref 8–16)
BUN SERPL-MCNC: 22 MG/DL (ref 8–23)
CALCIUM SERPL-MCNC: 8.4 MG/DL (ref 8.7–10.5)
CHLORIDE SERPL-SCNC: 104 MMOL/L (ref 95–110)
CO2 SERPL-SCNC: 29 MMOL/L (ref 23–29)
CREAT SERPL-MCNC: 0.9 MG/DL (ref 0.5–1.4)
ERYTHROCYTE [DISTWIDTH] IN BLOOD BY AUTOMATED COUNT: 16 % (ref 11.5–14.5)
EST. GFR  (NO RACE VARIABLE): >60 ML/MIN/1.73 M^2
GLUCOSE SERPL-MCNC: 177 MG/DL (ref 70–110)
GLUCOSE SERPL-MCNC: 215 MG/DL (ref 70–110)
GLUCOSE SERPL-MCNC: 219 MG/DL (ref 70–110)
GLUCOSE SERPL-MCNC: 227 MG/DL (ref 70–110)
GLUCOSE SERPL-MCNC: 248 MG/DL (ref 70–110)
HCT VFR BLD AUTO: 33.1 % (ref 37–48.5)
HGB BLD-MCNC: 10.4 G/DL (ref 12–16)
MAGNESIUM SERPL-MCNC: 1.3 MG/DL (ref 1.6–2.6)
MCH RBC QN AUTO: 26.3 PG (ref 27–31)
MCHC RBC AUTO-ENTMCNC: 31.4 G/DL (ref 32–36)
MCV RBC AUTO: 84 FL (ref 82–98)
PLATELET # BLD AUTO: 447 K/UL (ref 150–450)
PMV BLD AUTO: 11.3 FL (ref 9.2–12.9)
POTASSIUM SERPL-SCNC: 3.7 MMOL/L (ref 3.5–5.1)
RBC # BLD AUTO: 3.96 M/UL (ref 4–5.4)
SODIUM SERPL-SCNC: 140 MMOL/L (ref 136–145)
WBC # BLD AUTO: 7.03 K/UL (ref 3.9–12.7)

## 2023-08-01 PROCEDURE — 94761 N-INVAS EAR/PLS OXIMETRY MLT: CPT

## 2023-08-01 PROCEDURE — 83735 ASSAY OF MAGNESIUM: CPT | Performed by: INTERNAL MEDICINE

## 2023-08-01 PROCEDURE — 12000002 HC ACUTE/MED SURGE SEMI-PRIVATE ROOM

## 2023-08-01 PROCEDURE — 25000003 PHARM REV CODE 250: Performed by: INTERNAL MEDICINE

## 2023-08-01 PROCEDURE — 80048 BASIC METABOLIC PNL TOTAL CA: CPT | Performed by: STUDENT IN AN ORGANIZED HEALTH CARE EDUCATION/TRAINING PROGRAM

## 2023-08-01 PROCEDURE — 63600175 PHARM REV CODE 636 W HCPCS: Performed by: STUDENT IN AN ORGANIZED HEALTH CARE EDUCATION/TRAINING PROGRAM

## 2023-08-01 PROCEDURE — 97530 THERAPEUTIC ACTIVITIES: CPT

## 2023-08-01 PROCEDURE — 25000003 PHARM REV CODE 250: Performed by: STUDENT IN AN ORGANIZED HEALTH CARE EDUCATION/TRAINING PROGRAM

## 2023-08-01 PROCEDURE — 99900031 HC PATIENT EDUCATION (STAT)

## 2023-08-01 PROCEDURE — 36415 COLL VENOUS BLD VENIPUNCTURE: CPT | Performed by: INTERNAL MEDICINE

## 2023-08-01 PROCEDURE — 94664 DEMO&/EVAL PT USE INHALER: CPT

## 2023-08-01 PROCEDURE — 99900035 HC TECH TIME PER 15 MIN (STAT)

## 2023-08-01 PROCEDURE — 85027 COMPLETE CBC AUTOMATED: CPT | Performed by: STUDENT IN AN ORGANIZED HEALTH CARE EDUCATION/TRAINING PROGRAM

## 2023-08-01 PROCEDURE — 27000221 HC OXYGEN, UP TO 24 HOURS

## 2023-08-01 PROCEDURE — 63600175 PHARM REV CODE 636 W HCPCS: Performed by: INTERNAL MEDICINE

## 2023-08-01 PROCEDURE — 97530 THERAPEUTIC ACTIVITIES: CPT | Mod: CQ

## 2023-08-01 RX ADMIN — GUAIFENESIN 600 MG: 600 TABLET, EXTENDED RELEASE ORAL at 09:08

## 2023-08-01 RX ADMIN — ALPRAZOLAM 0.5 MG: 0.5 TABLET ORAL at 02:08

## 2023-08-01 RX ADMIN — ENOXAPARIN SODIUM 40 MG: 100 INJECTION SUBCUTANEOUS at 05:08

## 2023-08-01 RX ADMIN — DEXTROSE MONOHYDRATE 2 G: 50 INJECTION, SOLUTION INTRAVENOUS at 08:08

## 2023-08-01 RX ADMIN — CHLORHEXIDINE GLUCONATE 15 ML: 1.2 RINSE ORAL at 09:08

## 2023-08-01 RX ADMIN — HYDRALAZINE HYDROCHLORIDE 100 MG: 25 TABLET ORAL at 05:08

## 2023-08-01 RX ADMIN — POTASSIUM CHLORIDE 20 MEQ: 1500 TABLET, EXTENDED RELEASE ORAL at 09:08

## 2023-08-01 RX ADMIN — HYDRALAZINE HYDROCHLORIDE 100 MG: 25 TABLET ORAL at 02:08

## 2023-08-01 RX ADMIN — DAPTOMYCIN 805 MG: 500 INJECTION, POWDER, LYOPHILIZED, FOR SOLUTION INTRAVENOUS at 02:08

## 2023-08-01 RX ADMIN — HYDRALAZINE HYDROCHLORIDE 100 MG: 25 TABLET ORAL at 09:08

## 2023-08-01 RX ADMIN — TRAZODONE HYDROCHLORIDE 50 MG: 50 TABLET ORAL at 09:08

## 2023-08-01 RX ADMIN — FUROSEMIDE 40 MG: 10 INJECTION, SOLUTION INTRAVENOUS at 08:08

## 2023-08-01 RX ADMIN — AMLODIPINE BESYLATE 10 MG: 5 TABLET ORAL at 08:08

## 2023-08-01 RX ADMIN — INSULIN ASPART 4 UNITS: 100 INJECTION, SOLUTION INTRAVENOUS; SUBCUTANEOUS at 10:08

## 2023-08-01 RX ADMIN — CHLORHEXIDINE GLUCONATE 15 ML: 1.2 RINSE ORAL at 08:08

## 2023-08-01 RX ADMIN — ALPRAZOLAM 0.5 MG: 0.5 TABLET ORAL at 09:08

## 2023-08-01 RX ADMIN — METOPROLOL SUCCINATE 100 MG: 50 TABLET, FILM COATED, EXTENDED RELEASE ORAL at 08:08

## 2023-08-01 RX ADMIN — ALPRAZOLAM 0.5 MG: 0.5 TABLET ORAL at 08:08

## 2023-08-01 RX ADMIN — LEVOTHYROXINE SODIUM 75 MCG: 0.03 TABLET ORAL at 05:08

## 2023-08-01 RX ADMIN — INSULIN DETEMIR 20 UNITS: 100 INJECTION, SOLUTION SUBCUTANEOUS at 10:08

## 2023-08-01 RX ADMIN — INSULIN ASPART 1 UNITS: 100 INJECTION, SOLUTION INTRAVENOUS; SUBCUTANEOUS at 09:08

## 2023-08-01 RX ADMIN — GUAIFENESIN 600 MG: 600 TABLET, EXTENDED RELEASE ORAL at 08:08

## 2023-08-01 RX ADMIN — POTASSIUM CHLORIDE 20 MEQ: 1500 TABLET, EXTENDED RELEASE ORAL at 08:08

## 2023-08-01 NOTE — PROGRESS NOTES
Formerly [facility], later [facility] Medicine  Progress Note    Patient Name: Chanel Cervantes  MRN: [id number]  Patient Class: IP- Inpatient   Admission Date: 7/18/2023  Length of Stay: 13 days  Attending Physician: Kartik Farris MD  Primary Care Provider: ARIC Almaguer        Subjective:     Principal Problem:Septic shock        HPI:  Chanel Cervantes is a 67 y.o. White female   With PMH of DM2, HTN, bariatric surgery,  Remote cholecystectomy,  Frequent UTIs with kidney stones,  who presents with back pain.  Admitted for pancreatitis with elevated LFTs     Pt is currently confused after receiving ativan  (ER gave it to calm her for CT scan)  History taken from family at bedside     Onset of back pain overnight  Located b/l lumbar region  Radiating to b/l upper quadrants of abdomen  Occurring intermittently  Progressively worsening  Severe, causes SOB when occurring     Initially pt thought pain was due to a fall yesterday  (Mechanical, tripped over a rug, no head trauma)  +nausea, no vomiting  +intermittent chills  They are not sure about objective fever     Has had similar abdominal pain previously  Per family, this has been attributed to her ongoing ventral wall hernia  They don't bring her to ER for this usually  However pain today was much more severe than usual      Overview/Hospital Course:  Chanel Cervantes is a 67 year old female with a past medical history of obesity, ventral hernia, DM, HTN, anemia, CAD, NIKOLAS, and hypothyroidism who presented with septic shock secondary to a possible cholangitis with E coli bacteremia. Vancomycin was discontinued and she remained on meropenem. BP was maintained on a Levophed infusion. GI was consulted and performed ERCP which showed biliary sludge with a sludge ball dilating the main duct which was removed; a biliary sphincterotomy was also performed. Repeat blood cultures were negative. She was on IV fluids with LR and was NPO as she was encephalopathic. She also had  an ANTHONY as well. She also had acute hypoxic respiratory failure for which she was put on mask O2. She also had demand ischemia; TTE was done and troponin was trended. There was shock liver superimposed on the hepatocellular injury brought on by the acute cholangitis. Pulmonary/Critical Care was consulted given the patient's medical acuity.  Pulmonologist made decision to intubate patient based on patient's deep encephalopathy and inability to protect her airway.  Patient underwent transesophageal echocardiography which did not report any intracardiac thrombus or evidence of endocarditis.  Patient was successfully extubated on July 24, 2023. PT and OT is working with patient. Patient is transferred to medicine floor for further management and rehab.  working on LTAC placement.      Interval History:  She is doing much better today and is working with physical therapy.  She is been doing a great job of getting out of bed.  She continues to need rehab for further strengthening and rehabilitation.  Her insurance is denying her this time for unclear reasons.  She would greatly benefit from rehab    Review of Systems   All other systems reviewed and are negative.    Objective:     Vital Signs (Most Recent):  Temp: 98.1 °F (36.7 °C) (08/01/23 0736)  Pulse: 80 (08/01/23 0821)  Resp: 18 (08/01/23 0821)  BP: (!) 190/80 (08/01/23 0853)  SpO2: 95 % (08/01/23 0821) Vital Signs (24h Range):  Temp:  [97.1 °F (36.2 °C)-98.2 °F (36.8 °C)] 98.1 °F (36.7 °C)  Pulse:  [65-80] 80  Resp:  [16-18] 18  SpO2:  [93 %-97 %] 95 %  BP: (159-190)/(67-80) 190/80     Weight: 108.9 kg (239 lb 15.9 oz)  Body mass index is 37.6 kg/m².    Intake/Output Summary (Last 24 hours) at 8/1/2023 1354  Last data filed at 8/1/2023 1200  Gross per 24 hour   Intake 480 ml   Output 3400 ml   Net -2920 ml           Physical Exam  Constitutional:       Appearance: Normal appearance. She is normal weight.   HENT:      Head: Normocephalic and atraumatic.       Nose: Nose normal.      Mouth/Throat:      Mouth: Mucous membranes are moist.   Eyes:      Conjunctiva/sclera: Conjunctivae normal.      Pupils: Pupils are equal, round, and reactive to light.   Cardiovascular:      Rate and Rhythm: Normal rate and regular rhythm.      Pulses: Normal pulses.      Heart sounds: Normal heart sounds. No murmur heard.     No friction rub. No gallop.   Pulmonary:      Effort: Pulmonary effort is normal.      Breath sounds: No wheezing, rhonchi or rales.      Comments: Clear breath sounds today, no stridor. No wheezing  Abdominal:      General: Abdomen is flat. Bowel sounds are normal. There is no distension.      Palpations: Abdomen is soft.      Tenderness: There is no abdominal tenderness. There is no guarding.   Musculoskeletal:         General: No swelling. Normal range of motion.      Cervical back: Normal range of motion and neck supple.      Right lower leg: Edema present.      Left lower leg: Edema present.   Skin:     General: Skin is warm and dry.   Neurological:      General: No focal deficit present.      Mental Status: She is alert.   Psychiatric:         Mood and Affect: Mood normal.         Thought Content: Thought content normal.         Judgment: Judgment normal.             Significant Labs: All pertinent labs within the past 24 hours have been reviewed.    Significant Imaging: I have reviewed all pertinent imaging results/findings within the past 24 hours.      Assessment/Plan:      * Septic shock due to Entercoccus faecium and E.coli  Secondary to acute cholangitis  -continue IVAB, stop date is 08/01, will be finished after today  -Enterococcus bacteremia now cleared  -S/p ERCP    Antibiotics (From admission, onward)    Start     Stop Route Frequency Ordered    07/21/23 1400  DAPTOmycin (CUBICIN) 805 mg in sodium chloride 0.9% SolP 50 mL IVPB         -- IV Every 48 hours (non-standard times) 07/20/23 0749    07/19/23 1300  meropenem 1 g in sodium chloride 0.9 %  "100 mL IVPB (ready to mix system)        Note to Pharmacy: Ht: 5' 7" (1.702 m)  Wt: 108.9 kg (240 lb)  Estimated Creatinine Clearance: 63.1 mL/min (based on SCr of 1.1 mg/dL).  Body mass index is 37.59 kg/m².    -- IV Every 12 hours (non-standard times) 07/19/23 0740              Constipation  resolved      Gallstone pancreatitis  Present on hospital day 1.  Continue vasopressor support and crystalloid.      Encephalopathy, metabolic  Metabolic in setting of septic shock.  -assess mental status when off sedation  -Treat septic shock      Shock liver  - LFTs improved    Lab Results   Component Value Date    ALT 51 (H) 07/27/2023    AST 21 07/27/2023    GGT 24 05/17/2018    ALKPHOS 171 (H) 07/27/2023    BILITOT 0.8 07/27/2023         ANTHONY (acute kidney injury)  In setting of septic shock.  -appreciate nephrology recommendations  -Renally dose medications  -ANTHONY has now resolved status post ATN due to septic shock      Demand ischemia  History of CAD s/p CABG. No acute ST changes on EKG.  -check second trop level  -Telemetry  -cardiology consulting -- their opinion is that the troponin is high b/c of myocardial demand ischemia    Troponin I High Sensitivity   Date Value Ref Range Status   07/28/2023 33.6 (H) 0.0 - 14.9 pg/mL Final     Comment:     Troponin results differ between methods. Do not use   results between Troponin methods interchangeably.    Alkaline Phospatase levels above 400 U/L may   cause false positive results.    Access hsTnI should not be used for patients taking   Asfotase anya (Strensiq).     07/28/2023 33.6 (H) 0.0 - 14.9 pg/mL Final     Comment:     Troponin results differ between methods. Do not use   results between Troponin methods interchangeably.    Alkaline Phospatase levels above 400 U/L may   cause false positive results.    Access hsTnI should not be used for patients taking   Asfotase anya (Strensiq).     07/20/2023 2384.3 (HH) 0.0 - 14.9 pg/mL Final     Comment:     Troponin results " differ between methods. Do not use   results between Troponin methods interchangeably.    Alkaline Phospatase levels above 400 U/L may   cause false positive results.    Access hsTnI should not be used for patients taking   Asfotase anya (Strensiq).  Troponin critical result(s) called and verbal readback obtained   from [place] Avila RN M3 by MS1 07/20/2023 04:54         Acute hypoxemic respiratory failure  Required intubation on hospital day 3.  Patient was successfully extubated on July 24, 2023.  Follow Pulmonary Medicine recommendations.  Shortness of the breath significantly better  Continue mucus clearance  Check DVT u/s > no signs of DVT  Adjust DuoNebs  Added mucus clearance therapy yesterday seems to be helping        Anemia  Stable.  -Trend Hgb with CBC    Hemoglobin   Date Value Ref Range Status   08/01/2023 10.4 (L) 12.0 - 16.0 g/dL Final   07/31/2023 10.3 (L) 12.0 - 16.0 g/dL Final           Obesity (BMI 30-39.9)  Body mass index is 37.6 kg/m². Morbid obesity complicates all aspects of disease management from diagnostic modalities to treatment.       Hypothyroidism  TSH unremarkable.  It's 0.470.  -Continue Synthroid      Acute obstructive cholangitis  Sludge ball removed via ERCP. Likely source of bacteremia. LFTs much improved.  -appreciate ID recommendations, treatment of E coli and Enterococcus as noted above  -GI following  -IV fluids  - blood cultures:  Entercoccus faecium and E.coli  -Trend LFTs      CAD (coronary artery disease)  -Hold home medications  -Telemetry      Hypokalemia  -Trend K.  Has improved.  -Replete K PRN  -Telemetry    Potassium   Date Value Ref Range Status   07/23/2023 3.8 3.5 - 5.1 mmol/L Final   07/22/2023 4.0 3.5 - 5.1 mmol/L Final         S/P bariatric surgery  Noted.      NIKOLAS on CPAP  -chronic condition.  - needs nightly CPAP    Type 2 diabetes mellitus treated with insulin  Patient's FSGs are controlled on current medication regimen.  Last A1c reviewed-   Lab Results    Component Value Date    HGBA1C 6.9 (H) 07/18/2023     'Current correctional scale  Medium  Maintain anti-hyperglycemic dose as follows-   Antihyperglycemics (From admission, onward)    Start     Stop Route Frequency Ordered    07/19/23 1018  insulin aspart U-100 pen 1-10 Units         -- SubQ Every 6 hours PRN 07/19/23 0920        Hold Oral hypoglycemics while patient is in the hospital.    Benign essential hypertension  - resumed home medications  - blood pressure improving      VTE Risk Mitigation (From admission, onward)         Ordered     enoxaparin injection 40 mg  Every 24 hours (non-standard times)         07/29/23 1637     IP VTE HIGH RISK PATIENT  Once         07/18/23 1642     Place sequential compression device  Until discontinued         07/18/23 1642                Discharge Planning   LUIS ALFREDO: 8/1/2023     Code Status: Full Code   Is the patient medically ready for discharge?:     Reason for patient still in hospital (select all that apply): Treatment  Discharge Plan A: Rehab   Discharge Delays: (!) Post-Acute Set-up              Kartik Farris MD  Department of Hospital Medicine   [facility]

## 2023-08-01 NOTE — PLAN OF CARE
Problem: Occupational Therapy  Goal: Occupational Therapy Goal  Description: Goals to be met by: 8/25/2023     Patient will increase functional independence with ADLs by performing:    Feeding with [place].  UE Dressing with Modified [place].  LE Dressing with Modified [place].  Grooming while standing at sink with Modified [place].  Toileting from toilet with Modified [place] for hygiene and clothing management.   Toilet transfer to toilet with Modified [place].    Outcome: Ongoing, Progressing

## 2023-08-01 NOTE — PROGRESS NOTES
Acute kidney injury has resolved.    Avoid non-essential nephrotoxic agents for a couple of weeks  Keep euvolemic  Keep mean arterial bp above 55    Thank your for the referral.  Please call if further assistance is needed    Paul Villatoro MD  Nephrology  [facility]  [phone]

## 2023-08-01 NOTE — CARE UPDATE
08/01/23 0300   Incentive Spirometer   $ Incentive Spirometer Charges refused  (pt is using independently)

## 2023-08-01 NOTE — CARE UPDATE
08/01/23 0821   Patient Assessment/Suction   Level of Consciousness (AVPU) alert   Respiratory Effort Normal;Unlabored   Expansion/Accessory Muscles/Retractions no retractions;no use of accessory muscles   All Lung Fields Breath Sounds coarse;diminished   Rhythm/Pattern, Respiratory unlabored;pattern regular   Cough Frequency frequent   Cough Type congested   PRE-TX-O2   Device (Oxygen Therapy) nasal cannula   $ Is the patient on Low Flow Oxygen? Yes   Flow (L/min) 2   SpO2 95 %   Pulse Oximetry Type Intermittent   $ Pulse Oximetry - Multiple Charge Pulse Oximetry - Multiple   Pulse 80   Resp 18   Aerosol Therapy   $ Aerosol Therapy Charges PRN treatment not required   Respiratory Treatment Status (SVN) PRN treatment not required   Vibratory PEP Therapy   $ Vibratory PEP Charges Aerobika Therapy   $ Vibratory PEP Tech Time Charges 15 min   Daily Review of Necessity (PEP Therapy) completed   Type (PEP Therapy) vibratory/oscillatory   Route (PEP Therapy) instruction provided, follow-up;mouthpiece;proper technique demonstrated   Breaths per Cycle (PEP Therapy) 10   Cycles (PEP Therapy) 1   PEP Duration (min) 5   Settings (PEP Therapy) PEP 5   Patient Position (PEP Therapy) HOB elevated   Post Treatment Assessment (PEP) breath sounds unchanged   Signs of Intolerance (PEP Therapy) none   Education   $ Education 15 min

## 2023-08-01 NOTE — PROGRESS NOTES
[facility]  Adult Nutrition   Progress Note (Follow-Up)    SUMMARY      Recommendations:   1. Continue current diet  2. Continue Unjury or equivalent with meals.     Goals:   Goals: PO intake to meet 75% of EEN/EPN.progressing    Dietitian Rounds Brief  Plan is for patient to discharge soon, pending placement. Patient intake is good and her last BM was yesterday. Pt sitting up in chair with RN. RD to continue to follow PRN.    Diet order: Diabetic, 2000 kcal diet    Oral Supplement: Unjury with meals.      % Intake of Estimated Energy Needs: 50 - 75 %  % Meal Intake: 50 - 75 %    Estimated/Assessed Needs  Weight Used For Calorie Calculations: 108.9 kg (240 lb 1.3 oz)  Energy Calorie Requirements (kcal): 1198 - 1525 (11 - 14 kcal/kg)  Energy Need Method: Kcal/kg  Protein Requirements: 49 - 73 (0.8 - 73 (0.8 - 1.2 g/kg IBW)  Weight Used For Protein Calculations: 61 kg (134 lb 7.7 oz) (IBW)     Estimated Fluid Requirement Method: RDA Method  RDA Method (mL): 1198       Weight History:  Wt Readings from Last 5 Encounters:   07/19/23 108.9 kg (239 lb 15.9 oz)   07/19/23 108.9 kg (240 lb 1.3 oz)   06/02/23 109.1 kg (240 lb 8 oz)   04/13/23 108.8 kg (239 lb 14.4 oz)   02/16/23 108.5 kg (239 lb 4.8 oz)        Reason for Assessment  Reason For Assessment: RD follow-up  Diagnosis: infection/sepsis  Relevant Medical History: essential HTN; type 2 DM; NIKOLAS on CPAP; s/p bariatric surgery; hypokalemia; CAD; acute obstructive cholangitis; hypothyroidism; obesity; anemia; acute hypoxemic respiratory failure; ANTHONY; septic shock; shock liver; e. coli bacteremia; acute encephalopathy    Medications:Pertinent Medications Reviewed  Scheduled Meds:   ALPRAZolam  0.5 mg Oral TID    amLODIPine  10 mg Oral Daily    chlorhexidine  15 mL Mouth/Throat BID    enoxparin  40 mg Subcutaneous Q24H    furosemide (LASIX) injection  40 mg Intravenous Daily    guaiFENesin  600 mg Oral BID    hydrALAZINE  100 mg Oral Q8H    insulin detemir  U-100  20 Units Subcutaneous Daily    levothyroxine  75 mcg Oral Before breakfast    metoprolol succinate  100 mg Oral Daily    potassium chloride  20 mEq Oral BID    traZODone  50 mg Oral QHS     Continuous Infusions:  PRN Meds:.acetaminophen, albuterol-ipratropium, butalbital-acetaminophen-caffeine -40 mg, calcium gluconate IVPB, calcium gluconate IVPB, calcium gluconate IVPB, carboxymethylcellulose, dextrose 50%, dextrose 50%, glucagon (human recombinant), glucose, glucose, guaiFENesin 100 mg/5 ml, hydrALAZINE, insulin aspart U-100, magnesium oxide, magnesium oxide, magnesium sulfate IVPB, magnesium sulfate IVPB, naloxone, ondansetron, polyethylene glycol, potassium bicarbonate, potassium bicarbonate, potassium bicarbonate, potassium chloride in water **AND** potassium chloride in water **AND** potassium chloride in water, racepinephrine, senna-docusate 8.6-50 mg, simethicone, sodium chloride 0.9%, sodium phosphate 15 mmol in dextrose 5 % (D5W) 250 mL IVPB, sodium phosphate 20.01 mmol in dextrose 5 % (D5W) 250 mL IVPB, sodium phosphate 30 mmol in dextrose 5 % (D5W) 250 mL IVPB, traMADoL    Labs: Pertinent Labs Reviewed  Clinical Chemistry:  Recent Labs   Lab 07/27/23  0434 07/28/23  0520 07/28/23  2112 07/29/23  0554 07/30/23  0610 08/01/23  0510      < > 142 139   < > 140   K 3.5   < > 3.7 3.5   < > 3.7      < > 105 102   < > 104   CO2 28   < > 25 27   < > 29   *   < > 307* 274*   < > 227*   BUN 55*   < > 42* 38*   < > 22   CREATININE 1.8*   < > 1.3 1.2   < > 0.9   CALCIUM 8.0*   < > 8.3* 8.2*   < > 8.4*   PROT 5.6*  --  5.9* 5.8*  --   --    ALBUMIN 2.3*  --  2.3* 2.4*  --   --    BILITOT 0.8  --  0.8 0.7  --   --    ALKPHOS 171*  --  191* 197*  --   --    AST 21  --  48* 35  --   --    ALT 51*  --  47* 45*  --   --    ANIONGAP 8   < > 12 10   < > 7*   MG 2.0   < >  --  1.8   < > 1.3*    < > = values in this interval not displayed.     CBC:   Recent Labs   Lab 08/01/23  0510   WBC 7.03    RBC 3.96*   HGB 10.4*   HCT 33.1*      MCV 84   MCH 26.3*   MCHC 31.4*     Cardiac Profile:  Recent Labs   Lab 07/26/23  0503 07/28/23  2113   BNP  --  447*   CPK 20  --      Monitor and Evaluation  Food and Nutrient Intake: energy intake, food and beverage intake  Food and Nutrient Adminstration: diet order  Knowledge/Beliefs/Attitudes: food and nutrition knowledge/skill  Physical Activity and Function: nutrition-related ADLs and IADLs  Anthropometric Measurements: height/length, weight, weight change  Biochemical Data, Medical Tests and Procedures: electrolyte and renal panel, inflammatory profile, gastrointestinal profile, lipid profile, glucose/endocrine profile  Nutrition-Focused Physical Findings: overall appearance     Nutrition Risk  Level of Risk/Frequency of Follow-up: moderate     Nutrition Follow-Up  RD Follow-up?: Yes    Naomi Amato RD 08/01/2023 3:04 PM

## 2023-08-01 NOTE — ASSESSMENT & PLAN NOTE
Required intubation on hospital day 3.  Patient was successfully extubated on July 24, 2023.  Follow Pulmonary Medicine recommendations.  Shortness of the breath significantly better  Continue mucus clearance  Check DVT u/s > no signs of DVT  Adjust DuoNebs  Added mucus clearance therapy yesterday seems to be helping

## 2023-08-01 NOTE — ASSESSMENT & PLAN NOTE
In setting of septic shock.  -appreciate nephrology recommendations  -Renally dose medications  -ANTHONY has now resolved status post ATN due to septic shock

## 2023-08-01 NOTE — PLAN OF CARE
CM attempted to schedule peer to peer for inpatient rehab yesterday, unable to get in touch with someone at [facility] who could help set that up.  CM attempted to schedule peer to peer again this morning with [facility], again with no luck getting in touch with someone who can help.  Chelsea with [facility] has been trying since this morning to schedule the peer to peer as well.  CM to follow up with Cehlsea this afternoon.     15:55- Chelsea with [facility] notified CM the expedited appeal with [facility] has been filed at noon today.

## 2023-08-01 NOTE — ASSESSMENT & PLAN NOTE
"Secondary to acute cholangitis  -continue IVAB, stop date is 08/01, will be finished after today  -Enterococcus bacteremia now cleared  -S/p ERCP    Antibiotics (From admission, onward)    Start     Stop Route Frequency Ordered    07/21/23 1400  DAPTOmycin (CUBICIN) 805 mg in sodium chloride 0.9% SolP 50 mL IVPB         -- IV Every 48 hours (non-standard times) 07/20/23 0749    07/19/23 1300  meropenem 1 g in sodium chloride 0.9 % 100 mL IVPB (ready to mix system)        Note to Pharmacy: Ht: 5' 7" (1.702 m)  Wt: 108.9 kg (240 lb)  Estimated Creatinine Clearance: 63.1 mL/min (based on SCr of 1.1 mg/dL).  Body mass index is 37.59 kg/m².    -- IV Every 12 hours (non-standard times) 07/19/23 0740            "

## 2023-08-01 NOTE — ASSESSMENT & PLAN NOTE
Stable.  -Trend Hgb with CBC    Hemoglobin   Date Value Ref Range Status   08/01/2023 10.4 (L) 12.0 - 16.0 g/dL Final   07/31/2023 10.3 (L) 12.0 - 16.0 g/dL Final

## 2023-08-01 NOTE — PLAN OF CARE
Problem: Adult Inpatient Plan of Care  Goal: Plan of Care Review  8/1/2023 1731 by Abiodun Blankenship RN  Outcome: Ongoing, Progressing  8/1/2023 1729 by Abiodun Blankenship RN  Outcome: Ongoing, Progressing  8/1/2023 1727 by Abiodun Blankenship RN  Outcome: Ongoing, Progressing  Goal: Patient-Specific Goal (Individualized)  8/1/2023 1731 by Abiodun Blankenship RN  Outcome: Ongoing, Progressing  8/1/2023 1729 by Abiodun Blankenship RN  Outcome: Ongoing, Progressing  8/1/2023 1727 by Abiodun Balnkenship RN  Outcome: Ongoing, Progressing  Goal: Absence of Hospital-Acquired Illness or Injury  8/1/2023 1731 by Abiodun Blankenship RN  Outcome: Ongoing, Progressing  8/1/2023 1729 by Abiodun Blankenship RN  Outcome: Ongoing, Progressing  8/1/2023 1727 by Abiodun Blankenship RN  Outcome: Ongoing, Progressing  Goal: Optimal Comfort and Wellbeing  8/1/2023 1731 by Abiodun Blankenship RN  Outcome: Ongoing, Progressing  8/1/2023 1729 by Abiodun Blankenship RN  Outcome: Ongoing, Progressing  8/1/2023 1727 by Abiodun Blankenship RN  Outcome: Ongoing, Progressing  Goal: Readiness for Transition of Care  8/1/2023 1731 by Abiodun Blankenship RN  Outcome: Ongoing, Progressing  8/1/2023 1729 by Abiodun Blankenship RN  Outcome: Ongoing, Progressing  8/1/2023 1727 by Abiodun Blankenship RN  Outcome: Ongoing, Progressing     Problem: Diabetes Comorbidity  Goal: Blood Glucose Level Within Targeted Range  8/1/2023 1731 by Abiodun Blankenship RN  Outcome: Ongoing, Progressing  8/1/2023 1729 by Abiodun Blankenship RN  Outcome: Ongoing, Progressing  8/1/2023 1727 by Abiodun Blankenship RN  Outcome: Ongoing, Progressing     Problem: Skin Injury Risk Increased  Goal: Skin Health and Integrity  8/1/2023 1731 by Abiodun Blankenship RN  Outcome: Ongoing, Progressing  8/1/2023 1729 by Abiodun Blankenship RN  Outcome: Ongoing, Progressing  8/1/2023 1727 by Abiodun Blankenship RN  Outcome: Ongoing, Progressing     Problem: Infection  Goal: Absence of Infection Signs and Symptoms  8/1/2023 1731 by Abiodun Blankenship RN  Outcome: Ongoing,  Progressing  8/1/2023 1729 by Abiodun Blankenship RN  Outcome: Ongoing, Progressing  8/1/2023 1727 by Abiodun Blankenship RN  Outcome: Ongoing, Progressing     Problem: Adjustment to Illness (Sepsis/Septic Shock)  Goal: Optimal Coping  8/1/2023 1731 by Abiodun Blankenship RN  Outcome: Ongoing, Progressing  8/1/2023 1729 by Abiodun Blankenship RN  Outcome: Ongoing, Progressing  8/1/2023 1727 by Abiodun Blankenship RN  Outcome: Ongoing, Progressing     Problem: Bleeding (Sepsis/Septic Shock)  Goal: Absence of Bleeding  8/1/2023 1731 by Abiodun Blankenship RN  Outcome: Ongoing, Progressing  8/1/2023 1729 by Abiodun Blankenship RN  Outcome: Ongoing, Progressing  8/1/2023 1727 by Abiodun Blankenship RN  Outcome: Ongoing, Progressing     Problem: Glycemic Control Impaired (Sepsis/Septic Shock)  Goal: Blood Glucose Level Within Desired Range  8/1/2023 1731 by Abiodun Blankenship RN  Outcome: Ongoing, Progressing  8/1/2023 1729 by Abiodun Blankenship RN  Outcome: Ongoing, Progressing  8/1/2023 1727 by Abiodun Blankenship RN  Outcome: Ongoing, Progressing     Problem: Infection Progression (Sepsis/Septic Shock)  Goal: Absence of Infection Signs and Symptoms  8/1/2023 1731 by Abiodun Blankenship RN  Outcome: Ongoing, Progressing  8/1/2023 1729 by Abiodun Blankenship RN  Outcome: Ongoing, Progressing  8/1/2023 1727 by Abiodun Blankenship RN  Outcome: Ongoing, Progressing     Problem: Nutrition Impaired (Sepsis/Septic Shock)  Goal: Optimal Nutrition Intake  8/1/2023 1731 by Abiodun Blankenship RN  Outcome: Ongoing, Progressing  8/1/2023 1729 by Abiodun Blankenship RN  Outcome: Ongoing, Progressing  8/1/2023 1727 by Abiodun Blankenship RN  Outcome: Ongoing, Progressing     Problem: Fluid and Electrolyte Imbalance (Acute Kidney Injury/Impairment)  Goal: Fluid and Electrolyte Balance  8/1/2023 1731 by Abiodun Blankenship RN  Outcome: Ongoing, Progressing  8/1/2023 1729 by Abiodun Blankenship RN  Outcome: Ongoing, Progressing  8/1/2023 1727 by Abiodun Blankenship RN  Outcome: Ongoing, Progressing     Problem: Oral Intake  Inadequate (Acute Kidney Injury/Impairment)  Goal: Optimal Nutrition Intake  8/1/2023 1731 by Abiodun Blankenship RN  Outcome: Ongoing, Progressing  8/1/2023 1729 by Abiodun Blankenship RN  Outcome: Ongoing, Progressing  8/1/2023 1727 by Abiodun Blankenship RN  Outcome: Ongoing, Progressing     Problem: Renal Function Impairment (Acute Kidney Injury/Impairment)  Goal: Effective Renal Function  8/1/2023 1731 by Abiodun Blankenship RN  Outcome: Ongoing, Progressing  8/1/2023 1729 by Abiodun Blankenship RN  Outcome: Ongoing, Progressing  8/1/2023 1727 by Abiodun Blankenship RN  Outcome: Ongoing, Progressing     Problem: Fall Injury Risk  Goal: Absence of Fall and Fall-Related Injury  8/1/2023 1731 by Abiodun Blankenship RN  Outcome: Ongoing, Progressing  8/1/2023 1729 by Abiodun Blankenship RN  Outcome: Ongoing, Progressing  8/1/2023 1727 by Abiodun Blankenship RN  Outcome: Ongoing, Progressing     Problem: Restraint, Nonbehavioral (Nonviolent)  Goal: Absence of Harm or Injury  8/1/2023 1731 by Abiodun Blankenship RN  Outcome: Ongoing, Progressing  8/1/2023 1729 by Abiodun Blankenship RN  Outcome: Ongoing, Progressing  8/1/2023 1727 by Abiodun Blankenship RN  Outcome: Ongoing, Progressing     Problem: Communication Impairment (Mechanical Ventilation, Invasive)  Goal: Effective Communication  8/1/2023 1731 by Abiodun Blankenship RN  Outcome: Ongoing, Progressing  8/1/2023 1729 by Abiodun Blankenship RN  Outcome: Ongoing, Progressing  8/1/2023 1727 by Abiodun Blankenship RN  Outcome: Ongoing, Progressing     Problem: Device-Related Complication Risk (Mechanical Ventilation, Invasive)  Goal: Optimal Device Function  8/1/2023 1731 by Abiodun Blankenship RN  Outcome: Ongoing, Progressing  8/1/2023 1729 by Abiodun Blankenship RN  Outcome: Ongoing, Progressing  8/1/2023 1727 by Abiodun Blankenship RN  Outcome: Ongoing, Progressing     Problem: Inability to Wean (Mechanical Ventilation, Invasive)  Goal: Mechanical Ventilation Liberation  8/1/2023 1731 by Abiodun Blankenship RN  Outcome: Ongoing, Progressing  8/1/2023  1729 by Abiodun Blankenship RN  Outcome: Ongoing, Progressing  8/1/2023 1727 by Abiodun Blankenship RN  Outcome: Ongoing, Progressing     Problem: Nutrition Impairment (Mechanical Ventilation, Invasive)  Goal: Optimal Nutrition Delivery  8/1/2023 1731 by Abiodun Blankenship RN  Outcome: Ongoing, Progressing  8/1/2023 1729 by Abiodun Blankenship RN  Outcome: Ongoing, Progressing  8/1/2023 1727 by Abiodun Blankenship RN  Outcome: Ongoing, Progressing     Problem: Skin and Tissue Injury (Mechanical Ventilation, Invasive)  Goal: Absence of Device-Related Skin and Tissue Injury  8/1/2023 1731 by Abiodun Blankenship RN  Outcome: Ongoing, Progressing  8/1/2023 1729 by Abiodun Blankenship RN  Outcome: Ongoing, Progressing  8/1/2023 1727 by Abiodun Blankenship RN  Outcome: Ongoing, Progressing     Problem: Ventilator-Induced Lung Injury (Mechanical Ventilation, Invasive)  Goal: Absence of Ventilator-Induced Lung Injury  8/1/2023 1731 by Abiodun Blankenship RN  Outcome: Ongoing, Progressing  8/1/2023 1729 by Abiodun Blankenship RN  Outcome: Ongoing, Progressing  8/1/2023 1727 by Abiodun Blankenship RN  Outcome: Ongoing, Progressing     Problem: Communication Impairment (Artificial Airway)  Goal: Effective Communication  8/1/2023 1731 by Abiodun Blankenship RN  Outcome: Ongoing, Progressing  8/1/2023 1729 by Abiodun Blankenship RN  Outcome: Ongoing, Progressing  8/1/2023 1727 by Abioudn Blankenship RN  Outcome: Ongoing, Progressing     Problem: Device-Related Complication Risk (Artificial Airway)  Goal: Optimal Device Function  8/1/2023 1731 by Abiodun Blankenship RN  Outcome: Ongoing, Progressing  8/1/2023 1729 by Abiodun Blankenship RN  Outcome: Ongoing, Progressing  8/1/2023 1727 by Abiodun Blankenship RN  Outcome: Ongoing, Progressing     Problem: Skin and Tissue Injury (Artificial Airway)  Goal: Absence of Device-Related Skin or Tissue Injury  8/1/2023 1731 by Abiodun Blankenship RN  Outcome: Ongoing, Progressing  8/1/2023 1729 by Abiodun Blankenship RN  Outcome: Ongoing, Progressing  8/1/2023 1727 by Abiodun  SONIA Blankenship  Outcome: Ongoing, Progressing     Problem: Noninvasive Ventilation Acute  Goal: Effective Unassisted Ventilation and Oxygenation  8/1/2023 1731 by Abiodun Blankenship RN  Outcome: Ongoing, Progressing  8/1/2023 1729 by Abiodun Blankenship RN  Outcome: Ongoing, Progressing  8/1/2023 1727 by Abiodun Blankenship RN  Outcome: Ongoing, Progressing     Problem: Aspiration (Enteral Nutrition)  Goal: Absence of Aspiration Signs and Symptoms  8/1/2023 1731 by Abiodun Blankenship RN  Outcome: Ongoing, Progressing  8/1/2023 1729 by Abiodun Blankenship RN  Outcome: Ongoing, Progressing  8/1/2023 1727 by Abiodun Blankenship RN  Outcome: Ongoing, Progressing     Problem: Device-Related Complication Risk (Enteral Nutrition)  Goal: Safe, Effective Therapy Delivery  8/1/2023 1731 by Abiodun Blankenship RN  Outcome: Ongoing, Progressing  8/1/2023 1729 by Abiodun Blankenship RN  Outcome: Ongoing, Progressing  8/1/2023 1727 by Abiodun Blankenship RN  Outcome: Ongoing, Progressing     Problem: Feeding Intolerance (Enteral Nutrition)  Goal: Feeding Tolerance  8/1/2023 1731 by Abiodun Blankenship RN  Outcome: Ongoing, Progressing  8/1/2023 1729 by Abiodun Blankenship RN  Outcome: Ongoing, Progressing  8/1/2023 1727 by Abiodun Blankenship RN  Outcome: Ongoing, Progressing     Problem: Impaired Wound Healing  Goal: Optimal Wound Healing  8/1/2023 1731 by Abiodun Blankenship RN  Outcome: Ongoing, Progressing  8/1/2023 1729 by Abiodun Blankenship RN  Outcome: Ongoing, Progressing  8/1/2023 1727 by Abiodun Blankenship RN  Outcome: Ongoing, Progressing

## 2023-08-01 NOTE — PT/OT/SLP PROGRESS
Occupational Therapy   Treatment    Name: Chanel Cervantes  MRN: [id number]  Admitting Diagnosis:  Septic shock  13 Days Post-Op    Recommendations:     Discharge Recommendations: rehabilitation facility  Discharge Equipment Recommendations:  to be determined by next level of care  Barriers to discharge:  Decreased caregiver support    Assessment:     Chanel Cervantes is a 67 y.o. female with a medical diagnosis of Septic shock.  Performance deficits affecting function are weakness, impaired endurance, impaired self care skills, impaired functional mobility, gait instability, impaired balance, pain, edema.     Pt seen for two sessions; she required Min A x 2 persons for bed mobility and stand-pivot t/f to bedside chair in AM; she required Mod A x 2 persons to stand and pivot back to bed from the chair later in the day.      Rehab Prognosis:  Good; patient would benefit from acute skilled OT services to address these deficits and reach maximum level of function.       Plan:     Patient to be seen 6 x/week to address the above listed problems via self-care/home management, therapeutic activities, therapeutic exercises  Plan of Care Expires: 08/25/23  Plan of Care Reviewed with: patient, daughter    Subjective     Pain/Comfort:  Pain Rating 1: 0/10  Pain Rating Post-Intervention 1: 0/10    Objective:     Communicated with: nurse prior to session.  Patient found supine with telemetry upon OT entry to room.    General Precautions: Standard, fall    Orthopedic Precautions:N/A  Braces: N/A     Occupational Performance:     Bed Mobility:    Patient completed Supine to Sit with minimum assistance and 2 persons  Patient completed Sit to Supine with moderate assistance and 2 persons     Functional Mobility/Transfers:  Patient completed Sit <> Stand Transfer with minimum assistance, moderate assistance, and of 2 persons  with  rolling walker     Treatment & Education:  Discussed potential DME needs if pt is to d/c home per  pt/daughter's request; pt will need HHOT to recommend bathroom modifications due to pt having an inaccessible tub    Patient left up in chair in AM and supine in PM with all lines intact, call button in reach, bed alarm on, and chair alarm on    GOALS:   Multidisciplinary Problems       Occupational Therapy Goals          Problem: Occupational Therapy    Goal Priority Disciplines Outcome Interventions   Occupational Therapy Goal     OT, PT/OT Ongoing, Progressing    Description: Goals to be met by: 8/25/2023     Patient will increase functional independence with ADLs by performing:    Feeding with [place].  UE Dressing with Modified [place].  LE Dressing with Modified [place].  Grooming while standing at sink with Modified [place].  Toileting from toilet with Modified [place] for hygiene and clothing management.   Toilet transfer to toilet with Modified [place].                         Time Tracking:     OT Date of Treatment: 08/01/23  OT Start Time: 1141  OT Stop Time: 1203  Returned from 1508 to 1524  OT Total Time (min): 22 + 16 + 38    Billable Minutes:Therapeutic Activity 38    OT/RUTHY: OT          8/1/2023

## 2023-08-01 NOTE — CARE UPDATE
07/31/23 2200   Patient Assessment/Suction   Level of Consciousness (AVPU) alert   Respiratory Effort Normal   PRE-TX-O2   Device (Oxygen Therapy) nasal cannula   $ Is the patient on Low Flow Oxygen? Yes   Flow (L/min) 2   SpO2 96 %   $ Pulse Oximetry - Multiple Charge Pulse Oximetry - Multiple   Pulse 65   Resp 16   Positioning HOB elevated 30 degrees   Aerosol Therapy   $ Aerosol Therapy Charges PRN treatment not required   Incentive Spirometer   $ Incentive Spirometer Charges done independently per patient   Vibratory PEP Therapy   $ Vibratory PEP Charges [employer] Therapy  (pt is using on her own)

## 2023-08-01 NOTE — PT/OT/SLP PROGRESS
Physical Therapy Treatment    Patient Name:  Chanel Cervantes   MRN:  [id number]    Recommendations:     Discharge Recommendations: rehabilitation facility  Discharge Equipment Recommendations: to be determined by next level of care  Barriers to discharge:  increased assist with mobility, decreased activity tolerance    Assessment:     Chanel Cervantes is a 67 y.o. female admitted with a medical diagnosis of Septic shock.  She presents with the following impairments/functional limitations: weakness, impaired endurance, impaired self care skills, impaired functional mobility, gait instability, impaired balance, decreased lower extremity function, impaired cardiopulmonary response to activity.    Pt agreeable to visit. Pt reports having cough and sounds very raspy. Pt required min assist for supine to sit transfer with verbal cuing for sequencing. Pt required min assist x 2 and RW for sit to stand transfer and step transfer from bed to chair. Pt educated on calling for assist when wanting to get up and expressed understanding.    Rehab Prognosis: Fair; patient would benefit from acute skilled PT services to address these deficits and reach maximum level of function.    Recent Surgery: Procedure(s) (LRB):  ERCP (ENDOSCOPIC RETROGRADE CHOLANGIOPANCREATOGRAPHY) (N/A) 13 Days Post-Op    Plan:     During this hospitalization, patient to be seen 6 x/week to address the identified rehab impairments via gait training, therapeutic activities, therapeutic exercises, neuromuscular re-education and progress toward the following goals:    Plan of Care Expires:  08/29/23    Subjective     Chief Complaint: pt concerned about having pneumonia due to her having cough  Patient/Family Comments/goals: to get better  Pain/Comfort:  Pain Rating 1: 0/10      Objective:     Communicated with RN prior to session.  Patient found HOB elevated with telemetry, PureWick, PICC line, oxygen upon PT entry to room.     General Precautions: Standard,  fall  Orthopedic Precautions: N/A  Braces: N/A  Respiratory Status: Nasal cannula, flow 2 L/min     Functional Mobility:  Bed Mobility:     Supine to Sit: minimum assistance  Transfers:     Sit to Stand:  minimum assistance and of 2 persons with rolling walker  Bed to Chair: minimum assistance and of 2 persons with  rolling walker  using  Step Transfer      AM-PAC 6 CLICK MOBILITY          Treatment & Education:  Pt educated on importance of time OOB, importance of intermittent mobility, safe techniques for transfers/ambulation, discharge recommendations/options, and use of call light for assistance and fall prevention.      Patient left up in chair with all lines intact, call button in reach, and RN notified..    GOALS:   Multidisciplinary Problems       Physical Therapy Goals          Problem: Physical Therapy    Goal Priority Disciplines Outcome Goal Variances Interventions   Physical Therapy Goal     PT, PT/OT Ongoing, Progressing     Description: Goals to be met by: 23     Patient will increase functional independence with mobility by performin. Supine to sit with Supervision  2. Sit to stand transfer with Supervision  3. Bed to chair transfer with Supervision using Rolling Walker  4. Gait  x 150 feet with Supervision using Rolling Walker.                              Time Tracking:     PT Received On: 23  PT Start Time: 1354     PT Stop Time: 1406  PT Total Time (min): 12 min     Billable Minutes: Therapeutic Activity 12    Treatment Type: Treatment  PT/PTA: PTA     Number of PTA visits since last PT visit: 3     2023   medical evaluation

## 2023-08-02 LAB
ANION GAP SERPL CALC-SCNC: 8 MMOL/L (ref 8–16)
BUN SERPL-MCNC: 22 MG/DL (ref 8–23)
CALCIUM SERPL-MCNC: 8.5 MG/DL (ref 8.7–10.5)
CHLORIDE SERPL-SCNC: 103 MMOL/L (ref 95–110)
CO2 SERPL-SCNC: 29 MMOL/L (ref 23–29)
CREAT SERPL-MCNC: 0.9 MG/DL (ref 0.5–1.4)
ERYTHROCYTE [DISTWIDTH] IN BLOOD BY AUTOMATED COUNT: 16.1 % (ref 11.5–14.5)
EST. GFR  (NO RACE VARIABLE): >60 ML/MIN/1.73 M^2
GLUCOSE SERPL-MCNC: 191 MG/DL (ref 70–110)
GLUCOSE SERPL-MCNC: 214 MG/DL (ref 70–110)
GLUCOSE SERPL-MCNC: 225 MG/DL (ref 70–110)
GLUCOSE SERPL-MCNC: 330 MG/DL (ref 70–110)
HCT VFR BLD AUTO: 32.3 % (ref 37–48.5)
HGB BLD-MCNC: 10.2 G/DL (ref 12–16)
MAGNESIUM SERPL-MCNC: 1.3 MG/DL (ref 1.6–2.6)
MCH RBC QN AUTO: 26.5 PG (ref 27–31)
MCHC RBC AUTO-ENTMCNC: 31.6 G/DL (ref 32–36)
MCV RBC AUTO: 84 FL (ref 82–98)
PLATELET # BLD AUTO: 403 K/UL (ref 150–450)
PMV BLD AUTO: 11.1 FL (ref 9.2–12.9)
POTASSIUM SERPL-SCNC: 3.6 MMOL/L (ref 3.5–5.1)
RBC # BLD AUTO: 3.85 M/UL (ref 4–5.4)
SODIUM SERPL-SCNC: 140 MMOL/L (ref 136–145)
WBC # BLD AUTO: 6.41 K/UL (ref 3.9–12.7)

## 2023-08-02 PROCEDURE — 25000003 PHARM REV CODE 250: Performed by: INTERNAL MEDICINE

## 2023-08-02 PROCEDURE — 97116 GAIT TRAINING THERAPY: CPT

## 2023-08-02 PROCEDURE — 80048 BASIC METABOLIC PNL TOTAL CA: CPT | Performed by: STUDENT IN AN ORGANIZED HEALTH CARE EDUCATION/TRAINING PROGRAM

## 2023-08-02 PROCEDURE — 83735 ASSAY OF MAGNESIUM: CPT | Performed by: INTERNAL MEDICINE

## 2023-08-02 PROCEDURE — 94664 DEMO&/EVAL PT USE INHALER: CPT

## 2023-08-02 PROCEDURE — 12000002 HC ACUTE/MED SURGE SEMI-PRIVATE ROOM

## 2023-08-02 PROCEDURE — 63600175 PHARM REV CODE 636 W HCPCS: Performed by: STUDENT IN AN ORGANIZED HEALTH CARE EDUCATION/TRAINING PROGRAM

## 2023-08-02 PROCEDURE — 97530 THERAPEUTIC ACTIVITIES: CPT

## 2023-08-02 PROCEDURE — 94761 N-INVAS EAR/PLS OXIMETRY MLT: CPT

## 2023-08-02 PROCEDURE — 36415 COLL VENOUS BLD VENIPUNCTURE: CPT | Performed by: INTERNAL MEDICINE

## 2023-08-02 PROCEDURE — 85027 COMPLETE CBC AUTOMATED: CPT | Performed by: STUDENT IN AN ORGANIZED HEALTH CARE EDUCATION/TRAINING PROGRAM

## 2023-08-02 PROCEDURE — 27000221 HC OXYGEN, UP TO 24 HOURS

## 2023-08-02 PROCEDURE — 25000003 PHARM REV CODE 250: Performed by: STUDENT IN AN ORGANIZED HEALTH CARE EDUCATION/TRAINING PROGRAM

## 2023-08-02 PROCEDURE — 99900035 HC TECH TIME PER 15 MIN (STAT)

## 2023-08-02 PROCEDURE — 97535 SELF CARE MNGMENT TRAINING: CPT

## 2023-08-02 RX ORDER — ALPRAZOLAM 0.5 MG/1
0.5 TABLET ORAL NIGHTLY
Status: DISCONTINUED | OUTPATIENT
Start: 2023-08-03 | End: 2023-08-03 | Stop reason: HOSPADM

## 2023-08-02 RX ORDER — MAGNESIUM SULFATE HEPTAHYDRATE 40 MG/ML
2 INJECTION, SOLUTION INTRAVENOUS ONCE
Status: COMPLETED | OUTPATIENT
Start: 2023-08-02 | End: 2023-08-02

## 2023-08-02 RX ORDER — HYDROCODONE POLISTIREX AND CHLORPHENIRAMINE POLISTIREX 10; 8 MG/5ML; MG/5ML
5 SUSPENSION, EXTENDED RELEASE ORAL NIGHTLY
Status: DISCONTINUED | OUTPATIENT
Start: 2023-08-02 | End: 2023-08-03 | Stop reason: HOSPADM

## 2023-08-02 RX ADMIN — POTASSIUM CHLORIDE 20 MEQ: 1500 TABLET, EXTENDED RELEASE ORAL at 09:08

## 2023-08-02 RX ADMIN — HYDROCODONE POLISTIREX AND CHLORPHENIRAMINE POLISTIREX 5 ML: 10; 8 SUSPENSION, EXTENDED RELEASE ORAL at 09:08

## 2023-08-02 RX ADMIN — CHLORHEXIDINE GLUCONATE 15 ML: 1.2 RINSE ORAL at 09:08

## 2023-08-02 RX ADMIN — ACETAMINOPHEN 650 MG: 325 TABLET ORAL at 09:08

## 2023-08-02 RX ADMIN — GUAIFENESIN 600 MG: 600 TABLET, EXTENDED RELEASE ORAL at 09:08

## 2023-08-02 RX ADMIN — FUROSEMIDE 40 MG: 10 INJECTION, SOLUTION INTRAVENOUS at 09:08

## 2023-08-02 RX ADMIN — INSULIN ASPART 5 UNITS: 100 INJECTION, SOLUTION INTRAVENOUS; SUBCUTANEOUS at 11:08

## 2023-08-02 RX ADMIN — ENOXAPARIN SODIUM 40 MG: 100 INJECTION SUBCUTANEOUS at 05:08

## 2023-08-02 RX ADMIN — MAGNESIUM SULFATE 2 G: 2 INJECTION INTRAVENOUS at 09:08

## 2023-08-02 RX ADMIN — ALPRAZOLAM 0.5 MG: 0.5 TABLET ORAL at 09:08

## 2023-08-02 RX ADMIN — HYDRALAZINE HYDROCHLORIDE 100 MG: 25 TABLET ORAL at 03:08

## 2023-08-02 RX ADMIN — METOPROLOL SUCCINATE 100 MG: 50 TABLET, FILM COATED, EXTENDED RELEASE ORAL at 09:08

## 2023-08-02 RX ADMIN — HYDRALAZINE HYDROCHLORIDE 100 MG: 25 TABLET ORAL at 05:08

## 2023-08-02 RX ADMIN — LEVOTHYROXINE SODIUM 75 MCG: 0.03 TABLET ORAL at 05:08

## 2023-08-02 RX ADMIN — AMLODIPINE BESYLATE 10 MG: 5 TABLET ORAL at 09:08

## 2023-08-02 NOTE — ASSESSMENT & PLAN NOTE
Required intubation on hospital day 3.  Patient was successfully extubated on July 24, 2023.  Follow Pulmonary Medicine recommendations.  Shortness of the breath significantly better  Continue mucus clearance  Check DVT u/s > no signs of DVT  Adjust DuoNebs  Added mucus clearance therapy  seems to be helping

## 2023-08-02 NOTE — PT/OT/SLP PROGRESS
Occupational Therapy   Treatment    Name: Chanel Cervantes  MRN: [id number]  Admitting Diagnosis:  Septic shock  14 Days Post-Op    Recommendations:     Discharge Recommendations: rehabilitation facility  Discharge Equipment Recommendations:  to be determined by next level of care  Barriers to discharge:  Decreased caregiver support    Assessment:     Chanel Cervantes is a 67 y.o. female with a medical diagnosis of Septic shock.  Performance deficits affecting function are weakness, impaired endurance, impaired self care skills, impaired functional mobility, gait instability, impaired balance, pain, edema, decreased safety awareness, impaired cardiopulmonary response to activity.     Rehab Prognosis:  Good; patient would benefit from acute skilled OT services to address these deficits and reach maximum level of function.       Plan:     Patient to be seen 6 x/week to address the above listed problems via self-care/home management, therapeutic activities, therapeutic exercises  Plan of Care Expires: 08/25/23  Plan of Care Reviewed with: patient    Subjective     Pain/Comfort:  Pain Rating 1: 0/10  Pain Rating Post-Intervention 1: 0/10    Objective:     Communicated with: nurse prior to session.  Patient found HOB elevated with peripheral IV, PureWick, telemetry, oxygen upon OT entry to room.    General Precautions: Standard, fall    Orthopedic Precautions:N/A  Braces: N/A     Occupational Performance:     Bed Mobility:    Patient completed Supine to Sit with minimum assistance  Patient completed Sit to Supine with minimum assistance   Patient completed rolling bilaterally with minimum assistance using side rails    Functional Mobility/Transfers:  Patient completed Sit <> Stand Transfer with moderate assistance  with  rolling walker   Patient completed Toilet Transfer Stand Pivot technique with moderate assistance with  rolling walker and bedside commode; tech present for line management    Activities of Daily  Living:  Toileting: maximum assistance for hygiene/clothing management; completed on bedside commode  Lower Body Dressing: total assistance to doff/don brief     Patient left HOB elevated with all lines intact, call button in reach, and bed alarm on    GOALS:   Multidisciplinary Problems       Occupational Therapy Goals          Problem: Occupational Therapy    Goal Priority Disciplines Outcome Interventions   Occupational Therapy Goal     OT, PT/OT Ongoing, Progressing    Description: Goals to be met by: 8/25/2023     Patient will increase functional independence with ADLs by performing:    Feeding with [place].  UE Dressing with Modified [place].  LE Dressing with Modified [place].  Grooming while standing at sink with Modified [place].  Toileting from toilet with Modified [place] for hygiene and clothing management.   Toilet transfer to toilet with Modified [place].                         Time Tracking:     OT Date of Treatment: 08/02/23  OT Start Time: 1432  OT Stop Time: 1501  OT Total Time (min): 29 min    Billable Minutes:Self Care/Home Management 15  Therapeutic Activity 14    OT/RUTHY: OT          8/2/2023

## 2023-08-02 NOTE — SUBJECTIVE & OBJECTIVE
Interval History:  No acute events overnight. She has been denied placement by her insurance. Pending appeal    Review of Systems   All other systems reviewed and are negative.    Objective:     Vital Signs (Most Recent):  Temp: 97.9 °F (36.6 °C) (08/02/23 1129)  Pulse: 64 (08/02/23 1129)  Resp: 18 (08/02/23 1129)  BP: (!) 107/56 (08/02/23 1129)  SpO2: 97 % (08/02/23 1254) Vital Signs (24h Range):  Temp:  [97.8 °F (36.6 °C)-98.5 °F (36.9 °C)] 97.9 °F (36.6 °C)  Pulse:  [64-86] 64  Resp:  [16-18] 18  SpO2:  [96 %-98 %] 97 %  BP: (107-181)/(56-88) 107/56     Weight: 109 kg (240 lb 4.8 oz)  Body mass index is 37.64 kg/m².    Intake/Output Summary (Last 24 hours) at 8/2/2023 1338  Last data filed at 8/2/2023 1137  Gross per 24 hour   Intake 1200 ml   Output 1800 ml   Net -600 ml           Physical Exam  Constitutional:       Appearance: Normal appearance. She is normal weight.   HENT:      Head: Normocephalic and atraumatic.      Nose: Nose normal.      Mouth/Throat:      Mouth: Mucous membranes are moist.   Eyes:      Conjunctiva/sclera: Conjunctivae normal.      Pupils: Pupils are equal, round, and reactive to light.   Cardiovascular:      Rate and Rhythm: Normal rate and regular rhythm.      Pulses: Normal pulses.      Heart sounds: Normal heart sounds. No murmur heard.     No friction rub. No gallop.   Pulmonary:      Effort: Pulmonary effort is normal.      Breath sounds: No wheezing, rhonchi or rales.      Comments: Clear breath sounds today, no stridor. No wheezing  Abdominal:      General: Abdomen is flat. Bowel sounds are normal. There is no distension.      Palpations: Abdomen is soft.      Tenderness: There is no abdominal tenderness. There is no guarding.   Musculoskeletal:         General: No swelling. Normal range of motion.      Cervical back: Normal range of motion and neck supple.      Right lower leg: Edema present.      Left lower leg: Edema present.   Skin:     General: Skin is warm and dry.    Neurological:      General: No focal deficit present.      Mental Status: She is alert.   Psychiatric:         Mood and Affect: Mood normal.         Thought Content: Thought content normal.         Judgment: Judgment normal.             Significant Labs: All pertinent labs within the past 24 hours have been reviewed.    Significant Imaging: I have reviewed all pertinent imaging results/findings within the past 24 hours.

## 2023-08-02 NOTE — CONSULTS
Slight peeling pink denuded bilateral buttock, using purewick for incontinence thin layer of [place] applied    Left posterior lateral leg bruising    Right lateral leg small amount of bruising, no open wounds

## 2023-08-02 NOTE — PLAN OF CARE
Problem: Adult Inpatient Plan of Care  Goal: Plan of Care Review  Outcome: Ongoing, Progressing  Goal: Absence of Hospital-Acquired Illness or Injury  Outcome: Ongoing, Progressing  Goal: Readiness for Transition of Care  Outcome: Ongoing, Progressing     Problem: Diabetes Comorbidity  Goal: Blood Glucose Level Within Targeted Range  Outcome: Ongoing, Progressing     Problem: Skin Injury Risk Increased  Goal: Skin Health and Integrity  Outcome: Ongoing, Progressing     Problem: Adjustment to Illness (Sepsis/Septic Shock)  Goal: Optimal Coping  Outcome: Ongoing, Progressing

## 2023-08-02 NOTE — PLAN OF CARE
CM received phone call from Dulce with [employer] appeals department ([phone]).  Dulce stated the appeal was received and they have 72 hours to review and provide an answer.  Per Dulce, everything they needed was [place] from [place] [facility] and they would contact CM if they need anything else.  CM awaiting appeal response at this time.     13:12- Dominique with [employer] spoke to CM via telephone requesting most recent PT/OT notes.  PT/OT notes faxed to Dominique at [phone].       08/02/23 1029   Post-Acute Status   Post-Acute Authorization Placement   Post-Acute Placement Status Pending payor review/awaiting authorization (if required)   Patient choice form signed by patient/caregiver List with quality metrics by geographic area provided   Discharge Delays (!) Payor Issues   Discharge Plan   Discharge Plan A Rehab   Discharge Plan B Rehab

## 2023-08-02 NOTE — CARE UPDATE
08/02/23 0800   Patient Assessment/Suction   Level of Consciousness (AVPU) alert   Respiratory Effort Normal;Unlabored   PRE-TX-O2   Device (Oxygen Therapy) nasal cannula   Flow (L/min) 2   SpO2 96 %   Vibratory PEP Therapy   $ Vibratory PEP Charges Aerobika Therapy   $ Vibratory PEP Tech Time Charges 15 min   Daily Review of Necessity (PEP Therapy) completed   Type (PEP Therapy) vibratory/oscillatory   Device (PEP Therapy) flutter   Route (PEP Therapy) instruction provided, follow-up   Breaths per Cycle (PEP Therapy) 10   Cycles (PEP Therapy) 1   Settings (PEP Therapy) PEP 5   Patient Position (PEP Therapy) HOB elevated   Post Treatment Assessment (PEP) breath sounds improved   Signs of Intolerance (PEP Therapy) none

## 2023-08-02 NOTE — CARE UPDATE
08/01/23 2200   Patient Assessment/Suction   Level of Consciousness (AVPU) alert   Respiratory Effort Normal   PRE-TX-O2   Device (Oxygen Therapy) nasal cannula   $ Is the patient on Low Flow Oxygen? Yes   Flow (L/min) 2   Oxygen Analyzed Concentration (%) 96 %   Pulse Oximetry Type Intermittent   $ Pulse Oximetry - Multiple Charge Pulse Oximetry - Multiple   Oximetry Probe Site Assessed   Pulse 76   Resp 16   Positioning HOB elevated 30 degrees   Aerosol Therapy   $ Aerosol Therapy Charges PRN treatment not required   Vibratory PEP Therapy   $ Vibratory PEP Charges Aerobika Therapy  (pt is using on her own)

## 2023-08-02 NOTE — PT/OT/SLP PROGRESS
Physical Therapy Treatment    Patient Name:  Chanel Cervantes   MRN:  [id number]    Recommendations:     Discharge Recommendations: rehabilitation facility  Discharge Equipment Recommendations: to be determined by next level of care  Barriers to discharge: None    Assessment:     Chanel Cervantes is a 67 y.o. female admitted with a medical diagnosis of Septic shock.  She presents with the following impairments/functional limitations: weakness, impaired endurance, impaired self care skills, impaired functional mobility, gait instability, impaired balance, decreased lower extremity function, decreased safety awareness, pain, impaired cardiopulmonary response to activity. Patient sitting up in chair upon entering the room and is agreeable to participation with PT treatment. She reports her bottom is hurting and requests assistance returning to bed. She requires ModA x2 for sit to stand transfer from chair with VC for hand placement and sequencing with RW. She performed a step transfer from chair to EOB with RW and Min-ModA. She endorses fatigue and declines further ambulation. She performs sit to supine with Joan for LEs. Bed alarm on, daughter present, and PCT notified that patient is requesting purewick.     Rehab Prognosis: Good; patient would benefit from acute skilled PT services to address these deficits and reach maximum level of function.    Recent Surgery: Procedure(s) (LRB):  ERCP (ENDOSCOPIC RETROGRADE CHOLANGIOPANCREATOGRAPHY) (N/A) 14 Days Post-Op    Plan:     During this hospitalization, patient to be seen 6 x/week to address the identified rehab impairments via gait training, therapeutic activities, therapeutic exercises, neuromuscular re-education and progress toward the following goals:    Plan of Care Expires:  08/29/23    Subjective     Chief Complaint: fatigue  Patient/Family Comments/goals: get back to bed   Pain/Comfort:  Pain Rating 1:  (not rated)  Location 1: sacral spine  Pain Addressed 1:  Reposition, Distraction      Objective:     Communicated with SONIA Jasmine prior to session.  Patient found up in chair with oxygen, peripheral IV, telemetry upon PT entry to room.     General Precautions: Standard, fall, respiratory  Orthopedic Precautions: N/A  Braces: N/A  Respiratory Status: Nasal cannula, flow 2 L/min     Functional Mobility:  Bed Mobility:     Sit to Supine: minimum assistance  Transfers:     Sit to Stand:  moderate assistance and of 2 persons with rolling walker  Chair to bed: minimal-moderate assistance with  rolling walker  using  Step Transfer      AM-PAC 6 CLICK MOBILITY  Turning over in bed (including adjusting bedclothes, sheets and blankets)?: 2  Sitting down on and standing up from a chair with arms (e.g., wheelchair, bedside commode, etc.): 2  Moving from lying on back to sitting on the side of the bed?: 2  Moving to and from a bed to a chair (including a wheelchair)?: 3  Need to walk in hospital room?: 2  Climbing 3-5 steps with a railing?: 1  Basic Mobility Total Score: 12       Treatment & Education:  Patient was educated on the importance of OOB activity and functional mobility to negate negative effects of prolonged bed rest during hospitalization, safe transfers and ambulation, activity as tolerated, and D/C planning     Patient left HOB elevated with all lines intact, call button in reach, bed alarm on, PCT notified, and daughter present..    GOALS:   Multidisciplinary Problems       Physical Therapy Goals          Problem: Physical Therapy    Goal Priority Disciplines Outcome Goal Variances Interventions   Physical Therapy Goal     PT, PT/OT Ongoing, Progressing     Description: Goals to be met by: 23     Patient will increase functional independence with mobility by performin. Supine to sit with Supervision  2. Sit to stand transfer with Supervision  3. Bed to chair transfer with Supervision using Rolling Walker  4. Gait  x 150 feet with Supervision using Rolling  Walker.                              Time Tracking:     PT Received On: 08/02/23  PT Start Time: 1115     PT Stop Time: 1130  PT Total Time (min): 15 min     Billable Minutes: Gait Training 15    Treatment Type: Treatment  PT/PTA: PT     Number of PTA visits since last PT visit: 0     08/02/2023

## 2023-08-02 NOTE — PROGRESS NOTES
[facility] Medicine  Progress Note    Patient Name: Chaenl Cervantes  MRN: [id number]  Patient Class: IP- Inpatient   Admission Date: 7/18/2023  Length of Stay: 14 days  Attending Physician: Kartik Farris MD  Primary Care Provider: ARIC Almaguer        Subjective:     Principal Problem:Septic shock        HPI:  Chanel Cervantes is a 67 y.o. White female   With PMH of DM2, HTN, bariatric surgery,  Remote cholecystectomy,  Frequent UTIs with kidney stones,  who presents with back pain.  Admitted for pancreatitis with elevated LFTs     Pt is currently confused after receiving ativan  (ER gave it to calm her for CT scan)  History taken from family at bedside     Onset of back pain overnight  Located b/l lumbar region  Radiating to b/l upper quadrants of abdomen  Occurring intermittently  Progressively worsening  Severe, causes SOB when occurring     Initially pt thought pain was due to a fall yesterday  (Mechanical, tripped over a rug, no head trauma)  +nausea, no vomiting  +intermittent chills  They are not sure about objective fever     Has had similar abdominal pain previously  Per family, this has been attributed to her ongoing ventral wall hernia  They don't bring her to ER for this usually  However pain today was much more severe than usual      Overview/Hospital Course:  Chanel Cervantes is a 67 year old female with a past medical history of obesity, ventral hernia, DM, HTN, anemia, CAD, NIKOLAS, and hypothyroidism who presented with septic shock secondary to a possible cholangitis with E coli bacteremia. Vancomycin was discontinued and she remained on meropenem. BP was maintained on a Levophed infusion. GI was consulted and performed ERCP which showed biliary sludge with a sludge ball dilating the main duct which was removed; a biliary sphincterotomy was also performed. Repeat blood cultures were negative. She was on IV fluids with LR and was NPO as she was encephalopathic. She also had  an ANTHONY as well. She also had acute hypoxic respiratory failure for which she was put on mask O2. She also had demand ischemia; TTE was done and troponin was trended. There was shock liver superimposed on the hepatocellular injury brought on by the acute cholangitis. Pulmonary/Critical Care was consulted given the patient's medical acuity.  Pulmonologist made decision to intubate patient based on patient's deep encephalopathy and inability to protect her airway.  Patient underwent transesophageal echocardiography which did not report any intracardiac thrombus or evidence of endocarditis.  Patient was successfully extubated on July 24, 2023. PT and OT is working with patient. Patient is transferred to medicine floor for further management and rehab.  working on LTAC placement.      Interval History:  No acute events overnight. She has been denied placement by her insurance. Pending appeal    Review of Systems   All other systems reviewed and are negative.    Objective:     Vital Signs (Most Recent):  Temp: 97.9 °F (36.6 °C) (08/02/23 1129)  Pulse: 64 (08/02/23 1129)  Resp: 18 (08/02/23 1129)  BP: (!) 107/56 (08/02/23 1129)  SpO2: 97 % (08/02/23 1254) Vital Signs (24h Range):  Temp:  [97.8 °F (36.6 °C)-98.5 °F (36.9 °C)] 97.9 °F (36.6 °C)  Pulse:  [64-86] 64  Resp:  [16-18] 18  SpO2:  [96 %-98 %] 97 %  BP: (107-181)/(56-88) 107/56     Weight: 109 kg (240 lb 4.8 oz)  Body mass index is 37.64 kg/m².    Intake/Output Summary (Last 24 hours) at 8/2/2023 1338  Last data filed at 8/2/2023 1137  Gross per 24 hour   Intake 1200 ml   Output 1800 ml   Net -600 ml           Physical Exam  Constitutional:       Appearance: Normal appearance. She is normal weight.   HENT:      Head: Normocephalic and atraumatic.      Nose: Nose normal.      Mouth/Throat:      Mouth: Mucous membranes are moist.   Eyes:      Conjunctiva/sclera: Conjunctivae normal.      Pupils: Pupils are equal, round, and reactive to light.  "  Cardiovascular:      Rate and Rhythm: Normal rate and regular rhythm.      Pulses: Normal pulses.      Heart sounds: Normal heart sounds. No murmur heard.     No friction rub. No gallop.   Pulmonary:      Effort: Pulmonary effort is normal.      Breath sounds: No wheezing, rhonchi or rales.      Comments: Clear breath sounds today, no stridor. No wheezing  Abdominal:      General: Abdomen is flat. Bowel sounds are normal. There is no distension.      Palpations: Abdomen is soft.      Tenderness: There is no abdominal tenderness. There is no guarding.   Musculoskeletal:         General: No swelling. Normal range of motion.      Cervical back: Normal range of motion and neck supple.      Right lower leg: Edema present.      Left lower leg: Edema present.   Skin:     General: Skin is warm and dry.   Neurological:      General: No focal deficit present.      Mental Status: She is alert.   Psychiatric:         Mood and Affect: Mood normal.         Thought Content: Thought content normal.         Judgment: Judgment normal.             Significant Labs: All pertinent labs within the past 24 hours have been reviewed.    Significant Imaging: I have reviewed all pertinent imaging results/findings within the past 24 hours.      Assessment/Plan:      * Septic shock due to Entercoccus faecium and E.coli  Secondary to acute cholangitis  - completed abx on 8/1  -Enterococcus bacteremia now cleared  -S/p ERCP    Antibiotics (From admission, onward)    Start     Stop Route Frequency Ordered    07/21/23 1400  DAPTOmycin (CUBICIN) 805 mg in sodium chloride 0.9% SolP 50 mL IVPB         -- IV Every 48 hours (non-standard times) 07/20/23 0749    07/19/23 1300  meropenem 1 g in sodium chloride 0.9 % 100 mL IVPB (ready to mix system)        Note to Pharmacy: Ht: 5' 7" (1.702 m)  Wt: 108.9 kg (240 lb)  Estimated Creatinine Clearance: 63.1 mL/min (based on SCr of 1.1 mg/dL).  Body mass index is 37.59 kg/m².    -- IV Every 12 hours " (non-standard times) 07/19/23 0740              Constipation  resolved      Gallstone pancreatitis  Present on hospital day 1.  Continue vasopressor support and crystalloid.      Encephalopathy, metabolic  Metabolic in setting of septic shock.  -assess mental status when off sedation  -Treat septic shock      Shock liver  - LFTs improved    Lab Results   Component Value Date    ALT 51 (H) 07/27/2023    AST 21 07/27/2023    GGT 24 05/17/2018    ALKPHOS 171 (H) 07/27/2023    BILITOT 0.8 07/27/2023         ANTHONY (acute kidney injury)  In setting of septic shock.  -appreciate nephrology recommendations  -Renally dose medications  -ANTHONY has now resolved status post ATN due to septic shock      Demand ischemia  History of CAD s/p CABG. No acute ST changes on EKG.  -check second trop level  -Telemetry  -cardiology consulting -- their opinion is that the troponin is high b/c of myocardial demand ischemia    Troponin I High Sensitivity   Date Value Ref Range Status   07/28/2023 33.6 (H) 0.0 - 14.9 pg/mL Final     Comment:     Troponin results differ between methods. Do not use   results between Troponin methods interchangeably.    Alkaline Phospatase levels above 400 U/L may   cause false positive results.    Access hsTnI should not be used for patients taking   Asfotase anya (Strensiq).     07/28/2023 33.6 (H) 0.0 - 14.9 pg/mL Final     Comment:     Troponin results differ between methods. Do not use   results between Troponin methods interchangeably.    Alkaline Phospatase levels above 400 U/L may   cause false positive results.    Access hsTnI should not be used for patients taking   Asfotase anya (Strensiq).     07/20/2023 2384.3 (HH) 0.0 - 14.9 pg/mL Final     Comment:     Troponin results differ between methods. Do not use   results between Troponin methods interchangeably.    Alkaline Phospatase levels above 400 U/L may   cause false positive results.    Access hsTnI should not be used for patients taking   Asfotase anya  (Strensiq).  Troponin critical result(s) called and verbal readback obtained   from [place] Avila RN M3 by MS1 07/20/2023 04:54         Acute hypoxemic respiratory failure  Required intubation on hospital day 3.  Patient was successfully extubated on July 24, 2023.  Follow Pulmonary Medicine recommendations.  Shortness of the breath significantly better  Continue mucus clearance  Check DVT u/s > no signs of DVT  Adjust DuoNebs  Added mucus clearance therapy  seems to be helping        Anemia  Stable.  -Trend Hgb with CBC    Hemoglobin   Date Value Ref Range Status   08/01/2023 10.4 (L) 12.0 - 16.0 g/dL Final   07/31/2023 10.3 (L) 12.0 - 16.0 g/dL Final           Obesity (BMI 30-39.9)  Body mass index is 37.6 kg/m². Morbid obesity complicates all aspects of disease management from diagnostic modalities to treatment.       Hypothyroidism  TSH unremarkable.  It's 0.470.  -Continue Synthroid      Acute obstructive cholangitis  Sludge ball removed via ERCP. Likely source of bacteremia. LFTs much improved.  -appreciate ID recommendations, treatment of E coli and Enterococcus as noted above  -GI following  -IV fluids  - blood cultures:  Entercoccus faecium and E.coli  -Trend LFTs      CAD (coronary artery disease)  -Hold home medications  -Telemetry      Hypokalemia  -Trend K.  Has improved.  -Replete K PRN  -Telemetry    Potassium   Date Value Ref Range Status   07/23/2023 3.8 3.5 - 5.1 mmol/L Final   07/22/2023 4.0 3.5 - 5.1 mmol/L Final         S/P bariatric surgery  Noted.      NIKOLAS on CPAP  -chronic condition.  - needs nightly CPAP    Type 2 diabetes mellitus treated with insulin  Patient's FSGs are controlled on current medication regimen.  Last A1c reviewed-   Lab Results   Component Value Date    HGBA1C 6.9 (H) 07/18/2023     'Current correctional scale  Medium  Maintain anti-hyperglycemic dose as follows-   Antihyperglycemics (From admission, onward)    Start     Stop Route Frequency Ordered    07/19/23 1018   insulin aspart U-100 pen 1-10 Units         -- SubQ Every 6 hours PRN 07/19/23 0920        Hold Oral hypoglycemics while patient is in the hospital.    Benign essential hypertension  - resumed home medications  - blood pressure improving      VTE Risk Mitigation (From admission, onward)         Ordered     enoxaparin injection 40 mg  Every 24 hours (non-standard times)         07/29/23 1637     IP VTE HIGH RISK PATIENT  Once         07/18/23 1642     Place sequential compression device  Until discontinued         07/18/23 1642                Discharge Planning   LUIS ALFREDO: 8/2/2023     Code Status: Full Code   Is the patient medically ready for discharge?:     Reason for patient still in hospital (select all that apply): Treatment  Discharge Plan A: Rehab   Discharge Delays: (!) Payor Issues              Kartik Farris MD  Department of Hospital Medicine   [facility]

## 2023-08-02 NOTE — ASSESSMENT & PLAN NOTE
"Secondary to acute cholangitis  - completed abx on 8/1  -Enterococcus bacteremia now cleared  -S/p ERCP    Antibiotics (From admission, onward)    Start     Stop Route Frequency Ordered    07/21/23 1400  DAPTOmycin (CUBICIN) 805 mg in sodium chloride 0.9% SolP 50 mL IVPB         -- IV Every 48 hours (non-standard times) 07/20/23 0749    07/19/23 1300  meropenem 1 g in sodium chloride 0.9 % 100 mL IVPB (ready to mix system)        Note to Pharmacy: Ht: 5' 7" (1.702 m)  Wt: 108.9 kg (240 lb)  Estimated Creatinine Clearance: 63.1 mL/min (based on SCr of 1.1 mg/dL).  Body mass index is 37.59 kg/m².    -- IV Every 12 hours (non-standard times) 07/19/23 0740            "

## 2023-08-03 VITALS
RESPIRATION RATE: 18 BRPM | HEART RATE: 67 BPM | DIASTOLIC BLOOD PRESSURE: 64 MMHG | HEIGHT: 67 IN | BODY MASS INDEX: 37.72 KG/M2 | OXYGEN SATURATION: 93 % | WEIGHT: 240.31 LBS | SYSTOLIC BLOOD PRESSURE: 141 MMHG | TEMPERATURE: 99 F

## 2023-08-03 LAB
ANION GAP SERPL CALC-SCNC: 6 MMOL/L (ref 8–16)
BUN SERPL-MCNC: 16 MG/DL (ref 8–23)
CALCIUM SERPL-MCNC: 8.6 MG/DL (ref 8.7–10.5)
CHLORIDE SERPL-SCNC: 101 MMOL/L (ref 95–110)
CO2 SERPL-SCNC: 31 MMOL/L (ref 23–29)
CREAT SERPL-MCNC: 0.6 MG/DL (ref 0.5–1.4)
ERYTHROCYTE [DISTWIDTH] IN BLOOD BY AUTOMATED COUNT: 16.1 % (ref 11.5–14.5)
EST. GFR  (NO RACE VARIABLE): >60 ML/MIN/1.73 M^2
GLUCOSE SERPL-MCNC: 183 MG/DL (ref 70–110)
GLUCOSE SERPL-MCNC: 194 MG/DL (ref 70–110)
GLUCOSE SERPL-MCNC: 249 MG/DL (ref 70–110)
GLUCOSE SERPL-MCNC: 281 MG/DL (ref 70–110)
HCT VFR BLD AUTO: 32.3 % (ref 37–48.5)
HGB BLD-MCNC: 9.9 G/DL (ref 12–16)
MAGNESIUM SERPL-MCNC: 1.6 MG/DL (ref 1.6–2.6)
MCH RBC QN AUTO: 26.2 PG (ref 27–31)
MCHC RBC AUTO-ENTMCNC: 30.7 G/DL (ref 32–36)
MCV RBC AUTO: 85 FL (ref 82–98)
PLATELET # BLD AUTO: 399 K/UL (ref 150–450)
PMV BLD AUTO: 11.1 FL (ref 9.2–12.9)
POTASSIUM SERPL-SCNC: 3.5 MMOL/L (ref 3.5–5.1)
RBC # BLD AUTO: 3.78 M/UL (ref 4–5.4)
SODIUM SERPL-SCNC: 138 MMOL/L (ref 136–145)
WBC # BLD AUTO: 6.36 K/UL (ref 3.9–12.7)

## 2023-08-03 PROCEDURE — 83735 ASSAY OF MAGNESIUM: CPT | Performed by: INTERNAL MEDICINE

## 2023-08-03 PROCEDURE — 36415 COLL VENOUS BLD VENIPUNCTURE: CPT | Performed by: INTERNAL MEDICINE

## 2023-08-03 PROCEDURE — 99900035 HC TECH TIME PER 15 MIN (STAT)

## 2023-08-03 PROCEDURE — 27000221 HC OXYGEN, UP TO 24 HOURS

## 2023-08-03 PROCEDURE — 80048 BASIC METABOLIC PNL TOTAL CA: CPT | Performed by: STUDENT IN AN ORGANIZED HEALTH CARE EDUCATION/TRAINING PROGRAM

## 2023-08-03 PROCEDURE — 85027 COMPLETE CBC AUTOMATED: CPT | Performed by: STUDENT IN AN ORGANIZED HEALTH CARE EDUCATION/TRAINING PROGRAM

## 2023-08-03 PROCEDURE — 97116 GAIT TRAINING THERAPY: CPT

## 2023-08-03 PROCEDURE — 97535 SELF CARE MNGMENT TRAINING: CPT

## 2023-08-03 PROCEDURE — 25000003 PHARM REV CODE 250: Performed by: INTERNAL MEDICINE

## 2023-08-03 PROCEDURE — 97530 THERAPEUTIC ACTIVITIES: CPT

## 2023-08-03 PROCEDURE — 63600175 PHARM REV CODE 636 W HCPCS: Performed by: STUDENT IN AN ORGANIZED HEALTH CARE EDUCATION/TRAINING PROGRAM

## 2023-08-03 PROCEDURE — 25000003 PHARM REV CODE 250: Performed by: STUDENT IN AN ORGANIZED HEALTH CARE EDUCATION/TRAINING PROGRAM

## 2023-08-03 PROCEDURE — 94761 N-INVAS EAR/PLS OXIMETRY MLT: CPT

## 2023-08-03 RX ORDER — HYDRALAZINE HYDROCHLORIDE 100 MG/1
100 TABLET, FILM COATED ORAL EVERY 8 HOURS
Qty: 90 TABLET | Refills: 11
Start: 2023-08-04 | End: 2023-09-06

## 2023-08-03 RX ORDER — AMLODIPINE BESYLATE 2.5 MG/1
10 TABLET ORAL DAILY
Qty: 90 TABLET | Refills: 1
Start: 2023-08-03 | End: 2023-08-17 | Stop reason: SDUPTHER

## 2023-08-03 RX ORDER — FUROSEMIDE 40 MG/1
40 TABLET ORAL DAILY
Qty: 30 TABLET | Refills: 11
Start: 2023-08-04 | End: 2023-09-06 | Stop reason: SDUPTHER

## 2023-08-03 RX ORDER — ALPRAZOLAM 0.5 MG/1
0.5 TABLET ORAL NIGHTLY
Qty: 5 TABLET | Refills: 0
Start: 2023-08-03 | End: 2023-09-06

## 2023-08-03 RX ORDER — FUROSEMIDE 40 MG/1
40 TABLET ORAL DAILY
Status: DISCONTINUED | OUTPATIENT
Start: 2023-08-03 | End: 2023-08-03 | Stop reason: HOSPADM

## 2023-08-03 RX ORDER — METOLAZONE 5 MG/1
5 TABLET ORAL DAILY
Qty: 90 TABLET | Refills: 1 | OUTPATIENT
Start: 2023-08-03 | End: 2023-08-03

## 2023-08-03 RX ORDER — METOPROLOL SUCCINATE 25 MG/1
100 TABLET, EXTENDED RELEASE ORAL DAILY
Qty: 90 TABLET | Refills: 1
Start: 2023-08-03 | End: 2023-10-26 | Stop reason: SDUPTHER

## 2023-08-03 RX ADMIN — GUAIFENESIN 600 MG: 600 TABLET, EXTENDED RELEASE ORAL at 08:08

## 2023-08-03 RX ADMIN — INSULIN ASPART 6 UNITS: 100 INJECTION, SOLUTION INTRAVENOUS; SUBCUTANEOUS at 12:08

## 2023-08-03 RX ADMIN — FUROSEMIDE 40 MG: 40 TABLET ORAL at 03:08

## 2023-08-03 RX ADMIN — METOPROLOL SUCCINATE 100 MG: 50 TABLET, FILM COATED, EXTENDED RELEASE ORAL at 08:08

## 2023-08-03 RX ADMIN — HYDRALAZINE HYDROCHLORIDE 100 MG: 25 TABLET ORAL at 08:08

## 2023-08-03 RX ADMIN — LEVOTHYROXINE SODIUM 75 MCG: 0.03 TABLET ORAL at 05:08

## 2023-08-03 RX ADMIN — HYDRALAZINE HYDROCHLORIDE 100 MG: 25 TABLET ORAL at 12:08

## 2023-08-03 RX ADMIN — POTASSIUM CHLORIDE 20 MEQ: 1500 TABLET, EXTENDED RELEASE ORAL at 08:08

## 2023-08-03 RX ADMIN — AMLODIPINE BESYLATE 10 MG: 5 TABLET ORAL at 08:08

## 2023-08-03 RX ADMIN — CHLORHEXIDINE GLUCONATE 15 ML: 1.2 RINSE ORAL at 08:08

## 2023-08-03 RX ADMIN — INSULIN ASPART 2 UNITS: 100 INJECTION, SOLUTION INTRAVENOUS; SUBCUTANEOUS at 07:08

## 2023-08-03 RX ADMIN — FUROSEMIDE 40 MG: 10 INJECTION, SOLUTION INTRAVENOUS at 08:08

## 2023-08-03 RX ADMIN — HYDRALAZINE HYDROCHLORIDE 100 MG: 25 TABLET ORAL at 03:08

## 2023-08-03 NOTE — PROGRESS NOTES
[facility] Medicine  Progress Note    Patient Name: Chanel Cervantes  MRN: [id number]  Patient Class: IP- Inpatient   Admission Date: 7/18/2023  Length of Stay: 15 days  Attending Physician: Kartik Farris MD  Primary Care Provider: Magdalena Hein FNP        Subjective:     Principal Problem:Septic shock        HPI:  Chanel Cervantes is a 67 y.o. White female   With PMH of DM2, HTN, bariatric surgery,  Remote cholecystectomy,  Frequent UTIs with kidney stones,  who presents with back pain.  Admitted for pancreatitis with elevated LFTs     Pt is currently confused after receiving ativan  (ER gave it to calm her for CT scan)  History taken from family at bedside     Onset of back pain overnight  Located b/l lumbar region  Radiating to b/l upper quadrants of abdomen  Occurring intermittently  Progressively worsening  Severe, causes SOB when occurring     Initially pt thought pain was due to a fall yesterday  (Mechanical, tripped over a rug, no head trauma)  +nausea, no vomiting  +intermittent chills  They are not sure about objective fever     Has had similar abdominal pain previously  Per family, this has been attributed to her ongoing ventral wall hernia  They don't bring her to ER for this usually  However pain today was much more severe than usual      Overview/Hospital Course:  Chanel Cervantes is a 67 year old female with a past medical history of obesity, ventral hernia, DM, HTN, anemia, CAD, NIKOLAS, and hypothyroidism who presented with septic shock secondary to a possible cholangitis with E coli bacteremia. Vancomycin was discontinued and she remained on meropenem. BP was maintained on a Levophed infusion. GI was consulted and performed ERCP which showed biliary sludge with a sludge ball dilating the main duct which was removed; a biliary sphincterotomy was also performed. Repeat blood cultures were negative. She was on IV fluids with LR and was NPO as she was encephalopathic. She also  had an ANTHONY as well. She also had acute hypoxic respiratory failure for which she was put on mask O2. She also had demand ischemia; TTE was done and troponin was trended. There was shock liver superimposed on the hepatocellular injury brought on by the acute cholangitis. Pulmonary/Critical Care was consulted given the patient's medical acuity.  Pulmonologist made decision to intubate patient based on patient's deep encephalopathy and inability to protect her airway.  Patient underwent transesophageal echocardiography which did not report any intracardiac thrombus or evidence of endocarditis.  Patient was successfully extubated on July 24, 2023. PT and OT is working with patient. Patient is transferred to medicine floor for further management and rehab.  working on LTAC placement.      Interval History:  pending appeal with insurance. She is improving today still    Review of Systems   All other systems reviewed and are negative.    Objective:     Vital Signs (Most Recent):  Temp: 99.7 °F (37.6 °C) (08/03/23 1150)  Pulse: 77 (08/03/23 1150)  Resp: 18 (08/03/23 1150)  BP: (!) 157/89 (08/03/23 1150)  SpO2: 97 % (08/03/23 1150) Vital Signs (24h Range):  Temp:  [97.5 °F (36.4 °C)-99.7 °F (37.6 °C)] 99.7 °F (37.6 °C)  Pulse:  [64-79] 77  Resp:  [16-18] 18  SpO2:  [96 %-97 %] 97 %  BP: (106-189)/(62-89) 157/89     Weight: 109 kg (240 lb 4.8 oz)  Body mass index is 37.64 kg/m².    Intake/Output Summary (Last 24 hours) at 8/3/2023 1325  Last data filed at 8/3/2023 0403  Gross per 24 hour   Intake --   Output 300 ml   Net -300 ml           Physical Exam  Constitutional:       Appearance: Normal appearance. She is normal weight.   HENT:      Head: Normocephalic and atraumatic.      Nose: Nose normal.      Mouth/Throat:      Mouth: Mucous membranes are moist.   Eyes:      Conjunctiva/sclera: Conjunctivae normal.      Pupils: Pupils are equal, round, and reactive to light.   Cardiovascular:      Rate and Rhythm:  "Normal rate and regular rhythm.      Pulses: Normal pulses.      Heart sounds: Normal heart sounds. No murmur heard.     No friction rub. No gallop.   Pulmonary:      Effort: Pulmonary effort is normal.      Breath sounds: No wheezing, rhonchi or rales.      Comments: Clear breath sounds today, no stridor. No wheezing  Abdominal:      General: Abdomen is flat. Bowel sounds are normal. There is no distension.      Palpations: Abdomen is soft.      Tenderness: There is no abdominal tenderness. There is no guarding.   Musculoskeletal:         General: No swelling. Normal range of motion.      Cervical back: Normal range of motion and neck supple.      Right lower leg: Edema present.      Left lower leg: Edema present.   Skin:     General: Skin is warm and dry.   Neurological:      General: No focal deficit present.      Mental Status: She is alert.   Psychiatric:         Mood and Affect: Mood normal.         Thought Content: Thought content normal.         Judgment: Judgment normal.             Significant Labs: All pertinent labs within the past 24 hours have been reviewed.    Significant Imaging: I have reviewed all pertinent imaging results/findings within the past 24 hours.      Assessment/Plan:      * Septic shock due to Entercoccus faecium and E.coli  Secondary to acute cholangitis  - completed abx on 8/1  -Enterococcus bacteremia now cleared  -S/p ERCP    Antibiotics (From admission, onward)    Start     Stop Route Frequency Ordered    07/21/23 1400  DAPTOmycin (CUBICIN) 805 mg in sodium chloride 0.9% SolP 50 mL IVPB         -- IV Every 48 hours (non-standard times) 07/20/23 0749    07/19/23 1300  meropenem 1 g in sodium chloride 0.9 % 100 mL IVPB (ready to mix system)        Note to Pharmacy: Ht: 5' 7" (1.702 m)  Wt: 108.9 kg (240 lb)  Estimated Creatinine Clearance: 63.1 mL/min (based on SCr of 1.1 mg/dL).  Body mass index is 37.59 kg/m².    -- IV Every 12 hours (non-standard times) 07/19/23 0740    "           Constipation  resolved      Gallstone pancreatitis  Present on hospital day 1.  Continue vasopressor support and crystalloid.      Encephalopathy, metabolic  Metabolic in setting of septic shock.  -assess mental status when off sedation  -Treat septic shock      Shock liver  - LFTs improved    Lab Results   Component Value Date    ALT 51 (H) 07/27/2023    AST 21 07/27/2023    GGT 24 05/17/2018    ALKPHOS 171 (H) 07/27/2023    BILITOT 0.8 07/27/2023         ANTHONY (acute kidney injury)  In setting of septic shock.  -appreciate nephrology recommendations  -Renally dose medications  -ANTHONY has now resolved status post ATN due to septic shock      Demand ischemia  History of CAD s/p CABG. No acute ST changes on EKG.  -check second trop level  -Telemetry  -cardiology consulting -- their opinion is that the troponin is high b/c of myocardial demand ischemia    Troponin I High Sensitivity   Date Value Ref Range Status   07/28/2023 33.6 (H) 0.0 - 14.9 pg/mL Final     Comment:     Troponin results differ between methods. Do not use   results between Troponin methods interchangeably.    Alkaline Phospatase levels above 400 U/L may   cause false positive results.    Access hsTnI should not be used for patients taking   Asfotase anya (Strensiq).     07/28/2023 33.6 (H) 0.0 - 14.9 pg/mL Final     Comment:     Troponin results differ between methods. Do not use   results between Troponin methods interchangeably.    Alkaline Phospatase levels above 400 U/L may   cause false positive results.    Access hsTnI should not be used for patients taking   Asfotase anya (Strensiq).     07/20/2023 2384.3 (HH) 0.0 - 14.9 pg/mL Final     Comment:     Troponin results differ between methods. Do not use   results between Troponin methods interchangeably.    Alkaline Phospatase levels above 400 U/L may   cause false positive results.    Access hsTnI should not be used for patients taking   Asfotase anya (Strensiq).  Troponin critical  result(s) called and verbal readback obtained   from [place] Avila RN M3 by MS1 07/20/2023 04:54         Acute hypoxemic respiratory failure  Required intubation on hospital day 3.  Patient was successfully extubated on July 24, 2023.  Follow Pulmonary Medicine recommendations.  Shortness of the breath significantly better  Continue mucus clearance  Check DVT u/s > no signs of DVT  Adjust DuoNebs  Added mucus clearance therapy  seems to be helping        Anemia  Stable.  -Trend Hgb with CBC    Hemoglobin   Date Value Ref Range Status   08/01/2023 10.4 (L) 12.0 - 16.0 g/dL Final   07/31/2023 10.3 (L) 12.0 - 16.0 g/dL Final           Obesity (BMI 30-39.9)  Body mass index is 37.6 kg/m². Morbid obesity complicates all aspects of disease management from diagnostic modalities to treatment.       Hypothyroidism  TSH unremarkable.  It's 0.470.  -Continue Synthroid      Acute obstructive cholangitis  Sludge ball removed via ERCP. Likely source of bacteremia. LFTs much improved.  -appreciate ID recommendations, treatment of E coli and Enterococcus as noted above  -GI following  -IV fluids  - blood cultures:  Entercoccus faecium and E.coli  -Trend LFTs      CAD (coronary artery disease)  -Hold home medications  -Telemetry      Hypokalemia  -Trend K.  Has improved.  -Replete K PRN  -Telemetry    Potassium   Date Value Ref Range Status   07/23/2023 3.8 3.5 - 5.1 mmol/L Final   07/22/2023 4.0 3.5 - 5.1 mmol/L Final         S/P bariatric surgery  Noted.      NIKOLAS on CPAP  -chronic condition.  - needs nightly CPAP    Type 2 diabetes mellitus treated with insulin  Patient's FSGs are controlled on current medication regimen.  Last A1c reviewed-   Lab Results   Component Value Date    HGBA1C 6.9 (H) 07/18/2023     'Current correctional scale  Medium  Maintain anti-hyperglycemic dose as follows-   Antihyperglycemics (From admission, onward)    Start     Stop Route Frequency Ordered    07/19/23 1018  insulin aspart U-100 pen 1-10  Units         -- SubQ Every 6 hours PRN 07/19/23 0920        Hold Oral hypoglycemics while patient is in the hospital.    Benign essential hypertension  - resumed home medications  - blood pressure improving      VTE Risk Mitigation (From admission, onward)         Ordered     enoxaparin injection 40 mg  Every 24 hours (non-standard times)         07/29/23 1637     IP VTE HIGH RISK PATIENT  Once         07/18/23 1642     Place sequential compression device  Until discontinued         07/18/23 1642                Discharge Planning   LUIS ALFREDO: 8/3/2023     Code Status: Full Code   Is the patient medically ready for discharge?:     Reason for patient still in hospital (select all that apply): Treatment  Discharge Plan A: Rehab   Discharge Delays: (!) Payor Issues              Kartik Farris MD  Department of Hospital Medicine   [facility]

## 2023-08-03 NOTE — CARE UPDATE
08/03/23 0829   Home Oxygen Qualification   $ Home O2 Qualification Tech time 15 minutes   Room Air SpO2 At Rest 96 %   Room Air SpO2 During Ambulation 95 %   SpO2 Post Ambulation 95 %

## 2023-08-03 NOTE — CARE UPDATE
08/03/23 0738   Patient Assessment/Suction   Level of Consciousness (AVPU) alert   Respiratory Effort Normal;Unlabored   Expansion/Accessory Muscles/Retractions no use of accessory muscles;no retractions   All Lung Fields Breath Sounds diminished   Rhythm/Pattern, Respiratory unlabored;pattern regular;depth regular;no shortness of breath reported   Cough Frequency infrequent   Cough Type dry   PRE-TX-O2   Device (Oxygen Therapy) nasal cannula   $ Is the patient on Low Flow Oxygen? Yes   Flow (L/min) 2   SpO2 96 %   Pulse Oximetry Type Intermittent   $ Pulse Oximetry - Multiple Charge Pulse Oximetry - Multiple   Aerosol Therapy   Respiratory Treatment Status (SVN) PRN treatment not required

## 2023-08-03 NOTE — PLAN OF CARE
Problem: Occupational Therapy  Goal: Occupational Therapy Goal  Description: Goals to be met by: 8/25/2023     Patient will increase functional independence with ADLs by performing:    Feeding with [place].  UE Dressing with Modified [place].  LE Dressing with Modified [place].  Grooming while standing at sink with Modified [place].  Toileting from toilet with Modified [place] for hygiene and clothing management.   Toilet transfer to toilet with Modified [place].    Outcome: Ongoing, Progressing

## 2023-08-03 NOTE — SUBJECTIVE & OBJECTIVE
Interval History:  pending appeal with insurance. She is improving today still    Review of Systems   All other systems reviewed and are negative.    Objective:     Vital Signs (Most Recent):  Temp: 99.7 °F (37.6 °C) (08/03/23 1150)  Pulse: 77 (08/03/23 1150)  Resp: 18 (08/03/23 1150)  BP: (!) 157/89 (08/03/23 1150)  SpO2: 97 % (08/03/23 1150) Vital Signs (24h Range):  Temp:  [97.5 °F (36.4 °C)-99.7 °F (37.6 °C)] 99.7 °F (37.6 °C)  Pulse:  [64-79] 77  Resp:  [16-18] 18  SpO2:  [96 %-97 %] 97 %  BP: (106-189)/(62-89) 157/89     Weight: 109 kg (240 lb 4.8 oz)  Body mass index is 37.64 kg/m².    Intake/Output Summary (Last 24 hours) at 8/3/2023 1325  Last data filed at 8/3/2023 0403  Gross per 24 hour   Intake --   Output 300 ml   Net -300 ml           Physical Exam  Constitutional:       Appearance: Normal appearance. She is normal weight.   HENT:      Head: Normocephalic and atraumatic.      Nose: Nose normal.      Mouth/Throat:      Mouth: Mucous membranes are moist.   Eyes:      Conjunctiva/sclera: Conjunctivae normal.      Pupils: Pupils are equal, round, and reactive to light.   Cardiovascular:      Rate and Rhythm: Normal rate and regular rhythm.      Pulses: Normal pulses.      Heart sounds: Normal heart sounds. No murmur heard.     No friction rub. No gallop.   Pulmonary:      Effort: Pulmonary effort is normal.      Breath sounds: No wheezing, rhonchi or rales.      Comments: Clear breath sounds today, no stridor. No wheezing  Abdominal:      General: Abdomen is flat. Bowel sounds are normal. There is no distension.      Palpations: Abdomen is soft.      Tenderness: There is no abdominal tenderness. There is no guarding.   Musculoskeletal:         General: No swelling. Normal range of motion.      Cervical back: Normal range of motion and neck supple.      Right lower leg: Edema present.      Left lower leg: Edema present.   Skin:     General: Skin is warm and dry.   Neurological:      General: No focal  deficit present.      Mental Status: She is alert.   Psychiatric:         Mood and Affect: Mood normal.         Thought Content: Thought content normal.         Judgment: Judgment normal.             Significant Labs: All pertinent labs within the past 24 hours have been reviewed.    Significant Imaging: I have reviewed all pertinent imaging results/findings within the past 24 hours.

## 2023-08-03 NOTE — DISCHARGE SUMMARY
[facility] Medicine  Discharge Summary      Patient Name: Chanel Cervantes  MRN: [id number]  BIANCA: [id number]  Patient Class: IP- Inpatient  Admission Date: 7/18/2023  Hospital Length of Stay: 15 days  Discharge Date and Time:  08/03/2023 3:37 PM  Attending Physician: Kartik Farris MD   Discharging Provider: Kartik Farris MD  Primary Care Provider: Magdalena Hein FNP    Primary Care Team: Networked reference to record PCT     HPI:   Chanel Cervantes is a 67 y.o. White female   With PMH of DM2, HTN, bariatric surgery,  Remote cholecystectomy,  Frequent UTIs with kidney stones,  who presents with back pain.  Admitted for pancreatitis with elevated LFTs     Pt is currently confused after receiving ativan  (ER gave it to calm her for CT scan)  History taken from family at bedside     Onset of back pain overnight  Located b/l lumbar region  Radiating to b/l upper quadrants of abdomen  Occurring intermittently  Progressively worsening  Severe, causes SOB when occurring     Initially pt thought pain was due to a fall yesterday  (Mechanical, tripped over a rug, no head trauma)  +nausea, no vomiting  +intermittent chills  They are not sure about objective fever     Has had similar abdominal pain previously  Per family, this has been attributed to her ongoing ventral wall hernia  They don't bring her to ER for this usually  However pain today was much more severe than usual      Procedure(s) (LRB):  ERCP (ENDOSCOPIC RETROGRADE CHOLANGIOPANCREATOGRAPHY) (N/A)      Hospital Course:   Chanel Cervantes is a 67 year old female with a past medical history of obesity, ventral hernia, DM, HTN, anemia, CAD, NIKOLAS, and hypothyroidism who presented with septic shock secondary to a possible cholangitis with E coli bacteremia. Vancomycin was discontinued and she remained on meropenem. BP was maintained on a Levophed infusion. GI was consulted and performed ERCP which showed biliary sludge with a sludge ball  dilating the main duct which was removed; a biliary sphincterotomy was also performed. Repeat blood cultures were negative. She was on IV fluids with LR and was NPO as she was encephalopathic. She also had an ANTHONY as well. She also had acute hypoxic respiratory failure for which she was put on mask O2. She also had demand ischemia; TTE was done and troponin was trended. There was shock liver superimposed on the hepatocellular injury brought on by the acute cholangitis. Pulmonary/Critical Care was consulted given the patient's medical acuity.  Pulmonologist made decision to intubate patient based on patient's deep encephalopathy and inability to protect her airway.  Patient underwent transesophageal echocardiography which did not report any intracardiac thrombus or evidence of endocarditis.  Patient was successfully extubated on July 24, 2023. PT and OT is working with patient. Patient is transferred to medicine floor for further management and rehab.  working on LTAC placement.      Patient continued her antibiotics and completed her course on 08/01.  She was denied LTAC and we attempted placement in rehab however she was initially denied by insurance.  Ultimately she was approved for placement in rehab by her insurance company and she will continue ongoing rehabilitation there.  She will continue Lasix 40 mg daily.  She will also continue hydralazine, metoprolol, amlodipine for blood pressure control.  I saw evaluated the patient the day of discharge.  She was discharged in good condition with plans for ongoing outpatient follow-up and management.       Goals of Care Treatment Preferences:  Code Status: Full Code      Consults:   Consults (From admission, onward)        Status Ordering Provider     Inpatient consult to   Once        Provider:  (Not yet assigned)    Completed WILBER RUTHERFORD     Inpatient consult to Social Work/Case Management  Once        Provider:  (Not yet assigned)     Completed SULTAN, AQYANELI     Inpatient consult to Registered Dietitian/Nutritionist  Once        Provider:  (Not yet assigned)    Completed , JACQUIE     Inpatient consult to Registered Dietitian/Nutritionist  Once        Provider:  (Not yet assigned)    Completed FADUMO MORGAN     Inpatient consult to Nephrology  Once        Provider:  Rosalva Barros MD    Completed DAVIN ARRIAGA     Inpatient consult to Cardiology  Once        Provider:  Buddy Lowery MD    Completed GLYNN HEATH     Inpatient consult to Infectious Diseases  Once        Provider:  (Not yet assigned)    Completed KARIN SERRNAO     Inpatient consult to Intensivist  Once        Provider:  Karin Serrano MD    Completed MANDIE BEAL     Inpatient consult to Gastroenterology  Once        Provider:  Lavon Hernandez III, MD    Completed MANDIE BEAL          No new Assessment & Plan notes have been filed under this hospital service since the last note was generated.  Service: Hospital Medicine    Final Active Diagnoses:    Diagnosis Date Noted POA    PRINCIPAL PROBLEM:  Septic shock due to Entercoccus faecium and E.coli [A41.9, R65.21] 07/19/2023 Yes    Constipation [K59.00] 07/28/2023 Yes    Gallstone pancreatitis [K85.10] 07/21/2023 Yes    Hypothyroidism [E03.9] 07/19/2023 Yes    Obesity (BMI 30-39.9) [E66.9] 07/19/2023 Yes    Anemia [D64.9] 07/19/2023 Yes    Acute hypoxemic respiratory failure [J96.01] 07/19/2023 Yes    Demand ischemia [I24.8] 07/19/2023 Yes    ANTHONY (acute kidney injury) [N17.9] 07/19/2023 Yes    Shock liver [K72.00] 07/19/2023 Yes    Encephalopathy, metabolic [G93.41] 07/19/2023 Yes    Acute obstructive cholangitis [K83.09] 07/18/2023 Yes    Hypokalemia [E87.6] 07/09/2021 Yes    S/P bariatric surgery [Z98.84] 05/20/2019 Not Applicable    NIKOLAS on CPAP [G47.33, Z99.89] 04/08/2019 Not Applicable    CAD (coronary artery disease) [I25.10] 11/23/2018 Yes    Benign essential  hypertension [I10] 11/07/2018 Yes    Type 2 diabetes mellitus treated with insulin [E11.9, Z79.4] 11/07/2018 Not Applicable      Problems Resolved During this Admission:       Discharged Condition: good    Disposition: Rehab Facility    Follow Up:   Follow-up Information     Magdalena Hein FNP. Schedule an appointment as soon as possible for a visit.    Specialty: Family Medicine  Contact information:  [address] [facility]  [address]  [place] [id number] [phone]                       Patient Instructions:      Diet Adult Regular     No dressing needed     Activity as tolerated       Significant Diagnostic Studies: Labs:   BMP:   Recent Labs   Lab 08/02/23  0504 08/03/23  0506   * 194*    138   K 3.6 3.5    101   CO2 29 31*   BUN 22 16   CREATININE 0.9 0.6   CALCIUM 8.5* 8.6*   MG 1.3* 1.6   , CBC   Recent Labs   Lab 08/02/23  0504 08/03/23  0506   WBC 6.41 6.36   HGB 10.2* 9.9*   HCT 32.3* 32.3*    399    and All labs within the past 24 hours have been reviewed    Pending Diagnostic Studies:     None         Medications:  Reconciled Home Medications:      Medication List      START taking these medications    ALPRAZolam 0.5 MG tablet  Commonly known as: XANAX  Take 1 tablet (0.5 mg total) by mouth every evening. for 5 days     furosemide 40 MG tablet  Commonly known as: LASIX  Take 1 tablet (40 mg total) by mouth once daily.  Start taking on: August 4, 2023     hydrALAZINE 100 MG tablet  Commonly known as: APRESOLINE  Take 1 tablet (100 mg total) by mouth every 8 (eight) hours.  Start taking on: August 4, 2023        CHANGE how you take these medications    amLODIPine 2.5 MG tablet  Commonly known as: NORVASC  Take 4 tablets (10 mg total) by mouth once daily.  What changed: how much to take     metoprolol succinate 25 MG 24 hr tablet  Commonly known as: TOPROL-XL  Take 4 tablets (100 mg total) by mouth once daily.  What changed: how much to take        CONTINUE taking these medications     aspirin 81 MG EC tablet  Commonly known as: ECOTRIN  Take 1 tablet (81 mg total) by mouth once daily.     azelastine 137 mcg (0.1 %) nasal spray  Commonly known as: ASTELIN  1 spray (137 mcg total) by Nasal route 2 (two) times daily.     blood sugar diagnostic Strp  One Touch Ultra Blue Test strips, Use l strip to check glucose 4 times daily     blood-glucose meter kit  Use as instructed     calcium carbonate-vitamin D3 600 mg-62.5 mcg (2,500 unit) Cap  calcium carbonate 600 mg-vitamin D3 62.5 mcg (2,500 unit) capsule   Take by oral route.     cyanocobalamin 1,000 mcg/mL injection  Inject 1 mL (1,000 mcg total) into the skin every 30 days.     DULoxetine 60 MG capsule  Commonly known as: CYMBALTA  Take 1 capsule (60 mg total) by mouth once daily.     guanFACINE 2 MG tablet  Commonly known as: TENEX  Take 1 tablet (2 mg total) by mouth nightly.     insulin degludec 200 unit/mL (3 mL) insulin pen  Commonly known as: TRESIBA FLEXTOUCH U-200  Inject 76 Units into the skin once daily.     iron-vitamin C 100-250 mg (ICAR-C) 100-250 mg Tab  Take 1 tablet by mouth once daily.     lancets 33 gauge Misc  Commonly known as: ONETOUCH DELICA LANCETS  USE 1  TO CHECK GLUCOSE 4 TIMES DAILY     levothyroxine 75 MCG tablet  Commonly known as: SYNTHROID  Take 75 mcg by mouth before breakfast.     magnesium oxide 400 mg (241.3 mg magnesium) tablet  Commonly known as: MAG-OX  Take 1 tablet (400 mg total) by mouth once daily.     multivitamin capsule  Take 1 capsule by mouth once daily.     NYSTOP powder  Generic drug: nystatin  APPLY TO AFFECTED AREA AS NEEDED     potassium chloride 10 MEQ Tbsr  Commonly known as: KLOR-CON  Take 1 tablet (10 mEq total) by mouth once daily.     prednisoLONE acetate 1 % Drps  Commonly known as: PRED FORTE  Place 1 drop into both eyes as needed.     pregabalin 50 MG capsule  Commonly known as: LYRICA  Take 50 mg by mouth every evening.     rosuvastatin 40 MG Tab  Commonly known as: CRESTOR  Take 1  tablet (40 mg total) by mouth every evening.     TRIJARDY XR 25-5-1,000 mg [facility]  Generic drug: empaglifloz-linaglip-metformin  Take 1 tablet by mouth once daily.        STOP taking these medications    metFORMIN 1000 MG tablet  Commonly known as: GLUCOPHAGE     metOLazone 5 MG tablet  Commonly known as: ZAROXOLYN     olmesartan 40 MG tablet  Commonly known as: BENICAR            Indwelling Lines/Drains at time of discharge:   Lines/Drains/Airways     None                 Time spent on the discharge of patient: 50 minutes         Kartik Farris MD  Department of Hospital Medicine  Formerly [facility]

## 2023-08-03 NOTE — PT/OT/SLP PROGRESS
Occupational Therapy   Treatment    Name: Chanel Cervantes  MRN: [id number]  Admitting Diagnosis:  Septic shock  15 Days Post-Op    Recommendations:     Discharge Recommendations: rehabilitation facility  Discharge Equipment Recommendations:  to be determined by next level of care  Barriers to discharge:  Decreased caregiver support    Assessment:     Chanel Cervantes is a 67 y.o. female with a medical diagnosis of Septic shock.  Performance deficits affecting function are weakness, impaired endurance, impaired self care skills, impaired functional mobility, gait instability, impaired balance, edema, impaired cardiopulmonary response to activity.     Rehab Prognosis:  Good; patient would benefit from acute skilled OT services to address these deficits and reach maximum level of function.       Plan:     Patient to be seen 6 x/week to address the above listed problems via self-care/home management, therapeutic activities, therapeutic exercises  Plan of Care Expires: 08/25/23  Plan of Care Reviewed with: patient    Subjective     Pain/Comfort:  Pain Rating 1: 0/10  Pain Rating Post-Intervention 1: 0/10    Objective:     Communicated with: nurse prior to session.  Patient found up in chair with peripheral IV, PureWick, telemetry, oxygen upon OT entry to room.    General Precautions: Standard, fall    Orthopedic Precautions:N/A  Braces: N/A  Respiratory Status: Room air     Occupational Performance:     Bed Mobility:    Patient completed Sit to Supine with minimum assistance   Patient rolled bilaterally with Min A for donning brief in bed    Functional Mobility/Transfers:  Patient completed Sit <> Stand Transfer with contact guard assistance  with  rolling walker   Patient completed Bed <> Chair Transfer using Stand Pivot technique with minimum assistance with rolling walker    Activities of Daily Living:  Lower Body Dressing: maximal assistance    Toileting: total assistance to don pure wick bed level    Patient left HOB  elevated with all lines intact, call button in reach, and bed alarm on    GOALS:   Multidisciplinary Problems       Occupational Therapy Goals          Problem: Occupational Therapy    Goal Priority Disciplines Outcome Interventions   Occupational Therapy Goal     OT, PT/OT Ongoing, Progressing    Description: Goals to be met by: 8/25/2023     Patient will increase functional independence with ADLs by performing:    Feeding with [place].  UE Dressing with Modified [place].  LE Dressing with Modified [place].  Grooming while standing at sink with Modified [place].  Toileting from toilet with Modified [place] for hygiene and clothing management.   Toilet transfer to toilet with Modified [place].                         Time Tracking:     OT Date of Treatment: 08/03/23  OT Start Time: 1143  OT Stop Time: 1206  OT Total Time (min): 23 min    Billable Minutes:Self Care/Home Management 08  Therapeutic Activity 15    OT/RUTHY: OT          8/3/2023

## 2023-08-03 NOTE — PT/OT/SLP PROGRESS
Physical Therapy Treatment    Patient Name:  Chanel Cervantes   MRN:  [id number]    Recommendations:     Discharge Recommendations: rehabilitation facility  Discharge Equipment Recommendations: to be determined by next level of care  Barriers to discharge: None    Assessment:     Chanel Cervantes is a 67 y.o. female admitted with a medical diagnosis of Septic shock.  She presents with the following impairments/functional limitations: weakness, impaired endurance, impaired functional mobility, gait instability, impaired balance, decreased lower extremity function, decreased safety awareness, impaired cardiopulmonary response to activity. Patient sitting up in chair and is agreeable to participation with PT treatment. 2 trials of sit to stand and gait training with seated rest break between trials. She requires ModA (first trial) and CGA (second trial) for sit to stand with RW and VC for hand placement and sequencing. She ambulated 6' forward/backward x2 trials with RW, Joan, and VC for sequencing. She is agreeable to remain sitting up in chair for lunch with all needs met.     Rehab Prognosis: Good; patient would benefit from acute skilled PT services to address these deficits and reach maximum level of function.    Recent Surgery: Procedure(s) (LRB):  ERCP (ENDOSCOPIC RETROGRADE CHOLANGIOPANCREATOGRAPHY) (N/A) 15 Days Post-Op    Plan:     During this hospitalization, patient to be seen 6 x/week to address the identified rehab impairments via therapeutic activities, therapeutic exercises, gait training, neuromuscular re-education and progress toward the following goals:    Plan of Care Expires:  08/29/23    Subjective     Chief Complaint: cold and bottom hurts despite sitting on pillow   Patient/Family Comments/goals: PT added second pillow to chair for patient to sit on for comfort   Pain/Comfort:  Pain Rating 1:  (not rated)  Location 1: sacral spine  Pain Addressed 1: Reposition      Objective:     Communicated with  BALDO Rodriguez prior to session.  Patient found up in chair with telemetry upon PT entry to room.     General Precautions: Standard, fall  Orthopedic Precautions: N/A  Braces: N/A  Respiratory Status: Room air     Functional Mobility:  Transfers:     Sit to Stand:  moderate assistance with rolling walker  Gait: 6' forward/backward x2 trials with RW, Joan, and VC for sequencing      AM-PAC 6 CLICK MOBILITY          Treatment & Education:  Patient was educated on the importance of OOB activity and functional mobility to negate negative effects of prolonged bed rest during hospitalization, safe transfers and ambulation, and D/C planning     Patient left up in chair with all lines intact and call button in reach..    GOALS:   Multidisciplinary Problems       Physical Therapy Goals          Problem: Physical Therapy    Goal Priority Disciplines Outcome Goal Variances Interventions   Physical Therapy Goal     PT, PT/OT Ongoing, Progressing     Description: Goals to be met by: 23     Patient will increase functional independence with mobility by performin. Supine to sit with Supervision  2. Sit to stand transfer with Supervision  3. Bed to chair transfer with Supervision using Rolling Walker  4. Gait  x 150 feet with Supervision using Rolling Walker.                              Time Tracking:     PT Received On: 23  PT Start Time: 1102     PT Stop Time: 1134  PT Total Time (min): 32 min     Billable Minutes: Gait Training 32    Treatment Type: Treatment  PT/PTA: PT     Number of PTA visits since last PT visit: 0     2023

## 2023-08-03 NOTE — PLAN OF CARE
08/02/23 2018   Patient Assessment/Suction   Level of Consciousness (AVPU) alert   Respiratory Effort Normal;Unlabored   PRE-TX-O2   Device (Oxygen Therapy) nasal cannula   $ Is the patient on Low Flow Oxygen? Yes   Flow (L/min) 2   SpO2 97 %   Pulse Oximetry Type Intermittent   $ Pulse Oximetry - Multiple Charge Pulse Oximetry - Multiple   Pulse 72   Resp 18   Vibratory PEP Therapy   Daily Review of Necessity (PEP Therapy) completed   Type (PEP Therapy) vibratory/oscillatory  (PT DOING ON HER OWN)

## 2023-08-03 NOTE — PLAN OF CARE
CM sent updated therapy notes to [facility] yesterday as requested for the appeal.  [facility] it that they have until tomorrow 8/4 to provide an answer on whether the patient will be approved for Rehab or not. CM following.     14:51- Chelsea with [facility] notified CM of auth being approved by [facility]. Chelsea to follow up with CM about transporting patient today.       08/03/23 5023   Post-Acute Status   Post-Acute Authorization Placement   Post-Acute Placement Status Pending payor medical review/second level review   Patient choice form signed by patient/caregiver List with quality metrics by geographic area provided   Discharge Delays (!) Payor Issues   Discharge Plan   Discharge Plan A Rehab   Discharge Plan B Home Health

## 2023-08-03 NOTE — PLAN OF CARE
DC orders and chart reviewed. No discharge needs noted.  Patient cleared for discharge from .  Patient is discharging to Rehab at [facility] in [place].  Discharge orders and summary sent to [facility] via careport.  Per Chelsea with [facility], transportation is set up for 4:15pm.  Nurse can call report to [facility] at [phone].       08/03/23 1539   Final Note   Assessment Type Final Discharge Note   Anticipated Discharge Disposition Rehab   What phone number can be called within the next 1-3 days to see how you are doing after discharge? [phone]   Post-Acute Status   Post-Acute Authorization Placement   Post-Acute Placement Status Set-up Complete/Auth obtained   Patient choice form signed by patient/caregiver List with quality metrics by geographic area provided   Discharge Delays (!) Ambulance Transport/Facility Transport

## 2023-08-03 NOTE — PLAN OF CARE
Problem: Diabetes Comorbidity  Goal: Blood Glucose Level Within Targeted Range  Outcome: Ongoing, Progressing     Problem: Skin Injury Risk Increased  Goal: Skin Health and Integrity  Outcome: Ongoing, Progressing     Problem: Bleeding (Sepsis/Septic Shock)  Goal: Absence of Bleeding  Outcome: Ongoing, Progressing     Problem: Glycemic Control Impaired (Sepsis/Septic Shock)  Goal: Blood Glucose Level Within Desired Range  Outcome: Ongoing, Progressing

## 2023-08-04 DIAGNOSIS — Z79.4 TYPE 2 DIABETES MELLITUS TREATED WITH INSULIN: ICD-10-CM

## 2023-08-04 DIAGNOSIS — E11.9 TYPE 2 DIABETES MELLITUS TREATED WITH INSULIN: ICD-10-CM

## 2023-08-04 RX ORDER — EMPAGLIFLOZIN, LINAGLIPTIN, METFORMIN HYDROCHLORIDE 25; 5; 1000 MG/1; MG/1; MG/1
1 TABLET, EXTENDED RELEASE ORAL DAILY
Qty: 90 TABLET | Refills: 1 | Status: SHIPPED | OUTPATIENT
Start: 2023-08-04 | End: 2024-02-06

## 2023-08-08 ENCOUNTER — TELEPHONE (OUTPATIENT)
Dept: FAMILY MEDICINE | Facility: CLINIC | Age: 68
End: 2023-08-08

## 2023-08-08 NOTE — TELEPHONE ENCOUNTER
The following medication needs a prior authorization:     Medication Name: empaglifloz-linaglip-metformin (TRIJARDY XR) 25-5-1,000 mg TBph    Dosage:  25-5-1,000 mg TBph    Frequency: : once daily. - Oral    Directions for use: : Take 1 tablet by mouth once daily. - Oral    Diagnosis: Type 2 diabetes mellitus treated with insulin [E11.9, Z79.4]    Is the request for a reauthorization? Yes    Is the patient currently stable on therapy?  Yes    Please list all therapeutic alternatives previously used with start/end dates and outcome:    empaglifloz-linaglip-metformin (TRIJARDY XR) 25-5-1,000 mg TBph   2/16/23-present    insulin (BASAGLAR KWIKPEN U-100 INSULIN) glargine 100 units/mL (3mL) SubQ pen  2/19/19-4/6/22   insulin (LANTUS SOLOSTAR U-100 INSULIN) glargine 100 units/mL (3mL) SubQ pen  4/25/19-10/21/19    insulin degludec (TRESIBA FLEXTOUCH U-200) 200 unit/mL (3 mL) insulin pen 4/25/22-present    insulin glargine U-300 conc (TOUJEO MAX U-300 SOLOSTAR) 300 unit/mL (3 mL) InPn 11/26/18-2/19/19    linagliptin (TRADJENTA) 5 mg Tab tablet 12/7/18-5/20/19    metFORMIN (GLUCOPHAGE) 1000 MG tablet 8/14/19-7/18/23

## 2023-08-09 ENCOUNTER — TELEPHONE (OUTPATIENT)
Dept: FAMILY MEDICINE | Facility: CLINIC | Age: 68
End: 2023-08-09

## 2023-08-09 NOTE — TELEPHONE ENCOUNTER
Pharmacy notified PA not needed, spoke to Mireya vaca to verify that the patient did not have metformin active on her medication list.

## 2023-08-16 ENCOUNTER — PATIENT MESSAGE (OUTPATIENT)
Dept: FAMILY MEDICINE | Facility: CLINIC | Age: 68
End: 2023-08-16

## 2023-08-16 DIAGNOSIS — I10 BENIGN ESSENTIAL HYPERTENSION: ICD-10-CM

## 2023-08-16 RX ORDER — AMLODIPINE BESYLATE 2.5 MG/1
10 TABLET ORAL DAILY
Qty: 90 TABLET | Refills: 1 | Status: CANCELLED | OUTPATIENT
Start: 2023-08-16

## 2023-08-17 ENCOUNTER — PATIENT OUTREACH (OUTPATIENT)
Dept: FAMILY MEDICINE | Facility: CLINIC | Age: 68
End: 2023-08-17

## 2023-08-17 DIAGNOSIS — I10 BENIGN ESSENTIAL HYPERTENSION: ICD-10-CM

## 2023-08-17 NOTE — TELEPHONE ENCOUNTER
Discharge Information     Discharge Date:   8/15/23    Primary Discharge Diagnosis:    Septic Shock due to Entercoccus faecium and E. coli          Discharge Summary:  Reviewed      Medication & Order Review     Were medication changes made or new medications added?   Yes    If so, has the patient filled the prescriptions?  Yes     Was Home Health ordered? No    If so, has Home Health contacted patient and/or initiated services?  N/A    Name of Home Health Agency? N/A    Durable Medical Equipment ordered?  Yes     If so, has the DME provider contacted patient and delivered equipment?   Yes    Follow Up               Any problems since discharge? No    How is the patient feeling since returning home?  Doing better    Have you set up recommended follow up appointments?  Gastro 8/29/23  Berel 8/30/23    Schedule Hospital Follow-up appointment within 7-14 days (preferably 7).      Notes:  TCM eligible       [id number]      Melissa Bryant

## 2023-08-17 NOTE — TELEPHONE ENCOUNTER
The  prescription was set to no print when it was sent 2 weeks ago, therefore it did not go anywhere.

## 2023-08-18 RX ORDER — AMLODIPINE BESYLATE 2.5 MG/1
2.5 TABLET ORAL DAILY
Qty: 90 TABLET | Refills: 1 | Status: SHIPPED | OUTPATIENT
Start: 2023-08-18 | End: 2024-03-07 | Stop reason: SDUPTHER

## 2023-08-18 NOTE — TELEPHONE ENCOUNTER
Spoke with patient to clarify dosage. Patient says she is taking 2.5 mg 1 tablet daily, not 4 tablets. Patient says they were giving her all different medications when she was in the hospital. Patient did look at her medication bottle to clarify 2.5mg 1 tablet once daily.

## 2023-08-18 NOTE — TELEPHONE ENCOUNTER
Please confirm the dose she is taking- if it is 10 mg, I will need to change the prescription; it is listed as a 2.5 mg currently, but 4 tabs

## 2023-09-06 ENCOUNTER — OFFICE VISIT (OUTPATIENT)
Dept: FAMILY MEDICINE | Facility: CLINIC | Age: 68
End: 2023-09-06
Payer: MEDICARE

## 2023-09-06 VITALS
SYSTOLIC BLOOD PRESSURE: 120 MMHG | TEMPERATURE: 98 F | DIASTOLIC BLOOD PRESSURE: 68 MMHG | HEIGHT: 67 IN | WEIGHT: 230.63 LBS | RESPIRATION RATE: 20 BRPM | OXYGEN SATURATION: 97 % | HEART RATE: 61 BPM | BODY MASS INDEX: 36.2 KG/M2

## 2023-09-06 DIAGNOSIS — E83.42 HYPOMAGNESEMIA: ICD-10-CM

## 2023-09-06 DIAGNOSIS — I10 BENIGN ESSENTIAL HYPERTENSION: ICD-10-CM

## 2023-09-06 DIAGNOSIS — I51.7 CARDIOMEGALY: ICD-10-CM

## 2023-09-06 DIAGNOSIS — R91.1 PULMONARY NODULE: ICD-10-CM

## 2023-09-06 DIAGNOSIS — R93.89 ABNORMAL CT OF THE CHEST: ICD-10-CM

## 2023-09-06 DIAGNOSIS — Z09 HOSPITAL DISCHARGE FOLLOW-UP: Primary | ICD-10-CM

## 2023-09-06 PROCEDURE — 3044F HG A1C LEVEL LT 7.0%: CPT | Mod: CPTII,S$GLB,, | Performed by: NURSE PRACTITIONER

## 2023-09-06 PROCEDURE — 1100F PR PT FALLS ASSESS DOC 2+ FALLS/FALL W/INJURY/YR: ICD-10-PCS | Mod: CPTII,S$GLB,, | Performed by: NURSE PRACTITIONER

## 2023-09-06 PROCEDURE — 3074F PR MOST RECENT SYSTOLIC BLOOD PRESSURE < 130 MM HG: ICD-10-PCS | Mod: CPTII,S$GLB,, | Performed by: NURSE PRACTITIONER

## 2023-09-06 PROCEDURE — 1125F PR PAIN SEVERITY QUANTIFIED, PAIN PRESENT: ICD-10-PCS | Mod: CPTII,S$GLB,, | Performed by: NURSE PRACTITIONER

## 2023-09-06 PROCEDURE — 99213 OFFICE O/P EST LOW 20 MIN: CPT | Mod: S$GLB,,, | Performed by: NURSE PRACTITIONER

## 2023-09-06 PROCEDURE — 3008F PR BODY MASS INDEX (BMI) DOCUMENTED: ICD-10-PCS | Mod: CPTII,S$GLB,, | Performed by: NURSE PRACTITIONER

## 2023-09-06 PROCEDURE — 3288F FALL RISK ASSESSMENT DOCD: CPT | Mod: CPTII,S$GLB,, | Performed by: NURSE PRACTITIONER

## 2023-09-06 PROCEDURE — 1160F RVW MEDS BY RX/DR IN RCRD: CPT | Mod: CPTII,S$GLB,, | Performed by: NURSE PRACTITIONER

## 2023-09-06 PROCEDURE — 1159F MED LIST DOCD IN RCRD: CPT | Mod: CPTII,S$GLB,, | Performed by: NURSE PRACTITIONER

## 2023-09-06 PROCEDURE — 1159F PR MEDICATION LIST DOCUMENTED IN MEDICAL RECORD: ICD-10-PCS | Mod: CPTII,S$GLB,, | Performed by: NURSE PRACTITIONER

## 2023-09-06 PROCEDURE — 1160F PR REVIEW ALL MEDS BY PRESCRIBER/CLIN PHARMACIST DOCUMENTED: ICD-10-PCS | Mod: CPTII,S$GLB,, | Performed by: NURSE PRACTITIONER

## 2023-09-06 PROCEDURE — 3074F SYST BP LT 130 MM HG: CPT | Mod: CPTII,S$GLB,, | Performed by: NURSE PRACTITIONER

## 2023-09-06 PROCEDURE — 3288F PR FALLS RISK ASSESSMENT DOCUMENTED: ICD-10-PCS | Mod: CPTII,S$GLB,, | Performed by: NURSE PRACTITIONER

## 2023-09-06 PROCEDURE — 99213 PR OFFICE/OUTPT VISIT, EST, LEVL III, 20-29 MIN: ICD-10-PCS | Mod: S$GLB,,, | Performed by: NURSE PRACTITIONER

## 2023-09-06 PROCEDURE — 4010F ACE/ARB THERAPY RXD/TAKEN: CPT | Mod: CPTII,S$GLB,, | Performed by: NURSE PRACTITIONER

## 2023-09-06 PROCEDURE — 4010F PR ACE/ARB THEARPY RXD/TAKEN: ICD-10-PCS | Mod: CPTII,S$GLB,, | Performed by: NURSE PRACTITIONER

## 2023-09-06 PROCEDURE — 3066F PR DOCUMENTATION OF TREATMENT FOR NEPHROPATHY: ICD-10-PCS | Mod: CPTII,S$GLB,, | Performed by: NURSE PRACTITIONER

## 2023-09-06 PROCEDURE — 3061F NEG MICROALBUMINURIA REV: CPT | Mod: CPTII,S$GLB,, | Performed by: NURSE PRACTITIONER

## 2023-09-06 PROCEDURE — 3078F DIAST BP <80 MM HG: CPT | Mod: CPTII,S$GLB,, | Performed by: NURSE PRACTITIONER

## 2023-09-06 PROCEDURE — 3044F PR MOST RECENT HEMOGLOBIN A1C LEVEL <7.0%: ICD-10-PCS | Mod: CPTII,S$GLB,, | Performed by: NURSE PRACTITIONER

## 2023-09-06 PROCEDURE — 1100F PTFALLS ASSESS-DOCD GE2>/YR: CPT | Mod: CPTII,S$GLB,, | Performed by: NURSE PRACTITIONER

## 2023-09-06 PROCEDURE — 3078F PR MOST RECENT DIASTOLIC BLOOD PRESSURE < 80 MM HG: ICD-10-PCS | Mod: CPTII,S$GLB,, | Performed by: NURSE PRACTITIONER

## 2023-09-06 PROCEDURE — 1125F AMNT PAIN NOTED PAIN PRSNT: CPT | Mod: CPTII,S$GLB,, | Performed by: NURSE PRACTITIONER

## 2023-09-06 PROCEDURE — 3061F PR NEG MICROALBUMINURIA RESULT DOCUMENTED/REVIEW: ICD-10-PCS | Mod: CPTII,S$GLB,, | Performed by: NURSE PRACTITIONER

## 2023-09-06 PROCEDURE — 3066F NEPHROPATHY DOC TX: CPT | Mod: CPTII,S$GLB,, | Performed by: NURSE PRACTITIONER

## 2023-09-06 PROCEDURE — 3008F BODY MASS INDEX DOCD: CPT | Mod: CPTII,S$GLB,, | Performed by: NURSE PRACTITIONER

## 2023-09-06 RX ORDER — GUANFACINE 2 MG/1
2 TABLET ORAL NIGHTLY
Qty: 90 TABLET | Refills: 3 | Status: CANCELLED | OUTPATIENT
Start: 2023-09-06

## 2023-09-06 RX ORDER — NYSTATIN 100000 [USP'U]/G
POWDER TOPICAL
Status: CANCELLED | OUTPATIENT
Start: 2023-09-06

## 2023-09-06 RX ORDER — GUANFACINE 2 MG/1
2 TABLET ORAL NIGHTLY
Qty: 90 TABLET | Refills: 3 | Status: SHIPPED | OUTPATIENT
Start: 2023-09-06

## 2023-09-06 RX ORDER — LANOLIN ALCOHOL/MO/W.PET/CERES
400 CREAM (GRAM) TOPICAL DAILY
Qty: 90 TABLET | Refills: 1 | Status: CANCELLED | OUTPATIENT
Start: 2023-09-06

## 2023-09-06 RX ORDER — FUROSEMIDE 40 MG/1
40 TABLET ORAL DAILY
Qty: 30 TABLET | Refills: 2 | Status: SHIPPED | OUTPATIENT
Start: 2023-09-06 | End: 2023-11-01

## 2023-09-07 ENCOUNTER — PATIENT MESSAGE (OUTPATIENT)
Dept: FAMILY MEDICINE | Facility: CLINIC | Age: 68
End: 2023-09-07

## 2023-09-07 DIAGNOSIS — E83.42 HYPOMAGNESEMIA: ICD-10-CM

## 2023-09-07 RX ORDER — LANOLIN ALCOHOL/MO/W.PET/CERES
400 CREAM (GRAM) TOPICAL DAILY
Qty: 90 TABLET | Refills: 1 | Status: SHIPPED | OUTPATIENT
Start: 2023-09-07 | End: 2024-02-26

## 2023-09-08 NOTE — PROGRESS NOTES
SUBJECTIVE:      Patient ID: Chanel Cervantes is a 68 y.o. female.    Chief Complaint: Hospital Follow Up    Chanel is here today for hospital discharge follow-up.  Discharge summary reviewed as follows below:     Patient Name: Chanel Cervantes  MRN: [id number]  BIANCA: [id number]  Patient Class: IP- Inpatient  Admission Date: 7/18/2023  Hospital Length of Stay: 15 days  Discharge Date and Time:  08/03/2023 3:37 PM  Attending Physician: Kartik Farris MD   Discharging Provider: Kartik Farris MD  Primary Care Provider: Magdalena Hein FNP     Primary Care Team: Networked reference to record PCT      HPI:   Chanel Cervantes is a 67 y.o. White female   With PMH of DM2, HTN, bariatric surgery,  Remote cholecystectomy,  Frequent UTIs with kidney stones,  who presents with back pain.  Admitted for pancreatitis with elevated LFTs     Pt is currently confused after receiving ativan  (ER gave it to calm her for CT scan)  History taken from family at bedside     Onset of back pain overnight  Located b/l lumbar region  Radiating to b/l upper quadrants of abdomen  Occurring intermittently  Progressively worsening  Severe, causes SOB when occurring     Initially pt thought pain was due to a fall yesterday  (Mechanical, tripped over a rug, no head trauma)  +nausea, no vomiting  +intermittent chills  They are not sure about objective fever     Has had similar abdominal pain previously  Per family, this has been attributed to her ongoing ventral wall hernia  They don't bring her to ER for this usually  However pain today was much more severe than usual        Procedure(s) (LRB):  ERCP (ENDOSCOPIC RETROGRADE CHOLANGIOPANCREATOGRAPHY) (N/A)       Hospital Course:   Chanel Cervantes is a 67 year old female with a past medical history of obesity, ventral hernia, DM, HTN, anemia, CAD, NIKOLAS, and hypothyroidism who presented with septic shock secondary to a possible cholangitis with E coli bacteremia. Vancomycin was discontinued and  she remained on meropenem. BP was maintained on a Levophed infusion. GI was consulted and performed ERCP which showed biliary sludge with a sludge ball dilating the main duct which was removed; a biliary sphincterotomy was also performed. Repeat blood cultures were negative. She was on IV fluids with LR and was NPO as she was encephalopathic. She also had an ANTHONY as well. She also had acute hypoxic respiratory failure for which she was put on mask O2. She also had demand ischemia; TTE was done and troponin was trended. There was shock liver superimposed on the hepatocellular injury brought on by the acute cholangitis. Pulmonary/Critical Care was consulted given the patient's medical acuity.  Pulmonologist made decision to intubate patient based on patient's deep encephalopathy and inability to protect her airway.  Patient underwent transesophageal echocardiography which did not report any intracardiac thrombus or evidence of endocarditis.  Patient was successfully extubated on July 24, 2023. PT and OT is working with patient. Patient is transferred to medicine floor for further management and rehab.  working on LTAC placement.     Patient continued her antibiotics and completed her course on 08/01.  She was denied LTAC and we attempted placement in rehab however she was initially denied by insurance.  Ultimately she was approved for placement in rehab by her insurance company and she will continue ongoing rehabilitation there.  She will continue Lasix 40 mg daily.  She will also continue hydralazine, metoprolol, amlodipine for blood pressure control.  I saw evaluated the patient the day of discharge.  She was discharged in good condition with plans for ongoing outpatient follow-up and management.    She is feeling much better since discharge.  Did note abnormal CT of the chest upon review of records, as this was not discussed while in the hospital with patient or her family.  Radiology did recommend  "repeat chest CT in 6 months and orders will be placed.  Blood pressure is well controlled on exam today- she was switched to Lasix from metolazone.  We did discuss her need to follow-up with a cardiologist as she has no appointments planned.  She does have other upcoming follow-up with her specialists planned.        Family History   Problem Relation Age of Onset    Diabetes Mother     Vision loss Mother     Heart disease Father     Vision loss Father     Stroke Father       Social History     Socioeconomic History    Marital status:    Tobacco Use    Smoking status: Former     Current packs/day: 0.00     Average packs/day: 1 pack/day for 15.0 years (15.0 ttl pk-yrs)     Types: Cigarettes     Start date:      Quit date:      Years since quittin.7    Smokeless tobacco: Never   Substance and Sexual Activity    Alcohol use: No     Comment: "maybe once a year"    Drug use: No    Sexual activity: Not Currently     Social Determinants of Health     Financial Resource Strain: Unknown (2023)    Overall Financial Resource Strain (CARDIA)     Difficulty of Paying Living Expenses: Patient refused   Food Insecurity: Unknown (2023)    Hunger Vital Sign     Worried About Running Out of Food in the Last Year: Patient refused     Ran Out of Food in the Last Year: Patient refused   Transportation Needs: No Transportation Needs (2023)    PRAPARE - Transportation     Lack of Transportation (Medical): No     Lack of Transportation (Non-Medical): No   Physical Activity: Inactive (2023)    Exercise Vital Sign     Days of Exercise per Week: 0 days     Minutes of Exercise per Session: 0 min   Stress: Stress Concern Present (2023)    [employer] - Occupational Stress Questionnaire     Feeling of Stress : To some extent   Social Connections: Unknown (2023)    Social Connection and Isolation Panel [NHANES]     Frequency of Communication with Friends and Family: " More than three times a week     Frequency of Social Gatherings with Friends and Family: More than three times a week     Active Member of Clubs or Organizations: No     Attends Club or Organization Meetings: Never     Marital Status:    Housing Stability: Low Risk  (5/31/2023)    Housing Stability Vital Sign     Unable to Pay for Housing in the Last Year: No     Number of Places Lived in the Last Year: 1     Unstable Housing in the Last Year: No     Current Outpatient Medications   Medication Sig Dispense Refill    amLODIPine (NORVASC) 2.5 MG tablet Take 1 tablet (2.5 mg total) by mouth once daily. 90 tablet 1    aspirin (ECOTRIN) 81 MG EC tablet Take 1 tablet (81 mg total) by mouth once daily. 30 tablet 0    calcium carbonate-vitamin D3 600 mg-62.5 mcg (2,500 unit) Cap calcium carbonate 600 mg-vitamin D3 62.5 mcg (2,500 unit) capsule   Take by oral route.      DULoxetine (CYMBALTA) 60 MG capsule Take 1 capsule (60 mg total) by mouth once daily. 90 capsule 1    empaglifloz-linaglip-metformin (TRIJARDY XR) 25-5-1,000 mg TBph Take 1 tablet by mouth once daily. 90 tablet 1    insulin degludec (TRESIBA FLEXTOUCH U-200) 200 unit/mL (3 mL) insulin pen Inject 76 Units into the skin once daily. 12 pen 3    iron-vitamin C 100-250 mg, ICAR-C, 100-250 mg Tab Take 1 tablet by mouth once daily. 30 each 8    levothyroxine (SYNTHROID) 75 MCG tablet Take 75 mcg by mouth before breakfast.      metoprolol succinate (TOPROL-XL) 25 MG 24 hr tablet Take 4 tablets (100 mg total) by mouth once daily. 90 tablet 1    multivitamin capsule Take 1 capsule by mouth once daily.      potassium chloride (KLOR-CON) 10 MEQ TbSR Take 1 tablet (10 mEq total) by mouth once daily. 90 tablet 1    prednisoLONE acetate (PRED FORTE) 1 % DrpS Place 1 drop into both eyes as needed.       pregabalin (LYRICA) 50 MG capsule Take 50 mg by mouth every evening.      rosuvastatin (CRESTOR) 40 MG Tab Take 1 tablet (40 mg total) by mouth every evening. 90  tablet 3    blood sugar diagnostic Strp One Touch Ultra Blue Test strips, Use l strip to check glucose 4 times daily 100 each 11    blood-glucose meter kit Use as instructed 1 each 0    furosemide (LASIX) 40 MG tablet Take 1 tablet (40 mg total) by mouth once daily. 30 tablet 2    guanFACINE (TENEX) 2 MG tablet Take 1 tablet (2 mg total) by mouth nightly. 90 tablet 3    lancets (ONETOUCH DELICA LANCETS) 33 gauge Misc USE 1  TO CHECK GLUCOSE 4 TIMES DAILY 100 each 3    magnesium oxide (MAG-OX) 400 mg (241.3 mg magnesium) tablet Take 1 tablet (400 mg total) by mouth once daily. 90 tablet 1     No current facility-administered medications for this visit.     Review of patient's allergies indicates:   Allergen Reactions    Adhesive tape-silicones Rash    Dye Hives    Cephalexin     Iodine     Penicillins Edema    Pneumococcal vaccine     Iodinated contrast media Rash      Past Medical History:   Diagnosis Date    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 2021    Anemia, chronic disease 2021    CAD (coronary artery disease)     Diabetes mellitus, type 2     Hypertension     Iron deficiency anemia following bariatric surgery 2021    MI (myocardial infarction)     Secondary thrombocytosis 2021    Sleep apnea     Sleep apnea 2019    Sleep Right      Past Surgical History:   Procedure Laterality Date    ANGIOGRAM, CORONARY, WITH LEFT HEART CATHETERIZATION      APPENDECTOMY      BARIATRIC SURGERY  2019    Dr Nunez    CARPAL TUNNEL RELEASE Bilateral 2002     SECTION      CHOLECYSTECTOMY      COLONOSCOPY      CORONARY ANGIOPLASTY WITH STENT PLACEMENT      CORONARY ARTERY BYPASS GRAFT      EPIDURAL STEROID INJECTION INTO LUMBAR SPINE      x2    ERCP N/A 2023    Procedure: ERCP (ENDOSCOPIC RETROGRADE CHOLANGIOPANCREATOGRAPHY);  Surgeon: Lavon Hernandez III, MD;  Location: [facility];  Service: Endoscopy;  Laterality: N/A;    ESOPHAGOGASTRODUODENOSCOPY      HERNIA REPAIR  2019     "HYSTERECTOMY      THYROID LOBECTOMY Right 7/20/2021    Procedure: LOBECTOMY, THYROID;  Surgeon: Mari Chance MD;  Location: [facility];  Service: General;  Laterality: Right;       Review of Systems   Constitutional:  Negative for activity change, appetite change, chills, fatigue, fever and unexpected weight change.   HENT:  Negative for congestion, ear pain, hearing loss, postnasal drip, rhinorrhea, sinus pressure, sinus pain, sneezing, sore throat and trouble swallowing.    Eyes:  Negative for pain, discharge and visual disturbance.   Respiratory:  Negative for cough, chest tightness, shortness of breath and wheezing.    Cardiovascular:  Negative for chest pain, palpitations and leg swelling.   Gastrointestinal:  Negative for abdominal distention, abdominal pain, blood in stool, constipation, diarrhea, nausea and vomiting.   Endocrine: Negative for polydipsia and polyuria.   Genitourinary:  Negative for difficulty urinating, dysuria, flank pain, frequency, hematuria, menstrual problem, pelvic pain, urgency and vaginal discharge.   Musculoskeletal:  Negative for myalgias.   Skin:  Negative for color change, rash and wound.   Neurological:  Negative for dizziness, seizures, syncope, weakness, light-headedness, numbness and headaches.   Hematological:  Negative for adenopathy.   Psychiatric/Behavioral:  Negative for agitation, confusion, dysphoric mood, hallucinations, sleep disturbance and suicidal ideas. The patient is not nervous/anxious.       OBJECTIVE:      Vitals:    09/06/23 1408 09/06/23 1413   BP: 120/68    BP Location: Left arm    Patient Position: Sitting    BP Method: Large (Automatic)    Pulse: (!) 55 61   Resp: 20    Temp: 98.4 °F (36.9 °C)    TempSrc: Oral    SpO2: 97%    Weight: 104.6 kg (230 lb 9.6 oz)    Height: 5' 7" (1.702 m)      Physical Exam  Vitals and nursing note reviewed. Exam conducted with a chaperone present.   Constitutional:       General: She is not in acute distress.     Appearance: " Normal appearance. She is well-developed. She is obese. She is not diaphoretic.   HENT:      Head: Normocephalic.      Right Ear: Hearing normal.      Left Ear: Hearing normal.      Nose: Nose normal. No congestion.      Mouth/Throat:      Lips: Pink.      Mouth: Mucous membranes are moist.      Pharynx: Oropharynx is clear.   Eyes:      General: Lids are normal. No scleral icterus.     Conjunctiva/sclera: Conjunctivae normal.      Pupils: Pupils are equal, round, and reactive to light.   Neck:      Thyroid: No thyroid mass or thyromegaly.      Trachea: Trachea and phonation normal. No tracheal deviation.   Cardiovascular:      Rate and Rhythm: Normal rate and regular rhythm.      Heart sounds: Normal heart sounds.   Pulmonary:      Effort: Pulmonary effort is normal. No respiratory distress.      Breath sounds: Normal breath sounds. No stridor. No decreased breath sounds, wheezing, rhonchi or rales.   Musculoskeletal:         General: Normal range of motion.      Cervical back: Full passive range of motion without pain, normal range of motion and neck supple.      Right lower leg: No edema.      Left lower leg: No edema.   Lymphadenopathy:      Cervical: No cervical adenopathy.      Upper Body:      Right upper body: No supraclavicular adenopathy.      Left upper body: No supraclavicular adenopathy.   Skin:     General: Skin is warm and dry.      Coloration: Skin is not jaundiced or pale.   Neurological:      Mental Status: She is alert and oriented to person, place, and time.      GCS: GCS eye subscore is 4. GCS verbal subscore is 5. GCS motor subscore is 6.      Coordination: Coordination is intact.      Gait: Gait is intact.   Psychiatric:         Attention and Perception: Attention and perception normal.         Mood and Affect: Mood and affect normal.         Speech: Speech normal.         Behavior: Behavior normal. Behavior is cooperative.         Thought Content: Thought content normal. Thought content does  not include suicidal plan.         Cognition and Memory: Cognition and memory normal.         Judgment: Judgment normal.        Assessment:       1. Hospital discharge follow-up    2. Benign essential hypertension    3. Hypomagnesemia    4. Cardiomegaly    5. Abnormal CT of the chest    6. Pulmonary nodule        Plan:       Hospital discharge follow-up   -reviewed records; Discharge and current medications have been reviewed and reconciled with the chart.     Benign essential hypertension  -     guanFACINE (TENEX) 2 MG tablet; Take 1 tablet (2 mg total) by mouth nightly.  Dispense: 90 tablet; Refill: 3  -stable/controlled; continue medications    Hypomagnesemia   -cont supp daily     Cardiomegaly  -     furosemide (LASIX) 40 MG tablet; Take 1 tablet (40 mg total) by mouth once daily.  Dispense: 30 tablet; Refill: 2  -cont Lasix until cardio follow up     Abnormal CT of the chest  -     CT Chest Without Contrast; Future; Expected date: 01/19/2024    Pulmonary nodule  -     CT Chest Without Contrast; Future; Expected date: 01/19/2024   -discussed findings; repeat orders entered; discuss with hem/onc at upcoming appt     I spent a total of 24 minutes on the day of the visit.This includes face to face time and non-face to face time preparing to see the patient (eg, review of tests), obtaining and/or reviewing separately obtained history, documenting clinical information in the electronic or other health record, independently interpreting results and communicating results to the patient/family/caregiver, or care coordinator.       No follow-ups on file.      9/8/2023 Magdalena Hein, MESERET, FNP    This note was created using C[employer] voice recognition software that occasionally misinterprets phrases or words.

## 2023-09-17 ENCOUNTER — PATIENT MESSAGE (OUTPATIENT)
Dept: FAMILY MEDICINE | Facility: CLINIC | Age: 68
End: 2023-09-17

## 2023-09-19 ENCOUNTER — PATIENT MESSAGE (OUTPATIENT)
Dept: FAMILY MEDICINE | Facility: CLINIC | Age: 68
End: 2023-09-19

## 2023-09-24 ENCOUNTER — PATIENT MESSAGE (OUTPATIENT)
Dept: FAMILY MEDICINE | Facility: CLINIC | Age: 68
End: 2023-09-24

## 2023-09-24 DIAGNOSIS — E11.9 TYPE 2 DIABETES MELLITUS TREATED WITH INSULIN: ICD-10-CM

## 2023-09-24 DIAGNOSIS — Z79.4 TYPE 2 DIABETES MELLITUS TREATED WITH INSULIN: ICD-10-CM

## 2023-09-25 ENCOUNTER — TELEPHONE (OUTPATIENT)
Dept: HEMATOLOGY/ONCOLOGY | Facility: CLINIC | Age: 68
End: 2023-09-25

## 2023-09-25 DIAGNOSIS — R91.1 LUNG NODULE: Primary | ICD-10-CM

## 2023-09-25 DIAGNOSIS — R93.89 ABNORMAL CT OF THE CHEST: ICD-10-CM

## 2023-09-25 NOTE — TELEPHONE ENCOUNTER
Message and scan results reviewed with Dr Jackson, per his verbal orders I placed order for PET scan, spoke to patient and informed of need to f/u with pulmonology and that it would be ok to keep 10/26 appt at this time and if need be we will get her in sooner depending on PET results. Patient states she does not wish to f/u with Dr. Serrano at this time and instead would like to be referred elsewhere. Informed that I will discuss this with Dr. Jackson upon his return tomorrow and will call her to let her know who he recommends. Verbalized understanding. Prasanna made aware.

## 2023-09-26 ENCOUNTER — TELEPHONE (OUTPATIENT)
Dept: HEMATOLOGY/ONCOLOGY | Facility: CLINIC | Age: 68
End: 2023-09-26

## 2023-09-26 ENCOUNTER — TELEPHONE (OUTPATIENT)
Dept: PULMONOLOGY | Facility: CLINIC | Age: 68
End: 2023-09-26

## 2023-09-26 DIAGNOSIS — R93.89 ABNORMAL CT OF THE CHEST: ICD-10-CM

## 2023-09-26 DIAGNOSIS — R91.1 LUNG NODULE: Primary | ICD-10-CM

## 2023-09-26 NOTE — TELEPHONE ENCOUNTER
----- Message from Margaux Farfan sent at 9/26/2023  8:25 AM CDT -----  Please contact patient to schedule.

## 2023-09-26 NOTE — TELEPHONE ENCOUNTER
Per Dr. Jackson's orders, I placed referral to Margaux Gilliland made aware to send with all needed info. Patient made aware and instructed to call me if does not hear from their office by early next week to schedule. Verbalized understanding.

## 2023-10-11 ENCOUNTER — HOSPITAL ENCOUNTER (OUTPATIENT)
Dept: RADIOLOGY | Facility: HOSPITAL | Age: 68
Discharge: HOME | End: 2023-10-11
Attending: INTERNAL MEDICINE
Payer: MEDICARE

## 2023-10-11 VITALS — HEIGHT: 68 IN | WEIGHT: 230 LBS | BODY MASS INDEX: 34.86 KG/M2

## 2023-10-11 DIAGNOSIS — R91.1 LUNG NODULE: ICD-10-CM

## 2023-10-11 DIAGNOSIS — R93.89 ABNORMAL CT OF THE CHEST: ICD-10-CM

## 2023-10-11 LAB — GLUCOSE SERPL-MCNC: 101 MG/DL (ref 70–110)

## 2023-10-11 PROCEDURE — A9552 F18 FDG: HCPCS | Mod: PO

## 2023-10-23 ENCOUNTER — LAB VISIT (OUTPATIENT)
Dept: LAB | Facility: HOSPITAL | Age: 68
End: 2023-10-23
Attending: INTERNAL MEDICINE
Payer: MEDICARE

## 2023-10-23 DIAGNOSIS — K95.89 IRON DEFICIENCY ANEMIA FOLLOWING BARIATRIC SURGERY: ICD-10-CM

## 2023-10-23 DIAGNOSIS — D64.89 ANEMIA DUE TO MULTIPLE MECHANISMS: ICD-10-CM

## 2023-10-23 DIAGNOSIS — D50.8 IRON DEFICIENCY ANEMIA FOLLOWING BARIATRIC SURGERY: ICD-10-CM

## 2023-10-23 DIAGNOSIS — E53.8 B12 DEFICIENCY: ICD-10-CM

## 2023-10-23 DIAGNOSIS — D63.8 ANEMIA, CHRONIC DISEASE: ICD-10-CM

## 2023-10-23 PROBLEM — J96.01 ACUTE HYPOXEMIC RESPIRATORY FAILURE: Status: RESOLVED | Noted: 2023-07-19 | Resolved: 2023-10-23

## 2023-10-23 PROBLEM — N17.9 AKI (ACUTE KIDNEY INJURY): Status: RESOLVED | Noted: 2023-07-19 | Resolved: 2023-10-23

## 2023-10-23 LAB
ALBUMIN SERPL BCP-MCNC: 3.9 G/DL (ref 3.5–5.2)
ALP SERPL-CCNC: 76 U/L (ref 55–135)
ALT SERPL W/O P-5'-P-CCNC: 12 U/L (ref 10–44)
ANION GAP SERPL CALC-SCNC: 8 MMOL/L (ref 8–16)
AST SERPL-CCNC: 18 U/L (ref 10–40)
BASOPHILS # BLD AUTO: 0.07 K/UL (ref 0–0.2)
BASOPHILS NFR BLD: 1.1 % (ref 0–1.9)
BILIRUB SERPL-MCNC: 0.3 MG/DL (ref 0.1–1)
BUN SERPL-MCNC: 15 MG/DL (ref 8–23)
CALCIUM SERPL-MCNC: 10.4 MG/DL (ref 8.7–10.5)
CHLORIDE SERPL-SCNC: 103 MMOL/L (ref 95–110)
CO2 SERPL-SCNC: 32 MMOL/L (ref 23–29)
CREAT SERPL-MCNC: 0.9 MG/DL (ref 0.5–1.4)
DIFFERENTIAL METHOD: ABNORMAL
EOSINOPHIL # BLD AUTO: 0.1 K/UL (ref 0–0.5)
EOSINOPHIL NFR BLD: 1.3 % (ref 0–8)
ERYTHROCYTE [DISTWIDTH] IN BLOOD BY AUTOMATED COUNT: 14.4 % (ref 11.5–14.5)
EST. GFR  (NO RACE VARIABLE): >60 ML/MIN/1.73 M^2
FERRITIN SERPL-MCNC: 24.5 NG/ML (ref 20–300)
FOLATE SERPL-MCNC: >22.3 NG/ML (ref 4–24)
GLUCOSE SERPL-MCNC: 137 MG/DL (ref 70–110)
HCT VFR BLD AUTO: 43.1 % (ref 37–48.5)
HGB BLD-MCNC: 13.5 G/DL (ref 12–16)
IMM GRANULOCYTES # BLD AUTO: 0.02 K/UL (ref 0–0.04)
IMM GRANULOCYTES NFR BLD AUTO: 0.3 % (ref 0–0.5)
IRON SERPL-MCNC: 30 UG/DL (ref 30–160)
LYMPHOCYTES # BLD AUTO: 1 K/UL (ref 1–4.8)
LYMPHOCYTES NFR BLD: 15.8 % (ref 18–48)
MCH RBC QN AUTO: 26.7 PG (ref 27–31)
MCHC RBC AUTO-ENTMCNC: 31.3 G/DL (ref 32–36)
MCV RBC AUTO: 85 FL (ref 82–98)
MONOCYTES # BLD AUTO: 0.4 K/UL (ref 0.3–1)
MONOCYTES NFR BLD: 5.8 % (ref 4–15)
NEUTROPHILS # BLD AUTO: 4.7 K/UL (ref 1.8–7.7)
NEUTROPHILS NFR BLD: 75.7 % (ref 38–73)
NRBC BLD-RTO: 0 /100 WBC
PLATELET # BLD AUTO: 348 K/UL (ref 150–450)
PMV BLD AUTO: 10.8 FL (ref 9.2–12.9)
POTASSIUM SERPL-SCNC: 4 MMOL/L (ref 3.5–5.1)
PROT SERPL-MCNC: 7 G/DL (ref 6–8.4)
RBC # BLD AUTO: 5.05 M/UL (ref 4–5.4)
SATURATED IRON: 9 % (ref 20–50)
SODIUM SERPL-SCNC: 143 MMOL/L (ref 136–145)
TOTAL IRON BINDING CAPACITY: 330 UG/DL (ref 250–450)
TRANSFERRIN SERPL-MCNC: 236 MG/DL (ref 200–375)
VIT B12 SERPL-MCNC: 207 PG/ML (ref 210–950)
WBC # BLD AUTO: 6.26 K/UL (ref 3.9–12.7)

## 2023-10-23 PROCEDURE — 82746 ASSAY OF FOLIC ACID SERUM: CPT | Performed by: INTERNAL MEDICINE

## 2023-10-23 PROCEDURE — 82607 VITAMIN B-12: CPT | Performed by: INTERNAL MEDICINE

## 2023-10-23 PROCEDURE — 36415 COLL VENOUS BLD VENIPUNCTURE: CPT | Performed by: INTERNAL MEDICINE

## 2023-10-23 PROCEDURE — 83540 ASSAY OF IRON: CPT | Performed by: INTERNAL MEDICINE

## 2023-10-23 PROCEDURE — 85025 COMPLETE CBC W/AUTO DIFF WBC: CPT | Performed by: INTERNAL MEDICINE

## 2023-10-23 PROCEDURE — 80053 COMPREHEN METABOLIC PANEL: CPT | Performed by: INTERNAL MEDICINE

## 2023-10-23 PROCEDURE — 84466 ASSAY OF TRANSFERRIN: CPT | Performed by: INTERNAL MEDICINE

## 2023-10-23 PROCEDURE — 82728 ASSAY OF FERRITIN: CPT | Performed by: INTERNAL MEDICINE

## 2023-10-24 ENCOUNTER — TELEPHONE (OUTPATIENT)
Dept: HEMATOLOGY/ONCOLOGY | Facility: CLINIC | Age: 68
End: 2023-10-24

## 2023-10-24 NOTE — TELEPHONE ENCOUNTER
----- Message from Mauricio Jackson MD sent at 10/24/2023 12:06 PM CDT -----  Make sure she is on B12

## 2023-10-24 NOTE — TELEPHONE ENCOUNTER
Reviewed above with patient, who reports she has been off of her B12 supplement due to being in the hospital over the last 2 months. She is willing to start on an oral B12 supplement and then will discuss further with Dr Jackson when she sees him on Thursday, 10/26.

## 2023-10-25 ENCOUNTER — PATIENT MESSAGE (OUTPATIENT)
Dept: FAMILY MEDICINE | Facility: CLINIC | Age: 68
End: 2023-10-25

## 2023-10-25 NOTE — PROGRESS NOTES
[facility] Hematology/Oncology  PROGRESS NOTE -  Follow-up Visit      Subjective:       Patient ID:   NAME: Chanel Cervantes : 1955     68 y.o. female    Referring Doc: Melvina (New PCP)  Other Physicians: LUIS Orozco; Kj Lucas Braxton; Himanshu Nunez (Bariatrics)           Chief Complaint: iron defic anem f/u      History of Present Illness:     Patient returns today for a regularly scheduled follow-up visit.  The patient is here today to go over the results of the recently ordered labs, tests and studies.     She had bout of septicemia in 2023 from bile duct blockage due to sludge.    She has been having chronic back pain and sciatica issues and has been seeing pain management Dr Krystal White;    She reports that she has been having some sluggishness mentally and memory issues    Low energy levels, general aches and pains      She sees Dr Henley with endocrine again in 2023    She is doing ok with the oral iron and she has been doing the B12 ; she ran out of her iron and has not come in for a B12 since 2023     She is otherwise doing ok. No CP, SOB, HA's or N/V.         Discussed covid19 precautions - she has had her vaccinations            ROS:   GEN: energy levels low; chronic aches and pains; no fevers, weight loss  HEENT: normal with no HA's, sore throat, stiff neck, changes in vision  CV: normal with no CP, SOB, PND, WILSON or orthopnea  PULM: normal with no SOB, cough, hemoptysis, sputum or pleuritic pain  GI: normal with no abdominal pain, nausea, vomiting, constipation, diarrhea, melanotic stools, BRBPR, or hematemesis  : some dysuria  BREAST: normal with no mass, discharge, pain  SKIN: normal with no rash, erythema, bruising, or swelling    Pain Scale: 0     Allergies:  Review of patient's allergies indicates:   Allergen Reactions    Adhesive tape-silicones Rash    Dye Hives    Cephalexin     Iodine     Penicillins Edema    Pneumococcal vaccine     Iodinated contrast media Rash        Medications:    Current Outpatient Medications:     amLODIPine (NORVASC) 2.5 MG tablet, Take 1 tablet (2.5 mg total) by mouth once daily., Disp: 90 tablet, Rfl: 1    blood sugar diagnostic Strp, One Touch Ultra Blue Test strips, Use l strip to check glucose 4 times daily, Disp: 100 each, Rfl: 11    calcium carbonate-vitamin D3 600 mg-62.5 mcg (2,500 unit) Cap, calcium carbonate 600 mg-vitamin D3 62.5 mcg (2,500 unit) capsule  Take by oral route., Disp: , Rfl:     DULoxetine (CYMBALTA) 60 MG capsule, Take 1 capsule (60 mg total) by mouth once daily., Disp: 90 capsule, Rfl: 1    empaglifloz-linaglip-metformin (TRIJARDY XR) 25-5-1,000 mg TBph, Take 1 tablet by mouth once daily., Disp: 90 tablet, Rfl: 1    furosemide (LASIX) 40 MG tablet, Take 1 tablet (40 mg total) by mouth once daily., Disp: 30 tablet, Rfl: 2    guanFACINE (TENEX) 2 MG tablet, Take 1 tablet (2 mg total) by mouth nightly., Disp: 90 tablet, Rfl: 3    insulin degludec (TRESIBA FLEXTOUCH U-200) 200 unit/mL (3 mL) insulin pen, Inject 76 Units into the skin once daily., Disp: 12 pen, Rfl: 3    iron-vitamin C 100-250 mg, ICAR-C, 100-250 mg Tab, Take 1 tablet by mouth once daily., Disp: 30 each, Rfl: 8    lancets (ONETOUCH DELICA LANCETS) 33 gauge Misc, USE 1  TO CHECK GLUCOSE 4 TIMES DAILY, Disp: 100 each, Rfl: 3    levothyroxine (SYNTHROID) 75 MCG tablet, Take 75 mcg by mouth before breakfast., Disp: , Rfl:     magnesium oxide (MAG-OX) 400 mg (241.3 mg magnesium) tablet, Take 1 tablet (400 mg total) by mouth once daily., Disp: 90 tablet, Rfl: 1    metoprolol succinate (TOPROL-XL) 25 MG 24 hr tablet, Take 4 tablets (100 mg total) by mouth once daily., Disp: 90 tablet, Rfl: 1    multivitamin capsule, Take 1 capsule by mouth once daily., Disp: , Rfl:     potassium chloride (KLOR-CON) 10 MEQ TbSR, Take 1 tablet (10 mEq total) by mouth once daily., Disp: 90 tablet, Rfl: 1    prednisoLONE acetate (PRED FORTE) 1 % DrpS, Place 1 drop into both eyes as needed.  ", Disp: , Rfl:     pregabalin (LYRICA) 50 MG capsule, Take 50 mg by mouth every evening., Disp: , Rfl:     rosuvastatin (CRESTOR) 40 MG Tab, Take 1 tablet (40 mg total) by mouth every evening., Disp: 90 tablet, Rfl: 3    aspirin (ECOTRIN) 81 MG EC tablet, Take 1 tablet (81 mg total) by mouth once daily., Disp: 30 tablet, Rfl: 0    blood-glucose meter kit, Use as instructed, Disp: 1 each, Rfl: 0    fluconazole (DIFLUCAN) 100 MG tablet, Take 1 tablet (100 mg total) by mouth once daily., Disp: 30 tablet, Rfl: 0    PMHx/PSHx Updates:  See patient's last visit with me on 4/13/2023  See H&P on 1/25/2021        Pathology:   Cancer Staging   No matching staging information was found for the patient.          Objective:     Vitals:  Blood pressure (!) 146/64, pulse 61, temperature 97.2 °F (36.2 °C), resp. rate 16, height 5' 8" (1.727 m), weight 102 kg (224 lb 12.8 oz).    Physical Examination:   GEN: no apparent distress, comfortable; AAOx3; overweight  HEAD: atraumatic and normocephalic  EYES: no pallor, no icterus, PERRLA  ENT: OMM, no pharyngeal erythema, external ears WNL; no nasal discharge; no thrush  NECK: no masses, thyroid normal, trachea midline, no LAD/LN's, supple; s/p thyroidectomy    CV: RRR with no murmur; normal pulse; normal S1 and S2; no pedal edema  CHEST: Normal respiratory effort; CTAB; normal breath sounds; no wheeze or crackles  ABDOM: nontender and nondistended; soft; normal bowel sounds; no rebound/guarding  MUSC/Skeletal: ROM normal; no crepitus; joints normal; no deformities; s/p right shoulder replacement with better ROM   EXTREM: no clubbing, cyanosis, inflammation or swelling  SKIN: no rashes, lesions, ulcers, petechiae or subcutaneous nodules; chronic age related skin changes  : no schaefer  NEURO: grossly intact; motor/sensory WNL; AAOx3; no tremors  PSYCH: normal mood, affect and behavior  LYMPH: normal cervical, supraclavicular, axillary and groin LN's            Labs:     Lab Results "   Component Value Date    WBC 6.26 10/23/2023    HGB 13.5 10/23/2023    HCT 43.1 10/23/2023    MCV 85 10/23/2023     10/23/2023       Lab Results   Component Value Date    IRON 30 10/23/2023    TIBC 330 10/23/2023    FERRITIN 24.5 10/23/2023     Lab Results   Component Value Date    [id number] 207 (L) 10/23/2023     Lab Results   Component Value Date    FOLATE >22.3 10/23/2023           CMP  Sodium   Date Value Ref Range Status   10/23/2023 143 136 - 145 mmol/L Final   01/14/2019 141 134 - 144 mmol/L      Potassium   Date Value Ref Range Status   10/23/2023 4.0 3.5 - 5.1 mmol/L Final     Chloride   Date Value Ref Range Status   10/23/2023 103 95 - 110 mmol/L Final   01/14/2019 96 (L) 98 - 110 mmol/L      CO2   Date Value Ref Range Status   10/23/2023 32 (H) 23 - 29 mmol/L Final     Glucose   Date Value Ref Range Status   10/23/2023 137 (H) 70 - 110 mg/dL Final   01/14/2019 177 (H) 70 - 99 mg/dL      BUN   Date Value Ref Range Status   10/23/2023 15 8 - 23 mg/dL Final     Creatinine   Date Value Ref Range Status   10/23/2023 0.9 0.5 - 1.4 mg/dL Final   01/14/2019 0.99 0.60 - 1.40 mg/dL      Calcium   Date Value Ref Range Status   10/23/2023 10.4 8.7 - 10.5 mg/dL Final     Total Protein   Date Value Ref Range Status   10/23/2023 7.0 6.0 - 8.4 g/dL Final     Albumin   Date Value Ref Range Status   10/23/2023 3.9 3.5 - 5.2 g/dL Final   01/14/2019 3.8 3.1 - 4.7 g/dL      Total Bilirubin   Date Value Ref Range Status   10/23/2023 0.3 0.1 - 1.0 mg/dL Final     Comment:     For infants and newborns, interpretation of results should be based  on gestational age, weight and in agreement with clinical  observations.    Premature Infant recommended reference ranges:  Up to 24 hours.............<8.0 mg/dL  Up to 48 hours............<12.0 mg/dL  3-5 days..................<15.0 mg/dL  6-29 days.................<15.0 mg/dL       Alkaline Phosphatase   Date Value Ref Range Status   10/23/2023 76 55 - 135 U/L Final     AST    Date Value Ref Range Status   10/23/2023 18 10 - 40 U/L Final     ALT   Date Value Ref Range Status   10/23/2023 12 10 - 44 U/L Final     Anion Gap   Date Value Ref Range Status   10/23/2023 8 8 - 16 mmol/L Final     eGFR if    Date Value Ref Range Status   07/20/2022 >60.0 >60 mL/min/1.73 m^2 Final     eGFR if non    Date Value Ref Range Status   07/20/2022 >60.0 >60 mL/min/1.73 m^2 Final     Comment:     Calculation used to obtain the estimated glomerular filtration  rate (eGFR) is the CKD-EPI equation.            Radiology/Diagnostic Studies:    PET 10/11/2023:    IMPRESSION: 6 mm faint noncalcified nodule left upper lobe with no significant FDG activity. Short-term follow-up CT in 6 months is recommended     Cardiomegaly with very artery calcification     Moderate size hiatal hernia     Prior cholecystectomy and hysterectomy     Colonic diverticulosis without diverticulitis    Degenerative changes of the spine  Hemorrhagic left renal cyst      CT Shoulder Without Contrast Right    Result Date: 3/2/2021  CMS MANDATED QUALITY DATA - CT RADIATION  436 All CT scans at this facility utilize dose modulation, iterative reconstruction, and/or weight based dosing when appropriate to reduce radiation dose to as low as reasonably achievable. 3-D reconstructed images were generated on a separate workstation from the axial data set and stored in the patient's permanent medical record. CT SHOULDER WITHOUT CONTRAST RIGHT CLINICAL HISTORY: 65 years Female M25.511 Pain in right Shoulder COMPARISON: Right shoulder radiography February 18, 2020 FINDINGS: Acute comminuted proximal right humeral fracture demonstrates similar alignment to reference right shoulder radiographs. Medial displacement of largest distal humeral fracture fragment in relation to the largest proximal fragment by about one shaft width. Fracture extends superiorly to involve both the surgical and anatomic necks of the humerus,  and extends through the greater tuberosity. No intra-articular extension through the humeral head articular surface is evident. Scapula is intact. AC joint is normally aligned. Mild AC joint degenerative change. No right rib fracture is evident within the field-of-view. Right lung is clear. IMPRESSION: Acute comminuted and displaced fracture of proximal right humerus, involving both the surgical and anatomic necks of the right humerus, as well as the greater tuberosity. Electronically Signed by Jah TORRES on 3/3/2021 7:44 AM      I have reviewed all available lab results and radiology reports.    Assessment/Plan:   (1) 68 y.o. female with diagnosis of iron deficiency anemia who has been referred by Mindy Funk MD for evaluation by medical hematology/oncology. She has history of bariatric surgery and most likely has a multifactorial anemia process with underlying anemia of chronic disorders and iron deficiency/nutrient deficiency due to bariatric induced malabsorption.    - microcytic indices  - total bili is WNL, so I do not suspect any hemolysis at this time  - iron low at 21 and ferritin low at 3     1/25/2021:  - check B12/folate level   - order stool OBS x3  - check SPEP and Hgb electrophoresis  - discussed and will set up IV iron    4/5/2021:  - she had shoulder surgery  - repeat hgb currently at 10.5 and stable; she had two IV irons since last visit and before surgery and had been as high as 11.6 afterwards with the hgb  - iron panel is adequate  - B12 and folate are adequate  - retic 1.8 and SPEP with no M-protein    6/1/2021:  - latest labs with fairly adequate CBC  - mild anemia  - iron panel was good since on oral iron  - B12 was low - will start shots monthly today    10/11/2021:  - latest CBC and iron panel WNL  - getting B12 shot today    3/10/2022:  - last available labs are from oct 2021  - no anemia and iron panel was adequate  - B12 still was low - resume B12 monthly    9/12/2022:  -  latest Hgb was WNL; no current anemia  - iron panel, B12/folate are all adequate  - continued on oral iron and B12 injections at home    4/13/2023:  - latest CBC adequate with normal hgb  - iron panel adequate  - continued on iron po and B12 monthly    10/26/2023:  - resume B12 monthly  - continue oral iron  - hgb WNL and iron panel currently adequate  - refer to Dr Marrero with neurology     (2) CAD s/p MI and CABG; venous insufficiency     (3) HTN and hypercholesterolemia     (4) IDDM Type II     (5) Vit D Deficiency     (6) NIKOLAS - on CPAP     (7) Chronic LBP    (8) Lung nodule     10/26/2023:  - she had PET on 10/11/2023  - she is seeing pulm in Dec 2023 with Dr Velez              VISIT DIAGNOSES:      S/P bariatric surgery  -     Ambulatory referral/consult to Neurology; Future; Expected date: 11/02/2023  -     CBC Auto Differential; Standing  -     Comprehensive Metabolic Panel; Standing  -     Iron and TIBC; Standing  -     Ferritin; Standing  -     Vitamin B12; Standing  -     Folate; Standing    Anemia, unspecified type    Iron deficiency anemia following bariatric surgery  -     Ambulatory referral/consult to Neurology; Future; Expected date: 11/02/2023  -     CBC Auto Differential; Standing  -     Comprehensive Metabolic Panel; Standing  -     Iron and TIBC; Standing  -     Ferritin; Standing  -     Vitamin B12; Standing  -     Folate; Standing    B12 deficiency  -     Ambulatory referral/consult to Neurology; Future; Expected date: 11/02/2023  -     CBC Auto Differential; Standing  -     Comprehensive Metabolic Panel; Standing  -     Iron and TIBC; Standing  -     Ferritin; Standing  -     Vitamin B12; Standing  -     Folate; Standing    Yeast infection  -     fluconazole (DIFLUCAN) 100 MG tablet; Take 1 tablet (100 mg total) by mouth once daily.  Dispense: 30 tablet; Refill: 0          PLAN:  1. continue current management - resume B12 and encouraged compliance  2. continue ICAR-C    3. F/u with PCP,  bariatrics, card, Endocrine, etc  4. Check basic labs incl iron panel every 6 months  5. continue B12 monthly (encouraged compliance)  6. Refer to neurology for her memory issues,etc  7. Proceed with piulmonary evaluation with DR Velez in Dec 2023      RTC in 6 months    Fax note to Robson Mcintyre       Discussion:     COVID-19 Discussion:     I had long discussion with patient and any applicable family about the COVID-19 coronavirus epidemic and the recommended precautions with regard to cancer and/or hematology patients. I have re-iterated the CDC recommendations for adequate hand washing, use of hand -like products, and coughing into elbow, etc. In addition, especially for our patients who are on chemotherapy and/or our otherwise immunocompromised patients, I have recommended avoidance of crowds, including movie theaters, restaurants, churches, etc. I have recommended avoidance of any sick or symptomatic family members and/or friends. I have also recommended avoidance of any raw and unwashed food products, and general avoidance of food items that have not been prepared by themselves. The patient has been asked to call us immediately with any symptom developments, issues, questions or other general concerns.         Iron Infusion Therapy Discussion:      I provided literature/learning materials on the particular IV iron regimen and discussed the potential side-effect profiles of the drug(s). I discussed the importance of compliance with obtaining and monitoring requested lab work, and went over the potential risk for the development of anaphylactic shock, bronchospasm, dysrhythmia, liver and/or kidney damage, and respiratory/cardiovascular arrest and/or failure. I discussed the potential risks for development of alopecia, fevers, itching, chills and/or rigors, cold sensory issues, ringing in ears, vertigo and neuropathy, all of which are usually acute but sometimes could end up being  chronic and life-long. I discussed the risks of hand-foot syndrome and rashes, and development of other autoimmune mediated processes such as pneumonitis and colitis which could be life threatening.      The patient's consent has been obtained to proceed with the IV iron therapy.The patient will be referred to [facility] for training and education on IV iron therapy, use of antiemetics and/or anti-diarrheals, use of NSAID's, potential IV iron therapy side-effects, and any specific recommendations and precautions with the particular IV iron agents.       I answered all of the patient's (and family's, if applicable) questions to the best of my ability and to their complete satisfaction. The patient acknowledged full understanding of the risks, recommendations and plan(s).            I spent over 25 mins of time with the patient. Reviewed results of the recently ordered labs, tests and studies; made directives with regards to the results. Over half of this time was spent couseling and coordinating care.    I have explained all of the above in detail and the patient understands all of the current recommendation(s). I have answered all of their questions to the best of my ability and to their complete satisfaction.   The patient is to continue with the current management plan.            Electronically signed by Mauricio Jackson MD                          Answers submitted by the patient for this visit:  Review of Systems Questionnaire (Submitted on 10/23/2023)  appetite change : No  unexpected weight change: No  mouth sores: No  visual disturbance: No  cough: Yes  shortness of breath: No  chest pain: No  abdominal pain: No  diarrhea: No  frequency: Yes  back pain: Yes  rash: Yes  headaches: Yes  adenopathy: No  nervous/ anxious: No

## 2023-10-26 ENCOUNTER — OFFICE VISIT (OUTPATIENT)
Dept: HEMATOLOGY/ONCOLOGY | Facility: CLINIC | Age: 68
End: 2023-10-26
Payer: MEDICARE

## 2023-10-26 VITALS
HEIGHT: 68 IN | RESPIRATION RATE: 16 BRPM | BODY MASS INDEX: 34.07 KG/M2 | HEART RATE: 61 BPM | DIASTOLIC BLOOD PRESSURE: 64 MMHG | WEIGHT: 224.81 LBS | TEMPERATURE: 97 F | SYSTOLIC BLOOD PRESSURE: 146 MMHG

## 2023-10-26 DIAGNOSIS — E53.8 B12 DEFICIENCY: ICD-10-CM

## 2023-10-26 DIAGNOSIS — D50.8 IRON DEFICIENCY ANEMIA FOLLOWING BARIATRIC SURGERY: ICD-10-CM

## 2023-10-26 DIAGNOSIS — K95.89 IRON DEFICIENCY ANEMIA FOLLOWING BARIATRIC SURGERY: ICD-10-CM

## 2023-10-26 DIAGNOSIS — D64.9 ANEMIA, UNSPECIFIED TYPE: ICD-10-CM

## 2023-10-26 DIAGNOSIS — B37.9 YEAST INFECTION: ICD-10-CM

## 2023-10-26 DIAGNOSIS — Z98.84 S/P BARIATRIC SURGERY: Primary | ICD-10-CM

## 2023-10-26 DIAGNOSIS — I10 BENIGN ESSENTIAL HYPERTENSION: ICD-10-CM

## 2023-10-26 PROCEDURE — 4010F ACE/ARB THERAPY RXD/TAKEN: CPT | Mod: CPTII,S$GLB,, | Performed by: INTERNAL MEDICINE

## 2023-10-26 PROCEDURE — 99213 PR OFFICE/OUTPT VISIT, EST, LEVL III, 20-29 MIN: ICD-10-PCS | Mod: S$GLB,,, | Performed by: INTERNAL MEDICINE

## 2023-10-26 PROCEDURE — 3077F SYST BP >= 140 MM HG: CPT | Mod: CPTII,S$GLB,, | Performed by: INTERNAL MEDICINE

## 2023-10-26 PROCEDURE — 1125F PR PAIN SEVERITY QUANTIFIED, PAIN PRESENT: ICD-10-PCS | Mod: CPTII,S$GLB,, | Performed by: INTERNAL MEDICINE

## 2023-10-26 PROCEDURE — 3078F PR MOST RECENT DIASTOLIC BLOOD PRESSURE < 80 MM HG: ICD-10-PCS | Mod: CPTII,S$GLB,, | Performed by: INTERNAL MEDICINE

## 2023-10-26 PROCEDURE — 3008F PR BODY MASS INDEX (BMI) DOCUMENTED: ICD-10-PCS | Mod: CPTII,S$GLB,, | Performed by: INTERNAL MEDICINE

## 2023-10-26 PROCEDURE — 1100F PTFALLS ASSESS-DOCD GE2>/YR: CPT | Mod: CPTII,S$GLB,, | Performed by: INTERNAL MEDICINE

## 2023-10-26 PROCEDURE — 3061F PR NEG MICROALBUMINURIA RESULT DOCUMENTED/REVIEW: ICD-10-PCS | Mod: CPTII,S$GLB,, | Performed by: INTERNAL MEDICINE

## 2023-10-26 PROCEDURE — 1160F PR REVIEW ALL MEDS BY PRESCRIBER/CLIN PHARMACIST DOCUMENTED: ICD-10-PCS | Mod: CPTII,S$GLB,, | Performed by: INTERNAL MEDICINE

## 2023-10-26 PROCEDURE — 3078F DIAST BP <80 MM HG: CPT | Mod: CPTII,S$GLB,, | Performed by: INTERNAL MEDICINE

## 2023-10-26 PROCEDURE — 3066F NEPHROPATHY DOC TX: CPT | Mod: CPTII,S$GLB,, | Performed by: INTERNAL MEDICINE

## 2023-10-26 PROCEDURE — 99213 OFFICE O/P EST LOW 20 MIN: CPT | Mod: S$GLB,,, | Performed by: INTERNAL MEDICINE

## 2023-10-26 PROCEDURE — 4010F PR ACE/ARB THEARPY RXD/TAKEN: ICD-10-PCS | Mod: CPTII,S$GLB,, | Performed by: INTERNAL MEDICINE

## 2023-10-26 PROCEDURE — 3288F FALL RISK ASSESSMENT DOCD: CPT | Mod: CPTII,S$GLB,, | Performed by: INTERNAL MEDICINE

## 2023-10-26 PROCEDURE — 1100F PR PT FALLS ASSESS DOC 2+ FALLS/FALL W/INJURY/YR: ICD-10-PCS | Mod: CPTII,S$GLB,, | Performed by: INTERNAL MEDICINE

## 2023-10-26 PROCEDURE — 3008F BODY MASS INDEX DOCD: CPT | Mod: CPTII,S$GLB,, | Performed by: INTERNAL MEDICINE

## 2023-10-26 PROCEDURE — 3044F PR MOST RECENT HEMOGLOBIN A1C LEVEL <7.0%: ICD-10-PCS | Mod: CPTII,S$GLB,, | Performed by: INTERNAL MEDICINE

## 2023-10-26 PROCEDURE — 3077F PR MOST RECENT SYSTOLIC BLOOD PRESSURE >= 140 MM HG: ICD-10-PCS | Mod: CPTII,S$GLB,, | Performed by: INTERNAL MEDICINE

## 2023-10-26 PROCEDURE — 1125F AMNT PAIN NOTED PAIN PRSNT: CPT | Mod: CPTII,S$GLB,, | Performed by: INTERNAL MEDICINE

## 2023-10-26 PROCEDURE — 1159F MED LIST DOCD IN RCRD: CPT | Mod: CPTII,S$GLB,, | Performed by: INTERNAL MEDICINE

## 2023-10-26 PROCEDURE — 1160F RVW MEDS BY RX/DR IN RCRD: CPT | Mod: CPTII,S$GLB,, | Performed by: INTERNAL MEDICINE

## 2023-10-26 PROCEDURE — 3066F PR DOCUMENTATION OF TREATMENT FOR NEPHROPATHY: ICD-10-PCS | Mod: CPTII,S$GLB,, | Performed by: INTERNAL MEDICINE

## 2023-10-26 PROCEDURE — 1159F PR MEDICATION LIST DOCUMENTED IN MEDICAL RECORD: ICD-10-PCS | Mod: CPTII,S$GLB,, | Performed by: INTERNAL MEDICINE

## 2023-10-26 PROCEDURE — 3288F PR FALLS RISK ASSESSMENT DOCUMENTED: ICD-10-PCS | Mod: CPTII,S$GLB,, | Performed by: INTERNAL MEDICINE

## 2023-10-26 PROCEDURE — 3044F HG A1C LEVEL LT 7.0%: CPT | Mod: CPTII,S$GLB,, | Performed by: INTERNAL MEDICINE

## 2023-10-26 PROCEDURE — 3061F NEG MICROALBUMINURIA REV: CPT | Mod: CPTII,S$GLB,, | Performed by: INTERNAL MEDICINE

## 2023-10-26 RX ORDER — IRON,CARBONYL/ASCORBIC ACID 100-250 MG
1 TABLET ORAL DAILY
Qty: 90 EACH | Refills: 3 | Status: SHIPPED | OUTPATIENT
Start: 2023-10-26

## 2023-10-26 RX ORDER — FLUCONAZOLE 100 MG/1
100 TABLET ORAL DAILY
Qty: 30 TABLET | Refills: 0 | Status: SHIPPED | OUTPATIENT
Start: 2023-10-26 | End: 2023-11-01

## 2023-10-27 RX ORDER — METOPROLOL SUCCINATE 25 MG/1
100 TABLET, EXTENDED RELEASE ORAL DAILY
Qty: 90 TABLET | Refills: 0 | Status: SHIPPED | OUTPATIENT
Start: 2023-10-27 | End: 2023-11-01

## 2023-10-30 PROBLEM — A41.9 SEPTIC SHOCK: Status: RESOLVED | Noted: 2023-07-19 | Resolved: 2023-10-30

## 2023-10-30 PROBLEM — R65.21 SEPTIC SHOCK: Status: RESOLVED | Noted: 2023-07-19 | Resolved: 2023-10-30

## 2023-11-01 ENCOUNTER — OFFICE VISIT (OUTPATIENT)
Dept: FAMILY MEDICINE | Facility: CLINIC | Age: 68
End: 2023-11-01
Payer: MEDICARE

## 2023-11-01 VITALS
WEIGHT: 225.88 LBS | RESPIRATION RATE: 18 BRPM | OXYGEN SATURATION: 98 % | HEIGHT: 68 IN | DIASTOLIC BLOOD PRESSURE: 73 MMHG | BODY MASS INDEX: 34.23 KG/M2 | HEART RATE: 60 BPM | SYSTOLIC BLOOD PRESSURE: 137 MMHG | TEMPERATURE: 98 F

## 2023-11-01 DIAGNOSIS — B37.9 CANDIDA INFECTION: ICD-10-CM

## 2023-11-01 DIAGNOSIS — Z79.4 TYPE 2 DIABETES MELLITUS TREATED WITH INSULIN: ICD-10-CM

## 2023-11-01 DIAGNOSIS — E11.9 TYPE 2 DIABETES MELLITUS TREATED WITH INSULIN: ICD-10-CM

## 2023-11-01 DIAGNOSIS — I10 BENIGN ESSENTIAL HYPERTENSION: Primary | ICD-10-CM

## 2023-11-01 PROCEDURE — 3044F HG A1C LEVEL LT 7.0%: CPT | Mod: CPTII,S$GLB,, | Performed by: NURSE PRACTITIONER

## 2023-11-01 PROCEDURE — 3075F PR MOST RECENT SYSTOLIC BLOOD PRESS GE 130-139MM HG: ICD-10-PCS | Mod: CPTII,S$GLB,, | Performed by: NURSE PRACTITIONER

## 2023-11-01 PROCEDURE — 3078F DIAST BP <80 MM HG: CPT | Mod: CPTII,S$GLB,, | Performed by: NURSE PRACTITIONER

## 2023-11-01 PROCEDURE — 3288F FALL RISK ASSESSMENT DOCD: CPT | Mod: CPTII,S$GLB,, | Performed by: NURSE PRACTITIONER

## 2023-11-01 PROCEDURE — 4010F PR ACE/ARB THEARPY RXD/TAKEN: ICD-10-PCS | Mod: CPTII,S$GLB,, | Performed by: NURSE PRACTITIONER

## 2023-11-01 PROCEDURE — 1101F PR PT FALLS ASSESS DOC 0-1 FALLS W/OUT INJ PAST YR: ICD-10-PCS | Mod: CPTII,S$GLB,, | Performed by: NURSE PRACTITIONER

## 2023-11-01 PROCEDURE — 1160F PR REVIEW ALL MEDS BY PRESCRIBER/CLIN PHARMACIST DOCUMENTED: ICD-10-PCS | Mod: CPTII,S$GLB,, | Performed by: NURSE PRACTITIONER

## 2023-11-01 PROCEDURE — 1125F PR PAIN SEVERITY QUANTIFIED, PAIN PRESENT: ICD-10-PCS | Mod: CPTII,S$GLB,, | Performed by: NURSE PRACTITIONER

## 2023-11-01 PROCEDURE — 3061F PR NEG MICROALBUMINURIA RESULT DOCUMENTED/REVIEW: ICD-10-PCS | Mod: CPTII,S$GLB,, | Performed by: NURSE PRACTITIONER

## 2023-11-01 PROCEDURE — 1101F PT FALLS ASSESS-DOCD LE1/YR: CPT | Mod: CPTII,S$GLB,, | Performed by: NURSE PRACTITIONER

## 2023-11-01 PROCEDURE — 1159F MED LIST DOCD IN RCRD: CPT | Mod: CPTII,S$GLB,, | Performed by: NURSE PRACTITIONER

## 2023-11-01 PROCEDURE — 1159F PR MEDICATION LIST DOCUMENTED IN MEDICAL RECORD: ICD-10-PCS | Mod: CPTII,S$GLB,, | Performed by: NURSE PRACTITIONER

## 2023-11-01 PROCEDURE — 1160F RVW MEDS BY RX/DR IN RCRD: CPT | Mod: CPTII,S$GLB,, | Performed by: NURSE PRACTITIONER

## 2023-11-01 PROCEDURE — 1125F AMNT PAIN NOTED PAIN PRSNT: CPT | Mod: CPTII,S$GLB,, | Performed by: NURSE PRACTITIONER

## 2023-11-01 PROCEDURE — 3288F PR FALLS RISK ASSESSMENT DOCUMENTED: ICD-10-PCS | Mod: CPTII,S$GLB,, | Performed by: NURSE PRACTITIONER

## 2023-11-01 PROCEDURE — 3044F PR MOST RECENT HEMOGLOBIN A1C LEVEL <7.0%: ICD-10-PCS | Mod: CPTII,S$GLB,, | Performed by: NURSE PRACTITIONER

## 2023-11-01 PROCEDURE — 3075F SYST BP GE 130 - 139MM HG: CPT | Mod: CPTII,S$GLB,, | Performed by: NURSE PRACTITIONER

## 2023-11-01 PROCEDURE — 3066F PR DOCUMENTATION OF TREATMENT FOR NEPHROPATHY: ICD-10-PCS | Mod: CPTII,S$GLB,, | Performed by: NURSE PRACTITIONER

## 2023-11-01 PROCEDURE — 3066F NEPHROPATHY DOC TX: CPT | Mod: CPTII,S$GLB,, | Performed by: NURSE PRACTITIONER

## 2023-11-01 PROCEDURE — 4010F ACE/ARB THERAPY RXD/TAKEN: CPT | Mod: CPTII,S$GLB,, | Performed by: NURSE PRACTITIONER

## 2023-11-01 PROCEDURE — 99214 PR OFFICE/OUTPT VISIT, EST, LEVL IV, 30-39 MIN: ICD-10-PCS | Mod: S$GLB,,, | Performed by: NURSE PRACTITIONER

## 2023-11-01 PROCEDURE — 3078F PR MOST RECENT DIASTOLIC BLOOD PRESSURE < 80 MM HG: ICD-10-PCS | Mod: CPTII,S$GLB,, | Performed by: NURSE PRACTITIONER

## 2023-11-01 PROCEDURE — 99214 OFFICE O/P EST MOD 30 MIN: CPT | Mod: S$GLB,,, | Performed by: NURSE PRACTITIONER

## 2023-11-01 PROCEDURE — 3008F PR BODY MASS INDEX (BMI) DOCUMENTED: ICD-10-PCS | Mod: CPTII,S$GLB,, | Performed by: NURSE PRACTITIONER

## 2023-11-01 PROCEDURE — 3061F NEG MICROALBUMINURIA REV: CPT | Mod: CPTII,S$GLB,, | Performed by: NURSE PRACTITIONER

## 2023-11-01 PROCEDURE — 3008F BODY MASS INDEX DOCD: CPT | Mod: CPTII,S$GLB,, | Performed by: NURSE PRACTITIONER

## 2023-11-01 RX ORDER — NYSTATIN 100000 [USP'U]/G
POWDER TOPICAL 3 TIMES DAILY
Qty: 60 G | Refills: 0 | Status: SHIPPED | OUTPATIENT
Start: 2023-11-01 | End: 2023-11-30 | Stop reason: SDUPTHER

## 2023-11-01 RX ORDER — METOPROLOL SUCCINATE 25 MG/1
25 TABLET, EXTENDED RELEASE ORAL DAILY
Qty: 90 TABLET | Refills: 1 | Status: SHIPPED | OUTPATIENT
Start: 2023-11-01

## 2023-11-01 RX ORDER — METOLAZONE 2.5 MG/1
2.5 TABLET ORAL DAILY
Qty: 90 TABLET | Refills: 1 | Status: SHIPPED | OUTPATIENT
Start: 2023-11-01 | End: 2024-03-26 | Stop reason: SDUPTHER

## 2023-11-01 NOTE — PROGRESS NOTES
SUBJECTIVE:      Patient ID: Chanel Cervantes is a 68 y.o. female.    Chief Complaint: Medication Problem (Metoprolol)    Chanel is here for folllow up on medications and recheck of HTN.  PMH of DM, HTN, HLD, anemia, CAD, hx of MI, depression, obesity s/p gastric sleeve in April 2019 c/b incisional ventral hernia s/p repair, panniculectomy and full thickness skin graft on 12/4/19, anemia, multinodular thyroid s/p R thyroid lobectomy 7/2021, and NIKOLAS. Her BP is controlled today. She continues on amlodipine, olmesartan, Tenex, metoprolol 25 mg daily, and recently switched back to metolazone from lasix. Has not seen cardiology yet for cardiomegaly- will schedule. Has a rash under breasts and in groin- thinks it is heat rash. Has Diflucan she got from oncology today.       Dr. Henley - endocrine (thyroid)   Dr. Jackson - hem/onc  Dr. Orozco - rheumatology  Dr. Nunez - bariatrics/gen surg  Dr. Chance - surg (thyroid)  Dr. Guan - eye doctor      Hypertension  This is a chronic problem. The current episode started more than 1 year ago. The problem has been gradually improving since onset. The problem is controlled. Associated symptoms include headaches. Pertinent negatives include no anxiety, blurred vision, chest pain, malaise/fatigue, neck pain, palpitations, peripheral edema, shortness of breath or sweats. Agents associated with hypertension include thyroid hormones. Risk factors for coronary artery disease include obesity, post-menopausal state, sedentary lifestyle, dyslipidemia, diabetes mellitus and family history. Past treatments include beta blockers, angiotensin blockers, calcium channel blockers, lifestyle changes and diuretics. The current treatment provides moderate improvement. Compliance problems include diet and exercise.  Identifiable causes of hypertension include sleep apnea. There is no history of renovascular disease or a thyroid problem.       Family History   Problem Relation Age of Onset     "Diabetes Mother     Vision loss Mother     Heart disease Father     Vision loss Father     Stroke Father       Social History     Socioeconomic History    Marital status:    Tobacco Use    Smoking status: Former     Current packs/day: 0.00     Average packs/day: 1 pack/day for 15.0 years (15.0 ttl pk-yrs)     Types: Cigarettes     Start date:      Quit date:      Years since quittin.8    Smokeless tobacco: Never   Substance and Sexual Activity    Alcohol use: No     Comment: "maybe once a year"    Drug use: No    Sexual activity: Not Currently     Social Determinants of Health     Financial Resource Strain: Unknown (10/31/2023)    Overall Financial Resource Strain (CARDIA)     Difficulty of Paying Living Expenses: Patient refused   Food Insecurity: Unknown (10/31/2023)    Hunger Vital Sign     Worried About Running Out of Food in the Last Year: Patient refused     Ran Out of Food in the Last Year: Patient refused   Transportation Needs: Unknown (10/31/2023)    PRAPARE - Transportation     Lack of Transportation (Medical): Patient refused     Lack of Transportation (Non-Medical): Patient refused   Physical Activity: Unknown (10/31/2023)    Exercise Vital Sign     Days of Exercise per Week: Patient refused   Stress: Unknown (10/31/2023)    [employer] - Occupational Stress Questionnaire     Feeling of Stress : Patient refused   Recent Concern: Stress - Stress Concern Present (10/23/2023)    [employer] - Occupational Stress Questionnaire     Feeling of Stress : To some extent   Social Connections: Unknown (10/31/2023)    Social Connection and Isolation Panel [NHANES]     Frequency of Communication with Friends and Family: Patient refused     Frequency of Social Gatherings with Friends and Family: Patient refused     Active Member of Clubs or Organizations: Patient refused     Attends Club or Organization Meetings: Patient refused     " Marital Status: Patient refused   Housing Stability: Low Risk  (10/31/2023)    Housing Stability Vital Sign     Unable to Pay for Housing in the Last Year: No     Number of Places Lived in the Last Year: 1     Unstable Housing in the Last Year: No     Current Outpatient Medications   Medication Sig Dispense Refill    amLODIPine (NORVASC) 2.5 MG tablet Take 1 tablet (2.5 mg total) by mouth once daily. 90 tablet 1    aspirin (ECOTRIN) 81 MG EC tablet Take 1 tablet (81 mg total) by mouth once daily. 30 tablet 0    calcium carbonate-vitamin D3 600 mg-62.5 mcg (2,500 unit) Cap calcium carbonate 600 mg-vitamin D3 62.5 mcg (2,500 unit) capsule   Take by oral route.      DULoxetine (CYMBALTA) 60 MG capsule Take 1 capsule (60 mg total) by mouth once daily. 90 capsule 1    empaglifloz-linaglip-metformin (TRIJARDY XR) 25-5-1,000 mg TBph Take 1 tablet by mouth once daily. 90 tablet 1    guanFACINE (TENEX) 2 MG tablet Take 1 tablet (2 mg total) by mouth nightly. 90 tablet 3    insulin degludec (TRESIBA FLEXTOUCH U-200) 200 unit/mL (3 mL) insulin pen Inject 76 Units into the skin once daily. 12 pen 3    iron-vitamin C 100-250 mg, ICAR-C, 100-250 mg Tab Take 1 tablet by mouth once daily. 90 each 3    levothyroxine (SYNTHROID) 75 MCG tablet Take 75 mcg by mouth before breakfast.      magnesium oxide (MAG-OX) 400 mg (241.3 mg magnesium) tablet Take 1 tablet (400 mg total) by mouth once daily. 90 tablet 1    multivitamin capsule Take 1 capsule by mouth once daily.      potassium chloride (KLOR-CON) 10 MEQ TbSR Take 1 tablet (10 mEq total) by mouth once daily. 90 tablet 1    prednisoLONE acetate (PRED FORTE) 1 % DrpS Place 1 drop into both eyes as needed.       pregabalin (LYRICA) 50 MG capsule Take 50 mg by mouth every evening.      rosuvastatin (CRESTOR) 40 MG Tab Take 1 tablet (40 mg total) by mouth every evening. 90 tablet 3    blood sugar diagnostic Strp One Touch Ultra Blue Test strips, Use l strip to check glucose 4 times daily  100 each 11    blood-glucose meter kit Use as instructed 1 each 0    lancets (ONETOUCH DELICA LANCETS) 33 gauge Misc USE 1  TO CHECK GLUCOSE 4 TIMES DAILY 100 each 3    metOLazone (ZAROXOLYN) 2.5 MG tablet Take 1 tablet (2.5 mg total) by mouth once daily. 90 tablet 1    metoprolol succinate (TOPROL-XL) 25 MG 24 hr tablet Take 1 tablet (25 mg total) by mouth once daily. 90 tablet 1    nystatin (MYCOSTATIN) powder Apply topically 3 (three) times daily. 60 g 0     No current facility-administered medications for this visit.     Review of patient's allergies indicates:   Allergen Reactions    Adhesive tape-silicones Rash    Dye Hives    Cephalexin     Iodine     Penicillins Edema    Pneumococcal vaccine     Iodinated contrast media Rash      Past Medical History:   Diagnosis Date    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 2021    Anemia, chronic disease 2021    CAD (coronary artery disease)     Diabetes mellitus, type 2     Hypertension     Iron deficiency anemia following bariatric surgery 2021    MI (myocardial infarction)     Secondary thrombocytosis 2021    Sleep apnea     Sleep apnea 2019    Sleep Right      Past Surgical History:   Procedure Laterality Date    ANGIOGRAM, CORONARY, WITH LEFT HEART CATHETERIZATION      APPENDECTOMY      BARIATRIC SURGERY  2019    Dr Nunez    CARPAL TUNNEL RELEASE Bilateral 2002     SECTION      CHOLECYSTECTOMY      COLONOSCOPY      CORONARY ANGIOPLASTY WITH STENT PLACEMENT      CORONARY ARTERY BYPASS GRAFT      EPIDURAL STEROID INJECTION INTO LUMBAR SPINE      x2    ERCP N/A 2023    Procedure: ERCP (ENDOSCOPIC RETROGRADE CHOLANGIOPANCREATOGRAPHY);  Surgeon: Lavon Hernandez III, MD;  Location: [facility] ENDO;  Service: Endoscopy;  Laterality: N/A;    ESOPHAGOGASTRODUODENOSCOPY      HERNIA REPAIR  2019    HYSTERECTOMY      THYROID LOBECTOMY Right 2021    Procedure: LOBECTOMY, THYROID;  Surgeon: Mrai Chance MD;  Location: [facility] OR;   "Service: General;  Laterality: Right;       Review of Systems   Constitutional:  Negative for activity change, appetite change, chills, fatigue, fever, malaise/fatigue and unexpected weight change.   HENT:  Negative for congestion, ear discharge, ear pain, hearing loss, postnasal drip, rhinorrhea, sinus pressure, sinus pain, sneezing, sore throat and trouble swallowing.    Eyes:  Negative for blurred vision, pain, discharge and visual disturbance.   Respiratory:  Negative for cough, chest tightness, shortness of breath and wheezing.    Cardiovascular:  Negative for chest pain, palpitations and leg swelling.   Gastrointestinal:  Negative for abdominal distention, abdominal pain, blood in stool, constipation, diarrhea, nausea and vomiting.   Endocrine: Negative for polydipsia, polyphagia and polyuria.   Genitourinary:  Negative for difficulty urinating, dysuria, flank pain, frequency, hematuria, menstrual problem, pelvic pain, urgency and vaginal discharge.   Musculoskeletal:  Positive for arthralgias and back pain. Negative for joint swelling, myalgias and neck pain.   Skin:  Positive for color change and rash. Negative for wound.   Allergic/Immunologic: Positive for environmental allergies.   Neurological:  Positive for headaches. Negative for dizziness, seizures, syncope, weakness, light-headedness and numbness.   Hematological:  Negative for adenopathy.   Psychiatric/Behavioral:  Negative for agitation, confusion, dysphoric mood, hallucinations, sleep disturbance and suicidal ideas. The patient is not nervous/anxious.       OBJECTIVE:      Vitals:    11/01/23 1319 11/01/23 1322   BP: (!) 140/74 137/73   BP Location: Left arm Left arm   Patient Position: Sitting Sitting   BP Method: Large (Automatic) Large (Manual)   Pulse: (!) 56 60   Resp: 18    Temp: 98.1 °F (36.7 °C)    TempSrc: Oral    SpO2: 98%    Weight: 102.5 kg (225 lb 14.4 oz)    Height: 5' 8" (1.727 m)      Physical Exam  Vitals and nursing note " reviewed.   Constitutional:       General: She is not in acute distress.     Appearance: Normal appearance. She is well-developed. She is obese.   HENT:      Head: Normocephalic.      Right Ear: Hearing normal.      Left Ear: Hearing normal.      Mouth/Throat:      Lips: Pink.      Mouth: Mucous membranes are moist.   Eyes:      General: Lids are normal.      Pupils: Pupils are equal, round, and reactive to light.   Neck:      Thyroid: No thyroid mass or thyromegaly.      Trachea: Trachea and phonation normal. No tracheal deviation.   Cardiovascular:      Rate and Rhythm: Normal rate and regular rhythm.      Heart sounds: Normal heart sounds. No murmur heard.  Pulmonary:      Effort: Pulmonary effort is normal. No respiratory distress.      Breath sounds: Normal breath sounds. No decreased breath sounds.   Musculoskeletal:         General: Normal range of motion.      Cervical back: Full passive range of motion without pain, normal range of motion and neck supple.      Right lower leg: No edema.      Left lower leg: No edema.   Lymphadenopathy:      Cervical: No cervical adenopathy.      Upper Body:      Right upper body: No supraclavicular adenopathy.      Left upper body: No supraclavicular adenopathy.   Skin:     General: Skin is warm and dry.      Coloration: Skin is not jaundiced or pale.      Findings: Erythema and rash present.          Neurological:      Mental Status: She is alert and oriented to person, place, and time.      GCS: GCS eye subscore is 4. GCS verbal subscore is 5. GCS motor subscore is 6.      Coordination: Coordination is intact.      Gait: Gait is intact.   Psychiatric:         Attention and Perception: Attention and perception normal.         Mood and Affect: Mood and affect normal.         Speech: Speech normal.         Behavior: Behavior normal. Behavior is cooperative.         Thought Content: Thought content normal. Thought content does not include suicidal plan.         Cognition and  Memory: Cognition and memory normal.         Judgment: Judgment normal.        Assessment:       1. Benign essential hypertension    2. Type 2 diabetes mellitus treated with insulin    3. Candida infection        Plan:       Benign essential hypertension  -     metoprolol succinate (TOPROL-XL) 25 MG 24 hr tablet; Take 1 tablet (25 mg total) by mouth once daily.  Dispense: 90 tablet; Refill: 1  -     metOLazone (ZAROXOLYN) 2.5 MG tablet; Take 1 tablet (2.5 mg total) by mouth once daily.  Dispense: 90 tablet; Refill: 1  -stable, continue medications     Type 2 diabetes mellitus treated with insulin   -check labs next month     Candida infection  -     nystatin (MYCOSTATIN) powder; Apply topically 3 (three) times daily.  Dispense: 60 g; Refill: 0   -take Diflucan as written, add nystatin powder     I spent a total of 31 minutes on the day of the visit.This includes face to face time and non-face to face time preparing to see the patient (eg, review of tests), obtaining and/or reviewing separately obtained history, documenting clinical information in the electronic or other health record, independently interpreting results and communicating results to the patient/family/caregiver, or care coordinator.       Follow up in about 6 months (around 5/1/2024) for diabetes, HTN.      11/1/2023 MESERET Almaguer, GISELP    This note was created using [employer] voice recognition software that occasionally misinterprets phrases or words.

## 2023-11-10 ENCOUNTER — TELEPHONE (OUTPATIENT)
Dept: HEMATOLOGY/ONCOLOGY | Facility: CLINIC | Age: 68
End: 2023-11-10

## 2023-11-10 DIAGNOSIS — B37.9 YEAST INFECTION: ICD-10-CM

## 2023-11-10 RX ORDER — FLUCONAZOLE 100 MG/1
100 TABLET ORAL DAILY
Qty: 16 TABLET | Refills: 0 | Status: SHIPPED | OUTPATIENT
Start: 2023-11-10 | End: 2023-11-26

## 2023-11-10 NOTE — TELEPHONE ENCOUNTER
Patient called in and reported that [facility] had not filled part of the patient's prescription and she needed a prescription for the remaining 16 days of diflucan. Reviewed with Dr Jackson and per his verbal order, okay to send the remaining 16 days of diflucan to the pharmacy.     While notifying patient of above, she reports that her yeast infection is persistent and not going away even with diflucan and nystatin powder that had been ordered by her PCP. Reviewed with ARLINE Friedman and per her verbal order, referral to infectious disease placed. Patient made aware of above and verbalized understanding.

## 2023-11-13 DIAGNOSIS — D51.8 OTHER VITAMIN B12 DEFICIENCY ANEMIA: ICD-10-CM

## 2023-11-13 RX ORDER — CYANOCOBALAMIN 1000 UG/ML
1000 INJECTION, SOLUTION INTRAMUSCULAR; SUBCUTANEOUS
Qty: 3 ML | Refills: 4 | Status: SHIPPED | OUTPATIENT
Start: 2023-11-13

## 2023-11-13 NOTE — TELEPHONE ENCOUNTER
Patient called and requested to have her B12 injections changed to at home injections as she had previously been doing them. Prescription pended to Dr Jackson to review and sign. Patient made aware of above and verbalized understanding.

## 2023-11-16 LAB
LEFT EYE DM RETINOPATHY: NEGATIVE
RIGHT EYE DM RETINOPATHY: NEGATIVE

## 2023-11-20 ENCOUNTER — PATIENT MESSAGE (OUTPATIENT)
Dept: FAMILY MEDICINE | Facility: CLINIC | Age: 68
End: 2023-11-20

## 2023-11-20 DIAGNOSIS — E87.6 HYPOKALEMIA: ICD-10-CM

## 2023-11-20 RX ORDER — POTASSIUM CHLORIDE 750 MG/1
10 TABLET, EXTENDED RELEASE ORAL DAILY
Qty: 90 TABLET | Refills: 1 | Status: SHIPPED | OUTPATIENT
Start: 2023-11-20

## 2023-11-24 DIAGNOSIS — M79.7 FIBROMYALGIA: ICD-10-CM

## 2023-11-27 RX ORDER — DULOXETIN HYDROCHLORIDE 60 MG/1
60 CAPSULE, DELAYED RELEASE ORAL
Qty: 90 CAPSULE | Refills: 1 | Status: SHIPPED | OUTPATIENT
Start: 2023-11-27

## 2023-11-30 ENCOUNTER — OFFICE VISIT (OUTPATIENT)
Dept: INFECTIOUS DISEASES | Facility: CLINIC | Age: 68
End: 2023-11-30
Payer: MEDICARE

## 2023-11-30 VITALS
BODY MASS INDEX: 33.94 KG/M2 | HEART RATE: 67 BPM | WEIGHT: 223.19 LBS | OXYGEN SATURATION: 97 % | SYSTOLIC BLOOD PRESSURE: 124 MMHG | DIASTOLIC BLOOD PRESSURE: 68 MMHG

## 2023-11-30 DIAGNOSIS — E66.9 OBESITY, UNSPECIFIED CLASSIFICATION, UNSPECIFIED OBESITY TYPE, UNSPECIFIED WHETHER SERIOUS COMORBIDITY PRESENT: ICD-10-CM

## 2023-11-30 DIAGNOSIS — B37.9 CANDIDA INFECTION: ICD-10-CM

## 2023-11-30 DIAGNOSIS — E11.9 TYPE 2 DIABETES MELLITUS TREATED WITH INSULIN: ICD-10-CM

## 2023-11-30 DIAGNOSIS — Z79.4 TYPE 2 DIABETES MELLITUS TREATED WITH INSULIN: ICD-10-CM

## 2023-11-30 DIAGNOSIS — L30.4 INTERTRIGO: Primary | ICD-10-CM

## 2023-11-30 PROCEDURE — 4010F PR ACE/ARB THEARPY RXD/TAKEN: ICD-10-PCS | Mod: CPTII,S$GLB,, | Performed by: INTERNAL MEDICINE

## 2023-11-30 PROCEDURE — 3044F PR MOST RECENT HEMOGLOBIN A1C LEVEL <7.0%: ICD-10-PCS | Mod: CPTII,S$GLB,, | Performed by: INTERNAL MEDICINE

## 2023-11-30 PROCEDURE — 3008F PR BODY MASS INDEX (BMI) DOCUMENTED: ICD-10-PCS | Mod: CPTII,S$GLB,, | Performed by: INTERNAL MEDICINE

## 2023-11-30 PROCEDURE — 1159F PR MEDICATION LIST DOCUMENTED IN MEDICAL RECORD: ICD-10-PCS | Mod: CPTII,S$GLB,, | Performed by: INTERNAL MEDICINE

## 2023-11-30 PROCEDURE — 3078F PR MOST RECENT DIASTOLIC BLOOD PRESSURE < 80 MM HG: ICD-10-PCS | Mod: CPTII,S$GLB,, | Performed by: INTERNAL MEDICINE

## 2023-11-30 PROCEDURE — 1159F MED LIST DOCD IN RCRD: CPT | Mod: CPTII,S$GLB,, | Performed by: INTERNAL MEDICINE

## 2023-11-30 PROCEDURE — 3061F PR NEG MICROALBUMINURIA RESULT DOCUMENTED/REVIEW: ICD-10-PCS | Mod: CPTII,S$GLB,, | Performed by: INTERNAL MEDICINE

## 2023-11-30 PROCEDURE — 3078F DIAST BP <80 MM HG: CPT | Mod: CPTII,S$GLB,, | Performed by: INTERNAL MEDICINE

## 2023-11-30 PROCEDURE — 1160F PR REVIEW ALL MEDS BY PRESCRIBER/CLIN PHARMACIST DOCUMENTED: ICD-10-PCS | Mod: CPTII,S$GLB,, | Performed by: INTERNAL MEDICINE

## 2023-11-30 PROCEDURE — 3008F BODY MASS INDEX DOCD: CPT | Mod: CPTII,S$GLB,, | Performed by: INTERNAL MEDICINE

## 2023-11-30 PROCEDURE — 3066F NEPHROPATHY DOC TX: CPT | Mod: CPTII,S$GLB,, | Performed by: INTERNAL MEDICINE

## 2023-11-30 PROCEDURE — 3061F NEG MICROALBUMINURIA REV: CPT | Mod: CPTII,S$GLB,, | Performed by: INTERNAL MEDICINE

## 2023-11-30 PROCEDURE — 99214 OFFICE O/P EST MOD 30 MIN: CPT | Mod: S$GLB,,, | Performed by: INTERNAL MEDICINE

## 2023-11-30 PROCEDURE — 1125F AMNT PAIN NOTED PAIN PRSNT: CPT | Mod: CPTII,S$GLB,, | Performed by: INTERNAL MEDICINE

## 2023-11-30 PROCEDURE — 3044F HG A1C LEVEL LT 7.0%: CPT | Mod: CPTII,S$GLB,, | Performed by: INTERNAL MEDICINE

## 2023-11-30 PROCEDURE — 3066F PR DOCUMENTATION OF TREATMENT FOR NEPHROPATHY: ICD-10-PCS | Mod: CPTII,S$GLB,, | Performed by: INTERNAL MEDICINE

## 2023-11-30 PROCEDURE — 3074F SYST BP LT 130 MM HG: CPT | Mod: CPTII,S$GLB,, | Performed by: INTERNAL MEDICINE

## 2023-11-30 PROCEDURE — 1125F PR PAIN SEVERITY QUANTIFIED, PAIN PRESENT: ICD-10-PCS | Mod: CPTII,S$GLB,, | Performed by: INTERNAL MEDICINE

## 2023-11-30 PROCEDURE — 1160F RVW MEDS BY RX/DR IN RCRD: CPT | Mod: CPTII,S$GLB,, | Performed by: INTERNAL MEDICINE

## 2023-11-30 PROCEDURE — 99214 PR OFFICE/OUTPT VISIT, EST, LEVL IV, 30-39 MIN: ICD-10-PCS | Mod: S$GLB,,, | Performed by: INTERNAL MEDICINE

## 2023-11-30 PROCEDURE — 3074F PR MOST RECENT SYSTOLIC BLOOD PRESSURE < 130 MM HG: ICD-10-PCS | Mod: CPTII,S$GLB,, | Performed by: INTERNAL MEDICINE

## 2023-11-30 PROCEDURE — 4010F ACE/ARB THERAPY RXD/TAKEN: CPT | Mod: CPTII,S$GLB,, | Performed by: INTERNAL MEDICINE

## 2023-11-30 RX ORDER — NYSTATIN 100000 [USP'U]/G
POWDER TOPICAL 3 TIMES DAILY
Qty: 60 G | Refills: 2 | Status: SHIPPED | OUTPATIENT
Start: 2023-11-30

## 2023-11-30 NOTE — PROGRESS NOTES
Subjective:      Reason for consult: candida intertrigo    HPI: Chanel Cervantes is a 68 y.o. female    Has had infra-mammary yeast infection as well as inguinal areas since September, which she has treated with OTC antifungal(Lotrimin), which responds then returns. She has been using nystatin powder lately. A1c 6.8% on last measurement. She has large breasts and a large ventral hernia that leans right.   She was given diflucan by Dr. Jackson about 1 month ago, and she took it for 10-14 days, 100 mg. With minimal improvement.   No steroids in the last few months. Follows a low sugar diet. Last antibiotics were in July when she was hospitalized for sepsis due to cholangitis. This was followed by rehab stay and was discharged 8/15 ([facility] [place]      Review of patient's allergies indicates:   Allergen Reactions    Adhesive tape-silicones Rash    Dye Hives    Cephalexin     Iodine     Penicillins Edema    Pneumococcal vaccine     Iodinated contrast media Rash     Past Medical History:   Diagnosis Date    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 2021    CAD (coronary artery disease)     Cholangitis 2023    w/septic shock, Ecoli bacteremia    Diabetes mellitus, type 2     Hypertension     Iron deficiency anemia following bariatric surgery 2021    MI (myocardial infarction)     Secondary thrombocytosis 2021    Sleep apnea 2019    Sleep Right      Past Surgical History:   Procedure Laterality Date    ANGIOGRAM, CORONARY, WITH LEFT HEART CATHETERIZATION      APPENDECTOMY      BARIATRIC SURGERY  2019    Dr Nunez. severe wound inf after surgery    CARPAL TUNNEL RELEASE Bilateral 2002     SECTION      CHOLECYSTECTOMY      COLONOSCOPY      CORONARY ANGIOPLASTY WITH STENT PLACEMENT      CORONARY ARTERY BYPASS GRAFT      EPIDURAL STEROID INJECTION INTO LUMBAR SPINE      x2    ERCP N/A 2023    Procedure: ERCP (ENDOSCOPIC RETROGRADE CHOLANGIOPANCREATOGRAPHY);  Surgeon: Lavon FERRARA  "David BELTRÁN MD;  Location: [facility] ENDO;  Service: Endoscopy;  Laterality: N/A;    ERCP W/ SPHICTEROTOMY  2023    ESOPHAGOGASTRODUODENOSCOPY      HERNIA REPAIR  2019    HYSTERECTOMY      THYROID LOBECTOMY Right 2021    Procedure: LOBECTOMY, THYROID;  Surgeon: Mari Chance MD;  Location: [facility] OR;  Service: General;  Laterality: Right;      Social History     Tobacco Use    Smoking status: Former     Current packs/day: 0.00     Average packs/day: 1 pack/day for 15.0 years (15.0 ttl pk-yrs)     Types: Cigarettes     Start date:      Quit date:      Years since quittin.9    Smokeless tobacco: Never   Substance Use Topics    Alcohol use: No     Comment: "maybe once a year"     Family History   Problem Relation Age of Onset    Diabetes Mother     Vision loss Mother     Heart disease Father     Vision loss Father     Stroke Father        Pertinent medications noted:     Review of Systems    Ability to give history is: good  No chills, fever, sweats,   No change in vision,   No pain in mouth or throat.       No  shortness of breath,   No nausea, vomiting, diarrhea,  or focal abd pain.    No vaginitis  No swelling of joints, redness of joints, injuries,    Has had more headaches and memory loss since her episode of sepsis in july  No anxiety, depression,   Diabetes with good control  No bleeding, l  malignancy, unusual bruising      Outdoor activities: no wet hobbies  Travel:   Implants: none  Antibiotic History: none since july      Objective:      Blood pressure 124/68, pulse 67, weight 101.2 kg (223 lb 3.2 oz), SpO2 97 %. Body mass index is 33.94 kg/m².  Physical Exam      General: Alert and attentive, cooperative and in no distress    Eyes:   anicteric, EOMI    Neck: Supple,     ENT: EAC patent, nares patent, no oral lesions, teeth in good condition, no thrush    Cardiovascular: Regular rate and rhythm,     Respiratory: breathing comfortably    Gastrointestinal:  soft, large right sided " ventral hernia    Genitourinary:      Integumentary: candida intertrigo beneath both breasts with shiny erythematous tissue, likely improved from recent past. Also right groin (under right sided ventral hernia) but not left. No axillary lesions    Vascular:      Musculoskeletal: Ambulates without difficulty, no acute arthritis,      Lymphatic:      Neurological: Normal LOC,   speech,  , gait. Offers that her memory is imperfect(several times)    Psychiatric: Normal mood, speech,  demeanor     Wound: n/a    VAD:       General Labs reviewed:  Lab Results   Component Value Date    WBC 6.26 10/23/2023    RBC 5.05 10/23/2023    HGB 13.5 10/23/2023    HCT 43.1 10/23/2023    MCV 85 10/23/2023    MCH 26.7 (L) 10/23/2023    MCHC 31.3 (L) 10/23/2023    RDW 14.4 10/23/2023     10/23/2023    MPV 10.8 10/23/2023    GRAN 4.7 10/23/2023    GRAN 75.7 (H) 10/23/2023    LYMPH 1.0 10/23/2023    LYMPH 15.8 (L) 10/23/2023    MONO 0.4 10/23/2023    MONO 5.8 10/23/2023    EOS 0.1 10/23/2023    BASO 0.07 10/23/2023    EOSINOPHIL 1.3 10/23/2023    BASOPHIL 1.1 10/23/2023     CMP  Sodium   Date Value Ref Range Status   10/23/2023 143 136 - 145 mmol/L Final   01/14/2019 141 134 - 144 mmol/L      Potassium   Date Value Ref Range Status   10/23/2023 4.0 3.5 - 5.1 mmol/L Final     Chloride   Date Value Ref Range Status   10/23/2023 103 95 - 110 mmol/L Final   01/14/2019 96 (L) 98 - 110 mmol/L      CO2   Date Value Ref Range Status   10/23/2023 32 (H) 23 - 29 mmol/L Final     Glucose   Date Value Ref Range Status   10/23/2023 137 (H) 70 - 110 mg/dL Final   01/14/2019 177 (H) 70 - 99 mg/dL      BUN   Date Value Ref Range Status   10/23/2023 15 8 - 23 mg/dL Final     Creatinine   Date Value Ref Range Status   10/23/2023 0.9 0.5 - 1.4 mg/dL Final   01/14/2019 0.99 0.60 - 1.40 mg/dL      Calcium   Date Value Ref Range Status   10/23/2023 10.4 8.7 - 10.5 mg/dL Final     Total Protein   Date Value Ref Range Status   10/23/2023 7.0 6.0 - 8.4 g/dL  Final     Albumin   Date Value Ref Range Status   10/23/2023 3.9 3.5 - 5.2 g/dL Final   01/14/2019 3.8 3.1 - 4.7 g/dL      Total Bilirubin   Date Value Ref Range Status   10/23/2023 0.3 0.1 - 1.0 mg/dL Final     Comment:     For infants and newborns, interpretation of results should be based  on gestational age, weight and in agreement with clinical  observations.    Premature Infant recommended reference ranges:  Up to 24 hours.............<8.0 mg/dL  Up to 48 hours............<12.0 mg/dL  3-5 days..................<15.0 mg/dL  6-29 days.................<15.0 mg/dL       Alkaline Phosphatase   Date Value Ref Range Status   10/23/2023 76 55 - 135 U/L Final     AST   Date Value Ref Range Status   10/23/2023 18 10 - 40 U/L Final     ALT   Date Value Ref Range Status   10/23/2023 12 10 - 44 U/L Final     Anion Gap   Date Value Ref Range Status   10/23/2023 8 8 - 16 mmol/L Final     eGFR   Date Value Ref Range Status   10/23/2023 >60.0 >60 mL/min/1.73 m^2 Final           Micro:  Microbiology Results (last 7 days)       ** No results found for the last 168 hours. **          Imaging Reviewed:          Assessment:       Problem List Items Addressed This Visit       Type 2 diabetes mellitus treated with insulin     Other Visit Diagnoses       Intertrigo    -  Primary    Candida infection        Relevant Medications    nystatin (MYCOSTATIN) powder    Obesity, unspecified classification, unspecified obesity type, unspecified whether serious comorbidity present                 Plan:           Intertrigo  -     Ambulatory referral/consult to Infectious Disease    Candida infection  -     nystatin (MYCOSTATIN) powder; Apply topically 3 (three) times daily.  Dispense: 60 g; Refill: 2    Obesity, unspecified classification, unspecified obesity type, unspecified whether serious comorbidity present    Type 2 diabetes mellitus treated with insulin    Please take fluconazole 200 mg (2 tablets) daily until your supply runs out.   I  will refill the nystatin powder. To apply 2-3 times per day in the skin folds.   It is very important to separate the skin surfaces where moisture would be trapped with soft, cotton cloth such as a handkerchief or a cut up cotton T shirt.    Once you have the yeast under control/resolved(majority of the redness is gone), then you want to prevent a recurrence by using an antifungal powder such as Zeasorb AF(over the counter) , or plain cornstarch or a cornstarch containing powder and keeping the skin surfaces . .     This note was created using Dragon voice recognition software that occasionally misinterpreted phrases or words.

## 2023-11-30 NOTE — PATIENT INSTRUCTIONS
Please take fluconazole 200 mg (2 tablets) daily until your supply runs out.   I will refill the nystatin powder. To apply 2-3 times per day in the skin folds.   It is very important to separate the skin surfaces where moisture would be trapped with soft, cotton cloth such as a handkerchief or a cut up cotton T shirt.    Once you have the yeast under control/resolved(majority of the redness is gone), then you want to prevent a recurrence by using an antifungal powder such as Zeasorb AF(over the counter) , or plain cornstarch or a cornstarch containing powder and keeping the skin surfaces . .

## 2023-12-30 ENCOUNTER — PATIENT MESSAGE (OUTPATIENT)
Dept: FAMILY MEDICINE | Facility: CLINIC | Age: 68
End: 2023-12-30
Payer: MEDICARE

## 2023-12-30 DIAGNOSIS — I10 BENIGN ESSENTIAL HYPERTENSION: ICD-10-CM

## 2024-01-02 DIAGNOSIS — I10 BENIGN ESSENTIAL HYPERTENSION: ICD-10-CM

## 2024-01-02 RX ORDER — METOLAZONE 5 MG/1
5 TABLET ORAL
Qty: 90 TABLET | Refills: 0 | OUTPATIENT
Start: 2024-01-02

## 2024-01-03 RX ORDER — METOLAZONE 5 MG/1
5 TABLET ORAL
Qty: 90 TABLET | Refills: 1 | OUTPATIENT
Start: 2024-01-03

## 2024-01-04 DIAGNOSIS — Z78.0 MENOPAUSE: ICD-10-CM

## 2024-01-10 ENCOUNTER — OFFICE VISIT (OUTPATIENT)
Dept: PULMONOLOGY | Facility: CLINIC | Age: 69
End: 2024-01-10
Payer: MEDICARE

## 2024-01-10 VITALS
SYSTOLIC BLOOD PRESSURE: 120 MMHG | OXYGEN SATURATION: 99 % | WEIGHT: 219.88 LBS | DIASTOLIC BLOOD PRESSURE: 70 MMHG | BODY MASS INDEX: 33.44 KG/M2 | HEART RATE: 63 BPM

## 2024-01-10 DIAGNOSIS — R05.9 COUGH, UNSPECIFIED TYPE: Primary | ICD-10-CM

## 2024-01-10 DIAGNOSIS — R91.1 LUNG NODULE: ICD-10-CM

## 2024-01-10 DIAGNOSIS — G47.33 OSA ON CPAP: ICD-10-CM

## 2024-01-10 DIAGNOSIS — R05.3 CHRONIC COUGH: ICD-10-CM

## 2024-01-10 PROCEDURE — 3078F DIAST BP <80 MM HG: CPT | Mod: CPTII,S$GLB,, | Performed by: INTERNAL MEDICINE

## 2024-01-10 PROCEDURE — 3008F BODY MASS INDEX DOCD: CPT | Mod: CPTII,S$GLB,, | Performed by: INTERNAL MEDICINE

## 2024-01-10 PROCEDURE — 1125F AMNT PAIN NOTED PAIN PRSNT: CPT | Mod: CPTII,S$GLB,, | Performed by: INTERNAL MEDICINE

## 2024-01-10 PROCEDURE — 1160F RVW MEDS BY RX/DR IN RCRD: CPT | Mod: CPTII,S$GLB,, | Performed by: INTERNAL MEDICINE

## 2024-01-10 PROCEDURE — 99215 OFFICE O/P EST HI 40 MIN: CPT | Mod: S$GLB,,, | Performed by: INTERNAL MEDICINE

## 2024-01-10 PROCEDURE — 3074F SYST BP LT 130 MM HG: CPT | Mod: CPTII,S$GLB,, | Performed by: INTERNAL MEDICINE

## 2024-01-10 PROCEDURE — 1159F MED LIST DOCD IN RCRD: CPT | Mod: CPTII,S$GLB,, | Performed by: INTERNAL MEDICINE

## 2024-01-10 NOTE — PROGRESS NOTES
New Office Visit/Consultation Note *    Patient Name: hCanel Cervantes  MRN: [id number]  : 1955      Reason for visit: abnormal Ct chest, cough, h/o NIKOLAS    HPI:     1/10/2024 - Pt was hospitalized in 2023 with sepsis and was critically ill.  CT chest at that time showed a small nodule.  As outpt she had a PET scan (about 3 month later) showing a stbale nodule and no significant activity.  She has a remote h/o smoking and quit in  (< 20 pack years).  She has had issues with chronic cough worse since her hospitalization - she has PND (will try flonase and antihistamine and see how she responds), she denies any GERD.  She also has a h/o NIKOLAS was on treatment in the past but not recently.  She does c/o some memory loss and forgetfulness since her hospitalization and we discussed that we may need to reassess her sleep apnea.    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 severe wound inf after surgery    CARPAL TUNNEL RELEASE Bilateral 2002     SECTION      CHOLECYSTECTOMY      COLONOSCOPY      CORONARY ANGIOPLASTY WITH STENT PLACEMENT      CORONARY ARTERY BYPASS GRAFT      EPIDURAL STEROID INJECTION INTO LUMBAR SPINE      x2    ERCP N/A 2023    Procedure: ERCP (ENDOSCOPIC RETROGRADE CHOLANGIOPANCREATOGRAPHY);  Surgeon: Lavon Hernandez III, MD;  Location: [facility];  Service: Endoscopy;  Laterality: N/A;    ERCP  W/ SPHICTEROTOMY  07/2023    ESOPHAGOGASTRODUODENOSCOPY      HERNIA REPAIR  12/04/2019    HYSTERECTOMY      THYROID LOBECTOMY Right 07/20/2021    Procedure: LOBECTOMY, THYROID;  Surgeon: Mari Chance MD;  Location: [facility];  Service: General;  Laterality: Right;       Medications      Current Outpatient Medications:     amLODIPine (NORVASC) 2.5 MG tablet, Take 1 tablet (2.5 mg total) by mouth once daily., Disp: 90 tablet, Rfl: 1    aspirin (ECOTRIN) 81 MG EC tablet, Take 1 tablet (81 mg total) by mouth once daily., Disp: 30 tablet, Rfl: 0    calcium carbonate-vitamin D3 600 mg-62.5 mcg (2,500 unit) Cap, calcium carbonate 600 mg-vitamin D3 62.5 mcg (2,500 unit) capsule  Take by oral route., Disp: , Rfl:     cyanocobalamin 1,000 mcg/mL injection, Inject 1 mL (1,000 mcg total) into the skin every 30 days., Disp: 3 mL, Rfl: 4    DULoxetine (CYMBALTA) 60 MG capsule, Take 1 capsule by mouth once daily, Disp: 90 capsule, Rfl: 1    empaglifloz-linaglip-metformin (TRIJARDY XR) 25-5-1,000 mg TBph, Take 1 tablet by mouth once daily., Disp: 90 tablet, Rfl: 1    guanFACINE (TENEX) 2 MG tablet, Take 1 tablet (2 mg total) by mouth nightly., Disp: 90 tablet, Rfl: 3    insulin degludec (TRESIBA FLEXTOUCH U-200) 200 unit/mL (3 mL) insulin pen, Inject 76 Units into the skin once daily., Disp: 12 pen, Rfl: 3    iron-vitamin C 100-250 mg, ICAR-C, 100-250 mg Tab, Take 1 tablet by mouth once daily., Disp: 90 each, Rfl: 3    levothyroxine (SYNTHROID) 75 MCG tablet, Take 75 mcg by mouth before breakfast., Disp: , Rfl:     magnesium oxide (MAG-OX) 400 mg (241.3 mg magnesium) tablet, Take 1 tablet (400 mg total) by mouth once daily., Disp: 90 tablet, Rfl: 1    metOLazone (ZAROXOLYN) 2.5 MG tablet, Take 1 tablet (2.5 mg total) by mouth once daily., Disp: 90 tablet, Rfl: 1    metoprolol succinate (TOPROL-XL) 25 MG 24 hr tablet, Take 1 tablet (25 mg total) by mouth once daily., Disp: 90 tablet, Rfl: 1    multivitamin capsule, Take 1 capsule by  "mouth once daily., Disp: , Rfl:     nystatin (MYCOSTATIN) powder, Apply topically 3 (three) times daily., Disp: 60 g, Rfl: 2    potassium chloride (KLOR-CON) 10 MEQ TbSR, Take 1 tablet (10 mEq total) by mouth once daily., Disp: 90 tablet, Rfl: 1    prednisoLONE acetate (PRED FORTE) 1 % DrpS, Place 1 drop into both eyes as needed. , Disp: , Rfl:     rosuvastatin (CRESTOR) 40 MG Tab, Take 1 tablet (40 mg total) by mouth every evening., Disp: 90 tablet, Rfl: 3    blood sugar diagnostic Strp, One Touch Ultra Blue Test strips, Use l strip to check glucose 4 times daily, Disp: 100 each, Rfl: 11    blood-glucose meter kit, Use as instructed, Disp: 1 each, Rfl: 0    lancets (ONETOUCH DELICA LANCETS) 33 gauge Misc, USE 1  TO CHECK GLUCOSE 4 TIMES DAILY, Disp: 100 each, Rfl: 3    pregabalin (LYRICA) 50 MG capsule, Take 50 mg by mouth every evening., Disp: , Rfl:     Allergies    Review of patient's allergies indicates:   Allergen Reactions    Adhesive tape-silicones Rash    Dye Hives    Cephalexin     Iodine     Penicillins Edema    Pneumococcal vaccine     Iodinated contrast media Rash       SocHx    Social History     Tobacco Use   Smoking Status Former    Current packs/day: 0.00    Average packs/day: 1 pack/day for 15.0 years (15.0 ttl pk-yrs)    Types: Cigarettes    Start date:     Quit date:     Years since quittin.0   Smokeless Tobacco Never       Social History     Substance and Sexual Activity   Alcohol Use No    Comment: "maybe once a year"         FMHx    Family History   Problem Relation Age of Onset    Diabetes Mother     Vision loss Mother     Heart disease Father     Vision loss Father     Stroke Father          Review of Systems  Review of Systems   Constitutional:  Positive for malaise/fatigue. Negative for chills, diaphoresis, fever and weight loss.   HENT:  Positive for congestion.         Postnasal drip   Eyes:  Negative for pain.   Respiratory:  Positive for cough (thick clear mucus). Negative " for hemoptysis, sputum production, shortness of breath, wheezing and stridor.    Cardiovascular:  Negative for chest pain, palpitations, orthopnea, claudication, leg swelling and PND.   Gastrointestinal:  Negative for abdominal pain, blood in stool, constipation, diarrhea, heartburn, nausea and vomiting.   Genitourinary:  Negative for dysuria, frequency, hematuria and urgency.   Musculoskeletal:  Negative for falls and myalgias.   Neurological:  Negative for sensory change, focal weakness and weakness.   Psychiatric/Behavioral:  Negative for depression, substance abuse and suicidal ideas. The patient is not nervous/anxious.        Physical Exam    Vitals:    01/10/24 0930   BP: 120/70   BP Location: Left arm   Patient Position: Sitting   BP Method: Medium (Manual)   Pulse: 63   SpO2: 99%   Weight: 99.7 kg (219 lb 14.4 oz)       Physical Exam  Vitals and nursing note reviewed.   Constitutional:       General: She is not in acute distress.     Appearance: Normal appearance. She is well-developed. She is not ill-appearing or diaphoretic.   HENT:      Head: Normocephalic and atraumatic.      Right Ear: External ear normal.      Left Ear: External ear normal.      Nose: Congestion present.      Mouth/Throat:      Mouth: Mucous membranes are moist.      Pharynx: Oropharynx is clear. No oropharyngeal exudate or posterior oropharyngeal erythema.   Eyes:      General: No scleral icterus.        Right eye: No discharge.         Left eye: No discharge.      Extraocular Movements: Extraocular movements intact.      Conjunctiva/sclera: Conjunctivae normal.      Pupils: Pupils are equal, round, and reactive to light.   Neck:      Thyroid: No thyromegaly.      Vascular: No JVD.      Trachea: No tracheal deviation.   Cardiovascular:      Rate and Rhythm: Normal rate and regular rhythm.      Heart sounds: Normal heart sounds. No murmur heard.     No friction rub. No gallop.   Pulmonary:      Effort: Pulmonary effort is normal. No  respiratory distress.      Breath sounds: Normal breath sounds. No stridor. No wheezing, rhonchi or rales.   Chest:      Chest wall: No tenderness.   Abdominal:      General: Bowel sounds are normal. There is no distension.      Palpations: Abdomen is soft.      Tenderness: There is no abdominal tenderness. There is no guarding.   Musculoskeletal:         General: No tenderness. Normal range of motion.      Cervical back: Normal range of motion and neck supple. No rigidity.      Right lower leg: No edema.      Left lower leg: No edema.   Lymphadenopathy:      Cervical: No cervical adenopathy.   Skin:     General: Skin is warm and dry.      Capillary Refill: Capillary refill takes less than 2 seconds.   Neurological:      General: No focal deficit present.      Mental Status: She is alert and oriented to person, place, and time. Mental status is at baseline.      Cranial Nerves: No cranial nerve deficit.      Motor: No weakness.      Gait: Gait normal.   Psychiatric:         Mood and Affect: Mood normal.         Behavior: Behavior normal.         Thought Content: Thought content normal.         Judgment: Judgment normal.         Labs    Lab Results   Component Value Date    WBC 6.26 10/23/2023    HGB 13.5 10/23/2023    HCT 43.1 10/23/2023     10/23/2023       Sodium   Date Value Ref Range Status   10/23/2023 143 136 - 145 mmol/L Final   01/14/2019 141 134 - 144 mmol/L      Potassium   Date Value Ref Range Status   10/23/2023 4.0 3.5 - 5.1 mmol/L Final     Chloride   Date Value Ref Range Status   10/23/2023 103 95 - 110 mmol/L Final   01/14/2019 96 (L) 98 - 110 mmol/L      CO2   Date Value Ref Range Status   10/23/2023 32 (H) 23 - 29 mmol/L Final     Glucose   Date Value Ref Range Status   10/23/2023 137 (H) 70 - 110 mg/dL Final   01/14/2019 177 (H) 70 - 99 mg/dL      BUN   Date Value Ref Range Status   10/23/2023 15 8 - 23 mg/dL Final     Creatinine   Date Value Ref Range Status   10/23/2023 0.9 0.5 - 1.4  mg/dL Final   01/14/2019 0.99 0.60 - 1.40 mg/dL      Calcium   Date Value Ref Range Status   10/23/2023 10.4 8.7 - 10.5 mg/dL Final     Total Protein   Date Value Ref Range Status   10/23/2023 7.0 6.0 - 8.4 g/dL Final     Albumin   Date Value Ref Range Status   10/23/2023 3.9 3.5 - 5.2 g/dL Final   01/14/2019 3.8 3.1 - 4.7 g/dL      Total Bilirubin   Date Value Ref Range Status   10/23/2023 0.3 0.1 - 1.0 mg/dL Final     Comment:     For infants and newborns, interpretation of results should be based  on gestational age, weight and in agreement with clinical  observations.    Premature Infant recommended reference ranges:  Up to 24 hours.............<8.0 mg/dL  Up to 48 hours............<12.0 mg/dL  3-5 days..................<15.0 mg/dL  6-29 days.................<15.0 mg/dL       Alkaline Phosphatase   Date Value Ref Range Status   10/23/2023 76 55 - 135 U/L Final     AST   Date Value Ref Range Status   10/23/2023 18 10 - 40 U/L Final     ALT   Date Value Ref Range Status   10/23/2023 12 10 - 44 U/L Final     Anion Gap   Date Value Ref Range Status   10/23/2023 8 8 - 16 mmol/L Final       Xrays        Impression/Plan    Problem List Items Addressed This Visit          Pulmonary    Lung nodule     Stable from 7 to 10/2023  No activity on PET scan  Repeat CT ordered for 4/2024 and we will follow         Chronic cough     Has PND by history and we will try and treat that and see how she does  Check PFT and CXR  She is to call in 2 weeks and let me know if she is better on PND treatment  RTC 1 month            Other    NIKOLAS on CPAP     She is not on treatment at this time  She does c/o some memory loss and difficulty concentrating  May need to recheck home sleep study and will rediscuss at next visit          Other Visit Diagnoses       Cough, unspecified type    -  Primary    Relevant Orders    Complete PFT w/ bronchodilator    X-Ray Chest PA And Lateral            I have spent about 45 minutes with the patient taking  the history and examining the patient.  We have discussed the diagnoses and current plan and all questions have been answered.  We have discussed the follow up plan.  The patient and family (if present) know to contact the office with any questions they may have.        Harjit Velez MD

## 2024-01-10 NOTE — ASSESSMENT & PLAN NOTE
Stable from 7 to 10/2023  No activity on PET scan  Repeat CT ordered for 4/2024 and we will follow

## 2024-01-10 NOTE — ASSESSMENT & PLAN NOTE
Has PND by history and we will try and treat that and see how she does  Check PFT and CXR  She is to call in 2 weeks and let me know if she is better on PND treatment  RTC 1 month

## 2024-01-10 NOTE — ASSESSMENT & PLAN NOTE
She is not on treatment at this time  She does c/o some memory loss and difficulty concentrating  May need to recheck home sleep study and will rediscuss at next visit

## 2024-01-12 ENCOUNTER — PATIENT MESSAGE (OUTPATIENT)
Dept: FAMILY MEDICINE | Facility: CLINIC | Age: 69
End: 2024-01-12
Payer: MEDICARE

## 2024-01-12 DIAGNOSIS — Z12.31 ENCOUNTER FOR SCREENING MAMMOGRAM FOR BREAST CANCER: Primary | ICD-10-CM

## 2024-02-01 ENCOUNTER — PATIENT MESSAGE (OUTPATIENT)
Dept: FAMILY MEDICINE | Facility: CLINIC | Age: 69
End: 2024-02-01
Payer: MEDICARE

## 2024-02-01 DIAGNOSIS — Z79.4 TYPE 2 DIABETES MELLITUS TREATED WITH INSULIN: Primary | ICD-10-CM

## 2024-02-01 DIAGNOSIS — E11.9 TYPE 2 DIABETES MELLITUS TREATED WITH INSULIN: Primary | ICD-10-CM

## 2024-02-01 NOTE — TELEPHONE ENCOUNTER
Patient is requesting  a refill on Metformin. I do not see if active on her chart, but I do see that it was DC'D on 8-3-23 by .  Please advise

## 2024-02-02 ENCOUNTER — PATIENT MESSAGE (OUTPATIENT)
Dept: FAMILY MEDICINE | Facility: CLINIC | Age: 69
End: 2024-02-02
Payer: MEDICARE

## 2024-02-02 DIAGNOSIS — E11.9 TYPE 2 DIABETES MELLITUS TREATED WITH INSULIN: Primary | ICD-10-CM

## 2024-02-02 DIAGNOSIS — Z79.4 TYPE 2 DIABETES MELLITUS TREATED WITH INSULIN: Primary | ICD-10-CM

## 2024-02-02 NOTE — TELEPHONE ENCOUNTER
Attempted to call patient to verify but received voice mail. I left a message for patient and sent a portal message.

## 2024-02-02 NOTE — TELEPHONE ENCOUNTER
If she is taking the Trijardy, it is in the medication combo already; if it is an additional metformin dose, please pend and send it to me but confirmed with patient that she is taking both

## 2024-02-05 RX ORDER — METFORMIN HYDROCHLORIDE 1000 MG/1
1000 TABLET ORAL
Qty: 90 TABLET | Refills: 1 | Status: SHIPPED | OUTPATIENT
Start: 2024-02-05

## 2024-02-06 DIAGNOSIS — E11.9 TYPE 2 DIABETES MELLITUS TREATED WITH INSULIN: ICD-10-CM

## 2024-02-06 DIAGNOSIS — Z79.4 TYPE 2 DIABETES MELLITUS TREATED WITH INSULIN: ICD-10-CM

## 2024-02-06 RX ORDER — EMPAGLIFLOZIN, LINAGLIPTIN, METFORMIN HYDROCHLORIDE 25; 5; 1000 MG/1; MG/1; MG/1
1 TABLET, EXTENDED RELEASE ORAL DAILY
Qty: 90 TABLET | Refills: 1 | Status: SHIPPED | OUTPATIENT
Start: 2024-02-06

## 2024-02-26 DIAGNOSIS — E83.42 HYPOMAGNESEMIA: ICD-10-CM

## 2024-02-26 RX ORDER — LANOLIN ALCOHOL/MO/W.PET/CERES
1 CREAM (GRAM) TOPICAL
Qty: 90 TABLET | Refills: 0 | Status: SHIPPED | OUTPATIENT
Start: 2024-02-26

## 2024-03-07 DIAGNOSIS — I10 BENIGN ESSENTIAL HYPERTENSION: ICD-10-CM

## 2024-03-08 RX ORDER — AMLODIPINE BESYLATE 2.5 MG/1
2.5 TABLET ORAL DAILY
Qty: 90 TABLET | Refills: 1 | Status: SHIPPED | OUTPATIENT
Start: 2024-03-08

## 2024-03-12 ENCOUNTER — PATIENT MESSAGE (OUTPATIENT)
Dept: ADMINISTRATIVE | Facility: HOSPITAL | Age: 69
End: 2024-03-12
Payer: MEDICARE

## 2024-03-26 DIAGNOSIS — I10 BENIGN ESSENTIAL HYPERTENSION: ICD-10-CM

## 2024-03-26 RX ORDER — METOLAZONE 2.5 MG/1
2.5 TABLET ORAL DAILY
Qty: 90 TABLET | Refills: 1 | Status: SHIPPED | OUTPATIENT
Start: 2024-03-26

## 2024-04-02 DIAGNOSIS — E78.2 MIXED HYPERLIPIDEMIA: ICD-10-CM

## 2024-04-03 RX ORDER — ROSUVASTATIN CALCIUM 40 MG/1
40 TABLET, COATED ORAL NIGHTLY
Qty: 90 TABLET | Refills: 1 | Status: SHIPPED | OUTPATIENT
Start: 2024-04-03

## 2024-04-22 ENCOUNTER — HOSPITAL ENCOUNTER (OUTPATIENT)
Dept: RADIOLOGY | Facility: HOSPITAL | Age: 69
Discharge: HOME | End: 2024-04-22
Attending: NURSE PRACTITIONER
Payer: MEDICARE

## 2024-04-22 DIAGNOSIS — R91.1 PULMONARY NODULE: ICD-10-CM

## 2024-04-22 DIAGNOSIS — R93.89 ABNORMAL CT OF THE CHEST: ICD-10-CM

## 2024-04-22 DIAGNOSIS — Z12.31 ENCOUNTER FOR SCREENING MAMMOGRAM FOR BREAST CANCER: ICD-10-CM

## 2024-04-22 DIAGNOSIS — Z78.0 MENOPAUSE: ICD-10-CM

## 2024-04-22 PROCEDURE — 77067 SCR MAMMO BI INCL CAD: CPT | Mod: 26,,, | Performed by: RADIOLOGY

## 2024-04-22 PROCEDURE — 77080 DXA BONE DENSITY AXIAL: CPT | Mod: TC,PO

## 2024-04-22 PROCEDURE — 77063 BREAST TOMOSYNTHESIS BI: CPT | Mod: 26,,, | Performed by: RADIOLOGY

## 2024-04-22 PROCEDURE — 71250 CT THORAX DX C-: CPT | Mod: TC,PO

## 2024-04-22 PROCEDURE — 77080 DXA BONE DENSITY AXIAL: CPT | Mod: 26,,, | Performed by: RADIOLOGY

## 2024-04-22 PROCEDURE — 77063 BREAST TOMOSYNTHESIS BI: CPT | Mod: TC,PO

## 2024-04-22 PROCEDURE — 77067 SCR MAMMO BI INCL CAD: CPT | Mod: TC,PO

## 2024-04-22 PROCEDURE — 71250 CT THORAX DX C-: CPT | Mod: 26,,, | Performed by: RADIOLOGY

## 2024-04-22 NOTE — PROGRESS NOTES
Stable 6 mm nodule in left upper lobe; no additional findings or changes in the lungs; there is some artery calcifications in aorta and coronary arteries; I would suggest having a consultation with a cardiologist to review and determine if further testing and workup is recommended; she does also have a small hiatal hernia; please let me know if I need to recommend a cardiologist for her

## 2024-04-23 ENCOUNTER — TELEPHONE (OUTPATIENT)
Dept: FAMILY MEDICINE | Facility: CLINIC | Age: 69
End: 2024-04-23
Payer: MEDICARE

## 2024-04-23 NOTE — TELEPHONE ENCOUNTER
----- Message from ARIC Marte sent at 4/22/2024 12:37 PM CDT -----  Stable 6 mm nodule in left upper lobe; no additional findings or changes in the lungs; there is some artery calcifications in aorta and coronary arteries; I would suggest having a consultation with a cardiologist to review and determine if further testing and workup is recommended; she does also have a small hiatal hernia; please let me know if I need to recommend a cardiologist for her

## 2024-04-23 NOTE — TELEPHONE ENCOUNTER
----- Message from ARIC Marte sent at 4/22/2024 12:21 PM CDT -----  Please notify patient that results are normal.

## 2024-04-23 NOTE — TELEPHONE ENCOUNTER
Patient is requesting refill on Tresiba Flexpen 78 units. Medication is not on current med list. [facility] on [address].

## 2024-04-24 RX ORDER — INSULIN DEGLUDEC 200 U/ML
78 INJECTION, SOLUTION SUBCUTANEOUS NIGHTLY
Qty: 8 PEN | Refills: 2 | Status: SHIPPED | OUTPATIENT
Start: 2024-04-24 | End: 2024-05-24

## 2024-04-24 NOTE — TELEPHONE ENCOUNTER
Please have patient request all medication refills directed from pharmacy to us in the future; I will send this 1 time, but she needs to have them fax us request in the future; how many pens does she get monthly? The last time it was filled someone sent it for 76 units- please clarify prescription and dose   [employer]

## 2024-05-01 DIAGNOSIS — I10 BENIGN ESSENTIAL HYPERTENSION: ICD-10-CM

## 2024-05-01 RX ORDER — METOPROLOL SUCCINATE 25 MG/1
25 TABLET, EXTENDED RELEASE ORAL
Qty: 90 TABLET | Refills: 1 | Status: SHIPPED | OUTPATIENT
Start: 2024-05-01

## 2024-05-18 DIAGNOSIS — M79.7 FIBROMYALGIA: ICD-10-CM

## 2024-05-20 RX ORDER — DULOXETIN HYDROCHLORIDE 60 MG/1
60 CAPSULE, DELAYED RELEASE ORAL
Qty: 90 CAPSULE | Refills: 0 | Status: SHIPPED | OUTPATIENT
Start: 2024-05-20

## 2024-05-21 NOTE — PROGRESS NOTES
[facility]  PROGRESS NOTE -  Follow-up Visit      Subjective:       Patient ID:   NAME: Chanel Cervantes : 1955     68 y.o. female    Referring Doc: Melvina (New PCP)  Other Physicians: LUIS Orozco; Kj Lucas Braxton; Himanshu Nunez (Bariatrics)           Chief Complaint: iron defic anem f/u      History of Present Illness:     Patient returns today for a regularly scheduled follow-up visit.  The patient is here today to go over the results of the recently ordered labs, tests and studies. She is here by herself    She has some chronic congestion issues.She has been followed by pulmonary; she saw Dr Velez in 2024; Chest CT in 2024 was stable    She saw Melvina NP in 2024 and sees her again next week; she has some night-time vertigo issues    She has been having chronic back pain and sciatica issues and had been seeing pain management Dr Krystal White but has not followed up with her since last year; uses cane to ambulate    General low energy levels, general aches and pains;     She is otherwise doing ok. No CP, SOB, HA's or N/V.    She sees Dr Henley with endocrine and is due for follow-up with her    She is continued on oral iron and B12 injections              Discussed covid19 precautions - she has had her vaccinations            ROS:   GEN: energy levels low; chronic aches and pains; no fevers, weight loss  HEENT: normal with no HA's, sore throat, stiff neck, changes in vision  CV: normal with no CP, SOB, PND, WILSON or orthopnea  PULM: normal with no SOB, cough, hemoptysis, sputum or pleuritic pain  GI: normal with no abdominal pain, nausea, vomiting, constipation, diarrhea, melanotic stools, BRBPR, or hematemesis  : some dysuria  BREAST: normal with no mass, discharge, pain  SKIN: normal with no rash, erythema, bruising, or swelling    Pain Scale: 0     Allergies:  Review of patient's allergies indicates:   Allergen Reactions    Adhesive tape-silicones Rash    Dye Hives     Cephalexin     Iodine     Penicillins Edema    Pneumococcal vaccine     Iodinated contrast media Rash       Medications:    Current Outpatient Medications:     amLODIPine (NORVASC) 2.5 MG tablet, Take 1 tablet (2.5 mg total) by mouth once daily., Disp: 90 tablet, Rfl: 1    blood sugar diagnostic Strp, One Touch Ultra Blue Test strips, Use l strip to check glucose 4 times daily, Disp: 100 each, Rfl: 11    calcium carbonate-vitamin D3 600 mg-62.5 mcg (2,500 unit) Cap, calcium carbonate 600 mg-vitamin D3 62.5 mcg (2,500 unit) capsule  Take by oral route., Disp: , Rfl:     cyanocobalamin 1,000 mcg/mL injection, Inject 1 mL (1,000 mcg total) into the skin every 30 days., Disp: 3 mL, Rfl: 4    DULoxetine (CYMBALTA) 60 MG capsule, Take 1 capsule by mouth once daily, Disp: 90 capsule, Rfl: 0    empaglifloz-linaglip-metformin (TRIJARDY XR) 25-5-1,000 mg TBph, Take 1 tablet by mouth once daily, Disp: 90 tablet, Rfl: 1    guanFACINE (TENEX) 2 MG tablet, Take 1 tablet (2 mg total) by mouth nightly., Disp: 90 tablet, Rfl: 3    insulin degludec (TRESIBA FLEXTOUCH U-200) 200 unit/mL (3 mL) insulin pen, Inject 78 Units into the skin every evening., Disp: 8 pen , Rfl: 2    iron-vitamin C 100-250 mg, ICAR-C, 100-250 mg Tab, Take 1 tablet by mouth once daily., Disp: 90 each, Rfl: 3    lancets (ONETOUCH DELICA LANCETS) 33 gauge Misc, USE 1  TO CHECK GLUCOSE 4 TIMES DAILY, Disp: 100 each, Rfl: 3    levothyroxine (SYNTHROID) 75 MCG tablet, Take 75 mcg by mouth before breakfast., Disp: , Rfl:     magnesium oxide (MAG-OX) 400 mg (241.3 mg magnesium) tablet, Take 1 tablet by mouth once daily, Disp: 90 tablet, Rfl: 0    metFORMIN (GLUCOPHAGE) 1000 MG tablet, Take 1 tablet (1,000 mg total) by mouth daily with dinner or evening meal., Disp: 90 tablet, Rfl: 1    metOLazone (ZAROXOLYN) 2.5 MG tablet, Take 1 tablet (2.5 mg total) by mouth once daily., Disp: 90 tablet, Rfl: 1    metoprolol succinate (TOPROL-XL) 25 MG 24 hr tablet, Take 1 tablet by  "mouth once daily, Disp: 90 tablet, Rfl: 1    multivitamin capsule, Take 1 capsule by mouth once daily., Disp: , Rfl:     nystatin (MYCOSTATIN) powder, Apply topically 3 (three) times daily., Disp: 60 g, Rfl: 2    potassium chloride (KLOR-CON) 10 MEQ TbSR, Take 1 tablet (10 mEq total) by mouth once daily., Disp: 90 tablet, Rfl: 1    prednisoLONE acetate (PRED FORTE) 1 % DrpS, Place 1 drop into both eyes as needed. , Disp: , Rfl:     rosuvastatin (CRESTOR) 40 MG Tab, Take 1 tablet (40 mg total) by mouth every evening., Disp: 90 tablet, Rfl: 1    aspirin (ECOTRIN) 81 MG EC tablet, Take 1 tablet (81 mg total) by mouth once daily., Disp: 30 tablet, Rfl: 0    blood-glucose meter kit, Use as instructed, Disp: 1 each, Rfl: 0    pregabalin (LYRICA) 50 MG capsule, Take 50 mg by mouth every evening., Disp: , Rfl:     PMHx/PSHx Updates:  See patient's last visit with me on 10/26/2023  See H&P on 1/25/2021        Pathology:   Cancer Staging   No matching staging information was found for the patient.            Objective:     Vitals:  Blood pressure (!) 159/78, pulse 69, temperature 97 °F (36.1 °C), resp. rate 18, height 5' 6" (1.676 m), weight 101.8 kg (224 lb 8 oz).    Physical Examination:   GEN: no apparent distress, comfortable; AAOx3; overweight  HEAD: atraumatic and normocephalic  EYES: no pallor, no icterus, PERRLA  ENT: OMM, no pharyngeal erythema, external ears WNL; no nasal discharge; no thrush  NECK: no masses, thyroid normal, trachea midline, no LAD/LN's, supple; s/p thyroidectomy    CV: RRR with no murmur; normal pulse; normal S1 and S2; no pedal edema  CHEST: Normal respiratory effort; CTAB; normal breath sounds; no wheeze or crackles  ABDOM: nontender and nondistended; soft; normal bowel sounds; no rebound/guarding  MUSC/Skeletal: ROM normal; no crepitus; joints normal; no deformities; s/p right shoulder replacement with better ROM   EXTREM: no clubbing, cyanosis, inflammation or swelling  SKIN: no rashes, " lesions, ulcers, petechiae or subcutaneous nodules; chronic age related skin changes  : no schaefer  NEURO: grossly intact; motor/sensory WNL; AAOx3; no tremors  PSYCH: normal mood, affect and behavior  LYMPH: normal cervical, supraclavicular, axillary and groin LN's            Labs:     Lab Results   Component Value Date    WBC 7.32 04/22/2024    WBC 7.32 04/22/2024    HGB 14.1 04/22/2024    HGB 14.1 04/22/2024    HCT 46.1 04/22/2024    HCT 46.1 04/22/2024    MCV 86 04/22/2024    MCV 86 04/22/2024     04/22/2024     04/22/2024       Lab Results   Component Value Date    IRON 62 04/22/2024    TIBC 343 04/22/2024    FERRITIN 49.3 04/22/2024     Lab Results   Component Value Date    GMAQJYPB25 437 04/22/2024     Lab Results   Component Value Date    FOLATE >22.3 04/22/2024    FOLATE >22.3 04/22/2024           CMP  Sodium   Date Value Ref Range Status   04/22/2024 142 136 - 145 mmol/L Final   04/22/2024 142 136 - 145 mmol/L Final   01/14/2019 141 134 - 144 mmol/L      Potassium   Date Value Ref Range Status   04/22/2024 3.1 (L) 3.5 - 5.1 mmol/L Final   04/22/2024 3.1 (L) 3.5 - 5.1 mmol/L Final     Chloride   Date Value Ref Range Status   04/22/2024 99 95 - 110 mmol/L Final   04/22/2024 99 95 - 110 mmol/L Final   01/14/2019 96 (L) 98 - 110 mmol/L      CO2   Date Value Ref Range Status   04/22/2024 34 (H) 23 - 29 mmol/L Final   04/22/2024 34 (H) 23 - 29 mmol/L Final     Glucose   Date Value Ref Range Status   04/22/2024 83 70 - 110 mg/dL Final   04/22/2024 83 70 - 110 mg/dL Final   01/14/2019 177 (H) 70 - 99 mg/dL      BUN   Date Value Ref Range Status   04/22/2024 18 8 - 23 mg/dL Final   04/22/2024 18 8 - 23 mg/dL Final     Creatinine   Date Value Ref Range Status   04/22/2024 0.8 0.5 - 1.4 mg/dL Final   04/22/2024 0.8 0.5 - 1.4 mg/dL Final   01/14/2019 0.99 0.60 - 1.40 mg/dL      Calcium   Date Value Ref Range Status   04/22/2024 9.3 8.7 - 10.5 mg/dL Final   04/22/2024 9.3 8.7 - 10.5 mg/dL Final     Total  Protein   Date Value Ref Range Status   04/22/2024 6.9 6.0 - 8.4 g/dL Final   04/22/2024 6.9 6.0 - 8.4 g/dL Final     Albumin   Date Value Ref Range Status   04/22/2024 4.0 3.5 - 5.2 g/dL Final   04/22/2024 4.0 3.5 - 5.2 g/dL Final   01/14/2019 3.8 3.1 - 4.7 g/dL      Total Bilirubin   Date Value Ref Range Status   04/22/2024 0.4 0.1 - 1.0 mg/dL Final     Comment:     For infants and newborns, interpretation of results should be based  on gestational age, weight and in agreement with clinical  observations.    Premature Infant recommended reference ranges:  Up to 24 hours.............<8.0 mg/dL  Up to 48 hours............<12.0 mg/dL  3-5 days..................<15.0 mg/dL  6-29 days.................<15.0 mg/dL     04/22/2024 0.4 0.1 - 1.0 mg/dL Final     Comment:     For infants and newborns, interpretation of results should be based  on gestational age, weight and in agreement with clinical  observations.    Premature Infant recommended reference ranges:  Up to 24 hours.............<8.0 mg/dL  Up to 48 hours............<12.0 mg/dL  3-5 days..................<15.0 mg/dL  6-29 days.................<15.0 mg/dL       Alkaline Phosphatase   Date Value Ref Range Status   04/22/2024 73 55 - 135 U/L Final   04/22/2024 73 55 - 135 U/L Final     AST   Date Value Ref Range Status   04/22/2024 19 10 - 40 U/L Final   04/22/2024 19 10 - 40 U/L Final     ALT   Date Value Ref Range Status   04/22/2024 16 10 - 44 U/L Final   04/22/2024 16 10 - 44 U/L Final     Anion Gap   Date Value Ref Range Status   04/22/2024 9 8 - 16 mmol/L Final   04/22/2024 9 8 - 16 mmol/L Final     eGFR if    Date Value Ref Range Status   07/20/2022 >60.0 >60 mL/min/1.73 m^2 Final     eGFR if non    Date Value Ref Range Status   07/20/2022 >60.0 >60 mL/min/1.73 m^2 Final     Comment:     Calculation used to obtain the estimated glomerular filtration  rate (eGFR) is the CKD-EPI equation.            Radiology/Diagnostic  Studies:    Mammo 4/22/2024:  Impression:     Negative  mammogram. Annual screening mammography is recommended.     BI-RADS CATEGORY 1: NEGATIVE.      Chest CT 4/22/2024:      Impression:     Stable 6 mm noncalcified nodule in the left upper lobe with no additional pulmonary nodules     Coronary artery calcification     Small hiatal hernia      PET 10/11/2023:    IMPRESSION: 6 mm faint noncalcified nodule left upper lobe with no significant FDG activity. Short-term follow-up CT in 6 months is recommended     Cardiomegaly with very artery calcification     Moderate size hiatal hernia     Prior cholecystectomy and hysterectomy     Colonic diverticulosis without diverticulitis    Degenerative changes of the spine  Hemorrhagic left renal cyst      CT Shoulder Without Contrast Right    Result Date: 3/2/2021  CMS MANDATED QUALITY DATA - CT RADIATION  436 All CT scans at this facility utilize dose modulation, iterative reconstruction, and/or weight based dosing when appropriate to reduce radiation dose to as low as reasonably achievable. 3-D reconstructed images were generated on a separate workstation from the axial data set and stored in the patient's permanent medical record. CT SHOULDER WITHOUT CONTRAST RIGHT CLINICAL HISTORY: 65 years Female M25.511 Pain in right Shoulder COMPARISON: Right shoulder radiography February 18, 2020 FINDINGS: Acute comminuted proximal right humeral fracture demonstrates similar alignment to reference right shoulder radiographs. Medial displacement of largest distal humeral fracture fragment in relation to the largest proximal fragment by about one shaft width. Fracture extends superiorly to involve both the surgical and anatomic necks of the humerus, and extends through the greater tuberosity. No intra-articular extension through the humeral head articular surface is evident. Scapula is intact. AC joint is normally aligned. Mild AC joint degenerative change. No right rib fracture is  evident within the field-of-view. Right lung is clear. IMPRESSION: Acute comminuted and displaced fracture of proximal right humerus, involving both the surgical and anatomic necks of the right humerus, as well as the greater tuberosity. Electronically Signed by Jah TORRES on 3/3/2021 7:44 AM      I have reviewed all available lab results and radiology reports.    Assessment/Plan:   (1) 68 y.o. female with diagnosis of iron deficiency anemia who has been referred by Mindy Funk MD for evaluation by medical hematology/oncology. She has history of bariatric surgery and most likely has a multifactorial anemia process with underlying anemia of chronic disorders and iron deficiency/nutrient deficiency due to bariatric induced malabsorption.    - microcytic indices  - total bili is WNL, so I do not suspect any hemolysis at this time  - iron low at 21 and ferritin low at 3     1/25/2021:  - check B12/folate level   - order stool OBS x3  - check SPEP and Hgb electrophoresis  - discussed and will set up IV iron    4/5/2021:  - she had shoulder surgery  - repeat hgb currently at 10.5 and stable; she had two IV irons since last visit and before surgery and had been as high as 11.6 afterwards with the hgb  - iron panel is adequate  - B12 and folate are adequate  - retic 1.8 and SPEP with no M-protein    6/1/2021:  - latest labs with fairly adequate CBC  - mild anemia  - iron panel was good since on oral iron  - B12 was low - will start shots monthly today    10/11/2021:  - latest CBC and iron panel WNL  - getting B12 shot today    3/10/2022:  - last available labs are from oct 2021  - no anemia and iron panel was adequate  - B12 still was low - resume B12 monthly    9/12/2022:  - latest Hgb was WNL; no current anemia  - iron panel, B12/folate are all adequate  - continued on oral iron and B12 injections at home    4/13/2023:  - latest CBC adequate with normal hgb  - iron panel adequate  - continued on iron po  and B12 monthly    10/26/2023:  - resume B12 monthly  - continue oral iron  - hgb WNL and iron panel currently adequate  - refer to Dr Marrero with neurology    5/22/2024:  - latest CBC is adequate with normal hgb and no current anemia  - iron panel and b12/folate levels are adequate  - continued on oral iron and bg12 injections     (2) CAD s/p MI and CABG; venous insufficiency     (3) HTN and hypercholesterolemia     (4) IDDM Type II     (5) Vit D Deficiency     (6) NIKOLAS - on CPAP     (7) Chronic LBP    (8) Lung nodule     10/26/2023:  - she had PET on 10/11/2023  - she is seeing pulm in Dec 2023 with Dr Velez      5/22/2024:  - She has some chronic congestion issues.She has been followed by pulmonary; she saw Dr Velez in Jan 2024; Chest CT in Apr 2024 was stable  - She saw Melvina NP in Apr 2024 and sees her again next week; she has some night-time vertigo issues          VISIT DIAGNOSES:      Iron deficiency anemia following bariatric surgery    Anemia, unspecified type    B12 deficiency    S/P bariatric surgery          PLAN:  1. continue current management - B12 injections  2. continue ICAR-C    3. F/u with PCP, bariatrics, card, Endocrine, etc  4. Check basic labs incl iron panel every 6 months  5. continue B12 monthly (encouraged compliance)  6. F/u with pulmonary as directed      RTC in 6 months    Fax note to Melvina; Tadeo; Angelica Henley       Discussion:     COVID-19 Discussion:     I had long discussion with patient and any applicable family about the COVID-19 coronavirus epidemic and the recommended precautions with regard to cancer and/or hematology patients. I have re-iterated the CDC recommendations for adequate hand washing, use of hand -like products, and coughing into elbow, etc. In addition, especially for our patients who are on chemotherapy and/or our otherwise immunocompromised patients, I have recommended avoidance of crowds, including movie theaters, restaurants,  churches, etc. I have recommended avoidance of any sick or symptomatic family members and/or friends. I have also recommended avoidance of any raw and unwashed food products, and general avoidance of food items that have not been prepared by themselves. The patient has been asked to call us immediately with any symptom developments, issues, questions or other general concerns.         Iron Infusion Therapy Discussion:      I provided literature/learning materials on the particular IV iron regimen and discussed the potential side-effect profiles of the drug(s). I discussed the importance of compliance with obtaining and monitoring requested lab work, and went over the potential risk for the development of anaphylactic shock, bronchospasm, dysrhythmia, liver and/or kidney damage, and respiratory/cardiovascular arrest and/or failure. I discussed the potential risks for development of alopecia, fevers, itching, chills and/or rigors, cold sensory issues, ringing in ears, vertigo and neuropathy, all of which are usually acute but sometimes could end up being chronic and life-long. I discussed the risks of hand-foot syndrome and rashes, and development of other autoimmune mediated processes such as pneumonitis and colitis which could be life threatening.      The patient's consent has been obtained to proceed with the IV iron therapy.The patient will be referred to [facility] for training and education on IV iron therapy, use of antiemetics and/or anti-diarrheals, use of NSAID's, potential IV iron therapy side-effects, and any specific recommendations and precautions with the particular IV iron agents.       I answered all of the patient's (and family's, if applicable) questions to the best of my ability and to their complete satisfaction. The patient acknowledged full understanding of the risks, recommendations and plan(s).            I spent over 25 mins of time with the patient. Reviewed  results of the recently ordered labs, tests and studies; made directives with regards to the results. Over half of this time was spent couseling and coordinating care.    I have explained all of the above in detail and the patient understands all of the current recommendation(s). I have answered all of their questions to the best of my ability and to their complete satisfaction.   The patient is to continue with the current management plan.            Electronically signed by Mauricio Jackson MD                Answers submitted by the patient for this visit:  Review of Systems Questionnaire (Submitted on 5/21/2024)  appetite change : No  unexpected weight change: No  mouth sores: No  visual disturbance: No  cough: No  shortness of breath: No  chest pain: No  abdominal pain: No  diarrhea: No  frequency: No  back pain: Yes  rash: No  headaches: No  adenopathy: No  nervous/ anxious: No

## 2024-05-22 ENCOUNTER — OFFICE VISIT (OUTPATIENT)
Facility: CLINIC | Age: 69
End: 2024-05-22
Payer: MEDICARE

## 2024-05-22 VITALS
WEIGHT: 224.5 LBS | RESPIRATION RATE: 18 BRPM | HEIGHT: 66 IN | SYSTOLIC BLOOD PRESSURE: 159 MMHG | DIASTOLIC BLOOD PRESSURE: 78 MMHG | HEART RATE: 69 BPM | TEMPERATURE: 97 F | BODY MASS INDEX: 36.08 KG/M2

## 2024-05-22 DIAGNOSIS — K95.89 IRON DEFICIENCY ANEMIA FOLLOWING BARIATRIC SURGERY: Primary | ICD-10-CM

## 2024-05-22 DIAGNOSIS — D50.8 IRON DEFICIENCY ANEMIA FOLLOWING BARIATRIC SURGERY: Primary | ICD-10-CM

## 2024-05-22 DIAGNOSIS — Z98.84 S/P BARIATRIC SURGERY: ICD-10-CM

## 2024-05-22 DIAGNOSIS — E53.8 B12 DEFICIENCY: ICD-10-CM

## 2024-05-22 DIAGNOSIS — D64.9 ANEMIA, UNSPECIFIED TYPE: ICD-10-CM

## 2024-05-22 DIAGNOSIS — D51.8 OTHER VITAMIN B12 DEFICIENCY ANEMIA: ICD-10-CM

## 2024-05-22 PROCEDURE — 1125F AMNT PAIN NOTED PAIN PRSNT: CPT | Mod: CPTII,S$GLB,, | Performed by: INTERNAL MEDICINE

## 2024-05-22 PROCEDURE — 3008F BODY MASS INDEX DOCD: CPT | Mod: CPTII,S$GLB,, | Performed by: INTERNAL MEDICINE

## 2024-05-22 PROCEDURE — 1160F RVW MEDS BY RX/DR IN RCRD: CPT | Mod: CPTII,S$GLB,, | Performed by: INTERNAL MEDICINE

## 2024-05-22 PROCEDURE — 1101F PT FALLS ASSESS-DOCD LE1/YR: CPT | Mod: CPTII,S$GLB,, | Performed by: INTERNAL MEDICINE

## 2024-05-22 PROCEDURE — G2211 COMPLEX E/M VISIT ADD ON: HCPCS | Mod: S$GLB,,, | Performed by: INTERNAL MEDICINE

## 2024-05-22 PROCEDURE — 1159F MED LIST DOCD IN RCRD: CPT | Mod: CPTII,S$GLB,, | Performed by: INTERNAL MEDICINE

## 2024-05-22 PROCEDURE — 3078F DIAST BP <80 MM HG: CPT | Mod: CPTII,S$GLB,, | Performed by: INTERNAL MEDICINE

## 2024-05-22 PROCEDURE — 3288F FALL RISK ASSESSMENT DOCD: CPT | Mod: CPTII,S$GLB,, | Performed by: INTERNAL MEDICINE

## 2024-05-22 PROCEDURE — 3077F SYST BP >= 140 MM HG: CPT | Mod: CPTII,S$GLB,, | Performed by: INTERNAL MEDICINE

## 2024-05-22 PROCEDURE — 99999 PR PBB SHADOW E&M-EST. PATIENT-LVL IV: CPT | Mod: PBBFAC,,, | Performed by: INTERNAL MEDICINE

## 2024-05-22 PROCEDURE — 99213 OFFICE O/P EST LOW 20 MIN: CPT | Mod: S$GLB,,, | Performed by: INTERNAL MEDICINE

## 2024-05-22 RX ORDER — IRON,CARBONYL/ASCORBIC ACID 100-250 MG
1 TABLET ORAL DAILY
Qty: 90 EACH | Refills: 3 | Status: SHIPPED | OUTPATIENT
Start: 2024-05-22

## 2024-05-22 RX ORDER — CYANOCOBALAMIN 1000 UG/ML
1000 INJECTION, SOLUTION INTRAMUSCULAR; SUBCUTANEOUS
Qty: 3 ML | Refills: 4 | Status: SHIPPED | OUTPATIENT
Start: 2024-05-22

## 2024-05-30 ENCOUNTER — LAB VISIT (OUTPATIENT)
Dept: LAB | Facility: HOSPITAL | Age: 69
End: 2024-05-30
Attending: NURSE PRACTITIONER
Payer: MEDICARE

## 2024-05-30 DIAGNOSIS — Z79.4 TYPE 2 DIABETES MELLITUS TREATED WITH INSULIN: ICD-10-CM

## 2024-05-30 DIAGNOSIS — E11.9 TYPE 2 DIABETES MELLITUS TREATED WITH INSULIN: ICD-10-CM

## 2024-05-30 LAB
ALBUMIN SERPL BCP-MCNC: 3.8 G/DL (ref 3.5–5.2)
ALP SERPL-CCNC: 65 U/L (ref 55–135)
ALT SERPL W/O P-5'-P-CCNC: 14 U/L (ref 10–44)
ANION GAP SERPL CALC-SCNC: 8 MMOL/L (ref 8–16)
AST SERPL-CCNC: 18 U/L (ref 10–40)
BILIRUB SERPL-MCNC: 0.4 MG/DL (ref 0.1–1)
BUN SERPL-MCNC: 18 MG/DL (ref 8–23)
CALCIUM SERPL-MCNC: 9.8 MG/DL (ref 8.7–10.5)
CHLORIDE SERPL-SCNC: 100 MMOL/L (ref 95–110)
CO2 SERPL-SCNC: 35 MMOL/L (ref 23–29)
CREAT SERPL-MCNC: 0.8 MG/DL (ref 0.5–1.4)
EST. GFR  (NO RACE VARIABLE): >60 ML/MIN/1.73 M^2
ESTIMATED AVG GLUCOSE: 120 MG/DL (ref 68–131)
GLUCOSE SERPL-MCNC: 61 MG/DL (ref 70–110)
HBA1C MFR BLD: 5.8 % (ref 4.5–6.2)
POTASSIUM SERPL-SCNC: 3 MMOL/L (ref 3.5–5.1)
PROT SERPL-MCNC: 6.7 G/DL (ref 6–8.4)
SODIUM SERPL-SCNC: 143 MMOL/L (ref 136–145)

## 2024-05-30 PROCEDURE — 36415 COLL VENOUS BLD VENIPUNCTURE: CPT | Performed by: NURSE PRACTITIONER

## 2024-05-30 PROCEDURE — 83036 HEMOGLOBIN GLYCOSYLATED A1C: CPT | Performed by: NURSE PRACTITIONER

## 2024-05-30 PROCEDURE — 80053 COMPREHEN METABOLIC PANEL: CPT | Performed by: NURSE PRACTITIONER

## 2024-05-31 ENCOUNTER — PATIENT MESSAGE (OUTPATIENT)
Dept: FAMILY MEDICINE | Facility: CLINIC | Age: 69
End: 2024-05-31

## 2024-05-31 ENCOUNTER — OFFICE VISIT (OUTPATIENT)
Dept: FAMILY MEDICINE | Facility: CLINIC | Age: 69
End: 2024-05-31
Payer: MEDICARE

## 2024-05-31 ENCOUNTER — TELEPHONE (OUTPATIENT)
Dept: FAMILY MEDICINE | Facility: CLINIC | Age: 69
End: 2024-05-31
Payer: MEDICARE

## 2024-05-31 VITALS
HEIGHT: 66 IN | RESPIRATION RATE: 18 BRPM | SYSTOLIC BLOOD PRESSURE: 125 MMHG | HEART RATE: 57 BPM | TEMPERATURE: 98 F | DIASTOLIC BLOOD PRESSURE: 51 MMHG | OXYGEN SATURATION: 98 % | WEIGHT: 223.19 LBS | BODY MASS INDEX: 35.87 KG/M2

## 2024-05-31 DIAGNOSIS — E87.6 HYPOKALEMIA: Primary | ICD-10-CM

## 2024-05-31 DIAGNOSIS — R05.3 CHRONIC COUGH: ICD-10-CM

## 2024-05-31 DIAGNOSIS — E11.9 TYPE 2 DIABETES MELLITUS TREATED WITH INSULIN: ICD-10-CM

## 2024-05-31 DIAGNOSIS — Z12.11 COLON CANCER SCREENING: ICD-10-CM

## 2024-05-31 DIAGNOSIS — Z79.4 TYPE 2 DIABETES MELLITUS TREATED WITH INSULIN: ICD-10-CM

## 2024-05-31 DIAGNOSIS — I10 BENIGN ESSENTIAL HYPERTENSION: ICD-10-CM

## 2024-05-31 DIAGNOSIS — K44.9 HIATAL HERNIA: ICD-10-CM

## 2024-05-31 PROCEDURE — 3078F DIAST BP <80 MM HG: CPT | Mod: CPTII,S$GLB,, | Performed by: NURSE PRACTITIONER

## 2024-05-31 PROCEDURE — 3008F BODY MASS INDEX DOCD: CPT | Mod: CPTII,S$GLB,, | Performed by: NURSE PRACTITIONER

## 2024-05-31 PROCEDURE — 1101F PT FALLS ASSESS-DOCD LE1/YR: CPT | Mod: CPTII,S$GLB,, | Performed by: NURSE PRACTITIONER

## 2024-05-31 PROCEDURE — 3288F FALL RISK ASSESSMENT DOCD: CPT | Mod: CPTII,S$GLB,, | Performed by: NURSE PRACTITIONER

## 2024-05-31 PROCEDURE — 99999 PR PBB SHADOW E&M-EST. PATIENT-LVL V: CPT | Mod: PBBFAC,,, | Performed by: NURSE PRACTITIONER

## 2024-05-31 PROCEDURE — 1125F AMNT PAIN NOTED PAIN PRSNT: CPT | Mod: CPTII,S$GLB,, | Performed by: NURSE PRACTITIONER

## 2024-05-31 PROCEDURE — 1160F RVW MEDS BY RX/DR IN RCRD: CPT | Mod: CPTII,S$GLB,, | Performed by: NURSE PRACTITIONER

## 2024-05-31 PROCEDURE — 3044F HG A1C LEVEL LT 7.0%: CPT | Mod: CPTII,S$GLB,, | Performed by: NURSE PRACTITIONER

## 2024-05-31 PROCEDURE — 1159F MED LIST DOCD IN RCRD: CPT | Mod: CPTII,S$GLB,, | Performed by: NURSE PRACTITIONER

## 2024-05-31 PROCEDURE — 3074F SYST BP LT 130 MM HG: CPT | Mod: CPTII,S$GLB,, | Performed by: NURSE PRACTITIONER

## 2024-05-31 PROCEDURE — 99214 OFFICE O/P EST MOD 30 MIN: CPT | Mod: S$GLB,,, | Performed by: NURSE PRACTITIONER

## 2024-05-31 RX ORDER — OMEPRAZOLE 20 MG/1
20 CAPSULE, DELAYED RELEASE ORAL
Qty: 30 CAPSULE | Refills: 0 | Status: SHIPPED | OUTPATIENT
Start: 2024-05-31 | End: 2025-05-31

## 2024-05-31 RX ORDER — POTASSIUM CHLORIDE 1500 MG/1
20 TABLET, EXTENDED RELEASE ORAL DAILY
Qty: 90 TABLET | Refills: 1 | Status: SHIPPED | OUTPATIENT
Start: 2024-05-31

## 2024-05-31 NOTE — PROGRESS NOTES
SUBJECTIVE:      Patient ID: Chanel Cervantes is a 68 y.o. female.    Chief Complaint: Diabetes and Hypertension    Chanel is here for folllow up on medications and recheck of HTN.  PMH of DM, HTN, HLD, anemia, CAD, hx of MI, depression, obesity s/p gastric sleeve in April 2019 c/b incisional ventral hernia s/p repair, panniculectomy and full thickness skin graft on 12/4/19, anemia, multinodular thyroid s/p R thyroid lobectomy 7/2021, and NIKOLAS. Her BP is controlled today. She continues on amlodipine, olmesartan, Tenex, metoprolol 25 mg daily, and metolazone. CT chest screening results reviewed with pt as well as recent labs. A1C down to 5.8! Continuing on metformin, Trijardy and Tresiba 76 units nightly. CT shows coronary artery and aortic calcifications- has not seen cardiology yet. Dealing with insurance changes and trying to locate in network provider. Potassium is low on recent labs despite taking supplement 10 meq daily. Also c/o chronic cough for a year- has seen pulmonary about it and had testing done. Felt it was due to allergies- taking otc meds without improvement but reports PND and rhinorrhea almost daily. Does have HH present on imaging but pt denies GERD complaints.       Dr. Henley - endocrine (thyroid)   Dr. Jackson - hem/onc  Dr. Orozco - rheumatology  Dr. Nunez - bariatrics/gen surg  Dr. Chance - surg (thyroid)  Dr. Guan - eye doctor, exam in 2024      Diabetes  She presents for her follow-up diabetic visit. She has type 2 diabetes mellitus. No MedicAlert identification noted. The initial diagnosis of diabetes was made 1987 years ago. Her disease course has been improving. Hypoglycemia symptoms include dizziness. Pertinent negatives for hypoglycemia include no confusion, headaches, hunger, mood changes, nervousness/anxiousness, pallor, sleepiness or sweats. Pertinent negatives for diabetes include no blurred vision, no chest pain, no fatigue, no foot paresthesias, no foot ulcerations, no  "polydipsia, no polyphagia, no polyuria, no visual change, no weakness and no weight loss. Pertinent negatives for hypoglycemia complications include no blackouts, no hospitalization, no nocturnal hypoglycemia, no required assistance and no required glucagon injection. Symptoms are improving. Pertinent negatives for diabetic complications include no autonomic neuropathy, CVA, heart disease, nephropathy, peripheral neuropathy, PVD or retinopathy. Risk factors for coronary artery disease include family history, obesity, sedentary lifestyle, stress and diabetes mellitus. Current diabetic treatment includes insulin injections and oral agent (dual therapy). She is compliant with treatment most of the time. She is currently taking insulin at bedtime. Insulin injections are given by patient. Rotation sites for injection include the abdominal wall. She is following a generally unhealthy diet. When asked about meal planning, she reported none. She has not had a previous visit with a dietitian. She never participates in exercise. She monitors blood glucose at home 1-2 x per day. Blood glucose monitoring compliance is good. She does not see a podiatrist.Eye exam is current.       Family History   Problem Relation Name Age of Onset    Diabetes Mother      Vision loss Mother      Heart disease Father      Vision loss Father      Stroke Father        Social History     Socioeconomic History    Marital status:    Tobacco Use    Smoking status: Former     Current packs/day: 0.00     Average packs/day: 1 pack/day for 15.0 years (15.0 ttl pk-yrs)     Types: Cigarettes     Start date:      Quit date:      Years since quittin.4    Smokeless tobacco: Never   Substance and Sexual Activity    Alcohol use: No     Comment: "maybe once a year"    Drug use: No    Sexual activity: Not Currently     Social Determinants of Health     Financial Resource Strain: Patient Declined (2024)    Overall Financial Resource Strain " (CARDIA)     Difficulty of Paying Living Expenses: Patient declined   Food Insecurity: Patient Declined (5/21/2024)    Hunger Vital Sign     Worried About Running Out of Food in the Last Year: Patient declined     Ran Out of Food in the Last Year: Patient declined   Transportation Needs: Patient Declined (10/31/2023)    PRAPARE - Transportation     Lack of Transportation (Medical): Patient declined     Lack of Transportation (Non-Medical): Patient declined   Physical Activity: Unknown (5/21/2024)    Exercise Vital Sign     Days of Exercise per Week: 0 days   Stress: Patient Declined (5/21/2024)    [employer] - Occupational Stress Questionnaire     Feeling of Stress : Patient declined   Housing Stability: Unknown (5/21/2024)    Housing Stability Vital Sign     Unable to Pay for Housing in the Last Year: Patient declined     Current Outpatient Medications   Medication Sig Dispense Refill    amLODIPine (NORVASC) 2.5 MG tablet Take 1 tablet (2.5 mg total) by mouth once daily. 90 tablet 1    aspirin (ECOTRIN) 81 MG EC tablet Take 1 tablet (81 mg total) by mouth once daily. 30 tablet 0    calcium carbonate-vitamin D3 600 mg-62.5 mcg (2,500 unit) Cap calcium carbonate 600 mg-vitamin D3 62.5 mcg (2,500 unit) capsule   Take by oral route.      cyanocobalamin 1,000 mcg/mL injection Inject 1 mL (1,000 mcg total) into the skin every 30 days. 3 mL 4    DULoxetine (CYMBALTA) 60 MG capsule Take 1 capsule by mouth once daily 90 capsule 0    empaglifloz-linaglip-metformin (TRIJARDY XR) 25-5-1,000 mg TBph Take 1 tablet by mouth once daily 90 tablet 1    guanFACINE (TENEX) 2 MG tablet Take 1 tablet (2 mg total) by mouth nightly. 90 tablet 3    iron-vitamin C 100-250 mg, ICAR-C, 100-250 mg Tab Take 1 tablet by mouth once daily. 90 each 3    magnesium oxide (MAG-OX) 400 mg (241.3 mg magnesium) tablet Take 1 tablet by mouth once daily 90 tablet 0    metFORMIN (GLUCOPHAGE) 1000 MG tablet Take 1 tablet (1,000  mg total) by mouth daily with dinner or evening meal. 90 tablet 1    metOLazone (ZAROXOLYN) 2.5 MG tablet Take 1 tablet (2.5 mg total) by mouth once daily. 90 tablet 1    metoprolol succinate (TOPROL-XL) 25 MG 24 hr tablet Take 1 tablet by mouth once daily 90 tablet 1    multivitamin capsule Take 1 capsule by mouth once daily.      nystatin (MYCOSTATIN) powder Apply topically 3 (three) times daily. 60 g 2    prednisoLONE acetate (PRED FORTE) 1 % DrpS Place 1 drop into both eyes as needed.       rosuvastatin (CRESTOR) 40 MG Tab Take 1 tablet (40 mg total) by mouth every evening. 90 tablet 1    blood sugar diagnostic Strp One Touch Ultra Blue Test strips, Use l strip to check glucose 4 times daily 100 each 11    blood-glucose meter kit Use as instructed 1 each 0    lancets (ONETOUCH DELICA LANCETS) 33 gauge Misc USE 1  TO CHECK GLUCOSE 4 TIMES DAILY 100 each 3    omeprazole (PRILOSEC) 20 MG capsule Take 1 capsule (20 mg total) by mouth before breakfast. 30 capsule 0    potassium chloride (K-TAB) 20 mEq Take 1 tablet (20 mEq total) by mouth once daily. 90 tablet 1     No current facility-administered medications for this visit.     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  severe wound inf after surgery    CARPAL TUNNEL RELEASE Bilateral 2002     SECTION      CHOLECYSTECTOMY      COLONOSCOPY      CORONARY ANGIOPLASTY WITH STENT PLACEMENT      CORONARY ARTERY BYPASS GRAFT      EPIDURAL STEROID INJECTION INTO LUMBAR SPINE      x2    ERCP N/A 2023    Procedure: ERCP (ENDOSCOPIC RETROGRADE CHOLANGIOPANCREATOGRAPHY);  Surgeon: Lavon Hernandez III, MD;  Location: [facility] ENDO;  Service: Endoscopy;  Laterality: N/A;    ERCP W/ SPHICTEROTOMY  2023    ESOPHAGOGASTRODUODENOSCOPY      HERNIA REPAIR  2019    HYSTERECTOMY      THYROID LOBECTOMY Right 2021    Procedure: LOBECTOMY, THYROID;  Surgeon: Mari Chance MD;  Location: [facility] OR;  Service: General;  Laterality: Right;       Review of Systems   Constitutional:  Negative for activity change, appetite change, chills, fatigue, fever, malaise/fatigue, unexpected weight change and weight loss.   HENT:  Positive for postnasal drip and rhinorrhea (clear). Negative for congestion, ear discharge, ear pain, hearing loss, sinus pain, sneezing, sore throat and trouble swallowing.    Eyes:  Negative for blurred vision, pain, discharge and visual disturbance.   Respiratory:  Positive for cough. Negative for chest tightness, shortness of breath and wheezing.    Cardiovascular:  Negative for chest pain, palpitations and leg swelling.   Gastrointestinal:  Negative for abdominal distention, abdominal pain, diarrhea, nausea and vomiting.        Denies GERD or acid reflux    Endocrine: Negative for polydipsia, polyphagia and polyuria.   Genitourinary:  Negative for difficulty urinating, dysuria, flank pain, frequency, hematuria, menstrual problem, pelvic pain, urgency and vaginal discharge.   Musculoskeletal:  Positive for arthralgias. Negative for back pain, joint swelling, myalgias and neck pain.   Skin:  Negative for color change, pallor, rash and wound.   Allergic/Immunologic: Positive for environmental allergies.  "  Neurological:  Positive for dizziness. Negative for weakness, numbness and headaches.   Hematological:  Negative for adenopathy.   Psychiatric/Behavioral:  Negative for agitation, confusion, dysphoric mood, hallucinations, sleep disturbance and suicidal ideas. The patient is not nervous/anxious.       OBJECTIVE:      Vitals:    05/31/24 0947   BP: (!) 125/51   BP Location: Left arm   Patient Position: Sitting   BP Method: Large (Automatic)   Pulse: (!) 57   Resp: 18   Temp: 98 °F (36.7 °C)   TempSrc: Oral   SpO2: 98%   Weight: 101.2 kg (223 lb 3.2 oz)   Height: 5' 6" (1.676 m)     Physical Exam  Vitals and nursing note reviewed.   Constitutional:       General: She is not in acute distress.     Appearance: Normal appearance. She is well-developed. She is obese.   HENT:      Head: Normocephalic.      Right Ear: Hearing normal.      Left Ear: Hearing normal.      Mouth/Throat:      Lips: Pink.      Mouth: Mucous membranes are moist.   Eyes:      General: Lids are normal.      Pupils: Pupils are equal, round, and reactive to light.   Neck:      Thyroid: No thyroid mass or thyromegaly.      Trachea: Trachea and phonation normal. No tracheal deviation.   Cardiovascular:      Rate and Rhythm: Normal rate and regular rhythm.      Heart sounds: Normal heart sounds. No murmur heard.  Pulmonary:      Effort: Pulmonary effort is normal. No respiratory distress.      Breath sounds: Normal breath sounds. No decreased breath sounds, wheezing or rales.   Musculoskeletal:         General: Normal range of motion.      Cervical back: Full passive range of motion without pain, normal range of motion and neck supple.      Right lower leg: No edema.      Left lower leg: No edema.   Lymphadenopathy:      Cervical: No cervical adenopathy.      Upper Body:      Right upper body: No supraclavicular adenopathy.      Left upper body: No supraclavicular adenopathy.   Skin:     General: Skin is warm and dry.      Coloration: Skin is not " jaundiced or pale.   Neurological:      Mental Status: She is alert and oriented to person, place, and time.      GCS: GCS eye subscore is 4. GCS verbal subscore is 5. GCS motor subscore is 6.      Coordination: Coordination is intact.      Gait: Gait is intact.   Psychiatric:         Attention and Perception: Attention and perception normal.         Mood and Affect: Mood and affect normal.         Speech: Speech normal.         Behavior: Behavior normal. Behavior is cooperative.         Thought Content: Thought content normal. Thought content does not include suicidal plan.         Cognition and Memory: Cognition and memory normal.         Judgment: Judgment normal.        Assessment:       1. Hypokalemia    2. Type 2 diabetes mellitus treated with insulin    3. Benign essential hypertension    4. Colon cancer screening    5. Chronic cough    6. Hiatal hernia        Plan:       Hypokalemia  -     potassium chloride (K-TAB) 20 mEq; Take 1 tablet (20 mEq total) by mouth once daily.  Dispense: 90 tablet; Refill: 1  -     POTASSIUM; Future; Expected date: 06/14/2024  -increase supp daily; recheck in 2 weeks    Type 2 diabetes mellitus treated with insulin   -below goal, cont meds; need copies of eye exam; check lipids at next labs    Benign essential hypertension   -stable/controlled; cont meds; need f/u with cardiology about CT findings and hx of cardiomegaly    Colon cancer screening  -     Cologuard Screening (Multitarget Stool DNA); Future; Expected date: 05/31/2024  -need repeat Cologuard    Chronic cough   -switch allergy meds; recommend f/u with ENT if not improved    Hiatal hernia  -     omeprazole (PRILOSEC) 20 MG capsule; Take 1 capsule (20 mg total) by mouth before breakfast.  Dispense: 30 capsule; Refill: 0   -trial of PPI daily to see if improves chronic cough    I spent a total of 35 minutes on the day of the visit.This includes face to face time and non-face to face time preparing to see the patient (eg,  review of tests), obtaining and/or reviewing separately obtained history, documenting clinical information in the electronic or other health record, independently interpreting results and communicating results to the patient/family/caregiver, or care coordinator.       Follow up in about 6 months (around 11/30/2024) for diabetes, HTN.      5/31/2024 MESERET Almaguer, FNP    This note was created using [employer] voice recognition software that occasionally misinterprets phrases or words.

## 2024-06-03 ENCOUNTER — PATIENT OUTREACH (OUTPATIENT)
Dept: ADMINISTRATIVE | Facility: HOSPITAL | Age: 69
End: 2024-06-03
Payer: MEDICARE

## 2024-06-03 RX ORDER — INSULIN DEGLUDEC 200 U/ML
78 INJECTION, SOLUTION SUBCUTANEOUS NIGHTLY
COMMUNITY

## 2024-06-03 NOTE — LETTER
AUTHORIZATION FOR RELEASE OF   CONFIDENTIAL INFORMATION    Dear Dr. Annamarie Guan,    We are seeing Chanel Cervantes, date of birth 1955, in the clinic at [facility] [facility]. Magdalena Hein FNP is the patient's PCP. Chanel Cervantes has an outstanding lab/procedure at the time we reviewed her chart. In order to help keep her health information updated, she has authorized us to request the following medical record(s):        (x )  DILATED EYE EXAM                               Please fax records to [facility], Berel, Kimberly C., FNP, [phone]     If you have any questions, please contact       Nati Ariza  Nurse Clinical Care Coordinator  [facility]  Phone: [phone]  Fax: [phone]        Patient Name: Chanel Cervatnes  : 1955  Patient Phone #: [phone]              Chanel Cervantes  MRN: [id number]  : 1955  Age: 68 y.o.  Sex: female         Patient/Legal Guardian Signature  This signature was collected at 2024           _______________________________   Printed Name/Relationship to Patient      Consent for Examination and Treatment: I hereby authorize the providers and employees of [facility] ([facility]) to provide medical treatment/services which includes, but is not limited to, performing and administering tests and diagnostic procedures that are deemed necessary, including, but not limited to, imaging examinations, blood tests and other laboratory procedures as may be required by the hospital, clinic, or may be ordered by my physician(s) or persons working under the general and/or special instructions of my physician(s).      I understand and agree that this consent covers all authorized persons, including but not limited to physicians, residents, nurse practitioners, physicians' assistants, specialists, consultants, student nurses, and independently contracted physicians, who are called upon by the  physician in charge, to carry out the diagnostic procedures and medical or surgical treatment.     I hereby authorize [facility] to retain or dispose of any specimens or tissue, should there be such remaining from any test or procedure.     I hereby authorize and give consent for [facility] providers and employees to take photographs, images or videotapes of such diagnostic, surgical or treatment procedures of Patient as may be required by [facility] or as may be ordered by a physician. I further acknowledge and agree that [facility] may use cameras or other devices for patient monitoring.     I am aware that the practice of medicine is not an exact science, and I acknowledge that no guarantees have been made to me as to the outcome of any tests, procedures or treatment.     Authorization for Release of Information: I understand that my insurance company and/or their agents may need information necessary to make determinations about payment/reimbursement. I hereby provide authorization to release to all insurance companies, their successors, assignees, other parties with whom they may have contracted, or others acting on their behalf, that are involved with payment for any hospital and/or clinic charges incurred by the patient, any information that they request and deem necessary for payment/reimbursement, and/or quality review.  I further authorize the release of my health information to physicians or other health care practitioners on staff who are involved in my health care now and in the future, and to other health care providers, entities, or institutions for the purpose of my continued care and treatment, including referrals.     REGISTRATION AUTHORIZATION  Form No. 09864 (Rev. 3/25/2024)    Page 1 of 3                       Medicare Patient's Certification and Authorization to Release Information and Payment Request:  I certify that the information given by me in applying for payment under Title XVIII of the Social  Security Act is correct. I authorize any teran of medical or other information about me to release to the [facility], or its intermediaries or carriers, any information needed for this or a related Medicare claim. I request that payment of authorized benefits be made on my behalf.     Assignment of Insurance Benefits:   I hereby authorize any and all insurance companies, health plans, defined   benefit plans, health insurers or any entity that is or may be responsible for payment of my medical expenses to pay all hospital and medical benefits now due, and to become due and payable to me under any hospital benefits, sick benefits, injury benefits or any other benefit for services rendered to me, including Major Medical Benefits, direct to [facility] and all independently contracted physicians. I assign any and all rights that I may have against any and all insurance companies, health plans, defined benefit plans, health insurers or any entity that is or may be responsible for payment of my medical expenses, including, but not limited to any right to appeal a denial of a claim, any right to bring any action, lawsuit, administrative proceeding, or other cause of action on my behalf. I specifically assign my right to pursue litigation against any and all insurance companies, health plans, defined benefit plans, health insurers or any entity that is or may be responsible for payment of my medical expenses based upon a refusal to pay charges.            E. Valuables: It is understood and agreed that [facility] is not liable for the damage to or loss of any money, jewelry,   documents, dentures, eye glasses, hearing aids, prosthetics, or other property of value.     F. Computer Equipment: I understand and agree that should I choose to use computer equipment owned by [facility] or if I choose to access the Internet via [facility]s network, I do so at my own risk. [facility] is not responsible for any damage to my  computer equipment or to any damages of any type that might arise from my loss of equipment or data.     G. Acceptance of Financial Responsibility:  I agree that in consideration of the services and   supplies that have been   or will be furnished to the patient, I am hereby obligated to pay all charges made for or on the account of the patient according to the standard rates (in effect at the time the services and supplies are delivered) established by [facility], including its Patient Financial Assistance Policy to the extent it is applicable. I understand that I am responsible for all charges, or portions thereof, not covered by insurance or other sources. Patient refunds will be distributed only after balances at all [facility] facilities are paid.     H. Communication Authorization:  I hereby authorize [facility] and its representatives, along with any billing service   or  who may work on their behalf, to contact me on   my cell phone and/or home phone using pre- recorded messages, artificial voice messages, automatic telephone dialing devices or other computer assisted technology, or by electronic      mail, text messaging, or by any other form of electronic communication. This includes, but is not limited to, appointment reminders, yearly physical exam reminders, preventive care reminders, patient campaigns, welcome calls, and calls about account balances on my account or any account on which I am listed as a guarantor. I understand I have the right to opt out of these communications at any time.      Relationship  Between  Facility and  Provider:      I understand that some, but not all, providers furnishing services to the patient are not employees or agents of [facility]. The patient is under the care and supervision of his/her attending physician, and it is the responsibility of the facility and its nursing staff to carry out the instructions of such physicians. It is the responsibility of the  patient's physician/designee to obtain the patient's informed consent, when required, for medical or surgical treatment, special diagnostic or therapeutic procedures, or hospital services rendered for the patient under the special instructions of the physician/designee.           REGISTRATION AUTHORIZATION  Form No. 16255 (Rev. 3/25/2024)    Page 2 of 3                       Immunizations: [facility] shares immunization information with state sponsored health departments to help you and your doctor keep track of your immunization records. By signing, you consent to have this information shared with the health department in your state:                                [place] - LINKS ([place] Immunization Network for Kids Statewide)                                [place] - MIIX ([place] Immunization Information eXchange)                                [place] - ImmPRINT (Immunization Patient Registry with Integrated Technology)     TERM: This authorization is valid for this and subsequent care/treatment I receive at [facility] and will remain valid unless/until revoked in writing by me.     [facility]: As used in this document, [facility] means all [facility] owned and managed facilities, including, but not limited to, all health centers, surgery centers, clinics, urgent care centers, and hospitals.         [facility] complies with applicable Federal civil rights laws and does not discriminate on the basis of race, color, national origin, age, disability, or sex.  ATENCIÓN: si habla elizabeth, tiene a anglin disposición servicios gratuitos de asistencia lingüística. Llnadeen al [phone].  CHÚ Ý: N?u b?n nói Ti?ng Vi?t, có các d?ch v? h? tr? ngôn ng? mi?n phí dành cho b?n. G?i s? [phone].        REGISTRATION AUTHORIZATION  Form No. 36570 (Rev. 3/25/2024)   Page 3 of 3     Patient

## 2024-06-03 NOTE — PROGRESS NOTES
Population Health Chart Review & Patient Outreach Details      Additional [facility] Health Notes:               Updates Requested / Reviewed:      Updated Care Coordination Note, Care Everywhere, , Care Team Updated, and Immunizations Reconciliation Completed or Queried: [facility] Topics Overdue:      [facility] Score: 5     Colon Cancer Screening  Urine Screening  Eye Exam  Lipid Panel  Foot Exam    Shingles/Zoster Vaccine  RSV Vaccine                  Health Maintenance Topic(s) Outreach Outcomes & Actions Taken:    Eye Exam - Outreach Outcomes & Actions Taken  : External Records Requested & Care Team Updated if Applicable

## 2024-06-14 ENCOUNTER — LAB VISIT (OUTPATIENT)
Dept: LAB | Facility: HOSPITAL | Age: 69
End: 2024-06-14
Attending: NURSE PRACTITIONER
Payer: MEDICARE

## 2024-06-14 DIAGNOSIS — E87.6 HYPOPOTASSEMIA: Primary | ICD-10-CM

## 2024-06-14 DIAGNOSIS — E87.6 HYPOPOTASSEMIA: ICD-10-CM

## 2024-06-14 LAB — POTASSIUM SERPL-SCNC: 3.6 MMOL/L (ref 3.5–5.1)

## 2024-06-14 PROCEDURE — 36415 COLL VENOUS BLD VENIPUNCTURE: CPT | Performed by: NURSE PRACTITIONER

## 2024-06-14 PROCEDURE — 84132 ASSAY OF SERUM POTASSIUM: CPT | Performed by: NURSE PRACTITIONER

## 2024-06-24 DIAGNOSIS — K44.9 HIATAL HERNIA: ICD-10-CM

## 2024-06-24 DIAGNOSIS — E83.42 HYPOMAGNESEMIA: ICD-10-CM

## 2024-06-25 NOTE — TELEPHONE ENCOUNTER
She thinks it has helped the cough. She said it has improved but it is not completely gone.     LOV 5-31-24  NOV 12-4-24

## 2024-06-27 RX ORDER — OMEPRAZOLE 20 MG/1
20 CAPSULE, DELAYED RELEASE ORAL
Qty: 30 CAPSULE | Refills: 2 | Status: SHIPPED | OUTPATIENT
Start: 2024-06-27

## 2024-06-27 RX ORDER — LANOLIN ALCOHOL/MO/W.PET/CERES
1 CREAM (GRAM) TOPICAL DAILY
Qty: 90 TABLET | Refills: 0 | Status: SHIPPED | OUTPATIENT
Start: 2024-06-27

## 2024-07-23 ENCOUNTER — PATIENT MESSAGE (OUTPATIENT)
Dept: FAMILY MEDICINE | Facility: CLINIC | Age: 69
End: 2024-07-23
Payer: MEDICARE

## 2024-07-25 DIAGNOSIS — E11.9 TYPE 2 DIABETES MELLITUS TREATED WITH INSULIN: ICD-10-CM

## 2024-07-25 DIAGNOSIS — Z79.4 TYPE 2 DIABETES MELLITUS TREATED WITH INSULIN: ICD-10-CM

## 2024-07-25 RX ORDER — METFORMIN HYDROCHLORIDE 1000 MG/1
1000 TABLET ORAL
Qty: 90 TABLET | Refills: 1 | Status: SHIPPED | OUTPATIENT
Start: 2024-07-25

## 2024-07-31 DIAGNOSIS — Z79.4 TYPE 2 DIABETES MELLITUS TREATED WITH INSULIN: ICD-10-CM

## 2024-07-31 DIAGNOSIS — E11.9 TYPE 2 DIABETES MELLITUS TREATED WITH INSULIN: ICD-10-CM

## 2024-07-31 RX ORDER — EMPAGLIFLOZIN, LINAGLIPTIN, METFORMIN HYDROCHLORIDE 25; 5; 1000 MG/1; MG/1; MG/1
1 TABLET, EXTENDED RELEASE ORAL DAILY
Qty: 90 TABLET | Refills: 1 | Status: SHIPPED | OUTPATIENT
Start: 2024-07-31

## 2024-08-02 ENCOUNTER — PATIENT MESSAGE (OUTPATIENT)
Dept: ADMINISTRATIVE | Facility: HOSPITAL | Age: 69
End: 2024-08-02
Payer: MEDICARE

## 2024-08-15 DIAGNOSIS — I10 BENIGN ESSENTIAL HYPERTENSION: ICD-10-CM

## 2024-08-15 DIAGNOSIS — M79.7 FIBROMYALGIA: ICD-10-CM

## 2024-08-15 RX ORDER — GUANFACINE 2 MG/1
2 TABLET ORAL NIGHTLY
Qty: 90 TABLET | Refills: 1 | Status: SHIPPED | OUTPATIENT
Start: 2024-08-15

## 2024-08-15 RX ORDER — DULOXETIN HYDROCHLORIDE 60 MG/1
60 CAPSULE, DELAYED RELEASE ORAL
Qty: 90 CAPSULE | Refills: 1 | Status: SHIPPED | OUTPATIENT
Start: 2024-08-15

## 2024-08-31 DIAGNOSIS — I10 BENIGN ESSENTIAL HYPERTENSION: ICD-10-CM

## 2024-09-02 DIAGNOSIS — E78.2 MIXED HYPERLIPIDEMIA: ICD-10-CM

## 2024-09-03 RX ORDER — AMLODIPINE BESYLATE 2.5 MG/1
2.5 TABLET ORAL
Qty: 90 TABLET | Refills: 1 | Status: SHIPPED | OUTPATIENT
Start: 2024-09-03

## 2024-09-04 RX ORDER — ROSUVASTATIN CALCIUM 40 MG/1
40 TABLET, COATED ORAL NIGHTLY
Qty: 90 TABLET | Refills: 0 | Status: SHIPPED | OUTPATIENT
Start: 2024-09-04

## 2024-09-12 DIAGNOSIS — K44.9 HIATAL HERNIA: ICD-10-CM

## 2024-09-12 RX ORDER — OMEPRAZOLE 20 MG/1
20 CAPSULE, DELAYED RELEASE ORAL
Qty: 90 CAPSULE | Refills: 3 | Status: SHIPPED | OUTPATIENT
Start: 2024-09-12

## 2024-09-16 DIAGNOSIS — I10 BENIGN ESSENTIAL HYPERTENSION: ICD-10-CM

## 2024-09-16 RX ORDER — METOLAZONE 2.5 MG/1
2.5 TABLET ORAL
Qty: 90 TABLET | Refills: 0 | Status: SHIPPED | OUTPATIENT
Start: 2024-09-16

## 2024-09-22 DIAGNOSIS — E83.42 HYPOMAGNESEMIA: ICD-10-CM

## 2024-09-23 RX ORDER — LANOLIN ALCOHOL/MO/W.PET/CERES
1 CREAM (GRAM) TOPICAL
Qty: 90 TABLET | Refills: 0 | Status: SHIPPED | OUTPATIENT
Start: 2024-09-23

## 2024-10-15 ENCOUNTER — PATIENT MESSAGE (OUTPATIENT)
Dept: FAMILY MEDICINE | Facility: CLINIC | Age: 69
End: 2024-10-15
Payer: MEDICARE

## 2024-10-24 ENCOUNTER — PATIENT OUTREACH (OUTPATIENT)
Dept: ADMINISTRATIVE | Facility: HOSPITAL | Age: 69
End: 2024-10-24
Payer: MEDICARE

## 2024-10-24 NOTE — LETTER
AUTHORIZATION FOR RELEASE OF   CONFIDENTIAL INFORMATION    Dear Dr. Annamarie Guan,    We are seeing Chanel Cervantes, date of birth 1955, in the clinic at [facility] [address] [facility]. Magdalena Hein FNP is the patient's PCP. Chanel Cervantes has an outstanding lab/procedure at the time we reviewed her chart. In order to help keep her health information updated, she has authorized us to request the following medical record(s):          ( x )  EYE EXAM            Please fax records to [facility], Berel, Kimberly C., FNP, [phone]     If you have any questions, please contact       Nati Ariza  Nurse Clinical Care Coordinator  [facility]  Phone: [phone]  Fax: [phone]    Patient Name: Chanel Cervantes  : 1955  Patient Phone #: [phone]                  Chanel Cervantes  MRN: [id number]  : 1955  Age: 68 y.o.  Sex: female         Patient/Legal Guardian Signature  This signature was collected at 2024           _______________________________   Printed Name/Relationship to Patient      Consent for Examination and Treatment: I hereby authorize the providers and employees of [facility] ([facility]) to provide medical treatment/services which includes, but is not limited to, performing and administering tests and diagnostic procedures that are deemed necessary, including, but not limited to, imaging examinations, blood tests and other laboratory procedures as may be required by the hospital, clinic, or may be ordered by my physician(s) or persons working under the general and/or special instructions of my physician(s).      I understand and agree that this consent covers all authorized persons, including but not limited to physicians, residents, nurse practitioners, physicians' assistants, specialists, consultants, student nurses, and independently contracted physicians, who are called upon by the physician in charge, to  carry out the diagnostic procedures and medical or surgical treatment.     I hereby authorize [facility] to retain or dispose of any specimens or tissue, should there be such remaining from any test or procedure.     I hereby authorize and give consent for [facility] providers and employees to take photographs, images or videotapes of such diagnostic, surgical or treatment procedures of Patient as may be required by [facility] or as may be ordered by a physician. I further acknowledge and agree that [facility] may use cameras or other devices for patient monitoring.     I am aware that the practice of medicine is not an exact science, and I acknowledge that no guarantees have been made to me as to the outcome of any tests, procedures or treatment.     Authorization for Release of Information: I understand that my insurance company and/or their agents may need information necessary to make determinations about payment/reimbursement. I hereby provide authorization to release to all insurance companies, their successors, assignees, other parties with whom they may have contracted, or others acting on their behalf, that are involved with payment for any hospital and/or clinic charges incurred by the patient, any information that they request and deem necessary for payment/reimbursement, and/or quality review.  I further authorize the release of my health information to physicians or other health care practitioners on staff who are involved in my health care now and in the future, and to other health care providers, entities, or institutions for the purpose of my continued care and treatment, including referrals.     REGISTRATION AUTHORIZATION  Form No. 19419 (Rev. 3/25/2024)    Page 1 of 3                       Medicare Patient's Certification and Authorization to Release Information and Payment Request:  I certify that the information given by me in applying for payment under Title XVIII of the Social Security Act is correct. I  authorize any teran of medical or other information about me to release to the [facility], or its intermediaries or carriers, any information needed for this or a related Medicare claim. I request that payment of authorized benefits be made on my behalf.     Assignment of Insurance Benefits:   I hereby authorize any and all insurance companies, health plans, defined   benefit plans, health insurers or any entity that is or may be responsible for payment of my medical expenses to pay all hospital and medical benefits now due, and to become due and payable to me under any hospital benefits, sick benefits, injury benefits or any other benefit for services rendered to me, including Major Medical Benefits, direct to [facility] and all independently contracted physicians. I assign any and all rights that I may have against any and all insurance companies, health plans, defined benefit plans, health insurers or any entity that is or may be responsible for payment of my medical expenses, including, but not limited to any right to appeal a denial of a claim, any right to bring any action, lawsuit, administrative proceeding, or other cause of action on my behalf. I specifically assign my right to pursue litigation against any and all insurance companies, health plans, defined benefit plans, health insurers or any entity that is or may be responsible for payment of my medical expenses based upon a refusal to pay charges.            E. Valuables: It is understood and agreed that [facility] is not liable for the damage to or loss of any money, jewelry,   documents, dentures, eye glasses, hearing aids, prosthetics, or other property of value.     F. Computer Equipment: I understand and agree that should I choose to use computer equipment owned by [facility] or if I choose to access the Internet via [facility]s network, I do so at my own risk. [facility] is not responsible for any damage to my computer equipment or to any  damages of any type that might arise from my loss of equipment or data.     G. Acceptance of Financial Responsibility:  I agree that in consideration of the services and   supplies that have been   or will be furnished to the patient, I am hereby obligated to pay all charges made for or on the account of the patient according to the standard rates (in effect at the time the services and supplies are delivered) established by [facility], including its Patient Financial Assistance Policy to the extent it is applicable. I understand that I am responsible for all charges, or portions thereof, not covered by insurance or other sources. Patient refunds will be distributed only after balances at all [facility] facilities are paid.     H. Communication Authorization:  I hereby authorize [facility] and its representatives, along with any billing service   or  who may work on their behalf, to contact me on   my cell phone and/or home phone using pre- recorded messages, artificial voice messages, automatic telephone dialing devices or other computer assisted technology, or by electronic      mail, text messaging, or by any other form of electronic communication. This includes, but is not limited to, appointment reminders, yearly physical exam reminders, preventive care reminders, patient campaigns, welcome calls, and calls about account balances on my account or any account on which I am listed as a guarantor. I understand I have the right to opt out of these communications at any time.      Relationship  Between  Facility and  Provider:      I understand that some, but not all, providers furnishing services to the patient are not employees or agents of [facility]. The patient is under the care and supervision of his/her attending physician, and it is the responsibility of the facility and its nursing staff to carry out the instructions of such physicians. It is the responsibility of the patient's physician/designee to  obtain the patient's informed consent, when required, for medical or surgical treatment, special diagnostic or therapeutic procedures, or hospital services rendered for the patient under the special instructions of the physician/designee.           REGISTRATION AUTHORIZATION  Form No. 79452 (Rev. 3/25/2024)    Page 2 of 3                       Immunizations: [facility] shares immunization information with state sponsored health departments to help you and your doctor keep track of your immunization records. By signing, you consent to have this information shared with the health department in your state:                                [place] - LINKS ([place] Immunization Network for Kids Statewide)                                [place] - MIIX ([place] Immunization Information eXchange)                                [place] - ImmPRINT (Immunization Patient Registry with Integrated Technology)     TERM: This authorization is valid for this and subsequent care/treatment I receive at [facility] and will remain valid unless/until revoked in writing by me.     [facility]: As used in this document, [facility] means all [facility] owned and managed facilities, including, but not limited to, all health centers, surgery centers, clinics, urgent care centers, and hospitals.         [facility] complies with applicable Federal civil rights laws and does not discriminate on the basis of race, color, national origin, age, disability, or sex.  ATENCIÓN: si habla elizabeth, tiene a anglin disposición servicios gratuitos de asistencia lingüística. Penelope parada [phone].  CHÚ Ý: N?u b?n nói Ti?ng Vi?t, có các d?ch v? h? tr? ngôn ng? mi?n phí dành cho b?n. G?i s? [phone].        REGISTRATION AUTHORIZATION  Form No. 60291 (Rev. 3/25/2024)   Page 3 of 3     Patient

## 2024-10-24 NOTE — PROGRESS NOTES
Population Health Chart Review & Patient Outreach Details      Additional Pop Health Notes:               Updates Requested / Reviewed:      Updated Care Coordination Note, Care Everywhere, , and Care Team Updated         Health Maintenance Topics Overdue:      VB Score: 5     Colon Cancer Screening  Urine Screening  Eye Exam  Lipid Panel  Foot Exam    Shingles/Zoster Vaccine  RSV Vaccine                  Health Maintenance Topic(s) Outreach Outcomes & Actions Taken:    Eye Exam - Outreach Outcomes & Actions Taken  : External Records Requested & Care Team Updated if Applicable

## 2024-10-30 ENCOUNTER — PATIENT OUTREACH (OUTPATIENT)
Dept: ADMINISTRATIVE | Facility: HOSPITAL | Age: 69
End: 2024-10-30
Payer: MEDICARE

## 2024-10-31 DIAGNOSIS — Z79.4 TYPE 2 DIABETES MELLITUS TREATED WITH INSULIN: ICD-10-CM

## 2024-10-31 DIAGNOSIS — E11.9 TYPE 2 DIABETES MELLITUS TREATED WITH INSULIN: ICD-10-CM

## 2024-11-18 ENCOUNTER — LAB VISIT (OUTPATIENT)
Dept: LAB | Facility: HOSPITAL | Age: 69
End: 2024-11-18
Attending: NURSE PRACTITIONER
Payer: MEDICARE

## 2024-11-18 ENCOUNTER — TELEPHONE (OUTPATIENT)
Facility: CLINIC | Age: 69
End: 2024-11-18
Payer: MEDICARE

## 2024-11-18 DIAGNOSIS — D64.9 ANEMIA, UNSPECIFIED TYPE: ICD-10-CM

## 2024-11-18 DIAGNOSIS — E78.2 MIXED HYPERLIPIDEMIA: ICD-10-CM

## 2024-11-18 DIAGNOSIS — D64.89 ANEMIA DUE TO MULTIPLE MECHANISMS: ICD-10-CM

## 2024-11-18 DIAGNOSIS — E53.8 B12 DEFICIENCY: ICD-10-CM

## 2024-11-18 DIAGNOSIS — D50.8 IRON DEFICIENCY ANEMIA FOLLOWING BARIATRIC SURGERY: Primary | ICD-10-CM

## 2024-11-18 DIAGNOSIS — K95.89 IRON DEFICIENCY ANEMIA FOLLOWING BARIATRIC SURGERY: Primary | ICD-10-CM

## 2024-11-18 LAB
ALBUMIN SERPL BCP-MCNC: 3.8 G/DL (ref 3.5–5.2)
ALBUMIN SERPL BCP-MCNC: 3.9 G/DL (ref 3.5–5.2)
ALP SERPL-CCNC: 60 U/L (ref 55–135)
ALP SERPL-CCNC: 63 U/L (ref 55–135)
ALT SERPL W/O P-5'-P-CCNC: 13 U/L (ref 10–44)
ALT SERPL W/O P-5'-P-CCNC: 13 U/L (ref 10–44)
ANION GAP SERPL CALC-SCNC: 8 MMOL/L (ref 8–16)
AST SERPL-CCNC: 18 U/L (ref 10–40)
AST SERPL-CCNC: 19 U/L (ref 10–40)
BASOPHILS # BLD AUTO: 0.05 K/UL (ref 0–0.2)
BASOPHILS NFR BLD: 0.9 % (ref 0–1.9)
BILIRUB DIRECT SERPL-MCNC: 0.1 MG/DL (ref 0.1–0.3)
BILIRUB SERPL-MCNC: 0.4 MG/DL (ref 0.1–1)
BILIRUB SERPL-MCNC: 0.4 MG/DL (ref 0.1–1)
BUN SERPL-MCNC: 19 MG/DL (ref 8–23)
CALCIUM SERPL-MCNC: 9.7 MG/DL (ref 8.7–10.5)
CHLORIDE SERPL-SCNC: 101 MMOL/L (ref 95–110)
CHOLEST SERPL-MCNC: 98 MG/DL (ref 120–199)
CHOLEST/HDLC SERPL: 2.6 {RATIO} (ref 2–5)
CO2 SERPL-SCNC: 32 MMOL/L (ref 23–29)
CREAT SERPL-MCNC: 0.9 MG/DL (ref 0.5–1.4)
DIFFERENTIAL METHOD BLD: ABNORMAL
EOSINOPHIL # BLD AUTO: 0.1 K/UL (ref 0–0.5)
EOSINOPHIL NFR BLD: 2.1 % (ref 0–8)
ERYTHROCYTE [DISTWIDTH] IN BLOOD BY AUTOMATED COUNT: 14.6 % (ref 11.5–14.5)
EST. GFR  (NO RACE VARIABLE): >60 ML/MIN/1.73 M^2
FERRITIN SERPL-MCNC: 25.6 NG/ML (ref 20–300)
FOLATE SERPL-MCNC: >22.3 NG/ML (ref 4–24)
GLUCOSE SERPL-MCNC: 78 MG/DL (ref 70–110)
HCT VFR BLD AUTO: 43.1 % (ref 37–48.5)
HDLC SERPL-MCNC: 37 MG/DL (ref 40–75)
HDLC SERPL: 37.8 % (ref 20–50)
HGB BLD-MCNC: 13.2 G/DL (ref 12–16)
IMM GRANULOCYTES # BLD AUTO: 0.02 K/UL (ref 0–0.04)
IMM GRANULOCYTES NFR BLD AUTO: 0.3 % (ref 0–0.5)
IRON SERPL-MCNC: 60 UG/DL (ref 30–160)
LDLC SERPL CALC-MCNC: 23.6 MG/DL (ref 63–159)
LYMPHOCYTES # BLD AUTO: 1.6 K/UL (ref 1–4.8)
LYMPHOCYTES NFR BLD: 27.7 % (ref 18–48)
MCH RBC QN AUTO: 26.2 PG (ref 27–31)
MCHC RBC AUTO-ENTMCNC: 30.6 G/DL (ref 32–36)
MCV RBC AUTO: 86 FL (ref 82–98)
MONOCYTES # BLD AUTO: 0.6 K/UL (ref 0.3–1)
MONOCYTES NFR BLD: 9.9 % (ref 4–15)
NEUTROPHILS # BLD AUTO: 3.4 K/UL (ref 1.8–7.7)
NEUTROPHILS NFR BLD: 59.1 % (ref 38–73)
NONHDLC SERPL-MCNC: 61 MG/DL
NRBC BLD-RTO: 0 /100 WBC
PLATELET # BLD AUTO: 321 K/UL (ref 150–450)
PMV BLD AUTO: 10.8 FL (ref 9.2–12.9)
POTASSIUM SERPL-SCNC: 3 MMOL/L (ref 3.5–5.1)
PROT SERPL-MCNC: 6.5 G/DL (ref 6–8.4)
PROT SERPL-MCNC: 6.6 G/DL (ref 6–8.4)
RBC # BLD AUTO: 5.04 M/UL (ref 4–5.4)
SATURATED IRON: 18 % (ref 20–50)
SODIUM SERPL-SCNC: 141 MMOL/L (ref 136–145)
TOTAL IRON BINDING CAPACITY: 332 UG/DL (ref 250–450)
TRANSFERRIN SERPL-MCNC: 237 MG/DL (ref 200–375)
TRIGL SERPL-MCNC: 187 MG/DL (ref 30–150)
VIT B12 SERPL-MCNC: 325 PG/ML (ref 210–950)
WBC # BLD AUTO: 5.77 K/UL (ref 3.9–12.7)

## 2024-11-18 PROCEDURE — 36415 COLL VENOUS BLD VENIPUNCTURE: CPT | Performed by: INTERNAL MEDICINE

## 2024-11-18 PROCEDURE — 82746 ASSAY OF FOLIC ACID SERUM: CPT | Performed by: INTERNAL MEDICINE

## 2024-11-18 PROCEDURE — 80061 LIPID PANEL: CPT | Performed by: NURSE PRACTITIONER

## 2024-11-18 PROCEDURE — 82607 VITAMIN B-12: CPT | Performed by: INTERNAL MEDICINE

## 2024-11-18 PROCEDURE — 85025 COMPLETE CBC W/AUTO DIFF WBC: CPT | Performed by: INTERNAL MEDICINE

## 2024-11-18 PROCEDURE — 80076 HEPATIC FUNCTION PANEL: CPT | Performed by: NURSE PRACTITIONER

## 2024-11-18 PROCEDURE — 84466 ASSAY OF TRANSFERRIN: CPT | Performed by: INTERNAL MEDICINE

## 2024-11-18 PROCEDURE — 82728 ASSAY OF FERRITIN: CPT | Performed by: INTERNAL MEDICINE

## 2024-11-18 PROCEDURE — 80053 COMPREHEN METABOLIC PANEL: CPT | Performed by: INTERNAL MEDICINE

## 2024-11-18 NOTE — TELEPHONE ENCOUNTER
----- Message from Mayra sent at 2024 10:41 AM CST -----  Regarding: New Lab Orders Needed  Good morning,  Patient arrived at [facility] for labs for her primary care Ms. Magdalena Hein, as well as for Dr. Jackson. The orders for Dr. Jackson looks like they just  last month. Patient has appointment with Dr. Jackson on Thursday, 2024. Would someone be able to put those in for the patient so she does not have to come back to do additional labs?        Thank you,  Mayra Ayon  Patient Access Representative    [facility]  [phone]

## 2024-11-20 DIAGNOSIS — D50.8 IRON DEFICIENCY ANEMIA FOLLOWING BARIATRIC SURGERY: ICD-10-CM

## 2024-11-20 DIAGNOSIS — K95.89 IRON DEFICIENCY ANEMIA FOLLOWING BARIATRIC SURGERY: ICD-10-CM

## 2024-11-20 DIAGNOSIS — E87.6 HYPOKALEMIA: ICD-10-CM

## 2024-11-20 RX ORDER — POTASSIUM CHLORIDE 1500 MG/1
20 TABLET, EXTENDED RELEASE ORAL
Qty: 90 TABLET | Refills: 0 | Status: SHIPPED | OUTPATIENT
Start: 2024-11-20

## 2024-11-20 NOTE — TELEPHONE ENCOUNTER
Patient made aware that [facility] refill will be sent to her pharmacy. Verbalized understanding. Script pended to [facility].

## 2024-11-20 NOTE — TELEPHONE ENCOUNTER
----- Message from Marcy sent at 11/20/2024  1:37 PM CST -----  Regarding: REFILL  Pt is R/S from 11/21 ([facility] Out) to January, asked for a refill for [employer] T[employer]. Please call to discuss.

## 2024-11-21 RX ORDER — IRON,CARBONYL/ASCORBIC ACID 100-250 MG
1 TABLET ORAL DAILY
Qty: 90 EACH | Refills: 0 | Status: SHIPPED | OUTPATIENT
Start: 2024-11-21

## 2024-12-04 ENCOUNTER — OFFICE VISIT (OUTPATIENT)
Dept: FAMILY MEDICINE | Facility: CLINIC | Age: 69
End: 2024-12-04
Payer: MEDICARE

## 2024-12-04 ENCOUNTER — LAB VISIT (OUTPATIENT)
Dept: LAB | Facility: HOSPITAL | Age: 69
End: 2024-12-04
Attending: NURSE PRACTITIONER
Payer: MEDICARE

## 2024-12-04 VITALS
OXYGEN SATURATION: 97 % | HEIGHT: 66 IN | SYSTOLIC BLOOD PRESSURE: 136 MMHG | WEIGHT: 228.5 LBS | DIASTOLIC BLOOD PRESSURE: 70 MMHG | TEMPERATURE: 98 F | RESPIRATION RATE: 18 BRPM | HEART RATE: 59 BPM | BODY MASS INDEX: 36.72 KG/M2

## 2024-12-04 DIAGNOSIS — R05.3 CHRONIC COUGH: ICD-10-CM

## 2024-12-04 DIAGNOSIS — E11.9 TYPE 2 DIABETES MELLITUS TREATED WITH INSULIN: ICD-10-CM

## 2024-12-04 DIAGNOSIS — Z91.09 ENVIRONMENTAL ALLERGIES: Primary | ICD-10-CM

## 2024-12-04 DIAGNOSIS — Z79.4 TYPE 2 DIABETES MELLITUS TREATED WITH INSULIN: ICD-10-CM

## 2024-12-04 DIAGNOSIS — I10 BENIGN ESSENTIAL HYPERTENSION: ICD-10-CM

## 2024-12-04 LAB
ALBUMIN SERPL BCP-MCNC: 3.5 G/DL (ref 3.5–5.2)
ALBUMIN/CREAT UR: 12.9 UG/MG (ref 0–30)
ALP SERPL-CCNC: 62 U/L (ref 40–150)
ALT SERPL W/O P-5'-P-CCNC: 17 U/L (ref 10–44)
ANION GAP SERPL CALC-SCNC: 8 MMOL/L (ref 8–16)
AST SERPL-CCNC: 22 U/L (ref 10–40)
BILIRUB SERPL-MCNC: 0.4 MG/DL (ref 0.1–1)
BUN SERPL-MCNC: 17 MG/DL (ref 8–23)
CALCIUM SERPL-MCNC: 9.8 MG/DL (ref 8.7–10.5)
CHLORIDE SERPL-SCNC: 104 MMOL/L (ref 95–110)
CO2 SERPL-SCNC: 32 MMOL/L (ref 23–29)
CREAT SERPL-MCNC: 0.9 MG/DL (ref 0.5–1.4)
CREAT UR-MCNC: 93 MG/DL (ref 15–325)
EST. GFR  (NO RACE VARIABLE): >60 ML/MIN/1.73 M^2
ESTIMATED AVG GLUCOSE: 105 MG/DL (ref 68–131)
GLUCOSE SERPL-MCNC: 80 MG/DL (ref 70–110)
HBA1C MFR BLD: 5.3 % (ref 4–5.6)
MICROALBUMIN UR DL<=1MG/L-MCNC: 12 UG/ML
POTASSIUM SERPL-SCNC: 3.6 MMOL/L (ref 3.5–5.1)
PROT SERPL-MCNC: 6.7 G/DL (ref 6–8.4)
SODIUM SERPL-SCNC: 144 MMOL/L (ref 136–145)

## 2024-12-04 PROCEDURE — G2211 COMPLEX E/M VISIT ADD ON: HCPCS | Mod: S$GLB,,, | Performed by: NURSE PRACTITIONER

## 2024-12-04 PROCEDURE — 99999 PR PBB SHADOW E&M-EST. PATIENT-LVL V: CPT | Mod: PBBFAC,,, | Performed by: NURSE PRACTITIONER

## 2024-12-04 PROCEDURE — 3288F FALL RISK ASSESSMENT DOCD: CPT | Mod: CPTII,S$GLB,, | Performed by: NURSE PRACTITIONER

## 2024-12-04 PROCEDURE — 3008F BODY MASS INDEX DOCD: CPT | Mod: CPTII,S$GLB,, | Performed by: NURSE PRACTITIONER

## 2024-12-04 PROCEDURE — 1125F AMNT PAIN NOTED PAIN PRSNT: CPT | Mod: CPTII,S$GLB,, | Performed by: NURSE PRACTITIONER

## 2024-12-04 PROCEDURE — 82043 UR ALBUMIN QUANTITATIVE: CPT | Performed by: NURSE PRACTITIONER

## 2024-12-04 PROCEDURE — 1160F RVW MEDS BY RX/DR IN RCRD: CPT | Mod: CPTII,S$GLB,, | Performed by: NURSE PRACTITIONER

## 2024-12-04 PROCEDURE — 1101F PT FALLS ASSESS-DOCD LE1/YR: CPT | Mod: CPTII,S$GLB,, | Performed by: NURSE PRACTITIONER

## 2024-12-04 PROCEDURE — 3044F HG A1C LEVEL LT 7.0%: CPT | Mod: CPTII,S$GLB,, | Performed by: NURSE PRACTITIONER

## 2024-12-04 PROCEDURE — 36415 COLL VENOUS BLD VENIPUNCTURE: CPT | Performed by: NURSE PRACTITIONER

## 2024-12-04 PROCEDURE — 1159F MED LIST DOCD IN RCRD: CPT | Mod: CPTII,S$GLB,, | Performed by: NURSE PRACTITIONER

## 2024-12-04 PROCEDURE — 3075F SYST BP GE 130 - 139MM HG: CPT | Mod: CPTII,S$GLB,, | Performed by: NURSE PRACTITIONER

## 2024-12-04 PROCEDURE — 99214 OFFICE O/P EST MOD 30 MIN: CPT | Mod: S$GLB,,, | Performed by: NURSE PRACTITIONER

## 2024-12-04 PROCEDURE — 3061F NEG MICROALBUMINURIA REV: CPT | Mod: CPTII,S$GLB,, | Performed by: NURSE PRACTITIONER

## 2024-12-04 PROCEDURE — 3066F NEPHROPATHY DOC TX: CPT | Mod: CPTII,S$GLB,, | Performed by: NURSE PRACTITIONER

## 2024-12-04 PROCEDURE — 80053 COMPREHEN METABOLIC PANEL: CPT | Performed by: NURSE PRACTITIONER

## 2024-12-04 PROCEDURE — 83036 HEMOGLOBIN GLYCOSYLATED A1C: CPT | Performed by: NURSE PRACTITIONER

## 2024-12-04 PROCEDURE — 3078F DIAST BP <80 MM HG: CPT | Mod: CPTII,S$GLB,, | Performed by: NURSE PRACTITIONER

## 2024-12-04 RX ORDER — METOLAZONE 2.5 MG/1
2.5 TABLET ORAL DAILY
Qty: 90 TABLET | Refills: 1 | Status: SHIPPED | OUTPATIENT
Start: 2024-12-04

## 2024-12-04 RX ORDER — LEVOCETIRIZINE DIHYDROCHLORIDE 5 MG/1
5 TABLET, FILM COATED ORAL NIGHTLY
Qty: 30 TABLET | Refills: 3 | Status: SHIPPED | OUTPATIENT
Start: 2024-12-04 | End: 2025-12-04

## 2024-12-04 NOTE — PROGRESS NOTES
SUBJECTIVE:      Patient ID: Chanel Cervantes is a 69 y.o. female.    Chief Complaint: Diabetes and Hypertension    History of Present Illness    CHIEF COMPLAINT:  Chanel presents with persistent cough and mucus production that has not improved since a previous hospital stay.    HPI:  Chanel reports a persistent cough with mucus production ongoing since hospital discharge in October. The cough is constant, occurring throughout the day, with chest congestion. Chanel notes that auscultation by medical professionals has reportedly been clear despite ongoing symptoms. Concurrent nasal congestion or discharge is also present. Symptoms have persisted to the extent that her son has encouraged seeking medical attention.    A prior pulmonology consultation was mentioned, with tests reportedly yielding normal results. Chanel describes a skin condition, initially presenting as a small patch of spots, which she had previously discussed with her doctor. The condition has progressed, with new areas affected, including her hand.    Chanel reports significant stress recently, which she believes may be impacting her health. This stress is attributed to relocating her parents, with her mother having dementia.    Chanel denies any improvement in her cough despite previous treatments and denies any known allergies.    Taking all medications as prescribed without side effects or complaints.  Blood pressure is normal.  Recent lab work reviewed with patient today.    MEDICATIONS:  Chanel is on Crestor, potassium supplement, Omeprazole, Toprol, Metolazone, Trijardy, and Tresiba (insulin). She is also taking Norvasc. For allergies, she occasionally takes either Allegra or Zyrtec, as well as Claritin.    MEDICAL HISTORY:  Chanel has a history of diabetes, hypertension, hyperlipidemia, GERD, and hypokalemia.    FAMILY HISTORY:  Family history is significant for mother with dementia.    TEST RESULTS:  Chanel's most recent A1C measurement  "was 5.8. A recent potassium test with Dr. Jackson showed low levels. Recent cholesterol and liver function tests were within normal limits. Chanel has not completed a foot exam recently.    IMAGING:  Chanel previously underwent a CT. According to the pulmonologist, the results were unremarkable.          Family History   Problem Relation Name Age of Onset    Diabetes Mother      Vision loss Mother      Heart disease Father      Vision loss Father      Stroke Father        Social History     Socioeconomic History    Marital status:    Tobacco Use    Smoking status: Former     Current packs/day: 0.00     Average packs/day: 1 pack/day for 15.0 years (15.0 ttl pk-yrs)     Types: Cigarettes     Start date:      Quit date:      Years since quittin.9    Smokeless tobacco: Never   Substance and Sexual Activity    Alcohol use: No     Comment: "maybe once a year"    Drug use: No    Sexual activity: Not Currently     Social Drivers of Health     Financial Resource Strain: Patient Declined (2024)    Overall Financial Resource Strain (CARDIA)     Difficulty of Paying Living Expenses: Patient declined   Food Insecurity: Patient Declined (2024)    Hunger Vital Sign     Worried About Running Out of Food in the Last Year: Patient declined     Ran Out of Food in the Last Year: Patient declined   Transportation Needs: Patient Declined (10/31/2023)    PRAPARE - Transportation     Lack of Transportation (Medical): Patient declined     Lack of Transportation (Non-Medical): Patient declined   Physical Activity: Unknown (2024)    Exercise Vital Sign     Days of Exercise per Week: 0 days   Stress: Patient Declined (2024)    [employer] [place] [employer] - Occupational Stress Questionnaire     Feeling of Stress : Patient declined   Housing Stability: Unknown (2024)    Housing Stability Vital Sign     Unable to Pay for Housing in the Last Year: Patient declined     Current Outpatient " Medications   Medication Sig Dispense Refill    amLODIPine (NORVASC) 2.5 MG tablet Take 1 tablet by mouth once daily 90 tablet 1    aspirin (ECOTRIN) 81 MG EC tablet Take 1 tablet (81 mg total) by mouth once daily. 30 tablet 0    calcium carbonate-vitamin D3 600 mg-62.5 mcg (2,500 unit) Cap calcium carbonate 600 mg-vitamin D3 62.5 mcg (2,500 unit) capsule   Take by oral route.      DULoxetine (CYMBALTA) 60 MG capsule Take 1 capsule by mouth once daily 90 capsule 1    empaglifloz-linaglip-metformin (TRIJARDY XR) 25-5-1,000 mg TBph Take 1 tablet by mouth once daily 90 tablet 1    guanFACINE (TENEX) 2 MG tablet Take 1 tablet by mouth nightly 90 tablet 1    insulin degludec (TRESIBA FLEXTOUCH U-200) 200 unit/mL (3 mL) insulin pen Inject 78 Units into the skin every evening.      iron-vitamin C 100-250 mg, ICAR-C, 100-250 mg Tab Take 1 tablet by mouth once daily. 90 each 0    magnesium oxide (MAG-OX) 400 mg (241.3 mg magnesium) tablet Take 1 tablet by mouth once daily 90 tablet 0    metFORMIN (GLUCOPHAGE) 1000 MG tablet TAKE 1 TABLET BY MOUTH ONCE DAILY WITH  DINNER  OR  EVENING  MEAL 90 tablet 1    metoprolol succinate (TOPROL-XL) 25 MG 24 hr tablet Take 1 tablet by mouth once daily 90 tablet 0    multivitamin capsule Take 1 capsule by mouth once daily.      nystatin (MYCOSTATIN) powder Apply topically 3 (three) times daily. 60 g 2    omeprazole (PRILOSEC) 20 MG capsule TAKE 1 CAPSULE BY MOUTH BEFORE BREAKFAST 90 capsule 3    potassium chloride (K-TAB) 20 mEq Take 1 tablet by mouth once daily 90 tablet 0    prednisoLONE acetate (PRED FORTE) 1 % DrpS Place 1 drop into both eyes as needed.       rosuvastatin (CRESTOR) 40 MG Tab TAKE 1 TABLET BY MOUTH ONCE DAILY IN THE EVENING 90 tablet 0    blood sugar diagnostic Strp One Touch Ultra Blue Test strips, Use l strip to check glucose 4 times daily 100 each 5    blood-glucose meter kit Use as instructed 1 each 0    lancets (ONETOUCH DELICA LANCETS) 33 gauge Misc USE 1  TO  CHECK GLUCOSE 4 TIMES DAILY 100 each 3    levocetirizine (XYZAL) 5 MG tablet Take 1 tablet (5 mg total) by mouth every evening. 30 tablet 3    metOLazone (ZAROXOLYN) 2.5 MG tablet Take 1 tablet (2.5 mg total) by mouth once daily. 90 tablet 1     No current facility-administered medications for this visit.     Review of patient's allergies indicates:   Allergen Reactions    Adhesive tape-silicones Rash    Dye Hives    Cephalexin     Iodine     Penicillins Edema    Pneumococcal vaccine     Iodinated contrast media Rash      Past Medical History:   Diagnosis Date    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 2021    CAD (coronary artery disease)     Cholangitis 2023    w/septic shock, Ecoli bacteremia    Diabetes mellitus, type 2     Hypertension     Iron deficiency anemia following bariatric surgery 2021    MI (myocardial infarction)     Secondary thrombocytosis 2021    Sleep apnea 2019    Sleep Right      Past Surgical History:   Procedure Laterality Date    ANGIOGRAM, CORONARY, WITH LEFT HEART CATHETERIZATION      APPENDECTOMY      BARIATRIC SURGERY  2019    Dr Nunez. severe wound inf after surgery    CARPAL TUNNEL RELEASE Bilateral 2002     SECTION      CHOLECYSTECTOMY      COLONOSCOPY      CORONARY ANGIOPLASTY WITH STENT PLACEMENT      CORONARY ARTERY BYPASS GRAFT      EPIDURAL STEROID INJECTION INTO LUMBAR SPINE      x2    ERCP N/A 2023    Procedure: ERCP (ENDOSCOPIC RETROGRADE CHOLANGIOPANCREATOGRAPHY);  Surgeon: Lavon Hernandez III, MD;  Location: [facility] ENDO;  Service: Endoscopy;  Laterality: N/A;    ERCP W/ SPHICTEROTOMY  2023    ESOPHAGOGASTRODUODENOSCOPY      HERNIA REPAIR  2019    HYSTERECTOMY      THYROID LOBECTOMY Right 2021    Procedure: LOBECTOMY, THYROID;  Surgeon: Mari Chance MD;  Location: [facility] OR;  Service: General;  Laterality: Right;       Review of Systems   Constitutional:  Negative for activity change, appetite change, chills, fatigue,  "fever and unexpected weight change.   HENT:  Positive for postnasal drip. Negative for congestion, ear discharge, ear pain, hearing loss, rhinorrhea, sinus pain, sneezing, sore throat and trouble swallowing.    Eyes:  Negative for pain, discharge and visual disturbance.   Respiratory:  Positive for cough (With mucus production). Negative for chest tightness, shortness of breath and wheezing.    Cardiovascular:  Negative for chest pain, palpitations and leg swelling.   Gastrointestinal:  Negative for abdominal distention, abdominal pain, blood in stool, constipation, diarrhea, nausea and vomiting.        Denies GERD or acid reflux    Endocrine: Negative for polydipsia, polyphagia and polyuria.   Genitourinary:  Negative for difficulty urinating, dysuria, flank pain, frequency, hematuria, menstrual problem, pelvic pain, urgency and vaginal discharge.   Musculoskeletal:  Positive for arthralgias and neck pain. Negative for back pain, joint swelling and myalgias.   Skin:  Negative for color change, pallor, rash and wound.   Allergic/Immunologic: Positive for environmental allergies.   Neurological:  Negative for dizziness, weakness, numbness and headaches.   Hematological:  Negative for adenopathy.   Psychiatric/Behavioral:  Negative for agitation, confusion, dysphoric mood, hallucinations, sleep disturbance and suicidal ideas. The patient is not nervous/anxious.       OBJECTIVE:      Vitals:    12/04/24 0946   BP: 136/70   BP Location: Right arm   Patient Position: Sitting   Pulse: (!) 59   Resp: 18   Temp: 97.8 °F (36.6 °C)   TempSrc: Oral   SpO2: 97%   Weight: 103.6 kg (228 lb 8 oz)   Height: 5' 6" (1.676 m)     Physical Exam  Vitals and nursing note reviewed.   Constitutional:       General: She is not in acute distress.     Appearance: Normal appearance. She is well-developed. She is obese.   HENT:      Head: Normocephalic.      Right Ear: Hearing, tympanic membrane, ear canal and external ear normal.      Left " Ear: Hearing, tympanic membrane, ear canal and external ear normal.      Nose: Nose normal. No congestion.      Mouth/Throat:      Lips: Pink.      Mouth: Mucous membranes are moist.      Pharynx: Oropharynx is clear. No oropharyngeal exudate or posterior oropharyngeal erythema.   Eyes:      General: Lids are normal.         Right eye: No discharge.         Left eye: No discharge.      Extraocular Movements: Extraocular movements intact.      Conjunctiva/sclera: Conjunctivae normal.      Pupils: Pupils are equal, round, and reactive to light.   Neck:      Thyroid: No thyroid mass or thyromegaly.      Trachea: Trachea and phonation normal. No tracheal deviation.   Cardiovascular:      Rate and Rhythm: Regular rhythm. Bradycardia present.      Pulses: Normal pulses.           Dorsalis pedis pulses are 2+ on the right side and 2+ on the left side.        Posterior tibial pulses are 2+ on the right side and 2+ on the left side.      Heart sounds: Normal heart sounds. No murmur heard.  Pulmonary:      Effort: Pulmonary effort is normal. No respiratory distress.      Breath sounds: Normal breath sounds. No decreased breath sounds, wheezing or rales.   Musculoskeletal:         General: Normal range of motion.      Cervical back: Full passive range of motion without pain, normal range of motion and neck supple.      Right lower leg: No edema.      Left lower leg: No edema.      Right foot: Normal range of motion. No deformity.      Left foot: Normal range of motion. No deformity.   Feet:      Right foot:      Protective Sensation: 10 sites tested.  10 sites sensed.      Skin integrity: No ulcer, blister, skin breakdown, erythema, warmth, callus or dry skin.      Toenail Condition: Right toenails are normal.      Left foot:      Protective Sensation: 10 sites tested.  10 sites sensed.      Skin integrity: No ulcer, blister, skin breakdown, erythema, warmth, callus or dry skin.      Toenail Condition: Left toenails are  normal.   Lymphadenopathy:      Cervical: No cervical adenopathy.      Upper Body:      Right upper body: No supraclavicular adenopathy.      Left upper body: No supraclavicular adenopathy.   Skin:     General: Skin is warm and dry.      Coloration: Skin is not jaundiced or pale.   Neurological:      Mental Status: She is alert and oriented to person, place, and time.      GCS: GCS eye subscore is 4. GCS verbal subscore is 5. GCS motor subscore is 6.      Coordination: Coordination is intact.      Gait: Gait is intact.   Psychiatric:         Attention and Perception: Attention and perception normal.         Mood and Affect: Mood and affect normal.         Speech: Speech normal.         Behavior: Behavior normal. Behavior is cooperative.         Thought Content: Thought content normal. Thought content does not include suicidal plan.         Cognition and Memory: Cognition and memory normal.         Judgment: Judgment normal.        Assessment:       1. Environmental allergies    2. Chronic cough    3. Type 2 diabetes mellitus treated with insulin    4. Benign essential hypertension        Plan:      Assessment & Plan    ALLERGIC RHINITIS:  - Explained that persistent cough and mucus production may be related to post-nasal drip rather than a lung issue.  - Discussed the potential benefits of switching to Xyzal for allergy symptoms, including its potency and potential for drowsiness.  - Started Xyzal: Take at night, discontinue Allegra/Claritin/Zyrtec.  - Referred to ENT for evaluation of persistent cough and mucus production.  - Office will call patient to schedule appointment.  - Contact the office if experiencing any issues with new allergy medication (Xyzal).    TYPE 2 DIABETES MELLITUS:  - Performed foot sensation exam.  - Ordered A1C test, urine protein test.    HYPOKALEMIA:  - Continued potassium supplement: May adjust dosage based on lab results.  - Ordered potassium level check.    HYPERTENSION:  - Continued  metolazone, toprol, Norvasc.    HYPERLIPIDEMIA:  - Continued Crestor.    GASTROESOPHAGEAL REFLUX DISEASE:  - Continued omeprazole.    FOLLOW-UP:  - Follow up after receiving lab results for potential adjustment of potassium supplement.         Environmental allergies  -     Ambulatory referral/consult to ENT; Future; Expected date: 12/11/2024  -     levocetirizine (XYZAL) 5 MG tablet; Take 1 tablet (5 mg total) by mouth every evening.  Dispense: 30 tablet; Refill: 3    Chronic cough  -     Ambulatory referral/consult to ENT; Future; Expected date: 12/11/2024    Type 2 diabetes mellitus treated with insulin  -     COMPREHENSIVE METABOLIC PANEL; Future; Expected date: 12/04/2024  -     Hemoglobin A1C; Future; Expected date: 12/04/2024  -     Microalbumin/Creatinine Ratio, Urine; Future; Expected date: 12/04/2024  -     Foot Exam Performed   -check labs, previous A1c below goal; foot exam done today; eye exam scheduled next month    Benign essential hypertension  -     metOLazone (ZAROXOLYN) 2.5 MG tablet; Take 1 tablet (2.5 mg total) by mouth once daily.  Dispense: 90 tablet; Refill: 1   -hypertension is stable/controlled on current medications, continue as prescribed      Follow up in about 6 months (around 6/4/2025) for diabetes, HTN.      12/5/2024 MESERET Almaguer, ARIC  This note was generated with the assistance of ambient listening technology. Verbal consent was obtained by the patient and accompanying visitor(s) for the recording of patient appointment to facilitate this note. I attest to having reviewed and edited the generated note for accuracy, though some syntax or spelling errors may persist. Please contact the author of this note for any clarification.     This note was created using [employer] voice recognition software that occasionally misinterprets phrases or words.

## 2024-12-04 NOTE — TELEPHONE ENCOUNTER
Please see below and advise on what patient should do other than stopping medication temporarily due to upper respiratory infection. Thanks.    Requesting refill for   KLOR-CON  Last visit 11/30/2022 Next visit  2/16/2023

## 2024-12-05 ENCOUNTER — TELEPHONE (OUTPATIENT)
Dept: FAMILY MEDICINE | Facility: CLINIC | Age: 69
End: 2024-12-05
Payer: MEDICARE

## 2024-12-05 NOTE — PROGRESS NOTES
Please notify patient kidney and liver function are normal, glucose 80; A1c down to 5.3 which is out of prediabetes; microalbumin urine normal and potassium normal; I suggest we start cutting her extra metformin down, she can continue the try GERD at current dose, but cut down her metformin 1000 to 500 mg daily that is a separate pill; she can split in half and monitor her glucose, let me know if she is able to do this or if I need to send a new prescription; we can recheck her A1c in 3 months if her sugars are running higher but since she has such good control I do not think it is needed to stay at her 2000 mg daily total at this time

## 2024-12-05 NOTE — TELEPHONE ENCOUNTER
----- Message from ARIC Higuera sent at 12/5/2024 11:53 AM CST -----  Please notify patient kidney and liver function are normal, glucose 80; A1c down to 5.3 which is out of prediabetes; microalbumin urine normal and potassium normal; I suggest we start cutting her extra metformin down, she can continue the try GERD at current dose, but cut down her metformin 1000 to 500 mg daily that is a separate pill; she can split in half and monitor her glucose, let me know if she is able to do this or if I need to send a new prescription; we can recheck her A1c in 3 months if her sugars are running higher but since she has such good control I do not think it is needed to stay at her 2000 mg daily total at this time

## 2024-12-19 DIAGNOSIS — E83.42 HYPOMAGNESEMIA: ICD-10-CM

## 2024-12-19 RX ORDER — LANOLIN ALCOHOL/MO/W.PET/CERES
1 CREAM (GRAM) TOPICAL
Qty: 90 TABLET | Refills: 1 | Status: SHIPPED | OUTPATIENT
Start: 2024-12-19

## 2024-12-20 DIAGNOSIS — E78.2 MIXED HYPERLIPIDEMIA: ICD-10-CM

## 2024-12-23 RX ORDER — ROSUVASTATIN CALCIUM 40 MG/1
40 TABLET, COATED ORAL NIGHTLY
Qty: 90 TABLET | Refills: 0 | Status: SHIPPED | OUTPATIENT
Start: 2024-12-23

## 2024-12-23 RX ORDER — INSULIN DEGLUDEC 200 U/ML
INJECTION, SOLUTION SUBCUTANEOUS
Qty: 27 ML | Refills: 1 | Status: SHIPPED | OUTPATIENT
Start: 2024-12-23

## 2025-01-13 NOTE — PROGRESS NOTES
[facility] Hematology/Oncology  PROGRESS NOTE -  Follow-up Visit      Subjective:       Patient ID:   NAME: Chanel Cervantes : 1955     69 y.o. female    Referring Doc: Melvina (New PCP)  Other Physicians: LUIS Orozco; Kj Lucas Braxton; Himanshu Nunez (Bariatrics)           Chief Complaint: iron defic anem f/u      History of Present Illness:     Patient returns today for a regularly scheduled follow-up visit.  The patient is here today to go over the results of the recently ordered labs, tests and studies. She is here by herself    She saw Melvina NP on 2024    She is doing ok except for her chronic aches and pains; residual cough issues; she is seeing Mau NP with ENT next week    No CP, SOB, HA's or N/V.    She is continued on oral iron and B12 injections                  ROS:   GEN:  chronic aches and pains; no fevers   HEENT: normal with no HA's, sore throat, stiff neck, changes in vision  CV: normal with no CP, SOB, PND, WILSON or orthopnea  PULM: normal with no SOB, cough, hemoptysis, sputum or pleuritic pain  GI: normal with no abdominal pain, nausea, vomiting, constipation, diarrhea, melanotic stools, BRBPR, or hematemesis  : some dysuria  BREAST: normal with no mass, discharge, pain  SKIN: normal with no rash, erythema, bruising, or swelling    Pain Scale: 0     Allergies:  Review of patient's allergies indicates:   Allergen Reactions    Adhesive tape-silicones Rash    Dye Hives    Cephalexin     Iodine     Penicillins Edema    Pneumococcal vaccine     Iodinated contrast media Rash       Medications:    Current Outpatient Medications:     amLODIPine (NORVASC) 2.5 MG tablet, Take 1 tablet by mouth once daily, Disp: 90 tablet, Rfl: 1    aspirin (ECOTRIN) 81 MG EC tablet, Take 1 tablet (81 mg total) by mouth once daily., Disp: 30 tablet, Rfl: 0    blood sugar diagnostic Strp, One Touch Ultra Blue Test strips, Use l strip to check glucose 4 times daily, Disp: 100 each, Rfl: 5    blood-glucose meter  kit, Use as instructed, Disp: 1 each, Rfl: 0    calcium carbonate-vitamin D3 600 mg-62.5 mcg (2,500 unit) Cap, calcium carbonate 600 mg-vitamin D3 62.5 mcg (2,500 unit) capsule  Take by oral route., Disp: , Rfl:     DULoxetine (CYMBALTA) 60 MG capsule, Take 1 capsule by mouth once daily, Disp: 90 capsule, Rfl: 1    empaglifloz-linaglip-metformin (TRIJARDY XR) 25-5-1,000 mg TBph, Take 1 tablet by mouth once daily, Disp: 90 tablet, Rfl: 1    guanFACINE (TENEX) 2 MG tablet, Take 1 tablet by mouth nightly, Disp: 90 tablet, Rfl: 1    insulin degludec (TRESIBA FLEXTOUCH U-200) 200 unit/mL (3 mL) insulin pen, INJECT 78 UNITS SUBCUTANEOUSLY IN THE EVENING, Disp: 27 mL, Rfl: 1    iron-vitamin C 100-250 mg, ICAR-C, 100-250 mg Tab, Take 1 tablet by mouth once daily., Disp: 90 each, Rfl: 0    lancets (ONETOUCH DELICA LANCETS) 33 gauge Misc, USE 1  TO CHECK GLUCOSE 4 TIMES DAILY, Disp: 100 each, Rfl: 3    levocetirizine (XYZAL) 5 MG tablet, Take 1 tablet (5 mg total) by mouth every evening., Disp: 30 tablet, Rfl: 3    magnesium oxide (MAG-OX) 400 mg (241.3 mg magnesium) tablet, Take 1 tablet by mouth once daily, Disp: 90 tablet, Rfl: 1    metFORMIN (GLUCOPHAGE) 1000 MG tablet, TAKE 1 TABLET BY MOUTH ONCE DAILY WITH  DINNER  OR  EVENING  MEAL, Disp: 90 tablet, Rfl: 1    metOLazone (ZAROXOLYN) 2.5 MG tablet, Take 1 tablet (2.5 mg total) by mouth once daily., Disp: 90 tablet, Rfl: 1    metoprolol succinate (TOPROL-XL) 25 MG 24 hr tablet, Take 1 tablet by mouth once daily, Disp: 90 tablet, Rfl: 0    multivitamin capsule, Take 1 capsule by mouth once daily., Disp: , Rfl:     nystatin (MYCOSTATIN) powder, Apply topically 3 (three) times daily., Disp: 60 g, Rfl: 2    omeprazole (PRILOSEC) 20 MG capsule, TAKE 1 CAPSULE BY MOUTH BEFORE BREAKFAST, Disp: 90 capsule, Rfl: 3    potassium chloride (K-TAB) 20 mEq, Take 1 tablet by mouth once daily, Disp: 90 tablet, Rfl: 0    prednisoLONE acetate (PRED FORTE) 1 % DrpS, Place 1 drop into both  "eyes as needed. , Disp: , Rfl:     rosuvastatin (CRESTOR) 40 MG Tab, TAKE 1 TABLET BY MOUTH ONCE DAILY IN THE EVENING, Disp: 90 tablet, Rfl: 0    PMHx/PSHx Updates:  See patient's last visit with me on 5/22/2024  See H&P on 1/25/2021        Pathology:   Cancer Staging   No matching staging information was found for the patient.            Objective:     Vitals:  Blood pressure 126/69, pulse (!) 56, temperature 96.8 °F (36 °C), temperature source Temporal, resp. rate 16, height 5' 6" (1.676 m), weight 103.5 kg (228 lb 1.6 oz).    Physical Examination:   GEN: no apparent distress, comfortable; AAOx3; overweight  HEAD: atraumatic and normocephalic  EYES: no pallor, no icterus, PERRLA  ENT: OMM, no pharyngeal erythema, external ears WNL; no nasal discharge; no thrush  NECK: no masses, thyroid normal, trachea midline, no LAD/LN's, supple; s/p thyroidectomy    CV: RRR with no murmur; normal pulse; normal S1 and S2; no pedal edema  CHEST: Normal respiratory effort; CTAB; normal breath sounds; no wheeze or crackles  ABDOM: nontender and nondistended; soft; normal bowel sounds; no rebound/guarding  MUSC/Skeletal: ROM normal; no crepitus; joints normal; no deformities; s/p right shoulder replacement with better ROM   EXTREM: no clubbing, cyanosis, inflammation or swelling  SKIN: no rashes, lesions, ulcers, petechiae or subcutaneous nodules; chronic age related skin changes  : no schaefer  NEURO: grossly intact; motor/sensory WNL; AAOx3; no tremors  PSYCH: normal mood, affect and behavior  LYMPH: normal cervical, supraclavicular, axillary and groin LN's            Labs:     Lab Results   Component Value Date    WBC 5.77 11/18/2024    HGB 13.2 11/18/2024    HCT 43.1 11/18/2024    MCV 86 11/18/2024     11/18/2024       Lab Results   Component Value Date    IRON 60 11/18/2024    TIBC 332 11/18/2024    FERRITIN 25.6 11/18/2024     Lab Results   Component Value Date    SKVHVGLS82 325 11/18/2024     Lab Results   Component " Value Date    FOLATE >22.3 11/18/2024           CMP  Sodium   Date Value Ref Range Status   12/04/2024 144 136 - 145 mmol/L Final   01/14/2019 141 134 - 144 mmol/L      Potassium   Date Value Ref Range Status   12/04/2024 3.6 3.5 - 5.1 mmol/L Final     Chloride   Date Value Ref Range Status   12/04/2024 104 95 - 110 mmol/L Final   01/14/2019 96 (L) 98 - 110 mmol/L      CO2   Date Value Ref Range Status   12/04/2024 32 (H) 23 - 29 mmol/L Final     Glucose   Date Value Ref Range Status   12/04/2024 80 70 - 110 mg/dL Final   01/14/2019 177 (H) 70 - 99 mg/dL      BUN   Date Value Ref Range Status   12/04/2024 17 8 - 23 mg/dL Final     Creatinine   Date Value Ref Range Status   12/04/2024 0.9 0.5 - 1.4 mg/dL Final   01/14/2019 0.99 0.60 - 1.40 mg/dL      Calcium   Date Value Ref Range Status   12/04/2024 9.8 8.7 - 10.5 mg/dL Final     Total Protein   Date Value Ref Range Status   12/04/2024 6.7 6.0 - 8.4 g/dL Final     Albumin   Date Value Ref Range Status   12/04/2024 3.5 3.5 - 5.2 g/dL Final   01/14/2019 3.8 3.1 - 4.7 g/dL      Total Bilirubin   Date Value Ref Range Status   12/04/2024 0.4 0.1 - 1.0 mg/dL Final     Comment:     For infants and newborns, interpretation of results should be based  on gestational age, weight and in agreement with clinical  observations.    Premature Infant recommended reference ranges:  Up to 24 hours.............<8.0 mg/dL  Up to 48 hours............<12.0 mg/dL  3-5 days..................<15.0 mg/dL  6-29 days.................<15.0 mg/dL       Alkaline Phosphatase   Date Value Ref Range Status   12/04/2024 62 40 - 150 U/L Final     AST   Date Value Ref Range Status   12/04/2024 22 10 - 40 U/L Final     ALT   Date Value Ref Range Status   12/04/2024 17 10 - 44 U/L Final     Anion Gap   Date Value Ref Range Status   12/04/2024 8 8 - 16 mmol/L Final     eGFR if    Date Value Ref Range Status   07/20/2022 >60.0 >60 mL/min/1.73 m^2 Final     eGFR if non    Date  Value Ref Range Status   07/20/2022 >60.0 >60 mL/min/1.73 m^2 Final     Comment:     Calculation used to obtain the estimated glomerular filtration  rate (eGFR) is the CKD-EPI equation.            Radiology/Diagnostic Studies:    Mammo 4/22/2024:  Impression:     Negative  mammogram. Annual screening mammography is recommended.     BI-RADS CATEGORY 1: NEGATIVE.      Chest CT 4/22/2024:      Impression:     Stable 6 mm noncalcified nodule in the left upper lobe with no additional pulmonary nodules     Coronary artery calcification     Small hiatal hernia      PET 10/11/2023:    IMPRESSION: 6 mm faint noncalcified nodule left upper lobe with no significant FDG activity. Short-term follow-up CT in 6 months is recommended     Cardiomegaly with very artery calcification     Moderate size hiatal hernia     Prior cholecystectomy and hysterectomy     Colonic diverticulosis without diverticulitis    Degenerative changes of the spine  Hemorrhagic left renal cyst      CT Shoulder Without Contrast Right    Result Date: 3/2/2021  CMS MANDATED QUALITY DATA - CT RADIATION  436 All CT scans at this facility utilize dose modulation, iterative reconstruction, and/or weight based dosing when appropriate to reduce radiation dose to as low as reasonably achievable. 3-D reconstructed images were generated on a separate workstation from the axial data set and stored in the patient's permanent medical record. CT SHOULDER WITHOUT CONTRAST RIGHT CLINICAL HISTORY: 65 years Female M25.511 Pain in right Shoulder COMPARISON: Right shoulder radiography February 18, 2020 FINDINGS: Acute comminuted proximal right humeral fracture demonstrates similar alignment to reference right shoulder radiographs. Medial displacement of largest distal humeral fracture fragment in relation to the largest proximal fragment by about one shaft width. Fracture extends superiorly to involve both the surgical and anatomic necks of the humerus, and extends through the  greater tuberosity. No intra-articular extension through the humeral head articular surface is evident. Scapula is intact. AC joint is normally aligned. Mild AC joint degenerative change. No right rib fracture is evident within the field-of-view. Right lung is clear. IMPRESSION: Acute comminuted and displaced fracture of proximal right humerus, involving both the surgical and anatomic necks of the right humerus, as well as the greater tuberosity. Electronically Signed by Jah TORRES on 3/3/2021 7:44 AM      I have reviewed all available lab results and radiology reports.    Assessment/Plan:   (1) 69 y.o. female with diagnosis of iron deficiency anemia who has been referred by Mindy Funk MD for evaluation by medical hematology/oncology. She has history of bariatric surgery and most likely has a multifactorial anemia process with underlying anemia of chronic disorders and iron deficiency/nutrient deficiency due to bariatric induced malabsorption.    - microcytic indices  - total bili is WNL, so I do not suspect any hemolysis at this time  - iron low at 21 and ferritin low at 3     1/25/2021:  - check B12/folate level   - order stool OBS x3  - check SPEP and Hgb electrophoresis  - discussed and will set up IV iron    4/5/2021:  - she had shoulder surgery  - repeat hgb currently at 10.5 and stable; she had two IV irons since last visit and before surgery and had been as high as 11.6 afterwards with the hgb  - iron panel is adequate  - B12 and folate are adequate  - retic 1.8 and SPEP with no M-protein    6/1/2021:  - latest labs with fairly adequate CBC  - mild anemia  - iron panel was good since on oral iron  - B12 was low - will start shots monthly today    10/11/2021:  - latest CBC and iron panel WNL  - getting B12 shot today    3/10/2022:  - last available labs are from oct 2021  - no anemia and iron panel was adequate  - B12 still was low - resume B12 monthly    9/12/2022:  - latest Hgb was WNL; no  current anemia  - iron panel, B12/folate are all adequate  - continued on oral iron and B12 injections at home    4/13/2023:  - latest CBC adequate with normal hgb  - iron panel adequate  - continued on iron po and B12 monthly    10/26/2023:  - resume B12 monthly  - continue oral iron  - hgb WNL and iron panel currently adequate  - refer to Dr Marrero with neurology    5/22/2024:  - latest CBC is adequate with normal hgb and no current anemia  - iron panel and b12/folate levels are adequate  - continued on oral iron and bg12 injections    1/14/2025:  - latest CBC adequate  - no current anemia  - iron panel adequta  - continued on oral iron and b12     (2) CAD s/p MI and CABG; venous insufficiency     (3) HTN and hypercholesterolemia     (4) IDDM Type II     (5) Vit D Deficiency     (6) NIKOLAS - on CPAP     (7) Chronic LBP    (8) Lung nodule     10/26/2023:  - she had PET on 10/11/2023  - she is seeing pulm in Dec 2023 with Dr Velez      5/22/2024:  - She has some chronic congestion issues.She has been followed by pulmonary; she saw Dr Velez in Jan 2024; Chest CT in Apr 2024 was stable  - She saw Melvina NP in Apr 2024 and sees her again next week; she has some night-time vertigo issues    1/14/2025:  - she has not followed up with pulmonary in some time  - will get f/u chest CT          VISIT DIAGNOSES:      Iron deficiency anemia following bariatric surgery    Anemia, unspecified type    S/P bariatric surgery    B12 deficiency          PLAN:  1. continue current management - B12 injections  2. continue ICAR-C    3. F/u with PCP, bariatrics, card, Endocrine, etc  4. Check basic labs incl iron panel every 6 months  5. continue B12 monthly (encouraged compliance)  6. F/u with pulmonary recommended - order repeat Chest CT  7. Patient to establish with dermatology      RTC in 6 months    Fax note to Marvin Piedra; Angelica Henley       Discussion:     COVID-19 Discussion:     I had long discussion with patient  and any applicable family about the COVID-19 coronavirus epidemic and the recommended precautions with regard to cancer and/or hematology patients. I have re-iterated the CDC recommendations for adequate hand washing, use of hand -like products, and coughing into elbow, etc. In addition, especially for our patients who are on chemotherapy and/or our otherwise immunocompromised patients, I have recommended avoidance of crowds, including movie theaters, restaurants, churches, etc. I have recommended avoidance of any sick or symptomatic family members and/or friends. I have also recommended avoidance of any raw and unwashed food products, and general avoidance of food items that have not been prepared by themselves. The patient has been asked to call us immediately with any symptom developments, issues, questions or other general concerns.         Iron Infusion Therapy Discussion:      I provided literature/learning materials on the particular IV iron regimen and discussed the potential side-effect profiles of the drug(s). I discussed the importance of compliance with obtaining and monitoring requested lab work, and went over the potential risk for the development of anaphylactic shock, bronchospasm, dysrhythmia, liver and/or kidney damage, and respiratory/cardiovascular arrest and/or failure. I discussed the potential risks for development of alopecia, fevers, itching, chills and/or rigors, cold sensory issues, ringing in ears, vertigo and neuropathy, all of which are usually acute but sometimes could end up being chronic and life-long. I discussed the risks of hand-foot syndrome and rashes, and development of other autoimmune mediated processes such as pneumonitis and colitis which could be life threatening.      The patient's consent has been obtained to proceed with the IV iron therapy.The patient will be referred to [facility] for training and education on IV iron therapy, use  of antiemetics and/or anti-diarrheals, use of NSAID's, potential IV iron therapy side-effects, and any specific recommendations and precautions with the particular IV iron agents.       I answered all of the patient's (and family's, if applicable) questions to the best of my ability and to their complete satisfaction. The patient acknowledged full understanding of the risks, recommendations and plan(s).            I spent over 25 mins of time with the patient. Reviewed results of the recently ordered labs, tests and studies; made directives with regards to the results. Over half of this time was spent couseling and coordinating care.    I have explained all of the above in detail and the patient understands all of the current recommendation(s). I have answered all of their questions to the best of my ability and to their complete satisfaction.   The patient is to continue with the current management plan.            Electronically signed by Mauricio Jackson MD                 Answers submitted by the patient for this visit:  Review of Systems Questionnaire (Submitted on 11/20/2024)  appetite change : No  unexpected weight change: No  mouth sores: No  visual disturbance: No  cough: Yes  shortness of breath: No  chest pain: No  abdominal pain: No  diarrhea: No  frequency: No  back pain: Yes  rash: No  headaches: No  adenopathy: No  nervous/ anxious: No

## 2025-01-14 ENCOUNTER — OFFICE VISIT (OUTPATIENT)
Facility: CLINIC | Age: 70
End: 2025-01-14
Payer: MEDICARE

## 2025-01-14 VITALS
DIASTOLIC BLOOD PRESSURE: 69 MMHG | HEIGHT: 66 IN | WEIGHT: 228.13 LBS | RESPIRATION RATE: 16 BRPM | SYSTOLIC BLOOD PRESSURE: 126 MMHG | HEART RATE: 56 BPM | TEMPERATURE: 97 F | BODY MASS INDEX: 36.66 KG/M2

## 2025-01-14 DIAGNOSIS — R91.1 LUNG NODULE: ICD-10-CM

## 2025-01-14 DIAGNOSIS — E53.8 B12 DEFICIENCY: ICD-10-CM

## 2025-01-14 DIAGNOSIS — D64.9 ANEMIA, UNSPECIFIED TYPE: ICD-10-CM

## 2025-01-14 DIAGNOSIS — K95.89 IRON DEFICIENCY ANEMIA FOLLOWING BARIATRIC SURGERY: Primary | ICD-10-CM

## 2025-01-14 DIAGNOSIS — D50.8 IRON DEFICIENCY ANEMIA FOLLOWING BARIATRIC SURGERY: Primary | ICD-10-CM

## 2025-01-14 DIAGNOSIS — Z98.84 S/P BARIATRIC SURGERY: ICD-10-CM

## 2025-01-14 PROCEDURE — G2211 COMPLEX E/M VISIT ADD ON: HCPCS | Mod: S$GLB,,, | Performed by: INTERNAL MEDICINE

## 2025-01-14 PROCEDURE — 99999 PR PBB SHADOW E&M-EST. PATIENT-LVL V: CPT | Mod: PBBFAC,,, | Performed by: INTERNAL MEDICINE

## 2025-01-14 PROCEDURE — 1160F RVW MEDS BY RX/DR IN RCRD: CPT | Mod: CPTII,S$GLB,, | Performed by: INTERNAL MEDICINE

## 2025-01-14 PROCEDURE — 3008F BODY MASS INDEX DOCD: CPT | Mod: CPTII,S$GLB,, | Performed by: INTERNAL MEDICINE

## 2025-01-14 PROCEDURE — 3074F SYST BP LT 130 MM HG: CPT | Mod: CPTII,S$GLB,, | Performed by: INTERNAL MEDICINE

## 2025-01-14 PROCEDURE — 3288F FALL RISK ASSESSMENT DOCD: CPT | Mod: CPTII,S$GLB,, | Performed by: INTERNAL MEDICINE

## 2025-01-14 PROCEDURE — 1125F AMNT PAIN NOTED PAIN PRSNT: CPT | Mod: CPTII,S$GLB,, | Performed by: INTERNAL MEDICINE

## 2025-01-14 PROCEDURE — 99214 OFFICE O/P EST MOD 30 MIN: CPT | Mod: S$GLB,,, | Performed by: INTERNAL MEDICINE

## 2025-01-14 PROCEDURE — 1101F PT FALLS ASSESS-DOCD LE1/YR: CPT | Mod: CPTII,S$GLB,, | Performed by: INTERNAL MEDICINE

## 2025-01-14 PROCEDURE — 3078F DIAST BP <80 MM HG: CPT | Mod: CPTII,S$GLB,, | Performed by: INTERNAL MEDICINE

## 2025-01-14 PROCEDURE — 1159F MED LIST DOCD IN RCRD: CPT | Mod: CPTII,S$GLB,, | Performed by: INTERNAL MEDICINE

## 2025-01-23 DIAGNOSIS — I10 BENIGN ESSENTIAL HYPERTENSION: ICD-10-CM

## 2025-01-24 RX ORDER — METOPROLOL SUCCINATE 25 MG/1
25 TABLET, EXTENDED RELEASE ORAL
Qty: 90 TABLET | Refills: 0 | Status: SHIPPED | OUTPATIENT
Start: 2025-01-24

## 2025-01-24 NOTE — TELEPHONE ENCOUNTER
I spoke with the patient and she stated that there was no problems or issues in the care she received from you. She was switching to the provider her daughter used that way they can make appointments together. Patient stated that she is not driving as much any longer. She stated it had absolutely nothing to do with the care she received from you.

## 2025-01-28 ENCOUNTER — OFFICE VISIT (OUTPATIENT)
Dept: OTOLARYNGOLOGY | Facility: CLINIC | Age: 70
End: 2025-01-28
Payer: MEDICARE

## 2025-01-28 VITALS
DIASTOLIC BLOOD PRESSURE: 71 MMHG | WEIGHT: 226 LBS | SYSTOLIC BLOOD PRESSURE: 132 MMHG | HEIGHT: 66 IN | HEART RATE: 64 BPM | BODY MASS INDEX: 36.32 KG/M2

## 2025-01-28 DIAGNOSIS — Z91.09 ENVIRONMENTAL ALLERGIES: ICD-10-CM

## 2025-01-28 DIAGNOSIS — K21.9 LPRD (LARYNGOPHARYNGEAL REFLUX DISEASE): Primary | ICD-10-CM

## 2025-01-28 DIAGNOSIS — R05.3 CHRONIC COUGH: ICD-10-CM

## 2025-01-28 DIAGNOSIS — T78.40XA ALLERGY, UNSPECIFIED, INITIAL ENCOUNTER: ICD-10-CM

## 2025-01-28 PROCEDURE — 1126F AMNT PAIN NOTED NONE PRSNT: CPT | Mod: CPTII,S$GLB,, | Performed by: PHYSICIAN ASSISTANT

## 2025-01-28 PROCEDURE — 99999 PR PBB SHADOW E&M-EST. PATIENT-LVL V: CPT | Mod: PBBFAC,,, | Performed by: PHYSICIAN ASSISTANT

## 2025-01-28 PROCEDURE — 1100F PTFALLS ASSESS-DOCD GE2>/YR: CPT | Mod: CPTII,S$GLB,, | Performed by: PHYSICIAN ASSISTANT

## 2025-01-28 PROCEDURE — 99204 OFFICE O/P NEW MOD 45 MIN: CPT | Mod: S$GLB,,, | Performed by: PHYSICIAN ASSISTANT

## 2025-01-28 PROCEDURE — 1160F RVW MEDS BY RX/DR IN RCRD: CPT | Mod: CPTII,S$GLB,, | Performed by: PHYSICIAN ASSISTANT

## 2025-01-28 PROCEDURE — 3288F FALL RISK ASSESSMENT DOCD: CPT | Mod: CPTII,S$GLB,, | Performed by: PHYSICIAN ASSISTANT

## 2025-01-28 PROCEDURE — 3008F BODY MASS INDEX DOCD: CPT | Mod: CPTII,S$GLB,, | Performed by: PHYSICIAN ASSISTANT

## 2025-01-28 PROCEDURE — 3078F DIAST BP <80 MM HG: CPT | Mod: CPTII,S$GLB,, | Performed by: PHYSICIAN ASSISTANT

## 2025-01-28 PROCEDURE — 1159F MED LIST DOCD IN RCRD: CPT | Mod: CPTII,S$GLB,, | Performed by: PHYSICIAN ASSISTANT

## 2025-01-28 PROCEDURE — 3075F SYST BP GE 130 - 139MM HG: CPT | Mod: CPTII,S$GLB,, | Performed by: PHYSICIAN ASSISTANT

## 2025-01-28 RX ORDER — AZELASTINE 1 MG/ML
1 SPRAY, METERED NASAL 2 TIMES DAILY
Qty: 30 ML | Refills: 2 | Status: SHIPPED | OUTPATIENT
Start: 2025-01-28 | End: 2025-02-04 | Stop reason: SDUPTHER

## 2025-01-28 RX ORDER — LEVOCETIRIZINE DIHYDROCHLORIDE 5 MG/1
5 TABLET, FILM COATED ORAL NIGHTLY
Qty: 30 TABLET | Refills: 11 | Status: SHIPPED | OUTPATIENT
Start: 2025-01-28 | End: 2025-02-04 | Stop reason: SDUPTHER

## 2025-01-28 RX ORDER — PANTOPRAZOLE SODIUM 40 MG/1
40 TABLET, DELAYED RELEASE ORAL DAILY
Qty: 90 TABLET | Refills: 3 | Status: SHIPPED | OUTPATIENT
Start: 2025-01-28 | End: 2025-02-04 | Stop reason: SDUPTHER

## 2025-01-28 RX ORDER — FLUTICASONE PROPIONATE 50 MCG
1 SPRAY, SUSPENSION (ML) NASAL 2 TIMES DAILY
Qty: 16 G | Refills: 2 | Status: SHIPPED | OUTPATIENT
Start: 2025-01-28 | End: 2025-02-04 | Stop reason: SDUPTHER

## 2025-01-28 NOTE — PROGRESS NOTES
[facility]    Subjective:      Patient: Chanel Cervantes Patient PCP: Magdalena Hein FNP         :  1955     Sex:  female      MRN:  [id number]          Date of Visit: 2025      Chief Complaint: Chronic cough/environmental allergies    Patient ID: Chanel Cervantes is a 69 y.o. female former smoker with PMHx of CAD, DM x II, HTN, CHRIST, NIKOLAS-on CPAP and MI who presents to office for evaluation of chronic cough and environmental allergies. She has had ongoing issues with non-purulent productive cough since 2024. Pt has h/o bariatric surgery. She does have occasional issues with acid reflux. Pt states that she has issues with non-purulent post-nasal drip and rhinitis. Pt has had issues with nasal congestion. Pt endorses perennial allergies. She has been started on xyzal 5mg in the evening. Pt denies shortness of breath or wheezing. Pt denies h/o asthma/COPD. No fever/chills, paranasal sinus pressure/pain or purulent nasal drainage. She had CT chest WO 2024 that showed stable 6mm non-calcified nodule in left upper lung lobe. She had PET CT 10/11/2023 that showed no significant FDG uptake in pulmonary nodule.       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    Family History  Her family history includes Diabetes in her mother; Heart disease in her father; Stroke in her father; Vision loss in her father and mother.    Past Surgical History:   Procedure Laterality Date    ANGIOGRAM, CORONARY, WITH LEFT HEART CATHETERIZATION      APPENDECTOMY      BARIATRIC SURGERY  2019    Dr Nunez. severe wound inf after surgery    CARPAL TUNNEL RELEASE Bilateral 2002     SECTION      CHOLECYSTECTOMY      COLONOSCOPY      CORONARY ANGIOPLASTY  "WITH STENT PLACEMENT      CORONARY ARTERY BYPASS GRAFT      EPIDURAL STEROID INJECTION INTO LUMBAR SPINE      x2    ERCP N/A 2023    Procedure: ERCP (ENDOSCOPIC RETROGRADE CHOLANGIOPANCREATOGRAPHY);  Surgeon: Lavon Hernandez III, MD;  Location: [facility] ENDO;  Service: Endoscopy;  Laterality: N/A;    ERCP W/ SPHICTEROTOMY  2023    ESOPHAGOGASTRODUODENOSCOPY      HERNIA REPAIR  2019    HYSTERECTOMY      THYROID LOBECTOMY Right 2021    Procedure: LOBECTOMY, THYROID;  Surgeon: Mari Chance MD;  Location: [facility] OR;  Service: General;  Laterality: Right;     Social History     Tobacco Use    Smoking status: Former     Current packs/day: 0.00     Average packs/day: 1 pack/day for 15.0 years (15.0 ttl pk-yrs)     Types: Cigarettes     Start date:      Quit date:      Years since quittin.0    Smokeless tobacco: Never   Substance and Sexual Activity    Alcohol use: No     Comment: "maybe once a year"    Drug use: No    Sexual activity: Not Currently     Medications  She has a current medication list which includes the following prescription(s): amlodipine, aspirin, blood sugar diagnostic, blood-glucose meter, calcium carbonate-vitamin d3, duloxetine, trijardy xr, guanfacine, tresiba flextouch u-200, iron-vitamin c 100-250 mg (icar-c), lancets, levocetirizine, magnesium oxide, metformin, metolazone, metoprolol succinate, multivitamin, nystatin, omeprazole, potassium chloride, prednisolone acetate, and rosuvastatin.    Review of patient's allergies indicates:   Allergen Reactions    Adhesive tape-silicones Rash    Dye Hives    Cephalexin     Iodine     Penicillins Edema    Pneumococcal vaccine     Iodinated contrast media Rash     All medications, allergies, and past history have been reviewed.    Objective:      Vitals:      2024     9:46 AM 2025    11:05 AM 2025     2:23 PM   Vitals - 1 value per visit   SYSTOLIC 136 126 132   DIASTOLIC 70 69 71   Pulse 59 56 64   Temp 97.8 °F " "(36.6 °C) 96.8 °F (36 °C)    Resp 18 16    SPO2 97 %     Weight (lb) 228.5 228.1 225.97   Weight (kg) 103.647 103.465 102.5   Height 5' 6" (1.676 m) 5' 6" (1.676 m) 5' 6" (1.676 m)   BMI (Calculated) 36.9 36.8 36.5   Pain Score Nine Ten Zero       Body surface area is 2.18 meters squared.  Physical Exam  Constitutional:       General: She is not in acute distress.     Appearance: Normal appearance. She is not ill-appearing.   HENT:      Head: Normocephalic and atraumatic.      Right Ear: Tympanic membrane, ear canal and external ear normal.      Left Ear: Tympanic membrane, ear canal and external ear normal.      Nose: Septal deviation (leftward) present.      Mouth/Throat:      Lips: Pink. No lesions.      Mouth: Mucous membranes are moist. No oral lesions.      Tongue: No lesions.      Palate: No lesions.      Pharynx: Oropharynx is clear. Uvula midline. No pharyngeal swelling, oropharyngeal exudate, posterior oropharyngeal erythema or uvula swelling.   Eyes:      General:         Right eye: No discharge.         Left eye: No discharge.      Extraocular Movements: Extraocular movements intact.      Conjunctiva/sclera: Conjunctivae normal.   Pulmonary:      Effort: Pulmonary effort is normal.      Breath sounds: Normal breath sounds. No stridor. No decreased breath sounds, wheezing, rhonchi or rales.   Neurological:      General: No focal deficit present.      Mental Status: She is alert and oriented to person, place, and time. Mental status is at baseline.   Psychiatric:         Mood and Affect: Mood normal.         Behavior: Behavior normal.         Thought Content: Thought content normal.         Judgment: Judgment normal.     Laryngoscopy: See note.     Labs:  WBC   Date Value Ref Range Status   11/18/2024 5.77 3.90 - 12.70 K/uL Final     Eosinophil %   Date Value Ref Range Status   11/18/2024 2.1 0.0 - 8.0 % Final     Eos #   Date Value Ref Range Status   11/18/2024 0.1 0.0 - 0.5 K/uL Final     Platelets "   Date Value Ref Range Status   11/18/2024 321 150 - 450 K/uL Final     Glucose   Date Value Ref Range Status   12/04/2024 80 70 - 110 mg/dL Final     All lab results, imaging results, and data have been reviewed.    Assessment:        ICD-10-CM ICD-9-CM   1. LPRD (laryngopharyngeal reflux disease)  K21.9 478.79   2. Environmental allergies  Z91.09 V15.09   3. Chronic cough  R05.3 786.2   4. Allergy, unspecified, initial encounter  T78.40XA 995.3     Plan:      LPRD (laryngopharyngeal reflux disease)  -     Start pantoprazole (PROTONIX) 40 MG tablet; Take 1 tablet (40 mg total) by mouth once daily. Take 30 minutes prior to eating breakfast. Avoid eating 2-3 hours prior to bedtime.     Environmental allergies  -     Proceed with aeroallergen immunocap testing.   - Use NeilMed sinus rinse two times daily. This can be bought over the counter. Make sure to use distilled water when using sinus irrigations. Use salt packs and mix in solution. Use Neilmed sinus rinses twice daily (morning and evening) followed by flonase 1 spray (50mcg total) by Each Nostril route 2 (two) times daily and astelin 1 spray (137mcg total) by Nasal route (two) times daily. Point up and slightly outward toward ear when using medicated nasal sprays as to avoid irritating nasal septum. Take xyzal 5mg in the evening.     Chronic cough  -     Suspect secondary to LPRD along with possibility of obstructive or restrictive pulmonary process. She has had issues with chronic cough. No shortness of breath or wheezing. Will get PFT to further assess.     Follow up in 6 weeks.

## 2025-01-28 NOTE — PATIENT INSTRUCTIONS
Use NeilMed sinus rinse two times daily. This can be bought over the counter. Make sure to use distilled water when using sinus irrigations. Use salt packs and mix in solution. Use Neilmed sinus rinses twice daily (morning and evening) followed by flonase 1 spray (50mcg total) by Each Nostril route 2 (two) times daily and astelin 1 spray (137mcg total) by Nasal route (two) times daily. Point up and slightly outward toward ear when using medicated nasal sprays as to avoid irritating nasal septum. Take xyzal 5mg in the evening.      Disp Refills Start End SIDDHARTH   pantoprazole (PROTONIX) 40 MG tablet 90 tablet 3 1/28/2025 1/28/2026 --   Sig - Route: Take 1 tablet (40 mg total) by mouth once daily. Take 30 minutes prior to eating breakfast. - Oral     Avoid eating 2-3 hours prior to bedtime.    Follow up in 6 weeks.           DIRECTIONS FOR SINUS SALINE RINSE To see demonstration: Enter http://www.[employer].com/watch?v=[email or web address] into the browser address box, or go to You tube, and under the search box, enter sinus rinse. Click on NeilMed Sinus Rinse Video    Step 1    Step 1 Please wash your hands. Fill the clean bottle with the designated volume of warm distilled water, filtered water or previously boiled water. You may warm the water in a microwave but we recommend that you warm it in increments of five seconds. This is to avoid excessive heating of the water and damage to the device or scalding your nasal passage.    Step 2    Step 2 Cut the SINUS RINSE mixture packet at the corner and pour its contents into the bottle. Tighten the cap & tube on the bottle securely, place one finger over the tip of the cap and shake the bottle gently to dissolve the mixture.      Step 3    Step 3 Standing in front of a sink, bend forward to your comfort level and tilt your head down. Keeping your mouth open without holding your breath, place cap snugly against your nasal passage and SQUEEZE BOTTLE GENTLY until the solution  starts draining from the OPPOSITE nasal passage or from your mouth. Keep squeezing the bottle GENTLY until at least 1/4 to 1/2 (60 to 120 mL) of the bottle is used for a proper rinse. Do not swallow the solution.    Step 4    Blow your nose gently, without pinching your nose completely because this will apply pressure on the eardrums. If tolerable, sniff in any residual solution remaining in the nasal passage once or twice prior to blowing your nose as this may clean out the posterior nasopharyngeal area (the area at the back of your nasal passage). Some solution will reach the back of the throat, so please spit it out. To help improve drainage of any residual solution, blow your nose gently while tilting your head to the opposite side of the nasal passage that you just rinsed.    Step 5    Now repeat steps 3 & 4 for your other nasal passage.    Step 6     Air dry the Sinus Rinse bottle, cap, and tube on a clean paper towel, a glass plate to store the bottle cap and tube. If there is any solution leftover, please discard it. We recommend you make a fresh solution each time you rinse. Rinse 5 times each day, OR as directed by your physician. Warnings: DO NOT RINSE IF NASAL PASSAGE IS COMPLETELY BLOCKED OR IF YOU HAVE AN EAR INFECTION OR BLOCKED EARS. If you have had ear surgery, please contact your physician prior to irrigation. If you experience any pressure in your ears, stop the rinse and get further directions from your physician or contact our office during regular business hours. To avoid any ear discomfort: Heat the solution to lukewarm, do not use hot, boiling or cold water. Keep your mouth open. Do not hold your breath and if possible make the sound DOUGIE....DOUGIE... Make sure to take the position as shown. Gently squeeze 1/4 of the bottle at a time (60mL / 2 ounces). Stop the rinse if you feel any solution sensation near the ears. You may rinse with a partially blocked nasal passage. Please do not use for any  other purposes. Please rinse at least ONE HOUR PRIOR to bedtime, in order to avoid any residual solution dripping in the throat.    >> Before using the SINUS RINSE kit, please inspect the cap, tube and bottle carefully for wear and tear. If any of the components appear discolored or cracked, please contact [employer] to obtain a replacement. You must follow the cleaning instructions provided in this brochure or cleaning instruction card prior to each use.    >> The SINUS RINSE bottle and mixture are to be used only for nasalirrigation. Do not use for any other purposes.    >> We recommend that you use the rinse ONE HOUR PRIOR to bedtime in order to avoid any residual solution dripping in the throat.    Tips to avoid ear discomfort while rinsing    If you have had ear surgery, please contact your physician prior to irrigation. Do not use if you have an ear infection or blocked ears. Rinse with lukewarm water. Keep your mouth open. Do not hold your breath while rinsing. While rinsing, make sure to tilt your. Gently squeeze the bottle while rinsing; do not squeeze the bottle very forcefully. Stop the rinse if you feel a sensation of fluid near your ears.    Tips to avoid unexpected drainage after rinsing    In rare situations, especially if you have had sinus surgery, the saline solution can pool in the sinus cavities and nasal passages and then drip from your nostrils hours after rinsing. To avoid this harmless but annoying inconvenience, take one extra step after rinsing: lean forward, tilt your head sideways and gently blow your nose. Then, tilt your head to the other side and blow again. You may need to repeat this several times. This will help rid your nasal passages of any excess mucus and remaining saline solution. If you find yourself experiencing delayed drainage often, do not rinse right before leaving your house or going to bed.

## 2025-01-30 ENCOUNTER — TELEPHONE (OUTPATIENT)
Dept: FAMILY MEDICINE | Facility: CLINIC | Age: 70
End: 2025-01-30
Payer: MEDICARE

## 2025-01-30 NOTE — TELEPHONE ENCOUNTER
Attempted to call pt to confirm new pt appt. Busy signal just started as soon as number is dialed.

## 2025-02-04 ENCOUNTER — OFFICE VISIT (OUTPATIENT)
Dept: FAMILY MEDICINE | Facility: CLINIC | Age: 70
End: 2025-02-04
Payer: MEDICARE

## 2025-02-04 VITALS
BODY MASS INDEX: 40.04 KG/M2 | WEIGHT: 226 LBS | HEIGHT: 63 IN | DIASTOLIC BLOOD PRESSURE: 60 MMHG | HEART RATE: 66 BPM | SYSTOLIC BLOOD PRESSURE: 130 MMHG

## 2025-02-04 DIAGNOSIS — E89.0 POSTOPERATIVE HYPOTHYROIDISM: ICD-10-CM

## 2025-02-04 DIAGNOSIS — Z12.31 OTHER SCREENING MAMMOGRAM: ICD-10-CM

## 2025-02-04 DIAGNOSIS — M79.7 FIBROMYALGIA: ICD-10-CM

## 2025-02-04 DIAGNOSIS — K21.9 LPRD (LARYNGOPHARYNGEAL REFLUX DISEASE): ICD-10-CM

## 2025-02-04 DIAGNOSIS — Z76.89 ENCOUNTER TO ESTABLISH CARE: Primary | ICD-10-CM

## 2025-02-04 DIAGNOSIS — D50.8 IRON DEFICIENCY ANEMIA FOLLOWING BARIATRIC SURGERY: ICD-10-CM

## 2025-02-04 DIAGNOSIS — E87.6 HYPOKALEMIA: ICD-10-CM

## 2025-02-04 DIAGNOSIS — E78.2 MIXED HYPERLIPIDEMIA: ICD-10-CM

## 2025-02-04 DIAGNOSIS — Z91.09 ENVIRONMENTAL ALLERGIES: ICD-10-CM

## 2025-02-04 DIAGNOSIS — I25.2 H/O ACUTE MYOCARDIAL INFARCTION: ICD-10-CM

## 2025-02-04 DIAGNOSIS — K57.30 COLONIC DIVERTICULAR DISEASE: ICD-10-CM

## 2025-02-04 DIAGNOSIS — E11.9 TYPE 2 DIABETES MELLITUS TREATED WITH INSULIN: ICD-10-CM

## 2025-02-04 DIAGNOSIS — G47.09 OTHER INSOMNIA: ICD-10-CM

## 2025-02-04 DIAGNOSIS — G47.33 OSA ON CPAP: ICD-10-CM

## 2025-02-04 DIAGNOSIS — I10 BENIGN ESSENTIAL HYPERTENSION: ICD-10-CM

## 2025-02-04 DIAGNOSIS — E83.42 HYPOMAGNESEMIA: ICD-10-CM

## 2025-02-04 DIAGNOSIS — Z79.4 TYPE 2 DIABETES MELLITUS TREATED WITH INSULIN: ICD-10-CM

## 2025-02-04 DIAGNOSIS — Z95.1 HX OF CABG: ICD-10-CM

## 2025-02-04 DIAGNOSIS — K95.89 IRON DEFICIENCY ANEMIA FOLLOWING BARIATRIC SURGERY: ICD-10-CM

## 2025-02-04 PROBLEM — E66.9 OBESITY (BMI 30-39.9): Status: RESOLVED | Noted: 2023-07-19 | Resolved: 2025-02-04

## 2025-02-04 PROBLEM — K85.10 GALLSTONE PANCREATITIS: Status: RESOLVED | Noted: 2023-07-21 | Resolved: 2025-02-04

## 2025-02-04 PROBLEM — K72.00 SHOCK LIVER: Status: RESOLVED | Noted: 2023-07-19 | Resolved: 2025-02-04

## 2025-02-04 PROBLEM — K44.9 HIATAL HERNIA: Status: ACTIVE | Noted: 2025-02-04

## 2025-02-04 PROBLEM — K59.00 CONSTIPATION: Status: RESOLVED | Noted: 2023-07-28 | Resolved: 2025-02-04

## 2025-02-04 PROBLEM — M51.369 DEGENERATIVE DISC DISEASE, LUMBAR: Status: ACTIVE | Noted: 2023-12-20

## 2025-02-04 PROBLEM — K83.09 ACUTE OBSTRUCTIVE CHOLANGITIS: Status: RESOLVED | Noted: 2023-07-18 | Resolved: 2025-02-04

## 2025-02-04 PROBLEM — G93.41 ENCEPHALOPATHY, METABOLIC: Status: RESOLVED | Noted: 2023-07-19 | Resolved: 2025-02-04

## 2025-02-04 PROCEDURE — 3288F FALL RISK ASSESSMENT DOCD: CPT | Mod: CPTII,S$GLB,,

## 2025-02-04 PROCEDURE — 3075F SYST BP GE 130 - 139MM HG: CPT | Mod: CPTII,S$GLB,,

## 2025-02-04 PROCEDURE — 99215 OFFICE O/P EST HI 40 MIN: CPT | Mod: S$GLB,,,

## 2025-02-04 PROCEDURE — 1160F RVW MEDS BY RX/DR IN RCRD: CPT | Mod: CPTII,S$GLB,,

## 2025-02-04 PROCEDURE — 1125F AMNT PAIN NOTED PAIN PRSNT: CPT | Mod: CPTII,S$GLB,,

## 2025-02-04 PROCEDURE — 1100F PTFALLS ASSESS-DOCD GE2>/YR: CPT | Mod: CPTII,S$GLB,,

## 2025-02-04 PROCEDURE — 1159F MED LIST DOCD IN RCRD: CPT | Mod: CPTII,S$GLB,,

## 2025-02-04 PROCEDURE — 3008F BODY MASS INDEX DOCD: CPT | Mod: CPTII,S$GLB,,

## 2025-02-04 PROCEDURE — 3078F DIAST BP <80 MM HG: CPT | Mod: CPTII,S$GLB,,

## 2025-02-04 RX ORDER — LANOLIN ALCOHOL/MO/W.PET/CERES
1 CREAM (GRAM) TOPICAL DAILY
Qty: 90 TABLET | Refills: 3 | Status: SHIPPED | OUTPATIENT
Start: 2025-02-04 | End: 2026-02-04

## 2025-02-04 RX ORDER — AMLODIPINE BESYLATE 2.5 MG/1
2.5 TABLET ORAL DAILY
Qty: 90 TABLET | Refills: 3 | Status: SHIPPED | OUTPATIENT
Start: 2025-02-04

## 2025-02-04 RX ORDER — IRON,CARBONYL/ASCORBIC ACID 100-250 MG
1 TABLET ORAL DAILY
Qty: 90 TABLET | Refills: 3 | Status: SHIPPED | OUTPATIENT
Start: 2025-02-04 | End: 2026-02-04

## 2025-02-04 RX ORDER — EMPAGLIFLOZIN, LINAGLIPTIN, METFORMIN HYDROCHLORIDE 25; 5; 1000 MG/1; MG/1; MG/1
1 TABLET, EXTENDED RELEASE ORAL DAILY
Qty: 90 TABLET | Refills: 3 | Status: SHIPPED | OUTPATIENT
Start: 2025-02-04

## 2025-02-04 RX ORDER — LEVOCETIRIZINE DIHYDROCHLORIDE 5 MG/1
5 TABLET, FILM COATED ORAL NIGHTLY
Qty: 90 TABLET | Refills: 3 | Status: SHIPPED | OUTPATIENT
Start: 2025-02-04 | End: 2026-02-04

## 2025-02-04 RX ORDER — METOLAZONE 2.5 MG/1
2.5 TABLET ORAL DAILY
Qty: 90 TABLET | Refills: 3 | Status: SHIPPED | OUTPATIENT
Start: 2025-02-04 | End: 2026-02-04

## 2025-02-04 RX ORDER — PANTOPRAZOLE SODIUM 40 MG/1
40 TABLET, DELAYED RELEASE ORAL DAILY
Qty: 90 TABLET | Refills: 3 | Status: SHIPPED | OUTPATIENT
Start: 2025-02-04 | End: 2026-02-04

## 2025-02-04 RX ORDER — DULOXETIN HYDROCHLORIDE 60 MG/1
60 CAPSULE, DELAYED RELEASE ORAL DAILY
Qty: 90 CAPSULE | Refills: 2 | Status: SHIPPED | OUTPATIENT
Start: 2025-02-04

## 2025-02-04 RX ORDER — POTASSIUM CHLORIDE 1500 MG/1
20 TABLET, EXTENDED RELEASE ORAL DAILY
Qty: 90 TABLET | Refills: 3 | Status: SHIPPED | OUTPATIENT
Start: 2025-02-04

## 2025-02-04 RX ORDER — GUANFACINE 2 MG/1
2 TABLET ORAL NIGHTLY
Qty: 90 TABLET | Refills: 3 | Status: SHIPPED | OUTPATIENT
Start: 2025-02-04

## 2025-02-04 RX ORDER — INSULIN DEGLUDEC 200 U/ML
78 INJECTION, SOLUTION SUBCUTANEOUS NIGHTLY
Qty: 35.1 ML | Refills: 3 | Status: SHIPPED | OUTPATIENT
Start: 2025-02-04 | End: 2026-02-04

## 2025-02-04 RX ORDER — FLUTICASONE PROPIONATE 50 MCG
1 SPRAY, SUSPENSION (ML) NASAL 2 TIMES DAILY
Qty: 16 G | Refills: 2 | Status: SHIPPED | OUTPATIENT
Start: 2025-02-04

## 2025-02-04 RX ORDER — METFORMIN HYDROCHLORIDE 1000 MG/1
1000 TABLET ORAL
Qty: 90 TABLET | Refills: 3 | Status: SHIPPED | OUTPATIENT
Start: 2025-02-04

## 2025-02-04 RX ORDER — ROSUVASTATIN CALCIUM 40 MG/1
40 TABLET, COATED ORAL NIGHTLY
Qty: 90 TABLET | Refills: 3 | Status: SHIPPED | OUTPATIENT
Start: 2025-02-04

## 2025-02-04 RX ORDER — AZELASTINE 1 MG/ML
1 SPRAY, METERED NASAL 2 TIMES DAILY
Qty: 30 ML | Refills: 2 | Status: SHIPPED | OUTPATIENT
Start: 2025-02-04 | End: 2026-02-04

## 2025-02-04 RX ORDER — METOPROLOL SUCCINATE 25 MG/1
25 TABLET, EXTENDED RELEASE ORAL DAILY
Qty: 90 TABLET | Refills: 3 | Status: SHIPPED | OUTPATIENT
Start: 2025-02-04 | End: 2026-02-04

## 2025-02-04 NOTE — PATIENT INSTRUCTIONS
Let the tech who draws your blood know that you are doing labs for me, and for Angelito León, that way nothing gets missed.    Nito Buchanan,     If you are due for any health screening(s) below please notify me so we can arrange them to be ordered and scheduled. Most healthy patients at your age complete them, but you are free to accept or refuse.     If you can't do it, I'll definitely understand. If you can, I'd certainly appreciate it!    Tests to Keep You Healthy    Mammogram: Met on 4/22/2024  Eye Exam: DUE  Colon Cancer Screening: ORDERED  Last Blood Pressure <= 139/89 (2/4/2025): Yes  Last HbA1c < 8 (12/04/2024): Yes      Its time for your colon cancer screening     Colorectal cancer is one of the leading causes of cancer death for men and women but it doesnt have to be. Screenings can prevent colorectal cancer or find it early enough to treat and cure the disease.     Our records indicate that you may be overdue for colon cancer screening. A colonoscopy or stool screening test can help identify patients at risk for developing colon cancer. Cancer screenings save lives, so schedule yours today to stay healthy.     A colonoscopy is the preferred test for detecting colon cancer. It is needed only once every 10 years if results are negative. While you are sedated, a flexible, lighted tube with a tiny camera is inserted into the rectum and advanced through the colon to look for cancers.     An alternative screening test that is used at home and returned to the lab may also be used. It detects hidden blood in bowel movements which could indicate cancer in the colon. If results are positive, you will need a colonoscopy to determine if the blood is a sign of cancer. This type of follow up (diagnostic) colonoscopy usually requires additional copays as required by your insurance provider.     If you recently had your colon cancer screening performed outside of [facility], please let your Health care  team know so that they can update your health record. Please contact your PCP if you have any questions.    Your diabetic retinal eye exam is due     Diabetes is the #1 cause of blindness in the [place] - early detection before signs or symptoms develop can prevent debilitating blindness.     Our records indicate that you may be overdue for your annual diabetic eye exam. Eye screening can help identify patients at risk for developing vision loss which is common in diabetes. This simple screening is an important step to keeping you healthy and preventing complications from diabetes.     This recommended diabetic eye exam should take place once per year and can prevent and treat diabetes complications in the eye before developing symptoms. This can be done with a special camera is used to take photographs of the back of your eye without having to dilate them, or you can see an eye doctor for a full dilated exam.     If you recently had your yearly diabetic eye exam performed outside of [facility], please let your Health care team know so that they can update your health record.

## 2025-02-04 NOTE — PROGRESS NOTES
"  SUBJECTIVE:    Patient ID: Chanel Cervantes is a 69 y.o. female.    Chief Complaint: Diabetes (Brought bottles// wants to establish care. Needs refills.// A1C in December 5.3//eye exam due Dr. Annamarie Guan. States she will call to schedule this.// has cologuard at home //needs refills // chronic back pain -MJ)    Diabetes  Hypoglycemia symptoms include confusion and headaches. Pertinent negatives for diabetes include no chest pain, no polydipsia, no polyuria and no weakness.     History of Present Illness    CHIEF COMPLAINT:  Chanel presents today for medication refills    HISTORY OF PRESENT ILLNESS:  She has a history of sepsis from June/July 2023 due to a "large sludge ball" causing widespread infection, requiring intubation for 5-6 days. Since then, she experiences persistent memory issues with significant gaps in recollection, difficulty with writing tasks, and decreased stability requiring occasional use of a cane. She reports a chronic cough with significant mucus production,  present since discharge from hospital rehabilitation. Multiple providers have found her chest to be clear. She recently experiences left ear pain.    SLEEP:  She has difficulty falling asleep, often awake until 2-3 AM. She previously used CPAP therapy but discontinued due to claustrophobia and breathing difficulties with the mask. Her previously severe self-awakening snoring has improved.    SURGICAL HISTORY:  Her surgical history includes partial thyroidectomy with normal thyroid function, reverse right shoulder surgery resulting in frozen shoulder with limited range of motion affecting overhead activities. She underwent mini-cabbage procedures in 1998 and 1990 due to stent placement complications. Following bariatric surgery, she developed a hernia requiring a complex six-hour repair surgery with bowel involvement. Post-operative complications included wound healing issues requiring eight months of home health care with daily " dressing changes, wound vac therapy, weekly hospital visits, and additional surgical intervention for belly button repair.    FAMILY HISTORY:  Maternal side has significant cancer history with all relatives reportedly dying from various forms of cancer. Paternal side has consistent cardiovascular disease history with all relatives experiencing heart failure or heart attacks. Grandfather had cancer.    IMAGING:  CT showed hiatal hernia and left lung nodule beneath breast area. She denies acid reflux symptoms.    CURRENT MEDICATIONS:  She takes aspirin 81 mg and magnesium oxide in the mornings. Previously prescribed Unithroid 75 mg but has not taken it for the past year.      ROS:  General: -fever, -chills, -fatigue, -weight gain, -weight loss  Eyes: -vision changes, -redness, -discharge  ENT: +ear pain, -nasal congestion, -sore throat  Cardiovascular: -chest pain, -palpitations, -lower extremity edema  Respiratory: +cough, -shortness of breath  Gastrointestinal: -abdominal pain, -nausea, -vomiting, -diarrhea, -constipation, -blood in stool  Genitourinary: -dysuria, -hematuria, -frequency  Musculoskeletal: -joint pain, -muscle pain  Skin: -rash, -lesion  Neurological: -headache, -dizziness, -numbness, -tingling  Psychiatric: -anxiety, -depression, +sleep difficulty, +memory problems         Lab Visit on 12/04/2024   Component Date Value Ref Range Status    Sodium 12/04/2024 144  136 - 145 mmol/L Final    Potassium 12/04/2024 3.6  3.5 - 5.1 mmol/L Final    Chloride 12/04/2024 104  95 - 110 mmol/L Final    CO2 12/04/2024 32 (H)  23 - 29 mmol/L Final    Glucose 12/04/2024 80  70 - 110 mg/dL Final    BUN 12/04/2024 17  8 - 23 mg/dL Final    Creatinine 12/04/2024 0.9  0.5 - 1.4 mg/dL Final    Calcium 12/04/2024 9.8  8.7 - 10.5 mg/dL Final    Total Protein 12/04/2024 6.7  6.0 - 8.4 g/dL Final    Albumin 12/04/2024 3.5  3.5 - 5.2 g/dL Final    Total Bilirubin 12/04/2024 0.4  0.1 - 1.0 mg/dL Final    Alkaline Phosphatase  12/04/2024 62  40 - 150 U/L Final    AST 12/04/2024 22  10 - 40 U/L Final    ALT 12/04/2024 17  10 - 44 U/L Final    eGFR 12/04/2024 >60.0  >60 mL/min/1.73 m^2 Final    Anion Gap 12/04/2024 8  8 - 16 mmol/L Final    Hemoglobin A1C 12/04/2024 5.3  4.0 - 5.6 % Final    Estimated Avg Glucose 12/04/2024 105  68 - 131 mg/dL Final    Microalbumin, Urine 12/04/2024 12.0  ug/mL Final    Creatinine, Urine 12/04/2024 93.0  15.0 - 325.0 mg/dL Final    Microalb/Creat Ratio 12/04/2024 12.9  0.0 - 30.0 ug/mg Final   Lab Visit on 11/18/2024   Component Date Value Ref Range Status    Cholesterol 11/18/2024 98 (L)  120 - 199 mg/dL Final    Triglycerides 11/18/2024 187 (H)  30 - 150 mg/dL Final    HDL 11/18/2024 37 (L)  40 - 75 mg/dL Final    LDL Cholesterol 11/18/2024 23.6 (L)  63.0 - 159.0 mg/dL Final    HDL/Cholesterol Ratio 11/18/2024 37.8  20.0 - 50.0 % Final    Total Cholesterol/HDL Ratio 11/18/2024 2.6  2.0 - 5.0 Final    Non-HDL Cholesterol 11/18/2024 61  mg/dL Final    Total Protein 11/18/2024 6.6  6.0 - 8.4 g/dL Final    Albumin 11/18/2024 3.9  3.5 - 5.2 g/dL Final    Total Bilirubin 11/18/2024 0.4  0.1 - 1.0 mg/dL Final    Bilirubin, Direct 11/18/2024 0.1  0.1 - 0.3 mg/dL Final    AST 11/18/2024 18  10 - 40 U/L Final    ALT 11/18/2024 13  10 - 44 U/L Final    Alkaline Phosphatase 11/18/2024 60  55 - 135 U/L Final    WBC 11/18/2024 5.77  3.90 - 12.70 K/uL Final    RBC 11/18/2024 5.04  4.00 - 5.40 M/uL Final    Hemoglobin 11/18/2024 13.2  12.0 - 16.0 g/dL Final    Hematocrit 11/18/2024 43.1  37.0 - 48.5 % Final    MCV 11/18/2024 86  82 - 98 fL Final    MCH 11/18/2024 26.2 (L)  27.0 - 31.0 pg Final    MCHC 11/18/2024 30.6 (L)  32.0 - 36.0 g/dL Final    RDW 11/18/2024 14.6 (H)  11.5 - 14.5 % Final    Platelets 11/18/2024 321  150 - 450 K/uL Final    MPV 11/18/2024 10.8  9.2 - 12.9 fL Final    Immature Granulocytes 11/18/2024 0.3  0.0 - 0.5 % Final    Gran # (ANC) 11/18/2024 3.4  1.8 - 7.7 K/uL Final    Immature Grans (Abs)  11/18/2024 0.02  0.00 - 0.04 K/uL Final    Lymph # 11/18/2024 1.6  1.0 - 4.8 K/uL Final    Mono # 11/18/2024 0.6  0.3 - 1.0 K/uL Final    Eos # 11/18/2024 0.1  0.0 - 0.5 K/uL Final    Baso # 11/18/2024 0.05  0.00 - 0.20 K/uL Final    nRBC 11/18/2024 0  0 /100 WBC Final    Gran % 11/18/2024 59.1  38.0 - 73.0 % Final    Lymph % 11/18/2024 27.7  18.0 - 48.0 % Final    Mono % 11/18/2024 9.9  4.0 - 15.0 % Final    Eosinophil % 11/18/2024 2.1  0.0 - 8.0 % Final    Basophil % 11/18/2024 0.9  0.0 - 1.9 % Final    Differential Method 11/18/2024 Automated   Final    Sodium 11/18/2024 141  136 - 145 mmol/L Final    Potassium 11/18/2024 3.0 (L)  3.5 - 5.1 mmol/L Final    Chloride 11/18/2024 101  95 - 110 mmol/L Final    CO2 11/18/2024 32 (H)  23 - 29 mmol/L Final    Glucose 11/18/2024 78  70 - 110 mg/dL Final    BUN 11/18/2024 19  8 - 23 mg/dL Final    Creatinine 11/18/2024 0.9  0.5 - 1.4 mg/dL Final    Calcium 11/18/2024 9.7  8.7 - 10.5 mg/dL Final    Total Protein 11/18/2024 6.5  6.0 - 8.4 g/dL Final    Albumin 11/18/2024 3.8  3.5 - 5.2 g/dL Final    Total Bilirubin 11/18/2024 0.4  0.1 - 1.0 mg/dL Final    Alkaline Phosphatase 11/18/2024 63  55 - 135 U/L Final    AST 11/18/2024 19  10 - 40 U/L Final    ALT 11/18/2024 13  10 - 44 U/L Final    eGFR 11/18/2024 >60.0  >60 mL/min/1.73 m^2 Final    Anion Gap 11/18/2024 8  8 - 16 mmol/L Final    Iron 11/18/2024 60  30 - 160 ug/dL Final    Transferrin 11/18/2024 237  200 - 375 mg/dL Final    TIBC 11/18/2024 332  250 - 450 ug/dL Final    Saturated Iron 11/18/2024 18 (L)  20 - 50 % Final    Ferritin 11/18/2024 25.6  20.0 - 300.0 ng/mL Final    Folate 11/18/2024 >22.3  4.0 - 24.0 ng/mL Final    Vitamin B-12 11/18/2024 325  210 - 950 pg/mL Final   Patient Outreach on 10/30/2024   Component Date Value Ref Range Status    Left Eye DM Retinopathy 11/16/2023 Negative   Final    Right Eye DM Retinopathy 11/16/2023 Negative   Final       Past Medical History:   Diagnosis Date    Acute  "obstructive cholangitis 2023    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 2021    CAD (coronary artery disease)     Cholangitis 2023    w/septic shock, Ecoli bacteremia    Constipation 2023    Diabetes mellitus, type 2     Encephalopathy, metabolic 2023    Gallstone pancreatitis 2023    Hypertension     Iron deficiency anemia following bariatric surgery 2021    MI (myocardial infarction)     Obesity (BMI 30-39.9) 2023    Secondary thrombocytosis 2021    Shock liver 2023    Sleep apnea 2019    Sleep Right      Social History     Socioeconomic History    Marital status:    Tobacco Use    Smoking status: Former     Current packs/day: 0.00     Average packs/day: 1 pack/day for 15.0 years (15.0 ttl pk-yrs)     Types: Cigarettes     Start date:      Quit date:      Years since quittin.1    Smokeless tobacco: Never   Substance and Sexual Activity    Alcohol use: No     Comment: "maybe once a year"    Drug use: No    Sexual activity: Not Currently     Social Drivers of Health     Financial Resource Strain: Patient Declined (2024)    Overall Financial Resource Strain (CARDIA)     Difficulty of Paying Living Expenses: Patient declined   Food Insecurity: Patient Declined (2024)    Hunger Vital Sign     Worried About Running Out of Food in the Last Year: Patient declined     Ran Out of Food in the Last Year: Patient declined   Transportation Needs: Patient Declined (10/31/2023)    PRAPARE - Transportation     Lack of Transportation (Medical): Patient declined     Lack of Transportation (Non-Medical): Patient declined   Physical Activity: Unknown (2024)    Exercise Vital Sign     Days of Exercise per Week: 0 days   Stress: Patient Declined (2024)    [employer] - Occupational Stress Questionnaire     Feeling of Stress : Patient declined   Housing Stability: Unknown (2024)    Housing Stability Vital " Sign     Unable to Pay for Housing in the Last Year: Patient declined     Past Surgical History:   Procedure Laterality Date    ANGIOGRAM, CORONARY, WITH LEFT HEART CATHETERIZATION      APPENDECTOMY      BARIATRIC SURGERY  2019    Dr Nunez. severe wound inf after surgery    CARPAL TUNNEL RELEASE Bilateral 2002     SECTION      CHOLECYSTECTOMY      COLONOSCOPY      CORONARY ANGIOPLASTY WITH STENT PLACEMENT      CORONARY ARTERY BYPASS GRAFT      EPIDURAL STEROID INJECTION INTO LUMBAR SPINE      x2    ERCP N/A 2023    Procedure: ERCP (ENDOSCOPIC RETROGRADE CHOLANGIOPANCREATOGRAPHY);  Surgeon: Lavon Hernandez III, MD;  Location: [facility] ENDO;  Service: Endoscopy;  Laterality: N/A;    ERCP W/ SPHICTEROTOMY  2023    ESOPHAGOGASTRODUODENOSCOPY      HERNIA REPAIR  2019    HYSTERECTOMY      THYROID LOBECTOMY Right 2021    Procedure: LOBECTOMY, THYROID;  Surgeon: Mari Chance MD;  Location: [facility] OR;  Service: General;  Laterality: Right;     Family History   Problem Relation Name Age of Onset    Diabetes Mother      Vision loss Mother      Heart disease Father      Vision loss Father      Stroke Father         The CVD Risk score (PIERRE'Agostino et al., 2008) failed to calculate for the following reasons:    The patient has a prior MI, stroke, CHF, or peripheral vascular disease diagnosis    Tests to Keep You Healthy    Mammogram: Met on 2024  Eye Exam: DUE  Colon Cancer Screening: ORDERED  Last Blood Pressure <= 139/89 (2025): Yes  Last HbA1c < 8 (2024): Yes      Review of patient's allergies indicates:   Allergen Reactions    Adhesive tape-silicones Rash    Dye Hives    Cephalexin     Iodine     Penicillins Edema    Pneumococcal vaccine     Iodinated contrast media Rash       Current Outpatient Medications:     aspirin (ECOTRIN) 81 MG EC tablet, Take 1 tablet (81 mg total) by mouth once daily., Disp: 30 tablet, Rfl: 0    blood sugar diagnostic Strp, One Touch Ultra Blue Test  strips, Use l strip to check glucose 4 times daily, Disp: 100 each, Rfl: 5    blood-glucose meter kit, Use as instructed, Disp: 1 each, Rfl: 0    calcium carbonate-vitamin D3 600 mg-62.5 mcg (2,500 unit) Cap, calcium carbonate 600 mg-vitamin D3 62.5 mcg (2,500 unit) capsule  Take by oral route., Disp: , Rfl:     multivitamin capsule, Take 1 capsule by mouth once daily., Disp: , Rfl:     amLODIPine (NORVASC) 2.5 MG tablet, Take 1 tablet (2.5 mg total) by mouth once daily., Disp: 90 tablet, Rfl: 3    azelastine (ASTELIN) 137 mcg (0.1 %) nasal spray, 1 spray (137 mcg total) by Nasal route 2 (two) times daily. Point up and slightly outward toward ear when spraying to avoid irritating nasal septum., Disp: 30 mL, Rfl: 2    DULoxetine (CYMBALTA) 60 MG capsule, Take 1 capsule (60 mg total) by mouth once daily., Disp: 90 capsule, Rfl: 2    empaglifloz-linaglip-metformin (TRIJARDY XR) 25-5-1,000 mg TBph, Take 1 tablet by mouth once daily., Disp: 90 tablet, Rfl: 3    fluticasone propionate (FLONASE) 50 mcg/actuation nasal spray, 1 spray (50 mcg total) by Each Nostril route 2 (two) times daily. Point up and slightly outward toward ear when spraying to avoid irritating nasal septum., Disp: 16 g, Rfl: 2    guanFACINE (TENEX) 2 MG tablet, Take 1 tablet (2 mg total) by mouth every evening., Disp: 90 tablet, Rfl: 3    insulin degludec (TRESIBA FLEXTOUCH U-200) 200 unit/mL (3 mL) insulin pen, Inject 78 Units into the skin every evening., Disp: 35.1 mL, Rfl: 3    iron-vitamin C 100-250 mg, ICAR-C, 100-250 mg Tab, Take 1 tablet by mouth once daily., Disp: 90 tablet, Rfl: 3    lancets (ONETOUCH DELICA LANCETS) 33 gauge Misc, USE 1  TO CHECK GLUCOSE 4 TIMES DAILY, Disp: 100 each, Rfl: 3    levocetirizine (XYZAL) 5 MG tablet, Take 1 tablet (5 mg total) by mouth every evening., Disp: 90 tablet, Rfl: 3    magnesium oxide (MAG-OX) 400 mg (241.3 mg magnesium) tablet, Take 1 tablet (400 mg total) by mouth once daily., Disp: 90 tablet, Rfl: 3    " metFORMIN (GLUCOPHAGE) 1000 MG tablet, Take 1 tablet (1,000 mg total) by mouth daily with breakfast., Disp: 90 tablet, Rfl: 3    metOLazone (ZAROXOLYN) 2.5 MG tablet, Take 1 tablet (2.5 mg total) by mouth once daily., Disp: 90 tablet, Rfl: 3    metoprolol succinate (TOPROL-XL) 25 MG 24 hr tablet, Take 1 tablet (25 mg total) by mouth once daily., Disp: 90 tablet, Rfl: 3    nystatin (MYCOSTATIN) powder, Apply topically 3 (three) times daily., Disp: 60 g, Rfl: 2    pantoprazole (PROTONIX) 40 MG tablet, Take 1 tablet (40 mg total) by mouth once daily. Take 30 minutes prior to eating breakfast., Disp: 90 tablet, Rfl: 3    potassium chloride (K-TAB) 20 mEq, Take 1 tablet (20 mEq total) by mouth Daily., Disp: 90 tablet, Rfl: 3    prednisoLONE acetate (PRED FORTE) 1 % DrpS, Place 1 drop into both eyes as needed. , Disp: , Rfl:     rosuvastatin (CRESTOR) 40 MG Tab, Take 1 tablet (40 mg total) by mouth every evening., Disp: 90 tablet, Rfl: 3    Review of Systems   Constitutional:  Negative for activity change and unexpected weight change.   HENT:  Positive for rhinorrhea. Negative for hearing loss and trouble swallowing.    Eyes:  Positive for visual disturbance. Negative for discharge.   Respiratory:  Negative for chest tightness and wheezing.    Cardiovascular:  Negative for chest pain and palpitations.   Gastrointestinal:  Negative for blood in stool, constipation, diarrhea and vomiting.   Endocrine: Negative for polydipsia and polyuria.   Genitourinary:  Negative for difficulty urinating, dysuria, hematuria and menstrual problem.   Musculoskeletal:  Positive for arthralgias and joint swelling. Negative for neck pain.   Neurological:  Positive for headaches. Negative for weakness.   Psychiatric/Behavioral:  Positive for confusion. Negative for dysphoric mood.            Objective:      Vitals:    02/04/25 1125   BP: 130/60   Pulse: 66   Weight: 102.5 kg (226 lb)   Height: 5' 3" (1.6 m)     Physical Exam  Vitals and " nursing note reviewed.   Constitutional:       General: She is not in acute distress.     Appearance: Normal appearance. She is well-developed. She is obese. She is not ill-appearing.   HENT:      Head: Normocephalic and atraumatic.      Right Ear: Tympanic membrane and external ear normal.      Left Ear: Tympanic membrane and external ear normal.      Nose: Nose normal.      Mouth/Throat:      Lips: Pink.      Pharynx: Oropharynx is clear.   Eyes:      General: No scleral icterus.     Pupils: Pupils are equal, round, and reactive to light.   Neck:      Thyroid: No thyromegaly.      Vascular: No carotid bruit.   Cardiovascular:      Rate and Rhythm: Normal rate and regular rhythm.      Pulses:           Radial pulses are 2+ on the right side and 2+ on the left side.      Heart sounds: Normal heart sounds. No murmur heard.  Pulmonary:      Effort: Pulmonary effort is normal.      Breath sounds: Normal breath sounds.   Abdominal:      Palpations: Abdomen is soft.      Tenderness: There is no abdominal tenderness.      Comments: Ventral hernia noted   Musculoskeletal:         General: Normal range of motion.      Cervical back: Normal range of motion.      Lumbar back: Normal. No spasms.      Right lower leg: No edema.      Left lower leg: No edema.   Skin:     General: Skin is warm and dry.      Capillary Refill: Capillary refill takes less than 2 seconds.      Coloration: Skin is not pale.   Neurological:      General: No focal deficit present.      Mental Status: She is alert and oriented to person, place, and time.      Cranial Nerves: No cranial nerve deficit.      Sensory: Sensation is intact.      Motor: No weakness.      Gait: Gait is intact.   Psychiatric:         Attention and Perception: Attention normal.         Mood and Affect: Mood normal.         Speech: Speech normal.         Behavior: Behavior is cooperative.         Thought Content: Thought content does not include homicidal or suicidal ideation.        Physical Exam            Assessment:       1. Encounter to establish care    2. Benign essential hypertension    3. Mixed hyperlipidemia    4. Type 2 diabetes mellitus treated with insulin    5. LPRD (laryngopharyngeal reflux disease)    6. Hypomagnesemia    7. Iron deficiency anemia following bariatric surgery    8. Hypokalemia    9. Fibromyalgia    10. Postoperative hypothyroidism    11. H/O acute myocardial infarction    12. Hx of CABG    13. BMI 40.0-44.9, adult    14. Other insomnia    15. NIKOLAS on CPAP    16. Environmental allergies    17. Other screening mammogram    18. Colonic diverticular disease         Plan:         Assessment & Plan    IMPRESSION:  Encounter to establish care  - Assessed thyroid function given prolonged period without Unithroid; ordered thyroid labs to determine appropriate dosage for restarting medication  - Evaluated persistent cough and sinus problems; noted prior workup by Angelito León revealed no sinus infection but significant mucus  - Assessed sleep issues; recommended adjusting timing of current medications to potentially improve sleep quality    Type 2 diabetes mellitus treated with insulin  A1C controlled today. Continue current regimen.  Continue to monitor fasting blood sugars at home, and bring glucose diary to each visit. Stay away from sweets and high carb foods such as pasta, rice, bread, etc., and maintain healthy diet and exercise. we will repeat A1C, lipids and renal function per recommendations.  Discussed need for annual eye exam, and routine inspection of feet.   -     metFORMIN (GLUCOPHAGE) 1000 MG tablet; Take 1 tablet (1,000 mg total) by mouth daily with breakfast.  Dispense: 90 tablet; Refill: 3  -     empaglifloz-linaglip-metformin (TRIJARDY XR) 25-5-1,000 mg TBph; Take 1 tablet by mouth once daily.  Dispense: 90 tablet; Refill: 3  -     insulin degludec (TRESIBA FLEXTOUCH U-200) 200 unit/mL (3 mL) insulin pen; Inject 78 Units into the skin every  evening.  Dispense: 35.1 mL; Refill: 3    Benign essential hypertension  Blood pressure goals discussed with patient.  Tolerating current medications.  BP controlled today.  Continue current management.  -     amLODIPine (NORVASC) 2.5 MG tablet; Take 1 tablet (2.5 mg total) by mouth once daily.  Dispense: 90 tablet; Refill: 3  -     metoprolol succinate (TOPROL-XL) 25 MG 24 hr tablet; Take 1 tablet (25 mg total) by mouth once daily.  Dispense: 90 tablet; Refill: 3  -     metOLazone (ZAROXOLYN) 2.5 MG tablet; Take 1 tablet (2.5 mg total) by mouth once daily.  Dispense: 90 tablet; Refill: 3  -     guanFACINE (TENEX) 2 MG tablet; Take 1 tablet (2 mg total) by mouth every evening.  Dispense: 90 tablet; Refill: 3    Mixed hyperlipidemia  Dyslipidemia:   - Controlled    - On statin per ACC recommendations, will continue. No untoward side effects reported.   - Monitor LDL yearly at minimum  -     rosuvastatin (CRESTOR) 40 MG Tab; Take 1 tablet (40 mg total) by mouth every evening.  Dispense: 90 tablet; Refill: 3    BMI 40.0-44.9, adult  SEVERE OBESITY  WITH COMORBIDITY:  Discussed with patient the impact of morbid obesity and their comorbid conditions. Counseled patient on importance of healthy diet and regular exercise to help achieve weight loss. Encouraged to slowly begin exercise program with walking or stationary bike riding with exercise goal of 150 minutes weekly and weight loss goal of 1-2lbs a week.   - Noted patient's history of bariatric surgery     LPRD (laryngopharyngeal reflux disease)  -     pantoprazole (PROTONIX) 40 MG tablet; Take 1 tablet (40 mg total) by mouth once daily. Take 30 minutes prior to eating breakfast.  Dispense: 90 tablet; Refill: 3  -discussed about the different types of medications used to treat reflux and how to use them, antacids can be used PRN for breakthrough heartburn symptoms by reducing stomach acid that is already produced, H2 blockers work by limiting the amount acid  production, & PPI's work to block acid production and are taken daily, patient verbalized understanding.  -Avoid large meals, avoid eating within 2-3 hours of bedtime (avoid late night eating & lying down soon after eating), elevate head of bed if nocturnal symptoms are present, smoking cessation (if current smoker), & weight loss (if overweight).   -Avoid known foods which trigger reflux symptoms & to minimize/avoid high-fat foods, chocolate, caffeine, citrus, alcohol, & tomato products.  -Avoid/limit use of NSAID's, since they can cause GI upset, bleeding, and/or ulcers. If needed, take with food.    Fibromyalgia  Discussed medication's mechanism of action, side effects, and contraindications.   -     DULoxetine (CYMBALTA) 60 MG capsule; Take 1 capsule (60 mg total) by mouth once daily.  Dispense: 90 capsule; Refill: 2    Hypomagnesemia  Discussed medication's mechanism of action, side effects, and contraindications.   MEDICATIONS/SUPPLEMENTS:  - Chanel advised to take magnesium oxide supplementation at bedtime instead of morning.  -     magnesium oxide (MAG-OX) 400 mg (241.3 mg magnesium) tablet; Take 1 tablet (400 mg total) by mouth once daily.  Dispense: 90 tablet; Refill: 3    Iron deficiency anemia following bariatric surgery  Discussed medication's mechanism of action, side effects, and contraindications.   -     iron-vitamin C 100-250 mg, ICAR-C, 100-250 mg Tab; Take 1 tablet by mouth once daily.  Dispense: 90 tablet; Refill: 3    Colonic diverticular disease  DIVERTICULOSIS:  - Chanel to maintain regular, formed bowel movements to prevent complications from diverticulosis.    Hypokalemia  Discussed medication's mechanism of action, side effects, and contraindications.   -     potassium chloride (K-TAB) 20 mEq; Take 1 tablet (20 mEq total) by mouth Daily.  Dispense: 90 tablet; Refill: 3    Environmental allergies  Discussed medication's mechanism of action, side effects, and contraindications.   -      levocetirizine (XYZAL) 5 MG tablet; Take 1 tablet (5 mg total) by mouth every evening.  Dispense: 90 tablet; Refill: 3  -     fluticasone propionate (FLONASE) 50 mcg/actuation nasal spray; 1 spray (50 mcg total) by Each Nostril route 2 (two) times daily. Point up and slightly outward toward ear when spraying to avoid irritating nasal septum.  Dispense: 16 g; Refill: 2  -     azelastine (ASTELIN) 137 mcg (0.1 %) nasal spray; 1 spray (137 mcg total) by Nasal route 2 (two) times daily. Point up and slightly outward toward ear when spraying to avoid irritating nasal septum.  Dispense: 30 mL; Refill: 2    Other insomnia  Discussed with patient the following in detail:  Counseled patient that insomnia is repeated difficulty with sleep initiation, duration, consolidation or quality that occurs despite adequate time and opportunity for sleep, and results in some form of daytime impairment.  Chronic insomnia is at least 3 nights a week for greater than one month.  Discussed CBT, exercise program and adjunct therapies including benzodiazepines, antihistamine, hypnotic sedatives and antidepressants and their side effect profiles.  Counseled on importance of good quality sleep hygiene and avoidance of day time napping. Patient demonstrated verbal understanding of topic covered.    Postoperative hypothyroidism  Labs for evaluation of health/monitoring of condition.   -     T3, Free; Future; Expected date: 02/04/2025  -     T4, Free; Future; Expected date: 02/04/2025  -     TSH; Future; Expected date: 02/04/2025    NIKOLAS on CPAP  Patient reports she does not use her CPAP. Discussed risk to heart health as patient has history of  acute myocardial infarction and Hx of CABG    Other screening mammogram  Patient of appropriate population group due for screening test.   -     Mammo Digital Screening Bilat w/ Ollie; Future; Expected date: 04/23/2025        Follow up in about 6 months (around 8/4/2025) for Diabetic Check Up, HTN,  thyroid.        This note was generated with the assistance of ambient listening technology. Verbal consent was obtained by the patient and accompanying visitor(s) for the recording of patient appointment to facilitate this note. I attest to having reviewed and edited the generated note for accuracy, though some syntax or spelling errors may persist. Please contact the author of this note for any clarification.      2/4/2025 Ailyn De Leon

## 2025-02-23 NOTE — PLAN OF CARE
Problem: Physical Therapy  Goal: Physical Therapy Goal  Description: Goals to be met by: 23     Patient will increase functional independence with mobility by performin. Supine to sit with Supervision  2. Sit to stand transfer with Supervision  3. Bed to chair transfer with Supervision using Rolling Walker  4. Gait  x 150 feet with Supervision using Rolling Walker.         Outcome: Ongoing, Progressing      HEART FAILURE  You have heart failure.  This means that your heart muscle is weakened and cannot pump blood the way it should.    MEDICATIONS:  See Medication List (keep list up to date and bring it to your doctor appointments).  Talk to your doctor if you have problems getting or taking medications.    VACCINES:  Most Recent Immunizations   Administered Date(s) Administered    COVID Pfizer 12Y+ 04/19/2022    COVID Pfizer 12Y+ (Requires Dilution) 02/18/2021    COVID Pfizer/Comirnaty 12+ 10/17/2023       ACTIVITY:  Continue activity as you were doing in the hospital.  You can slowly increase to what you were doing before you were hospitalized.  Balance activity with rest.  Elevate legs when resting.  Daily Weight:  Weigh yourself first thing every morning (at the same time with same amount of clothes on).  Keep a record of your weights, and bring it to your doctor appointments.    SMOKING:  Avoid all tobacco products and secondhand smoke.  Smoking Cessation Counseling offered.  [place] Toll Free Quit Line: [phone]  [place] Toll Free Quit Line: [phone]-YES ([phone]).     LOW SALT (SODIUM) DIET:  Limit salt to help prevent fluid build-up in your body.  This will help prevent shortness of breath and swelling of feet and ankles.  Avoid adding salt to food at the table and use products labeled \"low sodium\" or \"no salt\" (<300 mg per serving).  Avoid shalonda salt foods like canned soup, sauces, bouillon, lunch meat, cheese, fast food, salty snacks, canned and packaged foods.    HEART FAILURE ACTION PLAN:  Use this plan to help you decide when to change your routine or call the doctor.    CALL 911 IF YOU:  Have pain or tightness in my chest.  Have trouble breathing.  Have dizzy spells or feel faint.  Feel anxious or like something bad will happen.    CONTACT YOUR DOCTOR (even on weekends and holidays) IF YOU:  Have to sleep sitting up.  Start coughing at night.  Notice swelling in your ankles or any part of  your body.  Lose your appetite.  Gain more than 3 pounds in 1-2 days or 5 pounds in a week.  Lose more than 5 pounds in 2 days.  Become tired faster or feel yourself losing energy.  Have noisy breathing.      American Heart Association (AHA) - My HF Guide/Heart Failure Interactive Workbook  To help you manage your Heart Failure symptoms and reach your treatment goals, the American Heart Association offers a FREE Interactive Heart Failure Workbook online “Healthier Living with Heart Failure.   You may visit: [email or web address] or scan the QR code:

## 2025-03-06 ENCOUNTER — TELEPHONE (OUTPATIENT)
Dept: FAMILY MEDICINE | Facility: CLINIC | Age: 70
End: 2025-03-06
Payer: MEDICARE

## 2025-03-06 NOTE — TELEPHONE ENCOUNTER
"Chuyita's thyroid lab orders from 2/4/25 came up in overdue results. Does she want these now? I started to send patient a message then saw at the bottom of her note this message "  Follow up in about 6 months (around 8/4/2025) for Diabetic Check Up, HTN, thyroid.  Let the tech who draws your blood know that you are doing labs for me, and for Angelito Mau, that way nothing gets missed."        She normally changes date or says that she wants them prior to visit, but due date is 2/2/25  "

## 2025-03-10 ENCOUNTER — TELEPHONE (OUTPATIENT)
Dept: FAMILY MEDICINE | Facility: CLINIC | Age: 70
End: 2025-03-10
Payer: MEDICARE

## 2025-03-10 NOTE — TELEPHONE ENCOUNTER
----- Message from Ailyn sent at 3/10/2025 10:47 AM CDT -----  -10:07- pt needs a prior auth on guanfacine  [phone]   Radiation educated completed, RN reviewed with pt and gave her blue folder.  RN also spoke with  Jimmy over the phone and reviewed education with him as well. All questions answered at this time, he will review blue folder and let us know if he has any additional questions.

## 2025-03-13 ENCOUNTER — LAB VISIT (OUTPATIENT)
Dept: LAB | Facility: HOSPITAL | Age: 70
End: 2025-03-13
Attending: PHYSICIAN ASSISTANT
Payer: MEDICARE

## 2025-03-13 DIAGNOSIS — T78.40XA ALLERGY, UNSPECIFIED, INITIAL ENCOUNTER: ICD-10-CM

## 2025-03-13 DIAGNOSIS — E89.0 POSTOPERATIVE HYPOTHYROIDISM: ICD-10-CM

## 2025-03-13 DIAGNOSIS — Z91.09 ENVIRONMENTAL ALLERGIES: ICD-10-CM

## 2025-03-13 LAB
T3FREE SERPL-MCNC: 2.8 PG/ML (ref 2.3–4.2)
T4 FREE SERPL-MCNC: 0.86 NG/DL (ref 0.71–1.51)
TSH SERPL DL<=0.005 MIU/L-ACNC: 4.22 UIU/ML (ref 0.4–4)

## 2025-03-13 PROCEDURE — 84439 ASSAY OF FREE THYROXINE: CPT

## 2025-03-13 PROCEDURE — 82785 ASSAY OF IGE: CPT | Performed by: PHYSICIAN ASSISTANT

## 2025-03-13 PROCEDURE — 84481 FREE ASSAY (FT-3): CPT

## 2025-03-13 PROCEDURE — 86003 ALLG SPEC IGE CRUDE XTRC EA: CPT | Mod: 59 | Performed by: PHYSICIAN ASSISTANT

## 2025-03-13 PROCEDURE — 84443 ASSAY THYROID STIM HORMONE: CPT

## 2025-03-14 ENCOUNTER — HOSPITAL ENCOUNTER (OUTPATIENT)
Dept: PULMONOLOGY | Facility: HOSPITAL | Age: 70
Discharge: HOME | End: 2025-03-14
Attending: PHYSICIAN ASSISTANT
Payer: MEDICARE

## 2025-03-14 ENCOUNTER — RESULTS FOLLOW-UP (OUTPATIENT)
Dept: FAMILY MEDICINE | Facility: CLINIC | Age: 70
End: 2025-03-14

## 2025-03-14 ENCOUNTER — TELEPHONE (OUTPATIENT)
Dept: OTOLARYNGOLOGY | Facility: CLINIC | Age: 70
End: 2025-03-14
Payer: MEDICARE

## 2025-03-14 DIAGNOSIS — R05.3 CHRONIC COUGH: ICD-10-CM

## 2025-03-14 LAB
DLCO SINGLE BREATH LLN: 15.08
DLCO SINGLE BREATH PRE REF: 100.4 %
DLCO SINGLE BREATH REF: 20.81
DLCOC SBVA LLN: 2.86
DLCOC SBVA REF: 4.36
DLCOC SINGLE BREATH LLN: 15.08
DLCOC SINGLE BREATH REF: 20.81
DLCOVA LLN: 2.86
DLCOVA PRE REF: 123.1 %
DLCOVA PRE: 5.37 ML/(MIN*MMHG*L) (ref 2.86–5.87)
DLCOVA REF: 4.36
ERVN2 LLN: -16449.35
ERVN2 PRE REF: 0 %
ERVN2 PRE: 0 L (ref -16449.35–16450.65)
ERVN2 REF: 0.65
FEF 25 75 LLN: 1.18
FEF 25 75 PRE REF: 71.5 %
FEF 25 75 REF: 2.57
FEV1 FVC LLN: 65
FEV1 FVC PRE REF: 101.9 %
FEV1 FVC REF: 78
FEV1 LLN: 1.57
FEV1 PRE REF: 89.7 %
FEV1 REF: 2.15
FRCN2 LLN: 1.83
FRCN2 PRE REF: 87 %
FRCN2 REF: 2.65
FVC LLN: 2.02
FVC PRE REF: 87.4 %
FVC REF: 2.76
IGE SERPL-ACNC: <21 IU/ML (ref 0–100)
IVC PRE: 2.23 L (ref 2.02–3.54)
IVC SINGLE BREATH LLN: 2.02
IVC SINGLE BREATH PRE REF: 80.6 %
IVC SINGLE BREATH REF: 2.76
PEF LLN: 3.91
PEF PRE REF: 101.9 %
PEF REF: 5.57
PRE DLCO: 20.9 ML/(MIN*MMHG) (ref 15.08–26.54)
PRE FEF 25 75: 1.84 L/S (ref 1.18–3.97)
PRE FET 100: 4.94 SEC
PRE FEV1 FVC: 79.82 % (ref 65.08–89.7)
PRE FEV1: 1.93 L (ref 1.57–2.71)
PRE FRC N2: 2.31 L (ref 1.83–3.48)
PRE FVC: 2.42 L (ref 2.02–3.54)
PRE PEF: 5.67 L/S (ref 3.91–7.23)
RVN2 LLN: 1.42
RVN2 PRE REF: 115.4 %
RVN2 PRE: 2.31 L (ref 1.42–2.58)
RVN2 REF: 2
RVN2TLCN2 LLN: 32.83
RVN2TLCN2 PRE REF: 115.2 %
RVN2TLCN2 PRE: 48.88 % (ref 32.83–52.01)
RVN2TLCN2 REF: 42.42
TLCN2 LLN: 3.78
TLCN2 PRE REF: 99 %
TLCN2 PRE: 4.72 L (ref 3.78–5.76)
TLCN2 REF: 4.77
VA PRE: 3.89 L (ref 4.62–4.62)
VA SINGLE BREATH LLN: 4.62
VA SINGLE BREATH PRE REF: 84.2 %
VA SINGLE BREATH REF: 4.62
VCMAXN2 LLN: 2.02
VCMAXN2 PRE REF: 87.4 %
VCMAXN2 PRE: 2.42 L (ref 2.02–3.54)
VCMAXN2 REF: 2.76

## 2025-03-14 PROCEDURE — 94729 DIFFUSING CAPACITY: CPT

## 2025-03-14 PROCEDURE — 94727 GAS DIL/WSHOT DETER LNG VOL: CPT

## 2025-03-14 PROCEDURE — 94010 BREATHING CAPACITY TEST: CPT

## 2025-03-14 NOTE — TELEPHONE ENCOUNTER
----- Message from Tech Shantel sent at 3/14/2025  2:18 PM CDT -----  There was an issue with the dom allergy test ordered on this patient from 3/13/25.Not available. The order has been cancelled. You will need to reorder and contact the patient for recollection if clinically indicated.If there are any questions, please call the [facility] lab at [phone] ext. [phone].  Anyone in the [facility] lab will be able to assist you.

## 2025-03-17 LAB
ALLERGEN NAME: NORMAL
ALLERGEN RESULT: NORMAL

## 2025-03-18 LAB
A ALTERNATA IGE QN: <0.1 KU/L
A FUMIGATUS IGE QN: <0.1 KU/L
ALLERGEN BOXELDER MAPLE TREE IGE: <0.1 KU/L
ALLERGEN MULBERRY TREE IGE: <0.1 KU/L
ALLERGEN PIGWEED IGE: <0.1 KU/L
ALLERGEN WALNUT TREE IGE: <0.1 KU/L
ALLERGEN WHITE PINE TREE IGE: <0.1 KU/L
BALD CYPRESS IGE QN: <0.1 KU/L
BERMUDA GRASS IGE QN: <0.1 KU/L
C HERBARUM IGE QN: <0.1 KU/L
CAT DANDER IGE QN: <0.1 KU/L
CEDAR IGE QN: <0.1 KU/L
COMMON RAGWEED IGE QN: <0.1 KU/L
D FARINAE IGE QN: <0.1 KU/L
D PTERONYSS IGE QN: <0.1 KU/L
DEPRECATED CEDAR IGE RAST QL: NORMAL
DEPRECATED M RACEMOSUS IGE RAST QL: NORMAL
DEPRECATED TIMOTHY IGE RAST QL: NORMAL
DOG DANDER IGE QN: <0.1 KU/L
ELDER IGE QN: <0.1 KU/L
ELM CEDAR, IGE: <0.1 KU/L
JOHNSON GRASS IGE QN: <0.1 KU/L
KENT BLUE GRASS IGE QN: <0.1 KU/L
M RACEMOSUS IGE QN: <0.1 KU/L
PECAN/HICK TREE IGE QN: <0.1 KU/L
RAST CLASS: NORMAL
SILVER BIRCH IGE QN: <0.1 KU/L
TIMOTHY IGE QN: <0.1 KU/L
WHITE OAK IGE QN: <0.1 KU/L

## 2025-03-19 LAB
DLCO SINGLE BREATH LLN: 15.08
DLCO SINGLE BREATH PRE REF: 100.4 %
DLCO SINGLE BREATH REF: 20.81
DLCOC SBVA LLN: 2.86
DLCOC SBVA REF: 4.36
DLCOC SINGLE BREATH LLN: 15.08
DLCOC SINGLE BREATH REF: 20.81
DLCOVA LLN: 2.86
DLCOVA PRE REF: 123.1 %
DLCOVA PRE: 5.37 ML/(MIN*MMHG*L) (ref 2.86–5.87)
DLCOVA REF: 4.36
ERVN2 LLN: -16449.35
ERVN2 PRE REF: 0 %
ERVN2 PRE: 0 L (ref -16449.35–16450.65)
ERVN2 REF: 0.65
FEF 25 75 LLN: 1.18
FEF 25 75 PRE REF: 71.5 %
FEF 25 75 REF: 2.57
FEV1 FVC LLN: 65
FEV1 FVC PRE REF: 101.9 %
FEV1 FVC REF: 78
FEV1 LLN: 1.57
FEV1 PRE REF: 89.7 %
FEV1 REF: 2.15
FRCN2 LLN: 1.83
FRCN2 PRE REF: 87 %
FRCN2 REF: 2.65
FVC LLN: 2.02
FVC PRE REF: 87.4 %
FVC REF: 2.76
IVC PRE: 2.23 L (ref 2.02–3.54)
IVC SINGLE BREATH LLN: 2.02
IVC SINGLE BREATH PRE REF: 80.6 %
IVC SINGLE BREATH REF: 2.76
PEF LLN: 3.91
PEF PRE REF: 101.9 %
PEF REF: 5.57
PRE DLCO: 20.9 ML/(MIN*MMHG) (ref 15.08–26.54)
PRE FEF 25 75: 1.84 L/S (ref 1.18–3.97)
PRE FET 100: 4.94 SEC
PRE FEV1 FVC: 79.82 % (ref 65.08–89.7)
PRE FEV1: 1.93 L (ref 1.57–2.71)
PRE FRC N2: 2.31 L (ref 1.83–3.48)
PRE FVC: 2.42 L (ref 2.02–3.54)
PRE PEF: 5.67 L/S (ref 3.91–7.23)
RVN2 LLN: 1.42
RVN2 PRE REF: 115.4 %
RVN2 PRE: 2.31 L (ref 1.42–2.58)
RVN2 REF: 2
RVN2TLCN2 LLN: 32.83
RVN2TLCN2 PRE REF: 115.2 %
RVN2TLCN2 PRE: 48.88 % (ref 32.83–52.01)
RVN2TLCN2 REF: 42.42
TLCN2 LLN: 3.78
TLCN2 PRE REF: 99 %
TLCN2 PRE: 4.72 L (ref 3.78–5.76)
TLCN2 REF: 4.77
VA PRE: 3.89 L (ref 4.62–4.62)
VA SINGLE BREATH LLN: 4.62
VA SINGLE BREATH PRE REF: 84.2 %
VA SINGLE BREATH REF: 4.62
VCMAXN2 LLN: 2.02
VCMAXN2 PRE REF: 87.4 %
VCMAXN2 PRE: 2.42 L (ref 2.02–3.54)
VCMAXN2 REF: 2.76

## 2025-03-19 PROCEDURE — 94729 DIFFUSING CAPACITY: CPT | Mod: 26,,, | Performed by: INTERNAL MEDICINE

## 2025-03-19 PROCEDURE — 94727 GAS DIL/WSHOT DETER LNG VOL: CPT | Mod: 26,,, | Performed by: INTERNAL MEDICINE

## 2025-03-19 PROCEDURE — 94010 BREATHING CAPACITY TEST: CPT | Mod: 26,,, | Performed by: INTERNAL MEDICINE

## 2025-03-20 ENCOUNTER — OFFICE VISIT (OUTPATIENT)
Dept: OTOLARYNGOLOGY | Facility: CLINIC | Age: 70
End: 2025-03-20
Payer: MEDICARE

## 2025-03-20 VITALS — WEIGHT: 231.94 LBS | HEIGHT: 63 IN | BODY MASS INDEX: 41.1 KG/M2

## 2025-03-20 DIAGNOSIS — R05.9 COUGH, UNSPECIFIED TYPE: Primary | ICD-10-CM

## 2025-03-20 DIAGNOSIS — R09.89 CHEST CONGESTION: ICD-10-CM

## 2025-03-20 PROCEDURE — 99999 PR PBB SHADOW E&M-EST. PATIENT-LVL IV: CPT | Mod: PBBFAC,,, | Performed by: PHYSICIAN ASSISTANT

## 2025-03-20 PROCEDURE — 1126F AMNT PAIN NOTED NONE PRSNT: CPT | Mod: CPTII,S$GLB,, | Performed by: PHYSICIAN ASSISTANT

## 2025-03-20 PROCEDURE — 99213 OFFICE O/P EST LOW 20 MIN: CPT | Mod: S$GLB,,, | Performed by: PHYSICIAN ASSISTANT

## 2025-03-20 PROCEDURE — 1160F RVW MEDS BY RX/DR IN RCRD: CPT | Mod: CPTII,S$GLB,, | Performed by: PHYSICIAN ASSISTANT

## 2025-03-20 PROCEDURE — 3008F BODY MASS INDEX DOCD: CPT | Mod: CPTII,S$GLB,, | Performed by: PHYSICIAN ASSISTANT

## 2025-03-20 PROCEDURE — 3288F FALL RISK ASSESSMENT DOCD: CPT | Mod: CPTII,S$GLB,, | Performed by: PHYSICIAN ASSISTANT

## 2025-03-20 PROCEDURE — 1159F MED LIST DOCD IN RCRD: CPT | Mod: CPTII,S$GLB,, | Performed by: PHYSICIAN ASSISTANT

## 2025-03-20 PROCEDURE — 1101F PT FALLS ASSESS-DOCD LE1/YR: CPT | Mod: CPTII,S$GLB,, | Performed by: PHYSICIAN ASSISTANT

## 2025-03-20 NOTE — PROGRESS NOTES
[facility]    Subjective:      Patient: Chanel Cervantes Patient PCP: Ailyn De Leon APRN-CNP         :  1955     Sex:  female      MRN:  [id number]          Date of Visit: 2025      Chief Complaint: Cough/sinus f/u    Interval History 2025: Pt's aeroallergen immunocap testing negative for common inhaled allergens. Pt was started on protonix 40mg PO daily for LPRD. She was advised to use neilmed sinus rinse BID followed by flonase 1 spray to each nostril BID and astelin 1 spray to each nostril BID. PFT 2025 normal. Pt states that she has had significant improvement in sinus symptoms. Although her sinus symptoms have improved, she continues to have issues with cough and chest congestion since she was admitted to the hospital for sepsis. Pt had xyzal 5mg added to her regimen by PCP and states that this also improved her sinus symptoms with adding to flonase and astelin regimen. Pt previously followed by pulmonologist Dr. Velez.     Patient ID 2025: Chanel Cervantes is a 69 y.o. female former smoker with PMHx of CAD, DM x II, HTN, CHRIST, NIKOLAS-on CPAP and MI who presents to office for evaluation of chronic cough and environmental allergies. She has had ongoing issues with non-purulent productive cough since 2024. Pt has h/o bariatric surgery. She does have occasional issues with acid reflux. Pt states that she has issues with non-purulent post-nasal drip and rhinitis. Pt has had issues with nasal congestion. Pt endorses perennial allergies. She has been started on xyzal 5mg in the evening. Pt denies shortness of breath or wheezing. Pt denies h/o asthma/COPD. No fever/chills, paranasal sinus pressure/pain or purulent nasal drainage. She had CT chest WO 2024 that showed stable 6mm non-calcified nodule in left upper lung lobe. She had PET CT 10/11/2023 that showed no significant FDG uptake in pulmonary nodule.       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    Family History  Her family history includes Diabetes in her mother; Heart disease in her father; Stroke in her father; Vision loss in her father and mother.    Past Surgical History:   Procedure Laterality Date    ANGIOGRAM, CORONARY, WITH LEFT HEART CATHETERIZATION      APPENDECTOMY      BARIATRIC SURGERY  2019    Dr Nunez. severe wound inf after surgery    CARPAL TUNNEL RELEASE Bilateral 2002     SECTION      CHOLECYSTECTOMY      COLONOSCOPY      CORONARY ANGIOPLASTY WITH STENT PLACEMENT      CORONARY ARTERY BYPASS GRAFT      EPIDURAL STEROID INJECTION INTO LUMBAR SPINE      x2    ERCP N/A 2023    Procedure: ERCP (ENDOSCOPIC RETROGRADE CHOLANGIOPANCREATOGRAPHY);  Surgeon: Lavon Hernandez III, MD;  Location: [facility];  Service: Endoscopy;  Laterality: N/A;    ERCP W/ SPHICTEROTOMY  2023    ESOPHAGOGASTRODUODENOSCOPY      HERNIA REPAIR  2019    HYSTERECTOMY      THYROID LOBECTOMY Right 2021    Procedure: LOBECTOMY, THYROID;  Surgeon: Mari Chance MD;  Location: [facility] OR;  Service: General;  Laterality: Right;     Social History     Tobacco Use    Smoking status: Former     Current packs/day: 0.00     Average packs/day: 1 pack/day for 15.0 years (15.0 ttl pk-yrs)     Types: Cigarettes     Start date:      Quit date:      Years since quittin.2    Smokeless tobacco: Never   Substance and Sexual Activity    Alcohol use: No     Comment: "maybe once a year"    Drug use: No    Sexual activity: Not Currently     Medications  She has a current medication list which includes the following prescription(s): " "amlodipine, azelastine, blood sugar diagnostic, calcium carbonate-vitamin d3, duloxetine, trijardy xr, fluticasone propionate, guanfacine, insulin degludec, iron-vitamin c 100-250 mg (icar-c), lancets, levocetirizine, magnesium oxide, metformin, metolazone, metoprolol succinate, multivitamin, nystatin, pantoprazole, potassium chloride, prednisolone acetate, rosuvastatin, aspirin, and blood-glucose meter.    Review of patient's allergies indicates:   Allergen Reactions    Adhesive tape-silicones Rash    Dye Hives    Cephalexin     Iodine     Penicillins Edema    Pneumococcal vaccine     Iodinated contrast media Rash     All medications, allergies, and past history have been reviewed.    Objective:      Vitals:      1/28/2025     2:23 PM 2/4/2025    11:25 AM 3/20/2025     2:44 PM   Vitals - 1 value per visit   SYSTOLIC 132 130    DIASTOLIC 71 60    Pulse 64 66    Weight (lb) 225.97 226 231.92   Weight (kg) 102.5 102.513 105.2   Height 5' 6" (1.676 m) 5' 3" (1.6 m) 5' 3" (1.6 m)   BMI (Calculated) 36.5 40 41.1   Pain Score Zero Ten Zero       Body surface area is 2.16 meters squared.  Physical Exam  Constitutional:       General: She is not in acute distress.     Appearance: Normal appearance. She is not ill-appearing.   HENT:      Head: Normocephalic and atraumatic.      Right Ear: Tympanic membrane, ear canal and external ear normal.      Left Ear: Tympanic membrane, ear canal and external ear normal.      Nose: Septal deviation (leftward) present.      Mouth/Throat:      Lips: Pink. No lesions.      Mouth: Mucous membranes are moist. No oral lesions.      Tongue: No lesions.      Palate: No lesions.      Pharynx: Oropharynx is clear. Uvula midline. No pharyngeal swelling, oropharyngeal exudate, posterior oropharyngeal erythema or uvula swelling.   Eyes:      General:         Right eye: No discharge.         Left eye: No discharge.      Extraocular Movements: Extraocular movements intact.      Conjunctiva/sclera: " Conjunctivae normal.   Pulmonary:      Effort: Pulmonary effort is normal.      Breath sounds: Normal breath sounds. No stridor. No decreased breath sounds, wheezing, rhonchi or rales.   Neurological:      General: No focal deficit present.      Mental Status: She is alert and oriented to person, place, and time. Mental status is at baseline.   Psychiatric:         Mood and Affect: Mood normal.         Behavior: Behavior normal.         Thought Content: Thought content normal.         Judgment: Judgment normal.         Labs:  WBC   Date Value Ref Range Status   11/18/2024 5.77 3.90 - 12.70 K/uL Final     Eosinophil %   Date Value Ref Range Status   11/18/2024 2.1 0.0 - 8.0 % Final     Eos #   Date Value Ref Range Status   11/18/2024 0.1 0.0 - 0.5 K/uL Final     Platelets   Date Value Ref Range Status   11/18/2024 321 150 - 450 K/uL Final     Glucose   Date Value Ref Range Status   12/04/2024 80 70 - 110 mg/dL Final     Total IgE   Date Value Ref Range Status   03/13/2025 <21 0 - 100 IU/mL Final     All lab results, imaging results, and data have been reviewed.    Assessment:        ICD-10-CM ICD-9-CM   1. Cough, unspecified type  R05.9 786.2   2. Chest congestion  R09.89 786.9     Plan:      Sinus symptoms significantly improved with use of flonase, astelin and xyzal. Continue flonase 1 spray to each nostril twice daily and astelin 1 spray to each nostril twice daily. Continue xyzal. Although her sinus symptoms have improved, she continues to have issues with cough and chest congestion since she was admitted to the hospital for sepsis. Pt previously followed by pulmonologist Dr. Velez. Follow up with Dr. Velez to further assess. Follow up as needed with ENT.

## 2025-04-24 ENCOUNTER — HOSPITAL ENCOUNTER (INPATIENT)
Facility: HOSPITAL | Age: 70
LOS: 2 days | Discharge: HOME | DRG: 206 | End: 2025-04-26
Attending: EMERGENCY MEDICINE | Admitting: INTERNAL MEDICINE
Payer: MEDICARE

## 2025-04-24 DIAGNOSIS — Z13.6 SCREENING FOR CARDIOVASCULAR CONDITION: ICD-10-CM

## 2025-04-24 DIAGNOSIS — R07.9 CHEST PAIN: ICD-10-CM

## 2025-04-24 DIAGNOSIS — R06.02 SHORTNESS OF BREATH AT REST: ICD-10-CM

## 2025-04-24 DIAGNOSIS — R50.9 FEVER, UNSPECIFIED FEVER CAUSE: Primary | ICD-10-CM

## 2025-04-24 DIAGNOSIS — R19.7 DIARRHEA, UNSPECIFIED TYPE: ICD-10-CM

## 2025-04-24 DIAGNOSIS — R09.02 HYPOXIA: ICD-10-CM

## 2025-04-24 PROBLEM — E11.9 DM (DIABETES MELLITUS), TYPE 2: Status: ACTIVE | Noted: 2025-04-24

## 2025-04-24 LAB
ABSOLUTE EOSINOPHIL (SMH): 0.02 K/UL
ABSOLUTE MONOCYTE (SMH): 0.8 K/UL (ref 0.3–1)
ABSOLUTE NEUTROPHIL COUNT (SMH): 8.7 K/UL (ref 1.8–7.7)
ALBUMIN SERPL-MCNC: 3.9 G/DL (ref 3.5–5.2)
ALP SERPL-CCNC: 70 UNIT/L (ref 55–135)
ALT SERPL-CCNC: 15 UNIT/L (ref 10–44)
ANION GAP (SMH): 8 MMOL/L (ref 8–16)
AST SERPL-CCNC: 20 UNIT/L (ref 10–40)
BACTERIA #/AREA URNS AUTO: NORMAL /HPF
BASOPHILS # BLD AUTO: 0.03 K/UL
BASOPHILS NFR BLD AUTO: 0.3 %
BILIRUB SERPL-MCNC: 0.5 MG/DL (ref 0.1–1)
BILIRUB UR QL STRIP.AUTO: ABNORMAL
BNP SERPL-MCNC: 32 PG/ML
BUN SERPL-MCNC: 20 MG/DL (ref 8–23)
CALCIUM SERPL-MCNC: 9.3 MG/DL (ref 8.7–10.5)
CHLORIDE SERPL-SCNC: 99 MMOL/L (ref 95–110)
CLARITY UR: CLEAR
CO2 SERPL-SCNC: 33 MMOL/L (ref 23–29)
COLOR UR AUTO: YELLOW
CREAT SERPL-MCNC: 1 MG/DL (ref 0.5–1.4)
ERYTHROCYTE [DISTWIDTH] IN BLOOD BY AUTOMATED COUNT: 15.3 % (ref 11.5–14.5)
GFR SERPLBLD CREATININE-BSD FMLA CKD-EPI: >60 ML/MIN/1.73/M2
GLUCOSE SERPL-MCNC: 96 MG/DL (ref 70–110)
GLUCOSE UR QL STRIP: ABNORMAL
HCT VFR BLD AUTO: 43.1 % (ref 37–48.5)
HCV AB SERPL QL IA: NORMAL
HGB BLD-MCNC: 13.7 GM/DL (ref 12–16)
HGB UR QL STRIP: NEGATIVE
HIV 1+2 AB+HIV1 P24 AG SERPL QL IA: NORMAL
HYALINE CASTS UR QL AUTO: 1 /LPF (ref 0–1)
IMM GRANULOCYTES # BLD AUTO: 0.03 K/UL (ref 0–0.04)
IMM GRANULOCYTES NFR BLD AUTO: 0.3 % (ref 0–0.5)
INFLUENZA A MOLECULAR (OHS): NEGATIVE
INFLUENZA B MOLECULAR (OHS): NEGATIVE
KETONES UR QL STRIP: ABNORMAL
LACTATE SERPL-SCNC: 0.9 MMOL/L (ref 0.5–1.9)
LDH SERPL L TO P-CCNC: 1.71 MMOL/L (ref 0.5–2.2)
LEUKOCYTE ESTERASE UR QL STRIP: ABNORMAL
LIPASE SERPL-CCNC: 13 U/L (ref 4–60)
LYMPHOCYTES # BLD AUTO: 0.51 K/UL (ref 1–4.8)
MAGNESIUM SERPL-MCNC: 1.3 MG/DL (ref 1.6–2.6)
MCH RBC QN AUTO: 26.6 PG (ref 27–31)
MCHC RBC AUTO-ENTMCNC: 31.8 G/DL (ref 32–36)
MCV RBC AUTO: 84 FL (ref 82–98)
MICROSCOPIC COMMENT: NORMAL
NITRITE UR QL STRIP: NEGATIVE
NUCLEATED RBC (/100WBC) (SMH): 0 /100 WBC
PH UR STRIP: 6 [PH]
PLATELET # BLD AUTO: 297 K/UL (ref 150–450)
PMV BLD AUTO: 10.3 FL (ref 9.2–12.9)
POTASSIUM SERPL-SCNC: 3.3 MMOL/L (ref 3.5–5.1)
PROCALCITONIN SERPL-MCNC: 0.12 NG/ML
PROT SERPL-MCNC: 6.9 GM/DL (ref 6–8.4)
PROT UR QL STRIP: ABNORMAL
RBC # BLD AUTO: 5.15 M/UL (ref 4–5.4)
RBC #/AREA URNS AUTO: 1 /HPF
RELATIVE EOSINOPHIL (SMH): 0.2 % (ref 0–8)
RELATIVE LYMPHOCYTE (SMH): 5 % (ref 18–48)
RELATIVE MONOCYTE (SMH): 7.9 % (ref 4–15)
RELATIVE NEUTROPHIL (SMH): 86.3 % (ref 38–73)
SAMPLE: NORMAL
SARS-COV-2 RDRP RESP QL NAA+PROBE: NEGATIVE
SODIUM SERPL-SCNC: 140 MMOL/L (ref 136–145)
SP GR UR STRIP: >=1.03
SQUAMOUS #/AREA URNS AUTO: 3 /HPF
TROPONIN HIGH SENSITIVE (SMH): 10.2 PG/ML
UROBILINOGEN UR STRIP-ACNC: ABNORMAL EU/DL
WBC # BLD AUTO: 10.1 K/UL (ref 3.9–12.7)
WBC #/AREA URNS AUTO: 1 /HPF

## 2025-04-24 PROCEDURE — 80053 COMPREHEN METABOLIC PANEL: CPT | Performed by: NURSE PRACTITIONER

## 2025-04-24 PROCEDURE — 84295 ASSAY OF SERUM SODIUM: CPT | Performed by: NURSE PRACTITIONER

## 2025-04-24 PROCEDURE — 99406 BEHAV CHNG SMOKING 3-10 MIN: CPT

## 2025-04-24 PROCEDURE — 36415 COLL VENOUS BLD VENIPUNCTURE: CPT | Performed by: NURSE PRACTITIONER

## 2025-04-24 PROCEDURE — 96375 TX/PRO/DX INJ NEW DRUG ADDON: CPT

## 2025-04-24 PROCEDURE — 87389 HIV-1 AG W/HIV-1&-2 AB AG IA: CPT | Performed by: EMERGENCY MEDICINE

## 2025-04-24 PROCEDURE — 83880 ASSAY OF NATRIURETIC PEPTIDE: CPT | Performed by: EMERGENCY MEDICINE

## 2025-04-24 PROCEDURE — 96372 THER/PROPH/DIAG INJ SC/IM: CPT | Performed by: REGISTERED NURSE

## 2025-04-24 PROCEDURE — 94799 UNLISTED PULMONARY SVC/PX: CPT

## 2025-04-24 PROCEDURE — 25000003 PHARM REV CODE 250: Performed by: EMERGENCY MEDICINE

## 2025-04-24 PROCEDURE — 93010 ELECTROCARDIOGRAM REPORT: CPT | Mod: ,,, | Performed by: INTERNAL MEDICINE

## 2025-04-24 PROCEDURE — 96365 THER/PROPH/DIAG IV INF INIT: CPT

## 2025-04-24 PROCEDURE — 27000221 HC OXYGEN, UP TO 24 HOURS

## 2025-04-24 PROCEDURE — 87040 BLOOD CULTURE FOR BACTERIA: CPT | Performed by: NURSE PRACTITIONER

## 2025-04-24 PROCEDURE — 63600175 PHARM REV CODE 636 W HCPCS: Performed by: REGISTERED NURSE

## 2025-04-24 PROCEDURE — 86803 HEPATITIS C AB TEST: CPT | Performed by: EMERGENCY MEDICINE

## 2025-04-24 PROCEDURE — 83690 ASSAY OF LIPASE: CPT | Performed by: REGISTERED NURSE

## 2025-04-24 PROCEDURE — 99285 EMERGENCY DEPT VISIT HI MDM: CPT | Mod: 25

## 2025-04-24 PROCEDURE — 83605 ASSAY OF LACTIC ACID: CPT | Performed by: NURSE PRACTITIONER

## 2025-04-24 PROCEDURE — 96366 THER/PROPH/DIAG IV INF ADDON: CPT

## 2025-04-24 PROCEDURE — 93005 ELECTROCARDIOGRAM TRACING: CPT | Performed by: INTERNAL MEDICINE

## 2025-04-24 PROCEDURE — 94761 N-INVAS EAR/PLS OXIMETRY MLT: CPT

## 2025-04-24 PROCEDURE — 85025 COMPLETE CBC W/AUTO DIFF WBC: CPT | Performed by: NURSE PRACTITIONER

## 2025-04-24 PROCEDURE — 81003 URINALYSIS AUTO W/O SCOPE: CPT | Performed by: EMERGENCY MEDICINE

## 2025-04-24 PROCEDURE — 84145 PROCALCITONIN (PCT): CPT | Performed by: REGISTERED NURSE

## 2025-04-24 PROCEDURE — 96367 TX/PROPH/DG ADDL SEQ IV INF: CPT

## 2025-04-24 PROCEDURE — 84484 ASSAY OF TROPONIN QUANT: CPT | Performed by: EMERGENCY MEDICINE

## 2025-04-24 PROCEDURE — 83735 ASSAY OF MAGNESIUM: CPT | Performed by: EMERGENCY MEDICINE

## 2025-04-24 PROCEDURE — 63600175 PHARM REV CODE 636 W HCPCS: Performed by: EMERGENCY MEDICINE

## 2025-04-24 PROCEDURE — 25000003 PHARM REV CODE 250: Performed by: REGISTERED NURSE

## 2025-04-24 PROCEDURE — 21400001 HC TELEMETRY ROOM

## 2025-04-24 PROCEDURE — 12000002 HC ACUTE/MED SURGE SEMI-PRIVATE ROOM

## 2025-04-24 PROCEDURE — 99900035 HC TECH TIME PER 15 MIN (STAT)

## 2025-04-24 PROCEDURE — 87502 INFLUENZA DNA AMP PROBE: CPT | Performed by: NURSE PRACTITIONER

## 2025-04-24 PROCEDURE — U0002 COVID-19 LAB TEST NON-CDC: HCPCS | Performed by: EMERGENCY MEDICINE

## 2025-04-24 RX ORDER — ACETAMINOPHEN 325 MG/1
650 TABLET ORAL EVERY 8 HOURS PRN
Status: DISCONTINUED | OUTPATIENT
Start: 2025-04-24 | End: 2025-04-26 | Stop reason: HOSPADM

## 2025-04-24 RX ORDER — SODIUM CHLORIDE 0.9 % (FLUSH) 0.9 %
10 SYRINGE (ML) INJECTION EVERY 12 HOURS PRN
Status: DISCONTINUED | OUTPATIENT
Start: 2025-04-24 | End: 2025-04-26 | Stop reason: HOSPADM

## 2025-04-24 RX ORDER — ASPIRIN 81 MG/1
81 TABLET ORAL DAILY
Status: DISCONTINUED | OUTPATIENT
Start: 2025-04-25 | End: 2025-04-26 | Stop reason: HOSPADM

## 2025-04-24 RX ORDER — IBUPROFEN 200 MG
24 TABLET ORAL
Status: DISCONTINUED | OUTPATIENT
Start: 2025-04-24 | End: 2025-04-26 | Stop reason: HOSPADM

## 2025-04-24 RX ORDER — METOPROLOL SUCCINATE 25 MG/1
25 TABLET, EXTENDED RELEASE ORAL DAILY
Status: DISCONTINUED | OUTPATIENT
Start: 2025-04-25 | End: 2025-04-26 | Stop reason: HOSPADM

## 2025-04-24 RX ORDER — INSULIN ASPART 100 [IU]/ML
0-5 INJECTION, SOLUTION INTRAVENOUS; SUBCUTANEOUS
Status: DISCONTINUED | OUTPATIENT
Start: 2025-04-24 | End: 2025-04-26 | Stop reason: HOSPADM

## 2025-04-24 RX ORDER — ONDANSETRON HYDROCHLORIDE 2 MG/ML
4 INJECTION, SOLUTION INTRAVENOUS EVERY 8 HOURS PRN
Status: DISCONTINUED | OUTPATIENT
Start: 2025-04-24 | End: 2025-04-26 | Stop reason: HOSPADM

## 2025-04-24 RX ORDER — AMLODIPINE BESYLATE 2.5 MG/1
2.5 TABLET ORAL DAILY
Status: DISCONTINUED | OUTPATIENT
Start: 2025-04-25 | End: 2025-04-26 | Stop reason: HOSPADM

## 2025-04-24 RX ORDER — NALOXONE HCL 0.4 MG/ML
0.02 VIAL (ML) INJECTION
Status: DISCONTINUED | OUTPATIENT
Start: 2025-04-24 | End: 2025-04-26 | Stop reason: HOSPADM

## 2025-04-24 RX ORDER — ENOXAPARIN SODIUM 100 MG/ML
40 INJECTION SUBCUTANEOUS EVERY 24 HOURS
Status: DISCONTINUED | OUTPATIENT
Start: 2025-04-24 | End: 2025-04-26 | Stop reason: HOSPADM

## 2025-04-24 RX ORDER — PANTOPRAZOLE SODIUM 40 MG/1
40 TABLET, DELAYED RELEASE ORAL
Status: DISCONTINUED | OUTPATIENT
Start: 2025-04-25 | End: 2025-04-26 | Stop reason: HOSPADM

## 2025-04-24 RX ORDER — ATORVASTATIN CALCIUM 40 MG/1
80 TABLET, FILM COATED ORAL DAILY
Status: DISCONTINUED | OUTPATIENT
Start: 2025-04-25 | End: 2025-04-26 | Stop reason: HOSPADM

## 2025-04-24 RX ORDER — ONDANSETRON HYDROCHLORIDE 2 MG/ML
4 INJECTION, SOLUTION INTRAVENOUS
Status: COMPLETED | OUTPATIENT
Start: 2025-04-24 | End: 2025-04-24

## 2025-04-24 RX ORDER — IBUPROFEN 200 MG
16 TABLET ORAL
Status: DISCONTINUED | OUTPATIENT
Start: 2025-04-24 | End: 2025-04-26 | Stop reason: HOSPADM

## 2025-04-24 RX ORDER — CEFTRIAXONE 2 G/1
2 INJECTION, POWDER, FOR SOLUTION INTRAMUSCULAR; INTRAVENOUS
Status: DISCONTINUED | OUTPATIENT
Start: 2025-04-24 | End: 2025-04-26 | Stop reason: HOSPADM

## 2025-04-24 RX ORDER — ACETAMINOPHEN 500 MG
1000 TABLET ORAL
Status: COMPLETED | OUTPATIENT
Start: 2025-04-24 | End: 2025-04-24

## 2025-04-24 RX ORDER — LOPERAMIDE HYDROCHLORIDE 2 MG/1
4 CAPSULE ORAL ONCE
Status: COMPLETED | OUTPATIENT
Start: 2025-04-24 | End: 2025-04-24

## 2025-04-24 RX ORDER — IPRATROPIUM BROMIDE AND ALBUTEROL SULFATE 2.5; .5 MG/3ML; MG/3ML
3 SOLUTION RESPIRATORY (INHALATION) EVERY 6 HOURS PRN
Status: DISCONTINUED | OUTPATIENT
Start: 2025-04-24 | End: 2025-04-26 | Stop reason: HOSPADM

## 2025-04-24 RX ORDER — POTASSIUM CHLORIDE 20 MEQ/1
40 TABLET, EXTENDED RELEASE ORAL
Status: COMPLETED | OUTPATIENT
Start: 2025-04-24 | End: 2025-04-24

## 2025-04-24 RX ORDER — DULOXETIN HYDROCHLORIDE 30 MG/1
60 CAPSULE, DELAYED RELEASE ORAL DAILY
Status: DISCONTINUED | OUTPATIENT
Start: 2025-04-25 | End: 2025-04-26 | Stop reason: HOSPADM

## 2025-04-24 RX ORDER — MAGNESIUM SULFATE HEPTAHYDRATE 40 MG/ML
2 INJECTION, SOLUTION INTRAVENOUS ONCE
Status: COMPLETED | OUTPATIENT
Start: 2025-04-24 | End: 2025-04-24

## 2025-04-24 RX ORDER — GLUCAGON 1 MG
1 KIT INJECTION
Status: DISCONTINUED | OUTPATIENT
Start: 2025-04-24 | End: 2025-04-26 | Stop reason: HOSPADM

## 2025-04-24 RX ADMIN — POTASSIUM CHLORIDE 40 MEQ: 1500 TABLET, EXTENDED RELEASE ORAL at 11:04

## 2025-04-24 RX ADMIN — ONDANSETRON 4 MG: 2 INJECTION INTRAMUSCULAR; INTRAVENOUS at 03:04

## 2025-04-24 RX ADMIN — ENOXAPARIN SODIUM 40 MG: 40 INJECTION SUBCUTANEOUS at 05:04

## 2025-04-24 RX ADMIN — MAGNESIUM SULFATE HEPTAHYDRATE 2 G: 40 INJECTION, SOLUTION INTRAVENOUS at 11:04

## 2025-04-24 RX ADMIN — CEFTRIAXONE 2 G: 2 INJECTION, POWDER, FOR SOLUTION INTRAMUSCULAR; INTRAVENOUS at 07:04

## 2025-04-24 RX ADMIN — LOPERAMIDE HYDROCHLORIDE 4 MG: 2 CAPSULE ORAL at 05:04

## 2025-04-24 RX ADMIN — AZITHROMYCIN MONOHYDRATE 500 MG: 500 INJECTION, POWDER, LYOPHILIZED, FOR SOLUTION INTRAVENOUS at 08:04

## 2025-04-24 RX ADMIN — ACETAMINOPHEN 1000 MG: 500 TABLET ORAL at 12:04

## 2025-04-24 NOTE — HPI
Patient is a 69-year-old female with a history of CAD, DM 2, chronic back pain who presented to ED with nausea, diarrhea, shortness a breath, fever x1 day.  Patient had a similar start to her symptoms 1 year ago for cholangitis which left her septic and intubated and hospitalized for 1 month.  Patient's daughter strongly urged mother come to hospital given similar history.  In ED patient febrile 101.  She was hypoxic with a O2 sat of 90% not on O2 which increased to 96% on 2 L nasal cannula.  Patient does not wear any O2 at home.  She is a former smoker which she quit in 1998.  In ED labwork remarkable for no leukocytosis however there is left shift.  Potassium 3.3, magnesium 1.3.  UA with rare bacteria and 1+ leuk esterase.  Negative lactic acid, BNP, troponin.  Chest x-ray benign, CT a/p without acute abnormality of the abdomen and pelvis.  Patient does have a mid abdominal wall hernia with loops of bowel however no obstruction noted.  Patient will be admitted to Hospital Medicine

## 2025-04-24 NOTE — SUBJECTIVE & OBJECTIVE
Past Medical History:   Diagnosis Date    Acute obstructive cholangitis 2023    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 2021    CAD (coronary artery disease)     Cholangitis 2023    w/septic shock, Ecoli bacteremia    Constipation 2023    Diabetes mellitus, type 2     Encephalopathy, metabolic 2023    Gallstone pancreatitis 2023    Hypertension     Iron deficiency anemia following bariatric surgery 2021    MI (myocardial infarction)     Obesity (BMI 30-39.9) 2023    Secondary thrombocytosis 2021    Shock liver 2023    Sleep apnea 2019    Sleep Right        Past Surgical History:   Procedure Laterality Date    ANGIOGRAM, CORONARY, WITH LEFT HEART CATHETERIZATION      APPENDECTOMY      BARIATRIC SURGERY  2019    Dr Nunez. severe wound inf after surgery    CARPAL TUNNEL RELEASE Bilateral 2002     SECTION      CHOLECYSTECTOMY      COLONOSCOPY      CORONARY ANGIOPLASTY WITH STENT PLACEMENT      CORONARY ARTERY BYPASS GRAFT      EPIDURAL STEROID INJECTION INTO LUMBAR SPINE      x2    ERCP N/A 2023    Procedure: ERCP (ENDOSCOPIC RETROGRADE CHOLANGIOPANCREATOGRAPHY);  Surgeon: Lavon Hernandez III, MD;  Location: [facility] ENDO;  Service: Endoscopy;  Laterality: N/A;    ERCP W/ SPHICTEROTOMY  2023    ESOPHAGOGASTRODUODENOSCOPY      HERNIA REPAIR  2019    HYSTERECTOMY      THYROID LOBECTOMY Right 2021    Procedure: LOBECTOMY, THYROID;  Surgeon: Mari Chance MD;  Location: [facility] OR;  Service: General;  Laterality: Right;       Review of patient's allergies indicates:   Allergen Reactions    Adhesive tape-silicones Rash    Dye Hives    Cephalexin     Iodine     Penicillins Edema    Pneumococcal vaccine     Iodinated contrast media Rash       No current facility-administered medications on file prior to encounter.     Current Outpatient Medications on File Prior to Encounter   Medication Sig    amLODIPine (NORVASC) 2.5 MG tablet Take 1  tablet (2.5 mg total) by mouth once daily.    aspirin (ECOTRIN) 81 MG EC tablet Take 1 tablet (81 mg total) by mouth once daily.    azelastine (ASTELIN) 137 mcg (0.1 %) nasal spray 1 spray (137 mcg total) by Nasal route 2 (two) times daily. Point up and slightly outward toward ear when spraying to avoid irritating nasal septum.    calcium carbonate-vitamin D3 600 mg-62.5 mcg (2,500 unit) Cap Take 1 capsule by mouth once daily.    DULoxetine (CYMBALTA) 60 MG capsule Take 1 capsule (60 mg total) by mouth once daily.    empaglifloz-linaglip-metformin (TRIJARDY XR) 25-5-1,000 mg TBph Take 1 tablet by mouth once daily.    fluticasone propionate (FLONASE) 50 mcg/actuation nasal spray 1 spray (50 mcg total) by Each Nostril route 2 (two) times daily. Point up and slightly outward toward ear when spraying to avoid irritating nasal septum.    guanFACINE (TENEX) 2 MG tablet Take 1 tablet (2 mg total) by mouth every evening.    insulin degludec (TRESIBA FLEXTOUCH U-200) 200 unit/mL (3 mL) insulin pen Inject 78 Units into the skin every evening.    iron-vitamin C 100-250 mg, ICAR-C, 100-250 mg Tab Take 1 tablet by mouth once daily.    levocetirizine (XYZAL) 5 MG tablet Take 1 tablet (5 mg total) by mouth every evening.    magnesium oxide (MAG-OX) 400 mg (241.3 mg magnesium) tablet Take 1 tablet (400 mg total) by mouth once daily. (Patient taking differently: Take 400 mg by mouth once daily.)    metFORMIN (GLUCOPHAGE) 1000 MG tablet Take 1 tablet (1,000 mg total) by mouth daily with breakfast.    metOLazone (ZAROXOLYN) 2.5 MG tablet Take 1 tablet (2.5 mg total) by mouth once daily.    metoprolol succinate (TOPROL-XL) 25 MG 24 hr tablet Take 1 tablet (25 mg total) by mouth once daily.    multivitamin capsule Take 1 capsule by mouth once daily.    nystatin (MYCOSTATIN) powder Apply topically 3 (three) times daily. (Patient taking differently: Apply 1 g topically 3 (three) times daily.)    pantoprazole (PROTONIX) 40 MG tablet Take 1  "tablet (40 mg total) by mouth once daily. Take 30 minutes prior to eating breakfast.    potassium chloride (K-TAB) 20 mEq Take 1 tablet (20 mEq total) by mouth Daily.    prednisoLONE acetate (PRED FORTE) 1 % DrpS Place 1 drop into both eyes as needed.     rosuvastatin (CRESTOR) 40 MG Tab Take 1 tablet (40 mg total) by mouth every evening.    blood sugar diagnostic Strp One Touch Ultra Blue Test strips, Use l strip to check glucose 4 times daily    blood-glucose meter kit Use as instructed    lancets (ONETOUCH DELICA LANCETS) 33 gauge Misc USE 1  TO CHECK GLUCOSE 4 TIMES DAILY     Family History       Problem Relation (Age of Onset)    Diabetes Mother    Heart disease Father    Stroke Father    Vision loss Mother, Father          Tobacco Use    Smoking status: Former     Current packs/day: 0.00     Average packs/day: 1 pack/day for 15.0 years (15.0 ttl pk-yrs)     Types: Cigarettes     Start date:      Quit date:      Years since quittin.3    Smokeless tobacco: Never   Substance and Sexual Activity    Alcohol use: No     Comment: "maybe once a year"    Drug use: No    Sexual activity: Not Currently     Review of Systems   Constitutional:  Positive for fever.   Respiratory:  Positive for shortness of breath.    Cardiovascular:  Negative for chest pain.   Gastrointestinal:  Positive for abdominal distention, abdominal pain (mild TTP), diarrhea and nausea. Negative for vomiting.     Objective:     Vital Signs (Most Recent):  Temp: (!) 100.5 °F (38.1 °C) (25 1222)  Pulse: 83 (25 1600)  Resp: 20 (25 1142)  BP: (!) 118/58 (25 1600)  SpO2: 95 % (25 1600) Vital Signs (24h Range):  Temp:  [100.5 °F (38.1 °C)-100.6 °F (38.1 °C)] 100.5 °F (38.1 °C)  Pulse:  [78-88] 83  Resp:  [18-20] 20  SpO2:  [91 %-95 %] 95 %  BP: (111-123)/(58-72) 118/58     Weight: 103.4 kg (228 lb)  Body mass index is 40.39 kg/m².     Physical Exam  Vitals reviewed.   Constitutional:       Appearance: She is " obese. She is ill-appearing.      Interventions: Nasal cannula in place.   HENT:      Mouth/Throat:      Mouth: Mucous membranes are dry.      Pharynx: Oropharynx is clear.   Eyes:      Pupils: Pupils are equal, round, and reactive to light.   Cardiovascular:      Rate and Rhythm: Normal rate and regular rhythm.      Pulses: Normal pulses.      Heart sounds: Normal heart sounds.   Pulmonary:      Breath sounds: Examination of the right-lower field reveals decreased breath sounds and wheezing. Examination of the left-lower field reveals decreased breath sounds and wheezing. Decreased breath sounds and wheezing present.   Abdominal:      General: Bowel sounds are normal.      Palpations: Abdomen is soft.      Tenderness: There is abdominal tenderness.      Hernia: A hernia is present.   Musculoskeletal:         General: Normal range of motion.      Cervical back: Normal range of motion.      Right lower leg: Edema present.      Left lower leg: Edema present.   Skin:     General: Skin is warm and dry.      Capillary Refill: Capillary refill takes less than 2 seconds.   Neurological:      General: No focal deficit present.      Mental Status: She is alert and oriented to person, place, and time.   Psychiatric:         Mood and Affect: Mood normal.         Behavior: Behavior normal.              CRANIAL NERVES     CN III, IV, VI   Pupils are equal, round, and reactive to light.       Significant Labs: All pertinent labs within the past 24 hours have been reviewed.  Recent Lab Results  (Last 5 results in the past 24 hours)        04/24/25  1326   04/24/25  1024   04/24/25  0936   04/24/25  0934   04/24/25  0930        Influenza A, Molecular     Negative           Influenza B, Molecular     Negative           Albumin     3.9           ALP     70           ALT     15           Anion Gap     8           Appearance, UA   Clear             AST     20           Bacteria, UA   Rare             Baso #     0.03           Basophil  %     0.3           BILIRUBIN TOTAL     0.5  Comment: For infants and newborns, interpretation of results should be based   on gestational age, weight and in agreement with clinical   observations.    Premature Infant recommended reference ranges:   0-24 hours:  <8.0 mg/dL   24-48 hours: <12.0 mg/dL   3-5 days:    <15.0 mg/dL   6-29 days:   <15.0 mg/dL           Bilirubin, UA   1+  Comment: Positive urine bilirubin is not confirmed. Correlate with serum bilirubin and clinical presentation.             BLOOD CULTURE     No Growth After 6 Hours  [P]     No Growth After 6 Hours  [P]       BNP     32  Comment: Values of less than 100 pg/ml are consistent with non-CHF populations.           BUN     20           Calcium     9.3           Chloride     99           CO2     33           Color, UA   Yellow             SARS COV-2 MOLECULAR     Negative  Comment: This test utilizes isothermal nucleic acid amplification technology to detect the SARS-CoV-2 RdRp nucleic acid segment. The analytical sensitivity (limit of detection) is 500 copies/swab.     A POSITIVE result is indicative of the presence of SARS-CoV-2 RNA; clinical correlation with patient history and other diagnostic information is necessary to determine patient infection status.     A NEGATIVE result means that SARS-CoV-2 nucleic acids are not present above the limit of detection. A NEGATIVE result should be treated as presumptive. It does not rule out the possibility of COVID-19 and should not be the sole basis for treatment decisions. If COVID-19 is strongly suspected based on clinical and exposure history, re-testing using an alternate molecular assay should be considered.     This test is FDA approved.     Performance characteristics of this test has been independently verified by [facility] in conjunction with [facility] Department of Pathology and Laboratory Medicine.              Creatinine     1.0           eGFR     >60            Eos #     0.02           Eos %     0.2           Glucose     96           Glucose, UA   4+             Hematocrit     43.1           Hemoglobin     13.7           Hepatitis C Ab     Non-Reactive           HIV 1/2 Ag/Ab     Non-Reactive           Hyaline Casts, UA   1             Immature Grans (Abs)     0.03  Comment: Mild elevation in immature granulocytes is non specific and can be seen in a variety of conditions including stress response, acute inflammation, trauma and pregnancy. Correlation with other laboratory and clinical findings is essential.           Immature Granulocytes     0.3           Ketones, UA   Trace             Lactic Acid Level 0.9               Leukocyte Esterase, UA   1+             Lymph #     0.51           LYMPH %     5.0           Magnesium      1.3           MCH     26.6           MCHC     31.8           MCV     84           Microscopic Comment     Comment: Other formed elements not mentioned in the report are not present in the microscopic examination.             Mono #     0.80           Mono %     7.9           MPV     10.3           Neut #     8.7           Neut %     86.3           NITRITE UA   Negative             nRBC     0           Blood, UA   Negative             pH, UA   6.0             Platelet Count     297           POC Lactate       1.71         Potassium     3.3           PROTEIN TOTAL     6.9           Protein, UA   Trace  Comment: Recommend a 24 hr urine protein/creatinine ratio if globulin induced proteinuria is clinically suspected.             RBC     5.15           RBC, UA   1             RDW     15.3           Sample       VENOUS         Sodium     140           Spec Grav UA   >=1.030             Squam Epithel, UA   3             Troponin I High Sensitivity     10.2  Comment: Troponin results differ between methods. Do not use   results between Troponin methods interchangeably.    Alkaline Phospatase levels above 400 U/L may   cause false positive  results.    Access hsTnI should not be used for patients taking   Asfotase anya (Strensiq).           Urobilinogen, UA   2.0-3.0             WBC, UA   1             WBC     10.10                                   [P] - Preliminary Result               Significant Imaging: I have reviewed all pertinent imaging results/findings within the past 24 hours.  I have reviewed and interpreted all pertinent imaging results/findings within the past 24 hours.

## 2025-04-24 NOTE — Clinical Note
Diagnosis: Screening for cardiovascular condition [614022]   Future Attending Provider: JACQUIE RUBIN [[id number]]   Place in Observation: [facility] [[id number]]

## 2025-04-24 NOTE — ED PROVIDER NOTES
Encounter Date: 2025       History     Chief Complaint   Patient presents with    Shortness of Breath    Fever     Pt started having sob last night and she is having back pain.      Patient presents emergency department with reported onset of fever decreased activity nausea diarrhea no vomiting no urinary symptoms she has been having flank pain he has a history of previous sepsis symptoms feel similar to prior episodes of sepsis she denies any dysuria urgency or frequency she denies any hematuria or blood in his stool he does have a chronic cough has been since her hernia repair cough is productive of a clearish occasionally yellowish sputum been no recent changes in her medications in his sick contacts at home        Review of patient's allergies indicates:   Allergen Reactions    Adhesive tape-silicones Rash    Dye Hives    Cephalexin     Iodine     Penicillins Edema    Pneumococcal vaccine     Iodinated contrast media Rash     Past Medical History:   Diagnosis Date    Acute obstructive cholangitis 2023    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 2021    CAD (coronary artery disease)     Cholangitis 2023    w/septic shock, Ecoli bacteremia    Constipation 2023    Diabetes mellitus, type 2     Encephalopathy, metabolic 2023    Gallstone pancreatitis 2023    Hypertension     Iron deficiency anemia following bariatric surgery 2021    MI (myocardial infarction)     Obesity (BMI 30-39.9) 2023    Secondary thrombocytosis 2021    Shock liver 2023    Sleep apnea 2019    Sleep Right      Past Surgical History:   Procedure Laterality Date    ANGIOGRAM, CORONARY, WITH LEFT HEART CATHETERIZATION      APPENDECTOMY      BARIATRIC SURGERY  2019    Dr Nunez. severe wound inf after surgery    CARPAL TUNNEL RELEASE Bilateral 2002     SECTION      CHOLECYSTECTOMY      COLONOSCOPY      CORONARY ANGIOPLASTY WITH STENT PLACEMENT      CORONARY ARTERY BYPASS  GRAFT      EPIDURAL STEROID INJECTION INTO LUMBAR SPINE      x2    ERCP N/A 07/19/2023    Procedure: ERCP (ENDOSCOPIC RETROGRADE CHOLANGIOPANCREATOGRAPHY);  Surgeon: Lavon Hernandez III, MD;  Location: [facility] ENDO;  Service: Endoscopy;  Laterality: N/A;    ERCP W/ SPHICTEROTOMY  07/2023    ESOPHAGOGASTRODUODENOSCOPY      HERNIA REPAIR  12/04/2019    HYSTERECTOMY      THYROID LOBECTOMY Right 07/20/2021    Procedure: LOBECTOMY, THYROID;  Surgeon: Mari Chance MD;  Location: [facility] OR;  Service: General;  Laterality: Right;     Family History   Problem Relation Name Age of Onset    Diabetes Mother      Vision loss Mother      Heart disease Father      Vision loss Father      Stroke Father       Social History[1]  Review of Systems   Constitutional:  Positive for activity change, fatigue and fever.   HENT:  Negative for congestion and sore throat.    Respiratory:  Positive for shortness of breath.    Cardiovascular:  Negative for chest pain and leg swelling.   Gastrointestinal:  Positive for abdominal pain, diarrhea and nausea. Negative for vomiting.   Genitourinary:  Negative for dysuria and hematuria.       Physical Exam     Initial Vitals   BP Pulse Resp Temp SpO2   04/24/25 0852 04/24/25 0852 04/24/25 0854 04/24/25 0852 04/24/25 0852   123/72 88 18 (!) 100.6 °F (38.1 °C) (!) 93 %      MAP       --                Physical Exam    Constitutional: She appears well-developed and well-nourished. No distress.   HENT:   Head: Normocephalic and atraumatic.   Right Ear: External ear normal.   Left Ear: External ear normal. Mouth/Throat: Oropharynx is clear and moist.   Eyes: Conjunctivae and EOM are normal. Pupils are equal, round, and reactive to light.   Neck: Neck supple.   Normal range of motion.  Cardiovascular:  Normal rate, regular rhythm, normal heart sounds and intact distal pulses.           Pulmonary/Chest: Breath sounds normal. No respiratory distress.   Abdominal: Abdomen is soft. Bowel sounds are normal. There  is abdominal tenderness.   Genitourinary:    Genitourinary Comments: Mild CVA tenderness     Musculoskeletal:         General: No edema. Normal range of motion.      Cervical back: Normal range of motion and neck supple.     Neurological: She is alert and oriented to person, place, and time. She has normal strength. GCS score is 15. GCS eye subscore is 4. GCS verbal subscore is 5. GCS motor subscore is 6.   Skin: Skin is warm and dry. Capillary refill takes less than 2 seconds. No rash noted.   Psychiatric: She has a normal mood and affect. Her behavior is normal.         ED Course   Procedures  Labs Reviewed   COMPREHENSIVE METABOLIC PANEL - Abnormal       Result Value    Sodium 140      Potassium 3.3 (*)     Chloride 99      CO2 33 (*)     Glucose 96      BUN 20      Creatinine 1.0      Calcium 9.3      Protein Total 6.9      Albumin 3.9      Bilirubin Total 0.5      ALP 70      AST 20      ALT 15      Anion Gap 8      eGFR >60     CBC WITH DIFFERENTIAL - Abnormal    WBC 10.10      RBC 5.15      Hgb 13.7      Hct 43.1      MCV 84      MCH 26.6 (*)     MCHC 31.8 (*)     RDW 15.3 (*)     Platelet Count 297      MPV 10.3      Nucleated RBC 0      Neut % 86.3 (*)     Lymph % 5.0 (*)     Mono % 7.9      Eos % 0.2      Basophil % 0.3      Imm Grans % 0.3      Neut # 8.7 (*)     Lymph # 0.51 (*)     Mono # 0.80      Eos # 0.02      Baso # 0.03      Imm Grans # 0.03     MAGNESIUM - Abnormal    Magnesium 1.3 (*)    URINALYSIS, REFLEX TO URINE CULTURE - Abnormal    Color, UA Yellow      Appearance, UA Clear      Spec Grav UA >=1.030 (*)     pH, UA 6.0      Protein, UA Trace (*)     Glucose, UA 4+ (*)     Ketones, UA Trace (*)     Blood, UA Negative      Bilirubin, UA 1+ (*)     Urobilinogen, UA 2.0-3.0 (*)     Nitrites, UA Negative      Leukocyte Esterase, UA 1+ (*)    INFLUENZA A & B BY MOLECULAR - Normal    INFLUENZA A MOLECULAR Negative      INFLUENZA B MOLECULAR  Negative     HEPATITIS C ANTIBODY - Normal    Hep C Ab  Interp Non-Reactive     HIV 1 / 2 ANTIBODY - Normal    HIV 1/2 Ag/Ab Non-Reactive     TROPONIN I HIGH SENSITIVITY - Normal    Troponin High Sensitive 10.2     B-TYPE NATRIURETIC PEPTIDE - Normal    BNP 32     SARS-COV-2 RNA AMPLIFICATION, QUAL - Normal    SARS COV-2 Molecular Negative     LACTIC ACID, PLASMA - Normal    Lactic Acid Level 0.9      Narrative:     Falsely low lactic acid results can be found in samples containing >=13.0 mg/dL total bilirubin and/or >=3.5 mg/dL direct bilirubin.    CULTURE, BLOOD   CULTURE, BLOOD   CBC W/ AUTO DIFFERENTIAL    Narrative:     The following orders were created for panel order CBC auto differential.  Procedure                               Abnormality         Status                     ---------                               -----------         ------                     CBC with Differential[[id number]]       Abnormal            Final result                 Please view results for these tests on the individual orders.   URINALYSIS MICROSCOPIC    RBC, UA 1      WBC, UA 1      Bacteria, UA Rare      Squamous Epithelial Cells, UA 3      Hyaline Casts, UA 1      Microscopic Comment       EXTRA TUBES    Narrative:     The following orders were created for panel order EXTRA TUBES.  Procedure                               Abnormality         Status                     ---------                               -----------         ------                     Light Blue Top Hold[[id number]]                             In process                   Please view results for these tests on the individual orders.   LIGHT BLUE TOP HOLD   POCT INFLUENZA A/B MOLECULAR   ISTAT LACTATE    POC Lactate 1.71      Sample VENOUS     POCT LACTATE   POCT GLUCOSE MONITORING CONTINUOUS        ECG Results              EKG 12-lead (In process)        Collection Time Result Time QRS Duration OHS QTC Calculation    04/24/25 08:45:03 04/24/25 09:55:58 140 467                     In process by Interface, Lab In  [facility] (04/24/25 09:56:05)                   Narrative:    Test Reason : Z13.6,    Vent. Rate :  94 BPM     Atrial Rate :  94 BPM     P-R Int : 140 ms          QRS Dur : 140 ms      QT Int : 374 ms       P-R-T Axes :  62 -34  28 degrees    QTcB Int : 467 ms    Normal sinus rhythm  Left axis deviation  Right bundle branch block  Minimal voltage criteria for LVH, may be normal variant ( R in aVL )  Abnormal ECG  No previous ECGs available    Referred By: AAAREFERRAL SELF           Confirmed By:                                   Imaging Results              CT Abdomen Pelvis  Without Contrast (Final result)  Result time 04/24/25 10:35:20      Final result by Jane Callahan MD (04/24/25 10:35:20)                   Impression:      No acute abnormality in the abdomen or pelvis    Prior cholecystectomy hysterectomy and gastric sleeve procedure    Stable lower midline abdominal wall hernia containing loops of small bowel and colon without obstruction    Colonic diverticulosis    Stable 17 mm hemorrhagic left renal cyst    Diffuse vascular calcification of the aorta and coronary arteries      Electronically signed by: Jane Callahan  Date:    04/24/2025  Time:    10:35               Narrative:      CMS MANDATED QUALITY DATA - CT RADIATION - 436    All CT scans at this facility utilize dose modulation, iterative reconstruction, and/or weight based dosing when appropriate to reduce radiation dose to as low as reasonably achievable.    CLINICAL HISTORY:  ([id number]) 70 y/o  (1955) F    Abdominal abscess/infection suspected;    TECHNIQUE:  (A#[id number], exam time 4/24/2025 10:19)    CT ABDOMEN PELVIS WITHOUT CONTRAST [id number]    Axial CT images of the abdomen and pelvis were obtained from the dome of the diaphragm to the proximal thighs.    COMPARISON:  07/18/2023    FINDINGS:  Lower Thorax:    There is multi-vessel coronary artery calcification.  There is no pericardial effusion.  The lung bases are clear.   There is a moderate size hiatal hernia.    CT Abdomen:    Liver: The liver is normal in size and imaging appearance.    Gallbladder: Prior cholecystectomy    Biliary Tree: No intra or extrahepatic ductal dilation.    Spleen: Normal.    Pancreas: The pancreas is normal.    Adrenal Glands: Normal    Kidneys: The kidneys are normal in imaging appearance without hydronephrosis or hydroureter.  Stable 17 mm ovoid hyperattenuating mass arising from the posterior mid left kidney compatible with hemorrhagic cyst.  There are no ureteral stones.    Vasculature: There are scattered atheromatous mural plaques in the aorta and its major branch vessels with no aneurysm seen.    Lymph nodes: No abdominal lymphadenopathy is seen.    Intraperitoneal structures: There is no ascites.    Bowel: Prior gastric sleeve procedure.  There are no thick-walled or dilated bowel loops.  Colonic diverticulosis without diverticulitis.    Abdominal wall: Stable lower midline abdominal wall hernia containing loops of small bowel and colon without obstruction.    Musculoskeletal: There is degenerative disc disease and facet arthropathy in the lumbar spine with no aggressive appearing lytic or blastic lesions    CT Pelvis:    Bladder: Normal.    Reproductive Organs: The uterus is not visualized/surgically absent.    Pelvic Lymph nodes: No pelvic lymphadenopathy or pelvic mass is identified.                                       X-Ray Chest AP Portable (Final result)  Result time 04/24/25 10:36:33      Final result by Jane Callahan MD (04/24/25 10:36:33)                   Impression:      No acute pulmonary process      Electronically signed by: Jane Callahan  Date:    04/24/2025  Time:    10:36               Narrative:    EXAMINATION:  XR CHEST AP PORTABLE    CLINICAL HISTORY:  Sepsis;    FINDINGS:  Portable chest at 10:06 is compared to 07/28/2023 shows normal cardiomediastinal silhouette. There are surgical clips overlying the left  "hemithorax.    Lungs are clear. Pulmonary vasculature is normal. There is a reverse right shoulder arthroplasty.  There are degenerative changes of the spine.                                       Medications   ondansetron injection 4 mg (has no administration in time range)   magnesium sulfate 2g in water 50mL IVPB (premix) (0 g Intravenous Stopped 25 1326)   potassium chloride SA CR tablet 40 mEq (40 mEq Oral Given 25 1139)   acetaminophen tablet 1,000 mg (1,000 mg Oral Given 25 1222)     Medical Decision Making  Laboratory evaluation reviewed patient does have few bacteria in her urine normal lactate normal white cell count magnesium was low at 1.3 potassium was low at 3.3 she was given magnesium and potassium in the emergency department chest x-ray shows no evidence of infiltrates CT scan showed incidental findings including the hernia diverticulosis but no evidence of diverticulitis no evidence of colitis I have discussed patient's findings with Hospital Medicine the will evaluate patient in the ER for admission    Amount and/or Complexity of Data Reviewed  Labs: ordered. Decision-making details documented in ED Course.  Radiology: ordered. Decision-making details documented in ED Course.    Risk  OTC drugs.  Prescription drug management.  Decision regarding hospitalization.                                      Clinical Impression:  Final diagnoses:  [Z13.6] Screening for cardiovascular condition  [R50.9] Fever, unspecified fever cause (Primary)          ED Disposition Condition    Admit                     [1]   Social History  Tobacco Use    Smoking status: Former     Current packs/day: 0.00     Average packs/day: 1 pack/day for 15.0 years (15.0 ttl pk-yrs)     Types: Cigarettes     Start date:      Quit date:      Years since quittin.3    Smokeless tobacco: Never   Substance Use Topics    Alcohol use: No     Comment: "maybe once a year"    Drug use: No        Ralph Cummings " MD  04/24/25 [id number]

## 2025-04-24 NOTE — FIRST PROVIDER EVALUATION
Emergency Department TeleTriage Encounter Note      CHIEF COMPLAINT    Chief Complaint   Patient presents with    Shortness of Breath    Fever     Pt started having sob last night and she is having back pain.        VITAL SIGNS   Initial Vitals   BP Pulse Resp Temp SpO2   04/24/25 0852 04/24/25 0852 04/24/25 0854 04/24/25 0852 04/24/25 0852   123/72 88 18 (!) 100.6 °F (38.1 °C) (!) 93 %      MAP       --                   ALLERGIES    Review of patient's allergies indicates:   Allergen Reactions    Adhesive tape-silicones Rash    Dye Hives    Cephalexin     Iodine     Penicillins Edema    Pneumococcal vaccine     Iodinated contrast media Rash       PROVIDER TRIAGE NOTE  Verbal consent for the teletriage evaluation was given by the patient at the start of the evaluation.  All efforts will be made to maintain patient's privacy during the evaluation.      This is a teletriage evaluation of a 69 y.o. female presenting to the ED with c/o body aches, nausea, fever, chills, SOB with exertion, dizziness that started yesterday.  Tylenol taken at 3:30 am. Limited physical exam via telehealth: The patient is awake, alert, answering questions appropriately and is not in respiratory distress.  As the Teletriage provider, I performed an initial assessment and ordered appropriate labs and imaging studies, if any, to facilitate the patient's care once placed in the ED. Once a room is available, care and a full evaluation will be completed by an alternate ED provider.  Any additional orders and the final disposition will be determined by that provider.  All imaging and labs will not be followed-up by the Teletriage Team, including myself.          ORDERS  Labs Reviewed   HEPATITIS C ANTIBODY   HIV 1 / 2 ANTIBODY       ED Orders (720h ago, onward)      Start Ordered     Status Ordering Provider    04/24/25 1310 04/24/25 0911  Lactic acid, plasma #2  In 4 hours         Ordered SHANTHI PUENTES    04/24/25 0912 04/24/25 0911  COVID-19  Rapid Screening  STAT         Ordered SHANTHI PUENTES    04/24/25 0912 04/24/25 0911  Influenza A & B by Molecular  Once         Ordered SHANTHI PUENTES    04/24/25 0912 04/24/25 0911  POCT glucose  Once         Ordered SHANTHI PUENTES    04/24/25 0910 04/24/25 0911  ED Preference List Used to Initiate Sepsis Orders  Until discontinued         Ordered SHANTHI PUENTES    04/24/25 0910 04/24/25 0911  Blood culture x two cultures. Draw prior to antibiotics.  Every 15 min      Comments: Aerobic and anaerobic     Start Status Ordering Provider   04/24/25 0910 Scheduled DELORISSHANTHI SANTIZO   04/24/25 0925 Scheduled DELORIS SHANTHI CUMMINS       Ordered DELORISSHANTHI    04/24/25 0910 04/24/25 0911  CBC auto differential  STAT         Ordered SHANTHI PUENTES    04/24/25 0910 04/24/25 0911  Comprehensive metabolic panel  STAT         Ordered SHANTHI PUENTES    04/24/25 0910 04/24/25 0911  POCT Lactate #1  Once         Ordered SHANTHI PUENTES    04/24/25 0910 04/24/25 0911  Vital signs  Every 15 min        Comments: Every 15 minutes until SBP greater than 90 or MAP greater than 65, then every 30 minutes times one hour, then every one hour.    Ordered SHANTHI PUENTES    04/24/25 0910 04/24/25 0911  Bed rest  Until discontinued         Ordered DELORIS SHANTHI CUMMINS    04/24/25 0910 04/24/25 0911  Cardiac Monitoring - Adult  Continuous        Comments: Notify Physician If:    Ordered DELORISSHANTHI    04/24/25 0910 04/24/25 0911  Pulse Oximetry Continuous  Continuous         Ordered DELORIS SHANTHI CUMMINS    04/24/25 0910 04/24/25 0911  Strict intake and output  Until discontinued         Ordered DELORIS SHANTHI CUMMINS    04/24/25 0910 04/24/25 0911  Urinalysis, Reflex to Urine Culture  STAT         Ordered DELORIS, SHANTHI CUMMINS    04/24/25 0910 04/24/25 0911  Saline lock IV  Once         Ordered DELORIS SHANTHI CUMMINS    04/24/25 0910 04/24/25 0911  EKG 12-lead  Once         Ordered DELORIS SHANTHI CUMMINS    04/24/25 0910 04/24/25 0911  X-Ray Chest AP Portable  1 time imaging         Ordered SHANTHI PUENTES     04/24/25 0852 04/24/25 0852  Hepatitis C Antibody  STAT         Ordered PRAVEEN LORENZ    04/24/25 0852 04/24/25 0852  HIV 1/2 Ag/Ab (4th Gen)  STAT         Ordered PRAVEEN LORENZ    04/24/25 0843 04/24/25 0843  EKG 12-lead  Once         Completed by JUAN COLORADO on 4/24/2025 at  8:48 AM THAO MCARTHUR              Virtual Visit Note: The provider triage portion of this emergency department evaluation and documentation was performed via [employer], a HIPAA-compliant telemedicine application, in concert with a tele-presenter in the room. A face to face patient evaluation with one of my colleagues will occur once the patient is placed in an emergency department room.      DISCLAIMER: This note was prepared with [employer] voice recognition transcription software. Garbled syntax, mangled pronouns, and other bizarre constructions may be attributed to that software system.

## 2025-04-25 PROBLEM — N30.01 ACUTE CYSTITIS WITH HEMATURIA: Status: ACTIVE | Noted: 2025-04-25

## 2025-04-25 LAB
ABSOLUTE EOSINOPHIL (SMH): 0.02 K/UL
ABSOLUTE MONOCYTE (SMH): 0.66 K/UL (ref 0.3–1)
ABSOLUTE NEUTROPHIL COUNT (SMH): 6.6 K/UL (ref 1.8–7.7)
ALBUMIN SERPL-MCNC: 3.6 G/DL (ref 3.5–5.2)
ALP SERPL-CCNC: 68 UNIT/L (ref 55–135)
ALT SERPL-CCNC: 13 UNIT/L (ref 10–44)
ANION GAP (SMH): 11 MMOL/L (ref 8–16)
AST SERPL-CCNC: 19 UNIT/L (ref 10–40)
BASOPHILS # BLD AUTO: 0.04 K/UL
BASOPHILS NFR BLD AUTO: 0.5 %
BILIRUB SERPL-MCNC: 0.4 MG/DL (ref 0.1–1)
BUN SERPL-MCNC: 29 MG/DL (ref 8–23)
CALCIUM SERPL-MCNC: 8.8 MG/DL (ref 8.7–10.5)
CHLORIDE SERPL-SCNC: 101 MMOL/L (ref 95–110)
CO2 SERPL-SCNC: 26 MMOL/L (ref 23–29)
CREAT SERPL-MCNC: 1 MG/DL (ref 0.5–1.4)
ERYTHROCYTE [DISTWIDTH] IN BLOOD BY AUTOMATED COUNT: 15.7 % (ref 11.5–14.5)
GFR SERPLBLD CREATININE-BSD FMLA CKD-EPI: >60 ML/MIN/1.73/M2
GLUCOSE SERPL-MCNC: 63 MG/DL (ref 70–110)
HCT VFR BLD AUTO: 42.9 % (ref 37–48.5)
HGB BLD-MCNC: 13.3 GM/DL (ref 12–16)
IMM GRANULOCYTES # BLD AUTO: 0.02 K/UL (ref 0–0.04)
IMM GRANULOCYTES NFR BLD AUTO: 0.2 % (ref 0–0.5)
LYMPHOCYTES # BLD AUTO: 0.82 K/UL (ref 1–4.8)
MAGNESIUM SERPL-MCNC: 1.9 MG/DL (ref 1.6–2.6)
MCH RBC QN AUTO: 26.5 PG (ref 27–31)
MCHC RBC AUTO-ENTMCNC: 31 G/DL (ref 32–36)
MCV RBC AUTO: 86 FL (ref 82–98)
NUCLEATED RBC (/100WBC) (SMH): 0 /100 WBC
PLATELET # BLD AUTO: 243 K/UL (ref 150–450)
PMV BLD AUTO: 10 FL (ref 9.2–12.9)
POTASSIUM SERPL-SCNC: 2.7 MMOL/L (ref 3.5–5.1)
PROT SERPL-MCNC: 6.4 GM/DL (ref 6–8.4)
RBC # BLD AUTO: 5.01 M/UL (ref 4–5.4)
RELATIVE EOSINOPHIL (SMH): 0.2 % (ref 0–8)
RELATIVE LYMPHOCYTE (SMH): 10.1 % (ref 18–48)
RELATIVE MONOCYTE (SMH): 8.1 % (ref 4–15)
RELATIVE NEUTROPHIL (SMH): 80.9 % (ref 38–73)
SODIUM SERPL-SCNC: 138 MMOL/L (ref 136–145)
WBC # BLD AUTO: 8.11 K/UL (ref 3.9–12.7)

## 2025-04-25 PROCEDURE — 36415 COLL VENOUS BLD VENIPUNCTURE: CPT | Performed by: REGISTERED NURSE

## 2025-04-25 PROCEDURE — 63600175 PHARM REV CODE 636 W HCPCS: Performed by: STUDENT IN AN ORGANIZED HEALTH CARE EDUCATION/TRAINING PROGRAM

## 2025-04-25 PROCEDURE — 25000003 PHARM REV CODE 250: Performed by: STUDENT IN AN ORGANIZED HEALTH CARE EDUCATION/TRAINING PROGRAM

## 2025-04-25 PROCEDURE — 94618 PULMONARY STRESS TESTING: CPT

## 2025-04-25 PROCEDURE — 25000003 PHARM REV CODE 250: Performed by: REGISTERED NURSE

## 2025-04-25 PROCEDURE — 94640 AIRWAY INHALATION TREATMENT: CPT

## 2025-04-25 PROCEDURE — 27000221 HC OXYGEN, UP TO 24 HOURS

## 2025-04-25 PROCEDURE — 99900031 HC PATIENT EDUCATION (STAT)

## 2025-04-25 PROCEDURE — 25000242 PHARM REV CODE 250 ALT 637 W/ HCPCS: Performed by: REGISTERED NURSE

## 2025-04-25 PROCEDURE — 21400001 HC TELEMETRY ROOM

## 2025-04-25 PROCEDURE — 85025 COMPLETE CBC W/AUTO DIFF WBC: CPT | Performed by: REGISTERED NURSE

## 2025-04-25 PROCEDURE — 99900035 HC TECH TIME PER 15 MIN (STAT)

## 2025-04-25 PROCEDURE — 94761 N-INVAS EAR/PLS OXIMETRY MLT: CPT

## 2025-04-25 PROCEDURE — 63600175 PHARM REV CODE 636 W HCPCS: Performed by: REGISTERED NURSE

## 2025-04-25 PROCEDURE — 80053 COMPREHEN METABOLIC PANEL: CPT | Performed by: REGISTERED NURSE

## 2025-04-25 PROCEDURE — 83735 ASSAY OF MAGNESIUM: CPT | Performed by: REGISTERED NURSE

## 2025-04-25 RX ORDER — POTASSIUM CHLORIDE 20 MEQ/1
40 TABLET, EXTENDED RELEASE ORAL EVERY 4 HOURS
Status: COMPLETED | OUTPATIENT
Start: 2025-04-25 | End: 2025-04-25

## 2025-04-25 RX ORDER — METHOCARBAMOL 500 MG/1
500 TABLET, FILM COATED ORAL 4 TIMES DAILY PRN
Status: DISCONTINUED | OUTPATIENT
Start: 2025-04-25 | End: 2025-04-26 | Stop reason: HOSPADM

## 2025-04-25 RX ORDER — POTASSIUM CHLORIDE 750 MG/1
50 CAPSULE, EXTENDED RELEASE ORAL ONCE
Status: COMPLETED | OUTPATIENT
Start: 2025-04-25 | End: 2025-04-25

## 2025-04-25 RX ORDER — SODIUM CHLORIDE, SODIUM LACTATE, POTASSIUM CHLORIDE, CALCIUM CHLORIDE 600; 310; 30; 20 MG/100ML; MG/100ML; MG/100ML; MG/100ML
INJECTION, SOLUTION INTRAVENOUS CONTINUOUS
Status: ACTIVE | OUTPATIENT
Start: 2025-04-25 | End: 2025-04-26

## 2025-04-25 RX ORDER — POTASSIUM CHLORIDE 750 MG/1
10 CAPSULE, EXTENDED RELEASE ORAL ONCE
Status: DISCONTINUED | OUTPATIENT
Start: 2025-04-25 | End: 2025-04-25

## 2025-04-25 RX ORDER — LOPERAMIDE HYDROCHLORIDE 2 MG/1
2 CAPSULE ORAL 4 TIMES DAILY PRN
Status: DISCONTINUED | OUTPATIENT
Start: 2025-04-25 | End: 2025-04-26 | Stop reason: HOSPADM

## 2025-04-25 RX ORDER — POTASSIUM CHLORIDE 7.45 MG/ML
10 INJECTION INTRAVENOUS
Status: DISCONTINUED | OUTPATIENT
Start: 2025-04-25 | End: 2025-04-25

## 2025-04-25 RX ADMIN — DULOXETINE HYDROCHLORIDE 60 MG: 30 CAPSULE, DELAYED RELEASE ORAL at 09:04

## 2025-04-25 RX ADMIN — PANTOPRAZOLE SODIUM 40 MG: 40 TABLET, DELAYED RELEASE ORAL at 05:04

## 2025-04-25 RX ADMIN — ENOXAPARIN SODIUM 40 MG: 40 INJECTION SUBCUTANEOUS at 05:04

## 2025-04-25 RX ADMIN — AMLODIPINE BESYLATE 2.5 MG: 2.5 TABLET ORAL at 09:04

## 2025-04-25 RX ADMIN — METOPROLOL SUCCINATE 25 MG: 25 TABLET, EXTENDED RELEASE ORAL at 09:04

## 2025-04-25 RX ADMIN — METHOCARBAMOL 500 MG: 500 TABLET ORAL at 09:04

## 2025-04-25 RX ADMIN — POTASSIUM CHLORIDE 40 MEQ: 1500 TABLET, EXTENDED RELEASE ORAL at 05:04

## 2025-04-25 RX ADMIN — LOPERAMIDE HYDROCHLORIDE 2 MG: 2 CAPSULE ORAL at 09:04

## 2025-04-25 RX ADMIN — CEFTRIAXONE 2 G: 2 INJECTION, POWDER, FOR SOLUTION INTRAMUSCULAR; INTRAVENOUS at 09:04

## 2025-04-25 RX ADMIN — POTASSIUM CHLORIDE 50 MEQ: 750 CAPSULE, EXTENDED RELEASE ORAL at 06:04

## 2025-04-25 RX ADMIN — SODIUM CHLORIDE, POTASSIUM CHLORIDE, SODIUM LACTATE AND CALCIUM CHLORIDE: 600; 310; 30; 20 INJECTION, SOLUTION INTRAVENOUS at 01:04

## 2025-04-25 RX ADMIN — ASPIRIN 81 MG: 81 TABLET, COATED ORAL at 09:04

## 2025-04-25 RX ADMIN — AZITHROMYCIN MONOHYDRATE 500 MG: 500 INJECTION, POWDER, LYOPHILIZED, FOR SOLUTION INTRAVENOUS at 09:04

## 2025-04-25 RX ADMIN — ATORVASTATIN CALCIUM 80 MG: 40 TABLET, FILM COATED ORAL at 09:04

## 2025-04-25 RX ADMIN — POTASSIUM CHLORIDE 40 MEQ: 1500 TABLET, EXTENDED RELEASE ORAL at 01:04

## 2025-04-25 RX ADMIN — LOPERAMIDE HYDROCHLORIDE 2 MG: 2 CAPSULE ORAL at 01:04

## 2025-04-25 RX ADMIN — IPRATROPIUM BROMIDE AND ALBUTEROL SULFATE 3 ML: 2.5; .5 SOLUTION RESPIRATORY (INHALATION) at 08:04

## 2025-04-25 RX ADMIN — METHOCARBAMOL 500 MG: 500 TABLET ORAL at 12:04

## 2025-04-25 RX ADMIN — POTASSIUM CHLORIDE 40 MEQ: 1500 TABLET, EXTENDED RELEASE ORAL at 09:04

## 2025-04-25 NOTE — ASSESSMENT & PLAN NOTE
Patient with a shortness  Acute hypoxia in ED  SpO2 90% on room air  Improved to 96% on 2 L nasal cannula does not wear O2 at home  Negative COVID and flu  Chest x-ray without obvious infiltrates    High normal WBC with a left shift fever and hypoxia  We will cover for CAP  Started on Rocephin and azithromycin

## 2025-04-25 NOTE — ASSESSMENT & PLAN NOTE
Patient has Abnormal Magnesium: hypomagnesemia. Will continue to monitor electrolytes closely. Will replace the affected electrolytes and repeat labs to be done after interventions completed. The patient's magnesium results have been reviewed and are listed below.  Recent Labs   Lab 04/25/25  0429   MG 1.9

## 2025-04-25 NOTE — H&P
[facility] - Emergency Dept  Hospital Medicine  History & Physical    Patient Name: Chanel Cervantes  MRN: [id number]  Patient Class: IP- Inpatient  Admission Date: 4/24/2025  Attending Physician: Enrike Huff MD   Primary Care Provider: Ailyn De Leon APRN-CNP         Patient information was obtained from patient and ER records.     Subjective:     Principal Problem:Hypoxia    Chief Complaint:   Chief Complaint   Patient presents with    Shortness of Breath    Fever     Pt started having sob last night and she is having back pain.         HPI: Patient is a 69-year-old female with a history of CAD, DM 2, chronic back pain who presented to ED with nausea, diarrhea, shortness a breath, fever x1 day.  Patient had a similar start to her symptoms 1 year ago for cholangitis which left her septic and intubated and hospitalized for 1 month.  Patient's daughter strongly urged mother come to hospital given similar history.  In ED patient febrile 101.  She was hypoxic with a O2 sat of 90% not on O2 which increased to 96% on 2 L nasal cannula.  Patient does not wear any O2 at home.  She is a former smoker which she quit in 1998.  In ED labwork remarkable for no leukocytosis however there is left shift.  Potassium 3.3, magnesium 1.3.  UA with rare bacteria and 1+ leuk esterase.  Negative lactic acid, BNP, troponin.  Chest x-ray benign, CT a/p without acute abnormality of the abdomen and pelvis.  Patient does have a mid abdominal wall hernia with loops of bowel however no obstruction noted.  Patient will be admitted to Hospital Medicine    Past Medical History:   Diagnosis Date    Acute obstructive cholangitis 07/18/2023    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 01/24/2021    CAD (coronary artery disease)     Cholangitis 07/2023    w/septic shock, Ecoli bacteremia    Constipation 07/28/2023    Diabetes mellitus, type 2     Encephalopathy, metabolic 07/19/2023    Gallstone pancreatitis 07/21/2023    Hypertension      Iron deficiency anemia following bariatric surgery 2021    MI (myocardial infarction)     Obesity (BMI 30-39.9) 2023    Secondary thrombocytosis 2021    Shock liver 2023    Sleep apnea 2019    Sleep Right        Past Surgical History:   Procedure Laterality Date    ANGIOGRAM, CORONARY, WITH LEFT HEART CATHETERIZATION      APPENDECTOMY      BARIATRIC SURGERY  2019    Dr Nunez. severe wound inf after surgery    CARPAL TUNNEL RELEASE Bilateral 2002     SECTION      CHOLECYSTECTOMY      COLONOSCOPY      CORONARY ANGIOPLASTY WITH STENT PLACEMENT      CORONARY ARTERY BYPASS GRAFT      EPIDURAL STEROID INJECTION INTO LUMBAR SPINE      x2    ERCP N/A 2023    Procedure: ERCP (ENDOSCOPIC RETROGRADE CHOLANGIOPANCREATOGRAPHY);  Surgeon: Lavon Hernandez III, MD;  Location: [facility] ENDO;  Service: Endoscopy;  Laterality: N/A;    ERCP W/ SPHICTEROTOMY  2023    ESOPHAGOGASTRODUODENOSCOPY      HERNIA REPAIR  2019    HYSTERECTOMY      THYROID LOBECTOMY Right 2021    Procedure: LOBECTOMY, THYROID;  Surgeon: Mari Chance MD;  Location: [facility] OR;  Service: General;  Laterality: Right;       Review of patient's allergies indicates:   Allergen Reactions    Adhesive tape-silicones Rash    Dye Hives    Cephalexin     Iodine     Penicillins Edema    Pneumococcal vaccine     Iodinated contrast media Rash       No current facility-administered medications on file prior to encounter.     Current Outpatient Medications on File Prior to Encounter   Medication Sig    amLODIPine (NORVASC) 2.5 MG tablet Take 1 tablet (2.5 mg total) by mouth once daily.    aspirin (ECOTRIN) 81 MG EC tablet Take 1 tablet (81 mg total) by mouth once daily.    azelastine (ASTELIN) 137 mcg (0.1 %) nasal spray 1 spray (137 mcg total) by Nasal route 2 (two) times daily. Point up and slightly outward toward ear when spraying to avoid irritating nasal septum.    calcium carbonate-vitamin D3 600 mg-62.5 mcg  (2,500 unit) Cap Take 1 capsule by mouth once daily.    DULoxetine (CYMBALTA) 60 MG capsule Take 1 capsule (60 mg total) by mouth once daily.    empaglifloz-linaglip-metformin (TRIJARDY XR) 25-5-1,000 mg TBph Take 1 tablet by mouth once daily.    fluticasone propionate (FLONASE) 50 mcg/actuation nasal spray 1 spray (50 mcg total) by Each Nostril route 2 (two) times daily. Point up and slightly outward toward ear when spraying to avoid irritating nasal septum.    guanFACINE (TENEX) 2 MG tablet Take 1 tablet (2 mg total) by mouth every evening.    insulin degludec (TRESIBA FLEXTOUCH U-200) 200 unit/mL (3 mL) insulin pen Inject 78 Units into the skin every evening.    iron-vitamin C 100-250 mg, ICAR-C, 100-250 mg Tab Take 1 tablet by mouth once daily.    levocetirizine (XYZAL) 5 MG tablet Take 1 tablet (5 mg total) by mouth every evening.    magnesium oxide (MAG-OX) 400 mg (241.3 mg magnesium) tablet Take 1 tablet (400 mg total) by mouth once daily. (Patient taking differently: Take 400 mg by mouth once daily.)    metFORMIN (GLUCOPHAGE) 1000 MG tablet Take 1 tablet (1,000 mg total) by mouth daily with breakfast.    metOLazone (ZAROXOLYN) 2.5 MG tablet Take 1 tablet (2.5 mg total) by mouth once daily.    metoprolol succinate (TOPROL-XL) 25 MG 24 hr tablet Take 1 tablet (25 mg total) by mouth once daily.    multivitamin capsule Take 1 capsule by mouth once daily.    nystatin (MYCOSTATIN) powder Apply topically 3 (three) times daily. (Patient taking differently: Apply 1 g topically 3 (three) times daily.)    pantoprazole (PROTONIX) 40 MG tablet Take 1 tablet (40 mg total) by mouth once daily. Take 30 minutes prior to eating breakfast.    potassium chloride (K-TAB) 20 mEq Take 1 tablet (20 mEq total) by mouth Daily.    prednisoLONE acetate (PRED FORTE) 1 % DrpS Place 1 drop into both eyes as needed.     rosuvastatin (CRESTOR) 40 MG Tab Take 1 tablet (40 mg total) by mouth every evening.    blood sugar diagnostic Strp One  "Touch Ultra Blue Test strips, Use l strip to check glucose 4 times daily    blood-glucose meter kit Use as instructed    lancets (ONETOUCH DELICA LANCETS) 33 gauge Misc USE 1  TO CHECK GLUCOSE 4 TIMES DAILY     Family History       Problem Relation (Age of Onset)    Diabetes Mother    Heart disease Father    Stroke Father    Vision loss Mother, Father          Tobacco Use    Smoking status: Former     Current packs/day: 0.00     Average packs/day: 1 pack/day for 15.0 years (15.0 ttl pk-yrs)     Types: Cigarettes     Start date:      Quit date:      Years since quittin.3    Smokeless tobacco: Never   Substance and Sexual Activity    Alcohol use: No     Comment: "maybe once a year"    Drug use: No    Sexual activity: Not Currently     Review of Systems   Constitutional:  Positive for fever.   Respiratory:  Positive for shortness of breath.    Cardiovascular:  Negative for chest pain.   Gastrointestinal:  Positive for abdominal distention, abdominal pain (mild TTP), diarrhea and nausea. Negative for vomiting.     Objective:     Vital Signs (Most Recent):  Temp: (!) 100.5 °F (38.1 °C) (25 1222)  Pulse: 83 (25 1600)  Resp: 20 (25 1142)  BP: (!) 118/58 (25 1600)  SpO2: 95 % (25 1600) Vital Signs (24h Range):  Temp:  [100.5 °F (38.1 °C)-100.6 °F (38.1 °C)] 100.5 °F (38.1 °C)  Pulse:  [78-88] 83  Resp:  [18-20] 20  SpO2:  [91 %-95 %] 95 %  BP: (111-123)/(58-72) 118/58     Weight: 103.4 kg (228 lb)  Body mass index is 40.39 kg/m².     Physical Exam  Vitals reviewed.   Constitutional:       Appearance: She is obese. She is ill-appearing.      Interventions: Nasal cannula in place.   HENT:      Mouth/Throat:      Mouth: Mucous membranes are dry.      Pharynx: Oropharynx is clear.   Eyes:      Pupils: Pupils are equal, round, and reactive to light.   Cardiovascular:      Rate and Rhythm: Normal rate and regular rhythm.      Pulses: Normal pulses.      Heart sounds: Normal heart sounds. "   Pulmonary:      Breath sounds: Examination of the right-lower field reveals decreased breath sounds and wheezing. Examination of the left-lower field reveals decreased breath sounds and wheezing. Decreased breath sounds and wheezing present.   Abdominal:      General: Bowel sounds are normal.      Palpations: Abdomen is soft.      Tenderness: There is abdominal tenderness.      Hernia: A hernia is present.   Musculoskeletal:         General: Normal range of motion.      Cervical back: Normal range of motion.      Right lower leg: Edema present.      Left lower leg: Edema present.   Skin:     General: Skin is warm and dry.      Capillary Refill: Capillary refill takes less than 2 seconds.   Neurological:      General: No focal deficit present.      Mental Status: She is alert and oriented to person, place, and time.   Psychiatric:         Mood and Affect: Mood normal.         Behavior: Behavior normal.              CRANIAL NERVES     CN III, IV, VI   Pupils are equal, round, and reactive to light.       Significant Labs: All pertinent labs within the past 24 hours have been reviewed.  Recent Lab Results  (Last 5 results in the past 24 hours)        04/24/25  1326   04/24/25  1024   04/24/25  0936   04/24/25  0934   04/24/25  0930        Influenza A, Molecular     Negative           Influenza B, Molecular     Negative           Albumin     3.9           ALP     70           ALT     15           Anion Gap     8           Appearance, UA   Clear             AST     20           Bacteria, UA   Rare             Baso #     0.03           Basophil %     0.3           BILIRUBIN TOTAL     0.5  Comment: For infants and newborns, interpretation of results should be based   on gestational age, weight and in agreement with clinical   observations.    Premature Infant recommended reference ranges:   0-24 hours:  <8.0 mg/dL   24-48 hours: <12.0 mg/dL   3-5 days:    <15.0 mg/dL   6-29 days:   <15.0 mg/dL           Bilirubin, UA    1+  Comment: Positive urine bilirubin is not confirmed. Correlate with serum bilirubin and clinical presentation.             BLOOD CULTURE     No Growth After 6 Hours  [P]     No Growth After 6 Hours  [P]       BNP     32  Comment: Values of less than 100 pg/ml are consistent with non-CHF populations.           BUN     20           Calcium     9.3           Chloride     99           CO2     33           Color, UA   Yellow             SARS COV-2 MOLECULAR     Negative  Comment: This test utilizes isothermal nucleic acid amplification technology to detect the SARS-CoV-2 RdRp nucleic acid segment. The analytical sensitivity (limit of detection) is 500 copies/swab.     A POSITIVE result is indicative of the presence of SARS-CoV-2 RNA; clinical correlation with patient history and other diagnostic information is necessary to determine patient infection status.     A NEGATIVE result means that SARS-CoV-2 nucleic acids are not present above the limit of detection. A NEGATIVE result should be treated as presumptive. It does not rule out the possibility of COVID-19 and should not be the sole basis for treatment decisions. If COVID-19 is strongly suspected based on clinical and exposure history, re-testing using an alternate molecular assay should be considered.     This test is FDA approved.     Performance characteristics of this test has been independently verified by [facility] in conjunction with [facility] Department of Pathology and Laboratory Medicine.              Creatinine     1.0           eGFR     >60           Eos #     0.02           Eos %     0.2           Glucose     96           Glucose, UA   4+             Hematocrit     43.1           Hemoglobin     13.7           Hepatitis C Ab     Non-Reactive           HIV 1/2 Ag/Ab     Non-Reactive           Hyaline Casts, UA   1             Immature Grans (Abs)     0.03  Comment: Mild elevation in immature granulocytes is non specific and  can be seen in a variety of conditions including stress response, acute inflammation, trauma and pregnancy. Correlation with other laboratory and clinical findings is essential.           Immature Granulocytes     0.3           Ketones, UA   Trace             Lactic Acid Level 0.9               Leukocyte Esterase, UA   1+             Lymph #     0.51           LYMPH %     5.0           Magnesium      1.3           MCH     26.6           MCHC     31.8           MCV     84           Microscopic Comment     Comment: Other formed elements not mentioned in the report are not present in the microscopic examination.             Mono #     0.80           Mono %     7.9           MPV     10.3           Neut #     8.7           Neut %     86.3           NITRITE UA   Negative             nRBC     0           Blood, UA   Negative             pH, UA   6.0             Platelet Count     297           POC Lactate       1.71         Potassium     3.3           PROTEIN TOTAL     6.9           Protein, UA   Trace  Comment: Recommend a 24 hr urine protein/creatinine ratio if globulin induced proteinuria is clinically suspected.             RBC     5.15           RBC, UA   1             RDW     15.3           Sample       VENOUS         Sodium     140           Spec Grav UA   >=1.030             Squam Epithel, UA   3             Troponin I High Sensitivity     10.2  Comment: Troponin results differ between methods. Do not use   results between Troponin methods interchangeably.    Alkaline Phospatase levels above 400 U/L may   cause false positive results.    Access hsTnI should not be used for patients taking   Asfotase anya (Strensiq).           Urobilinogen, UA   2.0-3.0             WBC, UA   1             WBC     10.10                                   [P] - Preliminary Result               Significant Imaging: I have reviewed all pertinent imaging results/findings within the past 24 hours.  I have reviewed and interpreted all  "pertinent imaging results/findings within the past 24 hours.  Assessment/Plan:     Assessment & Plan  Hypoxia  Patient with a shortness  Acute hypoxia in ED  SpO2 90% on room air  Improved to 96% on 2 L nasal cannula does not wear O2 at home  Negative COVID and flu  Chest x-ray without obvious infiltrates    High normal WBC with a left shift fever and hypoxia  We will cover for CAP  Started on Rocephin and azithromycin    Hypokalemia  Patient's most recent potassium results are listed below.   Recent Labs     04/24/25  0936   K 3.3*     Plan  - Replete potassium per protocol  - Monitor potassium Daily  - Patient's hypokalemia is stable  -   CAD (coronary artery disease)  Patient with known CAD s/p stent placement and CABG, which is controlled Will continue ASA and monitor for S/Sx of angina/ACS. Continue to monitor on telemetry.   Hypomagnesemia  Patient has Abnormal Magnesium: hypomagnesemia. Will continue to monitor electrolytes closely. Will replace the affected electrolytes and repeat labs to be done after interventions completed. The patient's magnesium results have been reviewed and are listed below.  Recent Labs   Lab 04/24/25  0936   MG 1.3*      Diarrhea  CT abdomen without acute abnormality  Imodium x1    DM (diabetes mellitus), type 2  Patient's FSGs are controlled on current medication regimen.  Last A1c reviewed-   Lab Results   Component Value Date    HGBA1C 5.3 12/04/2024     Most recent fingerstick glucose reviewed- No results for input(s): "POCTGLUCOSE" in the last 24 hours.  Current correctional scale  Low  Maintain anti-hyperglycemic dose as follows-   Antihyperglycemics (From admission, onward)      Start     Stop Route Frequency Ordered    04/24/25 1650  insulin aspart U-100 pen 0-5 Units         -- SubQ Before meals & nightly PRN 04/24/25 1555          Hold Oral hypoglycemics while patient is in the hospital.  VTE Risk Mitigation (From admission, onward)           Ordered     enoxaparin " injection 40 mg  Daily         04/24/25 1555     IP VTE HIGH RISK PATIENT  Once         04/24/25 1555     Place sequential compression device  Until discontinued         04/24/25 1555                                    Troy Anderson NP  Department of Hospital Medicine  [facility] - Emergency Dept

## 2025-04-25 NOTE — HOSPITAL COURSE
During the hospitalization she was admitted to [facility] for evaluation and management of hypoxia.  However the patient was a 25+ year smoker, her last PFT did not result in a COPD diagnoses, however it is possible she has underlying disease.  Imaging was not remarkable for pneumonia.  She was hypokalemic and repleted.  Home O2 eval performed, no need for oxygen, however recommended continued evaluation with her pulmonologist.  She continues to have 1 to 2  loose stools a day which is improved from the previous several days, requested Lomotil on discharge.  She was seen and examined on the day of discharge was medically stable.

## 2025-04-25 NOTE — PROGRESS NOTES
[facility] Hospital Medicine  Progress Note    Patient Name: Chanel Cervantes  MRN: [id number]  Patient Class: IP- Inpatient   Admission Date: 4/24/2025  Length of Stay: 1 days  Attending Physician: Maite Cohen DO  Primary Care Provider: Ailyn De Leon APRN-CNP        Subjective     Principal Problem:Hypoxia        HPI:  Patient is a 69-year-old female with a history of CAD, DM 2, chronic back pain who presented to ED with nausea, diarrhea, shortness a breath, fever x1 day.  Patient had a similar start to her symptoms 1 year ago for cholangitis which left her septic and intubated and hospitalized for 1 month.  Patient's daughter strongly urged mother come to hospital given similar history.  In ED patient febrile 101.  She was hypoxic with a O2 sat of 90% not on O2 which increased to 96% on 2 L nasal cannula.  Patient does not wear any O2 at home.  She is a former smoker which she quit in 1998.  In ED labwork remarkable for no leukocytosis however there is left shift.  Potassium 3.3, magnesium 1.3.  UA with rare bacteria and 1+ leuk esterase.  Negative lactic acid, BNP, troponin.  Chest x-ray benign, CT a/p without acute abnormality of the abdomen and pelvis.  Patient does have a mid abdominal wall hernia with loops of bowel however no obstruction noted.  Patient will be admitted to Hospital Medicine    Overview/Hospital Course:  During the hospitalization she was admitted to [facility] for evaluation and management of hypoxia.  However the patient was a 25+ year smoker, her last PFT did not result in a COPD diagnoses, however it is possible she has underlying disease.  Imaging was not remarkable for pneumonia.  She was hypokalemic and repleted.    Interval History:  She was seen and examined at bedside during morning rounds, endorses some shortness of breath however mostly with exertion.  She reports no new events overnight.  She is currently using 2 L per nasal cannula.  She was hypokalemic this  morning at 2.7, this will be repleted.    Review of Systems   All other systems reviewed and are negative.    Objective:     Vital Signs (Most Recent):  Temp: 99.5 °F (37.5 °C) (04/25/25 1100)  Pulse: 93 (04/25/25 1100)  Resp: 16 (04/25/25 1100)  BP: (!) 152/77 (04/25/25 1100)  SpO2: (!) 93 % (04/25/25 1100) Vital Signs (24h Range):  Temp:  [98 °F (36.7 °C)-100.6 °F (38.1 °C)] 99.5 °F (37.5 °C)  Pulse:  [83-97] 93  Resp:  [16-27] 16  SpO2:  [90 %-97 %] 93 %  BP: (118-152)/(58-80) 152/77     Weight: 103 kg (227 lb 1.2 oz)  Body mass index is 40.22 kg/m².    Intake/Output Summary (Last 24 hours) at 4/25/2025 1251  Last data filed at 4/25/2025 1011  Gross per 24 hour   Intake 164.17 ml   Output --   Net 164.17 ml         Physical Exam  Constitutional:       Appearance: Normal appearance.      Comments: Elderly white female   HENT:      Head: Normocephalic.      Right Ear: Tympanic membrane normal.      Left Ear: Tympanic membrane normal.      Nose: Nose normal.      Mouth/Throat:      Mouth: Mucous membranes are dry.   Eyes:      Pupils: Pupils are equal, round, and reactive to light.   Cardiovascular:      Rate and Rhythm: Normal rate and regular rhythm.   Pulmonary:      Effort: Pulmonary effort is normal.      Breath sounds: Normal breath sounds.      Comments: Currently using 2 L nasal cannula  Reduced aeration  Abdominal:      General: Abdomen is flat.      Palpations: Abdomen is soft.   Musculoskeletal:         General: Normal range of motion.      Cervical back: Normal range of motion.   Skin:     General: Skin is warm and dry.   Neurological:      General: No focal deficit present.      Mental Status: She is alert.   Psychiatric:         Mood and Affect: Mood normal.               Significant Labs: All pertinent labs within the past 24 hours have been reviewed.    Significant Imaging: I have reviewed all pertinent imaging results/findings within the past 24 hours.      Assessment & Plan  Hypoxia  Patient with a  "shortness  Acute hypoxia in ED  SpO2 90% on room air  Improved to 96% on 2 L nasal cannula does not wear O2 at home  Negative COVID and flu  Chest x-ray without obvious infiltrates    High normal WBC with a left shift fever and hypoxia  We will cover for CAP  Started on Rocephin and azithromycin    Hypokalemia  Patient's most recent potassium results are listed below.   Recent Labs     04/24/25  0936 04/25/25  0429   K 3.3* 2.7*     Plan  - Replete potassium per protocol  - Monitor potassium Daily  - Patient's hypokalemia is stable  -   CAD (coronary artery disease)  Patient with known CAD s/p stent placement and CABG, which is controlled Will continue ASA and monitor for S/Sx of angina/ACS. Continue to monitor on telemetry.   Hypomagnesemia  Patient has Abnormal Magnesium: hypomagnesemia. Will continue to monitor electrolytes closely. Will replace the affected electrolytes and repeat labs to be done after interventions completed. The patient's magnesium results have been reviewed and are listed below.  Recent Labs   Lab 04/25/25  0429   MG 1.9      Diarrhea  CT abdomen without acute abnormality  Likely viral, I will see if this starts to resolve some today if it does not we will follow up with stool studies.  DM (diabetes mellitus), type 2  Patient's FSGs are controlled on current medication regimen.  Last A1c reviewed-   Lab Results   Component Value Date    HGBA1C 5.3 12/04/2024     Most recent fingerstick glucose reviewed- No results for input(s): "POCTGLUCOSE" in the last 24 hours.  Current correctional scale  Low  Maintain anti-hyperglycemic dose as follows-   Antihyperglycemics (From admission, onward)      Start     Stop Route Frequency Ordered    04/24/25 1650  insulin aspart U-100 pen 0-5 Units         -- SubQ Before meals & nightly PRN 04/24/25 1555          Hold Oral hypoglycemics while patient is in the hospital.  Acute cystitis with hematuria  Continue Rocephin    VTE Risk Mitigation (From admission, " onward)           Ordered     enoxaparin injection 40 mg  Daily         04/24/25 1555     IP VTE HIGH RISK PATIENT  Once         04/24/25 1555     Place sequential compression device  Until discontinued         04/24/25 1555                    Discharge Planning   LUIS ALFREDO: 4/27/2025     Code Status: Full Code   Medical Readiness for Discharge Date:   Discharge Plan A: Home                        Bashir Mata NP  Department of Hospital Medicine   [facility]

## 2025-04-25 NOTE — SUBJECTIVE & OBJECTIVE
Interval History:  She was seen and examined at bedside during morning rounds, endorses some shortness of breath however mostly with exertion.  She reports no new events overnight.  She is currently using 2 L per nasal cannula.  She was hypokalemic this morning at 2.7, this will be repleted.    Review of Systems   All other systems reviewed and are negative.    Objective:     Vital Signs (Most Recent):  Temp: 99.5 °F (37.5 °C) (04/25/25 1100)  Pulse: 93 (04/25/25 1100)  Resp: 16 (04/25/25 1100)  BP: (!) 152/77 (04/25/25 1100)  SpO2: (!) 93 % (04/25/25 1100) Vital Signs (24h Range):  Temp:  [98 °F (36.7 °C)-100.6 °F (38.1 °C)] 99.5 °F (37.5 °C)  Pulse:  [83-97] 93  Resp:  [16-27] 16  SpO2:  [90 %-97 %] 93 %  BP: (118-152)/(58-80) 152/77     Weight: 103 kg (227 lb 1.2 oz)  Body mass index is 40.22 kg/m².    Intake/Output Summary (Last 24 hours) at 4/25/2025 1251  Last data filed at 4/25/2025 1011  Gross per 24 hour   Intake 164.17 ml   Output --   Net 164.17 ml         Physical Exam  Constitutional:       Appearance: Normal appearance.      Comments: Elderly white female   HENT:      Head: Normocephalic.      Right Ear: Tympanic membrane normal.      Left Ear: Tympanic membrane normal.      Nose: Nose normal.      Mouth/Throat:      Mouth: Mucous membranes are dry.   Eyes:      Pupils: Pupils are equal, round, and reactive to light.   Cardiovascular:      Rate and Rhythm: Normal rate and regular rhythm.   Pulmonary:      Effort: Pulmonary effort is normal.      Breath sounds: Normal breath sounds.      Comments: Currently using 2 L nasal cannula  Reduced aeration  Abdominal:      General: Abdomen is flat.      Palpations: Abdomen is soft.   Musculoskeletal:         General: Normal range of motion.      Cervical back: Normal range of motion.   Skin:     General: Skin is warm and dry.   Neurological:      General: No focal deficit present.      Mental Status: She is alert.   Psychiatric:         Mood and Affect: Mood  normal.               Significant Labs: All pertinent labs within the past 24 hours have been reviewed.    Significant Imaging: I have reviewed all pertinent imaging results/findings within the past 24 hours.

## 2025-04-25 NOTE — ASSESSMENT & PLAN NOTE
Patient has Abnormal Magnesium: hypomagnesemia. Will continue to monitor electrolytes closely. Will replace the affected electrolytes and repeat labs to be done after interventions completed. The patient's magnesium results have been reviewed and are listed below.  Recent Labs   Lab 04/24/25  0936   MG 1.3*

## 2025-04-25 NOTE — PLAN OF CARE
[facility]  Initial Discharge Assessment       Primary Care Provider: Ailyn De Leon APRN-CNP    Admission Diagnosis: Screening for cardiovascular condition [Z13.6]  Hypoxia [R09.02]    Admission Date: 4/24/2025  Expected Discharge Date:     Transition of Care Barriers: (P) None    Payor: [employer] [facility] / Plan: [employer] / Product Type: Medicare Advantage /     Extended Emergency Contact Information  Primary Emergency Contact: JoaquinsallyShaina   [facility]  Home Phone: [phone]  Mobile Phone: [phone]  Relation: Sister  Preferred language: English   needed? No  Secondary Emergency Contact: BillyAngie   [facility]  Home Phone: [phone]  Mobile Phone: [phone]  Relation: Daughter  Preferred language: English   needed? No    Discharge Plan A: (P) Home  Discharge Plan B: (P) Home with family      [facility] [address] [place] - [address]  [address]  [place] [address]  Phone: [phone] Fax: [phone]      Initial Assessment (most recent)       Adult Discharge Assessment - 04/25/25 1030          Discharge Assessment    Assessment Type Discharge Planning Assessment (P)      Confirmed/corrected address, phone number and insurance Yes (P)      Confirmed Demographics Correct on Facesheet (P)      Source of Information patient (P)      Communicated LUIS ALFREDO with patient/caregiver Yes (P)      People in Home alone (P)      Do you expect to return to your current living situation? Yes (P)      Do you have help at home or someone to help you manage your care at home? Yes (P)      Prior to hospitilization cognitive status: Alert/Oriented (P)      Current cognitive status: Alert/Oriented (P)      Walking or Climbing Stairs Difficulty no (P)      Dressing/Bathing Difficulty no (P)      Equipment Currently Used at Home none (P)      Readmission within 30 days? No (P)      Patient  currently being followed by outpatient case management? No (P)      Do you currently have service(s) that help you manage your care at home? No (P)      Do you take prescription medications? Yes (P)      Do you have prescription coverage? Yes (P)      Do you have any problems affording any of your prescribed medications? No (P)      Is the patient taking medications as prescribed? yes (P)      How do you get to doctors appointments? car, drives self (P)      Are you on dialysis? No (P)      Do you take coumadin? No (P)      Discharge Plan A Home (P)      Discharge Plan B Home with family (P)      DME Needed Upon Discharge  none (P)      Discharge Plan discussed with: Patient (P)      Transition of Care Barriers None (P)

## 2025-04-25 NOTE — ASSESSMENT & PLAN NOTE
Patient's most recent potassium results are listed below.   Recent Labs     04/24/25  0936   K 3.3*     Plan  - Replete potassium per protocol  - Monitor potassium Daily  - Patient's hypokalemia is stable  -

## 2025-04-25 NOTE — CARE UPDATE
04/25/25 0804   Patient Assessment/Suction   Level of Consciousness (AVPU) alert   Respiratory Effort Unlabored;Normal   Expansion/Accessory Muscles/Retractions no use of accessory muscles   All Lung Fields Breath Sounds coarse;diminished   Rhythm/Pattern, Respiratory shallow;no shortness of breath reported   Cough Frequency infrequent   PRE-TX-O2   Device (Oxygen Therapy) nasal cannula   $ Is the patient on Low Flow Oxygen? Yes   Flow (L/min) (Oxygen Therapy) 2   SpO2 (!) 94 %   Pulse Oximetry Type Intermittent   $ Pulse Oximetry - Multiple Charge Pulse Oximetry - Multiple   Pulse 85   Resp 20   Aerosol Therapy   $ Aerosol Therapy Charges Aerosol Treatment   Daily Review of Necessity (SVN) completed   Respiratory Treatment Status (SVN) given   Treatment Route (SVN) mouthpiece utilized;oxygen   Patient Position HOB elevated   Post Treatment Assessment (SVN) breath sounds improved   Signs of Intolerance (SVN) none   Breath Sounds Post-Respiratory Treatment   Throughout All Fields Post-Treatment All Fields   Throughout All Fields Post-Treatment aeration increased   Post-treatment Heart Rate (beats/min) 68   Post-treatment Resp Rate (breaths/min) 20   Education   $ Education Bronchodilator;Oxygen;15 min   Respiratory Evaluation   $ Care Plan Tech Time 15 min

## 2025-04-25 NOTE — CARE UPDATE
04/25/25 1400   Home Oxygen Qualification   $ Home O2 Qualification Pulmonary Stress Test/6 min walk   Room Air SpO2 At Rest (!) 89 %   Room Air SpO2 During Ambulation 96 %   SpO2 Post Ambulation 94 %   Post Ambulation Heart Rate 86 bpm   Home O2 Eval Comments Patient oxygen saturation improved with exercise. Patient does have history of NIKOLAS  noncompliant with treatment. may need oxygen at night.

## 2025-04-25 NOTE — ASSESSMENT & PLAN NOTE
Patient's most recent potassium results are listed below.   Recent Labs     04/24/25  0936 04/25/25  0429   K 3.3* 2.7*     Plan  - Replete potassium per protocol  - Monitor potassium Daily  - Patient's hypokalemia is stable  -

## 2025-04-25 NOTE — PLAN OF CARE
Problem: Adult Inpatient Plan of Care  Goal: Plan of Care Review  Outcome: Ongoing  Goal: Patient-Specific Goal (Individualized)  Outcome: Ongoing  Goal: Absence of Hospital-Acquired Illness or Injury  Outcome: Ongoing  Goal: Optimal Comfort and Wellbeing  Outcome: Ongoing  Goal: Readiness for Transition of Care  Outcome: Ongoing     Problem: Bariatric Environmental Safety  Goal: Safety Maintained with Care  Outcome: Ongoing     Problem: Diabetes Comorbidity  Goal: Blood Glucose Level Within Targeted Range  Outcome: Ongoing

## 2025-04-25 NOTE — ASSESSMENT & PLAN NOTE
CT abdomen without acute abnormality  Likely viral, I will see if this starts to resolve some today if it does not we will follow up with stool studies.

## 2025-04-25 NOTE — ASSESSMENT & PLAN NOTE
Patient with known CAD s/p stent placement and CABG, which is controlled Will continue ASA and monitor for S/Sx of angina/ACS. Continue to monitor on telemetry.

## 2025-04-25 NOTE — ASSESSMENT & PLAN NOTE
"Patient's FSGs are controlled on current medication regimen.  Last A1c reviewed-   Lab Results   Component Value Date    HGBA1C 5.3 12/04/2024     Most recent fingerstick glucose reviewed- No results for input(s): "POCTGLUCOSE" in the last 24 hours.  Current correctional scale  Low  Maintain anti-hyperglycemic dose as follows-   Antihyperglycemics (From admission, onward)      Start     Stop Route Frequency Ordered    04/24/25 1650  insulin aspart U-100 pen 0-5 Units         -- SubQ Before meals & nightly PRN 04/24/25 1555          Hold Oral hypoglycemics while patient is in the hospital.  "

## 2025-04-26 VITALS
WEIGHT: 227.06 LBS | BODY MASS INDEX: 40.23 KG/M2 | HEIGHT: 63 IN | OXYGEN SATURATION: 96 % | DIASTOLIC BLOOD PRESSURE: 73 MMHG | RESPIRATION RATE: 18 BRPM | HEART RATE: 66 BPM | TEMPERATURE: 98 F | SYSTOLIC BLOOD PRESSURE: 153 MMHG

## 2025-04-26 PROBLEM — E83.42 HYPOMAGNESEMIA: Status: RESOLVED | Noted: 2025-02-04 | Resolved: 2025-04-26

## 2025-04-26 PROBLEM — R09.02 HYPOXIA: Status: RESOLVED | Noted: 2025-04-24 | Resolved: 2025-04-26

## 2025-04-26 PROBLEM — E87.6 HYPOKALEMIA: Status: RESOLVED | Noted: 2021-07-09 | Resolved: 2025-04-26

## 2025-04-26 LAB
ABSOLUTE EOSINOPHIL (SMH): 0.12 K/UL
ABSOLUTE MONOCYTE (SMH): 0.69 K/UL (ref 0.3–1)
ABSOLUTE NEUTROPHIL COUNT (SMH): 4.7 K/UL (ref 1.8–7.7)
ALBUMIN SERPL-MCNC: 3.3 G/DL (ref 3.5–5.2)
ALP SERPL-CCNC: 73 UNIT/L (ref 55–135)
ALT SERPL-CCNC: 14 UNIT/L (ref 10–44)
ANION GAP (SMH): 7 MMOL/L (ref 8–16)
AST SERPL-CCNC: 21 UNIT/L (ref 10–40)
BASOPHILS # BLD AUTO: 0.03 K/UL
BASOPHILS NFR BLD AUTO: 0.4 %
BILIRUB SERPL-MCNC: 0.3 MG/DL (ref 0.1–1)
BUN SERPL-MCNC: 20 MG/DL (ref 8–23)
CALCIUM SERPL-MCNC: 8.6 MG/DL (ref 8.7–10.5)
CHLORIDE SERPL-SCNC: 105 MMOL/L (ref 95–110)
CO2 SERPL-SCNC: 28 MMOL/L (ref 23–29)
CREAT SERPL-MCNC: 0.8 MG/DL (ref 0.5–1.4)
ERYTHROCYTE [DISTWIDTH] IN BLOOD BY AUTOMATED COUNT: 15.6 % (ref 11.5–14.5)
GFR SERPLBLD CREATININE-BSD FMLA CKD-EPI: >60 ML/MIN/1.73/M2
GLUCOSE SERPL-MCNC: 167 MG/DL (ref 70–110)
GLUCOSE SERPL-MCNC: 63 MG/DL (ref 70–110)
HCT VFR BLD AUTO: 39.6 % (ref 37–48.5)
HGB BLD-MCNC: 12.3 GM/DL (ref 12–16)
IMM GRANULOCYTES # BLD AUTO: 0.02 K/UL (ref 0–0.04)
IMM GRANULOCYTES NFR BLD AUTO: 0.3 % (ref 0–0.5)
LYMPHOCYTES # BLD AUTO: 1.14 K/UL (ref 1–4.8)
MAGNESIUM SERPL-MCNC: 1.8 MG/DL (ref 1.6–2.6)
MCH RBC QN AUTO: 26.2 PG (ref 27–31)
MCHC RBC AUTO-ENTMCNC: 31.1 G/DL (ref 32–36)
MCV RBC AUTO: 84 FL (ref 82–98)
NUCLEATED RBC (/100WBC) (SMH): 0 /100 WBC
PLATELET # BLD AUTO: 256 K/UL (ref 150–450)
PMV BLD AUTO: 10 FL (ref 9.2–12.9)
POCT GLUCOSE: 167 MG/DL (ref 70–110)
POCT GLUCOSE: 221 MG/DL (ref 70–110)
POCT GLUCOSE: 49 MG/DL (ref 70–110)
POTASSIUM SERPL-SCNC: 4.3 MMOL/L (ref 3.5–5.1)
PROT SERPL-MCNC: 6 GM/DL (ref 6–8.4)
RBC # BLD AUTO: 4.69 M/UL (ref 4–5.4)
RELATIVE EOSINOPHIL (SMH): 1.8 % (ref 0–8)
RELATIVE LYMPHOCYTE (SMH): 17 % (ref 18–48)
RELATIVE MONOCYTE (SMH): 10.3 % (ref 4–15)
RELATIVE NEUTROPHIL (SMH): 70.2 % (ref 38–73)
SODIUM SERPL-SCNC: 140 MMOL/L (ref 136–145)
WBC # BLD AUTO: 6.7 K/UL (ref 3.9–12.7)

## 2025-04-26 PROCEDURE — 36415 COLL VENOUS BLD VENIPUNCTURE: CPT | Performed by: FAMILY MEDICINE

## 2025-04-26 PROCEDURE — 82947 ASSAY GLUCOSE BLOOD QUANT: CPT | Performed by: FAMILY MEDICINE

## 2025-04-26 PROCEDURE — 36415 COLL VENOUS BLD VENIPUNCTURE: CPT | Performed by: REGISTERED NURSE

## 2025-04-26 PROCEDURE — 85025 COMPLETE CBC W/AUTO DIFF WBC: CPT | Performed by: REGISTERED NURSE

## 2025-04-26 PROCEDURE — 25000003 PHARM REV CODE 250: Performed by: REGISTERED NURSE

## 2025-04-26 PROCEDURE — 94761 N-INVAS EAR/PLS OXIMETRY MLT: CPT

## 2025-04-26 PROCEDURE — 63600175 PHARM REV CODE 636 W HCPCS: Performed by: STUDENT IN AN ORGANIZED HEALTH CARE EDUCATION/TRAINING PROGRAM

## 2025-04-26 PROCEDURE — 25000003 PHARM REV CODE 250: Performed by: STUDENT IN AN ORGANIZED HEALTH CARE EDUCATION/TRAINING PROGRAM

## 2025-04-26 PROCEDURE — 83735 ASSAY OF MAGNESIUM: CPT | Performed by: REGISTERED NURSE

## 2025-04-26 PROCEDURE — 99900035 HC TECH TIME PER 15 MIN (STAT)

## 2025-04-26 PROCEDURE — 99900031 HC PATIENT EDUCATION (STAT)

## 2025-04-26 PROCEDURE — 80053 COMPREHEN METABOLIC PANEL: CPT | Performed by: REGISTERED NURSE

## 2025-04-26 PROCEDURE — 27000221 HC OXYGEN, UP TO 24 HOURS

## 2025-04-26 RX ORDER — HYDROCODONE BITARTRATE AND ACETAMINOPHEN 5; 325 MG/1; MG/1
1 TABLET ORAL EVERY 6 HOURS PRN
Refills: 0 | Status: DISCONTINUED | OUTPATIENT
Start: 2025-04-26 | End: 2025-04-26 | Stop reason: HOSPADM

## 2025-04-26 RX ORDER — AZITHROMYCIN 250 MG/1
TABLET, FILM COATED ORAL
Qty: 4 TABLET | Refills: 0 | Status: SHIPPED | OUTPATIENT
Start: 2025-04-26

## 2025-04-26 RX ORDER — CEFDINIR 300 MG/1
300 CAPSULE ORAL 2 TIMES DAILY
Qty: 10 CAPSULE | Refills: 0 | Status: SHIPPED | OUTPATIENT
Start: 2025-04-26 | End: 2025-05-01

## 2025-04-26 RX ORDER — DIPHENOXYLATE HYDROCHLORIDE AND ATROPINE SULFATE 2.5; .025 MG/1; MG/1
1 TABLET ORAL 4 TIMES DAILY PRN
Qty: 10 TABLET | Refills: 0 | Status: SHIPPED | OUTPATIENT
Start: 2025-04-26 | End: 2025-05-06

## 2025-04-26 RX ADMIN — METHOCARBAMOL 500 MG: 500 TABLET ORAL at 12:04

## 2025-04-26 RX ADMIN — METOPROLOL SUCCINATE 25 MG: 25 TABLET, EXTENDED RELEASE ORAL at 08:04

## 2025-04-26 RX ADMIN — AMLODIPINE BESYLATE 2.5 MG: 2.5 TABLET ORAL at 08:04

## 2025-04-26 RX ADMIN — HYDROCODONE BITARTRATE AND ACETAMINOPHEN 1 TABLET: 5; 325 TABLET ORAL at 03:04

## 2025-04-26 RX ADMIN — PANTOPRAZOLE SODIUM 40 MG: 40 TABLET, DELAYED RELEASE ORAL at 06:04

## 2025-04-26 RX ADMIN — SODIUM CHLORIDE, POTASSIUM CHLORIDE, SODIUM LACTATE AND CALCIUM CHLORIDE: 600; 310; 30; 20 INJECTION, SOLUTION INTRAVENOUS at 12:04

## 2025-04-26 RX ADMIN — DEXTROSE MONOHYDRATE 25 G: 25 INJECTION, SOLUTION INTRAVENOUS at 08:04

## 2025-04-26 RX ADMIN — ASPIRIN 81 MG: 81 TABLET, COATED ORAL at 08:04

## 2025-04-26 RX ADMIN — DULOXETINE HYDROCHLORIDE 60 MG: 30 CAPSULE, DELAYED RELEASE ORAL at 08:04

## 2025-04-26 RX ADMIN — ATORVASTATIN CALCIUM 80 MG: 40 TABLET, FILM COATED ORAL at 08:04

## 2025-04-26 NOTE — NURSING
Discharge instructions reviewed with patient and daughter.  Copy to patient.  Discharged home in stable condition

## 2025-04-26 NOTE — RESPIRATORY THERAPY
04/26/25 0800   Patient Assessment/Suction   Level of Consciousness (AVPU) alert   Respiratory Effort Normal;Unlabored   Expansion/Accessory Muscles/Retractions no use of accessory muscles;no retractions;expansion symmetric   All Lung Fields Breath Sounds clear;diminished   Rhythm/Pattern, Respiratory unlabored;pattern regular;depth regular   Cough Frequency no cough   PRE-TX-O2   Device (Oxygen Therapy) nasal cannula   $ Is the patient on Low Flow Oxygen? Yes   Flow (L/min) (Oxygen Therapy) 2   SpO2 98 %   Pulse Oximetry Type Intermittent   $ Pulse Oximetry - Multiple Charge Pulse Oximetry - Multiple   Pulse 64   Resp 18   Aerosol Therapy   $ Aerosol Therapy Charges PRN treatment not required   Education   $ Education Bronchodilator;15 min

## 2025-04-26 NOTE — PLAN OF CARE
Charts and orders reviewed. Pt to discharge home. TN sent an inbasket to PCP to have hospital f/u scheduled. Pt has no other needs to be addressed by case management. Pt cleared to discharge from case management standpoint.    Pt's family will be transporting pt home from [facility].         04/26/25 1341   Final Note   Assessment Type Final Discharge Note   Anticipated Discharge Disposition Home   What phone number can be called within the next 1-3 days to see how you are doing after discharge? [phone]   Hospital Resources/Appts/Education Provided Appointments scheduled and added to AVS   Post-Acute Status   Discharge Delays None known at this time

## 2025-04-26 NOTE — CARE UPDATE
04/25/25 2020   Patient Assessment/Suction   Level of Consciousness (AVPU) alert   Respiratory Effort Unlabored   Expansion/Accessory Muscles/Retractions no use of accessory muscles   All Lung Fields Breath Sounds clear;diminished   Rhythm/Pattern, Respiratory no shortness of breath reported   Cough Frequency no cough   PRE-TX-O2   Device (Oxygen Therapy) nasal cannula   $ Is the patient on Low Flow Oxygen? Yes   Flow (L/min) (Oxygen Therapy) 2   SpO2 96 %   Pulse Oximetry Type Intermittent   $ Pulse Oximetry - Multiple Charge Pulse Oximetry - Multiple   Pulse 66   Resp 17   Positioning   Head of Bed (HOB) Positioning HOB elevated;HOB at 30 degrees   Aerosol Therapy   $ Aerosol Therapy Charges PRN treatment not required

## 2025-04-26 NOTE — DISCHARGE SUMMARY
[facility]  Discharge Summary      Patient Name: Chanel Cervantes  MRN: [id number]  BIANCA: [id number]  Patient Class: IP- Inpatient  Admission Date: 4/24/2025  Hospital Length of Stay: 2 days  Discharge Date and Time: 04/26/2025 10:47 AM  Attending Physician: Maite Cohen DO   Discharging Provider: Bashir Mata NP  Primary Care Provider: Ailyn De Leon APRN-CNP    Primary Care Team: Networked reference to record PCT     HPI:   Patient is a 69-year-old female with a history of CAD, DM 2, chronic back pain who presented to ED with nausea, diarrhea, shortness a breath, fever x1 day.  Patient had a similar start to her symptoms 1 year ago for cholangitis which left her septic and intubated and hospitalized for 1 month.  Patient's daughter strongly urged mother come to hospital given similar history.  In ED patient febrile 101.  She was hypoxic with a O2 sat of 90% not on O2 which increased to 96% on 2 L nasal cannula.  Patient does not wear any O2 at home.  She is a former smoker which she quit in 1998.  In ED labwork remarkable for no leukocytosis however there is left shift.  Potassium 3.3, magnesium 1.3.  UA with rare bacteria and 1+ leuk esterase.  Negative lactic acid, BNP, troponin.  Chest x-ray benign, CT a/p without acute abnormality of the abdomen and pelvis.  Patient does have a mid abdominal wall hernia with loops of bowel however no obstruction noted.  Patient will be admitted to Hospital Medicine    * No surgery found *      Hospital Course:   During the hospitalization she was admitted to [facility] for evaluation and management of hypoxia.  However the patient was a 25+ year smoker, her last PFT did not result in a COPD diagnoses, however it is possible she has underlying disease.  Imaging was not remarkable for pneumonia.  She was hypokalemic and repleted.  Home O2 eval performed, no need for oxygen, however recommended continued evaluation with her pulmonologist.   Her diarrhea continued, stool culture was performed, she was checked for C diff     Goals of Care Treatment Preferences:  Code Status: Full Code      SDOH Screening:  The patient declined to be screened for utility difficulties, food insecurity, transport difficulties, housing insecurity, and interpersonal safety, so no concerns could be identified this admission.     Consults:     Final Active Diagnoses:    Diagnosis Date Noted POA    Acute cystitis with hematuria [N30.01] 04/25/2025 Unknown    Diarrhea [R19.7] 04/24/2025 Yes    DM (diabetes mellitus), type 2 [E11.9] 04/24/2025 Yes    CAD (coronary artery disease) [I25.10] 11/23/2018 Yes      Problems Resolved During this Admission:    Diagnosis Date Noted Date Resolved POA    PRINCIPAL PROBLEM:  Hypoxia [R09.02] 04/24/2025 04/26/2025 Yes    Hypomagnesemia [E83.42] 02/04/2025 04/26/2025 Yes    Hypokalemia [E87.6] 07/09/2021 04/26/2025 Yes       Discharged Condition: stable    Disposition:     Follow Up:   Follow-up Information       Ailyn De Leon APRN-CNP. Schedule an appointment as soon as possible for a visit in 1 week(s).    Specialty: Family Medicine  Why: Office will call to schedule follow up  Contact information:  [address] [address] [phone]                           Patient Instructions:   No discharge procedures on file.    Significant Diagnostic Studies: N/A    Pending Diagnostic Studies:       Procedure Component Value Units Date/Time    EXTRA TUBES [[id number]] Collected: 04/24/25 0930    Order Status: Sent Lab Status: In process Updated: 04/24/25 0951    Specimen: Blood, Venous     Narrative:      The following orders were created for panel order EXTRA TUBES.  Procedure                               Abnormality         Status                     ---------                               -----------         ------                     Light Blue Top Hold[[id number]]                             In process                    Please view results for these tests on the individual orders.    Stool Exam-Ova,Cysts,Parasites [[id number]]     Order Status: Sent Lab Status: No result     Specimen: Stool            Medications:  Reconciled Home Medications:      Medication List        ASK your doctor about these medications      amLODIPine 2.5 MG tablet  Commonly known as: NORVASC  Take 1 tablet (2.5 mg total) by mouth once daily.     aspirin 81 MG EC tablet  Commonly known as: ECOTRIN  Take 1 tablet (81 mg total) by mouth once daily.     azelastine 137 mcg (0.1 %) nasal spray  Commonly known as: ASTELIN  1 spray (137 mcg total) by Nasal route 2 (two) times daily. Point up and slightly outward toward ear when spraying to avoid irritating nasal septum.     blood sugar diagnostic Strp  One Touch Ultra Blue Test strips, Use l strip to check glucose 4 times daily     blood-glucose meter kit  Use as instructed     calcium carbonate-vitamin D3 600 mg-62.5 mcg (2,500 unit) Cap  Take 1 capsule by mouth once daily.     DULoxetine 60 MG capsule  Commonly known as: CYMBALTA  Take 1 capsule (60 mg total) by mouth once daily.     fluticasone propionate 50 mcg/actuation nasal spray  Commonly known as: FLONASE  1 spray (50 mcg total) by Each Nostril route 2 (two) times daily. Point up and slightly outward toward ear when spraying to avoid irritating nasal septum.     guanFACINE 2 MG tablet  Commonly known as: TENEX  Take 1 tablet (2 mg total) by mouth every evening.     insulin degludec 200 unit/mL (3 mL) insulin pen  Commonly known as: TRESIBA FLEXTOUCH U-200  Inject 78 Units into the skin every evening.     iron-vitamin C 100-250 mg (ICAR-C) 100-250 mg Tab  Take 1 tablet by mouth once daily.     lancets 33 gauge Misc  Commonly known as: ONETOUCH DELICA LANCETS  USE 1  TO CHECK GLUCOSE 4 TIMES DAILY     levocetirizine 5 MG tablet  Commonly known as: XYZAL  Take 1 tablet (5 mg total) by mouth every evening.     magnesium oxide 400 mg (241.3 mg magnesium)  tablet  Commonly known as: MAG-OX  Take 1 tablet (400 mg total) by mouth once daily.     metFORMIN 1000 MG tablet  Commonly known as: GLUCOPHAGE  Take 1 tablet (1,000 mg total) by mouth daily with breakfast.     metOLazone 2.5 MG tablet  Commonly known as: ZAROXOLYN  Take 1 tablet (2.5 mg total) by mouth once daily.     metoprolol succinate 25 MG 24 hr tablet  Commonly known as: TOPROL-XL  Take 1 tablet (25 mg total) by mouth once daily.     multivitamin capsule  Take 1 capsule by mouth once daily.     nystatin powder  Commonly known as: MYCOSTATIN  Apply topically 3 (three) times daily.     pantoprazole 40 MG tablet  Commonly known as: PROTONIX  Take 1 tablet (40 mg total) by mouth once daily. Take 30 minutes prior to eating breakfast.     potassium chloride 20 mEq  Commonly known as: K-TAB  Take 1 tablet (20 mEq total) by mouth Daily.     prednisoLONE acetate 1 % Drps  Commonly known as: PRED FORTE  Place 1 drop into both eyes as needed.     rosuvastatin 40 MG Tab  Commonly known as: CRESTOR  Take 1 tablet (40 mg total) by mouth every evening.     TRIJARDY XR 25-5-1,000 mg Tbph  Generic drug: empaglifloz-linaglip-metformin  Take 1 tablet by mouth once daily.              Indwelling Lines/Drains at time of discharge:   Lines/Drains/Airways       None                   Time spent on the discharge of patient: 33 minutes         Bashir Mata NP  Department of Hospital Medicine  [facility]

## 2025-04-28 ENCOUNTER — PATIENT OUTREACH (OUTPATIENT)
Dept: FAMILY MEDICINE | Facility: CLINIC | Age: 70
End: 2025-04-28
Payer: MEDICARE

## 2025-04-28 ENCOUNTER — TELEPHONE (OUTPATIENT)
Dept: FAMILY MEDICINE | Facility: CLINIC | Age: 70
End: 2025-04-28
Payer: MEDICARE

## 2025-04-28 NOTE — PROGRESS NOTES
Discharge Information     Discharge Date:   4/26/25    Primary Discharge Diagnosis:  Hypoxia       Discharge Summary:  Reviewed      Medication & Order Review     Were medication changes made or new medications added?   yes    If so, has the patient filled the prescriptions?  Yes     Was Home Health ordered? No    If so, has Home Health contacted patient and/or initiated services?  No    Name of Home Health Agency? N/A    Durable Medical Equipment ordered?  No     If so, has the DME provider contacted patient and delivered equipment?  N/A    Follow Up               Any problems since discharge? No    How is the patient feeling since returning home?      Have you set up recommended follow up appointments?    Ailyn     Schedule Hospital Follow-up appointment within 7-14 days       Notes:  Spoke with pt states she is doing well. Denies needs.       HFU scheduled.       Gely Gabriel

## 2025-04-28 NOTE — TELEPHONE ENCOUNTER
----- Message from  Dahlia sent at 4/25/2025  2:08 PM CDT -----  Regarding: Hospital follow up  Hello, This patient will discharge Sunday. Will you schedule a hospital follow up appointment for them within 1 week? Thanks

## 2025-04-29 ENCOUNTER — PATIENT OUTREACH (OUTPATIENT)
Dept: ADMINISTRATIVE | Facility: CLINIC | Age: 70
End: 2025-04-29
Payer: MEDICARE

## 2025-04-29 LAB
BACTERIA BLD CULT: NORMAL
BACTERIA BLD CULT: NORMAL

## 2025-04-29 NOTE — PROGRESS NOTES
C3 nurse attempted to contact Chanel Cervantes for a TCC post hospital discharge follow up call. No answer, left voicemail with callback information. The patient has a scheduled HOSFU with Ailyn De Leon APRN-CNP on 5/5/25 at 1340. No messages routed at this time.

## 2025-04-30 LAB
OHS QRS DURATION: 140 MS
OHS QTC CALCULATION: 467 MS

## 2025-04-30 NOTE — PROGRESS NOTES
C3 nurse contacted Chanel Cervantes for a TCC post hospital discharge follow-up call. The patient politely declined call at this time and has a HOSFU with Ailyn De Leon APRN-CNP on 5/5/25 at 1340. No messages routed at this time.

## 2025-05-05 ENCOUNTER — OFFICE VISIT (OUTPATIENT)
Dept: FAMILY MEDICINE | Facility: CLINIC | Age: 70
End: 2025-05-05
Payer: MEDICARE

## 2025-05-05 VITALS
DIASTOLIC BLOOD PRESSURE: 52 MMHG | SYSTOLIC BLOOD PRESSURE: 132 MMHG | WEIGHT: 231 LBS | OXYGEN SATURATION: 99 % | BODY MASS INDEX: 40.93 KG/M2 | HEART RATE: 60 BPM | HEIGHT: 63 IN

## 2025-05-05 DIAGNOSIS — Z79.4 TYPE 2 DIABETES MELLITUS TREATED WITH INSULIN: ICD-10-CM

## 2025-05-05 DIAGNOSIS — R19.7 DIARRHEA, UNSPECIFIED TYPE: ICD-10-CM

## 2025-05-05 DIAGNOSIS — Z09 HOSPITAL DISCHARGE FOLLOW-UP: Primary | ICD-10-CM

## 2025-05-05 DIAGNOSIS — E11.9 TYPE 2 DIABETES MELLITUS TREATED WITH INSULIN: ICD-10-CM

## 2025-05-05 DIAGNOSIS — B37.31 VAGINA, CANDIDIASIS: ICD-10-CM

## 2025-05-05 PROCEDURE — 1111F DSCHRG MED/CURRENT MED MERGE: CPT | Mod: CPTII,S$GLB,,

## 2025-05-05 PROCEDURE — 1160F RVW MEDS BY RX/DR IN RCRD: CPT | Mod: CPTII,S$GLB,,

## 2025-05-05 PROCEDURE — 3075F SYST BP GE 130 - 139MM HG: CPT | Mod: CPTII,S$GLB,,

## 2025-05-05 PROCEDURE — 3078F DIAST BP <80 MM HG: CPT | Mod: CPTII,S$GLB,,

## 2025-05-05 PROCEDURE — 99495 TRANSJ CARE MGMT MOD F2F 14D: CPT | Mod: S$GLB,,,

## 2025-05-05 PROCEDURE — 1159F MED LIST DOCD IN RCRD: CPT | Mod: CPTII,S$GLB,,

## 2025-05-05 RX ORDER — INSULIN DEGLUDEC 200 U/ML
78 INJECTION, SOLUTION SUBCUTANEOUS NIGHTLY
Qty: 11.7 ML | Refills: 11 | Status: SHIPPED | OUTPATIENT
Start: 2025-05-05 | End: 2026-05-05

## 2025-05-05 RX ORDER — FLUCONAZOLE 150 MG/1
150 TABLET ORAL
Qty: 2 TABLET | Refills: 0 | Status: SHIPPED | OUTPATIENT
Start: 2025-05-05 | End: 2025-05-09

## 2025-05-05 NOTE — PROGRESS NOTES
SUBJECTIVE:    Patient ID: Chanel Cervantes is a 69 y.o. female.    Chief Complaint: Hospital Follow Up, Requests Rx For Yeast Infection, Vertigo At Bedtime, and Discuss Need For CT      Patient Name: Chanel Cervantes  MRN: [id number]  BIANCA: [id number]  Patient Class: IP- Inpatient  Admission Date: 4/24/2025  Hospital Length of Stay: 2 days  Discharge Date and Time: 04/26/2025 10:47 AM  Attending Physician: Maite Cohen DO   Discharging Provider: Bashir Mata NP  Primary Care Provider: Ailyn De Leon APRN-CNP     Primary Care Team: Networked reference to record PCT      HPI:   Patient is a 69-year-old female with a history of CAD, DM 2, chronic back pain who presented to ED with nausea, diarrhea, shortness a breath, fever x1 day.  Patient had a similar start to her symptoms 1 year ago for cholangitis which left her septic and intubated and hospitalized for 1 month.  Patient's daughter strongly urged mother come to hospital given similar history.  In ED patient febrile 101.  She was hypoxic with a O2 sat of 90% not on O2 which increased to 96% on 2 L nasal cannula.  Patient does not wear any O2 at home.  She is a former smoker which she quit in 1998.  In ED labwork remarkable for no leukocytosis however there is left shift.  Potassium 3.3, magnesium 1.3.  UA with rare bacteria and 1+ leuk esterase.  Negative lactic acid, BNP, troponin.  Chest x-ray benign, CT a/p without acute abnormality of the abdomen and pelvis.  Patient does have a mid abdominal wall hernia with loops of bowel however no obstruction noted.  Patient will be admitted to Hospital Medicine     * No surgery found *       Hospital Course:   During the hospitalization she was admitted to [facility] for evaluation and management of hypoxia.  However the patient was a 25+ year smoker, her last PFT did not result in a COPD diagnoses, however it is possible she has underlying disease.  Imaging was not remarkable for pneumonia.  She was hypokalemic and  repleted.  Home O2 eval performed, no need for oxygen, however recommended continued evaluation with her pulmonologist.  Her diarrhea continued, stool culture was performed, she was checked for C diff        Admit Date: 4/24/25   Discharge Date: 4/26/25  Discharge Facility: Hospital    Medication Reconciliation:  Medications changed/added/deleted. antibiotics  New Prescriptions filled after discharge: yes  Discharge summary reviewed:  yes  Pending test results at discharge reviewed:   not applicable  Follow up appointments scheduled:  not applicable     Follow up labs/tests ordered:   yes  Home Health ordered on discharge:   not applicable  Home Health company name:   DME ordered at discharge:   not applicable  Disease/illness education: risks for C. Diff  Discussion with other health care providers: not applicable  Establishment or re-establishment of referral orders for community resources: No other necessary community resources  Family and/or Caretaker present at visit?  not applicable    Discharge Summary:reviewed    How patient is feeling since discharge from the hospital?  A little better                      Admission on 04/24/2025, Discharged on 04/26/2025   Component Date Value Ref Range Status    Hep C Ab Interp 04/24/2025 Non-Reactive  Non-Reactive Final    HIV 1/2 Ag/Ab 04/24/2025 Non-Reactive  Non-Reactive Final    CULTURE, BLOOD ([facility]) 04/24/2025 No Growth After 5 Days   Final    CULTURE, BLOOD ([facility]) 04/24/2025 No Growth After 5 Days   Final    Sodium 04/24/2025 140  136 - 145 mmol/L Final    Potassium 04/24/2025 3.3 (L)  3.5 - 5.1 mmol/L Final    Chloride 04/24/2025 99  95 - 110 mmol/L Final    CO2 04/24/2025 33 (H)  23 - 29 mmol/L Final    Glucose 04/24/2025 96  70 - 110 mg/dL Final    BUN 04/24/2025 20  8 - 23 mg/dL Final    Creatinine 04/24/2025 1.0  0.5 - 1.4 mg/dL Final    Calcium 04/24/2025 9.3  8.7 - 10.5 mg/dL Final    Protein Total 04/24/2025 6.9  6.0 - 8.4 gm/dL Final    Albumin 04/24/2025  3.9  3.5 - 5.2 g/dL Final    Bilirubin Total 04/24/2025 0.5  0.1 - 1.0 mg/dL Final    ALP 04/24/2025 70  55 - 135 unit/L Final    AST 04/24/2025 20  10 - 40 unit/L Final    ALT 04/24/2025 15  10 - 44 unit/L Final    Anion Gap 04/24/2025 8  8 - 16 mmol/L Final    eGFR 04/24/2025 >60  >60 mL/min/1.73/m2 Final    QRS Duration 04/24/2025 140  ms Final    OHS QTC Calculation 04/24/2025 467  ms Final    INFLUENZA A MOLECULAR 04/24/2025 Negative  Negative Final    INFLUENZA B MOLECULAR  04/24/2025 Negative  Negative Final    WBC 04/24/2025 10.10  3.90 - 12.70 K/uL Final    RBC 04/24/2025 5.15  4.00 - 5.40 M/uL Final    Hgb 04/24/2025 13.7  12.0 - 16.0 gm/dL Final    Hct 04/24/2025 43.1  37.0 - 48.5 % Final    MCV 04/24/2025 84  82 - 98 fL Final    MCH 04/24/2025 26.6 (L)  27.0 - 31.0 pg Final    MCHC 04/24/2025 31.8 (L)  32.0 - 36.0 g/dL Final    RDW 04/24/2025 15.3 (H)  11.5 - 14.5 % Final    Platelet Count 04/24/2025 297  150 - 450 K/uL Final    MPV 04/24/2025 10.3  9.2 - 12.9 fL Final    Nucleated RBC 04/24/2025 0  <=0 /100 WBC Final    Neut % 04/24/2025 86.3 (H)  38 - 73 % Final    Lymph % 04/24/2025 5.0 (L)  18 - 48 % Final    Mono % 04/24/2025 7.9  4 - 15 % Final    Eos % 04/24/2025 0.2  0 - 8 % Final    Basophil % 04/24/2025 0.3  <=1.9 % Final    Imm Grans % 04/24/2025 0.3  0.0 - 0.5 % Final    Neut # 04/24/2025 8.7 (H)  1.8 - 7.7 K/uL Final    Lymph # 04/24/2025 0.51 (L)  1 - 4.8 K/uL Final    Mono # 04/24/2025 0.80  0.3 - 1 K/uL Final    Eos # 04/24/2025 0.02  <=0.5 K/uL Final    Baso # 04/24/2025 0.03  <=0.2 K/uL Final    Imm Grans # 04/24/2025 0.03  0.00 - 0.04 K/uL Final    Magnesium 04/24/2025 1.3 (L)  1.6 - 2.6 mg/dL Final    Troponin High Sensitive 04/24/2025 10.2  <=14.9 pg/mL Final    Color, UA 04/24/2025 Yellow  Yellow, Straw, Claudia Final    Appearance, UA 04/24/2025 Clear  Clear Final    Spec Grav UA 04/24/2025 >=1.030 (A)  1.005 - 1.030 Final    pH, UA 04/24/2025 6.0  5.0 - 8.0 Final    Protein, UA  04/24/2025 Trace (A)  Negative Final    Glucose, UA 04/24/2025 4+ (A)  Negative Final    Ketones, UA 04/24/2025 Trace (A)  Negative Final    Blood, UA 04/24/2025 Negative  Negative Final    Bilirubin, UA 04/24/2025 1+ (A)  Negative Final    Urobilinogen, UA 04/24/2025 2.0-3.0 (A)  <2.0 EU/dL Final    Nitrites, UA 04/24/2025 Negative  Negative Final    Leukocyte Esterase, UA 04/24/2025 1+ (A)  Negative Final    BNP 04/24/2025 32  <=99 pg/mL Final    SARS COV-2 Molecular 04/24/2025 Negative  Negative Final    POC Lactate 04/24/2025 1.71  0.5 - 2.2 mmol/L Final    Sample 04/24/2025 VENOUS   Final    Lactic Acid Level 04/24/2025 0.9  0.5 - 1.9 mmol/L Final    RBC, UA 04/24/2025 1  <=4 /HPF Final    WBC, UA 04/24/2025 1  <=5 /HPF Final    Bacteria, UA 04/24/2025 Rare  None, Rare, Occasional /HPF Final    Squamous Epithelial Cells, UA 04/24/2025 3  /HPF Final    Hyaline Casts, UA 04/24/2025 1  0 - 1 /LPF Final    Microscopic Comment 04/24/2025    Final    Lipase Level 04/24/2025 13  4 - 60 U/L Final    Procalcitonin 04/24/2025 0.12  <0.25 ng/mL Final    Sodium 04/25/2025 138  136 - 145 mmol/L Final    Potassium 04/25/2025 2.7 (LL)  3.5 - 5.1 mmol/L Final    Chloride 04/25/2025 101  95 - 110 mmol/L Final    CO2 04/25/2025 26  23 - 29 mmol/L Final    Glucose 04/25/2025 63 (L)  70 - 110 mg/dL Final    BUN 04/25/2025 29 (H)  8 - 23 mg/dL Final    Creatinine 04/25/2025 1.0  0.5 - 1.4 mg/dL Final    Calcium 04/25/2025 8.8  8.7 - 10.5 mg/dL Final    Protein Total 04/25/2025 6.4  6.0 - 8.4 gm/dL Final    Albumin 04/25/2025 3.6  3.5 - 5.2 g/dL Final    Bilirubin Total 04/25/2025 0.4  0.1 - 1.0 mg/dL Final    ALP 04/25/2025 68  55 - 135 unit/L Final    AST 04/25/2025 19  10 - 40 unit/L Final    ALT 04/25/2025 13  10 - 44 unit/L Final    Anion Gap 04/25/2025 11  8 - 16 mmol/L Final    eGFR 04/25/2025 >60  >60 mL/min/1.73/m2 Final    Magnesium 04/25/2025 1.9  1.6 - 2.6 mg/dL Final    WBC 04/25/2025 8.11  3.90 - 12.70 K/uL Final     RBC 04/25/2025 5.01  4.00 - 5.40 M/uL Final    Hgb 04/25/2025 13.3  12.0 - 16.0 gm/dL Final    Hct 04/25/2025 42.9  37.0 - 48.5 % Final    MCV 04/25/2025 86  82 - 98 fL Final    MCH 04/25/2025 26.5 (L)  27.0 - 31.0 pg Final    MCHC 04/25/2025 31.0 (L)  32.0 - 36.0 g/dL Final    RDW 04/25/2025 15.7 (H)  11.5 - 14.5 % Final    Platelet Count 04/25/2025 243  150 - 450 K/uL Final    MPV 04/25/2025 10.0  9.2 - 12.9 fL Final    Nucleated RBC 04/25/2025 0  <=0 /100 WBC Final    Neut % 04/25/2025 80.9 (H)  38 - 73 % Final    Lymph % 04/25/2025 10.1 (L)  18 - 48 % Final    Mono % 04/25/2025 8.1  4 - 15 % Final    Eos % 04/25/2025 0.2  0 - 8 % Final    Basophil % 04/25/2025 0.5  <=1.9 % Final    Imm Grans % 04/25/2025 0.2  0.0 - 0.5 % Final    Neut # 04/25/2025 6.6  1.8 - 7.7 K/uL Final    Lymph # 04/25/2025 0.82 (L)  1 - 4.8 K/uL Final    Mono # 04/25/2025 0.66  0.3 - 1 K/uL Final    Eos # 04/25/2025 0.02  <=0.5 K/uL Final    Baso # 04/25/2025 0.04  <=0.2 K/uL Final    Imm Grans # 04/25/2025 0.02  0.00 - 0.04 K/uL Final    Sodium 04/26/2025 140  136 - 145 mmol/L Final    Potassium 04/26/2025 4.3  3.5 - 5.1 mmol/L Final    Chloride 04/26/2025 105  95 - 110 mmol/L Final    CO2 04/26/2025 28  23 - 29 mmol/L Final    Glucose 04/26/2025 63 (L)  70 - 110 mg/dL Final    BUN 04/26/2025 20  8 - 23 mg/dL Final    Creatinine 04/26/2025 0.8  0.5 - 1.4 mg/dL Final    Calcium 04/26/2025 8.6 (L)  8.7 - 10.5 mg/dL Final    Protein Total 04/26/2025 6.0  6.0 - 8.4 gm/dL Final    Albumin 04/26/2025 3.3 (L)  3.5 - 5.2 g/dL Final    Bilirubin Total 04/26/2025 0.3  0.1 - 1.0 mg/dL Final    ALP 04/26/2025 73  55 - 135 unit/L Final    AST 04/26/2025 21  10 - 40 unit/L Final    ALT 04/26/2025 14  10 - 44 unit/L Final    Anion Gap 04/26/2025 7 (L)  8 - 16 mmol/L Final    eGFR 04/26/2025 >60  >60 mL/min/1.73/m2 Final    Magnesium 04/26/2025 1.8  1.6 - 2.6 mg/dL Final    WBC 04/26/2025 6.70  3.90 - 12.70 K/uL Final    RBC 04/26/2025 4.69  4.00 - 5.40  M/uL Final    Hgb 04/26/2025 12.3  12.0 - 16.0 gm/dL Final    Hct 04/26/2025 39.6  37.0 - 48.5 % Final    MCV 04/26/2025 84  82 - 98 fL Final    MCH 04/26/2025 26.2 (L)  27.0 - 31.0 pg Final    MCHC 04/26/2025 31.1 (L)  32.0 - 36.0 g/dL Final    RDW 04/26/2025 15.6 (H)  11.5 - 14.5 % Final    Platelet Count 04/26/2025 256  150 - 450 K/uL Final    MPV 04/26/2025 10.0  9.2 - 12.9 fL Final    Nucleated RBC 04/26/2025 0  <=0 /100 WBC Final    Neut % 04/26/2025 70.2  38 - 73 % Final    Lymph % 04/26/2025 17.0 (L)  18 - 48 % Final    Mono % 04/26/2025 10.3  4 - 15 % Final    Eos % 04/26/2025 1.8  0 - 8 % Final    Basophil % 04/26/2025 0.4  <=1.9 % Final    Imm Grans % 04/26/2025 0.3  0.0 - 0.5 % Final    Neut # 04/26/2025 4.7  1.8 - 7.7 K/uL Final    Lymph # 04/26/2025 1.14  1 - 4.8 K/uL Final    Mono # 04/26/2025 0.69  0.3 - 1 K/uL Final    Eos # 04/26/2025 0.12  <=0.5 K/uL Final    Baso # 04/26/2025 0.03  <=0.2 K/uL Final    Imm Grans # 04/26/2025 0.02  0.00 - 0.04 K/uL Final    POCT Glucose 04/26/2025 49 (LL)  70 - 110 mg/dL Final    Glucose 04/26/2025 167 (H)  70 - 110 mg/dL Final    POCT Glucose 04/26/2025 167 (H)  70 - 110 mg/dL Final    POCT Glucose 04/26/2025 221 (H)  70 - 110 mg/dL Final   Hospital Outpatient Visit on 03/14/2025   Component Date Value Ref Range Status    Pre FVC 03/19/2025 2.42  2.02 - 3.54 L Preliminary    Pre FEV1 03/19/2025 1.93  1.57 - 2.71 L Preliminary    Pre FEV1 FVC 03/19/2025 79.82  65.08 - 89.70 % Preliminary    Pre FEF 25 75 03/19/2025 1.84  1.18 - 3.97 L/s Preliminary    Pre PEF 03/19/2025 5.67  3.91 - 7.23 L/s Preliminary    Pre  03/19/2025 4.94  sec Preliminary    Pre DLCO 03/19/2025 20.90  15.08 - 26.54 ml/(min*mmHg) Preliminary    DLCOVA PRE 03/19/2025 5.37  2.86 - 5.87 ml/(min*mmHg*L) Preliminary    VA PRE 03/19/2025 3.89 (L)  4.62 - 4.62 L Preliminary    IVC PRE 03/19/2025 2.23  2.02 - 3.54 L Preliminary    TLCN2 PRE 03/19/2025 4.72  3.78 - 5.76 L Preliminary    VCMAXN2  PRE 03/19/2025 2.42  2.02 - 3.54 L Preliminary    Pre FRC N2 03/19/2025 2.31  1.83 - 3.48 L Preliminary    ERVN2 PRE 03/19/2025 0.00  -60273.35 - 64775.65 L Preliminary    RVN2 PRE 03/19/2025 2.31  1.42 - 2.58 L Preliminary    [id number] PRE 03/19/2025 48.88  32.83 - 52.01 % Preliminary    FVC Ref 03/19/2025 2.76   Preliminary    FVC LLN 03/19/2025 2.02   Preliminary    FVC Pre Ref 03/19/2025 87.4  % Preliminary    FEV1 Ref 03/19/2025 2.15   Preliminary    FEV1 LLN 03/19/2025 1.57   Preliminary    FEV1 Pre Ref 03/19/2025 89.7  % Preliminary    FEV1 FVC Ref 03/19/2025 78   Preliminary    FEV1 FVC LLN 03/19/2025 65   Preliminary    FEV1 FVC Pre Ref 03/19/2025 101.9  % Preliminary    FEF 25 75 Ref 03/19/2025 2.57   Preliminary    FEF 25 75 LLN 03/19/2025 1.18   Preliminary    FEF 25 75 Pre Ref 03/19/2025 71.5  % Preliminary    PEF Ref 03/19/2025 5.57   Preliminary    PEF LLN 03/19/2025 3.91   Preliminary    PEF Pre Ref 03/19/2025 101.9  % Preliminary    TLCN2 Ref 03/19/2025 4.77   Preliminary    TLCN2 LLN 03/19/2025 3.78   Preliminary    TLCN2 Pre Ref 03/19/2025 99.0  % Preliminary    VCMAXN2 Ref 03/19/2025 2.76   Preliminary    VCMAXN2 LLN 03/19/2025 2.02   Preliminary    VCMAXN2 Pre Ref 03/19/2025 87.4  % Preliminary    FRCN2 Ref 03/19/2025 2.65   Preliminary    FRCN2 LLN 03/19/2025 1.83   Preliminary    FRCN2 Pre Ref 03/19/2025 87.0  % Preliminary    ERVN2 Ref 03/19/2025 0.65   Preliminary    ERVN2 LLN 03/19/2025 -82415.35   Preliminary    ERVN2 Pre Ref 03/19/2025 0.0  % Preliminary    RVN2 Ref 03/19/2025 2.00   Preliminary    RVN2 LLN 03/19/2025 1.42   Preliminary    RVN2 Pre Ref 03/19/2025 115.4  % Preliminary    [id number] Ref 03/19/2025 42.42   Preliminary    [id number] LLN 03/19/2025 32.83   Preliminary    [id number] Pre Ref 03/19/2025 115.2  % Preliminary    DLCO Single Breath Ref 03/19/2025 20.81   Preliminary    DLCO Single Breath LLN 03/19/2025 15.08   Preliminary    DLCO Single Breath Pre Ref 03/19/2025 100.4   % Preliminary    DLCOc Single Breath Ref 03/19/2025 20.81   Preliminary    DLCOc Single Breath LLN 03/19/2025 15.08   Preliminary    DLCOVA Ref 03/19/2025 4.36   Preliminary    DLCOVA LLN 03/19/2025 2.86   Preliminary    DLCOVA Pre Ref 03/19/2025 123.1  % Preliminary    DLCOc SBVA Ref 03/19/2025 4.36   Preliminary    DLCOc SBVA LLN 03/19/2025 2.86   Preliminary    VA Single Breath Ref 03/19/2025 4.62   Preliminary    VA Single Breath LLN 03/19/2025 4.62   Preliminary    VA Single Breath Pre Ref 03/19/2025 84.2  % Preliminary    IVC Single Breath Ref 03/19/2025 2.76   Preliminary    IVC Single Breath LLN 03/19/2025 2.02   Preliminary    IVC Single Breath Pre Ref 03/19/2025 80.6  % Preliminary   Lab Visit on 03/13/2025   Component Date Value Ref Range Status    [place] Maple Tree IgE 03/13/2025 <0.10  <0.10 kU/L Final    Allergen Maple ([place]) Class 03/13/2025 CLASS 0   Final    Pigweed IgE 03/13/2025 <0.10  <0.10 kU/L Final    Common Pigweed Class 03/13/2025 CLASS 0   Final    [place] IgE 03/13/2025 <0.10  <0.10 kU/L Final    [place] Class 03/13/2025 CLASS 0   Final    A. alternata IgE 03/13/2025 <0.10  <0.10 kU/L Final    Altern. alternata Class 03/13/2025 CLASS 0   Final    Pecan [place] Tree IgE 03/13/2025 <0.10  <0.10 kU/L Final    [place]ec[place], Class 03/13/2025 CLASS 0   Final    Mucor racemosus, IgE 03/13/2025 <0.10  <0.10 kU/L Final    Mucor racemosus Class 03/13/2025 CLASS 0   Final    [place] Grass ([place]), IgE 03/13/2025 <0.10  <0.10 kU/L Final    [place] Grass ([place]) Class 03/13/2025 CLASS 0   Final    [place]m [place], IgE 03/13/2025 <0.10  <0.10 kU/L Final    Elm [place] Class 03/13/2025 CLASS 0   Final    Dash Grass IgE 03/13/2025 <0.10  <0.10 kU/L Final    Dash Grass Class 03/13/2025 CLASS 0   Final    Ragweed, Short, IgE 03/13/2025 <0.10  <0.10 kU/L Final    Ragweed, Short, Class 03/13/2025 CLASS 0   Final    [place] IgE 03/13/2025 <0.10  <0.10 kU/L Final    [place] Class 03/13/2025 CLASS  0   Final    [place] Grass IgE 03/13/2025 <0.10  <0.10 kU/L Final    [place] Grass Class 03/13/2025 CLASS 0   Final    Dog Dander IgE 03/13/2025 <0.10  <0.10 kU/L Final    Dog Dander Class 03/13/2025 CLASS 0   Final    D. pteronyssinus Dust Mite IgE 03/13/2025 <0.10  <0.10 kU/L Final    D. pteronyssinus Class 03/13/2025 CLASS 0   Final    D. farinae 03/13/2025 <0.10  <0.10 kU/L Final    D. farinae Class 03/13/2025 CLASS 0   Final    [place] 03/13/2025 <0.10  <0.10 kU/L Final    Bald [place] Class 03/13/2025 CLASS 0   Final    Cladosporium IgE 03/13/2025 <0.10  <0.10 kU/L Final    Cladosporium Class 03/13/2025 CLASS 0   Final    Cat Dander IgE 03/13/2025 <0.10  <0.10 kU/L Final    Cat Epithelium Class 03/13/2025 CLASS 0   Final    [place] Grass IgE 03/13/2025 <0.10  <0.10 kU/L Final    [place] Grass Class 03/13/2025 CLASS 0   Final    Aspergillus Fumigatus IgE 03/13/2025 <0.10  <0.10 kU/L Final    A. fumigatus Class 03/13/2025 CLASS 0   Final    Allergen Name 03/13/2025 american cockroach   Final    Allergen Result 03/13/2025 See result image under hyperlink   Final    [place] Tree IgE 03/13/2025 <0.10  <0.10 kU/L Final    [place], Class 03/13/2025 CLASS 0   Final    [place] Tree IgE 03/13/2025 <0.10  <0.10 kU/L Final    Allergen [place] Class 03/13/2025 CLASS 0   Final    [place] Tree IgE 03/13/2025 <0.10  <0.10 kU/L Final    [place] Tree Class 03/13/2025 CLASS 0   Final    Silver [place] IgE 03/13/2025 <0.10  <0.10 kU/L Final    [place] Class 03/13/2025 CLASS 0   Final    [place] Tree IgE 03/13/2025 <0.10  <0.10 kU/L Final    [place] Class 03/13/2025 CLASS 0   Final    Total IgE 03/13/2025 <21  0 - 100 IU/mL Final    T3, Free 03/13/2025 2.8  2.3 - 4.2 pg/mL Final    Free T4 03/13/2025 0.86  0.71 - 1.51 ng/dL Final    TSH 03/13/2025 4.222 (H)  0.400 - 4.000 uIU/mL Final   Lab Visit on 12/04/2024   Component Date Value Ref Range Status    Sodium 12/04/2024 144  136 - 145 mmol/L Final    Potassium  12/04/2024 3.6  3.5 - 5.1 mmol/L Final    Chloride 12/04/2024 104  95 - 110 mmol/L Final    CO2 12/04/2024 32 (H)  23 - 29 mmol/L Final    Glucose 12/04/2024 80  70 - 110 mg/dL Final    BUN 12/04/2024 17  8 - 23 mg/dL Final    Creatinine 12/04/2024 0.9  0.5 - 1.4 mg/dL Final    Calcium 12/04/2024 9.8  8.7 - 10.5 mg/dL Final    Total Protein 12/04/2024 6.7  6.0 - 8.4 g/dL Final    Albumin 12/04/2024 3.5  3.5 - 5.2 g/dL Final    Total Bilirubin 12/04/2024 0.4  0.1 - 1.0 mg/dL Final    Alkaline Phosphatase 12/04/2024 62  40 - 150 U/L Final    AST 12/04/2024 22  10 - 40 U/L Final    ALT 12/04/2024 17  10 - 44 U/L Final    eGFR 12/04/2024 >60.0  >60 mL/min/1.73 m^2 Final    Anion Gap 12/04/2024 8  8 - 16 mmol/L Final    Hemoglobin A1C 12/04/2024 5.3  4.0 - 5.6 % Final    Estimated Avg Glucose 12/04/2024 105  68 - 131 mg/dL Final    Microalbumin, Urine 12/04/2024 12.0  ug/mL Final    Creatinine, Urine 12/04/2024 93.0  15.0 - 325.0 mg/dL Final    Microalb/Creat Ratio 12/04/2024 12.9  0.0 - 30.0 ug/mg Final   Lab Visit on 11/18/2024   Component Date Value Ref Range Status    Cholesterol 11/18/2024 98 (L)  120 - 199 mg/dL Final    Triglycerides 11/18/2024 187 (H)  30 - 150 mg/dL Final    HDL 11/18/2024 37 (L)  40 - 75 mg/dL Final    LDL Cholesterol 11/18/2024 23.6 (L)  63.0 - 159.0 mg/dL Final    HDL/Cholesterol Ratio 11/18/2024 37.8  20.0 - 50.0 % Final    Total Cholesterol/HDL Ratio 11/18/2024 2.6  2.0 - 5.0 Final    Non-HDL Cholesterol 11/18/2024 61  mg/dL Final    Total Protein 11/18/2024 6.6  6.0 - 8.4 g/dL Final    Albumin 11/18/2024 3.9  3.5 - 5.2 g/dL Final    Total Bilirubin 11/18/2024 0.4  0.1 - 1.0 mg/dL Final    Bilirubin, Direct 11/18/2024 0.1  0.1 - 0.3 mg/dL Final    AST 11/18/2024 18  10 - 40 U/L Final    ALT 11/18/2024 13  10 - 44 U/L Final    Alkaline Phosphatase 11/18/2024 60  55 - 135 U/L Final    WBC 11/18/2024 5.77  3.90 - 12.70 K/uL Final    RBC 11/18/2024 5.04  4.00 - 5.40 M/uL Final    Hemoglobin  11/18/2024 13.2  12.0 - 16.0 g/dL Final    Hematocrit 11/18/2024 43.1  37.0 - 48.5 % Final    MCV 11/18/2024 86  82 - 98 fL Final    MCH 11/18/2024 26.2 (L)  27.0 - 31.0 pg Final    MCHC 11/18/2024 30.6 (L)  32.0 - 36.0 g/dL Final    RDW 11/18/2024 14.6 (H)  11.5 - 14.5 % Final    Platelets 11/18/2024 321  150 - 450 K/uL Final    MPV 11/18/2024 10.8  9.2 - 12.9 fL Final    Immature Granulocytes 11/18/2024 0.3  0.0 - 0.5 % Final    Gran # (ANC) 11/18/2024 3.4  1.8 - 7.7 K/uL Final    Immature Grans (Abs) 11/18/2024 0.02  0.00 - 0.04 K/uL Final    Lymph # 11/18/2024 1.6  1.0 - 4.8 K/uL Final    Mono # 11/18/2024 0.6  0.3 - 1.0 K/uL Final    Eos # 11/18/2024 0.1  0.0 - 0.5 K/uL Final    Baso # 11/18/2024 0.05  0.00 - 0.20 K/uL Final    nRBC 11/18/2024 0  0 /100 WBC Final    Gran % 11/18/2024 59.1  38.0 - 73.0 % Final    Lymph % 11/18/2024 27.7  18.0 - 48.0 % Final    Mono % 11/18/2024 9.9  4.0 - 15.0 % Final    Eosinophil % 11/18/2024 2.1  0.0 - 8.0 % Final    Basophil % 11/18/2024 0.9  0.0 - 1.9 % Final    Differential Method 11/18/2024 Automated   Final    Sodium 11/18/2024 141  136 - 145 mmol/L Final    Potassium 11/18/2024 3.0 (L)  3.5 - 5.1 mmol/L Final    Chloride 11/18/2024 101  95 - 110 mmol/L Final    CO2 11/18/2024 32 (H)  23 - 29 mmol/L Final    Glucose 11/18/2024 78  70 - 110 mg/dL Final    BUN 11/18/2024 19  8 - 23 mg/dL Final    Creatinine 11/18/2024 0.9  0.5 - 1.4 mg/dL Final    Calcium 11/18/2024 9.7  8.7 - 10.5 mg/dL Final    Total Protein 11/18/2024 6.5  6.0 - 8.4 g/dL Final    Albumin 11/18/2024 3.8  3.5 - 5.2 g/dL Final    Total Bilirubin 11/18/2024 0.4  0.1 - 1.0 mg/dL Final    Alkaline Phosphatase 11/18/2024 63  55 - 135 U/L Final    AST 11/18/2024 19  10 - 40 U/L Final    ALT 11/18/2024 13  10 - 44 U/L Final    eGFR 11/18/2024 >60.0  >60 mL/min/1.73 m^2 Final    Anion Gap 11/18/2024 8  8 - 16 mmol/L Final    Iron 11/18/2024 60  30 - 160 ug/dL Final    Transferrin 11/18/2024 237  200 - 375 mg/dL  Final    TIBC 2024 332  250 - 450 ug/dL Final    Saturated Iron 2024 18 (L)  20 - 50 % Final    Ferritin 2024 25.6  20.0 - 300.0 ng/mL Final    Folate 2024 >22.3  4.0 - 24.0 ng/mL Final    Vitamin B-12 2024 325  210 - 950 pg/mL Final       Past Medical History:   Diagnosis Date    Acute obstructive cholangitis 2023    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 2021    CAD (coronary artery disease)     Cholangitis 2023    w/septic shock, Ecoli bacteremia    Constipation 2023    Diabetes mellitus, type 2     Encephalopathy, metabolic 2023    Gallstone pancreatitis 2023    Hypertension     Iron deficiency anemia following bariatric surgery 2021    MI (myocardial infarction)     Obesity (BMI 30-39.9) 2023    Secondary thrombocytosis 2021    Shock liver 2023    Sleep apnea 2019    Sleep Right      Social History[1]  Past Surgical History:   Procedure Laterality Date    ANGIOGRAM, CORONARY, WITH LEFT HEART CATHETERIZATION      APPENDECTOMY      BARIATRIC SURGERY  2019    Dr Nunez. severe wound inf after surgery    CARPAL TUNNEL RELEASE Bilateral 2002     SECTION      CHOLECYSTECTOMY      COLONOSCOPY      CORONARY ANGIOPLASTY WITH STENT PLACEMENT      CORONARY ARTERY BYPASS GRAFT      EPIDURAL STEROID INJECTION INTO LUMBAR SPINE      x2    ERCP N/A 2023    Procedure: ERCP (ENDOSCOPIC RETROGRADE CHOLANGIOPANCREATOGRAPHY);  Surgeon: Lavon Hernandez III, MD;  Location: [facility];  Service: Endoscopy;  Laterality: N/A;    ERCP W/ SPHICTEROTOMY  2023    ESOPHAGOGASTRODUODENOSCOPY      HERNIA REPAIR  2019    HYSTERECTOMY      THYROID LOBECTOMY Right 2021    Procedure: LOBECTOMY, THYROID;  Surgeon: Mari Chance MD;  Location: [facility] OR;  Service: General;  Laterality: Right;     Family History   Problem Relation Name Age of Onset    Diabetes Mother      Vision loss Mother      Heart disease Father       "Vision loss Father      Stroke Father         The CVD Risk score (PIERRE'Adria, et al., 2008) failed to calculate for the following reasons:    The patient has a prior MI, stroke, CHF, or peripheral vascular disease diagnosis    Tests to Keep You Healthy    Mammogram: Met on 5/20/2025  Eye Exam: DUE  Colon Cancer Screening: DUE  Last Blood Pressure <= 139/89 (5/5/2025): Yes  Last HbA1c < 8 (12/04/2024): Yes      Review of patient's allergies indicates:   Allergen Reactions    Adhesive tape-silicones Rash    Dye Hives    Cephalexin     Iodine     Penicillins Edema    Pneumococcal vaccine     Iodinated contrast media Rash     Current Medications[2]    Review of Systems        Objective:      Vitals:    05/05/25 1350   BP: (!) 132/52   Pulse: 60   SpO2: 99%   Weight: 104.8 kg (231 lb)   Height: 5' 3" (1.6 m)     Physical Exam  Vitals and nursing note reviewed.   Constitutional:       General: She is not in acute distress.     Appearance: Normal appearance. She is well-developed. She is not ill-appearing.   HENT:      Head: Normocephalic and atraumatic.      Right Ear: External ear normal.      Left Ear: External ear normal.      Mouth/Throat:      Lips: Pink.      Pharynx: Oropharynx is clear.   Eyes:      General: No scleral icterus.  Neck:      Thyroid: No thyromegaly.   Cardiovascular:      Rate and Rhythm: Normal rate and regular rhythm.      Pulses:           Radial pulses are 2+ on the right side and 2+ on the left side.      Heart sounds: Normal heart sounds. No murmur heard.  Pulmonary:      Effort: Pulmonary effort is normal.      Breath sounds: Normal breath sounds.   Abdominal:      General: Bowel sounds are normal.      Palpations: Abdomen is soft.      Tenderness: There is no abdominal tenderness.   Musculoskeletal:         General: Normal range of motion.      Lumbar back: Normal. No spasms.      Right lower leg: No edema.      Left lower leg: No edema.   Skin:     General: Skin is warm and dry.      " Capillary Refill: Capillary refill takes less than 2 seconds.   Neurological:      General: No focal deficit present.      Mental Status: She is alert and oriented to person, place, and time.      Cranial Nerves: No cranial nerve deficit.      Sensory: Sensation is intact.      Motor: No weakness.      Gait: Gait is intact.   Psychiatric:         Attention and Perception: Attention normal.         Speech: Speech normal.         Behavior: Behavior is cooperative.         Thought Content: Thought content does not include homicidal or suicidal ideation.                     Assessment:       1. Hospital discharge follow-up    2. Diarrhea, unspecified type    3. Vagina, candidiasis         Plan:       Hospital discharge follow-up    Diarrhea, unspecified type  Still having some loose stool but in the last day or so has improved. Advised that if she is really still having frequent, liquid stool, can collect for studies.   -     WBC, Stool; Future; Expected date: 05/05/2025  -     Rotavirus antigen, stool; Future; Expected date: 05/05/2025  -     Adenovirus Molecular Detection, PCR, Non-Blood Stool; Future; Expected date: 05/05/2025  -     Giardia / Cryptosporidum, EIA; Future; Expected date: 05/05/2025  -     Stool Exam-Ova,Cysts,Parasites; Future; Expected date: 05/05/2025  -     Clostridium difficile EIA; Future; Expected date: 05/05/2025  -     Stool culture; Future; Expected date: 05/05/2025    Vagina, candidiasis  Discussed medication's mechanism of action, side effects, and contraindications.   -     fluconazole (DIFLUCAN) 150 MG Tab; Take 1 tablet (150 mg total) by mouth every 72 hours. for 2 doses  Dispense: 2 tablet; Refill: 0                  Follow up for keep regular follow up .        This note was generated with the assistance of ambient listening technology. Verbal consent was obtained by the patient and accompanying visitor(s) for the recording of patient appointment to facilitate this note. I attest to  "having reviewed and edited the generated note for accuracy, though some syntax or spelling errors may persist. Please contact the author of this note for any clarification.      2025 Ailyn De Leon         [1]   Social History  Socioeconomic History    Marital status:    Tobacco Use    Smoking status: Former     Current packs/day: 0.00     Average packs/day: 1 pack/day for 15.0 years (15.0 ttl pk-yrs)     Types: Cigarettes     Start date:      Quit date:      Years since quittin.4    Smokeless tobacco: Never   Substance and Sexual Activity    Alcohol use: No     Comment: "maybe once a year"    Drug use: No    Sexual activity: Not Currently     Social Drivers of Health     Financial Resource Strain: Patient Declined (2025)    Overall Financial Resource Strain (CARDIA)     Difficulty of Paying Living Expenses: Patient declined   Food Insecurity: Patient Declined (2025)    Hunger Vital Sign     Worried About Running Out of Food in the Last Year: Patient declined     Ran Out of Food in the Last Year: Patient declined   Transportation Needs: Patient Declined (2025)    PRAPARE - Transportation     Lack of Transportation (Medical): Patient declined     Lack of Transportation (Non-Medical): Patient declined   Physical Activity: Unknown (2024)    Exercise Vital Sign     Days of Exercise per Week: 0 days   Stress: Patient Declined (2025)    [employer] [place] [employer] - Occupational Stress Questionnaire     Feeling of Stress : Patient declined   Housing Stability: Patient Declined (2025)    Housing Stability Vital Sign     Unable to Pay for Housing in the Last Year: Patient declined     Homeless in the Last Year: Patient declined   [2]   Current Outpatient Medications:     amLODIPine (NORVASC) 2.5 MG tablet, Take 1 tablet (2.5 mg total) by mouth once daily., Disp: 90 tablet, Rfl: 3    aspirin (ECOTRIN) 81 MG EC tablet, Take 1 tablet (81 mg total) by mouth " once daily., Disp: 30 tablet, Rfl: 0    azelastine (ASTELIN) 137 mcg (0.1 %) nasal spray, 1 spray (137 mcg total) by Nasal route 2 (two) times daily. Point up and slightly outward toward ear when spraying to avoid irritating nasal septum., Disp: 30 mL, Rfl: 2    azithromycin (Z-PHI) 250 MG tablet, Take 2 tablets by mouth on day 1; Take 1 tablet by mouth on days 2-5, Disp: 4 tablet, Rfl: 0    blood sugar diagnostic Strp, One Touch Ultra Blue Test strips, Use l strip to check glucose 4 times daily, Disp: 100 each, Rfl: 5    calcium carbonate-vitamin D3 600 mg-62.5 mcg (2,500 unit) Cap, Take 1 capsule by mouth once daily., Disp: , Rfl:     DULoxetine (CYMBALTA) 60 MG capsule, Take 1 capsule (60 mg total) by mouth once daily., Disp: 90 capsule, Rfl: 2    empaglifloz-linaglip-metformin (TRIJARDY XR) 25-5-1,000 mg TBph, Take 1 tablet by mouth once daily., Disp: 90 tablet, Rfl: 3    fluticasone propionate (FLONASE) 50 mcg/actuation nasal spray, 1 spray (50 mcg total) by Each Nostril route 2 (two) times daily. Point up and slightly outward toward ear when spraying to avoid irritating nasal septum., Disp: 16 g, Rfl: 2    guanFACINE (TENEX) 2 MG tablet, Take 1 tablet (2 mg total) by mouth every evening., Disp: 90 tablet, Rfl: 3    insulin degludec (TRESIBA FLEXTOUCH U-200) 200 unit/mL (3 mL) insulin pen, Inject 78 Units into the skin every evening., Disp: 11.7 mL, Rfl: 11    iron-vitamin C 100-250 mg, ICAR-C, 100-250 mg Tab, Take 1 tablet by mouth once daily., Disp: 90 tablet, Rfl: 3    lancets (ONETOUCH DELICA LANCETS) 33 gauge Misc, USE 1  TO CHECK GLUCOSE 4 TIMES DAILY, Disp: 100 each, Rfl: 3    levocetirizine (XYZAL) 5 MG tablet, Take 1 tablet (5 mg total) by mouth every evening., Disp: 90 tablet, Rfl: 3    magnesium oxide (MAG-OX) 400 mg (241.3 mg magnesium) tablet, Take 1 tablet (400 mg total) by mouth once daily. (Patient taking differently: Take 400 mg by mouth once daily.), Disp: 90 tablet, Rfl: 3    metFORMIN  (GLUCOPHAGE) 1000 MG tablet, Take 1 tablet (1,000 mg total) by mouth daily with breakfast., Disp: 90 tablet, Rfl: 3    metOLazone (ZAROXOLYN) 2.5 MG tablet, Take 1 tablet (2.5 mg total) by mouth once daily., Disp: 90 tablet, Rfl: 3    metoprolol succinate (TOPROL-XL) 25 MG 24 hr tablet, Take 1 tablet (25 mg total) by mouth once daily., Disp: 90 tablet, Rfl: 3    multivitamin capsule, Take 1 capsule by mouth once daily., Disp: , Rfl:     nystatin (MYCOSTATIN) powder, Apply topically 3 (three) times daily. (Patient taking differently: Apply 1 g topically 3 (three) times daily.), Disp: 60 g, Rfl: 2    pantoprazole (PROTONIX) 40 MG tablet, Take 1 tablet (40 mg total) by mouth once daily. Take 30 minutes prior to eating breakfast., Disp: 90 tablet, Rfl: 3    potassium chloride (K-TAB) 20 mEq, Take 1 tablet (20 mEq total) by mouth Daily., Disp: 90 tablet, Rfl: 3    prednisoLONE acetate (PRED FORTE) 1 % DrpS, Place 1 drop into both eyes as needed. , Disp: , Rfl:     rosuvastatin (CRESTOR) 40 MG Tab, Take 1 tablet (40 mg total) by mouth every evening., Disp: 90 tablet, Rfl: 3    blood-glucose meter kit, Use as instructed, Disp: 1 each, Rfl: 0

## 2025-05-05 NOTE — PATIENT INSTRUCTIONS
If loose stool continues, you have abdominal pain, vomiting, fever, let me know.  If stool remains loose, will have you collect sample to be turned in to [facility] to check for C. Diff.

## 2025-05-15 ENCOUNTER — TELEPHONE (OUTPATIENT)
Dept: FAMILY MEDICINE | Facility: CLINIC | Age: 70
End: 2025-05-15
Payer: MEDICARE

## 2025-05-15 NOTE — TELEPHONE ENCOUNTER
Spoke to patient about Colonoscopy and Eye Exam due. Pt states she needs to call Dr Guan and get the appt scheduled. She has a lot of family issues going on currently. She also stated once things get a little better she will send in the cologuard.

## 2025-05-20 ENCOUNTER — HOSPITAL ENCOUNTER (OUTPATIENT)
Dept: RADIOLOGY | Facility: HOSPITAL | Age: 70
Discharge: HOME | End: 2025-05-20
Payer: MEDICARE

## 2025-05-20 ENCOUNTER — RESULTS FOLLOW-UP (OUTPATIENT)
Facility: CLINIC | Age: 70
End: 2025-05-20

## 2025-05-20 ENCOUNTER — TELEPHONE (OUTPATIENT)
Facility: CLINIC | Age: 70
End: 2025-05-20
Payer: MEDICARE

## 2025-05-20 ENCOUNTER — HOSPITAL ENCOUNTER (OUTPATIENT)
Dept: RADIOLOGY | Facility: HOSPITAL | Age: 70
Discharge: HOME | End: 2025-05-20
Attending: INTERNAL MEDICINE
Payer: MEDICARE

## 2025-05-20 VITALS — BODY MASS INDEX: 40.93 KG/M2 | HEIGHT: 63 IN | WEIGHT: 231 LBS

## 2025-05-20 DIAGNOSIS — R91.1 LUNG NODULE: ICD-10-CM

## 2025-05-20 DIAGNOSIS — Z12.31 OTHER SCREENING MAMMOGRAM: ICD-10-CM

## 2025-05-20 DIAGNOSIS — R93.89 ABNORMAL CT OF THE CHEST: Primary | ICD-10-CM

## 2025-05-20 PROCEDURE — 77063 BREAST TOMOSYNTHESIS BI: CPT | Mod: TC,PO

## 2025-05-20 PROCEDURE — 77067 SCR MAMMO BI INCL CAD: CPT | Mod: 26,,, | Performed by: RADIOLOGY

## 2025-05-20 PROCEDURE — 71250 CT THORAX DX C-: CPT | Mod: 26,,, | Performed by: RADIOLOGY

## 2025-05-20 PROCEDURE — 77063 BREAST TOMOSYNTHESIS BI: CPT | Mod: 26,,, | Performed by: RADIOLOGY

## 2025-05-20 PROCEDURE — 71250 CT THORAX DX C-: CPT | Mod: TC,PO

## 2025-05-20 NOTE — TELEPHONE ENCOUNTER
----- Message from Arlen Farfan NP sent at 5/20/2025  1:28 PM CDT -----  Slight change in nodule, need a PET scan.   ----- Message -----  From: Interface, Rad Results In  Sent: 5/20/2025  11:44 AM CDT  To: Mauricio Jackson MD

## 2025-05-20 NOTE — TELEPHONE ENCOUNTER
Patient made aware of PET results and informed of need for PET. Instructed that scheduling will call with appt for PET. Verbalized understanding. Spoke to Angel in scheduling, made aware of need to schedule PET, states he will call with appt.

## 2025-05-26 ENCOUNTER — TELEPHONE (OUTPATIENT)
Dept: FAMILY MEDICINE | Facility: CLINIC | Age: 70
End: 2025-05-26
Payer: MEDICARE

## 2025-05-26 NOTE — TELEPHONE ENCOUNTER
Note placed in CC of next OV. Last note from 5/15 says patient will turn in cologuard but she was just + for cdiff. Per claudia, needs to wait at least 3 months between having cdiff and having the cologuard done.

## 2025-06-03 ENCOUNTER — RESULTS FOLLOW-UP (OUTPATIENT)
Facility: CLINIC | Age: 70
End: 2025-06-03

## 2025-06-03 ENCOUNTER — TELEPHONE (OUTPATIENT)
Facility: CLINIC | Age: 70
End: 2025-06-03
Payer: MEDICARE

## 2025-06-03 ENCOUNTER — HOSPITAL ENCOUNTER (OUTPATIENT)
Dept: RADIOLOGY | Facility: HOSPITAL | Age: 70
Discharge: HOME | End: 2025-06-03
Attending: NURSE PRACTITIONER
Payer: MEDICARE

## 2025-06-03 DIAGNOSIS — R91.1 LUNG NODULE: ICD-10-CM

## 2025-06-03 DIAGNOSIS — R93.89 ABNORMAL CT OF THE CHEST: ICD-10-CM

## 2025-06-03 LAB — GLUCOSE SERPL-MCNC: 93 MG/DL (ref 70–110)

## 2025-06-03 PROCEDURE — A9552 F18 FDG: HCPCS | Mod: PN | Performed by: NURSE PRACTITIONER

## 2025-06-03 PROCEDURE — 78815 PET IMAGE W/CT SKULL-THIGH: CPT | Mod: 26,PS,, | Performed by: RADIOLOGY

## 2025-06-03 PROCEDURE — 78815 PET IMAGE W/CT SKULL-THIGH: CPT | Mod: TC,PN

## 2025-06-03 RX ORDER — FLUDEOXYGLUCOSE F18 500 MCI/ML
13 INJECTION INTRAVENOUS
Status: COMPLETED | OUTPATIENT
Start: 2025-06-03 | End: 2025-06-03

## 2025-06-03 RX ADMIN — FLUDEOXYGLUCOSE F-18 13 MILLICURIE: 500 INJECTION INTRAVENOUS at 11:06

## 2025-07-10 ENCOUNTER — TELEPHONE (OUTPATIENT)
Facility: CLINIC | Age: 70
End: 2025-07-10
Payer: MEDICARE

## 2025-08-11 ENCOUNTER — LAB VISIT (OUTPATIENT)
Dept: LAB | Facility: HOSPITAL | Age: 70
End: 2025-08-11
Payer: MEDICARE

## 2025-08-11 DIAGNOSIS — D50.8 IRON DEFICIENCY ANEMIA FOLLOWING BARIATRIC SURGERY: ICD-10-CM

## 2025-08-11 DIAGNOSIS — K95.89 IRON DEFICIENCY ANEMIA FOLLOWING BARIATRIC SURGERY: ICD-10-CM

## 2025-08-11 DIAGNOSIS — D64.9 ANEMIA, UNSPECIFIED TYPE: ICD-10-CM

## 2025-08-11 DIAGNOSIS — E53.8 B12 DEFICIENCY: ICD-10-CM

## 2025-08-11 LAB
ABSOLUTE EOSINOPHIL (SMH): 0.1 K/UL
ABSOLUTE MONOCYTE (SMH): 0.64 K/UL (ref 0.3–1)
ABSOLUTE NEUTROPHIL COUNT (SMH): 5.1 K/UL (ref 1.8–7.7)
ALBUMIN SERPL-MCNC: 3.8 G/DL (ref 3.5–5.2)
ALP SERPL-CCNC: 66 UNIT/L (ref 55–135)
ALT SERPL-CCNC: 12 UNIT/L (ref 10–44)
ANION GAP (SMH): 6 MMOL/L (ref 8–16)
AST SERPL-CCNC: 15 UNIT/L (ref 10–40)
BASOPHILS # BLD AUTO: 0.05 K/UL
BASOPHILS NFR BLD AUTO: 0.6 %
BILIRUB SERPL-MCNC: 0.3 MG/DL (ref 0.1–1)
BUN SERPL-MCNC: 21 MG/DL (ref 8–23)
CALCIUM SERPL-MCNC: 9.3 MG/DL (ref 8.7–10.5)
CHLORIDE SERPL-SCNC: 101 MMOL/L (ref 95–110)
CO2 SERPL-SCNC: 36 MMOL/L (ref 23–29)
CREAT SERPL-MCNC: 1 MG/DL (ref 0.5–1.4)
ERYTHROCYTE [DISTWIDTH] IN BLOOD BY AUTOMATED COUNT: 15.4 % (ref 11.5–14.5)
FERRITIN SERPL-MCNC: 51.3 NG/ML (ref 20–300)
FOLATE SERPL-MCNC: >22.3 NG/ML (ref 4–24)
GFR SERPLBLD CREATININE-BSD FMLA CKD-EPI: >60 ML/MIN/1.73/M2
GLUCOSE SERPL-MCNC: 118 MG/DL (ref 70–110)
HCT VFR BLD AUTO: 43.5 % (ref 37–48.5)
HGB BLD-MCNC: 13.5 GM/DL (ref 12–16)
IMM GRANULOCYTES # BLD AUTO: 0.04 K/UL (ref 0–0.04)
IMM GRANULOCYTES NFR BLD AUTO: 0.5 % (ref 0–0.5)
IRON SATN MFR SERPL: 17 % (ref 20–50)
IRON SERPL-MCNC: 48 UG/DL (ref 30–160)
LYMPHOCYTES # BLD AUTO: 1.98 K/UL (ref 1–4.8)
MCH RBC QN AUTO: 26.6 PG (ref 27–31)
MCHC RBC AUTO-ENTMCNC: 31 G/DL (ref 32–36)
MCV RBC AUTO: 86 FL (ref 82–98)
NUCLEATED RBC (/100WBC) (SMH): 0 /100 WBC
PLATELET # BLD AUTO: 308 K/UL (ref 150–450)
PMV BLD AUTO: 10.9 FL (ref 9.2–12.9)
POTASSIUM SERPL-SCNC: 3.3 MMOL/L (ref 3.5–5.1)
PROT SERPL-MCNC: 6.6 GM/DL (ref 6–8.4)
RBC # BLD AUTO: 5.07 M/UL (ref 4–5.4)
RELATIVE EOSINOPHIL (SMH): 1.3 % (ref 0–8)
RELATIVE LYMPHOCYTE (SMH): 24.9 % (ref 18–48)
RELATIVE MONOCYTE (SMH): 8.1 % (ref 4–15)
RELATIVE NEUTROPHIL (SMH): 64.6 % (ref 38–73)
SODIUM SERPL-SCNC: 143 MMOL/L (ref 136–145)
TIBC SERPL-MCNC: 290 UG/DL (ref 250–450)
TRANSFERRIN SERPL-MCNC: 207 MG/DL (ref 200–375)
VIT B12 SERPL-MCNC: 297 PG/ML (ref 210–950)
WBC # BLD AUTO: 7.94 K/UL (ref 3.9–12.7)

## 2025-08-11 PROCEDURE — 80053 COMPREHEN METABOLIC PANEL: CPT

## 2025-08-11 PROCEDURE — 85025 COMPLETE CBC W/AUTO DIFF WBC: CPT

## 2025-08-11 PROCEDURE — 82607 VITAMIN B-12: CPT

## 2025-08-11 PROCEDURE — 84466 ASSAY OF TRANSFERRIN: CPT

## 2025-08-11 PROCEDURE — 82746 ASSAY OF FOLIC ACID SERUM: CPT

## 2025-08-11 PROCEDURE — 36415 COLL VENOUS BLD VENIPUNCTURE: CPT | Mod: PO

## 2025-08-11 PROCEDURE — 82728 ASSAY OF FERRITIN: CPT

## 2025-08-12 ENCOUNTER — PATIENT MESSAGE (OUTPATIENT)
Dept: FAMILY MEDICINE | Facility: CLINIC | Age: 70
End: 2025-08-12
Payer: MEDICARE

## 2025-08-12 DIAGNOSIS — E78.2 MIXED HYPERLIPIDEMIA: ICD-10-CM

## 2025-08-12 DIAGNOSIS — I10 BENIGN ESSENTIAL HYPERTENSION: ICD-10-CM

## 2025-08-12 DIAGNOSIS — E89.0 POSTOPERATIVE HYPOTHYROIDISM: ICD-10-CM

## 2025-08-12 DIAGNOSIS — E11.9 TYPE 2 DIABETES MELLITUS TREATED WITH INSULIN: Primary | ICD-10-CM

## 2025-08-12 DIAGNOSIS — Z79.4 TYPE 2 DIABETES MELLITUS TREATED WITH INSULIN: Primary | ICD-10-CM

## 2025-08-14 ENCOUNTER — OFFICE VISIT (OUTPATIENT)
Facility: CLINIC | Age: 70
End: 2025-08-14
Payer: MEDICARE

## 2025-08-14 VITALS
HEART RATE: 59 BPM | BODY MASS INDEX: 41.31 KG/M2 | TEMPERATURE: 97 F | DIASTOLIC BLOOD PRESSURE: 74 MMHG | SYSTOLIC BLOOD PRESSURE: 145 MMHG | RESPIRATION RATE: 17 BRPM | OXYGEN SATURATION: 98 % | WEIGHT: 233.13 LBS | HEIGHT: 63 IN

## 2025-08-14 DIAGNOSIS — E53.8 B12 DEFICIENCY: ICD-10-CM

## 2025-08-14 DIAGNOSIS — K95.89 IRON DEFICIENCY ANEMIA FOLLOWING BARIATRIC SURGERY: Primary | ICD-10-CM

## 2025-08-14 DIAGNOSIS — D64.9 ANEMIA, UNSPECIFIED TYPE: ICD-10-CM

## 2025-08-14 DIAGNOSIS — D50.8 IRON DEFICIENCY ANEMIA FOLLOWING BARIATRIC SURGERY: Primary | ICD-10-CM

## 2025-08-14 PROCEDURE — 3288F FALL RISK ASSESSMENT DOCD: CPT | Mod: CPTII,S$GLB,, | Performed by: INTERNAL MEDICINE

## 2025-08-14 PROCEDURE — G2211 COMPLEX E/M VISIT ADD ON: HCPCS | Mod: S$GLB,,, | Performed by: INTERNAL MEDICINE

## 2025-08-14 PROCEDURE — 1125F AMNT PAIN NOTED PAIN PRSNT: CPT | Mod: CPTII,S$GLB,, | Performed by: INTERNAL MEDICINE

## 2025-08-14 PROCEDURE — 1101F PT FALLS ASSESS-DOCD LE1/YR: CPT | Mod: CPTII,S$GLB,, | Performed by: INTERNAL MEDICINE

## 2025-08-14 PROCEDURE — 3077F SYST BP >= 140 MM HG: CPT | Mod: CPTII,S$GLB,, | Performed by: INTERNAL MEDICINE

## 2025-08-14 PROCEDURE — 1159F MED LIST DOCD IN RCRD: CPT | Mod: CPTII,S$GLB,, | Performed by: INTERNAL MEDICINE

## 2025-08-14 PROCEDURE — 3078F DIAST BP <80 MM HG: CPT | Mod: CPTII,S$GLB,, | Performed by: INTERNAL MEDICINE

## 2025-08-14 PROCEDURE — 99214 OFFICE O/P EST MOD 30 MIN: CPT | Mod: S$GLB,,, | Performed by: INTERNAL MEDICINE

## 2025-08-14 PROCEDURE — 99999 PR PBB SHADOW E&M-EST. PATIENT-LVL V: CPT | Mod: PBBFAC,,, | Performed by: INTERNAL MEDICINE

## 2025-08-14 PROCEDURE — 1160F RVW MEDS BY RX/DR IN RCRD: CPT | Mod: CPTII,S$GLB,, | Performed by: INTERNAL MEDICINE

## 2025-08-14 PROCEDURE — 3008F BODY MASS INDEX DOCD: CPT | Mod: CPTII,S$GLB,, | Performed by: INTERNAL MEDICINE

## (undated) DEVICE — SPONGE XRAY DETECTABLE 4X4

## (undated) DEVICE — GLOVE SURGICAL SIZE 7 STRAW POLYISOPRENE POWDER FREE

## (undated) DEVICE — COVER LIGHT HANDLE LB53

## (undated) DEVICE — SOLUTION IRRI NS BOTTLE 1000ML R5200-01

## (undated) DEVICE — DRESSING TEGADERM 4X4 3/4 TD1004

## (undated) DEVICE — DISSECTOR EXACT LIGASURE 20.6MM-21CM

## (undated) DEVICE — DRAPE THYROID 89255

## (undated) DEVICE — SUTURE SILK 2-0 SH 30 K833H

## (undated) DEVICE — CLIP APPLIER MCS20 STERILMED ETHMCS20

## (undated) DEVICE — POUCH INSTRUMENT 1018

## (undated) DEVICE — HEMOSTAT FIBRILLAR 2X4 1962

## (undated) DEVICE — SPONGE PEANUT 3/8 7103

## (undated) DEVICE — SPONGE GAUZE 2S 4X4 STERILE NON21424

## (undated) DEVICE — SYRINGE HYPODERMC LL 22G 1.5 ECLIPSE 30

## (undated) DEVICE — SUTURE CHROMIC 3-0 SH 27 G122H

## (undated) DEVICE — Device

## (undated) DEVICE — PAD BOVIE ADULT

## (undated) DEVICE — SUTURE SILK 3-0 18 A184H

## (undated) DEVICE — TRAY GENERAL SURGERY

## (undated) DEVICE — CLIP APPLIER MSM20 STERILMED  ETHMSM20

## (undated) DEVICE — SUTURE VICRYL 2-0 SH 18 VCP775D

## (undated) DEVICE — SUTURE STRATAFIX SPIRAL 3-0 60CM PS-2

## (undated) DEVICE — DRAPE MAGNETIC 16X20 Q420

## (undated) DEVICE — SUTURE SILK 2-0 18 A185H

## (undated) DEVICE — SUTURE SILK 3-0 SH 18 C013D

## (undated) DEVICE — ADHESIVE TISSUE EXOFIN

## (undated) DEVICE — SUTURE SILK 4-0 18 A183H

## (undated) DEVICE — TIP BOVIE ENT 2.75      E1455

## (undated) DEVICE — SUTURE VICRYL 4-0 SH 27 J415H